# Patient Record
Sex: MALE | Race: WHITE | NOT HISPANIC OR LATINO | Employment: OTHER | URBAN - NONMETROPOLITAN AREA
[De-identification: names, ages, dates, MRNs, and addresses within clinical notes are randomized per-mention and may not be internally consistent; named-entity substitution may affect disease eponyms.]

---

## 2017-06-13 ENCOUNTER — OFFICE VISIT (OUTPATIENT)
Dept: GASTROENTEROLOGY | Facility: CLINIC | Age: 77
End: 2017-06-13

## 2017-06-13 VITALS
BODY MASS INDEX: 26.88 KG/M2 | HEART RATE: 70 BPM | DIASTOLIC BLOOD PRESSURE: 70 MMHG | OXYGEN SATURATION: 96 % | HEIGHT: 71 IN | WEIGHT: 192 LBS | SYSTOLIC BLOOD PRESSURE: 118 MMHG

## 2017-06-13 DIAGNOSIS — I10 HTN (HYPERTENSION), BENIGN: ICD-10-CM

## 2017-06-13 DIAGNOSIS — Z86.010 HX OF COLONIC POLYPS: Primary | ICD-10-CM

## 2017-06-13 PROCEDURE — S0260 H&P FOR SURGERY: HCPCS | Performed by: CLINICAL NURSE SPECIALIST

## 2017-06-13 RX ORDER — LOVASTATIN 40 MG/1
40 TABLET ORAL NIGHTLY
COMMUNITY
Start: 2017-06-06

## 2017-06-13 RX ORDER — AMLODIPINE BESYLATE 5 MG/1
5 TABLET ORAL DAILY
COMMUNITY
Start: 2017-04-06 | End: 2022-08-31

## 2017-06-13 RX ORDER — LISINOPRIL 20 MG/1
20 TABLET ORAL DAILY
COMMUNITY
Start: 2017-04-06 | End: 2022-08-31

## 2017-06-13 RX ORDER — FINASTERIDE 5 MG/1
5 TABLET, FILM COATED ORAL DAILY
COMMUNITY
Start: 2017-06-06

## 2017-06-13 NOTE — PROGRESS NOTES
Ever Monson  1940 6/13/2017  Chief Complaint   Patient presents with   • Colonoscopy     Subjective   HPI  Ever Monson is a 76 y.o. male who presents as a referral for preventative maintenance. He has no complaints of nausea or vomiting. No change in bowels. No wt loss. No BRBPR. No melena. There is a negative family hx for colon cancer. No abdominal pain.  Past Medical History:   Diagnosis Date   • BPH (benign prostatic hyperplasia)    • GERD (gastroesophageal reflux disease)    • Hx of colonic polyps    • Hypercholesteremia    • Hypertension    • Melanoma    • Sleep apnea      Past Surgical History:   Procedure Laterality Date   • CHOLECYSTECTOMY     • COLONOSCOPY W/ POLYPECTOMY  06/05/2012    Hyperplastic polyp in the rectum repeat exam in 5 years   • ENDOSCOPY  08/14/2007    Distal esophageal ring, HH   • LYMPH NODE DISSECTION      Right arm     Outpatient Prescriptions Marked as Taking for the 6/13/17 encounter (Office Visit) with JOSE Delvalle   Medication Sig Dispense Refill   • amLODIPine (NORVASC) 5 MG tablet      • finasteride (PROSCAR) 5 MG tablet      • lisinopril (PRINIVIL,ZESTRIL) 20 MG tablet      • lovastatin (MEVACOR) 40 MG tablet        No Known Allergies  Social History     Social History   • Marital status:      Spouse name: N/A   • Number of children: N/A   • Years of education: N/A     Occupational History   • Not on file.     Social History Main Topics   • Smoking status: Former Smoker   • Smokeless tobacco: Never Used   • Alcohol use No   • Drug use: Not on file   • Sexual activity: Not on file     Other Topics Concern   • Not on file     Social History Narrative     Family History   Problem Relation Age of Onset   • Colon cancer Neg Hx    • Colon polyps Neg Hx      Health Maintenance   Topic Date Due   • TDAP/TD VACCINES (1 - Tdap) 09/12/1959   • PNEUMOCOCCAL VACCINES (65+ LOW/MEDIUM RISK) (1 of 2 - PCV13) 09/12/2005   • ZOSTER VACCINE   "06/06/2017   • LIPID PANEL  06/13/2017   • INFLUENZA VACCINE  08/01/2017       REVIEW OF SYSTEMS  General: well appearing, no fever chills or sweats, no unexplained wt loss  HEENT: no acute visual or hearing disturbances  Cardiovascular: No chest pain or palpitations  Pulmonary: No shortness of breath, coughing, wheezing or hemoptysis  : No burning, urgency, hematuria, or dysuria  Musculoskeletal: No joint pain or stiffness  Peripheral: no edema  Skin: No lesions or rashes  Neuro: No dizziness, headaches, stroke, syncope  Endocrine: No hot or cold intolerances  Hematological: No blood dyscrasias    Objective   Vitals:    06/13/17 1241   BP: 118/70   Pulse: 70   SpO2: 96%   Weight: 192 lb (87.1 kg)   Height: 71\" (180.3 cm)     Body mass index is 26.78 kg/(m^2).    PHYSICAL EXAM  General: age appropriate well nourished well appearing, no acute distress  Head: normocephalic and atraumatic  Global assessment-supple  Neck-No JVD noted, no lymphadenopathy  Pulmonary-clear to auscultation bilaterally, normal respiratory effort  Cardiovascular-normal rate and rhythm, normal heart sounds, S1 and S2 noted  Abdomen-soft, non tender, non distended, normal bowel sounds all 4 quadrants, no hepatosplenomegaly noted  Extremities-No clubbing cyanosis or edema  Neuro-Non focal, converses appropriately, awake, alert, oriented    Assessment/Plan     Ever was seen today for colonoscopy.    Diagnoses and all orders for this visit:    Hx of colonic polyps  -     polyethylene glycol (GoLYTELY) 236 G solution; Take as directed by office instructions.  -     Case Request; Standing  -     Case Request    HTN (hypertension), benign  Comments:  continue BP medication the day of procedure    Other orders  -     Implement Anesthesia Orders Day of Procedure; Standing  -     Obtain Informed Consent; Standing  -     Verify bowel prep was successful; Standing        COLONOSCOPY WITH ANESTHESIA (N/A)  Body mass index is 26.78 kg/(m^2).  There are " no Patient Instructions on file for this visit.  Patient instructions on prep prior to procedure provided to the patient.    All risks, benefits, alternatives, and indications of colonoscopy procedure have been discussed with the patient. Risks to include perforation of the colon requiring possible surgery or colostomy, risk of bleeding from biopsies or removal of colon tissue, possibility of missing a colon polyp or cancer, or adverse drug reaction.  Benefits to include the diagnosis and management of disease of the colon and rectum. Alternatives to include barium enema, radiographic evaluation, lab testing or no intervention. Pt verbalizes understanding and agrees.

## 2017-08-07 ENCOUNTER — ANESTHESIA (OUTPATIENT)
Dept: GASTROENTEROLOGY | Facility: HOSPITAL | Age: 77
End: 2017-08-07

## 2017-08-07 ENCOUNTER — ANESTHESIA EVENT (OUTPATIENT)
Dept: GASTROENTEROLOGY | Facility: HOSPITAL | Age: 77
End: 2017-08-07

## 2017-08-07 ENCOUNTER — HOSPITAL ENCOUNTER (OUTPATIENT)
Facility: HOSPITAL | Age: 77
Setting detail: HOSPITAL OUTPATIENT SURGERY
Discharge: HOME | End: 2017-08-07
Attending: INTERNAL MEDICINE | Admitting: INTERNAL MEDICINE

## 2017-08-07 VITALS
DIASTOLIC BLOOD PRESSURE: 74 MMHG | WEIGHT: 185 LBS | RESPIRATION RATE: 16 BRPM | HEIGHT: 69 IN | TEMPERATURE: 97.1 F | OXYGEN SATURATION: 94 % | BODY MASS INDEX: 27.4 KG/M2 | HEART RATE: 76 BPM | SYSTOLIC BLOOD PRESSURE: 130 MMHG

## 2017-08-07 DIAGNOSIS — Z86.010 HX OF COLONIC POLYPS: ICD-10-CM

## 2017-08-07 PROCEDURE — 25010000002 PROPOFOL 10 MG/ML EMULSION: Performed by: NURSE ANESTHETIST, CERTIFIED REGISTERED

## 2017-08-07 PROCEDURE — 88305 TISSUE EXAM BY PATHOLOGIST: CPT | Performed by: INTERNAL MEDICINE

## 2017-08-07 PROCEDURE — 45385 COLONOSCOPY W/LESION REMOVAL: CPT | Performed by: INTERNAL MEDICINE

## 2017-08-07 RX ORDER — LIDOCAINE HYDROCHLORIDE 10 MG/ML
0.5 INJECTION, SOLUTION INFILTRATION; PERINEURAL ONCE AS NEEDED
Status: DISCONTINUED | OUTPATIENT
Start: 2017-08-07 | End: 2017-08-07

## 2017-08-07 RX ORDER — LIDOCAINE HYDROCHLORIDE 20 MG/ML
INJECTION, SOLUTION INFILTRATION; PERINEURAL AS NEEDED
Status: DISCONTINUED | OUTPATIENT
Start: 2017-08-07 | End: 2017-08-07 | Stop reason: SURG

## 2017-08-07 RX ORDER — SODIUM CHLORIDE 9 MG/ML
500 INJECTION, SOLUTION INTRAVENOUS CONTINUOUS PRN
Status: DISCONTINUED | OUTPATIENT
Start: 2017-08-07 | End: 2017-08-07 | Stop reason: HOSPADM

## 2017-08-07 RX ORDER — SODIUM CHLORIDE 0.9 % (FLUSH) 0.9 %
3 SYRINGE (ML) INJECTION AS NEEDED
Status: DISCONTINUED | OUTPATIENT
Start: 2017-08-07 | End: 2017-08-07 | Stop reason: HOSPADM

## 2017-08-07 RX ORDER — PROPOFOL 10 MG/ML
VIAL (ML) INTRAVENOUS AS NEEDED
Status: DISCONTINUED | OUTPATIENT
Start: 2017-08-07 | End: 2017-08-07 | Stop reason: SURG

## 2017-08-07 RX ADMIN — PROPOFOL 30 MG: 10 INJECTION, EMULSION INTRAVENOUS at 09:00

## 2017-08-07 RX ADMIN — LIDOCAINE HYDROCHLORIDE 0.5 ML: 10 INJECTION, SOLUTION EPIDURAL; INFILTRATION; INTRACAUDAL; PERINEURAL at 07:58

## 2017-08-07 RX ADMIN — PROPOFOL 70 MG: 10 INJECTION, EMULSION INTRAVENOUS at 08:55

## 2017-08-07 RX ADMIN — SODIUM CHLORIDE 500 ML: 9 INJECTION, SOLUTION INTRAVENOUS at 07:57

## 2017-08-07 RX ADMIN — LIDOCAINE HYDROCHLORIDE 50 MG: 20 INJECTION, SOLUTION INFILTRATION; PERINEURAL at 08:45

## 2017-08-07 RX ADMIN — PROPOFOL 70 MG: 10 INJECTION, EMULSION INTRAVENOUS at 08:50

## 2017-08-07 RX ADMIN — PROPOFOL 50 MG: 10 INJECTION, EMULSION INTRAVENOUS at 09:04

## 2017-08-07 NOTE — ANESTHESIA PREPROCEDURE EVALUATION
Anesthesia Evaluation     Patient summary reviewed   no history of anesthetic complications:         Airway   Mallampati: I  TM distance: >3 FB  Neck ROM: full  no difficulty expected  Dental    (+) upper dentures and lower dentures    Pulmonary - normal exam   (+) sleep apnea (noncompliant with CPAP),   (-) COPD, asthma, not a smoker  Cardiovascular - normal exam  Exercise tolerance: good (4-7 METS)    (+) hypertension, hyperlipidemia  (-) past MI, angina      Neuro/Psych  (-) seizures, TIA, CVA  GI/Hepatic/Renal/Endo    (+)  GERD,   (-) liver disease, no renal disease, diabetes    Musculoskeletal     Abdominal    Substance History      OB/GYN          Other      history of cancer (lymph node dissection- right axilla- Melanoma)                                    Anesthesia Plan    ASA 2     general   total IV anesthesia  intravenous induction   Anesthetic plan and risks discussed with patient.

## 2017-08-07 NOTE — PLAN OF CARE
Problem: GI Endoscopy (Adult)  Goal: Signs and Symptoms of Listed Potential Problems Will be Absent or Manageable (GI Endoscopy)  Outcome: Outcome(s) achieved Date Met:  08/07/17

## 2017-08-07 NOTE — PLAN OF CARE
Problem: Patient Care Overview (Adult)  Goal: Plan of Care Review  Outcome: Outcome(s) achieved Date Met:  08/07/17 08/07/17 0915   Patient Care Overview   Progress improving   Outcome Evaluation   Outcome Summary/Follow up Plan d/c criteria met   Coping/Psychosocial Response Interventions   Plan Of Care Reviewed With patient;spouse;daughter

## 2017-08-07 NOTE — PLAN OF CARE
Problem: Patient Care Overview (Adult)  Goal: Adult Individualization and Mutuality  Outcome: Ongoing (interventions implemented as appropriate)    08/07/17 0746   Individualization   Patient Specific Preferences none   Patient Specific Goals none   Patient Specific Interventions none   Mutuality/Individual Preferences   What Anxieties, Fears or Concerns Do You Have About Your Health or Care? none   What Questions Do You Have About Your Health or Care? none   What Information Would Help Us Give You More Personalized Care? none

## 2017-08-07 NOTE — PLAN OF CARE
Problem: Patient Care Overview (Adult)  Goal: Plan of Care Review  Outcome: Ongoing (interventions implemented as appropriate)    08/07/17 0805   Patient Care Overview   Progress improving   Outcome Evaluation   Outcome Summary/Follow up Plan no noted problems

## 2017-08-07 NOTE — H&P
"[facility] Gastroenterology  Pre Procedure History & Physical    Chief Complaint:   History of polyps    Subjective     HPI:   Here for colonoscopy.  History of polyps.  Please see office note dated 6/13/2017    Past Medical History:   Past Medical History:   Diagnosis Date   • BPH (benign prostatic hyperplasia)    • GERD (gastroesophageal reflux disease)    • Hx of colonic polyps    • Hypercholesteremia    • Hypertension    • Melanoma    • Sleep apnea        Past Surgical History:  [unfilled]    Family History:  Family History   Problem Relation Age of Onset   • Colon cancer Neg Hx    • Colon polyps Neg Hx        Social History:   reports that he has quit smoking. He has never used smokeless tobacco. He reports that he does not drink alcohol or use illicit drugs.    Medications:   Prior to Admission medications    Medication Sig Start Date End Date Taking? Authorizing Provider   amLODIPine (NORVASC) 5 MG tablet  4/6/17  Yes Historical Provider, MD   finasteride (PROSCAR) 5 MG tablet  6/6/17  Yes Historical Provider, MD   lisinopril (PRINIVIL,ZESTRIL) 20 MG tablet  4/6/17  Yes Historical Provider, MD   lovastatin (MEVACOR) 40 MG tablet  6/6/17  Yes Historical Provider, MD   polyethylene glycol (GoLYTELY) 236 G solution Take as directed by office instructions. 6/13/17  Yes Teetee Bishop, JOSE       Allergies:  Review of patient's allergies indicates no known allergies.    Objective     Blood pressure 133/70, pulse 66, temperature 97.1 °F (36.2 °C), temperature source Temporal Artery , resp. rate 20, height 69\" (175.3 cm), weight 185 lb (83.9 kg), SpO2 95 %.    Physical Exam   Constitutional: Pt is oriented to person, place, and in no distress.   HENT: Mouth/Throat: Oropharynx is clear.   Cardiovascular: Normal rate, regular rhythm.    Pulmonary/Chest: Effort normal. No respiratory distress. No  wheezes.   Abdominal: Soft. Non-distended.  Skin: Skin is warm and dry.   Psychiatric: Mood, memory, affect and " judgment appear normal.     Assessment/Plan     Diagnosis:  History of polyps    Anticipated Surgical Procedure:    Proceed with colonoscopy as scheduled    The following major R/B/A were discussed with the patient, however the list is not all inclusive . Risk:  Bleeding (immediate and delayed), perforation (rupture or tear), reaction to medication, missed lesion/cancer, pain during the procedure, infection, need for surgery, need for ostomy, need for mechanical ventilation (breathing machine), death.  Benefits: removal of polyp/tissue, burn/clip/or inject to stop bleeding, removal of foreign body, dilate any stricture.  Alternatives: Xray or CT, surgery, do nothing with associated risk   The patient was given time to ask question and received explanation, and agrees to proceed as per History and Physical.   No guarantee given or expressed.    EMR Dragon/transcription disclaimer: Much of this encounter note is an electronic transcription/translation of spoken language to printed text.  The electronic translation of spoken language may permit erroneous, or at times, nonsensical words or phrases to be inadvertently transcribed.  Although I have reviewed the note for such errors, some may still exist.    Claudy Gibbons MD  8:49 AM  8/7/2017

## 2017-08-07 NOTE — ANESTHESIA POSTPROCEDURE EVALUATION
Patient: Ever Monson    Procedure Summary     Date Anesthesia Start Anesthesia Stop Room / Location    08/07/17 0846 0914  [facility] /  [facility]       Procedure Diagnosis Surgeon Provider    COLONOSCOPY WITH ANESTHESIA (N/A ) Hx of colonic polyps  (Hx of colonic polyps [Z86.010]) MD Alpesh Luis CRNA          Anesthesia Type: general  Last vitals  BP        Temp        Pulse       Resp        SpO2          Post Anesthesia Care and Evaluation    Patient location during evaluation: PHASE II  Patient participation: complete - patient participated  Level of consciousness: awake  Pain score: 0  Pain management: adequate  Airway patency: patent  Anesthetic complications: No anesthetic complications  PONV Status: none  Cardiovascular status: acceptable  Respiratory status: acceptable  Hydration status: acceptable

## 2017-08-08 LAB
CYTO UR: NORMAL
LAB AP CASE REPORT: NORMAL
LAB AP CLINICAL INFORMATION: NORMAL
Lab: NORMAL
PATH REPORT.FINAL DX SPEC: NORMAL
PATH REPORT.GROSS SPEC: NORMAL

## 2018-06-18 ENCOUNTER — TRANSCRIBE ORDERS (OUTPATIENT)
Dept: ADMINISTRATIVE | Facility: HOSPITAL | Age: 78
End: 2018-06-18

## 2018-06-18 ENCOUNTER — HOSPITAL ENCOUNTER (OUTPATIENT)
Dept: GENERAL RADIOLOGY | Facility: HOSPITAL | Age: 78
Discharge: HOME | End: 2018-06-18
Attending: INTERNAL MEDICINE | Admitting: INTERNAL MEDICINE

## 2018-06-18 DIAGNOSIS — I10 ESSENTIAL HYPERTENSION: Primary | ICD-10-CM

## 2018-06-18 DIAGNOSIS — I10 ESSENTIAL HYPERTENSION: ICD-10-CM

## 2018-06-18 PROCEDURE — 71046 X-RAY EXAM CHEST 2 VIEWS: CPT

## 2018-07-03 ENCOUNTER — APPOINTMENT (OUTPATIENT)
Dept: CT IMAGING | Age: 78
End: 2018-07-03
Payer: MEDICARE

## 2018-07-03 ENCOUNTER — APPOINTMENT (OUTPATIENT)
Dept: GENERAL RADIOLOGY | Age: 78
End: 2018-07-03
Payer: MEDICARE

## 2018-07-03 ENCOUNTER — HOSPITAL ENCOUNTER (EMERGENCY)
Age: 78
Discharge: HOME | End: 2018-07-03
Attending: EMERGENCY MEDICINE
Payer: MEDICARE

## 2018-07-03 VITALS
TEMPERATURE: 98.5 F | DIASTOLIC BLOOD PRESSURE: 76 MMHG | HEART RATE: 75 BPM | WEIGHT: 185 LBS | SYSTOLIC BLOOD PRESSURE: 144 MMHG | HEIGHT: 71 IN | OXYGEN SATURATION: 93 % | RESPIRATION RATE: 16 BRPM | BODY MASS INDEX: 25.9 KG/M2

## 2018-07-03 DIAGNOSIS — C78.7 LIVER METASTASIS (HCC): ICD-10-CM

## 2018-07-03 DIAGNOSIS — R10.9 ABDOMINAL PAIN, UNSPECIFIED ABDOMINAL LOCATION: Primary | ICD-10-CM

## 2018-07-03 DIAGNOSIS — N28.9 LESION OF LEFT NATIVE KIDNEY: ICD-10-CM

## 2018-07-03 LAB
ALBUMIN SERPL-MCNC: 3.9 G/DL (ref 3.5–5.2)
ALP BLD-CCNC: 62 U/L (ref 40–130)
ALT SERPL-CCNC: 27 U/L (ref 5–41)
ANION GAP SERPL CALCULATED.3IONS-SCNC: 12 MMOL/L (ref 7–19)
AST SERPL-CCNC: 30 U/L (ref 5–40)
BASOPHILS ABSOLUTE: 0 K/UL (ref 0–0.2)
BASOPHILS RELATIVE PERCENT: 0.4 % (ref 0–1)
BILIRUB SERPL-MCNC: 1.1 MG/DL (ref 0.2–1.2)
BILIRUBIN URINE: NEGATIVE
BLOOD, URINE: NEGATIVE
BUN BLDV-MCNC: 15 MG/DL (ref 8–23)
CALCIUM SERPL-MCNC: 9.5 MG/DL (ref 8.8–10.2)
CHLORIDE BLD-SCNC: 99 MMOL/L (ref 98–111)
CLARITY: CLEAR
CO2: 26 MMOL/L (ref 22–29)
COLOR: YELLOW
CREAT SERPL-MCNC: 1.2 MG/DL (ref 0.5–1.2)
EOSINOPHILS ABSOLUTE: 0 K/UL (ref 0–0.6)
EOSINOPHILS RELATIVE PERCENT: 0.6 % (ref 0–5)
GFR NON-AFRICAN AMERICAN: 59
GLUCOSE BLD-MCNC: 107 MG/DL (ref 74–109)
GLUCOSE URINE: NEGATIVE MG/DL
HCT VFR BLD CALC: 47.8 % (ref 42–52)
HEMOGLOBIN: 15.8 G/DL (ref 14–18)
KETONES, URINE: NEGATIVE MG/DL
LEUKOCYTE ESTERASE, URINE: NEGATIVE
LIPASE: 28 U/L (ref 13–60)
LYMPHOCYTES ABSOLUTE: 0.7 K/UL (ref 1.1–4.5)
LYMPHOCYTES RELATIVE PERCENT: 10.1 % (ref 20–40)
MCH RBC QN AUTO: 30.9 PG (ref 27–31)
MCHC RBC AUTO-ENTMCNC: 33.1 G/DL (ref 33–37)
MCV RBC AUTO: 93.4 FL (ref 80–94)
MONOCYTES ABSOLUTE: 0.6 K/UL (ref 0–0.9)
MONOCYTES RELATIVE PERCENT: 8.1 % (ref 0–10)
NEUTROPHILS ABSOLUTE: 5.9 K/UL (ref 1.5–7.5)
NEUTROPHILS RELATIVE PERCENT: 80.7 % (ref 50–65)
NITRITE, URINE: NEGATIVE
PDW BLD-RTO: 12.4 % (ref 11.5–14.5)
PH UA: 6
PLATELET # BLD: 157 K/UL (ref 130–400)
PMV BLD AUTO: 10 FL (ref 9.4–12.4)
POTASSIUM SERPL-SCNC: 4.5 MMOL/L (ref 3.5–5)
PROTEIN UA: NEGATIVE MG/DL
RBC # BLD: 5.12 M/UL (ref 4.7–6.1)
SODIUM BLD-SCNC: 137 MMOL/L (ref 136–145)
SPECIFIC GRAVITY UA: 1.01
TOTAL PROTEIN: 7.2 G/DL (ref 6.6–8.7)
TROPONIN: <0.01 NG/ML (ref 0–0.03)
URINE REFLEX TO CULTURE: NORMAL
UROBILINOGEN, URINE: 0.2 E.U./DL
WBC # BLD: 7.3 K/UL (ref 4.8–10.8)

## 2018-07-03 PROCEDURE — 99284 EMERGENCY DEPT VISIT MOD MDM: CPT

## 2018-07-03 PROCEDURE — 81003 URINALYSIS AUTO W/O SCOPE: CPT

## 2018-07-03 PROCEDURE — 93005 ELECTROCARDIOGRAM TRACING: CPT

## 2018-07-03 PROCEDURE — 36415 COLL VENOUS BLD VENIPUNCTURE: CPT

## 2018-07-03 PROCEDURE — 83690 ASSAY OF LIPASE: CPT

## 2018-07-03 PROCEDURE — 71045 X-RAY EXAM CHEST 1 VIEW: CPT

## 2018-07-03 PROCEDURE — 85025 COMPLETE CBC W/AUTO DIFF WBC: CPT

## 2018-07-03 PROCEDURE — 84484 ASSAY OF TROPONIN QUANT: CPT

## 2018-07-03 PROCEDURE — 80053 COMPREHEN METABOLIC PANEL: CPT

## 2018-07-03 PROCEDURE — 99284 EMERGENCY DEPT VISIT MOD MDM: CPT | Performed by: EMERGENCY MEDICINE

## 2018-07-03 PROCEDURE — 6360000004 HC RX CONTRAST MEDICATION: Performed by: EMERGENCY MEDICINE

## 2018-07-03 PROCEDURE — 74177 CT ABD & PELVIS W/CONTRAST: CPT

## 2018-07-03 RX ORDER — HYDROCODONE BITARTRATE AND ACETAMINOPHEN 7.5; 325 MG/1; MG/1
1 TABLET ORAL EVERY 6 HOURS PRN
Qty: 28 TABLET | Refills: 0 | Status: SHIPPED | OUTPATIENT
Start: 2018-07-03 | End: 2018-07-10

## 2018-07-03 RX ORDER — ONDANSETRON 4 MG/1
4 TABLET, ORALLY DISINTEGRATING ORAL EVERY 8 HOURS PRN
Qty: 20 TABLET | Refills: 0 | Status: SHIPPED | OUTPATIENT
Start: 2018-07-03 | End: 2019-09-18 | Stop reason: SDUPTHER

## 2018-07-03 RX ADMIN — IOPAMIDOL 90 ML: 755 INJECTION, SOLUTION INTRAVENOUS at 15:23

## 2018-07-03 ASSESSMENT — ENCOUNTER SYMPTOMS
NAUSEA: 0
ABDOMINAL PAIN: 1
BELCHING: 1
VOMITING: 0
DIARRHEA: 0
SHORTNESS OF BREATH: 0

## 2018-07-03 ASSESSMENT — PAIN SCALES - GENERAL: PAINLEVEL_OUTOF10: 5

## 2018-07-05 LAB
EKG P AXIS: -5 DEGREES
EKG P-R INTERVAL: 176 MS
EKG Q-T INTERVAL: 402 MS
EKG QRS DURATION: 82 MS
EKG QTC CALCULATION (BAZETT): 410 MS
EKG T AXIS: 14 DEGREES

## 2018-07-11 ENCOUNTER — TRANSCRIBE ORDERS (OUTPATIENT)
Dept: ADMINISTRATIVE | Facility: HOSPITAL | Age: 78
End: 2018-07-11

## 2018-07-11 DIAGNOSIS — C43.60 MALIGNANT MELANOMA OF UPPER EXTREMITY, INCLUDING SHOULDER, UNSPECIFIED LATERALITY (HCC): ICD-10-CM

## 2018-07-11 DIAGNOSIS — R51.9 NONINTRACTABLE HEADACHE, UNSPECIFIED CHRONICITY PATTERN, UNSPECIFIED HEADACHE TYPE: Primary | ICD-10-CM

## 2018-07-11 DIAGNOSIS — K76.9 LIVER LESION: ICD-10-CM

## 2018-07-11 DIAGNOSIS — R11.0 NAUSEA: ICD-10-CM

## 2018-07-11 DIAGNOSIS — N28.89 RENAL MASS: ICD-10-CM

## 2018-07-13 ENCOUNTER — HOSPITAL ENCOUNTER (OUTPATIENT)
Dept: MRI IMAGING | Facility: HOSPITAL | Age: 78
Discharge: HOME | End: 2018-07-13
Attending: INTERNAL MEDICINE | Admitting: INTERNAL MEDICINE

## 2018-07-13 ENCOUNTER — HOSPITAL ENCOUNTER (OUTPATIENT)
Dept: ULTRASOUND IMAGING | Facility: HOSPITAL | Age: 78
Discharge: HOME | End: 2018-07-13
Attending: INTERNAL MEDICINE

## 2018-07-13 ENCOUNTER — HOSPITAL ENCOUNTER (OUTPATIENT)
Dept: MRI IMAGING | Facility: HOSPITAL | Age: 78
Discharge: HOME | End: 2018-07-13
Attending: INTERNAL MEDICINE

## 2018-07-13 DIAGNOSIS — C43.60 MALIGNANT MELANOMA OF UPPER EXTREMITY, INCLUDING SHOULDER, UNSPECIFIED LATERALITY (HCC): ICD-10-CM

## 2018-07-13 DIAGNOSIS — R11.0 NAUSEA: ICD-10-CM

## 2018-07-13 DIAGNOSIS — N28.89 RENAL MASS: ICD-10-CM

## 2018-07-13 DIAGNOSIS — R51.9 NONINTRACTABLE HEADACHE, UNSPECIFIED CHRONICITY PATTERN, UNSPECIFIED HEADACHE TYPE: ICD-10-CM

## 2018-07-13 DIAGNOSIS — K76.9 LIVER LESION: ICD-10-CM

## 2018-07-13 LAB — CREAT BLDA-MCNC: 1.2 MG/DL (ref 0.6–1.3)

## 2018-07-13 PROCEDURE — 70553 MRI BRAIN STEM W/O & W/DYE: CPT

## 2018-07-13 PROCEDURE — A9577 INJ MULTIHANCE: HCPCS | Performed by: INTERNAL MEDICINE

## 2018-07-13 PROCEDURE — 0 GADOBENATE DIMEGLUMINE 529 MG/ML SOLUTION: Performed by: INTERNAL MEDICINE

## 2018-07-13 PROCEDURE — 74183 MRI ABD W/O CNTR FLWD CNTR: CPT

## 2018-07-13 PROCEDURE — 82565 ASSAY OF CREATININE: CPT

## 2018-07-13 PROCEDURE — 76775 US EXAM ABDO BACK WALL LIM: CPT

## 2018-07-13 RX ADMIN — GADOBENATE DIMEGLUMINE 18 ML: 529 INJECTION, SOLUTION INTRAVENOUS at 15:15

## 2018-07-18 ENCOUNTER — TRANSCRIBE ORDERS (OUTPATIENT)
Dept: ADMINISTRATIVE | Facility: HOSPITAL | Age: 78
End: 2018-07-18

## 2018-07-18 DIAGNOSIS — R59.9 SWELLING OF LYMPH NODES: Primary | ICD-10-CM

## 2018-07-18 DIAGNOSIS — Z85.820 PERSONAL HISTORY OF MALIGNANT MELANOMA OF SKIN: ICD-10-CM

## 2018-07-24 ENCOUNTER — TRANSCRIBE ORDERS (OUTPATIENT)
Dept: ADMINISTRATIVE | Facility: HOSPITAL | Age: 78
End: 2018-07-24

## 2018-07-24 ENCOUNTER — HOSPITAL ENCOUNTER (OUTPATIENT)
Dept: CT IMAGING | Facility: HOSPITAL | Age: 78
Discharge: HOME | End: 2018-07-24
Attending: INTERNAL MEDICINE

## 2018-07-24 ENCOUNTER — HOSPITAL ENCOUNTER (OUTPATIENT)
Dept: NUCLEAR MEDICINE | Facility: HOSPITAL | Age: 78
End: 2018-07-24
Attending: INTERNAL MEDICINE

## 2018-07-24 ENCOUNTER — HOSPITAL ENCOUNTER (OUTPATIENT)
Dept: NUCLEAR MEDICINE | Facility: HOSPITAL | Age: 78
Discharge: HOME | End: 2018-07-24
Attending: INTERNAL MEDICINE

## 2018-07-24 DIAGNOSIS — R59.9 SWELLING OF LYMPH NODES: ICD-10-CM

## 2018-07-24 DIAGNOSIS — R22.9 LOCALIZED SUPERFICIAL SWELLING, MASS, OR LUMP: Primary | ICD-10-CM

## 2018-07-24 DIAGNOSIS — Z85.820 PERSONAL HISTORY OF MALIGNANT MELANOMA OF SKIN: ICD-10-CM

## 2018-07-24 LAB — CREAT BLDA-MCNC: 1.3 MG/DL (ref 0.6–1.3)

## 2018-07-24 PROCEDURE — 0 TECHNETIUM OXIDRONATE KIT: Performed by: INTERNAL MEDICINE

## 2018-07-24 PROCEDURE — 82565 ASSAY OF CREATININE: CPT

## 2018-07-24 PROCEDURE — 71260 CT THORAX DX C+: CPT

## 2018-07-24 PROCEDURE — 78306 BONE IMAGING WHOLE BODY: CPT

## 2018-07-24 PROCEDURE — A9561 TC99M OXIDRONATE: HCPCS | Performed by: INTERNAL MEDICINE

## 2018-07-24 PROCEDURE — 25010000002 IOPAMIDOL 61 % SOLUTION: Performed by: INTERNAL MEDICINE

## 2018-07-24 RX ADMIN — TECHNETIUM TC 99M OXIDRONATE 1 DOSE: 3.15 INJECTION, POWDER, LYOPHILIZED, FOR SOLUTION INTRAVENOUS at 08:30

## 2018-07-24 RX ADMIN — IOPAMIDOL 100 ML: 612 INJECTION, SOLUTION INTRAVENOUS at 09:00

## 2018-07-26 ENCOUNTER — HOSPITAL ENCOUNTER (OUTPATIENT)
Dept: ULTRASOUND IMAGING | Facility: HOSPITAL | Age: 78
Discharge: HOME | End: 2018-07-26
Attending: SPECIALIST | Admitting: SPECIALIST

## 2018-07-26 VITALS — OXYGEN SATURATION: 97 % | HEART RATE: 63 BPM | DIASTOLIC BLOOD PRESSURE: 87 MMHG | SYSTOLIC BLOOD PRESSURE: 137 MMHG

## 2018-07-26 DIAGNOSIS — R22.9 LOCALIZED SUPERFICIAL SWELLING, MASS, OR LUMP: ICD-10-CM

## 2018-07-26 PROCEDURE — 76942 ECHO GUIDE FOR BIOPSY: CPT

## 2018-07-26 PROCEDURE — 88172 CYTP DX EVAL FNA 1ST EA SITE: CPT | Performed by: SPECIALIST

## 2018-07-26 PROCEDURE — 88341 IMHCHEM/IMCYTCHM EA ADD ANTB: CPT | Performed by: SPECIALIST

## 2018-07-26 PROCEDURE — 88305 TISSUE EXAM BY PATHOLOGIST: CPT | Performed by: SPECIALIST

## 2018-07-26 PROCEDURE — 88173 CYTOPATH EVAL FNA REPORT: CPT | Performed by: SPECIALIST

## 2018-07-26 PROCEDURE — 76882 US LMTD JT/FCL EVL NVASC XTR: CPT

## 2018-07-26 PROCEDURE — 88334 PATH CONSLTJ SURG CYTO XM EA: CPT | Performed by: SPECIALIST

## 2018-07-26 PROCEDURE — 88342 IMHCHEM/IMCYTCHM 1ST ANTB: CPT | Performed by: SPECIALIST

## 2018-08-01 ENCOUNTER — TRANSCRIBE ORDERS (OUTPATIENT)
Dept: ADMINISTRATIVE | Facility: HOSPITAL | Age: 78
End: 2018-08-01

## 2018-08-01 DIAGNOSIS — Z85.820 HISTORY OF MALIGNANT MELANOMA OF SKIN: ICD-10-CM

## 2018-08-01 DIAGNOSIS — C43.60 MALIGNANT MELANOMA OF UPPER EXTREMITY, INCLUDING SHOULDER, UNSPECIFIED LATERALITY (HCC): Primary | ICD-10-CM

## 2018-08-03 ENCOUNTER — HOSPITAL ENCOUNTER (OUTPATIENT)
Facility: HOSPITAL | Age: 78
Setting detail: HOSPITAL OUTPATIENT SURGERY
Discharge: HOME | End: 2018-08-03
Attending: SPECIALIST | Admitting: SPECIALIST

## 2018-08-03 ENCOUNTER — ANESTHESIA EVENT (OUTPATIENT)
Dept: PERIOP | Facility: HOSPITAL | Age: 78
End: 2018-08-03

## 2018-08-03 ENCOUNTER — APPOINTMENT (OUTPATIENT)
Dept: GENERAL RADIOLOGY | Facility: HOSPITAL | Age: 78
End: 2018-08-03

## 2018-08-03 ENCOUNTER — ANESTHESIA (OUTPATIENT)
Dept: PERIOP | Facility: HOSPITAL | Age: 78
End: 2018-08-03

## 2018-08-03 VITALS
WEIGHT: 183.86 LBS | OXYGEN SATURATION: 98 % | RESPIRATION RATE: 16 BRPM | BODY MASS INDEX: 27.23 KG/M2 | DIASTOLIC BLOOD PRESSURE: 61 MMHG | TEMPERATURE: 98 F | SYSTOLIC BLOOD PRESSURE: 151 MMHG | HEIGHT: 69 IN | HEART RATE: 81 BPM

## 2018-08-03 PROCEDURE — 25010000002 FENTANYL CITRATE (PF) 100 MCG/2ML SOLUTION: Performed by: NURSE ANESTHETIST, CERTIFIED REGISTERED

## 2018-08-03 PROCEDURE — 25010000002 HEPARIN LOCK FLUSH 10 UNIT/ML SOLUTION: Performed by: SPECIALIST

## 2018-08-03 PROCEDURE — C1788 PORT, INDWELLING, IMP: HCPCS | Performed by: SPECIALIST

## 2018-08-03 PROCEDURE — 25010000002 VANCOMYCIN PER 500 MG: Performed by: SPECIALIST

## 2018-08-03 PROCEDURE — 25010000002 PROPOFOL 10 MG/ML EMULSION: Performed by: NURSE ANESTHETIST, CERTIFIED REGISTERED

## 2018-08-03 PROCEDURE — 76000 FLUOROSCOPY <1 HR PHYS/QHP: CPT

## 2018-08-03 PROCEDURE — 25010000002 PROPOFOL 1000 MG/ML EMULSION: Performed by: NURSE ANESTHETIST, CERTIFIED REGISTERED

## 2018-08-03 DEVICE — POWERPORT M.R.I. IMPLANTABLE PORT WITH ATTACHABLE 9.6F OPEN-ENDED SINGLE-LUMEN VENOUS CATHETER INTERMEDIATE KIT (WITH SUTURE PLUGS)
Type: IMPLANTABLE DEVICE | Status: FUNCTIONAL
Brand: POWERPORT M.R.I.

## 2018-08-03 RX ORDER — FENTANYL CITRATE 50 UG/ML
INJECTION, SOLUTION INTRAMUSCULAR; INTRAVENOUS AS NEEDED
Status: DISCONTINUED | OUTPATIENT
Start: 2018-08-03 | End: 2018-08-03 | Stop reason: SURG

## 2018-08-03 RX ORDER — FENTANYL CITRATE 50 UG/ML
25 INJECTION, SOLUTION INTRAMUSCULAR; INTRAVENOUS AS NEEDED
Status: DISCONTINUED | OUTPATIENT
Start: 2018-08-03 | End: 2018-08-03 | Stop reason: HOSPADM

## 2018-08-03 RX ORDER — ONDANSETRON 2 MG/ML
4 INJECTION INTRAMUSCULAR; INTRAVENOUS ONCE AS NEEDED
Status: DISCONTINUED | OUTPATIENT
Start: 2018-08-03 | End: 2018-08-03 | Stop reason: HOSPADM

## 2018-08-03 RX ORDER — SODIUM CHLORIDE 0.9 % (FLUSH) 0.9 %
1-10 SYRINGE (ML) INJECTION AS NEEDED
Status: DISCONTINUED | OUTPATIENT
Start: 2018-08-03 | End: 2018-08-03 | Stop reason: HOSPADM

## 2018-08-03 RX ORDER — HYDROCODONE BITARTRATE AND ACETAMINOPHEN 7.5; 325 MG/1; MG/1
1 TABLET ORAL EVERY 4 HOURS PRN
Qty: 20 TABLET | Refills: 0 | Status: SHIPPED | OUTPATIENT
Start: 2018-08-03

## 2018-08-03 RX ORDER — LIDOCAINE HYDROCHLORIDE AND EPINEPHRINE 10; 10 MG/ML; UG/ML
INJECTION, SOLUTION INFILTRATION; PERINEURAL AS NEEDED
Status: DISCONTINUED | OUTPATIENT
Start: 2018-08-03 | End: 2018-08-03 | Stop reason: HOSPADM

## 2018-08-03 RX ORDER — METOCLOPRAMIDE HYDROCHLORIDE 5 MG/ML
5 INJECTION INTRAMUSCULAR; INTRAVENOUS
Status: DISCONTINUED | OUTPATIENT
Start: 2018-08-03 | End: 2018-08-03 | Stop reason: HOSPADM

## 2018-08-03 RX ORDER — MEPERIDINE HYDROCHLORIDE 50 MG/ML
12.5 INJECTION INTRAMUSCULAR; INTRAVENOUS; SUBCUTANEOUS
Status: DISCONTINUED | OUTPATIENT
Start: 2018-08-03 | End: 2018-08-03 | Stop reason: HOSPADM

## 2018-08-03 RX ORDER — ONDANSETRON HCL 8 MG
8 TABLET ORAL EVERY 8 HOURS PRN
Qty: 5 TABLET | Refills: 1 | Status: SHIPPED | OUTPATIENT
Start: 2018-08-03

## 2018-08-03 RX ORDER — SODIUM CHLORIDE 0.9 % (FLUSH) 0.9 %
3 SYRINGE (ML) INJECTION AS NEEDED
Status: DISCONTINUED | OUTPATIENT
Start: 2018-08-03 | End: 2018-08-03 | Stop reason: HOSPADM

## 2018-08-03 RX ORDER — OXYCODONE AND ACETAMINOPHEN 7.5; 325 MG/1; MG/1
2 TABLET ORAL ONCE AS NEEDED
Status: DISCONTINUED | OUTPATIENT
Start: 2018-08-03 | End: 2018-08-03 | Stop reason: HOSPADM

## 2018-08-03 RX ORDER — PROPOFOL 10 MG/ML
VIAL (ML) INTRAVENOUS AS NEEDED
Status: DISCONTINUED | OUTPATIENT
Start: 2018-08-03 | End: 2018-08-03 | Stop reason: SURG

## 2018-08-03 RX ORDER — IBUPROFEN 600 MG/1
600 TABLET ORAL ONCE AS NEEDED
Status: DISCONTINUED | OUTPATIENT
Start: 2018-08-03 | End: 2018-08-03 | Stop reason: HOSPADM

## 2018-08-03 RX ORDER — HYDROCODONE BITARTRATE AND ACETAMINOPHEN 10; 325 MG/1; MG/1
1 TABLET ORAL EVERY 6 HOURS PRN
COMMUNITY

## 2018-08-03 RX ORDER — OXYCODONE AND ACETAMINOPHEN 10; 325 MG/1; MG/1
1 TABLET ORAL ONCE AS NEEDED
Status: DISCONTINUED | OUTPATIENT
Start: 2018-08-03 | End: 2018-08-03 | Stop reason: HOSPADM

## 2018-08-03 RX ORDER — LABETALOL HYDROCHLORIDE 5 MG/ML
5 INJECTION, SOLUTION INTRAVENOUS
Status: DISCONTINUED | OUTPATIENT
Start: 2018-08-03 | End: 2018-08-03 | Stop reason: HOSPADM

## 2018-08-03 RX ORDER — IPRATROPIUM BROMIDE AND ALBUTEROL SULFATE 2.5; .5 MG/3ML; MG/3ML
3 SOLUTION RESPIRATORY (INHALATION) ONCE AS NEEDED
Status: DISCONTINUED | OUTPATIENT
Start: 2018-08-03 | End: 2018-08-03 | Stop reason: HOSPADM

## 2018-08-03 RX ORDER — HEPARIN SODIUM,PORCINE 10 UNIT/ML
VIAL (ML) INTRAVENOUS AS NEEDED
Status: DISCONTINUED | OUTPATIENT
Start: 2018-08-03 | End: 2018-08-03 | Stop reason: HOSPADM

## 2018-08-03 RX ORDER — NALOXONE HCL 0.4 MG/ML
0.4 VIAL (ML) INJECTION AS NEEDED
Status: DISCONTINUED | OUTPATIENT
Start: 2018-08-03 | End: 2018-08-03 | Stop reason: HOSPADM

## 2018-08-03 RX ORDER — SODIUM CHLORIDE, SODIUM LACTATE, POTASSIUM CHLORIDE, CALCIUM CHLORIDE 600; 310; 30; 20 MG/100ML; MG/100ML; MG/100ML; MG/100ML
1000 INJECTION, SOLUTION INTRAVENOUS CONTINUOUS
Status: DISCONTINUED | OUTPATIENT
Start: 2018-08-03 | End: 2018-08-03 | Stop reason: HOSPADM

## 2018-08-03 RX ORDER — ONDANSETRON 4 MG/1
4 TABLET, FILM COATED ORAL EVERY 8 HOURS PRN
COMMUNITY
End: 2022-08-31

## 2018-08-03 RX ORDER — SODIUM CHLORIDE, SODIUM LACTATE, POTASSIUM CHLORIDE, CALCIUM CHLORIDE 600; 310; 30; 20 MG/100ML; MG/100ML; MG/100ML; MG/100ML
100 INJECTION, SOLUTION INTRAVENOUS CONTINUOUS
Status: DISCONTINUED | OUTPATIENT
Start: 2018-08-03 | End: 2018-08-03 | Stop reason: HOSPADM

## 2018-08-03 RX ADMIN — PROPOFOL 50 MG: 10 INJECTION, EMULSION INTRAVENOUS at 15:40

## 2018-08-03 RX ADMIN — LIDOCAINE HYDROCHLORIDE 0.5 ML: 10 INJECTION, SOLUTION EPIDURAL; INFILTRATION; INTRACAUDAL; PERINEURAL at 12:28

## 2018-08-03 RX ADMIN — PROPOFOL 100 MCG/KG/MIN: 10 INJECTION, EMULSION INTRAVENOUS at 15:40

## 2018-08-03 RX ADMIN — VANCOMYCIN HYDROCHLORIDE 1000 MG: 1 INJECTION, POWDER, LYOPHILIZED, FOR SOLUTION INTRAVENOUS at 15:26

## 2018-08-03 RX ADMIN — FENTANYL CITRATE 50 MCG: 50 INJECTION, SOLUTION INTRAMUSCULAR; INTRAVENOUS at 15:47

## 2018-08-03 RX ADMIN — SODIUM CHLORIDE, POTASSIUM CHLORIDE, SODIUM LACTATE AND CALCIUM CHLORIDE 1000 ML: 600; 310; 30; 20 INJECTION, SOLUTION INTRAVENOUS at 12:28

## 2018-08-03 RX ADMIN — FENTANYL CITRATE 50 MCG: 50 INJECTION, SOLUTION INTRAMUSCULAR; INTRAVENOUS at 15:39

## 2018-08-03 NOTE — H&P
Patient Care Team:  Arnaldo Ramsey MD as PCP - General (General Practice)    Chief complaint recurrent melanoma    Subjective     Subjective     Ever Monson  is a 77 y.o. male presents with history of a melanoma excised 2014.  He is done well recently he has noted a nodule in his axilla is some scarring had evaluation showing multiple nodules on his PET scan in the liver and also other areas he is being followed by Dr. Us and with this he has a recurrent melanoma the area in the axilla was biopsied and was positive for melanoma and subcutaneous coordinates treatment for recurrent malignant melanoma.  His initial excision site is clear his eczema is clear except for a 2 cm nodule in the axilla that was biopsied positive and he has a PET scan showing multiple lesions noted.  With the above problem he is for placement of a single lumen port for chemotherapy in treatment of metastatic melanoma.    Past surgical history significant for cholecystectomy in 2008, excision of malignant right tricep melanoma with axillary dissection on 07/21/2014.    Medical problems to skin for skin cancers, arthritis, hypertension, shortness of breath, back pain.    Allergies negative    Medications are those listed he is on no blood thinners    Review of Systems   Pertinent items are noted in HPI, all other systems reviewed and negative    History  Past Medical History:   Diagnosis Date   • BPH (benign prostatic hyperplasia)    • GERD (gastroesophageal reflux disease)    • Hx of colonic polyps    • Hypercholesteremia    • Hypertension    • Melanoma (CMS/HCC)    • Sleep apnea      Past Surgical History:   Procedure Laterality Date   • CHOLECYSTECTOMY     • COLONOSCOPY N/A 8/7/2017    Procedure: COLONOSCOPY WITH ANESTHESIA;  Surgeon: Claudy Gibbons MD;  Location: [facility] ENDOSCOPY;  Service:    • COLONOSCOPY W/ POLYPECTOMY  06/05/2012    Hyperplastic polyp in the rectum repeat exam in 5 years   • ENDOSCOPY   08/14/2007    Distal esophageal ring, HH   • LYMPH NODE DISSECTION      Right arm   • TIPS PROCEDURE  2016   • US GUIDED LYMPH NODE BIOPSY  7/26/2018     Family History   Problem Relation Age of Onset   • Colon cancer Neg Hx    • Colon polyps Neg Hx      Social History   Substance Use Topics   • Smoking status: Former Smoker   • Smokeless tobacco: Never Used      Comment: quit 30-40 years ago   • Alcohol use No     Prescriptions Prior to Admission   Medication Sig Dispense Refill Last Dose   • amLODIPine (NORVASC) 5 MG tablet Take 5 mg by mouth Daily.   8/3/2018 at 0600   • finasteride (PROSCAR) 5 MG tablet Take 5 mg by mouth Daily.   8/2/2018 at 2000   • HYDROcodone-acetaminophen (NORCO) 5-325 MG per tablet Take 1 tablet by mouth Every 6 (Six) Hours As Needed.   8/2/2018 at 1200   • lisinopril (PRINIVIL,ZESTRIL) 20 MG tablet Take 20 mg by mouth Daily.   8/3/2018 at 0600   • lovastatin (MEVACOR) 40 MG tablet Take 40 mg by mouth Every Night.   8/2/2018 at 2000   • ondansetron (ZOFRAN) 4 MG tablet Take 4 mg by mouth Every 8 (Eight) Hours As Needed for Nausea or Vomiting.        Allergies:  Patient has no known allergies.    Problem list  There is no problem list on file for this patient.      Objective      Objective     Vital Signs  Temp:  [98 °F (36.7 °C)] 98 °F (36.7 °C)  Heart Rate:  [62-63] 62  Resp:  [16] 16  BP: (132)/(65) 132/65    Physical Exam:      General Appearance:    Alert, cooperative, in no acute distress   Head:    Normocephalic, without obvious abnormality, atraumatic   Eyes:            Lids and lashes normal, conjunctivae and sclerae normal, no   icterus, no pallor, corneas clear, PERRLA   Ears:    Ears appear intact with no abnormalities noted   Throat:   No oral lesions, no thrush, oral mucosa moist   Neck:   No adenopathy, supple, trachea midline, no thyromegaly, no   carotid bruit, no JVD   Back:     No kyphosis present, no scoliosis present, no skin lesions,      erythema or scars, no  tenderness to percussion or                   palpation,   range of motion normal   Lungs:     Clear to auscultation,respirations regular, even and                  unlabored    Heart:    Regular rhythm and normal rate, normal S1 and S2, no            murmur, no gallop, no rub, no click   Chest Wall:    No abnormalities observed   Abdomen:     Normal bowel sounds, no masses, no organomegaly, soft        non-tender, non-distended, no guarding, no rebound                tenderness   Rectal:     Deferred   Extremities:   Well-healed surgical incision in the right posterior tricep area is clean and dry also well healed incision in the axilla but there is a nodule in the central aspect of the incision that was biopsy-positive for recurrent melanoma.  He appreciated no other skin lesions noted.     Pulses:   Pulses palpable and equal bilaterally   Skin:   No bleeding, bruising or rash   Lymph nodes:   No palpable adenopathy   Neurologic:   Cranial nerves 2 - 12 grossly intact, sensation intact, DTR       present and equal bilaterally       Results Review:    I reviewed the patient's new imaging results and agree with the interpretation.                 Invalid input(s): LABALBU, PROT    Assessment/Plan     Assessment/Plan     Active Problems:    * No active hospital problems. *      Issue with hypertension, chronic back pain, history of melanoma now with recurrent melanoma for placement of a single lumen port for chemotherapy    I discussed the patients findings and my recommendations with patient, family, nursing staff and primary care team.     Bart Enrique MD  08/03/18  3:13 PM    Time:Time spent with the patient 30 minutes     EMR Dragon/Transcription disclaimer: Much of this encounter note is an electronic transcription/translation of spoken language to printed text. The electronic translation of spoken language may permit erroneous, or at times, nonsensical words or phrases to be inadvertently transcribed;  although I have reviewed the note for such errors, some may still exist.

## 2018-08-03 NOTE — ANESTHESIA POSTPROCEDURE EVALUATION
Patient: Ever Monson    Procedure Summary     Date:  08/03/18 Room / Location:   [facility]  /  [facility]    Anesthesia Start:  1533 Anesthesia Stop:      Procedure:  SL PORT PLACEMENT (N/A Chin to Nipples) Diagnosis:      Surgeon:  Bart Enrique MD Provider:  Alex Pryor CRNA    Anesthesia Type:  MAC ASA Status:  3          Anesthesia Type: MAC  Last vitals  BP   132/65 (08/03/18 1153)   Temp   98 °F (36.7 °C) (08/03/18 1153)   Pulse   62 (08/03/18 1442)   Resp   16 (08/03/18 1442)     SpO2   (!) 6 % (08/03/18 1442)     Anesthesia Post Evaluation

## 2018-08-03 NOTE — DISCHARGE INSTRUCTIONS
YOUR NEXT PAIN MEDICATION IS DUE AT______________        Moderate Conscious Sedation, Adult, Care After  Refer to this sheet in the next few weeks. These instructions provide you with information on caring for yourself after your procedure. Your health care provider may also give you more specific instructions. Your treatment has been planned according to current medical practices, but problems sometimes occur. Call your health care provider if you have any problems or questions after your procedure.  WHAT TO EXPECT AFTER THE PROCEDURE    After your procedure:  · You may feel sleepy, clumsy, and have poor balance for several hours.  · Vomiting may occur if you eat too soon after the procedure.  HOME CARE INSTRUCTIONS  · Do not participate in any activities where you could become injured for at least 24 hours. Do not:  ¨ Drive.  ¨ Swim.  ¨ Ride a bicycle.  ¨ Operate heavy machinery.  ¨ Cook.  ¨ Use power tools.  ¨ Climb ladders.  ¨ Work from a high place.  · Do not make important decisions or sign legal documents until you are improved.  · If you vomit, drink water, juice, or soup when you can drink without vomiting. Make sure you have little or no nausea before eating solid foods.  · Only take over-the-counter or prescription medicines for pain, discomfort, or fever as directed by your health care provider.  · Make sure you and your family fully understand everything about the medicines given to you, including what side effects may occur.  · You should not drink alcohol, take sleeping pills, or take medicines that cause drowsiness for at least 24 hours.  · If you smoke, do not smoke without supervision.  · If you are feeling better, you may resume normal activities 24 hours after you were sedated.  · Keep all appointments with your health care provider.  SEEK MEDICAL CARE IF:  · Your skin is pale or bluish in color.  · You continue to feel nauseous or vomit.  · Your pain is getting worse and is not helped by  medicine.  · You have bleeding or swelling.  · You are still sleepy or feeling clumsy after 24 hours.  SEEK IMMEDIATE MEDICAL CARE IF:  · You develop a rash.  · You have difficulty breathing.  · You develop any type of allergic problem.  · You have a fever.  MAKE SURE YOU:  · Understand these instructions.  · Will watch your condition.  · Will get help right away if you are not doing well or get worse.     This information is not intended to replace advice given to you by your health care provider. Make sure you discuss any questions you have with your health care provider.     Document Released: 10/08/2014 Document Revised: 01/08/2016 Document Reviewed: 10/08/2014  [employer] Interactive Patient Education ©2016 [employer]         CALL YOUR PHYSICIAN IF YOU EXPERIENCE  INCREASED PAIN NOT HELPED BY YOUR PAIN MEDICATION.        Fall Prevention in the Home      Falls can cause injuries. They can happen to people of all ages. There are many things you can do to make your home safe and to help prevent falls.    WHAT CAN I DO ON THE OUTSIDE OF MY HOME?  · Regularly fix the edges of walkways and driveways and fix any cracks.  · Remove anything that might make you trip as you walk through a door, such as a raised step or threshold.  · Trim any bushes or trees on the path to your home.  · Use bright outdoor lighting.  · Clear any walking paths of anything that might make someone trip, such as rocks or tools.  · Regularly check to see if handrails are loose or broken. Make sure that both sides of any steps have handrails.  · Any raised decks and porches should have guardrails on the edges.  · Have any leaves, snow, or ice cleared regularly.  · Use sand or salt on walking paths during winter.  · Clean up any spills in your garage right away. This includes oil or grease spills.  WHAT CAN I DO IN THE BATHROOM?    · Use night lights.  · Install grab bars by the toilet and in the tub and shower. Do not use towel bars as grab  bars.  · Use non-skid mats or decals in the tub or shower.  · If you need to sit down in the shower, use a plastic, non-slip stool.  · Keep the floor dry. Clean up any water that spills on the floor as soon as it happens.  · Remove soap buildup in the tub or shower regularly.  · Attach bath mats securely with double-sided non-slip rug tape.  · Do not have throw rugs and other things on the floor that can make you trip.  WHAT CAN I DO IN THE BEDROOM?  · Use night lights.  · Make sure that you have a light by your bed that is easy to reach.  · Do not use any sheets or blankets that are too big for your bed. They should not hang down onto the floor.  · Have a firm chair that has side arms. You can use this for support while you get dressed.  · Do not have throw rugs and other things on the floor that can make you trip.  WHAT CAN I DO IN THE KITCHEN?  · Clean up any spills right away.  · Avoid walking on wet floors.  · Keep items that you use a lot in easy-to-reach places.  · If you need to reach something above you, use a strong step stool that has a grab bar.  · Keep electrical cords out of the way.  · Do not use floor polish or wax that makes floors slippery. If you must use wax, use non-skid floor wax.  · Do not have throw rugs and other things on the floor that can make you trip.  WHAT CAN I DO WITH MY STAIRS?  · Do not leave any items on the stairs.  · Make sure that there are handrails on both sides of the stairs and use them. Fix handrails that are broken or loose. Make sure that handrails are as long as the stairways.  · Check any carpeting to make sure that it is firmly attached to the stairs. Fix any carpet that is loose or worn.  · Avoid having throw rugs at the top or bottom of the stairs. If you do have throw rugs, attach them to the floor with carpet tape.  · Make sure that you have a light switch at the top of the stairs and the bottom of the stairs. If you do not have them, ask someone to add them for  you.  WHAT ELSE CAN I DO TO HELP PREVENT FALLS?  · Wear shoes that:  ¨ Do not have high heels.  ¨ Have rubber bottoms.  ¨ Are comfortable and fit you well.  ¨ Are closed at the toe. Do not wear sandals.  · If you use a stepladder:  ¨ Make sure that it is fully opened. Do not climb a closed stepladder.  ¨ Make sure that both sides of the stepladder are locked into place.  ¨ Ask someone to hold it for you, if possible.  · Clearly anthony and make sure that you can see:  ¨ Any grab bars or handrails.  ¨ First and last steps.  ¨ Where the edge of each step is.  · Use tools that help you move around (mobility aids) if they are needed. These include:  ¨ Canes.  ¨ Walkers.  ¨ Scooters.  ¨ Crutches.  · Turn on the lights when you go into a dark area. Replace any light bulbs as soon as they burn out.  · Set up your furniture so you have a clear path. Avoid moving your furniture around.  · If any of your floors are uneven, fix them.  · If there are any pets around you, be aware of where they are.  · Review your medicines with your doctor. Some medicines can make you feel dizzy. This can increase your chance of falling.  Ask your doctor what other things that you can do to help prevent falls.     This information is not intended to replace advice given to you by your health care provider. Make sure you discuss any questions you have with your health care provider.     Document Released: 10/14/2010 Document Revised: 05/03/2016 Document Reviewed: 01/22/2016  [employer] Interactive Patient Education ©2016 [employer]     PATIENT/FAMILY/RESPONSIBLE PARTY VERBALIZES UNDERSTANDING OF ABOVE EDUCATION.  COPY OF PAIN SCALE GIVEN AND REVIEWED WITH VERBALIZED UNDERSTANDING.

## 2018-08-03 NOTE — ANESTHESIA PREPROCEDURE EVALUATION
Anesthesia Evaluation     Patient summary reviewed   no history of anesthetic complications:  NPO Solid Status: > 8 hours  NPO Liquid Status: > 8 hours           Airway   Mallampati: III  TM distance: >3 FB  Neck ROM: full  Dental    (+) edentulous, upper dentures and lower dentures    Pulmonary - normal exam    breath sounds clear to auscultation  (+) sleep apnea,   (-) asthma, recent URI, not a smoker  Cardiovascular - normal exam  Exercise tolerance: good (4-7 METS)    ECG reviewed  Rhythm: regular  Rate: normal    (+) hypertension well controlled, hyperlipidemia,   (-) pacemaker, past MI, angina, cardiac stents, CABG      Neuro/Psych  (-) seizures, TIA, CVA  GI/Hepatic/Renal/Endo    (+)  GERD,    (-) liver disease, no renal disease, diabetes, hypothyroidism, hyperthyroidism    Musculoskeletal     Abdominal    Substance History      OB/GYN          Other      history of cancer (Melanoma with metastasis)                    Anesthesia Plan    ASA 3     MAC   total IV anesthesia  intravenous induction   Anesthetic plan and risks discussed with patient.

## 2018-08-03 NOTE — OP NOTE
INSERTION VENOUS ACCESS DEVICE  Procedure Note    Ever Monson  8/3/2018    Pre-op Diagnosis:   Melanoma  Post-op Diagnosis:     Recurrent melanoma    Procedure/CPT® Codes:      Procedure(s):  SL PORT PLACEMENT    Surgeon(s):  Bart Enrique MD    Anesthesia: Monitor Anesthesia Care    Staff:   Circulator: Severino Calle RN  Scrub Person: Nadja Stephanyndjesusita TERI; Hima Grijalvaa    Estimated Blood Loss: 10 mL    Specimens:                There were no specimens      Drains:  There were no drains    Indications:Ever Monson  is a 77 y.o. male presents with history of a melanoma excised 2014.  He is done well recently he has noted a nodule in his axilla is some scarring had evaluation showing multiple nodules on his PET scan in the liver and also other areas he is being followed by Dr. Us and with this he has a recurrent melanoma the area in the axilla was biopsied and was positive for melanoma and subcutaneous coordinates treatment for recurrent malignant melanoma.  His initial excision site is clear his axilla is clear except for a 2 cm nodule in the axilla that was biopsied positive and he has a PET scan showing multiple lesions noted.  With the above problem he is for placement of a single lumen port for chemotherapy in treatment of metastatic melanoma.    Findings: Placement of a left single-lumen port via the left cephalic vein.    Complications: There were no complications blood loss 10 mL     Procedure: Patient was placed in a supine position in the surgical suite and after a timeout had been completed 1 g of vancomycin given IV piggyback the area was scrubbed prepped and draped with alcohol and Betadine of the anterior chest.  An Ioban was then applied and local anesthesia of 1% Xylocaine with epinephrine was infiltrated into the left deltopectoral area.  With 35 mL of local given adequate anesthesia accomplished as well as IV sedation a oblique skin incision was completed in the  left deltopectoral groove incising through the skin and subcutaneous tissue to the deltopectoral groove.  A pocket was created over the pectoralis major and a antibiotic soaked sponges placed in this cavity.  The cephalic vein was then noted and encircled proximally and distally with 2-0 silk ties.  Proximally, a Mccarthy tie was placed distally, a regular suture and this was ligated.  With this completed a small nick was completed into the cephalic vein and using a vein pick a dual lumen catheter was inserted and advanced to the distal SVC.  Excess catheter was then removed and the single lumen port was then affixed to this catheter.  With good forward and back flow noted the port was flushed the port was then affixed to the pectoralis major using 2 2-0 silk sutures.  Having completed this and with good forward and back flow from the port the Mccarthy tie was then further cinched around the catheter and the vein and C-arm radiography was completed to assure good position.  With good position noted the correct punch count and laparotomy count was obtained, the deep subcutaneous tissue was reapproximated using simple inverted interrupted sutures of 3-0 Vicryl and skin was enclosed using running subcuticular suture of 4-0 Vicryl.  Following this Mastisol, Steri-Strips, 2 x 2 and Tegaderm applied the patient was then transferred to recovery room in good condition.  Blood loss was less than 10 mL complications none.  No chest x-ray will be needed as a cutdown was completed.    Bart Enrique MD     Date: 8/3/2018  Time: 4:29 PM    EMR Dragon/Transcription disclaimer: Much of this encounter note is an electronic transcription/translation of spoken language to printed text. The electronic translation of spoken language may permit erroneous, or at times, nonsensical words or phrases to be inadvertently transcribed; although I have reviewed the note for such errors, some may still exist.

## 2018-08-06 ENCOUNTER — HOSPITAL ENCOUNTER (OUTPATIENT)
Dept: CT IMAGING | Facility: HOSPITAL | Age: 78
Discharge: HOME | End: 2018-08-06
Attending: INTERNAL MEDICINE | Admitting: INTERNAL MEDICINE

## 2018-08-06 ENCOUNTER — HOSPITAL ENCOUNTER (OUTPATIENT)
Dept: CT IMAGING | Facility: HOSPITAL | Age: 78
Discharge: HOME | End: 2018-08-06
Attending: INTERNAL MEDICINE

## 2018-08-06 DIAGNOSIS — Z85.820 HISTORY OF MALIGNANT MELANOMA OF SKIN: ICD-10-CM

## 2018-08-06 PROCEDURE — A9552 F18 FDG: HCPCS | Performed by: INTERNAL MEDICINE

## 2018-08-06 PROCEDURE — 78816 PET IMAGE W/CT FULL BODY: CPT

## 2018-08-06 PROCEDURE — 0 FLUDEOXYGLUCOSE F18 SOLUTION: Performed by: INTERNAL MEDICINE

## 2018-08-06 RX ADMIN — FLUDEOXYGLUCOSE F18 1 DOSE: 300 INJECTION INTRAVENOUS at 08:17

## 2018-08-16 LAB
CYTO UR: NORMAL
LAB AP CASE REPORT: NORMAL
LAB AP FLOW CYTOMETRY REPORT,ADDENDUM: NORMAL
Lab: NORMAL
PATH REPORT.FINAL DX SPEC: NORMAL
PATH REPORT.GROSS SPEC: NORMAL

## 2018-09-20 ENCOUNTER — TRANSCRIBE ORDERS (OUTPATIENT)
Dept: ADMINISTRATIVE | Facility: HOSPITAL | Age: 78
End: 2018-09-20

## 2018-09-20 DIAGNOSIS — R10.30 LOWER ABDOMINAL PAIN: Primary | ICD-10-CM

## 2018-09-21 ENCOUNTER — HOSPITAL ENCOUNTER (OUTPATIENT)
Dept: CT IMAGING | Facility: HOSPITAL | Age: 78
Discharge: HOME | End: 2018-09-21
Attending: INTERNAL MEDICINE | Admitting: INTERNAL MEDICINE

## 2018-09-21 DIAGNOSIS — R10.30 LOWER ABDOMINAL PAIN: ICD-10-CM

## 2018-09-21 LAB — CREAT BLDA-MCNC: 1.3 MG/DL (ref 0.6–1.3)

## 2018-09-21 PROCEDURE — 25010000002 IOPAMIDOL 61 % SOLUTION: Performed by: INTERNAL MEDICINE

## 2018-09-21 PROCEDURE — 74177 CT ABD & PELVIS W/CONTRAST: CPT

## 2018-09-21 PROCEDURE — 82565 ASSAY OF CREATININE: CPT

## 2018-09-21 RX ADMIN — IOPAMIDOL 100 ML: 612 INJECTION, SOLUTION INTRAVENOUS at 09:49

## 2018-10-16 ENCOUNTER — HOSPITAL ENCOUNTER (EMERGENCY)
Age: 78
Discharge: HOME | End: 2018-10-16
Attending: EMERGENCY MEDICINE
Payer: MEDICARE

## 2018-10-16 ENCOUNTER — APPOINTMENT (OUTPATIENT)
Dept: CT IMAGING | Age: 78
End: 2018-10-16
Payer: MEDICARE

## 2018-10-16 VITALS
SYSTOLIC BLOOD PRESSURE: 108 MMHG | DIASTOLIC BLOOD PRESSURE: 72 MMHG | HEIGHT: 69 IN | BODY MASS INDEX: 27.11 KG/M2 | TEMPERATURE: 98.6 F | HEART RATE: 80 BPM | WEIGHT: 183 LBS | OXYGEN SATURATION: 92 % | RESPIRATION RATE: 18 BRPM

## 2018-10-16 DIAGNOSIS — K57.92 DIVERTICULITIS: Primary | ICD-10-CM

## 2018-10-16 DIAGNOSIS — R10.32 LEFT LOWER QUADRANT PAIN: ICD-10-CM

## 2018-10-16 LAB
ALBUMIN SERPL-MCNC: 3.6 G/DL (ref 3.5–5.2)
ALP BLD-CCNC: 58 U/L (ref 40–130)
ALT SERPL-CCNC: 21 U/L (ref 5–41)
ANION GAP SERPL CALCULATED.3IONS-SCNC: 11 MMOL/L (ref 7–19)
AST SERPL-CCNC: 18 U/L (ref 5–40)
BASOPHILS ABSOLUTE: 0.1 K/UL (ref 0–0.2)
BASOPHILS RELATIVE PERCENT: 0.5 % (ref 0–1)
BILIRUB SERPL-MCNC: 1 MG/DL (ref 0.2–1.2)
BUN BLDV-MCNC: 17 MG/DL (ref 8–23)
CALCIUM SERPL-MCNC: 9.2 MG/DL (ref 8.8–10.2)
CHLORIDE BLD-SCNC: 99 MMOL/L (ref 98–111)
CO2: 26 MMOL/L (ref 22–29)
CREAT SERPL-MCNC: 1 MG/DL (ref 0.5–1.2)
EOSINOPHILS ABSOLUTE: 0.3 K/UL (ref 0–0.6)
EOSINOPHILS RELATIVE PERCENT: 1.8 % (ref 0–5)
GFR NON-AFRICAN AMERICAN: >60
GLUCOSE BLD-MCNC: 110 MG/DL (ref 74–109)
HCT VFR BLD CALC: 40.6 % (ref 42–52)
HEMOGLOBIN: 13.7 G/DL (ref 14–18)
LACTIC ACID: 0.9 MMOL/L (ref 0.5–1.9)
LIPASE: 35 U/L (ref 13–60)
LYMPHOCYTES ABSOLUTE: 2.9 K/UL (ref 1.1–4.5)
LYMPHOCYTES RELATIVE PERCENT: 18.5 % (ref 20–40)
MCH RBC QN AUTO: 30.3 PG (ref 27–31)
MCHC RBC AUTO-ENTMCNC: 33.7 G/DL (ref 33–37)
MCV RBC AUTO: 89.8 FL (ref 80–94)
MONOCYTES ABSOLUTE: 1.4 K/UL (ref 0–0.9)
MONOCYTES RELATIVE PERCENT: 9.1 % (ref 0–10)
NEUTROPHILS ABSOLUTE: 10.8 K/UL (ref 1.5–7.5)
NEUTROPHILS RELATIVE PERCENT: 69.7 % (ref 50–65)
PDW BLD-RTO: 13.2 % (ref 11.5–14.5)
PLATELET # BLD: 207 K/UL (ref 130–400)
PMV BLD AUTO: 10 FL (ref 9.4–12.4)
POTASSIUM SERPL-SCNC: 4.5 MMOL/L (ref 3.5–5)
RBC # BLD: 4.52 M/UL (ref 4.7–6.1)
SODIUM BLD-SCNC: 136 MMOL/L (ref 136–145)
TOTAL PROTEIN: 6.8 G/DL (ref 6.6–8.7)
WBC # BLD: 15.5 K/UL (ref 4.8–10.8)

## 2018-10-16 PROCEDURE — 83690 ASSAY OF LIPASE: CPT

## 2018-10-16 PROCEDURE — 2500000003 HC RX 250 WO HCPCS: Performed by: EMERGENCY MEDICINE

## 2018-10-16 PROCEDURE — 6360000004 HC RX CONTRAST MEDICATION: Performed by: EMERGENCY MEDICINE

## 2018-10-16 PROCEDURE — 6360000002 HC RX W HCPCS: Performed by: EMERGENCY MEDICINE

## 2018-10-16 PROCEDURE — 96365 THER/PROPH/DIAG IV INF INIT: CPT

## 2018-10-16 PROCEDURE — 80053 COMPREHEN METABOLIC PANEL: CPT

## 2018-10-16 PROCEDURE — 99284 EMERGENCY DEPT VISIT MOD MDM: CPT | Performed by: EMERGENCY MEDICINE

## 2018-10-16 PROCEDURE — 96375 TX/PRO/DX INJ NEW DRUG ADDON: CPT

## 2018-10-16 PROCEDURE — 83605 ASSAY OF LACTIC ACID: CPT

## 2018-10-16 PROCEDURE — 85025 COMPLETE CBC W/AUTO DIFF WBC: CPT

## 2018-10-16 PROCEDURE — 96367 TX/PROPH/DG ADDL SEQ IV INF: CPT

## 2018-10-16 PROCEDURE — 36415 COLL VENOUS BLD VENIPUNCTURE: CPT

## 2018-10-16 PROCEDURE — 2580000003 HC RX 258: Performed by: EMERGENCY MEDICINE

## 2018-10-16 PROCEDURE — 99284 EMERGENCY DEPT VISIT MOD MDM: CPT

## 2018-10-16 PROCEDURE — 74177 CT ABD & PELVIS W/CONTRAST: CPT

## 2018-10-16 RX ORDER — SODIUM CHLORIDE 9 MG/ML
INJECTION, SOLUTION INTRAVENOUS CONTINUOUS
Status: DISCONTINUED | OUTPATIENT
Start: 2018-10-16 | End: 2018-10-16 | Stop reason: HOSPADM

## 2018-10-16 RX ORDER — MORPHINE SULFATE/0.9% NACL/PF 1 MG/ML
4 SYRINGE (ML) INJECTION ONCE
Status: COMPLETED | OUTPATIENT
Start: 2018-10-16 | End: 2018-10-16

## 2018-10-16 RX ORDER — AMLODIPINE BESYLATE 5 MG/1
5 TABLET ORAL
COMMUNITY
Start: 2017-04-06 | End: 2020-07-09

## 2018-10-16 RX ORDER — HYDROCODONE BITARTRATE AND ACETAMINOPHEN 7.5; 325 MG/1; MG/1
1 TABLET ORAL EVERY 6 HOURS PRN
COMMUNITY
Start: 2018-08-03 | End: 2019-10-01 | Stop reason: DRUGHIGH

## 2018-10-16 RX ORDER — METRONIDAZOLE 500 MG/1
500 TABLET ORAL 3 TIMES DAILY
Qty: 30 TABLET | Refills: 0 | Status: SHIPPED | OUTPATIENT
Start: 2018-10-16 | End: 2018-10-26

## 2018-10-16 RX ORDER — HEPARIN SODIUM (PORCINE) LOCK FLUSH IV SOLN 100 UNIT/ML 100 UNIT/ML
300 SOLUTION INTRAVENOUS ONCE
Status: COMPLETED | OUTPATIENT
Start: 2018-10-16 | End: 2018-10-16

## 2018-10-16 RX ORDER — FINASTERIDE 5 MG/1
5 TABLET, FILM COATED ORAL
COMMUNITY
Start: 2017-06-06

## 2018-10-16 RX ORDER — CIPROFLOXACIN 500 MG/1
500 TABLET, FILM COATED ORAL 2 TIMES DAILY
Qty: 20 TABLET | Refills: 0 | Status: SHIPPED | OUTPATIENT
Start: 2018-10-16 | End: 2018-10-26

## 2018-10-16 RX ORDER — LISINOPRIL 20 MG/1
20 TABLET ORAL
COMMUNITY
Start: 2017-04-06 | End: 2020-07-09

## 2018-10-16 RX ORDER — LOVASTATIN 40 MG/1
40 TABLET ORAL
COMMUNITY
Start: 2017-06-06

## 2018-10-16 RX ORDER — DRONABINOL 2.5 MG/1
2.5 CAPSULE ORAL
COMMUNITY
End: 2019-09-05 | Stop reason: SDUPTHER

## 2018-10-16 RX ORDER — CIPROFLOXACIN 2 MG/ML
400 INJECTION, SOLUTION INTRAVENOUS ONCE
Status: COMPLETED | OUTPATIENT
Start: 2018-10-16 | End: 2018-10-16

## 2018-10-16 RX ADMIN — CIPROFLOXACIN 400 MG: 2 INJECTION, SOLUTION INTRAVENOUS at 07:45

## 2018-10-16 RX ADMIN — IOPAMIDOL 95 ML: 755 INJECTION, SOLUTION INTRAVENOUS at 06:07

## 2018-10-16 RX ADMIN — Medication 2 MG: at 05:44

## 2018-10-16 RX ADMIN — Medication 1 MG: at 07:26

## 2018-10-16 RX ADMIN — Medication 300 UNITS: at 09:14

## 2018-10-16 RX ADMIN — SODIUM CHLORIDE: 9 INJECTION, SOLUTION INTRAVENOUS at 05:16

## 2018-10-16 RX ADMIN — METRONIDAZOLE 500 MG: 500 INJECTION, SOLUTION INTRAVENOUS at 06:25

## 2018-10-16 ASSESSMENT — ENCOUNTER SYMPTOMS
DIARRHEA: 0
PHOTOPHOBIA: 0
ABDOMINAL PAIN: 1
CONSTIPATION: 0
SINUS PRESSURE: 0
VOMITING: 0
EYE PAIN: 0
CHEST TIGHTNESS: 0
BACK PAIN: 0
SHORTNESS OF BREATH: 0
WHEEZING: 0
COLOR CHANGE: 0
TROUBLE SWALLOWING: 0
ABDOMINAL DISTENTION: 1
NAUSEA: 1

## 2018-10-16 ASSESSMENT — PAIN SCALES - GENERAL
PAINLEVEL_OUTOF10: 2
PAINLEVEL_OUTOF10: 6
PAINLEVEL_OUTOF10: 10
PAINLEVEL_OUTOF10: 4
PAINLEVEL_OUTOF10: 0

## 2018-10-16 ASSESSMENT — PAIN DESCRIPTION - LOCATION: LOCATION: ABDOMEN

## 2018-10-16 NOTE — ED PROVIDER NOTES
MEDICALHISTORY     Past Medical History:   Diagnosis Date    Acid reflux     BPH (benign prostatic hyperplasia)     Cancer (HCC)     Diverticulitis     Hard of hearing     Hypercholesteremia     Hypertension     Melanoma ([address]     to liver, stomach, bone, and right axilla         SURGICAL HISTORY       Past Surgical History:   Procedure Laterality Date    CHOLECYSTECTOMY      TUNNELED CENTRAL VENOUS CATHETER W/ SUBCUTANEOUS PORT           CURRENT MEDICATIONS     Previous Medications    AMLODIPINE (NORVASC) 5 MG TABLET    Take 5 mg by mouth    DRONABINOL (MARINOL) 2.5 MG CAPSULE    Take 2.5 mg by mouth 2 times daily (before meals). Georgana Boop FINASTERIDE (PROSCAR) 5 MG TABLET    Take 5 mg by mouth    HYDROCODONE-ACETAMINOPHEN (NORCO) 7.5-325 MG PER TABLET    Take 1 tablet by mouth. Georgana Boop LISINOPRIL (PRINIVIL;ZESTRIL) 20 MG TABLET    Take 20 mg by mouth    LOVASTATIN (MEVACOR) 40 MG TABLET    Take 40 mg by mouth    ONDANSETRON (ZOFRAN ODT) 4 MG DISINTEGRATING TABLET    Take 1 tablet by mouth every 8 hours as needed for Nausea or Vomiting       ALLERGIES     Patient has no known allergies. FAMILY HISTORY     History reviewed. No pertinent family history. SOCIAL HISTORY       Social History     Social History    Marital status:      Spouse name: N/A    Number of children: N/A    Years of education: N/A     Social History Main Topics    Smoking status: Former Smoker    Smokeless tobacco: Never Used    Alcohol use No    Drug use: No    Sexual activity: Yes     Partners: Female     Other Topics Concern    None     Social History Narrative    None       SCREENINGS             PHYSICAL EXAM    (up to 7 for level 4, 8 or more for level 5)     ED Triage Vitals [10/16/18 0502]   BP Temp Temp src Pulse Resp SpO2 Height Weight   -- -- -- -- -- -- 5' 9\" (1.753 m) 183 lb (83 kg)       Physical Exam   Constitutional: He is oriented to person, place, and time. He appears well-developed and well-nourished. Additional Contrast? None    (Results Pending)           LABS:  Labs Reviewed   CBC WITH AUTO DIFFERENTIAL - Abnormal; Notable for the following:        Result Value    WBC 15.5 (*)     RBC 4.52 (*)     Hemoglobin 13.7 (*)     Hematocrit 40.6 (*)     Neutrophils % 69.7 (*)     Lymphocytes % 18.5 (*)     Neutrophils # 10.8 (*)     Monocytes # 1.40 (*)     All other components within normal limits   COMPREHENSIVE METABOLIC PANEL - Abnormal; Notable for the following:     Glucose 110 (*)     All other components within normal limits   LIPASE   LACTIC ACID, PLASMA       All other labs were within normal range or not returned as of this dictation. EMERGENCY DEPARTMENT COURSE and DIFFERENTIAL DIAGNOSIS/MDM:   Vitals:    Vitals:    10/16/18 0502 10/16/18 0630 10/16/18 0631   BP:  135/78    Pulse:  86    Resp:  20    Temp:   98.6 °F (37 °C)   SpO2:  99%    Weight: 183 lb (83 kg)     Height: 5' 9\" (1.753 m)         MDM  Number of Diagnoses or Management Options  Diverticulitis: new and requires workup  Left lower quadrant pain: new and requires workup  Diagnosis management comments: Patient was found to have diverticulitis. Patient was started on IV Cipro and Flagyl while here in the ED. I'll place him on an outpatient regimen of Flagyl and Cipro. He has pain medication and nausea medication at home. He will need to follow-up with his primary care. He has follow-up with his oncologist in 2 days. Encouraged him to keep the appointment with the oncologist as well as us making a new one with the primary care. After careful review of history, physical, and supporting data, there is very low suspicion for an acute abdomen. The differential includes but is not limited to appendicitis, torsion, perforated viscus, ischemic bowel, obstruction, and infarct. The patient's symptoms have improved from presentation and appears clinically well. Patient reexamined prior to discharge and appears improved.  Patient has been encouraged to

## 2018-10-16 NOTE — ED NOTES
Bedside report received from [facility], helped in escorting patient to restroom and back to room. Medications IV restarted.      [address] [facility]  10/16/18 4905

## 2018-10-25 ENCOUNTER — TRANSCRIBE ORDERS (OUTPATIENT)
Dept: ADMINISTRATIVE | Facility: HOSPITAL | Age: 78
End: 2018-10-25

## 2018-10-25 DIAGNOSIS — C43.60 MALIGNANT MELANOMA OF UPPER EXTREMITY, INCLUDING SHOULDER, UNSPECIFIED LATERALITY (HCC): Primary | ICD-10-CM

## 2018-10-25 DIAGNOSIS — I10 ESSENTIAL HYPERTENSION: ICD-10-CM

## 2018-11-08 ENCOUNTER — HOSPITAL ENCOUNTER (OUTPATIENT)
Dept: CT IMAGING | Facility: HOSPITAL | Age: 78
Discharge: HOME | End: 2018-11-08
Attending: INTERNAL MEDICINE | Admitting: INTERNAL MEDICINE

## 2018-11-08 DIAGNOSIS — C43.60 MALIGNANT MELANOMA OF UPPER EXTREMITY, INCLUDING SHOULDER, UNSPECIFIED LATERALITY (HCC): ICD-10-CM

## 2018-11-08 DIAGNOSIS — I10 ESSENTIAL HYPERTENSION: ICD-10-CM

## 2018-11-08 LAB — CREAT BLDA-MCNC: 1.1 MG/DL (ref 0.6–1.3)

## 2018-11-08 PROCEDURE — 71260 CT THORAX DX C+: CPT

## 2018-11-08 PROCEDURE — 25010000002 IOPAMIDOL 61 % SOLUTION: Performed by: INTERNAL MEDICINE

## 2018-11-08 PROCEDURE — 82565 ASSAY OF CREATININE: CPT

## 2018-11-08 RX ADMIN — IOPAMIDOL 100 ML: 612 INJECTION, SOLUTION INTRAVENOUS at 09:37

## 2018-11-26 ENCOUNTER — OFFICE VISIT (OUTPATIENT)
Dept: UROLOGY | Facility: CLINIC | Age: 78
End: 2018-11-26

## 2018-11-26 VITALS — HEIGHT: 69 IN | BODY MASS INDEX: 27.16 KG/M2 | TEMPERATURE: 98.6 F | WEIGHT: 183.4 LBS

## 2018-11-26 DIAGNOSIS — N28.89 LEFT RENAL MASS: ICD-10-CM

## 2018-11-26 DIAGNOSIS — N40.1 BPH WITH OBSTRUCTION/LOWER URINARY TRACT SYMPTOMS: Primary | ICD-10-CM

## 2018-11-26 DIAGNOSIS — N13.8 BPH WITH OBSTRUCTION/LOWER URINARY TRACT SYMPTOMS: Primary | ICD-10-CM

## 2018-11-26 LAB
BILIRUB BLD-MCNC: NEGATIVE MG/DL
CLARITY, POC: CLEAR
COLOR UR: YELLOW
GLUCOSE UR STRIP-MCNC: NEGATIVE MG/DL
KETONES UR QL: NEGATIVE
LEUKOCYTE EST, POC: NEGATIVE
NITRITE UR-MCNC: NEGATIVE MG/ML
PH UR: 5.5 [PH] (ref 5–8)
PROT UR STRIP-MCNC: NEGATIVE MG/DL
RBC # UR STRIP: NEGATIVE /UL
SP GR UR: 1.02 (ref 1–1.03)
UROBILINOGEN UR QL: NORMAL

## 2018-11-26 PROCEDURE — 81001 URINALYSIS AUTO W/SCOPE: CPT | Performed by: UROLOGY

## 2018-11-26 PROCEDURE — 99204 OFFICE O/P NEW MOD 45 MIN: CPT | Performed by: UROLOGY

## 2018-11-26 NOTE — PROGRESS NOTES
Mr. Monson is 78 y.o. male    CHIEF COMPLAINT: I am here for BPH    HPI  Abnormal radiographic finding   This patient presents with a possible abnormal finding of the left lower pole of the kidney and prostate on CT that included abdominal cuts. This is a  new finding to me but has been noted in 2015. This test was done 2 month(s) ago. Onset is unknown. This was done in context of evaluation for follow-up of metastatic melanoma. Symptoms include obstructive and irritative voiding symptoms but no flank pain or gross hematuria. .   The following portions of the patient's history were reviewed and updated as appropriate: allergies, current medications, past family history, past medical history, past social history, past surgical history and problem list.      Review of Systems   Constitutional: Negative for appetite change, chills, fever and unexpected weight change.   HENT: Negative for congestion, ear pain, facial swelling, hearing loss, nosebleeds, trouble swallowing and voice change.    Eyes: Negative for photophobia, pain, discharge and visual disturbance.   Respiratory: Negative for cough, choking, chest tightness and shortness of breath.    Cardiovascular: Negative for chest pain and palpitations.   Gastrointestinal: Negative for abdominal distention, abdominal pain, blood in stool, constipation, diarrhea, nausea and vomiting.   Endocrine: Negative for cold intolerance, heat intolerance and polydipsia.   Genitourinary: Negative for discharge, dysuria, flank pain, frequency, genital sores, hematuria, scrotal swelling, testicular pain and urgency.   Musculoskeletal: Negative for arthralgias, joint swelling, neck pain and neck stiffness.   Skin: Negative for pallor and rash.   Allergic/Immunologic: Negative for immunocompromised state.   Neurological: Negative for dizziness, tremors, seizures, syncope, light-headedness and headaches.   Hematological: Negative for adenopathy. Does not bruise/bleed easily.    Psychiatric/Behavioral: Negative for agitation, confusion, dysphoric mood, hallucinations, self-injury and suicidal ideas.           Current Outpatient Medications:   •  amLODIPine (NORVASC) 5 MG tablet, Take 5 mg by mouth Daily., Disp: , Rfl:   •  finasteride (PROSCAR) 5 MG tablet, Take 5 mg by mouth Daily., Disp: , Rfl:   •  HYDROcodone-acetaminophen (NORCO) 5-325 MG per tablet, Take 1 tablet by mouth Every 6 (Six) Hours As Needed., Disp: , Rfl:   •  HYDROcodone-acetaminophen (NORCO) 7.5-325 MG per tablet, Take 1 tablet by mouth Every 4 (Four) Hours As Needed for Moderate Pain  for up to 20 doses., Disp: 20 tablet, Rfl: 0  •  lisinopril (PRINIVIL,ZESTRIL) 20 MG tablet, Take 20 mg by mouth Daily., Disp: , Rfl:   •  lovastatin (MEVACOR) 40 MG tablet, Take 40 mg by mouth Every Night., Disp: , Rfl:   •  ondansetron (ZOFRAN) 4 MG tablet, Take 4 mg by mouth Every 8 (Eight) Hours As Needed for Nausea or Vomiting., Disp: , Rfl:   •  ZOFRAN 8 MG tablet, Take 1 tablet by mouth Every 8 (Eight) Hours As Needed for Nausea or Vomiting for up to 5 doses., Disp: 5 tablet, Rfl: 1    Past Medical History:   Diagnosis Date   • BPH (benign prostatic hyperplasia)    • GERD (gastroesophageal reflux disease)    • Hx of colonic polyps    • Hypercholesteremia    • Hypertension    • Melanoma (CMS/HCC)    • Sleep apnea        Past Surgical History:   Procedure Laterality Date   • CHOLECYSTECTOMY     • COLONOSCOPY W/ POLYPECTOMY  06/05/2012    Hyperplastic polyp in the rectum repeat exam in 5 years   • ENDOSCOPY  08/14/2007    Distal esophageal ring, HH   • LYMPH NODE DISSECTION      Right arm   • TIPS PROCEDURE  2016   • US GUIDED LYMPH NODE BIOPSY  7/26/2018       Social History     Socioeconomic History   • Marital status:      Spouse name: Not on file   • Number of children: Not on file   • Years of education: Not on file   • Highest education level: Not on file   Tobacco Use   • Smoking status: Former Smoker   • Smokeless  "tobacco: Never Used   • Tobacco comment: quit 30-40 years ago   Substance and Sexual Activity   • Alcohol use: No   • Drug use: No   • Sexual activity: Defer       Family History   Problem Relation Age of Onset   • Colon cancer Neg Hx    • Colon polyps Neg Hx          Temp 98.6 °F (37 °C)   Ht 175.3 cm (69\")   Wt 83.2 kg (183 lb 6.4 oz)   BMI 27.08 kg/m²       Physical Exam  Constitutional: Well nourished, Well developed; No apparent distress.  His vital signs are reviewed  Psychiatric: Appropriate affect; Alert and oriented  Eyes: Unremarkable  Musculoskeletal: Normal gait and station  GI: Abdomen is soft, non-tender  Respiratory: No distress; Unlabored movement; No accessory musculature needed with symmetric movements  Skin: No pallor or diaphoresis  ; Penis and testicles are normal; Prostate 45 mL without nodule          Data  Results for orders placed or performed in visit on 11/26/18   POC Urinalysis Dipstick, Multipro   Result Value Ref Range    Color Yellow Yellow, Straw, Dark Yellow, Angle    Clarity, UA Clear Clear    Glucose, UA Negative Negative, 1000 mg/dL (3+) mg/dL    Bilirubin Negative Negative    Ketones, UA Negative Negative    Specific Gravity  1.025 1.005 - 1.030    Blood, UA Negative Negative    pH, Urine 5.5 5.0 - 8.0    Protein, POC Negative Negative mg/dL    Urobilinogen, UA Normal Normal    Nitrite, UA Negative Negative    Leukocytes Negative Negative   CT ABDOMEN PELVIS W CONTRAST-  9/21/2018 11:03 AM CDT  FINDINGS:  Calcified subcarinal mediastinal lymph nodes appreciated. Lung bases are  grossly clear with mild groundglass opacities posteriorly in the lower  lobes, dependent atelectasis suspected. There is mild nodularity  appreciated in the left lower lobe laterally, near the major fissure,  suspect related to the mild atelectatic change. Calcified granuloma  noted in the left lung base laterally.     Interval development of multiple less than 1 cm low-attenuation hepatic  lesions. " Largest lesion observed near the dome, right laterally  measuring nearly 1 cm. Multiple lesions observed, too many to count.  These findings are suspicious for developing liver metastases.     The gallbladder is surgically absent. There is no biliary ductal  dilatation. The hepatic venous and portal venous structures are imaged  normally.     The spleen is not enlarged.     There are no adrenal masses.     In the left kidney, lower pole region, there is a 2.2 cm low-attenuation  lesion was observed on the 2015 examination and is essentially  unchanged. The attenuation of the lesion is slightly increased with  density measurements of 33 Hounsfield units, not typical of simple cyst.  A complex cyst considered. A slow-growing neoplasm not excluded.  Ultrasound may be helpful. Continued imaging follow-up suggested.  Additional 2 cm low-attenuation lesions observed in the interpolar  region the right kidney and medially in the left kidney with density  measurements most compatible with cysts.     There are no urinary tract calculi or evidence of obstruction. The  urinary bladder is minimally distended without focal bladder wall  abnormalities. The prostate gland is enlarged measuring 6 cm in  transverse projection with associated mass effect on the urinary bladder  base.     There is extensive diverticulosis of the sigmoid colon. There is colon  wall thickening with mild induration of pericolonic fat. Mild acute  diverticulitis considered in this patient with history of lower  abdominal pain.: Malignancy of coarse difficult to exclude the  associated bowel wall thickening and follow-up recommended post therapy.     Additional diverticulitis observed in the more proximal colon without  additional diverticulitis changes. Colon is not dilated. The appendix is  imaged appropriately.     The small bowel is not dilated. The duodenal sweep is appropriately  observed. The stomach is not distended.     There are atherosclerotic  aorto iliac and femoral calcifications. The  celiac axis, SMA and SALLY are intact and uncompromised.     There are no pathologically enlarged lymph nodes.     There is no ascites or pneumoperitoneum.     There are spondylosis changes noted diffusely in the thoracolumbar spine  with disc degeneration in the lower lumbar spine particularly at L5-S1  and L3-L4.     There are no CT findings to indicate an acute osseous abnormality.     IMPRESSION:  1. Interval development of Multiple subcentimeter low-attenuation  hepatic lesions, too many to count worrisome for metastatic disease.  2. 2 cm low-attenuation lesion lower pole left kidney with density  measurements not typical of a simple cyst, however, stable in size  compared to the 2015 examination suggesting benign process. Slow-growing  malignancy not excluded. Follow-up recommended. Probable bilateral  nephrogenic cysts.  3. Extensive diverticulosis of the sigmoid colon with colon wall  thickening with mild induration of the pericolonic fat suspicious for  acute diverticulitis. Correlate with patient presentation. Imaging  follow-up recommended to assure benign changes post therapy.  4. Prostate hypertrophy.   5. Probable mild atelectasis lung bases.  This report was finalized on 09/21/2018 11:23 by Dr. Austyn Nguyễn MD.  These images were made available to me to review independently.  I did review the radiologist's report described above with regard to the urologic findings./Erasmo Strange MD          International Prostate Symptom Score  The following is posted based on patient questionnaire answers:  0 - not at all    1-7 mild symptoms  1- Less than one time in five  8-19 moderate symptoms  2 -Less than half the time  20-35 severe symptoms  3 - About half the time  4 - More than half the time  5 - Almost always     For following sections:  Incomplete Emptying: - How often have you had the sensation  of not emptying your bladder completely after you finished  urinating?  2  Frequency: -How often have you had to urinate again less than   two hours after you finished urinating?      3  Intermittency: -How often have you found you stopped and started again  Several times when you urinate?       3  Urgency: -How often do you find it difficult to postpone urination?             1  Weak stream: - How often have you had a weak urinary stream?             3  Straining: - How often have you had to push or strain to begin  Urination?          1  Sleeping: -How many times did you most typically get up to urinate   From the time you went to bed at night until the time you got up in the   2  Morning          Total `  15    Quality of Life  How would you feel if you had to live with your urinary condition the way   2  It is now, no better, no worse for the rest of your life?    Where: 0=delighted; 1= pleased, 2= mostly satisfied, 3= mixed, 4 = mostly  Dissatisfied, 5= Unhappy, 6 = terrible  Patient's Body mass index is 27.08 kg/m². BMI is above normal parameters. Recommendations include: educational material.    Medical Records Summary:  Available to me today are  medical records from Dr. Us, oncologist, and Dr. Ever Richardson, previous urologist.. There are approximately 20 pages of records that can be summarized as follows:   08/2014: Dr. Richardson saw this gentleman for an increased PSA velocity and the CT findings of the asymmetry of the base of the prostate gland.  Dr. Richardson describes his exam to save this gentleman has a moderate to enlarged but normal feeling prostate as far as consistency and without evidence of nodules.  He describes the patient has had improvement in his voiding symptoms since starting finasteride which I assume was when he had seen him in a prior visit in 2012.  Those notes are not available to me.  He notes the CT shows enlargement of the prostate no significant abnormality the kidneys.  He does not mention the left lower pole lesion.  He refers the  reader to the report.  The PSA level is not in the note.  11/2018: Dr. Us's record is reviewed.  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  He then describes that the patient developed metastatic melanoma to the liver, bone, celiac and right axillary lymph nodes.  This was noted when he presented to the [facility] hospital on July 2018 with a three-month history of waxing and waning epigastric discomfort and new onset fever.  He did not tolerate interferon. He does describe that he is responded to 4 cycles of Nivolumab/Ipillimumab.               Assessment and Plan  Diagnoses and all orders for this visit:    BPH with obstruction/lower urinary tract symptoms  -     POC Urinalysis Dipstick, Multipro  -     PSA DIAGNOSTIC; Future    Left renal mass      - His prostate is markedly enlarged and he is on finasteride which I would continue.  On CT he has an interesting appearance and that there is significant asymmetry at the base of the prostate with extension of prostatic tissue that is stable from 2015 to 2018.  Certainly if he were to have an advanced prostate cancer we would have expected to see a significant change in this apparent over 3 year period.   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  It does require further follow-up but I feel very good about it because over 3 year.  This is been stable.  Current recommendations are to follow such mass for 5 years.  He already gets surveillance CTA abdomen for his metastatic melanoma which will be adequate for further evaluation of this mass.    (Please note that portions of this note were completed with a voice recognition program.)  Erasmo Strange MD  11/27/18  11:05 AM      Cc: Graeme Us MD, Arnaldo Ramsey MD

## 2019-01-03 ENCOUNTER — TRANSCRIBE ORDERS (OUTPATIENT)
Dept: ADMINISTRATIVE | Facility: HOSPITAL | Age: 79
End: 2019-01-03

## 2019-01-03 DIAGNOSIS — Z01.818 PREOP EXAMINATION: ICD-10-CM

## 2019-01-03 DIAGNOSIS — C43.61 MALIGNANT MELANOMA OF RIGHT UPPER EXTREMITY INCLUDING SHOULDER (HCC): Primary | ICD-10-CM

## 2019-01-09 ENCOUNTER — HOSPITAL ENCOUNTER (OUTPATIENT)
Dept: CARDIOLOGY | Facility: HOSPITAL | Age: 79
Discharge: HOME | End: 2019-01-09
Attending: INTERNAL MEDICINE

## 2019-01-09 ENCOUNTER — HOSPITAL ENCOUNTER (OUTPATIENT)
Dept: CT IMAGING | Facility: HOSPITAL | Age: 79
Discharge: HOME | End: 2019-01-09
Attending: INTERNAL MEDICINE

## 2019-01-09 ENCOUNTER — HOSPITAL ENCOUNTER (OUTPATIENT)
Dept: NUCLEAR MEDICINE | Facility: HOSPITAL | Age: 79
Discharge: HOME | End: 2019-01-09
Attending: INTERNAL MEDICINE

## 2019-01-09 VITALS
HEIGHT: 69 IN | BODY MASS INDEX: 27.11 KG/M2 | WEIGHT: 183 LBS | SYSTOLIC BLOOD PRESSURE: 130 MMHG | DIASTOLIC BLOOD PRESSURE: 70 MMHG

## 2019-01-09 DIAGNOSIS — C43.61 MALIGNANT MELANOMA OF RIGHT UPPER EXTREMITY INCLUDING SHOULDER (HCC): ICD-10-CM

## 2019-01-09 DIAGNOSIS — Z01.818 PREOP EXAMINATION: ICD-10-CM

## 2019-01-09 LAB — CREAT BLDA-MCNC: 1 MG/DL (ref 0.6–1.3)

## 2019-01-09 PROCEDURE — 93306 TTE W/DOPPLER COMPLETE: CPT | Performed by: INTERNAL MEDICINE

## 2019-01-09 PROCEDURE — 71260 CT THORAX DX C+: CPT

## 2019-01-09 PROCEDURE — 0399T ADULT TRANSTHORACIC ECHO COMPLETE W/ CONT IF NECESSARY PER PROTOCOL: CPT | Performed by: INTERNAL MEDICINE

## 2019-01-09 PROCEDURE — 78306 BONE IMAGING WHOLE BODY: CPT

## 2019-01-09 PROCEDURE — 0 IOPAMIDOL PER 1 ML: Performed by: INTERNAL MEDICINE

## 2019-01-09 PROCEDURE — A9561 TC99M OXIDRONATE: HCPCS | Performed by: INTERNAL MEDICINE

## 2019-01-09 PROCEDURE — 74177 CT ABD & PELVIS W/CONTRAST: CPT

## 2019-01-09 PROCEDURE — 0 TECHNETIUM OXIDRONATE KIT: Performed by: INTERNAL MEDICINE

## 2019-01-09 PROCEDURE — 0399T HC MYOCARDL STRAIN IMAG QUAN ASSMT PER SESS: CPT

## 2019-01-09 PROCEDURE — 93306 TTE W/DOPPLER COMPLETE: CPT

## 2019-01-09 PROCEDURE — 82565 ASSAY OF CREATININE: CPT

## 2019-01-09 RX ADMIN — IOPAMIDOL 200 ML: 755 INJECTION, SOLUTION INTRAVENOUS at 09:35

## 2019-01-09 RX ADMIN — TECHNETIUM TC 99M OXIDRONATE 1 DOSE: 3.15 INJECTION, POWDER, LYOPHILIZED, FOR SOLUTION INTRAVENOUS at 07:24

## 2019-01-10 LAB
BH CV ECHO MEAS - AO MAX PG (FULL): 2.6 MMHG
BH CV ECHO MEAS - AO MAX PG: 7.7 MMHG
BH CV ECHO MEAS - AO MEAN PG (FULL): 1 MMHG
BH CV ECHO MEAS - AO MEAN PG: 3 MMHG
BH CV ECHO MEAS - AO ROOT AREA (BSA CORRECTED): 1.6
BH CV ECHO MEAS - AO ROOT AREA: 8 CM^2
BH CV ECHO MEAS - AO ROOT DIAM: 3.2 CM
BH CV ECHO MEAS - AO V2 MAX: 139 CM/SEC
BH CV ECHO MEAS - AO V2 MEAN: 74.8 CM/SEC
BH CV ECHO MEAS - AO V2 VTI: 27.4 CM
BH CV ECHO MEAS - AVA(I,A): 3.2 CM^2
BH CV ECHO MEAS - AVA(I,D): 3.2 CM^2
BH CV ECHO MEAS - AVA(V,A): 2.6 CM^2
BH CV ECHO MEAS - AVA(V,D): 2.6 CM^2
BH CV ECHO MEAS - BSA(HAYCOCK): 2 M^2
BH CV ECHO MEAS - BSA: 2 M^2
BH CV ECHO MEAS - BZI_BMI: 27 KILOGRAMS/M^2
BH CV ECHO MEAS - BZI_METRIC_HEIGHT: 175.3 CM
BH CV ECHO MEAS - BZI_METRIC_WEIGHT: 83 KG
BH CV ECHO MEAS - EDV(CUBED): 161 ML
BH CV ECHO MEAS - EDV(MOD-SP4): 92 ML
BH CV ECHO MEAS - EDV(TEICH): 143.7 ML
BH CV ECHO MEAS - EF(CUBED): 70 %
BH CV ECHO MEAS - EF(MOD-SP4): 61.2 %
BH CV ECHO MEAS - EF(TEICH): 61.1 %
BH CV ECHO MEAS - ESV(CUBED): 48.2 ML
BH CV ECHO MEAS - ESV(MOD-SP4): 35.7 ML
BH CV ECHO MEAS - ESV(TEICH): 55.9 ML
BH CV ECHO MEAS - FS: 33.1 %
BH CV ECHO MEAS - IVS/LVPW: 1.1
BH CV ECHO MEAS - IVSD: 1.6 CM
BH CV ECHO MEAS - LA DIMENSION: 4.4 CM
BH CV ECHO MEAS - LA/AO: 1.4
BH CV ECHO MEAS - LAT PEAK E' VEL: 5.6 CM/SEC
BH CV ECHO MEAS - LV DIASTOLIC VOL/BSA (35-75): 46.3 ML/M^2
BH CV ECHO MEAS - LV MASS(C)D: 374 GRAMS
BH CV ECHO MEAS - LV MASS(C)DI: 188 GRAMS/M^2
BH CV ECHO MEAS - LV MAX PG: 5.1 MMHG
BH CV ECHO MEAS - LV MEAN PG: 2 MMHG
BH CV ECHO MEAS - LV SYSTOLIC VOL/BSA (12-30): 17.9 ML/M^2
BH CV ECHO MEAS - LV V1 MAX: 113 CM/SEC
BH CV ECHO MEAS - LV V1 MEAN: 53.9 CM/SEC
BH CV ECHO MEAS - LV V1 VTI: 28 CM
BH CV ECHO MEAS - LVIDD: 5.4 CM
BH CV ECHO MEAS - LVIDS: 3.6 CM
BH CV ECHO MEAS - LVLD AP4: 8 CM
BH CV ECHO MEAS - LVLS AP4: 6 CM
BH CV ECHO MEAS - LVOT AREA (M): 3.1 CM^2
BH CV ECHO MEAS - LVOT AREA: 3.1 CM^2
BH CV ECHO MEAS - LVOT DIAM: 2 CM
BH CV ECHO MEAS - LVPWD: 1.4 CM
BH CV ECHO MEAS - MED PEAK E' VEL: 5.33 CM/SEC
BH CV ECHO MEAS - MV A MAX VEL: 100 CM/SEC
BH CV ECHO MEAS - MV DEC TIME: 0.26 SEC
BH CV ECHO MEAS - MV E MAX VEL: 71 CM/SEC
BH CV ECHO MEAS - MV E/A: 0.71
BH CV ECHO MEAS - RAP SYSTOLE: 5 MMHG
BH CV ECHO MEAS - RVDD: 4.2 CM
BH CV ECHO MEAS - RVSP: 36.8 MMHG
BH CV ECHO MEAS - SI(AO): 110.8 ML/M^2
BH CV ECHO MEAS - SI(CUBED): 56.7 ML/M^2
BH CV ECHO MEAS - SI(LVOT): 44.2 ML/M^2
BH CV ECHO MEAS - SI(MOD-SP4): 28.3 ML/M^2
BH CV ECHO MEAS - SI(TEICH): 44.2 ML/M^2
BH CV ECHO MEAS - SV(AO): 220.4 ML
BH CV ECHO MEAS - SV(CUBED): 112.8 ML
BH CV ECHO MEAS - SV(LVOT): 88 ML
BH CV ECHO MEAS - SV(MOD-SP4): 56.3 ML
BH CV ECHO MEAS - SV(TEICH): 87.8 ML
BH CV ECHO MEAS - TR MAX VEL: 282 CM/SEC
BH CV ECHO MEASUREMENTS AVERAGE E/E' RATIO: 12.99
LEFT ATRIUM VOLUME INDEX: 17.6 ML/M2
LEFT ATRIUM VOLUME: 35.1 CM3
LV EF 2D ECHO EST: 60 %
MAXIMAL PREDICTED HEART RATE: 142 BPM
STRESS TARGET HR: 121 BPM

## 2019-01-12 ENCOUNTER — HOSPITAL ENCOUNTER (INPATIENT)
Age: 79
LOS: 6 days | Discharge: HOME | DRG: 392 | End: 2019-01-18
Attending: EMERGENCY MEDICINE | Admitting: INTERNAL MEDICINE
Payer: MEDICARE

## 2019-01-12 ENCOUNTER — APPOINTMENT (OUTPATIENT)
Dept: CT IMAGING | Age: 79
DRG: 392 | End: 2019-01-12
Payer: MEDICARE

## 2019-01-12 ENCOUNTER — APPOINTMENT (OUTPATIENT)
Dept: GENERAL RADIOLOGY | Age: 79
DRG: 392 | End: 2019-01-12
Payer: MEDICARE

## 2019-01-12 DIAGNOSIS — E78.00 HYPERCHOLESTEREMIA: ICD-10-CM

## 2019-01-12 DIAGNOSIS — D72.829 LEUKOCYTOSIS, UNSPECIFIED TYPE: ICD-10-CM

## 2019-01-12 DIAGNOSIS — K57.92 DIVERTICULITIS: ICD-10-CM

## 2019-01-12 DIAGNOSIS — K52.9 ENTEROCOLITIS: Primary | ICD-10-CM

## 2019-01-12 DIAGNOSIS — I10 ESSENTIAL HYPERTENSION: ICD-10-CM

## 2019-01-12 LAB
ALBUMIN SERPL-MCNC: 3.3 G/DL (ref 3.5–5.2)
ALP BLD-CCNC: 59 U/L (ref 40–130)
ALT SERPL-CCNC: 27 U/L (ref 5–41)
AMYLASE: 56 U/L (ref 28–100)
ANION GAP SERPL CALCULATED.3IONS-SCNC: 10 MMOL/L (ref 7–19)
AST SERPL-CCNC: 23 U/L (ref 5–40)
BASOPHILS ABSOLUTE: 0.1 K/UL (ref 0–0.2)
BASOPHILS RELATIVE PERCENT: 0.3 % (ref 0–1)
BILIRUB SERPL-MCNC: <0.2 MG/DL (ref 0.2–1.2)
BILIRUBIN URINE: NEGATIVE
BLOOD, URINE: NEGATIVE
BUN BLDV-MCNC: 22 MG/DL (ref 8–23)
C DIFFICILE TOXIN, EIA: NORMAL
CALCIUM SERPL-MCNC: 7.7 MG/DL (ref 8.8–10.2)
CHLORIDE BLD-SCNC: 103 MMOL/L (ref 98–111)
CLARITY: ABNORMAL
CO2: 23 MMOL/L (ref 22–29)
COLOR: YELLOW
CREAT SERPL-MCNC: 1.1 MG/DL (ref 0.5–1.2)
EOSINOPHILS ABSOLUTE: 0.4 K/UL (ref 0–0.6)
EOSINOPHILS RELATIVE PERCENT: 2.1 % (ref 0–5)
GFR NON-AFRICAN AMERICAN: >60
GLUCOSE BLD-MCNC: 136 MG/DL (ref 74–109)
GLUCOSE URINE: NEGATIVE MG/DL
HCT VFR BLD CALC: 45.6 % (ref 42–52)
HEMOGLOBIN: 14.7 G/DL (ref 14–18)
KETONES, URINE: NEGATIVE MG/DL
LEUKOCYTE ESTERASE, URINE: NEGATIVE
LIPASE: 32 U/L (ref 13–60)
LYMPHOCYTES ABSOLUTE: 2.8 K/UL (ref 1.1–4.5)
LYMPHOCYTES RELATIVE PERCENT: 14.2 % (ref 20–40)
MCH RBC QN AUTO: 30.2 PG (ref 27–31)
MCHC RBC AUTO-ENTMCNC: 32.2 G/DL (ref 33–37)
MCV RBC AUTO: 93.6 FL (ref 80–94)
MONOCYTES ABSOLUTE: 1.1 K/UL (ref 0–0.9)
MONOCYTES RELATIVE PERCENT: 5.7 % (ref 0–10)
NEUTROPHILS ABSOLUTE: 15.2 K/UL (ref 1.5–7.5)
NEUTROPHILS RELATIVE PERCENT: 76.9 % (ref 50–65)
NITRITE, URINE: NEGATIVE
PDW BLD-RTO: 14.7 % (ref 11.5–14.5)
PH UA: 5.5
PLATELET # BLD: 278 K/UL (ref 130–400)
PMV BLD AUTO: 10.1 FL (ref 9.4–12.4)
POTASSIUM REFLEX MAGNESIUM: 4.2 MMOL/L (ref 3.5–5)
PROTEIN UA: ABNORMAL MG/DL
RAPID INFLUENZA  B AGN: NEGATIVE
RAPID INFLUENZA A AGN: NEGATIVE
RBC # BLD: 4.87 M/UL (ref 4.7–6.1)
SODIUM BLD-SCNC: 136 MMOL/L (ref 136–145)
SPECIFIC GRAVITY UA: 1.03
TOTAL PROTEIN: 6.2 G/DL (ref 6.6–8.7)
URINE REFLEX TO CULTURE: ABNORMAL
UROBILINOGEN, URINE: 0.2 E.U./DL
WBC # BLD: 19.7 K/UL (ref 4.8–10.8)

## 2019-01-12 PROCEDURE — 99222 1ST HOSP IP/OBS MODERATE 55: CPT | Performed by: INTERNAL MEDICINE

## 2019-01-12 PROCEDURE — 1210000000 HC MED SURG R&B

## 2019-01-12 PROCEDURE — 71045 X-RAY EXAM CHEST 1 VIEW: CPT

## 2019-01-12 PROCEDURE — 85025 COMPLETE CBC W/AUTO DIFF WBC: CPT

## 2019-01-12 PROCEDURE — 96365 THER/PROPH/DIAG IV INF INIT: CPT

## 2019-01-12 PROCEDURE — 6370000000 HC RX 637 (ALT 250 FOR IP): Performed by: EMERGENCY MEDICINE

## 2019-01-12 PROCEDURE — 36415 COLL VENOUS BLD VENIPUNCTURE: CPT

## 2019-01-12 PROCEDURE — 83690 ASSAY OF LIPASE: CPT

## 2019-01-12 PROCEDURE — 99285 EMERGENCY DEPT VISIT HI MDM: CPT | Performed by: EMERGENCY MEDICINE

## 2019-01-12 PROCEDURE — 80053 COMPREHEN METABOLIC PANEL: CPT

## 2019-01-12 PROCEDURE — 82150 ASSAY OF AMYLASE: CPT

## 2019-01-12 PROCEDURE — 6370000000 HC RX 637 (ALT 250 FOR IP): Performed by: INTERNAL MEDICINE

## 2019-01-12 PROCEDURE — 2500000003 HC RX 250 WO HCPCS: Performed by: EMERGENCY MEDICINE

## 2019-01-12 PROCEDURE — 87449 NOS EACH ORGANISM AG IA: CPT

## 2019-01-12 PROCEDURE — 87804 INFLUENZA ASSAY W/OPTIC: CPT

## 2019-01-12 PROCEDURE — 94664 DEMO&/EVAL PT USE INHALER: CPT

## 2019-01-12 PROCEDURE — 81003 URINALYSIS AUTO W/O SCOPE: CPT

## 2019-01-12 PROCEDURE — 87324 CLOSTRIDIUM AG IA: CPT

## 2019-01-12 PROCEDURE — 6360000002 HC RX W HCPCS: Performed by: INTERNAL MEDICINE

## 2019-01-12 PROCEDURE — 74176 CT ABD & PELVIS W/O CONTRAST: CPT

## 2019-01-12 PROCEDURE — 6360000004 HC RX CONTRAST MEDICATION: Performed by: EMERGENCY MEDICINE

## 2019-01-12 PROCEDURE — 6360000002 HC RX W HCPCS: Performed by: EMERGENCY MEDICINE

## 2019-01-12 PROCEDURE — 94640 AIRWAY INHALATION TREATMENT: CPT

## 2019-01-12 PROCEDURE — 99285 EMERGENCY DEPT VISIT HI MDM: CPT

## 2019-01-12 PROCEDURE — 2580000003 HC RX 258: Performed by: INTERNAL MEDICINE

## 2019-01-12 RX ORDER — ONDANSETRON 2 MG/ML
4 INJECTION INTRAMUSCULAR; INTRAVENOUS EVERY 6 HOURS PRN
Status: DISCONTINUED | OUTPATIENT
Start: 2019-01-12 | End: 2019-01-14

## 2019-01-12 RX ORDER — HYDROCODONE BITARTRATE AND ACETAMINOPHEN 7.5; 325 MG/1; MG/1
1 TABLET ORAL EVERY 6 HOURS PRN
Status: DISCONTINUED | OUTPATIENT
Start: 2019-01-12 | End: 2019-01-18 | Stop reason: HOSPADM

## 2019-01-12 RX ORDER — CIPROFLOXACIN 2 MG/ML
400 INJECTION, SOLUTION INTRAVENOUS ONCE
Status: COMPLETED | OUTPATIENT
Start: 2019-01-12 | End: 2019-01-12

## 2019-01-12 RX ORDER — CIPROFLOXACIN 2 MG/ML
400 INJECTION, SOLUTION INTRAVENOUS EVERY 12 HOURS
Status: DISCONTINUED | OUTPATIENT
Start: 2019-01-13 | End: 2019-01-18 | Stop reason: HOSPADM

## 2019-01-12 RX ORDER — DRONABINOL 2.5 MG/1
2.5 CAPSULE ORAL
Status: DISCONTINUED | OUTPATIENT
Start: 2019-01-12 | End: 2019-01-18 | Stop reason: HOSPADM

## 2019-01-12 RX ORDER — SODIUM CHLORIDE 9 MG/ML
INJECTION, SOLUTION INTRAVENOUS CONTINUOUS
Status: ACTIVE | OUTPATIENT
Start: 2019-01-12 | End: 2019-01-13

## 2019-01-12 RX ORDER — SODIUM CHLORIDE 0.9 % (FLUSH) 0.9 %
10 SYRINGE (ML) INJECTION EVERY 12 HOURS SCHEDULED
Status: DISCONTINUED | OUTPATIENT
Start: 2019-01-12 | End: 2019-01-18 | Stop reason: HOSPADM

## 2019-01-12 RX ORDER — ATORVASTATIN CALCIUM 20 MG/1
20 TABLET, FILM COATED ORAL NIGHTLY
Status: DISCONTINUED | OUTPATIENT
Start: 2019-01-12 | End: 2019-01-18 | Stop reason: HOSPADM

## 2019-01-12 RX ORDER — FINASTERIDE 5 MG/1
5 TABLET, FILM COATED ORAL DAILY
Status: DISCONTINUED | OUTPATIENT
Start: 2019-01-13 | End: 2019-01-18 | Stop reason: HOSPADM

## 2019-01-12 RX ORDER — IPRATROPIUM BROMIDE AND ALBUTEROL SULFATE 2.5; .5 MG/3ML; MG/3ML
1 SOLUTION RESPIRATORY (INHALATION) ONCE
Status: COMPLETED | OUTPATIENT
Start: 2019-01-12 | End: 2019-01-12

## 2019-01-12 RX ORDER — SODIUM CHLORIDE 0.9 % (FLUSH) 0.9 %
10 SYRINGE (ML) INJECTION PRN
Status: DISCONTINUED | OUTPATIENT
Start: 2019-01-12 | End: 2019-01-18 | Stop reason: HOSPADM

## 2019-01-12 RX ADMIN — IOPAMIDOL 90 ML: 755 INJECTION, SOLUTION INTRAVENOUS at 13:30

## 2019-01-12 RX ADMIN — METRONIDAZOLE 500 MG: 500 INJECTION, SOLUTION INTRAVENOUS at 17:00

## 2019-01-12 RX ADMIN — ATORVASTATIN CALCIUM 20 MG: 20 TABLET, FILM COATED ORAL at 20:37

## 2019-01-12 RX ADMIN — IPRATROPIUM BROMIDE AND ALBUTEROL SULFATE 1 AMPULE: .5; 3 SOLUTION RESPIRATORY (INHALATION) at 11:44

## 2019-01-12 RX ADMIN — SODIUM CHLORIDE: 9 INJECTION, SOLUTION INTRAVENOUS at 18:32

## 2019-01-12 RX ADMIN — HYDROCODONE BITARTRATE AND ACETAMINOPHEN 1 TABLET: 7.5; 325 TABLET ORAL at 20:42

## 2019-01-12 RX ADMIN — ENOXAPARIN SODIUM 40 MG: 40 INJECTION SUBCUTANEOUS at 18:31

## 2019-01-12 RX ADMIN — CALCIUM GLUCONATE 1 G: 98 INJECTION, SOLUTION INTRAVENOUS at 20:37

## 2019-01-12 RX ADMIN — CIPROFLOXACIN 400 MG: 2 INJECTION, SOLUTION INTRAVENOUS at 15:46

## 2019-01-12 RX ADMIN — Medication 10 ML: at 20:38

## 2019-01-12 ASSESSMENT — ENCOUNTER SYMPTOMS
COUGH: 1
NAUSEA: 0
DIARRHEA: 1
VOMITING: 0
SHORTNESS OF BREATH: 0
ABDOMINAL PAIN: 1

## 2019-01-12 ASSESSMENT — PAIN DESCRIPTION - LOCATION
LOCATION: ABDOMEN

## 2019-01-12 ASSESSMENT — PAIN SCALES - WONG BAKER: WONGBAKER_NUMERICALRESPONSE: 0

## 2019-01-12 ASSESSMENT — PAIN DESCRIPTION - PAIN TYPE
TYPE: ACUTE PAIN

## 2019-01-12 ASSESSMENT — PAIN SCALES - GENERAL
PAINLEVEL_OUTOF10: 4
PAINLEVEL_OUTOF10: 7
PAINLEVEL_OUTOF10: 5

## 2019-01-13 LAB
BASOPHILS ABSOLUTE: 0 K/UL (ref 0–0.2)
BASOPHILS RELATIVE PERCENT: 0.3 % (ref 0–1)
EOSINOPHILS ABSOLUTE: 0.4 K/UL (ref 0–0.6)
EOSINOPHILS RELATIVE PERCENT: 2.9 % (ref 0–5)
HCT VFR BLD CALC: 41.7 % (ref 42–52)
HEMOGLOBIN: 13.5 G/DL (ref 14–18)
LYMPHOCYTES ABSOLUTE: 3.4 K/UL (ref 1.1–4.5)
LYMPHOCYTES RELATIVE PERCENT: 22.8 % (ref 20–40)
MAGNESIUM: 2.2 MG/DL (ref 1.6–2.4)
MCH RBC QN AUTO: 30.1 PG (ref 27–31)
MCHC RBC AUTO-ENTMCNC: 32.4 G/DL (ref 33–37)
MCV RBC AUTO: 93.1 FL (ref 80–94)
MONOCYTES ABSOLUTE: 1 K/UL (ref 0–0.9)
MONOCYTES RELATIVE PERCENT: 7.1 % (ref 0–10)
NEUTROPHILS ABSOLUTE: 9.8 K/UL (ref 1.5–7.5)
NEUTROPHILS RELATIVE PERCENT: 66.4 % (ref 50–65)
PDW BLD-RTO: 14.8 % (ref 11.5–14.5)
PLATELET # BLD: 248 K/UL (ref 130–400)
PMV BLD AUTO: 9.4 FL (ref 9.4–12.4)
RBC # BLD: 4.48 M/UL (ref 4.7–6.1)
WBC # BLD: 14.7 K/UL (ref 4.8–10.8)

## 2019-01-13 PROCEDURE — 6370000000 HC RX 637 (ALT 250 FOR IP): Performed by: INTERNAL MEDICINE

## 2019-01-13 PROCEDURE — 36591 DRAW BLOOD OFF VENOUS DEVICE: CPT

## 2019-01-13 PROCEDURE — 6370000000 HC RX 637 (ALT 250 FOR IP): Performed by: HOSPITALIST

## 2019-01-13 PROCEDURE — 99233 SBSQ HOSP IP/OBS HIGH 50: CPT | Performed by: HOSPITALIST

## 2019-01-13 PROCEDURE — 1210000000 HC MED SURG R&B

## 2019-01-13 PROCEDURE — 2500000003 HC RX 250 WO HCPCS: Performed by: INTERNAL MEDICINE

## 2019-01-13 PROCEDURE — 83735 ASSAY OF MAGNESIUM: CPT

## 2019-01-13 PROCEDURE — 2580000003 HC RX 258: Performed by: HOSPITALIST

## 2019-01-13 PROCEDURE — 6360000002 HC RX W HCPCS: Performed by: INTERNAL MEDICINE

## 2019-01-13 PROCEDURE — 85025 COMPLETE CBC W/AUTO DIFF WBC: CPT

## 2019-01-13 RX ORDER — ACETAMINOPHEN 325 MG/1
650 TABLET ORAL EVERY 8 HOURS PRN
Status: DISCONTINUED | OUTPATIENT
Start: 2019-01-13 | End: 2019-01-18 | Stop reason: HOSPADM

## 2019-01-13 RX ORDER — SODIUM CHLORIDE 9 MG/ML
INJECTION, SOLUTION INTRAVENOUS CONTINUOUS
Status: ACTIVE | OUTPATIENT
Start: 2019-01-13 | End: 2019-01-14

## 2019-01-13 RX ORDER — LOPERAMIDE HYDROCHLORIDE 2 MG/1
2 CAPSULE ORAL 4 TIMES DAILY PRN
Status: DISCONTINUED | OUTPATIENT
Start: 2019-01-13 | End: 2019-01-18 | Stop reason: HOSPADM

## 2019-01-13 RX ADMIN — CIPROFLOXACIN 400 MG: 2 INJECTION, SOLUTION INTRAVENOUS at 16:44

## 2019-01-13 RX ADMIN — ACETAMINOPHEN 650 MG: 325 TABLET ORAL at 21:20

## 2019-01-13 RX ADMIN — SODIUM CHLORIDE: 9 INJECTION, SOLUTION INTRAVENOUS at 21:20

## 2019-01-13 RX ADMIN — SODIUM CHLORIDE: 9 INJECTION, SOLUTION INTRAVENOUS at 14:17

## 2019-01-13 RX ADMIN — METRONIDAZOLE 500 MG: 500 INJECTION, SOLUTION INTRAVENOUS at 08:09

## 2019-01-13 RX ADMIN — ONDANSETRON 4 MG: 2 INJECTION INTRAMUSCULAR; INTRAVENOUS at 18:11

## 2019-01-13 RX ADMIN — CIPROFLOXACIN 400 MG: 2 INJECTION, SOLUTION INTRAVENOUS at 04:09

## 2019-01-13 RX ADMIN — DRONABINOL 2.5 MG: 2.5 CAPSULE ORAL at 16:44

## 2019-01-13 RX ADMIN — ENOXAPARIN SODIUM 40 MG: 40 INJECTION SUBCUTANEOUS at 08:09

## 2019-01-13 RX ADMIN — METRONIDAZOLE 500 MG: 500 INJECTION, SOLUTION INTRAVENOUS at 00:34

## 2019-01-13 RX ADMIN — ATORVASTATIN CALCIUM 20 MG: 20 TABLET, FILM COATED ORAL at 21:20

## 2019-01-13 RX ADMIN — FINASTERIDE 5 MG: 5 TABLET, FILM COATED ORAL at 08:09

## 2019-01-13 RX ADMIN — DRONABINOL 2.5 MG: 2.5 CAPSULE ORAL at 06:48

## 2019-01-13 RX ADMIN — LOPERAMIDE HYDROCHLORIDE 2 MG: 2 CAPSULE ORAL at 14:17

## 2019-01-13 RX ADMIN — METRONIDAZOLE 500 MG: 500 INJECTION, SOLUTION INTRAVENOUS at 17:49

## 2019-01-13 ASSESSMENT — PAIN SCALES - GENERAL
PAINLEVEL_OUTOF10: 2
PAINLEVEL_OUTOF10: 3

## 2019-01-14 LAB
ANION GAP SERPL CALCULATED.3IONS-SCNC: 10 MMOL/L (ref 7–19)
BUN BLDV-MCNC: 11 MG/DL (ref 8–23)
CALCIUM SERPL-MCNC: 6.8 MG/DL (ref 8.8–10.2)
CHLORIDE BLD-SCNC: 105 MMOL/L (ref 98–111)
CO2: 22 MMOL/L (ref 22–29)
CREAT SERPL-MCNC: 0.9 MG/DL (ref 0.5–1.2)
GFR NON-AFRICAN AMERICAN: >60
GLUCOSE BLD-MCNC: 107 MG/DL (ref 74–109)
HCT VFR BLD CALC: 40 % (ref 42–52)
HEMOGLOBIN: 12.8 G/DL (ref 14–18)
MCH RBC QN AUTO: 29.6 PG (ref 27–31)
MCHC RBC AUTO-ENTMCNC: 32 G/DL (ref 33–37)
MCV RBC AUTO: 92.6 FL (ref 80–94)
PDW BLD-RTO: 14.5 % (ref 11.5–14.5)
PLATELET # BLD: 234 K/UL (ref 130–400)
PMV BLD AUTO: 9.8 FL (ref 9.4–12.4)
POTASSIUM SERPL-SCNC: 3.6 MMOL/L (ref 3.5–5)
RBC # BLD: 4.32 M/UL (ref 4.7–6.1)
SODIUM BLD-SCNC: 137 MMOL/L (ref 136–145)
WBC # BLD: 14.9 K/UL (ref 4.8–10.8)

## 2019-01-14 PROCEDURE — 99233 SBSQ HOSP IP/OBS HIGH 50: CPT | Performed by: HOSPITALIST

## 2019-01-14 PROCEDURE — 6370000000 HC RX 637 (ALT 250 FOR IP): Performed by: INTERNAL MEDICINE

## 2019-01-14 PROCEDURE — 2580000003 HC RX 258: Performed by: INTERNAL MEDICINE

## 2019-01-14 PROCEDURE — 80048 BASIC METABOLIC PNL TOTAL CA: CPT

## 2019-01-14 PROCEDURE — 6370000000 HC RX 637 (ALT 250 FOR IP): Performed by: HOSPITALIST

## 2019-01-14 PROCEDURE — 1210000000 HC MED SURG R&B

## 2019-01-14 PROCEDURE — 6360000002 HC RX W HCPCS: Performed by: NURSE PRACTITIONER

## 2019-01-14 PROCEDURE — 6360000002 HC RX W HCPCS: Performed by: INTERNAL MEDICINE

## 2019-01-14 PROCEDURE — 6370000000 HC RX 637 (ALT 250 FOR IP): Performed by: NURSE PRACTITIONER

## 2019-01-14 PROCEDURE — 2500000003 HC RX 250 WO HCPCS: Performed by: INTERNAL MEDICINE

## 2019-01-14 PROCEDURE — 85027 COMPLETE CBC AUTOMATED: CPT

## 2019-01-14 RX ORDER — PROMETHAZINE HYDROCHLORIDE 25 MG/ML
12.5 INJECTION, SOLUTION INTRAMUSCULAR; INTRAVENOUS EVERY 6 HOURS PRN
Status: DISCONTINUED | OUTPATIENT
Start: 2019-01-14 | End: 2019-01-18 | Stop reason: HOSPADM

## 2019-01-14 RX ORDER — DIPHENOXYLATE HYDROCHLORIDE AND ATROPINE SULFATE 2.5; .025 MG/1; MG/1
1 TABLET ORAL 4 TIMES DAILY PRN
Status: DISCONTINUED | OUTPATIENT
Start: 2019-01-14 | End: 2019-01-17

## 2019-01-14 RX ORDER — ONDANSETRON 2 MG/ML
8 INJECTION INTRAMUSCULAR; INTRAVENOUS EVERY 6 HOURS PRN
Status: DISCONTINUED | OUTPATIENT
Start: 2019-01-14 | End: 2019-01-18 | Stop reason: HOSPADM

## 2019-01-14 RX ADMIN — DRONABINOL 2.5 MG: 2.5 CAPSULE ORAL at 17:10

## 2019-01-14 RX ADMIN — CIPROFLOXACIN 400 MG: 2 INJECTION, SOLUTION INTRAVENOUS at 04:20

## 2019-01-14 RX ADMIN — ATORVASTATIN CALCIUM 20 MG: 20 TABLET, FILM COATED ORAL at 21:33

## 2019-01-14 RX ADMIN — METRONIDAZOLE 500 MG: 500 INJECTION, SOLUTION INTRAVENOUS at 01:23

## 2019-01-14 RX ADMIN — METRONIDAZOLE 500 MG: 500 INJECTION, SOLUTION INTRAVENOUS at 18:38

## 2019-01-14 RX ADMIN — LOPERAMIDE HYDROCHLORIDE 2 MG: 2 CAPSULE ORAL at 18:41

## 2019-01-14 RX ADMIN — ONDANSETRON 8 MG: 2 INJECTION INTRAMUSCULAR; INTRAVENOUS at 16:16

## 2019-01-14 RX ADMIN — METRONIDAZOLE 500 MG: 500 INJECTION, SOLUTION INTRAVENOUS at 09:00

## 2019-01-14 RX ADMIN — DIPHENOXYLATE HYDROCHLORIDE AND ATROPINE SULFATE 1 TABLET: 2.5; .025 TABLET ORAL at 17:12

## 2019-01-14 RX ADMIN — LOPERAMIDE HYDROCHLORIDE 2 MG: 2 CAPSULE ORAL at 03:44

## 2019-01-14 RX ADMIN — FINASTERIDE 5 MG: 5 TABLET, FILM COATED ORAL at 09:00

## 2019-01-14 RX ADMIN — ONDANSETRON 4 MG: 2 INJECTION INTRAMUSCULAR; INTRAVENOUS at 04:58

## 2019-01-14 RX ADMIN — ENOXAPARIN SODIUM 40 MG: 40 INJECTION SUBCUTANEOUS at 09:00

## 2019-01-14 RX ADMIN — CIPROFLOXACIN 400 MG: 2 INJECTION, SOLUTION INTRAVENOUS at 17:10

## 2019-01-14 RX ADMIN — HYDROCODONE BITARTRATE AND ACETAMINOPHEN 1 TABLET: 7.5; 325 TABLET ORAL at 03:44

## 2019-01-14 RX ADMIN — Medication 10 ML: at 21:33

## 2019-01-14 RX ADMIN — DRONABINOL 2.5 MG: 2.5 CAPSULE ORAL at 06:21

## 2019-01-14 RX ADMIN — PROMETHAZINE HYDROCHLORIDE 12.5 MG: 25 INJECTION, SOLUTION INTRAMUSCULAR; INTRAVENOUS at 09:16

## 2019-01-14 ASSESSMENT — ENCOUNTER SYMPTOMS
ABDOMINAL PAIN: 0
GASTROINTESTINAL NEGATIVE: 1
EYE PAIN: 0
EYES NEGATIVE: 1
SHORTNESS OF BREATH: 0
WHEEZING: 0
DIARRHEA: 0
NAUSEA: 0
VOMITING: 0
EYE DISCHARGE: 0
BLOOD IN STOOL: 0
EYE REDNESS: 0
BACK PAIN: 0
SORE THROAT: 0
COUGH: 1
CONSTIPATION: 0

## 2019-01-14 ASSESSMENT — PAIN DESCRIPTION - LOCATION: LOCATION: ABDOMEN

## 2019-01-14 ASSESSMENT — PAIN SCALES - GENERAL
PAINLEVEL_OUTOF10: 6
PAINLEVEL_OUTOF10: 8
PAINLEVEL_OUTOF10: 2
PAINLEVEL_OUTOF10: 0

## 2019-01-15 LAB
ANION GAP SERPL CALCULATED.3IONS-SCNC: 6 MMOL/L (ref 7–19)
BUN BLDV-MCNC: 7 MG/DL (ref 8–23)
CALCIUM SERPL-MCNC: 6.3 MG/DL (ref 8.8–10.2)
CHLORIDE BLD-SCNC: 106 MMOL/L (ref 98–111)
CO2: 25 MMOL/L (ref 22–29)
CREAT SERPL-MCNC: 0.9 MG/DL (ref 0.5–1.2)
GFR NON-AFRICAN AMERICAN: >60
GLUCOSE BLD-MCNC: 84 MG/DL (ref 74–109)
HCT VFR BLD CALC: 38.5 % (ref 42–52)
HEMOGLOBIN: 12.5 G/DL (ref 14–18)
MCH RBC QN AUTO: 30.1 PG (ref 27–31)
MCHC RBC AUTO-ENTMCNC: 32.5 G/DL (ref 33–37)
MCV RBC AUTO: 92.8 FL (ref 80–94)
PDW BLD-RTO: 14.8 % (ref 11.5–14.5)
PLATELET # BLD: 216 K/UL (ref 130–400)
PMV BLD AUTO: 9.4 FL (ref 9.4–12.4)
POTASSIUM SERPL-SCNC: 3.6 MMOL/L (ref 3.5–5)
RBC # BLD: 4.15 M/UL (ref 4.7–6.1)
SODIUM BLD-SCNC: 137 MMOL/L (ref 136–145)
WBC # BLD: 10.8 K/UL (ref 4.8–10.8)

## 2019-01-15 PROCEDURE — 2500000003 HC RX 250 WO HCPCS: Performed by: INTERNAL MEDICINE

## 2019-01-15 PROCEDURE — 6370000000 HC RX 637 (ALT 250 FOR IP): Performed by: NURSE PRACTITIONER

## 2019-01-15 PROCEDURE — 1210000000 HC MED SURG R&B

## 2019-01-15 PROCEDURE — 6360000002 HC RX W HCPCS: Performed by: INTERNAL MEDICINE

## 2019-01-15 PROCEDURE — 85027 COMPLETE CBC AUTOMATED: CPT

## 2019-01-15 PROCEDURE — 2580000003 HC RX 258: Performed by: INTERNAL MEDICINE

## 2019-01-15 PROCEDURE — 6370000000 HC RX 637 (ALT 250 FOR IP): Performed by: HOSPITALIST

## 2019-01-15 PROCEDURE — 6370000000 HC RX 637 (ALT 250 FOR IP): Performed by: INTERNAL MEDICINE

## 2019-01-15 PROCEDURE — 80048 BASIC METABOLIC PNL TOTAL CA: CPT

## 2019-01-15 PROCEDURE — 6360000002 HC RX W HCPCS: Performed by: NURSE PRACTITIONER

## 2019-01-15 PROCEDURE — 99232 SBSQ HOSP IP/OBS MODERATE 35: CPT | Performed by: INTERNAL MEDICINE

## 2019-01-15 RX ORDER — SIMETHICONE 80 MG
80 TABLET,CHEWABLE ORAL ONCE
Status: COMPLETED | OUTPATIENT
Start: 2019-01-15 | End: 2019-01-15

## 2019-01-15 RX ORDER — SODIUM CHLORIDE 9 MG/ML
INJECTION, SOLUTION INTRAVENOUS CONTINUOUS
Status: DISCONTINUED | OUTPATIENT
Start: 2019-01-15 | End: 2019-01-18 | Stop reason: HOSPADM

## 2019-01-15 RX ADMIN — DIPHENOXYLATE HYDROCHLORIDE AND ATROPINE SULFATE 1 TABLET: 2.5; .025 TABLET ORAL at 18:51

## 2019-01-15 RX ADMIN — FINASTERIDE 5 MG: 5 TABLET, FILM COATED ORAL at 09:46

## 2019-01-15 RX ADMIN — LOPERAMIDE HYDROCHLORIDE 2 MG: 2 CAPSULE ORAL at 13:54

## 2019-01-15 RX ADMIN — CIPROFLOXACIN 400 MG: 2 INJECTION, SOLUTION INTRAVENOUS at 18:51

## 2019-01-15 RX ADMIN — DRONABINOL 2.5 MG: 2.5 CAPSULE ORAL at 18:51

## 2019-01-15 RX ADMIN — ONDANSETRON 8 MG: 2 INJECTION INTRAMUSCULAR; INTRAVENOUS at 12:05

## 2019-01-15 RX ADMIN — ENOXAPARIN SODIUM 40 MG: 40 INJECTION SUBCUTANEOUS at 09:46

## 2019-01-15 RX ADMIN — METRONIDAZOLE 500 MG: 500 INJECTION, SOLUTION INTRAVENOUS at 20:37

## 2019-01-15 RX ADMIN — LOPERAMIDE HYDROCHLORIDE 2 MG: 2 CAPSULE ORAL at 05:52

## 2019-01-15 RX ADMIN — PROMETHAZINE HYDROCHLORIDE 12.5 MG: 25 INJECTION, SOLUTION INTRAMUSCULAR; INTRAVENOUS at 20:37

## 2019-01-15 RX ADMIN — METRONIDAZOLE 500 MG: 500 INJECTION, SOLUTION INTRAVENOUS at 01:32

## 2019-01-15 RX ADMIN — SODIUM CHLORIDE: 9 INJECTION, SOLUTION INTRAVENOUS at 18:52

## 2019-01-15 RX ADMIN — CIPROFLOXACIN 400 MG: 2 INJECTION, SOLUTION INTRAVENOUS at 03:44

## 2019-01-15 RX ADMIN — DIPHENOXYLATE HYDROCHLORIDE AND ATROPINE SULFATE 1 TABLET: 2.5; .025 TABLET ORAL at 09:59

## 2019-01-15 RX ADMIN — ATORVASTATIN CALCIUM 20 MG: 20 TABLET, FILM COATED ORAL at 20:38

## 2019-01-15 RX ADMIN — HYDROCODONE BITARTRATE AND ACETAMINOPHEN 1 TABLET: 7.5; 325 TABLET ORAL at 11:56

## 2019-01-15 RX ADMIN — SIMETHICONE CHEW TAB 80 MG 80 MG: 80 TABLET ORAL at 20:38

## 2019-01-15 RX ADMIN — PROMETHAZINE HYDROCHLORIDE 12.5 MG: 25 INJECTION, SOLUTION INTRAMUSCULAR; INTRAVENOUS at 05:52

## 2019-01-15 RX ADMIN — METRONIDAZOLE 500 MG: 500 INJECTION, SOLUTION INTRAVENOUS at 09:46

## 2019-01-15 RX ADMIN — Medication 10 ML: at 20:38

## 2019-01-15 RX ADMIN — DRONABINOL 2.5 MG: 2.5 CAPSULE ORAL at 05:52

## 2019-01-15 ASSESSMENT — ENCOUNTER SYMPTOMS
BACK PAIN: 0
COUGH: 1
EYE DISCHARGE: 0
SORE THROAT: 0
BLOOD IN STOOL: 0
SHORTNESS OF BREATH: 0
EYE REDNESS: 0
EYE PAIN: 0
NAUSEA: 0
EYES NEGATIVE: 1
CONSTIPATION: 0
ABDOMINAL PAIN: 0
DIARRHEA: 0
VOMITING: 0
WHEEZING: 0
GASTROINTESTINAL NEGATIVE: 1

## 2019-01-15 ASSESSMENT — PAIN SCALES - GENERAL
PAINLEVEL_OUTOF10: 0
PAINLEVEL_OUTOF10: 7
PAINLEVEL_OUTOF10: 4

## 2019-01-16 LAB
ANION GAP SERPL CALCULATED.3IONS-SCNC: 9 MMOL/L (ref 7–19)
BUN BLDV-MCNC: 9 MG/DL (ref 8–23)
CALCIUM SERPL-MCNC: 7 MG/DL (ref 8.8–10.2)
CHLORIDE BLD-SCNC: 103 MMOL/L (ref 98–111)
CO2: 25 MMOL/L (ref 22–29)
CREAT SERPL-MCNC: 0.9 MG/DL (ref 0.5–1.2)
GFR NON-AFRICAN AMERICAN: >60
GLUCOSE BLD-MCNC: 104 MG/DL (ref 74–109)
HCT VFR BLD CALC: 39.1 % (ref 42–52)
HEMOGLOBIN: 12.7 G/DL (ref 14–18)
MAGNESIUM: 1.9 MG/DL (ref 1.6–2.4)
MCH RBC QN AUTO: 30.1 PG (ref 27–31)
MCHC RBC AUTO-ENTMCNC: 32.5 G/DL (ref 33–37)
MCV RBC AUTO: 92.7 FL (ref 80–94)
PDW BLD-RTO: 14.9 % (ref 11.5–14.5)
PHOSPHORUS: 1.4 MG/DL (ref 2.5–4.5)
PLATELET # BLD: 241 K/UL (ref 130–400)
PMV BLD AUTO: 9.8 FL (ref 9.4–12.4)
POTASSIUM SERPL-SCNC: 3.3 MMOL/L (ref 3.5–5)
RBC # BLD: 4.22 M/UL (ref 4.7–6.1)
SODIUM BLD-SCNC: 137 MMOL/L (ref 136–145)
WBC # BLD: 11.5 K/UL (ref 4.8–10.8)

## 2019-01-16 PROCEDURE — 2580000003 HC RX 258: Performed by: INTERNAL MEDICINE

## 2019-01-16 PROCEDURE — 6370000000 HC RX 637 (ALT 250 FOR IP): Performed by: INTERNAL MEDICINE

## 2019-01-16 PROCEDURE — 6370000000 HC RX 637 (ALT 250 FOR IP): Performed by: HOSPITALIST

## 2019-01-16 PROCEDURE — 2500000003 HC RX 250 WO HCPCS: Performed by: INTERNAL MEDICINE

## 2019-01-16 PROCEDURE — 1210000000 HC MED SURG R&B

## 2019-01-16 PROCEDURE — 6370000000 HC RX 637 (ALT 250 FOR IP): Performed by: NURSE PRACTITIONER

## 2019-01-16 PROCEDURE — 85027 COMPLETE CBC AUTOMATED: CPT

## 2019-01-16 PROCEDURE — 6360000002 HC RX W HCPCS: Performed by: NURSE PRACTITIONER

## 2019-01-16 PROCEDURE — 84100 ASSAY OF PHOSPHORUS: CPT

## 2019-01-16 PROCEDURE — 83735 ASSAY OF MAGNESIUM: CPT

## 2019-01-16 PROCEDURE — 6360000002 HC RX W HCPCS: Performed by: INTERNAL MEDICINE

## 2019-01-16 PROCEDURE — 99232 SBSQ HOSP IP/OBS MODERATE 35: CPT | Performed by: INTERNAL MEDICINE

## 2019-01-16 PROCEDURE — 80048 BASIC METABOLIC PNL TOTAL CA: CPT

## 2019-01-16 RX ORDER — POTASSIUM CHLORIDE 20 MEQ/1
20 TABLET, EXTENDED RELEASE ORAL ONCE
Status: COMPLETED | OUTPATIENT
Start: 2019-01-16 | End: 2019-01-16

## 2019-01-16 RX ADMIN — DRONABINOL 2.5 MG: 2.5 CAPSULE ORAL at 06:40

## 2019-01-16 RX ADMIN — METRONIDAZOLE 500 MG: 500 INJECTION, SOLUTION INTRAVENOUS at 18:25

## 2019-01-16 RX ADMIN — PROMETHAZINE HYDROCHLORIDE 12.5 MG: 25 INJECTION, SOLUTION INTRAMUSCULAR; INTRAVENOUS at 10:53

## 2019-01-16 RX ADMIN — POTASSIUM CHLORIDE 20 MEQ: 20 TABLET, EXTENDED RELEASE ORAL at 12:59

## 2019-01-16 RX ADMIN — DRONABINOL 2.5 MG: 2.5 CAPSULE ORAL at 16:28

## 2019-01-16 RX ADMIN — Medication 10 ML: at 08:46

## 2019-01-16 RX ADMIN — Medication 10 ML: at 22:00

## 2019-01-16 RX ADMIN — DIPHENOXYLATE HYDROCHLORIDE AND ATROPINE SULFATE 1 TABLET: 2.5; .025 TABLET ORAL at 06:40

## 2019-01-16 RX ADMIN — HYDROCODONE BITARTRATE AND ACETAMINOPHEN 1 TABLET: 7.5; 325 TABLET ORAL at 06:40

## 2019-01-16 RX ADMIN — CIPROFLOXACIN 400 MG: 2 INJECTION, SOLUTION INTRAVENOUS at 16:28

## 2019-01-16 RX ADMIN — POTASSIUM PHOSPHATE, MONOBASIC AND POTASSIUM PHOSPHATE, DIBASIC 30 MMOL: 224; 236 INJECTION, SOLUTION INTRAVENOUS at 14:46

## 2019-01-16 RX ADMIN — SODIUM CHLORIDE: 9 INJECTION, SOLUTION INTRAVENOUS at 22:03

## 2019-01-16 RX ADMIN — ONDANSETRON 8 MG: 2 INJECTION INTRAMUSCULAR; INTRAVENOUS at 02:44

## 2019-01-16 RX ADMIN — FINASTERIDE 5 MG: 5 TABLET, FILM COATED ORAL at 08:45

## 2019-01-16 RX ADMIN — ENOXAPARIN SODIUM 40 MG: 40 INJECTION SUBCUTANEOUS at 08:45

## 2019-01-16 RX ADMIN — LOPERAMIDE HYDROCHLORIDE 2 MG: 2 CAPSULE ORAL at 02:26

## 2019-01-16 RX ADMIN — CIPROFLOXACIN 400 MG: 2 INJECTION, SOLUTION INTRAVENOUS at 04:34

## 2019-01-16 RX ADMIN — METRONIDAZOLE 500 MG: 500 INJECTION, SOLUTION INTRAVENOUS at 01:58

## 2019-01-16 RX ADMIN — METRONIDAZOLE 500 MG: 500 INJECTION, SOLUTION INTRAVENOUS at 08:46

## 2019-01-16 RX ADMIN — ATORVASTATIN CALCIUM 20 MG: 20 TABLET, FILM COATED ORAL at 22:00

## 2019-01-16 ASSESSMENT — PAIN SCALES - WONG BAKER: WONGBAKER_NUMERICALRESPONSE: 0

## 2019-01-16 ASSESSMENT — ENCOUNTER SYMPTOMS
ABDOMINAL PAIN: 0
BACK PAIN: 0
EYE PAIN: 0
EYES NEGATIVE: 1
CONSTIPATION: 0
SHORTNESS OF BREATH: 0
DIARRHEA: 0
VOMITING: 0
COUGH: 1
EYE REDNESS: 0
BLOOD IN STOOL: 0
WHEEZING: 0
EYE DISCHARGE: 0
GASTROINTESTINAL NEGATIVE: 1
NAUSEA: 0
SORE THROAT: 0

## 2019-01-16 ASSESSMENT — PAIN SCALES - GENERAL
PAINLEVEL_OUTOF10: 0
PAINLEVEL_OUTOF10: 6

## 2019-01-17 LAB
ANION GAP SERPL CALCULATED.3IONS-SCNC: 9 MMOL/L (ref 7–19)
BUN BLDV-MCNC: 9 MG/DL (ref 8–23)
CALCIUM SERPL-MCNC: 7.5 MG/DL (ref 8.8–10.2)
CHLORIDE BLD-SCNC: 103 MMOL/L (ref 98–111)
CO2: 26 MMOL/L (ref 22–29)
CREAT SERPL-MCNC: 0.9 MG/DL (ref 0.5–1.2)
GFR NON-AFRICAN AMERICAN: >60
GLUCOSE BLD-MCNC: 127 MG/DL (ref 74–109)
HCT VFR BLD CALC: 38.4 % (ref 42–52)
HEMOGLOBIN: 12.4 G/DL (ref 14–18)
MAGNESIUM: 1.8 MG/DL (ref 1.6–2.4)
MCH RBC QN AUTO: 29.7 PG (ref 27–31)
MCHC RBC AUTO-ENTMCNC: 32.3 G/DL (ref 33–37)
MCV RBC AUTO: 92.1 FL (ref 80–94)
PDW BLD-RTO: 14.8 % (ref 11.5–14.5)
PHOSPHORUS: 2.5 MG/DL (ref 2.5–4.5)
PLATELET # BLD: 260 K/UL (ref 130–400)
PMV BLD AUTO: 9.3 FL (ref 9.4–12.4)
POTASSIUM SERPL-SCNC: 3.9 MMOL/L (ref 3.5–5)
RBC # BLD: 4.17 M/UL (ref 4.7–6.1)
SODIUM BLD-SCNC: 138 MMOL/L (ref 136–145)
WBC # BLD: 10.5 K/UL (ref 4.8–10.8)

## 2019-01-17 PROCEDURE — 2500000003 HC RX 250 WO HCPCS: Performed by: INTERNAL MEDICINE

## 2019-01-17 PROCEDURE — 85027 COMPLETE CBC AUTOMATED: CPT

## 2019-01-17 PROCEDURE — 83735 ASSAY OF MAGNESIUM: CPT

## 2019-01-17 PROCEDURE — 84100 ASSAY OF PHOSPHORUS: CPT

## 2019-01-17 PROCEDURE — 99232 SBSQ HOSP IP/OBS MODERATE 35: CPT | Performed by: HOSPITALIST

## 2019-01-17 PROCEDURE — 2580000003 HC RX 258: Performed by: INTERNAL MEDICINE

## 2019-01-17 PROCEDURE — 80048 BASIC METABOLIC PNL TOTAL CA: CPT

## 2019-01-17 PROCEDURE — 1210000000 HC MED SURG R&B

## 2019-01-17 PROCEDURE — 36591 DRAW BLOOD OFF VENOUS DEVICE: CPT

## 2019-01-17 PROCEDURE — 97161 PT EVAL LOW COMPLEX 20 MIN: CPT

## 2019-01-17 PROCEDURE — 97165 OT EVAL LOW COMPLEX 30 MIN: CPT

## 2019-01-17 PROCEDURE — 6360000002 HC RX W HCPCS: Performed by: INTERNAL MEDICINE

## 2019-01-17 PROCEDURE — 6370000000 HC RX 637 (ALT 250 FOR IP): Performed by: INTERNAL MEDICINE

## 2019-01-17 RX ADMIN — FINASTERIDE 5 MG: 5 TABLET, FILM COATED ORAL at 09:12

## 2019-01-17 RX ADMIN — METRONIDAZOLE 500 MG: 500 INJECTION, SOLUTION INTRAVENOUS at 09:11

## 2019-01-17 RX ADMIN — CIPROFLOXACIN 400 MG: 2 INJECTION, SOLUTION INTRAVENOUS at 06:08

## 2019-01-17 RX ADMIN — METRONIDAZOLE 500 MG: 500 INJECTION, SOLUTION INTRAVENOUS at 17:18

## 2019-01-17 RX ADMIN — ENOXAPARIN SODIUM 40 MG: 40 INJECTION SUBCUTANEOUS at 09:12

## 2019-01-17 RX ADMIN — SODIUM CHLORIDE: 9 INJECTION, SOLUTION INTRAVENOUS at 14:15

## 2019-01-17 RX ADMIN — ATORVASTATIN CALCIUM 20 MG: 20 TABLET, FILM COATED ORAL at 21:11

## 2019-01-17 RX ADMIN — Medication 10 ML: at 21:11

## 2019-01-17 RX ADMIN — CIPROFLOXACIN 400 MG: 2 INJECTION, SOLUTION INTRAVENOUS at 17:14

## 2019-01-17 RX ADMIN — DRONABINOL 2.5 MG: 2.5 CAPSULE ORAL at 06:08

## 2019-01-17 RX ADMIN — DRONABINOL 2.5 MG: 2.5 CAPSULE ORAL at 17:14

## 2019-01-17 RX ADMIN — METRONIDAZOLE 500 MG: 500 INJECTION, SOLUTION INTRAVENOUS at 02:18

## 2019-01-17 ASSESSMENT — ENCOUNTER SYMPTOMS
EYE DISCHARGE: 0
EYES NEGATIVE: 1
BACK PAIN: 0
SORE THROAT: 0
BLOOD IN STOOL: 0
SHORTNESS OF BREATH: 0
CONSTIPATION: 0
GASTROINTESTINAL NEGATIVE: 1
ABDOMINAL PAIN: 0
WHEEZING: 0
COUGH: 1
EYE PAIN: 0
NAUSEA: 0
DIARRHEA: 0
VOMITING: 0
EYE REDNESS: 0

## 2019-01-17 ASSESSMENT — PAIN SCALES - GENERAL: PAINLEVEL_OUTOF10: 0

## 2019-01-18 VITALS
OXYGEN SATURATION: 97 % | WEIGHT: 184 LBS | SYSTOLIC BLOOD PRESSURE: 131 MMHG | TEMPERATURE: 98.4 F | HEIGHT: 69 IN | BODY MASS INDEX: 27.25 KG/M2 | DIASTOLIC BLOOD PRESSURE: 78 MMHG | HEART RATE: 61 BPM | RESPIRATION RATE: 20 BRPM

## 2019-01-18 PROCEDURE — 99239 HOSP IP/OBS DSCHRG MGMT >30: CPT | Performed by: HOSPITALIST

## 2019-01-18 PROCEDURE — 6360000002 HC RX W HCPCS: Performed by: INTERNAL MEDICINE

## 2019-01-18 PROCEDURE — 2580000003 HC RX 258: Performed by: INTERNAL MEDICINE

## 2019-01-18 PROCEDURE — 6360000002 HC RX W HCPCS: Performed by: HOSPITALIST

## 2019-01-18 PROCEDURE — 6370000000 HC RX 637 (ALT 250 FOR IP): Performed by: INTERNAL MEDICINE

## 2019-01-18 PROCEDURE — 2500000003 HC RX 250 WO HCPCS: Performed by: INTERNAL MEDICINE

## 2019-01-18 RX ORDER — METRONIDAZOLE 500 MG/1
500 TABLET ORAL 3 TIMES DAILY
Qty: 15 TABLET | Refills: 0 | Status: SHIPPED | OUTPATIENT
Start: 2019-01-18 | End: 2019-01-23

## 2019-01-18 RX ADMIN — METRONIDAZOLE 500 MG: 500 INJECTION, SOLUTION INTRAVENOUS at 01:59

## 2019-01-18 RX ADMIN — FINASTERIDE 5 MG: 5 TABLET, FILM COATED ORAL at 11:35

## 2019-01-18 RX ADMIN — CIPROFLOXACIN 400 MG: 2 INJECTION, SOLUTION INTRAVENOUS at 03:35

## 2019-01-18 RX ADMIN — DRONABINOL 2.5 MG: 2.5 CAPSULE ORAL at 06:47

## 2019-01-18 RX ADMIN — SODIUM CHLORIDE: 9 INJECTION, SOLUTION INTRAVENOUS at 03:35

## 2019-01-18 RX ADMIN — ENOXAPARIN SODIUM 40 MG: 40 INJECTION SUBCUTANEOUS at 11:35

## 2019-01-18 RX ADMIN — SODIUM CHLORIDE, PRESERVATIVE FREE 300 UNITS: 5 INJECTION INTRAVENOUS at 12:15

## 2019-01-18 ASSESSMENT — ENCOUNTER SYMPTOMS
EYES NEGATIVE: 1
NAUSEA: 0
CONSTIPATION: 0
COUGH: 1
GASTROINTESTINAL NEGATIVE: 1
EYE DISCHARGE: 0
EYE PAIN: 0
BACK PAIN: 0
VOMITING: 0
EYE REDNESS: 0
DIARRHEA: 0
BLOOD IN STOOL: 0
ABDOMINAL PAIN: 0
SHORTNESS OF BREATH: 0
WHEEZING: 0
SORE THROAT: 0

## 2019-02-11 ENCOUNTER — HOSPITAL ENCOUNTER (EMERGENCY)
Age: 79
Discharge: HOME | End: 2019-02-11
Payer: MEDICARE

## 2019-02-11 ENCOUNTER — APPOINTMENT (OUTPATIENT)
Dept: CT IMAGING | Age: 79
End: 2019-02-11
Payer: MEDICARE

## 2019-02-11 VITALS
HEART RATE: 77 BPM | BODY MASS INDEX: 27.28 KG/M2 | WEIGHT: 180 LBS | OXYGEN SATURATION: 93 % | DIASTOLIC BLOOD PRESSURE: 78 MMHG | SYSTOLIC BLOOD PRESSURE: 136 MMHG | TEMPERATURE: 97.7 F | RESPIRATION RATE: 18 BRPM | HEIGHT: 68 IN

## 2019-02-11 DIAGNOSIS — K59.1 FUNCTIONAL DIARRHEA: ICD-10-CM

## 2019-02-11 DIAGNOSIS — K57.32 DIVERTICULITIS OF COLON: Primary | ICD-10-CM

## 2019-02-11 LAB
ALBUMIN SERPL-MCNC: 3.7 G/DL (ref 3.5–5.2)
ALP BLD-CCNC: 61 U/L (ref 40–130)
ALT SERPL-CCNC: 30 U/L (ref 5–41)
ANION GAP SERPL CALCULATED.3IONS-SCNC: 13 MMOL/L (ref 7–19)
AST SERPL-CCNC: 29 U/L (ref 5–40)
BASOPHILS ABSOLUTE: 0.1 K/UL (ref 0–0.2)
BASOPHILS RELATIVE PERCENT: 0.7 % (ref 0–1)
BILIRUB SERPL-MCNC: 0.5 MG/DL (ref 0.2–1.2)
BILIRUBIN URINE: NEGATIVE
BLOOD, URINE: NEGATIVE
BUN BLDV-MCNC: 18 MG/DL (ref 8–23)
CALCIUM SERPL-MCNC: 9.3 MG/DL (ref 8.8–10.2)
CHLORIDE BLD-SCNC: 95 MMOL/L (ref 98–111)
CLARITY: CLEAR
CO2: 24 MMOL/L (ref 22–29)
COLOR: YELLOW
CREAT SERPL-MCNC: 1.1 MG/DL (ref 0.5–1.2)
EOSINOPHILS ABSOLUTE: 0.4 K/UL (ref 0–0.6)
EOSINOPHILS RELATIVE PERCENT: 2.6 % (ref 0–5)
GFR NON-AFRICAN AMERICAN: >60
GLUCOSE BLD-MCNC: 107 MG/DL (ref 74–109)
GLUCOSE URINE: NEGATIVE MG/DL
HCT VFR BLD CALC: 41.8 % (ref 42–52)
HEMOGLOBIN: 13.6 G/DL (ref 14–18)
KETONES, URINE: NEGATIVE MG/DL
LACTIC ACID, SEPSIS: 0.8 MG/DL (ref 0.5–1.9)
LEUKOCYTE ESTERASE, URINE: NEGATIVE
LIPASE: 31 U/L (ref 13–60)
LYMPHOCYTES ABSOLUTE: 3.8 K/UL (ref 1.1–4.5)
LYMPHOCYTES RELATIVE PERCENT: 28.4 % (ref 20–40)
MCH RBC QN AUTO: 29.7 PG (ref 27–31)
MCHC RBC AUTO-ENTMCNC: 32.5 G/DL (ref 33–37)
MCV RBC AUTO: 91.3 FL (ref 80–94)
MONOCYTES ABSOLUTE: 1.1 K/UL (ref 0–0.9)
MONOCYTES RELATIVE PERCENT: 8 % (ref 0–10)
NEUTROPHILS ABSOLUTE: 7.9 K/UL (ref 1.5–7.5)
NEUTROPHILS RELATIVE PERCENT: 59.6 % (ref 50–65)
NITRITE, URINE: NEGATIVE
PDW BLD-RTO: 13.9 % (ref 11.5–14.5)
PH UA: 6.5
PLATELET # BLD: 374 K/UL (ref 130–400)
PMV BLD AUTO: 9.7 FL (ref 9.4–12.4)
POTASSIUM SERPL-SCNC: 4.6 MMOL/L (ref 3.5–5)
PROTEIN UA: NEGATIVE MG/DL
RBC # BLD: 4.58 M/UL (ref 4.7–6.1)
SODIUM BLD-SCNC: 132 MMOL/L (ref 136–145)
SPECIFIC GRAVITY UA: 1.03
TOTAL PROTEIN: 7.3 G/DL (ref 6.6–8.7)
URINE REFLEX TO CULTURE: NORMAL
UROBILINOGEN, URINE: 0.2 E.U./DL
WBC # BLD: 13.2 K/UL (ref 4.8–10.8)

## 2019-02-11 PROCEDURE — 99284 EMERGENCY DEPT VISIT MOD MDM: CPT | Performed by: PHYSICIAN ASSISTANT

## 2019-02-11 PROCEDURE — 99284 EMERGENCY DEPT VISIT MOD MDM: CPT

## 2019-02-11 PROCEDURE — 74177 CT ABD & PELVIS W/CONTRAST: CPT

## 2019-02-11 PROCEDURE — 36415 COLL VENOUS BLD VENIPUNCTURE: CPT

## 2019-02-11 PROCEDURE — 83605 ASSAY OF LACTIC ACID: CPT

## 2019-02-11 PROCEDURE — 2580000003 HC RX 258: Performed by: PHYSICIAN ASSISTANT

## 2019-02-11 PROCEDURE — 83690 ASSAY OF LIPASE: CPT

## 2019-02-11 PROCEDURE — 6360000004 HC RX CONTRAST MEDICATION: Performed by: PHYSICIAN ASSISTANT

## 2019-02-11 PROCEDURE — 81003 URINALYSIS AUTO W/O SCOPE: CPT

## 2019-02-11 PROCEDURE — 85025 COMPLETE CBC W/AUTO DIFF WBC: CPT

## 2019-02-11 PROCEDURE — 80053 COMPREHEN METABOLIC PANEL: CPT

## 2019-02-11 PROCEDURE — 96374 THER/PROPH/DIAG INJ IV PUSH: CPT

## 2019-02-11 PROCEDURE — 6360000002 HC RX W HCPCS: Performed by: PHYSICIAN ASSISTANT

## 2019-02-11 RX ORDER — ONDANSETRON 2 MG/ML
4 INJECTION INTRAMUSCULAR; INTRAVENOUS ONCE
Status: COMPLETED | OUTPATIENT
Start: 2019-02-11 | End: 2019-02-11

## 2019-02-11 RX ORDER — 0.9 % SODIUM CHLORIDE 0.9 %
500 INTRAVENOUS SOLUTION INTRAVENOUS ONCE
Status: COMPLETED | OUTPATIENT
Start: 2019-02-11 | End: 2019-02-11

## 2019-02-11 RX ORDER — METRONIDAZOLE 500 MG/1
500 TABLET ORAL 3 TIMES DAILY
Qty: 30 TABLET | Refills: 0 | Status: SHIPPED | OUTPATIENT
Start: 2019-02-11 | End: 2019-02-21

## 2019-02-11 RX ORDER — CIPROFLOXACIN 500 MG/1
500 TABLET, FILM COATED ORAL 2 TIMES DAILY
Qty: 20 TABLET | Refills: 0 | Status: SHIPPED | OUTPATIENT
Start: 2019-02-11 | End: 2019-02-21

## 2019-02-11 RX ADMIN — SODIUM CHLORIDE 500 ML: 9 INJECTION, SOLUTION INTRAVENOUS at 08:45

## 2019-02-11 RX ADMIN — IOPAMIDOL 90 ML: 755 INJECTION, SOLUTION INTRAVENOUS at 10:32

## 2019-02-11 RX ADMIN — ONDANSETRON 4 MG: 2 INJECTION INTRAMUSCULAR; INTRAVENOUS at 10:06

## 2019-02-11 ASSESSMENT — ENCOUNTER SYMPTOMS
VOMITING: 0
COLOR CHANGE: 0
DIARRHEA: 1
SHORTNESS OF BREATH: 0
EYE DISCHARGE: 0
NAUSEA: 0
COUGH: 0
PHOTOPHOBIA: 0
EYE ITCHING: 0
BACK PAIN: 0
CONSTIPATION: 0
ABDOMINAL PAIN: 1
WHEEZING: 0

## 2019-05-31 DIAGNOSIS — C79.51 BONE METASTASES (HCC): ICD-10-CM

## 2019-06-06 ENCOUNTER — HOSPITAL ENCOUNTER (OUTPATIENT)
Dept: INFUSION THERAPY | Age: 79
Discharge: HOME | End: 2019-06-06
Payer: MEDICARE

## 2019-06-06 DIAGNOSIS — C43.9 MALIGNANT MELANOMA, UNSPECIFIED SITE (HCC): ICD-10-CM

## 2019-06-06 DIAGNOSIS — C79.51 BONE METASTASES (HCC): Primary | ICD-10-CM

## 2019-06-06 PROCEDURE — 80053 COMPREHEN METABOLIC PANEL: CPT

## 2019-06-06 PROCEDURE — 85025 COMPLETE CBC W/AUTO DIFF WBC: CPT

## 2019-06-06 PROCEDURE — 96413 CHEMO IV INFUSION 1 HR: CPT

## 2019-06-06 PROCEDURE — 96372 THER/PROPH/DIAG INJ SC/IM: CPT

## 2019-06-06 PROCEDURE — 84443 ASSAY THYROID STIM HORMONE: CPT

## 2019-06-06 PROCEDURE — 6360000002 HC RX W HCPCS: Performed by: INTERNAL MEDICINE

## 2019-06-06 PROCEDURE — 96415 CHEMO IV INFUSION ADDL HR: CPT

## 2019-06-06 PROCEDURE — 96375 TX/PRO/DX INJ NEW DRUG ADDON: CPT

## 2019-06-06 PROCEDURE — 2580000003 HC RX 258: Performed by: INTERNAL MEDICINE

## 2019-06-06 RX ORDER — DIPHENHYDRAMINE HYDROCHLORIDE 50 MG/ML
50 INJECTION INTRAMUSCULAR; INTRAVENOUS
Status: ACTIVE | OUTPATIENT
Start: 2019-06-06 | End: 2019-06-06

## 2019-06-06 RX ORDER — SODIUM CHLORIDE 0.9 % (FLUSH) 0.9 %
5 SYRINGE (ML) INJECTION PRN
Status: DISCONTINUED | OUTPATIENT
Start: 2019-06-06 | End: 2019-06-07 | Stop reason: HOSPADM

## 2019-06-06 RX ORDER — SODIUM CHLORIDE 9 MG/ML
20 INJECTION, SOLUTION INTRAVENOUS ONCE
Status: DISCONTINUED | OUTPATIENT
Start: 2019-06-06 | End: 2019-06-08 | Stop reason: HOSPADM

## 2019-06-06 RX ORDER — HEPARIN SODIUM (PORCINE) LOCK FLUSH IV SOLN 100 UNIT/ML 100 UNIT/ML
500 SOLUTION INTRAVENOUS PRN
Status: DISCONTINUED | OUTPATIENT
Start: 2019-06-06 | End: 2019-06-07 | Stop reason: HOSPADM

## 2019-06-06 RX ORDER — METHYLPREDNISOLONE SODIUM SUCCINATE 125 MG/2ML
125 INJECTION, POWDER, LYOPHILIZED, FOR SOLUTION INTRAMUSCULAR; INTRAVENOUS
Status: ACTIVE | OUTPATIENT
Start: 2019-06-06 | End: 2019-06-06

## 2019-06-06 RX ORDER — SODIUM CHLORIDE 0.9 % (FLUSH) 0.9 %
10 SYRINGE (ML) INJECTION PRN
Status: DISCONTINUED | OUTPATIENT
Start: 2019-06-06 | End: 2019-06-07 | Stop reason: HOSPADM

## 2019-06-06 RX ORDER — EPINEPHRINE 1 MG/ML
0.3 INJECTION, SOLUTION, CONCENTRATE INTRAVENOUS PRN
Status: DISCONTINUED | OUTPATIENT
Start: 2019-06-06 | End: 2019-06-08 | Stop reason: HOSPADM

## 2019-06-20 ENCOUNTER — HOSPITAL ENCOUNTER (OUTPATIENT)
Dept: INFUSION THERAPY | Age: 79
Discharge: HOME | End: 2019-06-20
Payer: MEDICARE

## 2019-06-20 DIAGNOSIS — C43.9 MALIGNANT MELANOMA, UNSPECIFIED SITE (HCC): Primary | ICD-10-CM

## 2019-06-20 PROCEDURE — 96413 CHEMO IV INFUSION 1 HR: CPT

## 2019-06-20 PROCEDURE — 6360000002 HC RX W HCPCS: Performed by: INTERNAL MEDICINE

## 2019-06-20 PROCEDURE — 85025 COMPLETE CBC W/AUTO DIFF WBC: CPT

## 2019-06-20 PROCEDURE — 84443 ASSAY THYROID STIM HORMONE: CPT

## 2019-06-20 PROCEDURE — 80053 COMPREHEN METABOLIC PANEL: CPT

## 2019-06-20 PROCEDURE — 2580000003 HC RX 258: Performed by: INTERNAL MEDICINE

## 2019-06-20 RX ORDER — EPINEPHRINE 1 MG/ML
0.3 INJECTION, SOLUTION, CONCENTRATE INTRAVENOUS PRN
Status: DISCONTINUED | OUTPATIENT
Start: 2019-06-20 | End: 2019-06-22 | Stop reason: HOSPADM

## 2019-06-20 RX ORDER — SODIUM CHLORIDE 0.9 % (FLUSH) 0.9 %
5 SYRINGE (ML) INJECTION PRN
Status: DISCONTINUED | OUTPATIENT
Start: 2019-06-20 | End: 2019-06-21 | Stop reason: HOSPADM

## 2019-06-20 RX ORDER — HEPARIN SODIUM (PORCINE) LOCK FLUSH IV SOLN 100 UNIT/ML 100 UNIT/ML
500 SOLUTION INTRAVENOUS PRN
Status: DISCONTINUED | OUTPATIENT
Start: 2019-06-20 | End: 2019-06-21 | Stop reason: HOSPADM

## 2019-06-20 RX ORDER — METHYLPREDNISOLONE SODIUM SUCCINATE 125 MG/2ML
125 INJECTION, POWDER, LYOPHILIZED, FOR SOLUTION INTRAMUSCULAR; INTRAVENOUS PRN
Status: DISCONTINUED | OUTPATIENT
Start: 2019-06-20 | End: 2019-06-22 | Stop reason: HOSPADM

## 2019-06-20 RX ORDER — DIPHENHYDRAMINE HYDROCHLORIDE 50 MG/ML
50 INJECTION INTRAMUSCULAR; INTRAVENOUS PRN
Status: DISCONTINUED | OUTPATIENT
Start: 2019-06-20 | End: 2019-06-22 | Stop reason: HOSPADM

## 2019-06-20 RX ORDER — SODIUM CHLORIDE 0.9 % (FLUSH) 0.9 %
10 SYRINGE (ML) INJECTION PRN
Status: DISCONTINUED | OUTPATIENT
Start: 2019-06-20 | End: 2019-06-21 | Stop reason: HOSPADM

## 2019-07-05 ENCOUNTER — HOSPITAL ENCOUNTER (OUTPATIENT)
Dept: INFUSION THERAPY | Age: 79
Discharge: HOME | End: 2019-07-05
Payer: MEDICARE

## 2019-07-05 DIAGNOSIS — C79.51 BONE METASTASES (HCC): ICD-10-CM

## 2019-07-05 DIAGNOSIS — C43.9 MALIGNANT MELANOMA, UNSPECIFIED SITE (HCC): Primary | ICD-10-CM

## 2019-07-05 PROCEDURE — 96523 IRRIG DRUG DELIVERY DEVICE: CPT

## 2019-07-05 PROCEDURE — 96372 THER/PROPH/DIAG INJ SC/IM: CPT

## 2019-07-05 PROCEDURE — 80053 COMPREHEN METABOLIC PANEL: CPT

## 2019-07-05 PROCEDURE — 84443 ASSAY THYROID STIM HORMONE: CPT

## 2019-07-05 PROCEDURE — 96413 CHEMO IV INFUSION 1 HR: CPT

## 2019-07-05 PROCEDURE — 85025 COMPLETE CBC W/AUTO DIFF WBC: CPT

## 2019-07-05 PROCEDURE — 2580000003 HC RX 258: Performed by: INTERNAL MEDICINE

## 2019-07-05 PROCEDURE — 6360000002 HC RX W HCPCS: Performed by: INTERNAL MEDICINE

## 2019-07-05 RX ORDER — SODIUM CHLORIDE 0.9 % (FLUSH) 0.9 %
10 SYRINGE (ML) INJECTION PRN
Status: DISCONTINUED | OUTPATIENT
Start: 2019-07-05 | End: 2019-07-06 | Stop reason: HOSPADM

## 2019-07-05 RX ORDER — DIPHENHYDRAMINE HYDROCHLORIDE 50 MG/ML
50 INJECTION INTRAMUSCULAR; INTRAVENOUS
Status: CANCELLED | OUTPATIENT
Start: 2019-07-05

## 2019-07-05 RX ORDER — SODIUM CHLORIDE 0.9 % (FLUSH) 0.9 %
5 SYRINGE (ML) INJECTION PRN
Status: DISCONTINUED | OUTPATIENT
Start: 2019-07-05 | End: 2019-07-06 | Stop reason: HOSPADM

## 2019-07-05 RX ORDER — EPINEPHRINE 1 MG/ML
0.3 INJECTION, SOLUTION, CONCENTRATE INTRAVENOUS PRN
Status: DISCONTINUED | OUTPATIENT
Start: 2019-07-05 | End: 2019-07-07 | Stop reason: HOSPADM

## 2019-07-05 RX ORDER — SODIUM CHLORIDE 9 MG/ML
20 INJECTION, SOLUTION INTRAVENOUS ONCE
Status: DISCONTINUED | OUTPATIENT
Start: 2019-07-05 | End: 2019-07-07 | Stop reason: HOSPADM

## 2019-07-05 RX ORDER — DIPHENHYDRAMINE HYDROCHLORIDE 50 MG/ML
50 INJECTION INTRAMUSCULAR; INTRAVENOUS PRN
Status: DISCONTINUED | OUTPATIENT
Start: 2019-07-05 | End: 2019-07-07 | Stop reason: HOSPADM

## 2019-07-05 RX ORDER — METHYLPREDNISOLONE SODIUM SUCCINATE 125 MG/2ML
125 INJECTION, POWDER, LYOPHILIZED, FOR SOLUTION INTRAMUSCULAR; INTRAVENOUS PRN
Status: DISCONTINUED | OUTPATIENT
Start: 2019-07-05 | End: 2019-07-07 | Stop reason: HOSPADM

## 2019-07-05 RX ORDER — METHYLPREDNISOLONE SODIUM SUCCINATE 125 MG/2ML
125 INJECTION, POWDER, LYOPHILIZED, FOR SOLUTION INTRAMUSCULAR; INTRAVENOUS
Status: CANCELLED | OUTPATIENT
Start: 2019-07-05

## 2019-07-05 RX ORDER — HEPARIN SODIUM (PORCINE) LOCK FLUSH IV SOLN 100 UNIT/ML 100 UNIT/ML
500 SOLUTION INTRAVENOUS PRN
Status: DISCONTINUED | OUTPATIENT
Start: 2019-07-05 | End: 2019-07-06 | Stop reason: HOSPADM

## 2019-07-25 ENCOUNTER — HOSPITAL ENCOUNTER (OUTPATIENT)
Dept: INFUSION THERAPY | Age: 79
Discharge: HOME | End: 2019-07-25
Payer: MEDICARE

## 2019-07-25 DIAGNOSIS — C43.9 MALIGNANT MELANOMA, UNSPECIFIED SITE (HCC): Primary | ICD-10-CM

## 2019-07-25 PROCEDURE — 85025 COMPLETE CBC W/AUTO DIFF WBC: CPT

## 2019-07-25 PROCEDURE — 96413 CHEMO IV INFUSION 1 HR: CPT

## 2019-07-25 PROCEDURE — 6360000002 HC RX W HCPCS: Performed by: PHYSICIAN ASSISTANT

## 2019-07-25 PROCEDURE — 2580000003 HC RX 258: Performed by: PHYSICIAN ASSISTANT

## 2019-07-25 RX ORDER — HEPARIN SODIUM (PORCINE) LOCK FLUSH IV SOLN 100 UNIT/ML 100 UNIT/ML
500 SOLUTION INTRAVENOUS PRN
Status: DISCONTINUED | OUTPATIENT
Start: 2019-07-25 | End: 2019-07-26 | Stop reason: HOSPADM

## 2019-07-27 VITALS
SYSTOLIC BLOOD PRESSURE: 136 MMHG | HEIGHT: 71 IN | WEIGHT: 182 LBS | DIASTOLIC BLOOD PRESSURE: 68 MMHG | HEART RATE: 62 BPM | BODY MASS INDEX: 25.48 KG/M2

## 2019-07-27 DIAGNOSIS — C43.61 MALIGNANT MELANOMA OF SKIN OF RIGHT UPPER EXTREMITY, INCLUDING SHOULDER (HCC): ICD-10-CM

## 2019-08-08 ENCOUNTER — OFFICE VISIT (OUTPATIENT)
Dept: HEMATOLOGY | Age: 79
End: 2019-08-08
Payer: MEDICARE

## 2019-08-08 ENCOUNTER — TRANSCRIBE ORDERS (OUTPATIENT)
Dept: ADMINISTRATIVE | Facility: HOSPITAL | Age: 79
End: 2019-08-08

## 2019-08-08 ENCOUNTER — HOSPITAL ENCOUNTER (OUTPATIENT)
Dept: INFUSION THERAPY | Age: 79
Discharge: HOME | End: 2019-08-08
Payer: MEDICARE

## 2019-08-08 VITALS
SYSTOLIC BLOOD PRESSURE: 122 MMHG | BODY MASS INDEX: 25.58 KG/M2 | HEIGHT: 71 IN | DIASTOLIC BLOOD PRESSURE: 66 MMHG | TEMPERATURE: 97.9 F | WEIGHT: 182.7 LBS | HEART RATE: 64 BPM | OXYGEN SATURATION: 97 %

## 2019-08-08 DIAGNOSIS — C43.9 MALIGNANT MELANOMA, UNSPECIFIED SITE (HCC): Primary | ICD-10-CM

## 2019-08-08 DIAGNOSIS — L29.9 ITCHING: ICD-10-CM

## 2019-08-08 DIAGNOSIS — C79.51 BONE METASTASES (HCC): ICD-10-CM

## 2019-08-08 DIAGNOSIS — Z87.19 HISTORY OF DIVERTICULITIS: ICD-10-CM

## 2019-08-08 DIAGNOSIS — C79.51 METASTASIS TO BONE (HCC): ICD-10-CM

## 2019-08-08 DIAGNOSIS — C43.61 MALIGNANT MELANOMA OF RIGHT UPPER EXTREMITY INCLUDING SHOULDER (HCC): Primary | ICD-10-CM

## 2019-08-08 DIAGNOSIS — C78.7 LIVER METASTASES (HCC): ICD-10-CM

## 2019-08-08 DIAGNOSIS — R91.1 SOLITARY PULMONARY NODULE: ICD-10-CM

## 2019-08-08 DIAGNOSIS — C78.7 METASTASIS TO LIVER (HCC): Primary | ICD-10-CM

## 2019-08-08 DIAGNOSIS — N28.89 RENAL MASS, LEFT: ICD-10-CM

## 2019-08-08 DIAGNOSIS — E03.2 HYPOTHYROIDISM DUE TO MEDICATION: ICD-10-CM

## 2019-08-08 PROBLEM — K52.9 ENTEROCOLITIS: Status: RESOLVED | Noted: 2019-01-12 | Resolved: 2019-08-08

## 2019-08-08 PROCEDURE — 85025 COMPLETE CBC W/AUTO DIFF WBC: CPT

## 2019-08-08 PROCEDURE — 84443 ASSAY THYROID STIM HORMONE: CPT

## 2019-08-08 PROCEDURE — 96413 CHEMO IV INFUSION 1 HR: CPT

## 2019-08-08 PROCEDURE — 6360000002 HC RX W HCPCS: Performed by: INTERNAL MEDICINE

## 2019-08-08 PROCEDURE — 99214 OFFICE O/P EST MOD 30 MIN: CPT | Performed by: NURSE PRACTITIONER

## 2019-08-08 PROCEDURE — 96372 THER/PROPH/DIAG INJ SC/IM: CPT

## 2019-08-08 PROCEDURE — 80053 COMPREHEN METABOLIC PANEL: CPT

## 2019-08-08 PROCEDURE — 2580000003 HC RX 258: Performed by: INTERNAL MEDICINE

## 2019-08-08 RX ORDER — SODIUM CHLORIDE 0.9 % (FLUSH) 0.9 %
5 SYRINGE (ML) INJECTION PRN
Status: DISCONTINUED | OUTPATIENT
Start: 2019-08-08 | End: 2019-08-09 | Stop reason: HOSPADM

## 2019-08-08 RX ORDER — EPINEPHRINE 1 MG/ML
0.3 INJECTION, SOLUTION, CONCENTRATE INTRAVENOUS PRN
Status: DISCONTINUED | OUTPATIENT
Start: 2019-08-08 | End: 2019-08-10 | Stop reason: HOSPADM

## 2019-08-08 RX ORDER — TRIAMCINOLONE ACETONIDE 0.25 MG/G
CREAM TOPICAL
Qty: 30 G | Refills: 2 | Status: SHIPPED | OUTPATIENT
Start: 2019-08-08 | End: 2021-04-08 | Stop reason: SDUPTHER

## 2019-08-08 RX ORDER — SODIUM CHLORIDE 0.9 % (FLUSH) 0.9 %
10 SYRINGE (ML) INJECTION PRN
Status: DISCONTINUED | OUTPATIENT
Start: 2019-08-08 | End: 2019-08-09 | Stop reason: HOSPADM

## 2019-08-08 RX ORDER — METHYLPREDNISOLONE SODIUM SUCCINATE 125 MG/2ML
125 INJECTION, POWDER, LYOPHILIZED, FOR SOLUTION INTRAMUSCULAR; INTRAVENOUS PRN
Status: DISCONTINUED | OUTPATIENT
Start: 2019-08-08 | End: 2019-08-10 | Stop reason: HOSPADM

## 2019-08-08 RX ORDER — SODIUM CHLORIDE 9 MG/ML
20 INJECTION, SOLUTION INTRAVENOUS ONCE
Status: DISCONTINUED | OUTPATIENT
Start: 2019-08-08 | End: 2019-08-10 | Stop reason: HOSPADM

## 2019-08-08 RX ORDER — DIPHENHYDRAMINE HYDROCHLORIDE 50 MG/ML
50 INJECTION INTRAMUSCULAR; INTRAVENOUS PRN
Status: DISCONTINUED | OUTPATIENT
Start: 2019-08-08 | End: 2019-08-10 | Stop reason: HOSPADM

## 2019-08-08 RX ORDER — HEPARIN SODIUM (PORCINE) LOCK FLUSH IV SOLN 100 UNIT/ML 100 UNIT/ML
500 SOLUTION INTRAVENOUS PRN
Status: DISCONTINUED | OUTPATIENT
Start: 2019-08-08 | End: 2019-08-09 | Stop reason: HOSPADM

## 2019-08-08 ASSESSMENT — ENCOUNTER SYMPTOMS
EYE ITCHING: 0
PHOTOPHOBIA: 0
DIARRHEA: 0
SHORTNESS OF BREATH: 0
WHEEZING: 0
NAUSEA: 0
ABDOMINAL PAIN: 0
EYE REDNESS: 0
TROUBLE SWALLOWING: 0
SORE THROAT: 0
VOMITING: 0
CONSTIPATION: 1
COUGH: 0
EYE DISCHARGE: 0

## 2019-08-08 NOTE — PROGRESS NOTES
with SUV of 34, ultrasound recommended. The prostate is markedly enlarged causing partial right obstructive uropathy. CBC shows WBC 7.3, hemoglobin 15.8 and platelets 047,259  CMP revealed a creatinine of 1.2, GFR 59. LFTs WNL  UA negative  Lipase 28, troponin <0.01  He has a history of GERD with laparoscopic paraesophageal hernia repair and fundoplication by Dr. Franco De Leon on 5/24/15. He was prescribed Norco and Zofran PRN at [facility]. Rx was given for omeprazole in clinic  Tumor markers drawn 7/11/18 included:   AFP 5.8   CEA 2.0   CA 19-9 - 8   PSA 7.4 (chronically elevated, up to 8.28 on 8/8/14)  MRI of the abdomen w/wo contrast 7/13/18 at [facility] documented innumerable T1 hypointense, T2 hyperintense lesions throughout the liver as noted on recent CT. One of the larger lesions seen posteriorly in the right hepatic lobe measured 1.6 cm. Also noted was at least two T2 hyperintense lesions within the thoracocolumbar spine, which could represent metastasis. Bilateral renal ultrasound 7/3/18 showed a 2.6 cm hypoechoic left lower pole renal lesion, not typical of a benign cyst with enhancing characteristics concerning for primary renal cancer versus metastatic disease. Bilateral nephrogenic cysts noted along with marked prostate enlargement with lobular changes measuring 8 x 5.9 x 4.9 cm. MRI of the brain w/wo contrast 7/3/18 for complaint of headache and nausea was without evidence of acute intracranial process. Contrasted chest CT 7/24/18 revealed a stable 2 cm right thyroid nodule, a new 9 mm right thyroid nodule. An indeterminate 3 mm subpleural HORACIO nodule is noted, likely granulomatous or postinfectious. Bone scan 7/24/18 was negative for evidence of osseous metastasis. Examination is remarkable for mid epigastric discomfort, 10 lbs weight loss and 1 inch right axillary lymph node.  This was a previous site of positive sentinel lymph node biopsy and dissection associated with the resected, stage IIIa right Negative for arthralgias, joint swelling and myalgias. Skin: Negative for pallor and rash. Allergic/Immunologic: Negative for environmental allergies and immunocompromised state. Neurological: Negative for seizures, syncope and numbness. Hematological: Negative for adenopathy. Does not bruise/bleed easily. Psychiatric/Behavioral: Negative for agitation, behavioral problems, confusion and suicidal ideas. The patient is not nervous/anxious. Objective   There were no vitals filed for this visit. PHYSICAL EXAM:  Physical Exam   Constitutional: He is oriented to person, place, and time. He appears well-developed and well-nourished. He is cooperative. No distress. HENT:   Head: Normocephalic and atraumatic. Mouth/Throat: Oropharynx is clear and moist.   Eyes: Conjunctivae and EOM are normal. Right eye exhibits no discharge. Left eye exhibits no discharge. No scleral icterus. Wears glasses     Neck: Neck supple. No tracheal deviation present. Cardiovascular: Normal rate and regular rhythm. No murmur heard. Pulmonary/Chest: Effort normal and breath sounds normal. No respiratory distress. He has no wheezes. He has no rales. He exhibits no tenderness. Abdominal: Soft. Bowel sounds are normal. He exhibits no distension and no mass. There is no hepatosplenomegaly. There is no tenderness. There is no guarding. Genitourinary:   Genitourinary Comments: Exam deferred     Musculoskeletal: He exhibits no edema, tenderness or deformity. Normal ROM all four extremities     Lymphadenopathy:     He has no cervical adenopathy. Right cervical: No superficial cervical adenopathy present. Left cervical: No superficial cervical adenopathy present. He has no axillary adenopathy. Right: No inguinal and no supraclavicular adenopathy present. Left: No inguinal and no supraclavicular adenopathy present.    No bulky palpable cervical, clavicular, axillary or inguinal adenopathies on

## 2019-08-13 ENCOUNTER — HOSPITAL ENCOUNTER (OUTPATIENT)
Dept: CT IMAGING | Facility: HOSPITAL | Age: 79
Discharge: HOME | End: 2019-08-13
Admitting: NURSE PRACTITIONER

## 2019-08-13 DIAGNOSIS — C78.7 METASTASIS TO LIVER (HCC): ICD-10-CM

## 2019-08-13 DIAGNOSIS — R91.1 SOLITARY PULMONARY NODULE: ICD-10-CM

## 2019-08-13 DIAGNOSIS — C79.51 METASTASIS TO BONE (HCC): ICD-10-CM

## 2019-08-13 LAB — CREAT BLDA-MCNC: 1.3 MG/DL (ref 0.6–1.3)

## 2019-08-13 PROCEDURE — 0 IOHEXOL 300 MG/ML SOLUTION: Performed by: NURSE PRACTITIONER

## 2019-08-13 PROCEDURE — 25010000002 IOPAMIDOL 61 % SOLUTION: Performed by: NURSE PRACTITIONER

## 2019-08-13 PROCEDURE — 74177 CT ABD & PELVIS W/CONTRAST: CPT

## 2019-08-13 PROCEDURE — 71260 CT THORAX DX C+: CPT

## 2019-08-13 PROCEDURE — 82565 ASSAY OF CREATININE: CPT

## 2019-08-13 RX ADMIN — IOHEXOL 50 ML: 300 INJECTION, SOLUTION INTRAVENOUS at 08:35

## 2019-08-13 RX ADMIN — IOPAMIDOL 100 ML: 612 INJECTION, SOLUTION INTRAVENOUS at 08:35

## 2019-08-19 RX ORDER — MONTELUKAST SODIUM 10 MG/1
10 TABLET ORAL NIGHTLY
Qty: 30 TABLET | Refills: 1 | Status: SHIPPED | OUTPATIENT
Start: 2019-08-19 | End: 2019-09-05

## 2019-08-22 ENCOUNTER — HOSPITAL ENCOUNTER (OUTPATIENT)
Dept: INFUSION THERAPY | Age: 79
Discharge: HOME | End: 2019-08-22
Payer: MEDICARE

## 2019-08-22 PROCEDURE — 85025 COMPLETE CBC W/AUTO DIFF WBC: CPT

## 2019-08-22 RX ORDER — METHYLPREDNISOLONE 4 MG/1
TABLET ORAL
Qty: 1 KIT | Refills: 0 | Status: SHIPPED | OUTPATIENT
Start: 2019-08-22 | End: 2019-08-28

## 2019-08-22 NOTE — TELEPHONE ENCOUNTER
Presents for treatment today. Complains of charo all over. He states it is worse than usual. I spoke with Dr. Alex Bhagat and treatment held today. Medrol dose pack escribed. Treatment held today. He will return in two weeks for monthly Opdivo and see Bright Pedraza.

## 2019-09-04 NOTE — PROGRESS NOTES
consultation on 08/05/14, referred by Dr. Som Hogan, regarding a diagnosis of resected malignant melanoma of the right upper extremity. Dr. Thomas Lynn resected a malignant melanoma from the right posterior arm, above the elbow, on 06/05/14. Pathology revealed a 2.25 mm thick non-ulcerated Pankaj's level IV malignant melanoma. There were 10 mitoses/ sq mm. There was no vascular or lymphatic invasion. A wide excision with a sentinel lymph node biopsy was performed by by Dr. Franco Ulloa on 07/09/14. Pathology revealed that the right axillary sentinel lymph node was positive for metastatic malignancy by immunoperoxidase stain. The wide excision sample was without further local involvement. A right axillary lymph node dissection was performed by Dr. Franco Ulloa on 07/21/14. No metastatic malignancy was noted in any of the 8 right axillary lymph nodes submitted. The HMB-45 immunoperoxidase stain was negative for metastatic malignant melanoma in any of these subsequently submitted lymph nodes. Completion of a metastatic workup on 08/06/14, including MRI of the brain, bone scan, CT scans of the chest, abdomen and pelvis were negative for evidence of metastatic disease. Adjuvant pegylated Interferon (Sylatron) 6 mcg/kg (500mcg) sub-q weekly x 8 to be followed then by 3 mcg/kg(250mcg) weekly x up to 5 years was discussed and planned. Adjuvant therapy was indeed initiated and delivered as discussed below in treatment summary. The last dose of the medication was delivered on 2/15/2015. He was unable to tolerate this medication even at reduced dosages because of poor tolerability, weight loss, anorexia, unable to sleep, anxiety, fatigue, et Michele Quick. He declined any further interferon, which is reasonable.    A one-year followup CT scan of the chest and MRI of the brain on 6/11/2015 did not reveal any evidence of recurrent disease.  ----------------------------------PROGRESSION --------------------------------  Guy Cousins presented to intracranial process. Contrasted chest CT 7/24/18 revealed a stable 2 cm right thyroid nodule, a new 9 mm right thyroid nodule. An indeterminate 3 mm subpleural HORACIO nodule is noted, likely granulomatous or postinfectious. Bone scan 7/24/18 was negative for evidence of osseous metastasis. Examination is remarkable for mid epigastric discomfort, 10 lbs weight loss and 1 inch right axillary lymph node. This was a previous site of positive sentinel lymph node biopsy and dissection associated with the resected, stage IIIa right upper extremity malignant melanoma in 2014. Suspect recurrent malignant melanoma. Joce Gonsalez was referred to Dr. Chance Finley who performed an FNA of the right axillary lymph node 7/26/18. Pathology was consistent for metastatic malignant melanoma. 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 Findings were discussed with both Joce Gonsalez and his wife by Dr. Shahla Grey at follow-up on 8/1/18 and reiterated on 8/7/18. Recommendation is for combination therapy with Opdivo 1 mg/kg and Yervoy 3 mg/kg every 3 weeks ×4 cycles, followed by Opdivo 240 mg every 2 weeks thereafter. Joce Gonsalez had a left chest Mediport placed by Dr. Gabriela Medina 8/3/18. PET scan 8/6/18 showed the following:  3 right axillary lymph nodes, SUV up to 8.7   Hepatic metastasis, too numerous to count   2 celiac lymph nodes SUV 2.8 and 3   Right ilium, SUV 5. Right acetabulum SUV 6.4  Cycle #1 Opdivo/Yervoy and monthly Xgeva initiated 8/7/18. He was evaluated at [facility] 10/16/18 for LLQ abdominal discomfort with a history of recent diverticulitis. Abdominal/pelvic CT with contrast revealed a decreased size in the multiple liver nodules. Bilateral renal nodules were stable. An enlarged prostate with an exophytic mass arising from the posterior superior aspect of the prostate was present. He was treated for acute diverticulitis of the mid to distal sigmoid colon.   Joce Gonslaez was evaluated by Dr. Kristopher Wright on by Dr. Mary Ann Castro on 07/09/14. 3. Adjuvant weekly Sylatron Initiated 09/02/14 at 6 mcg/kg (500mcg) sub-q weekly but was only able to receive 4 of 8 planned treatments of this. The last dose #4 was on 09/28/14   4. Adjuvant weekly Sylatron 3 mcg/kg (250mcg) initiated 10/12/2014. Last dose delivered on 2/15/2015, discontinued due to poor tolerability and unable to tolerate the medication. 5. Opdivo 1 mg/kg and Yervoy 3 mg/kg every 3 weeks ×4 doses. 8/7/18 - 10/25/18. 6. Opdivo 240 mg every 2 weeks initiated 11/15/18, discontinued after dose #2 on 12/20/18   7. Monthly Xgeva initiated 8/7/18.   8. Tafinlar 150 mg po BID & Mekinist 2 mg po qday, cycle #1 initiated 1/4/19.   9. Single agent Tafinlar 150 mg po BID initiated 3/28/19, discontinue by patient 4/4/19.   10. Opdivo 240 mg every 2 weeks resumed 4/25/19, change to 480 mg every 4 weeks on 9/5/2019      Past Medical History:    Past Medical History:   Diagnosis Date    Acid reflux     BPH (benign prostatic hyperplasia)     Cancer ([address]     Diverticulitis     Hard of hearing     Hypercholesteremia     Hypertension     Malignant melanoma of skin of upper limb, including shoulder ([address] dx 6/5/2014    to liver, stomach, bone, and right axilla     Past Surgical History:    Past Surgical History:   Procedure Laterality Date    CHOLECYSTECTOMY      TUNNELED CENTRAL VENOUS CATHETER W/ SUBCUTANEOUS PORT       Current Medications:    Current Outpatient Medications   Medication Sig Dispense Refill    PARoxetine (PAXIL) 10 MG tablet Take 1 tablet by mouth daily 30 tablet 3    hydrOXYzine (ATARAX) 25 MG tablet Take 1 tablet by mouth every 8 hours as needed for Itching 90 tablet 0    montelukast (SINGULAIR) 10 MG tablet Take 1 tablet by mouth nightly (Patient not taking: Reported on 9/5/2019) 30 tablet 1    triamcinolone (KENALOG) 0.025 % cream Apply topically 2 times daily.  30 g 2    amLODIPine (NORVASC) 5 MG tablet Take 5 mg by mouth      finasteride due to drug     6. Renal mass, left       Excellent response of stage IV metastatic melanoma on nivolumab. Continue monthly Xgeva for bony metastasis. Recommend continue treatment with attempt to manage adverse effect of itching. Discussed symptoms with Dr. Jadiel Can who recommended trial of Paxil 10 mg daily and hydroxyzine for itching. 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 RF Marinol for appetite. Regarding history of left renal mass, bilateral renal cysts were noted with no solid enhancing renal mass on Javier/pelvic CT dated 8/13/2019. Hypertension is stable, managed by Dr. Casi Espitia. Return in about 4 weeks (around 10/3/2019) for follow up with LIAM Whalen for opdivio.       LIAM Joel  9:33 AM  9/5/2019

## 2019-09-05 ENCOUNTER — CLINICAL DOCUMENTATION (OUTPATIENT)
Dept: HEMATOLOGY | Age: 79
End: 2019-09-05

## 2019-09-05 ENCOUNTER — HOSPITAL ENCOUNTER (OUTPATIENT)
Dept: INFUSION THERAPY | Age: 79
End: 2019-09-05
Payer: MEDICARE

## 2019-09-05 ENCOUNTER — OFFICE VISIT (OUTPATIENT)
Dept: HEMATOLOGY | Age: 79
End: 2019-09-05
Payer: MEDICARE

## 2019-09-05 ENCOUNTER — HOSPITAL ENCOUNTER (OUTPATIENT)
Dept: INFUSION THERAPY | Age: 79
Discharge: HOME | End: 2019-09-05
Payer: MEDICARE

## 2019-09-05 VITALS
BODY MASS INDEX: 26.25 KG/M2 | HEIGHT: 71 IN | WEIGHT: 187.5 LBS | DIASTOLIC BLOOD PRESSURE: 82 MMHG | OXYGEN SATURATION: 97 % | HEART RATE: 85 BPM | TEMPERATURE: 97.5 F | SYSTOLIC BLOOD PRESSURE: 152 MMHG

## 2019-09-05 DIAGNOSIS — C78.7 LIVER METASTASES (HCC): ICD-10-CM

## 2019-09-05 DIAGNOSIS — C78.7 LIVER METASTASIS (HCC): ICD-10-CM

## 2019-09-05 DIAGNOSIS — C43.9 MALIGNANT MELANOMA, UNSPECIFIED SITE (HCC): Primary | ICD-10-CM

## 2019-09-05 DIAGNOSIS — E03.2 HYPOTHYROIDISM DUE TO MEDICATION: ICD-10-CM

## 2019-09-05 DIAGNOSIS — L29.8 ITCHING DUE TO DRUG: ICD-10-CM

## 2019-09-05 DIAGNOSIS — R63.4 WEIGHT LOSS: Primary | ICD-10-CM

## 2019-09-05 DIAGNOSIS — F50.00 ANOREXIA NERVOSA, UNSPECIFIED: ICD-10-CM

## 2019-09-05 DIAGNOSIS — C79.51 BONE METASTASES (HCC): ICD-10-CM

## 2019-09-05 DIAGNOSIS — C43.61 MALIGNANT MELANOMA OF RIGHT UPPER EXTREMITY INCLUDING SHOULDER (HCC): Primary | ICD-10-CM

## 2019-09-05 DIAGNOSIS — N28.89 RENAL MASS, LEFT: ICD-10-CM

## 2019-09-05 DIAGNOSIS — C43.61 MALIGNANT MELANOMA OF RIGHT UPPER EXTREMITY INCLUDING SHOULDER (HCC): ICD-10-CM

## 2019-09-05 DIAGNOSIS — T50.905A ITCHING DUE TO DRUG: ICD-10-CM

## 2019-09-05 PROCEDURE — 6360000002 HC RX W HCPCS: Performed by: INTERNAL MEDICINE

## 2019-09-05 PROCEDURE — 99214 OFFICE O/P EST MOD 30 MIN: CPT | Performed by: NURSE PRACTITIONER

## 2019-09-05 PROCEDURE — 84443 ASSAY THYROID STIM HORMONE: CPT

## 2019-09-05 PROCEDURE — 85025 COMPLETE CBC W/AUTO DIFF WBC: CPT

## 2019-09-05 PROCEDURE — 6360000002 HC RX W HCPCS: Performed by: PHYSICIAN ASSISTANT

## 2019-09-05 PROCEDURE — 2580000003 HC RX 258: Performed by: PHYSICIAN ASSISTANT

## 2019-09-05 PROCEDURE — 36593 DECLOT VASCULAR DEVICE: CPT

## 2019-09-05 PROCEDURE — 96372 THER/PROPH/DIAG INJ SC/IM: CPT

## 2019-09-05 PROCEDURE — 96413 CHEMO IV INFUSION 1 HR: CPT

## 2019-09-05 PROCEDURE — 80053 COMPREHEN METABOLIC PANEL: CPT

## 2019-09-05 RX ORDER — SODIUM CHLORIDE 0.9 % (FLUSH) 0.9 %
10 SYRINGE (ML) INJECTION PRN
Status: DISCONTINUED | OUTPATIENT
Start: 2019-09-05 | End: 2019-09-06 | Stop reason: HOSPADM

## 2019-09-05 RX ORDER — EPINEPHRINE 1 MG/ML
0.3 INJECTION, SOLUTION, CONCENTRATE INTRAVENOUS PRN
Status: DISCONTINUED | OUTPATIENT
Start: 2019-09-05 | End: 2019-09-07 | Stop reason: HOSPADM

## 2019-09-05 RX ORDER — DRONABINOL 2.5 MG/1
2.5 CAPSULE ORAL
Qty: 60 CAPSULE | Refills: 1 | Status: SHIPPED | OUTPATIENT
Start: 2019-09-05 | End: 2019-10-01 | Stop reason: SDUPTHER

## 2019-09-05 RX ORDER — PAROXETINE 10 MG/1
10 TABLET, FILM COATED ORAL DAILY
Qty: 30 TABLET | Refills: 3 | Status: SHIPPED | OUTPATIENT
Start: 2019-09-05 | End: 2019-09-30 | Stop reason: SDUPTHER

## 2019-09-05 RX ORDER — HYDROXYZINE HYDROCHLORIDE 25 MG/1
25 TABLET, FILM COATED ORAL EVERY 8 HOURS PRN
Qty: 90 TABLET | Refills: 0 | Status: SHIPPED | OUTPATIENT
Start: 2019-09-05 | End: 2019-09-27 | Stop reason: SDUPTHER

## 2019-09-05 RX ORDER — SODIUM CHLORIDE 0.9 % (FLUSH) 0.9 %
5 SYRINGE (ML) INJECTION PRN
Status: DISCONTINUED | OUTPATIENT
Start: 2019-09-05 | End: 2019-09-06 | Stop reason: HOSPADM

## 2019-09-05 RX ORDER — DRONABINOL 2.5 MG/1
2.5 CAPSULE ORAL
Qty: 60 CAPSULE | Refills: 1 | Status: SHIPPED | OUTPATIENT
Start: 2019-09-05 | End: 2019-09-05 | Stop reason: SDUPTHER

## 2019-09-05 RX ORDER — METHYLPREDNISOLONE SODIUM SUCCINATE 125 MG/2ML
125 INJECTION, POWDER, LYOPHILIZED, FOR SOLUTION INTRAMUSCULAR; INTRAVENOUS PRN
Status: DISCONTINUED | OUTPATIENT
Start: 2019-09-05 | End: 2019-09-07 | Stop reason: HOSPADM

## 2019-09-05 RX ORDER — DIPHENHYDRAMINE HYDROCHLORIDE 50 MG/ML
50 INJECTION INTRAMUSCULAR; INTRAVENOUS PRN
Status: DISCONTINUED | OUTPATIENT
Start: 2019-09-05 | End: 2019-09-07 | Stop reason: HOSPADM

## 2019-09-05 ASSESSMENT — ENCOUNTER SYMPTOMS
SORE THROAT: 0
ABDOMINAL PAIN: 0
EYE DISCHARGE: 0
EYE REDNESS: 0
COUGH: 0
TROUBLE SWALLOWING: 0
EYE ITCHING: 0
VOMITING: 0
PHOTOPHOBIA: 0
CONSTIPATION: 0
NAUSEA: 0
WHEEZING: 0
DIARRHEA: 0
SHORTNESS OF BREATH: 0

## 2019-09-05 NOTE — PATIENT INSTRUCTIONS
(sometimes called L-tryptophan), warfarin (Coumadin, Jantoven);  · heart rhythm medicine;  · HIV or AIDS medications;  · certain medicines to treat narcolepsy or ADHD --amphetamine, atomoxetine, dextroamphetamine, Adderall, Dexedrine, Evekeo, Vyvanse, and others;  · narcotic pain medicine --fentanyl, tramadol;  · medicine to treat anxiety, mood disorders, thought disorders, or mental illness --such as buspirone, lithium, other antidepressants, or antipsychotics;  · migraine headache medicine --sumatriptan, rizatriptan, zolmitriptan, and others; or  · seizure medicine --phenobarbital, phenytoin. This list is not complete. Other drugs may interact with paroxetine, including prescription and over-the-counter medicines, vitamins, and herbal products. Not all possible interactions are listed in this medication guide. Where can I get more information? Your pharmacist can provide more information about paroxetine. Remember, keep this and all other medicines out of the reach of children, never share your medicines with others, and use this medication only for the indication prescribed. Every effort has been made to ensure that the information provided by [address] [place] [id number]  is accurate, up-to-date, and complete, but no guarantee is made to that effect. Drug information contained herein may be time sensitive. [facility] information has been compiled for use by healthcare practitioners and consumers in the [place]  and therefore [facility] does not warrant that uses outside of the [place] are appropriate, unless specifically indicated otherwise. [facility] drug information does not endorse drugs, diagnose patients or recommend therapy.  [facility] drug information is an informational resource designed to assist licensed healthcare practitioners in caring for their patients and/or to serve consumers viewing this service as a supplement to, and not a substitute for, the expertise, skill, knowledge and judgment of

## 2019-09-10 RX ORDER — MONTELUKAST SODIUM 10 MG/1
TABLET ORAL
Qty: 30 TABLET | Refills: 1 | Status: SHIPPED | OUTPATIENT
Start: 2019-09-10 | End: 2019-10-31

## 2019-09-18 DIAGNOSIS — R11.0 NAUSEA: Primary | ICD-10-CM

## 2019-09-18 RX ORDER — ONDANSETRON 4 MG/1
4 TABLET, ORALLY DISINTEGRATING ORAL EVERY 8 HOURS PRN
Qty: 60 TABLET | Refills: 0 | Status: SHIPPED | OUTPATIENT
Start: 2019-09-18 | End: 2019-10-18

## 2019-09-18 NOTE — TELEPHONE ENCOUNTER
Patient phoned to request RF on prescribed Zofran and also requests a larger quantity. Sent to preferred pharmacy as requested.

## 2019-09-27 RX ORDER — HYDROXYZINE HYDROCHLORIDE 25 MG/1
25 TABLET, FILM COATED ORAL EVERY 8 HOURS PRN
Qty: 90 TABLET | Refills: 0 | Status: SHIPPED | OUTPATIENT
Start: 2019-09-27 | End: 2019-10-25 | Stop reason: SDUPTHER

## 2019-09-30 RX ORDER — PAROXETINE 10 MG/1
10 TABLET, FILM COATED ORAL DAILY
Qty: 90 TABLET | Refills: 2 | Status: SHIPPED | OUTPATIENT
Start: 2019-09-30 | End: 2019-12-26

## 2019-10-01 DIAGNOSIS — C43.61 MALIGNANT MELANOMA OF RIGHT UPPER EXTREMITY INCLUDING SHOULDER (HCC): ICD-10-CM

## 2019-10-01 DIAGNOSIS — R63.4 WEIGHT LOSS: ICD-10-CM

## 2019-10-01 DIAGNOSIS — F50.00 ANOREXIA NERVOSA, UNSPECIFIED: ICD-10-CM

## 2019-10-01 DIAGNOSIS — C79.51 BONE METASTASES (HCC): Primary | ICD-10-CM

## 2019-10-02 RX ORDER — HYDROCODONE BITARTRATE AND ACETAMINOPHEN 10; 325 MG/1; MG/1
1 TABLET ORAL EVERY 6 HOURS PRN
Qty: 120 TABLET | Refills: 0 | Status: SHIPPED | OUTPATIENT
Start: 2019-10-02 | End: 2019-11-01

## 2019-10-02 RX ORDER — DRONABINOL 2.5 MG/1
2.5 CAPSULE ORAL
Qty: 60 CAPSULE | Refills: 0 | Status: SHIPPED | OUTPATIENT
Start: 2019-10-02 | End: 2019-11-01

## 2019-10-03 ENCOUNTER — HOSPITAL ENCOUNTER (OUTPATIENT)
Dept: INFUSION THERAPY | Age: 79
Discharge: HOME | End: 2019-10-03
Payer: MEDICARE

## 2019-10-03 ENCOUNTER — OFFICE VISIT (OUTPATIENT)
Dept: HEMATOLOGY | Age: 79
End: 2019-10-03
Payer: MEDICARE

## 2019-10-03 VITALS
DIASTOLIC BLOOD PRESSURE: 82 MMHG | HEART RATE: 60 BPM | TEMPERATURE: 97.6 F | BODY MASS INDEX: 25.48 KG/M2 | OXYGEN SATURATION: 95 % | HEIGHT: 71 IN | WEIGHT: 182 LBS | SYSTOLIC BLOOD PRESSURE: 134 MMHG

## 2019-10-03 DIAGNOSIS — L29.8 ITCHING DUE TO DRUG: ICD-10-CM

## 2019-10-03 DIAGNOSIS — C43.9 MALIGNANT MELANOMA, UNSPECIFIED SITE (HCC): ICD-10-CM

## 2019-10-03 DIAGNOSIS — T50.905A ITCHING DUE TO DRUG: ICD-10-CM

## 2019-10-03 DIAGNOSIS — N28.89 RENAL MASS, LEFT: ICD-10-CM

## 2019-10-03 DIAGNOSIS — E03.2 HYPOTHYROIDISM DUE TO MEDICATION: ICD-10-CM

## 2019-10-03 DIAGNOSIS — C79.51 BONE METASTASES (HCC): ICD-10-CM

## 2019-10-03 DIAGNOSIS — C79.51 BONE METASTASES (HCC): Primary | ICD-10-CM

## 2019-10-03 DIAGNOSIS — C43.61 MALIGNANT MELANOMA OF SKIN OF RIGHT UPPER EXTREMITY, INCLUDING SHOULDER (HCC): Primary | ICD-10-CM

## 2019-10-03 DIAGNOSIS — C43.61 MALIGNANT MELANOMA OF SKIN OF RIGHT UPPER EXTREMITY, INCLUDING SHOULDER (HCC): ICD-10-CM

## 2019-10-03 DIAGNOSIS — C78.7 LIVER METASTASIS (HCC): ICD-10-CM

## 2019-10-03 PROCEDURE — 84443 ASSAY THYROID STIM HORMONE: CPT

## 2019-10-03 PROCEDURE — 6360000002 HC RX W HCPCS: Performed by: INTERNAL MEDICINE

## 2019-10-03 PROCEDURE — 80053 COMPREHEN METABOLIC PANEL: CPT

## 2019-10-03 PROCEDURE — 96413 CHEMO IV INFUSION 1 HR: CPT

## 2019-10-03 PROCEDURE — 36415 COLL VENOUS BLD VENIPUNCTURE: CPT

## 2019-10-03 PROCEDURE — 4040F PNEUMOC VAC/ADMIN/RCVD: CPT | Performed by: NURSE PRACTITIONER

## 2019-10-03 PROCEDURE — 99213 OFFICE O/P EST LOW 20 MIN: CPT | Performed by: NURSE PRACTITIONER

## 2019-10-03 PROCEDURE — 85025 COMPLETE CBC W/AUTO DIFF WBC: CPT

## 2019-10-03 PROCEDURE — G8427 DOCREV CUR MEDS BY ELIG CLIN: HCPCS | Performed by: NURSE PRACTITIONER

## 2019-10-03 PROCEDURE — G8419 CALC BMI OUT NRM PARAM NOF/U: HCPCS | Performed by: NURSE PRACTITIONER

## 2019-10-03 PROCEDURE — 2580000003 HC RX 258: Performed by: INTERNAL MEDICINE

## 2019-10-03 PROCEDURE — G8484 FLU IMMUNIZE NO ADMIN: HCPCS | Performed by: NURSE PRACTITIONER

## 2019-10-03 PROCEDURE — 1123F ACP DISCUSS/DSCN MKR DOCD: CPT | Performed by: NURSE PRACTITIONER

## 2019-10-03 PROCEDURE — 96372 THER/PROPH/DIAG INJ SC/IM: CPT

## 2019-10-03 PROCEDURE — 1036F TOBACCO NON-USER: CPT | Performed by: NURSE PRACTITIONER

## 2019-10-03 RX ORDER — EPINEPHRINE 1 MG/ML
0.3 INJECTION, SOLUTION, CONCENTRATE INTRAVENOUS PRN
Status: DISCONTINUED | OUTPATIENT
Start: 2019-10-03 | End: 2019-10-05 | Stop reason: HOSPADM

## 2019-10-03 RX ORDER — METHYLPREDNISOLONE SODIUM SUCCINATE 125 MG/2ML
125 INJECTION, POWDER, LYOPHILIZED, FOR SOLUTION INTRAMUSCULAR; INTRAVENOUS PRN
Status: DISCONTINUED | OUTPATIENT
Start: 2019-10-03 | End: 2019-10-05 | Stop reason: HOSPADM

## 2019-10-03 RX ORDER — SODIUM CHLORIDE 0.9 % (FLUSH) 0.9 %
5 SYRINGE (ML) INJECTION PRN
Status: DISCONTINUED | OUTPATIENT
Start: 2019-10-03 | End: 2019-10-04 | Stop reason: HOSPADM

## 2019-10-03 RX ORDER — DIPHENHYDRAMINE HYDROCHLORIDE 50 MG/ML
50 INJECTION INTRAMUSCULAR; INTRAVENOUS PRN
Status: DISCONTINUED | OUTPATIENT
Start: 2019-10-03 | End: 2019-10-05 | Stop reason: HOSPADM

## 2019-10-03 RX ORDER — SODIUM CHLORIDE 0.9 % (FLUSH) 0.9 %
10 SYRINGE (ML) INJECTION PRN
Status: DISCONTINUED | OUTPATIENT
Start: 2019-10-03 | End: 2019-10-04 | Stop reason: HOSPADM

## 2019-10-03 ASSESSMENT — ENCOUNTER SYMPTOMS
ABDOMINAL PAIN: 0
EYE DISCHARGE: 0
WHEEZING: 0
SHORTNESS OF BREATH: 0
SORE THROAT: 0
TROUBLE SWALLOWING: 0
EYE REDNESS: 0
NAUSEA: 0
VOMITING: 0
EYE ITCHING: 0
COUGH: 0
CONSTIPATION: 0
PHOTOPHOBIA: 0
DIARRHEA: 0

## 2019-10-07 RX ORDER — MONTELUKAST SODIUM 10 MG/1
TABLET ORAL
Qty: 30 TABLET | Refills: 1 | Status: SHIPPED | OUTPATIENT
Start: 2019-10-07 | End: 2019-10-31

## 2019-10-11 RX ORDER — LEVOTHYROXINE SODIUM 0.05 MG/1
TABLET ORAL
Qty: 90 TABLET | Refills: 0 | Status: SHIPPED | OUTPATIENT
Start: 2019-10-11 | End: 2019-11-05

## 2019-10-25 RX ORDER — HYDROXYZINE HYDROCHLORIDE 25 MG/1
25 TABLET, FILM COATED ORAL EVERY 8 HOURS PRN
Qty: 90 TABLET | Refills: 0 | Status: SHIPPED | OUTPATIENT
Start: 2019-10-25 | End: 2020-01-13 | Stop reason: SDUPTHER

## 2019-10-30 RX ORDER — SODIUM CHLORIDE 0.9 % (FLUSH) 0.9 %
10 SYRINGE (ML) INJECTION PRN
Status: CANCELLED | OUTPATIENT
Start: 2019-10-31

## 2019-10-30 RX ORDER — EPINEPHRINE 1 MG/ML
0.3 INJECTION, SOLUTION, CONCENTRATE INTRAVENOUS PRN
Status: CANCELLED | OUTPATIENT
Start: 2019-10-31

## 2019-10-30 RX ORDER — DIPHENHYDRAMINE HYDROCHLORIDE 50 MG/ML
50 INJECTION INTRAMUSCULAR; INTRAVENOUS PRN
Status: CANCELLED | OUTPATIENT
Start: 2019-10-31

## 2019-10-30 RX ORDER — METHYLPREDNISOLONE SODIUM SUCCINATE 125 MG/2ML
125 INJECTION, POWDER, LYOPHILIZED, FOR SOLUTION INTRAMUSCULAR; INTRAVENOUS PRN
Status: CANCELLED | OUTPATIENT
Start: 2019-10-31

## 2019-10-30 RX ORDER — SODIUM CHLORIDE 0.9 % (FLUSH) 0.9 %
5 SYRINGE (ML) INJECTION PRN
Status: CANCELLED | OUTPATIENT
Start: 2019-10-31

## 2019-10-31 ENCOUNTER — OFFICE VISIT (OUTPATIENT)
Dept: HEMATOLOGY | Age: 79
End: 2019-10-31
Payer: MEDICARE

## 2019-10-31 ENCOUNTER — HOSPITAL ENCOUNTER (OUTPATIENT)
Dept: INFUSION THERAPY | Age: 79
Discharge: HOME | End: 2019-10-31
Payer: MEDICARE

## 2019-10-31 VITALS
OXYGEN SATURATION: 98 % | SYSTOLIC BLOOD PRESSURE: 132 MMHG | HEART RATE: 57 BPM | BODY MASS INDEX: 26.04 KG/M2 | WEIGHT: 186 LBS | HEIGHT: 71 IN | DIASTOLIC BLOOD PRESSURE: 78 MMHG | TEMPERATURE: 98.3 F

## 2019-10-31 DIAGNOSIS — C43.9 MALIGNANT MELANOMA, UNSPECIFIED SITE (HCC): Primary | ICD-10-CM

## 2019-10-31 DIAGNOSIS — I10 ESSENTIAL HYPERTENSION: ICD-10-CM

## 2019-10-31 DIAGNOSIS — N28.89 RENAL MASS, LEFT: ICD-10-CM

## 2019-10-31 DIAGNOSIS — C43.61 MALIGNANT MELANOMA OF RIGHT UPPER EXTREMITY INCLUDING SHOULDER (HCC): ICD-10-CM

## 2019-10-31 DIAGNOSIS — C43.61 MALIGNANT MELANOMA OF SKIN OF RIGHT UPPER EXTREMITY, INCLUDING SHOULDER (HCC): Primary | ICD-10-CM

## 2019-10-31 DIAGNOSIS — C43.61 MALIGNANT MELANOMA OF SKIN OF RIGHT UPPER EXTREMITY, INCLUDING SHOULDER (HCC): ICD-10-CM

## 2019-10-31 DIAGNOSIS — C79.51 BONE METASTASES (HCC): ICD-10-CM

## 2019-10-31 DIAGNOSIS — L29.9 GENERALIZED PRURITUS: ICD-10-CM

## 2019-10-31 DIAGNOSIS — C78.7 LIVER METASTASIS (HCC): ICD-10-CM

## 2019-10-31 DIAGNOSIS — R63.0 DECREASED APPETITE: ICD-10-CM

## 2019-10-31 DIAGNOSIS — E03.2 HYPOTHYROIDISM DUE TO MEDICATION: ICD-10-CM

## 2019-10-31 DIAGNOSIS — Z79.899 ENCOUNTER FOR LONG-TERM (CURRENT) USE OF OTHER MEDICATIONS: ICD-10-CM

## 2019-10-31 PROCEDURE — 2580000003 HC RX 258: Performed by: INTERNAL MEDICINE

## 2019-10-31 PROCEDURE — 96413 CHEMO IV INFUSION 1 HR: CPT

## 2019-10-31 PROCEDURE — 6360000002 HC RX W HCPCS: Performed by: INTERNAL MEDICINE

## 2019-10-31 PROCEDURE — 96372 THER/PROPH/DIAG INJ SC/IM: CPT

## 2019-10-31 PROCEDURE — 99213 OFFICE O/P EST LOW 20 MIN: CPT | Performed by: NURSE PRACTITIONER

## 2019-10-31 PROCEDURE — 80053 COMPREHEN METABOLIC PANEL: CPT

## 2019-10-31 PROCEDURE — 85025 COMPLETE CBC W/AUTO DIFF WBC: CPT

## 2019-10-31 PROCEDURE — G8417 CALC BMI ABV UP PARAM F/U: HCPCS | Performed by: NURSE PRACTITIONER

## 2019-10-31 PROCEDURE — 84443 ASSAY THYROID STIM HORMONE: CPT

## 2019-10-31 PROCEDURE — G8428 CUR MEDS NOT DOCUMENT: HCPCS | Performed by: NURSE PRACTITIONER

## 2019-10-31 PROCEDURE — 1036F TOBACCO NON-USER: CPT | Performed by: NURSE PRACTITIONER

## 2019-10-31 PROCEDURE — 4040F PNEUMOC VAC/ADMIN/RCVD: CPT | Performed by: NURSE PRACTITIONER

## 2019-10-31 PROCEDURE — 1123F ACP DISCUSS/DSCN MKR DOCD: CPT | Performed by: NURSE PRACTITIONER

## 2019-10-31 PROCEDURE — G8484 FLU IMMUNIZE NO ADMIN: HCPCS | Performed by: NURSE PRACTITIONER

## 2019-10-31 RX ORDER — SODIUM CHLORIDE 0.9 % (FLUSH) 0.9 %
10 SYRINGE (ML) INJECTION PRN
Status: DISCONTINUED | OUTPATIENT
Start: 2019-10-31 | End: 2019-11-01 | Stop reason: HOSPADM

## 2019-10-31 RX ADMIN — ALTEPLASE 2 MG: 2.2 INJECTION, POWDER, LYOPHILIZED, FOR SOLUTION INTRAVENOUS at 10:26

## 2019-10-31 RX ADMIN — WATER 2.2 ML: 1 INJECTION INTRAMUSCULAR; INTRAVENOUS; SUBCUTANEOUS at 10:26

## 2019-10-31 RX ADMIN — DENOSUMAB 120 MG: 120 INJECTION SUBCUTANEOUS at 13:07

## 2019-10-31 RX ADMIN — SODIUM CHLORIDE 480 MG: 9 INJECTION, SOLUTION INTRAVENOUS at 12:37

## 2019-10-31 ASSESSMENT — ENCOUNTER SYMPTOMS
ABDOMINAL PAIN: 0
COUGH: 0
CONSTIPATION: 0
EYE DISCHARGE: 0
VOMITING: 0
DIARRHEA: 0
EYE REDNESS: 0
SORE THROAT: 0
PHOTOPHOBIA: 0
SHORTNESS OF BREATH: 0
NAUSEA: 0
TROUBLE SWALLOWING: 0
WHEEZING: 0
EYE ITCHING: 0

## 2019-10-31 NOTE — PROGRESS NOTES
Port failed to draw blood even after Activase 2mg administered and instilled x 1hr.  Peripheral IV started and Opdivo administered. May need port study at next visit.

## 2019-11-04 RX ORDER — MONTELUKAST SODIUM 10 MG/1
TABLET ORAL
Qty: 30 TABLET | Refills: 1 | Status: SHIPPED | OUTPATIENT
Start: 2019-11-04 | End: 2019-11-29 | Stop reason: SDUPTHER

## 2019-11-05 RX ORDER — LEVOTHYROXINE SODIUM 0.07 MG/1
TABLET ORAL
Qty: 90 TABLET | Refills: 0 | Status: SHIPPED | OUTPATIENT
Start: 2019-11-05 | End: 2019-12-05

## 2019-11-06 RX ORDER — DICYCLOMINE HCL 20 MG
TABLET ORAL
Qty: 60 TABLET | Refills: 1 | Status: SHIPPED | OUTPATIENT
Start: 2019-11-06 | End: 2020-07-29 | Stop reason: SDUPTHER

## 2019-11-21 ENCOUNTER — RESULTS ENCOUNTER (OUTPATIENT)
Dept: UROLOGY | Facility: CLINIC | Age: 79
End: 2019-11-21

## 2019-11-21 DIAGNOSIS — N13.8 BPH WITH OBSTRUCTION/LOWER URINARY TRACT SYMPTOMS: ICD-10-CM

## 2019-11-21 DIAGNOSIS — N40.1 BPH WITH OBSTRUCTION/LOWER URINARY TRACT SYMPTOMS: ICD-10-CM

## 2019-11-26 DIAGNOSIS — C43.9 MALIGNANT MELANOMA, UNSPECIFIED SITE (HCC): Primary | ICD-10-CM

## 2019-11-26 RX ORDER — SODIUM CHLORIDE 0.9 % (FLUSH) 0.9 %
5 SYRINGE (ML) INJECTION PRN
Status: CANCELLED | OUTPATIENT
Start: 2019-11-27

## 2019-11-26 RX ORDER — METHYLPREDNISOLONE SODIUM SUCCINATE 125 MG/2ML
125 INJECTION, POWDER, LYOPHILIZED, FOR SOLUTION INTRAMUSCULAR; INTRAVENOUS PRN
Status: CANCELLED | OUTPATIENT
Start: 2019-11-27

## 2019-11-26 RX ORDER — DIPHENHYDRAMINE HYDROCHLORIDE 50 MG/ML
50 INJECTION INTRAMUSCULAR; INTRAVENOUS PRN
Status: CANCELLED | OUTPATIENT
Start: 2019-11-27

## 2019-11-26 RX ORDER — DRONABINOL 2.5 MG/1
2.5 CAPSULE ORAL
Qty: 60 CAPSULE | Refills: 0 | Status: SHIPPED | OUTPATIENT
Start: 2019-11-26 | End: 2019-12-26 | Stop reason: SDUPTHER

## 2019-11-26 RX ORDER — SODIUM CHLORIDE 0.9 % (FLUSH) 0.9 %
10 SYRINGE (ML) INJECTION PRN
Status: CANCELLED | OUTPATIENT
Start: 2019-11-27

## 2019-11-26 RX ORDER — HYDROCODONE BITARTRATE AND ACETAMINOPHEN 10; 325 MG/1; MG/1
1 TABLET ORAL EVERY 6 HOURS PRN
Qty: 120 TABLET | Refills: 0 | Status: SHIPPED | OUTPATIENT
Start: 2019-11-26 | End: 2019-12-26 | Stop reason: SDUPTHER

## 2019-11-26 RX ORDER — EPINEPHRINE 1 MG/ML
0.3 INJECTION, SOLUTION, CONCENTRATE INTRAVENOUS PRN
Status: CANCELLED | OUTPATIENT
Start: 2019-11-27

## 2019-11-27 ENCOUNTER — HOSPITAL ENCOUNTER (OUTPATIENT)
Dept: INFUSION THERAPY | Age: 79
Discharge: HOME | End: 2019-11-27
Payer: MEDICARE

## 2019-11-27 VITALS
TEMPERATURE: 96.8 F | WEIGHT: 184 LBS | HEIGHT: 71 IN | OXYGEN SATURATION: 98 % | SYSTOLIC BLOOD PRESSURE: 130 MMHG | DIASTOLIC BLOOD PRESSURE: 78 MMHG | BODY MASS INDEX: 25.76 KG/M2 | HEART RATE: 65 BPM

## 2019-11-27 DIAGNOSIS — C79.51 BONE METASTASES (HCC): ICD-10-CM

## 2019-11-27 DIAGNOSIS — C43.61 MALIGNANT MELANOMA OF RIGHT UPPER EXTREMITY INCLUDING SHOULDER (HCC): Primary | ICD-10-CM

## 2019-11-27 DIAGNOSIS — C43.61 MALIGNANT MELANOMA OF RIGHT UPPER EXTREMITY INCLUDING SHOULDER (HCC): ICD-10-CM

## 2019-11-27 DIAGNOSIS — R53.83 FATIGUE DUE TO TREATMENT: ICD-10-CM

## 2019-11-27 DIAGNOSIS — C43.9 MALIGNANT MELANOMA, UNSPECIFIED SITE (HCC): Primary | ICD-10-CM

## 2019-11-27 PROCEDURE — 85025 COMPLETE CBC W/AUTO DIFF WBC: CPT

## 2019-11-27 PROCEDURE — 96413 CHEMO IV INFUSION 1 HR: CPT

## 2019-11-27 PROCEDURE — 80053 COMPREHEN METABOLIC PANEL: CPT

## 2019-11-27 PROCEDURE — 36415 COLL VENOUS BLD VENIPUNCTURE: CPT

## 2019-11-27 PROCEDURE — 6360000002 HC RX W HCPCS: Performed by: INTERNAL MEDICINE

## 2019-11-27 PROCEDURE — 2580000003 HC RX 258: Performed by: INTERNAL MEDICINE

## 2019-11-27 PROCEDURE — 96372 THER/PROPH/DIAG INJ SC/IM: CPT

## 2019-11-27 PROCEDURE — 84443 ASSAY THYROID STIM HORMONE: CPT

## 2019-11-27 RX ORDER — METHYLPREDNISOLONE SODIUM SUCCINATE 125 MG/2ML
125 INJECTION, POWDER, LYOPHILIZED, FOR SOLUTION INTRAMUSCULAR; INTRAVENOUS PRN
Status: CANCELLED | OUTPATIENT
Start: 2019-11-27

## 2019-11-27 RX ORDER — DIPHENHYDRAMINE HYDROCHLORIDE 50 MG/ML
50 INJECTION INTRAMUSCULAR; INTRAVENOUS PRN
Status: CANCELLED | OUTPATIENT
Start: 2019-11-27

## 2019-11-27 RX ORDER — SODIUM CHLORIDE 0.9 % (FLUSH) 0.9 %
10 SYRINGE (ML) INJECTION PRN
Status: DISCONTINUED | OUTPATIENT
Start: 2019-11-27 | End: 2019-11-28 | Stop reason: HOSPADM

## 2019-11-27 RX ADMIN — SODIUM CHLORIDE 480 MG: 9 INJECTION, SOLUTION INTRAVENOUS at 09:43

## 2019-11-27 RX ADMIN — HEPARIN 500 UNITS: 100 SYRINGE at 10:13

## 2019-11-27 RX ADMIN — DENOSUMAB 120 MG: 120 INJECTION SUBCUTANEOUS at 10:13

## 2019-11-27 RX ADMIN — SODIUM CHLORIDE, PRESERVATIVE FREE 10 ML: 5 INJECTION INTRAVENOUS at 10:13

## 2019-11-29 RX ORDER — MONTELUKAST SODIUM 10 MG/1
TABLET ORAL
Qty: 30 TABLET | Refills: 1 | Status: SHIPPED | OUTPATIENT
Start: 2019-11-29 | End: 2019-12-26

## 2019-12-05 RX ORDER — LEVOTHYROXINE SODIUM 0.1 MG/1
100 TABLET ORAL DAILY
Qty: 30 TABLET | Refills: 3 | Status: SHIPPED | OUTPATIENT
Start: 2019-12-05 | End: 2020-03-19 | Stop reason: ALTCHOICE

## 2019-12-26 ENCOUNTER — OFFICE VISIT (OUTPATIENT)
Dept: HEMATOLOGY | Age: 79
End: 2019-12-26
Payer: MEDICARE

## 2019-12-26 ENCOUNTER — TRANSCRIBE ORDERS (OUTPATIENT)
Dept: ADMINISTRATIVE | Facility: HOSPITAL | Age: 79
End: 2019-12-26

## 2019-12-26 ENCOUNTER — HOSPITAL ENCOUNTER (OUTPATIENT)
Dept: INFUSION THERAPY | Age: 79
Discharge: HOME | End: 2019-12-26
Payer: MEDICARE

## 2019-12-26 VITALS
DIASTOLIC BLOOD PRESSURE: 72 MMHG | BODY MASS INDEX: 25.89 KG/M2 | HEIGHT: 71 IN | SYSTOLIC BLOOD PRESSURE: 138 MMHG | HEART RATE: 52 BPM | TEMPERATURE: 97.2 F | WEIGHT: 184.9 LBS

## 2019-12-26 DIAGNOSIS — C78.7 LIVER METASTASIS (HCC): ICD-10-CM

## 2019-12-26 DIAGNOSIS — C43.61 MALIGNANT MELANOMA OF RIGHT UPPER EXTREMITY INCLUDING SHOULDER (HCC): Primary | ICD-10-CM

## 2019-12-26 DIAGNOSIS — C79.51 BONE METASTASES (HCC): ICD-10-CM

## 2019-12-26 DIAGNOSIS — C79.52 SECONDARY MALIGNANT NEOPLASM OF BONE AND BONE MARROW (HCC): Primary | ICD-10-CM

## 2019-12-26 DIAGNOSIS — I10 ESSENTIAL HYPERTENSION: ICD-10-CM

## 2019-12-26 DIAGNOSIS — C79.51 SECONDARY MALIGNANT NEOPLASM OF BONE AND BONE MARROW (HCC): Primary | ICD-10-CM

## 2019-12-26 DIAGNOSIS — R20.2 PARESTHESIA OF FINGER: ICD-10-CM

## 2019-12-26 DIAGNOSIS — C43.9 MALIGNANT MELANOMA, UNSPECIFIED SITE (HCC): ICD-10-CM

## 2019-12-26 DIAGNOSIS — Z51.12 ENCOUNTER FOR ANTINEOPLASTIC IMMUNOTHERAPY: ICD-10-CM

## 2019-12-26 DIAGNOSIS — C43.61 MALIGNANT MELANOMA OF SKIN OF RIGHT UPPER EXTREMITY, INCLUDING SHOULDER (HCC): Primary | ICD-10-CM

## 2019-12-26 DIAGNOSIS — C43.61 MALIGNANT MELANOMA OF SKIN OF RIGHT UPPER EXTREMITY, INCLUDING SHOULDER (HCC): ICD-10-CM

## 2019-12-26 DIAGNOSIS — C79.52 SECONDARY MALIGNANT NEOPLASM OF BONE AND BONE MARROW (HCC): ICD-10-CM

## 2019-12-26 DIAGNOSIS — C43.9 MALIGNANT MELANOMA, UNSPECIFIED SITE (HCC): Primary | ICD-10-CM

## 2019-12-26 DIAGNOSIS — E03.2 HYPOTHYROIDISM DUE TO MEDICATION: ICD-10-CM

## 2019-12-26 DIAGNOSIS — M54.50 LOW BACK PAIN, UNSPECIFIED BACK PAIN LATERALITY, UNSPECIFIED CHRONICITY, UNSPECIFIED WHETHER SCIATICA PRESENT: ICD-10-CM

## 2019-12-26 DIAGNOSIS — C79.51 SECONDARY MALIGNANT NEOPLASM OF BONE AND BONE MARROW (HCC): ICD-10-CM

## 2019-12-26 PROCEDURE — 1036F TOBACCO NON-USER: CPT | Performed by: NURSE PRACTITIONER

## 2019-12-26 PROCEDURE — 96372 THER/PROPH/DIAG INJ SC/IM: CPT

## 2019-12-26 PROCEDURE — G8417 CALC BMI ABV UP PARAM F/U: HCPCS | Performed by: NURSE PRACTITIONER

## 2019-12-26 PROCEDURE — 4040F PNEUMOC VAC/ADMIN/RCVD: CPT | Performed by: NURSE PRACTITIONER

## 2019-12-26 PROCEDURE — 1123F ACP DISCUSS/DSCN MKR DOCD: CPT | Performed by: NURSE PRACTITIONER

## 2019-12-26 PROCEDURE — 6360000002 HC RX W HCPCS: Performed by: INTERNAL MEDICINE

## 2019-12-26 PROCEDURE — 2580000003 HC RX 258: Performed by: INTERNAL MEDICINE

## 2019-12-26 PROCEDURE — 96413 CHEMO IV INFUSION 1 HR: CPT

## 2019-12-26 PROCEDURE — G8484 FLU IMMUNIZE NO ADMIN: HCPCS | Performed by: NURSE PRACTITIONER

## 2019-12-26 PROCEDURE — 99214 OFFICE O/P EST MOD 30 MIN: CPT | Performed by: NURSE PRACTITIONER

## 2019-12-26 PROCEDURE — G8427 DOCREV CUR MEDS BY ELIG CLIN: HCPCS | Performed by: NURSE PRACTITIONER

## 2019-12-26 PROCEDURE — 85025 COMPLETE CBC W/AUTO DIFF WBC: CPT

## 2019-12-26 RX ORDER — METHYLPREDNISOLONE SODIUM SUCCINATE 125 MG/2ML
125 INJECTION, POWDER, LYOPHILIZED, FOR SOLUTION INTRAMUSCULAR; INTRAVENOUS PRN
Status: CANCELLED | OUTPATIENT
Start: 2019-12-26

## 2019-12-26 RX ORDER — DIPHENHYDRAMINE HYDROCHLORIDE 50 MG/ML
50 INJECTION INTRAMUSCULAR; INTRAVENOUS PRN
Status: CANCELLED | OUTPATIENT
Start: 2019-12-26

## 2019-12-26 RX ORDER — DRONABINOL 2.5 MG/1
2.5 CAPSULE ORAL
Qty: 60 CAPSULE | Refills: 0 | Status: SHIPPED | OUTPATIENT
Start: 2019-12-26 | End: 2020-01-22 | Stop reason: SDUPTHER

## 2019-12-26 RX ORDER — SODIUM CHLORIDE 0.9 % (FLUSH) 0.9 %
5 SYRINGE (ML) INJECTION PRN
Status: CANCELLED | OUTPATIENT
Start: 2019-12-26

## 2019-12-26 RX ORDER — EPINEPHRINE 1 MG/ML
0.3 INJECTION, SOLUTION, CONCENTRATE INTRAVENOUS PRN
Status: CANCELLED | OUTPATIENT
Start: 2019-12-26

## 2019-12-26 RX ORDER — SODIUM CHLORIDE 0.9 % (FLUSH) 0.9 %
10 SYRINGE (ML) INJECTION PRN
Status: CANCELLED | OUTPATIENT
Start: 2019-12-26

## 2019-12-26 RX ORDER — HYDROCODONE BITARTRATE AND ACETAMINOPHEN 10; 325 MG/1; MG/1
1 TABLET ORAL EVERY 6 HOURS PRN
Qty: 120 TABLET | Refills: 0 | Status: SHIPPED | OUTPATIENT
Start: 2019-12-26 | End: 2020-01-22 | Stop reason: SDUPTHER

## 2019-12-26 RX ADMIN — DENOSUMAB 120 MG: 120 INJECTION SUBCUTANEOUS at 11:17

## 2019-12-26 RX ADMIN — SODIUM CHLORIDE 480 MG: 9 INJECTION, SOLUTION INTRAVENOUS at 10:42

## 2019-12-26 ASSESSMENT — ENCOUNTER SYMPTOMS
EYE ITCHING: 0
PHOTOPHOBIA: 0
VOMITING: 0
NAUSEA: 0
ABDOMINAL PAIN: 0
SHORTNESS OF BREATH: 0
SORE THROAT: 0
DIARRHEA: 0
EYE DISCHARGE: 0
EYE REDNESS: 0
CONSTIPATION: 0
COUGH: 0
WHEEZING: 0
TROUBLE SWALLOWING: 0

## 2019-12-27 ASSESSMENT — ENCOUNTER SYMPTOMS: BACK PAIN: 1

## 2020-01-10 RX ORDER — MONTELUKAST SODIUM 10 MG/1
TABLET ORAL
Qty: 30 TABLET | Refills: 1 | Status: SHIPPED | OUTPATIENT
Start: 2020-01-10 | End: 2020-02-06

## 2020-01-13 RX ORDER — HYDROXYZINE HYDROCHLORIDE 25 MG/1
25 TABLET, FILM COATED ORAL EVERY 8 HOURS PRN
Qty: 90 TABLET | Refills: 0 | Status: SHIPPED | OUTPATIENT
Start: 2020-01-13 | End: 2020-01-14 | Stop reason: SDUPTHER

## 2020-01-14 RX ORDER — HYDROXYZINE HYDROCHLORIDE 25 MG/1
25 TABLET, FILM COATED ORAL EVERY 8 HOURS PRN
Qty: 90 TABLET | Refills: 1 | Status: SHIPPED | OUTPATIENT
Start: 2020-01-14 | End: 2020-05-19

## 2020-01-16 ENCOUNTER — HOSPITAL ENCOUNTER (OUTPATIENT)
Dept: CT IMAGING | Facility: HOSPITAL | Age: 80
Discharge: HOME | End: 2020-01-16
Admitting: NURSE PRACTITIONER

## 2020-01-16 ENCOUNTER — HOSPITAL ENCOUNTER (OUTPATIENT)
Dept: NUCLEAR MEDICINE | Facility: HOSPITAL | Age: 80
Discharge: HOME | End: 2020-01-16

## 2020-01-16 DIAGNOSIS — C79.51 SECONDARY MALIGNANT NEOPLASM OF BONE AND BONE MARROW (HCC): ICD-10-CM

## 2020-01-16 DIAGNOSIS — C43.61 MALIGNANT MELANOMA OF RIGHT UPPER EXTREMITY INCLUDING SHOULDER (HCC): ICD-10-CM

## 2020-01-16 DIAGNOSIS — C79.52 SECONDARY MALIGNANT NEOPLASM OF BONE AND BONE MARROW (HCC): ICD-10-CM

## 2020-01-16 LAB — CREAT BLDA-MCNC: 1.3 MG/DL (ref 0.6–1.3)

## 2020-01-16 PROCEDURE — 0 TECHNETIUM OXIDRONATE KIT: Performed by: NURSE PRACTITIONER

## 2020-01-16 PROCEDURE — 74177 CT ABD & PELVIS W/CONTRAST: CPT

## 2020-01-16 PROCEDURE — 78306 BONE IMAGING WHOLE BODY: CPT

## 2020-01-16 PROCEDURE — 25010000002 IOPAMIDOL 61 % SOLUTION: Performed by: NURSE PRACTITIONER

## 2020-01-16 PROCEDURE — A9561 TC99M OXIDRONATE: HCPCS | Performed by: NURSE PRACTITIONER

## 2020-01-16 PROCEDURE — 71260 CT THORAX DX C+: CPT

## 2020-01-16 PROCEDURE — 82565 ASSAY OF CREATININE: CPT

## 2020-01-16 RX ADMIN — IOPAMIDOL 50 ML: 612 INJECTION, SOLUTION INTRAVENOUS at 08:31

## 2020-01-16 RX ADMIN — TECHNETIUM TC 99M OXIDRONATE 1 DOSE: 3.15 INJECTION, POWDER, LYOPHILIZED, FOR SOLUTION INTRAVENOUS at 09:04

## 2020-01-16 RX ADMIN — IOPAMIDOL 100 ML: 612 INJECTION, SOLUTION INTRAVENOUS at 08:31

## 2020-01-22 RX ORDER — EPINEPHRINE 1 MG/ML
0.3 INJECTION, SOLUTION, CONCENTRATE INTRAVENOUS PRN
Status: CANCELLED | OUTPATIENT
Start: 2020-01-23

## 2020-01-22 RX ORDER — HYDROCODONE BITARTRATE AND ACETAMINOPHEN 10; 325 MG/1; MG/1
1 TABLET ORAL EVERY 6 HOURS PRN
Qty: 120 TABLET | Refills: 0 | Status: SHIPPED | OUTPATIENT
Start: 2020-01-22 | End: 2020-03-19 | Stop reason: SDUPTHER

## 2020-01-22 RX ORDER — SODIUM CHLORIDE 0.9 % (FLUSH) 0.9 %
10 SYRINGE (ML) INJECTION PRN
Status: CANCELLED | OUTPATIENT
Start: 2020-01-23

## 2020-01-22 RX ORDER — DIPHENHYDRAMINE HYDROCHLORIDE 50 MG/ML
50 INJECTION INTRAMUSCULAR; INTRAVENOUS PRN
Status: CANCELLED | OUTPATIENT
Start: 2020-01-23

## 2020-01-22 RX ORDER — METHYLPREDNISOLONE SODIUM SUCCINATE 125 MG/2ML
125 INJECTION, POWDER, LYOPHILIZED, FOR SOLUTION INTRAMUSCULAR; INTRAVENOUS PRN
Status: CANCELLED | OUTPATIENT
Start: 2020-01-23

## 2020-01-22 RX ORDER — SODIUM CHLORIDE 0.9 % (FLUSH) 0.9 %
5 SYRINGE (ML) INJECTION PRN
Status: CANCELLED | OUTPATIENT
Start: 2020-01-23

## 2020-01-22 RX ORDER — DRONABINOL 2.5 MG/1
2.5 CAPSULE ORAL
Qty: 60 CAPSULE | Refills: 0 | Status: SHIPPED | OUTPATIENT
Start: 2020-01-22 | End: 2020-03-19 | Stop reason: SDUPTHER

## 2020-01-22 RX ORDER — HEPARIN SODIUM (PORCINE) LOCK FLUSH IV SOLN 100 UNIT/ML 100 UNIT/ML
300 SOLUTION INTRAVENOUS PRN
Status: CANCELLED
Start: 2020-01-23

## 2020-01-23 ENCOUNTER — HOSPITAL ENCOUNTER (OUTPATIENT)
Dept: INFUSION THERAPY | Age: 80
Discharge: HOME | End: 2020-01-23
Payer: MEDICARE

## 2020-01-23 ENCOUNTER — OFFICE VISIT (OUTPATIENT)
Dept: HEMATOLOGY | Age: 80
End: 2020-01-23
Payer: MEDICARE

## 2020-01-23 VITALS
WEIGHT: 189 LBS | RESPIRATION RATE: 18 BRPM | BODY MASS INDEX: 26.36 KG/M2 | DIASTOLIC BLOOD PRESSURE: 62 MMHG | SYSTOLIC BLOOD PRESSURE: 132 MMHG | TEMPERATURE: 96.7 F | HEART RATE: 62 BPM

## 2020-01-23 DIAGNOSIS — C43.61 MALIGNANT MELANOMA OF SKIN OF RIGHT UPPER EXTREMITY, INCLUDING SHOULDER (HCC): ICD-10-CM

## 2020-01-23 DIAGNOSIS — C79.51 BONE METASTASES (HCC): ICD-10-CM

## 2020-01-23 DIAGNOSIS — C43.9 MALIGNANT MELANOMA, UNSPECIFIED SITE (HCC): Primary | ICD-10-CM

## 2020-01-23 PROBLEM — M15.9 OSTEOARTHRITIS OF MULTIPLE JOINTS: Status: ACTIVE | Noted: 2020-01-23

## 2020-01-23 PROCEDURE — 2580000003 HC RX 258: Performed by: INTERNAL MEDICINE

## 2020-01-23 PROCEDURE — 84443 ASSAY THYROID STIM HORMONE: CPT

## 2020-01-23 PROCEDURE — 80053 COMPREHEN METABOLIC PANEL: CPT

## 2020-01-23 PROCEDURE — G8484 FLU IMMUNIZE NO ADMIN: HCPCS | Performed by: NURSE PRACTITIONER

## 2020-01-23 PROCEDURE — 36415 COLL VENOUS BLD VENIPUNCTURE: CPT

## 2020-01-23 PROCEDURE — 96372 THER/PROPH/DIAG INJ SC/IM: CPT

## 2020-01-23 PROCEDURE — 6360000002 HC RX W HCPCS: Performed by: INTERNAL MEDICINE

## 2020-01-23 PROCEDURE — 1036F TOBACCO NON-USER: CPT | Performed by: NURSE PRACTITIONER

## 2020-01-23 PROCEDURE — G8427 DOCREV CUR MEDS BY ELIG CLIN: HCPCS | Performed by: NURSE PRACTITIONER

## 2020-01-23 PROCEDURE — 36593 DECLOT VASCULAR DEVICE: CPT

## 2020-01-23 PROCEDURE — 4040F PNEUMOC VAC/ADMIN/RCVD: CPT | Performed by: NURSE PRACTITIONER

## 2020-01-23 PROCEDURE — 1123F ACP DISCUSS/DSCN MKR DOCD: CPT | Performed by: NURSE PRACTITIONER

## 2020-01-23 PROCEDURE — 99214 OFFICE O/P EST MOD 30 MIN: CPT | Performed by: NURSE PRACTITIONER

## 2020-01-23 PROCEDURE — 85025 COMPLETE CBC W/AUTO DIFF WBC: CPT

## 2020-01-23 PROCEDURE — G8417 CALC BMI ABV UP PARAM F/U: HCPCS | Performed by: NURSE PRACTITIONER

## 2020-01-23 PROCEDURE — 96413 CHEMO IV INFUSION 1 HR: CPT

## 2020-01-23 RX ORDER — METRONIDAZOLE 500 MG/1
500 TABLET ORAL 3 TIMES DAILY
Qty: 21 TABLET | Refills: 0 | Status: SHIPPED | OUTPATIENT
Start: 2020-01-23 | End: 2020-01-30

## 2020-01-23 RX ORDER — SODIUM CHLORIDE 0.9 % (FLUSH) 0.9 %
10 SYRINGE (ML) INJECTION PRN
Status: DISCONTINUED | OUTPATIENT
Start: 2020-01-23 | End: 2020-01-24 | Stop reason: HOSPADM

## 2020-01-23 RX ORDER — HEPARIN SODIUM (PORCINE) LOCK FLUSH IV SOLN 100 UNIT/ML 100 UNIT/ML
300 SOLUTION INTRAVENOUS PRN
Status: DISCONTINUED | OUTPATIENT
Start: 2020-01-23 | End: 2020-01-25 | Stop reason: HOSPADM

## 2020-01-23 RX ADMIN — SODIUM CHLORIDE 480 MG: 9 INJECTION, SOLUTION INTRAVENOUS at 10:45

## 2020-01-23 RX ADMIN — SODIUM CHLORIDE, PRESERVATIVE FREE 10 ML: 5 INJECTION INTRAVENOUS at 11:13

## 2020-01-23 RX ADMIN — HEPARIN 300 UNITS: 100 SYRINGE at 11:13

## 2020-01-23 RX ADMIN — ALTEPLASE 2 MG: 2.2 INJECTION, POWDER, LYOPHILIZED, FOR SOLUTION INTRAVENOUS at 10:10

## 2020-01-23 RX ADMIN — DENOSUMAB 120 MG: 120 INJECTION SUBCUTANEOUS at 11:21

## 2020-01-23 ASSESSMENT — ENCOUNTER SYMPTOMS
BACK PAIN: 1
SORE THROAT: 0
NAUSEA: 0
COUGH: 0
EYE REDNESS: 0
WHEEZING: 0
PHOTOPHOBIA: 0
EYE ITCHING: 0
CONSTIPATION: 0
VOMITING: 0
EYE DISCHARGE: 0
SHORTNESS OF BREATH: 0
TROUBLE SWALLOWING: 0
DIARRHEA: 0
ABDOMINAL PAIN: 0

## 2020-01-23 NOTE — PROGRESS NOTES
Progress Note      Pt Name: Morena Peralta  YOB: 1940  MRN: [id number]    Date of evaluation: 12/26/2019  History Obtained From:  patient, spouse, electronic medical record    CHIEF COMPLAINT:    Chief Complaint   Patient presents with    Melanoma     HISTORY OF PRESENT ILLNESS:    Morena Peralta is a 70-year-old gentleman here for continued treatment with immunotherapy regarding his diagnosis of recurrent, stage IV metastatic melanoma involving the liver, bone, celiac and right axillary lymph nodes made on 7/26/2018. He is here today to review surveillance imaging and due for both monthly nivolumab and Xgeva. Hari Joyner is monitored and treated for drug-induced hypothyroidism, currently stable on Synthroid 100 mcg daily. He continues to tolerate treatment well aside from generalized itching for which Prozac, Singulair and Atarax are taken. Bowels were loose following oral contrast with recent CT imaging, otherwise stable. He complains of chronic, dull, generalized arthritis, worse in early a.m. and better throughout the day. Discomfort is improved with with Norco as needed. HTN is stable. Appetite is stable with Marinol 2.5 mg twice daily. TARGET METASTATIC MALIGNANT MELANOMA SITES:  1. Right upper extremity original primary site 06/05/14   2. Right axilla lymph node at presentation 6/5/2014 and 3 axillary nodes at recurrence 7/27/2018 with an SUV of 8.7   3. Too numerous to count liver metastases   4. Celiac lymph nodes x 2 with highest SUV of 3   5. Bony metastatic disease on PET scan in the right ilium (SUV of 5) and right acetabulum (SUV of 6.4)   6. Indeterminate HORACIO 3 mm subpleural nodule   7. Indeterminate thyroid nodules on chest CT    1st TUMOR HISTORY: Resected stage IIIA (pT3a pN1a M0) right upper extremity malignant melanoma, 06/05/14  Mr. Johnathan Brady was seen in initial oncology consultation on 08/05/14, referred by Dr. Jed Camacho, regarding a diagnosis of resected CT scans of the chest, abdomen/pelvis and bone scan 1/16/2020 were negative for evidence of disease progression. 19 mm right thyroid lobe nodule and subcentimeter low-density liver lesions were stable. No abnormal bony uptake. Treatment continues with nivolumab. TREATMENT SUMMARY:  1. Dr. Julieta Daniel resected a malignant melanoma from the right posterior arm, above the elbow, on 06/05/14   2. Wide excision with a sentinel lymph node biopsy by Dr. Stephy Toney on 07/09/14. 3. Adjuvant weekly Sylatron Initiated 09/02/14 at 6 mcg/kg (500mcg) sub-q weekly but was only able to receive 4 of 8 planned treatments of this. The last dose #4 was on 09/28/14   4. Adjuvant weekly Sylatron 3 mcg/kg (250mcg) initiated 10/12/2014. Last dose delivered on 2/15/2015, discontinued due to poor tolerability and unable to tolerate the medication. 5. Opdivo 1 mg/kg and Yervoy 3 mg/kg every 3 weeks ×4 doses. 8/7/18 - 10/25/18. 6. Opdivo 240 mg every 2 weeks initiated 11/15/18, discontinued after dose #2 on 12/20/18   7. Monthly Xgeva initiated 8/7/18.   8.  Tafinlar 150 mg po BID & Mekinist 2 mg po qday, cycle #1 initiated 1/4/19.   9. Single agent Tafinlar 150 mg po BID initiated 3/28/19, discontinue by patient 4/4/19.   10. Opdivo 240 mg every 2 weeks resumed 4/25/19, change to 480 mg every 4 weeks on 9/5/2019      Past Medical History:    Past Medical History:   Diagnosis Date    Acid reflux     BPH (benign prostatic hyperplasia)     Cancer ([address]     Diverticulitis     Hard of hearing     Hypercholesteremia     Hypertension     Malignant melanoma of skin of upper limb, including shoulder ([address] dx 6/5/2014    to liver, stomach, bone, and right axilla     Past Surgical History:    Past Surgical History:   Procedure Laterality Date    CHOLECYSTECTOMY      TUNNELED CENTRAL VENOUS CATHETER W/ SUBCUTANEOUS PORT       Current Medications:    Current Outpatient Medications   Medication Sig Dispense Refill    metroNIDAZOLE (FLAGYL) 500 MG tablet Take 1 tablet by mouth 3 times daily for 7 days 21 tablet 0    dronabinol (MARINOL) 2.5 MG capsule Take 1 capsule by mouth 2 times daily (before meals) for 30 days. 60 capsule 0    HYDROcodone-acetaminophen (NORCO)  MG per tablet Take 1 tablet by mouth every 6 hours as needed for Pain for up to 30 days. Intended supply: 30 days 120 tablet 0    hydrOXYzine (ATARAX) 25 MG tablet Take 1 tablet by mouth every 8 hours as needed for Itching 90 tablet 1    montelukast (SINGULAIR) 10 MG tablet TAKE 1 TABLET BY MOUTH EVERY DAY EVERY NIGHT 30 tablet 1    levothyroxine (SYNTHROID) 100 MCG tablet Take 1 tablet by mouth Daily 30 tablet 3    dicyclomine (BENTYL) 20 MG tablet TAKE 1/2 TABLET BY MOUTH 4 TIMES A DAY AS NEEDED 60 tablet 1    triamcinolone (KENALOG) 0.025 % cream Apply topically 2 times daily. 30 g 2    amLODIPine (NORVASC) 5 MG tablet Take 5 mg by mouth      finasteride (PROSCAR) 5 MG tablet Take 5 mg by mouth      lisinopril (PRINIVIL;ZESTRIL) 20 MG tablet Take 20 mg by mouth      lovastatin (MEVACOR) 40 MG tablet Take 40 mg by mouth       No current facility-administered medications for this visit.       Facility-Administered Medications Ordered in Other Visits   Medication Dose Route Frequency Provider Last Rate Last Dose    nivolumab 480 mg in sodium chloride 0.9 % 100 mL chemo IVPB  480 mg Intravenous Once Carolann Anderson MD        heparin flush 100 UNIT/ML injection 300 Units  300 Units Intracatheter PRN Carolann Anderson MD        sodium chloride flush 0.9 % injection 10 mL  10 mL Intravenous PRN Carolann Anderson MD            Allergies: No Known Allergies  Social History:    Social History     Tobacco Use    Smoking status: Former Smoker    Smokeless tobacco: Never Used   Substance Use Topics    Alcohol use: No    Drug use: No     Family History:   Family History   Problem Relation Age of Onset    Heart Attack Brother          age 78 of [place]       Subjective   REVIEW OF SYSTEMS:   Review of Systems   Constitutional: Positive for fatigue. Negative for fever. No night sweats   HENT: Negative for dental problem, hearing loss, mouth sores, nosebleeds, sore throat and trouble swallowing. Eyes: Negative for photophobia, discharge, redness and itching. No blurring of vision, no double vision    Respiratory: Negative for cough, shortness of breath and wheezing. No hemoptysis   Cardiovascular: Negative for chest pain, palpitations and leg swelling. Gastrointestinal: Negative for abdominal pain, constipation, diarrhea, nausea and vomiting. Endocrine: Negative for cold intolerance, heat intolerance, polydipsia and polyuria. Genitourinary: Negative for dysuria, frequency, hematuria and urgency. Musculoskeletal: Positive for arthralgias and back pain (Chronic). Negative for joint swelling and myalgias. Generalized arthritis   Skin: Negative for pallor and rash. itching, stable   Allergic/Immunologic: Negative for environmental allergies and immunocompromised state. Neurological: Negative for seizures, syncope and numbness. Hematological: Negative for adenopathy. Does not bruise/bleed easily. Psychiatric/Behavioral: Negative for agitation, behavioral problems, confusion and suicidal ideas. The patient is not nervous/anxious. Objective   Vitals:    01/23/20 0955   BP: 132/62   Pulse: 62   Resp: 18   Temp: 96.7 °F (35.9 °C)     PHYSICAL EXAM:  Physical Exam  Vitals signs reviewed. Constitutional:       General: He is not in acute distress. Appearance: He is well-developed and normal weight. He is not ill-appearing or diaphoretic. HENT:      Head: Normocephalic and atraumatic. Right Ear: External ear normal.      Left Ear: External ear normal.      Nose: Nose normal.      Mouth/Throat:      Mouth: Mucous membranes are moist.   Eyes:      General: No scleral icterus. Right eye: No discharge.          Left eye: No extremity, including shoulder ([address]    2. Liver metastasis ([address]    3. Bone metastases ([address]    4. Encounter for antineoplastic immunotherapy    5. Renal mass, left    6. Malignant melanoma, unspecified site ([address]    7. Diverticulitis    8. Hypothyroidism due to medication    9. Osteoarthritis of multiple joints, unspecified osteoarthritis type      Contrasted chest CT 1/16/2020: Stable without acute abnormality. 19 mm right thyroid lobe nodule, stable  Contrasted abdominal/pelvic CT 1/16/2020: Showed no evidence of disease progression. Subcentimeter low-density liver lesions are stable. Mild sigmoid diverticulitis suspected. Bone scan 1/16/2020: No focal abnormal bony uptake to indicate metastatic disease or acute fracture, stable when compared to 2 years ago. There are no new skin lesions and no peripheral adenopathies upon exam.  TSH was 2.93 on 12/26/2019. He is instructed to continue Synthroid 100 mcg daily. B/P stable at 132/62, managed by Dr. Leydi Gonzalez. Continue Singulair, Atarax and Paxil for itching. Continue Marinol 2.5 mg po BID. Continue Norco PRN pain as prescribed. Report new or worsening pain. Rx Flagyl 500 mg po TID for mild diverticulitis on imaging - afebrile, denies abdominal pain, minimal loose stools. CMP, TSH today  RTC monthly for treatment    Return in about 2 months (around 3/23/2020) for follow up with LIAM Olmedo for nivolumab/Xgeva.     LIAM Mclain  10:36 AM  1/23/2020

## 2020-01-23 NOTE — PATIENT INSTRUCTIONS
Patient Education        metronidazole  Pronunciation:  me adalberto bailey  Brand:  FIRST Metronidazole, Flagyl, Flagyl 375  What is the most important information I should know about metronidazole? You should not use metronidazole if you have taken disulfiram (Antabuse) within the past 2 weeks. Do not drink alcohol or consume foods or medicines that contain propylene glycol while you are taking metronidazole and for at least 3 days after you stop taking it. What is metronidazole? Metronidazole is an antibiotic that is used to treat bacterial infections of the vagina, stomach, liver, skin, joints, brain, and respiratory tract. Metronidazole will not treat a vaginal yeast infection. Metronidazole may also be used for purposes not listed in this medication guide. What should I discuss with my healthcare provider before taking metronidazole? You should not take metronidazole if you are allergic to it, or if you have taken disulfiram (Antabuse) within the past 2 weeks. Do not take metronidazole during the first trimester of pregnancy. This medicine can harm an unborn baby. Tell your doctor if you are pregnant. Tell your doctor if you have ever had:  · liver or kidney disease;  · Cockayne syndrome (a rare genetic disorder);  · a stomach or intestinal disease such as Crohn's disease;  · a blood cell disorder such as anemia (lack of red blood cells) or low white blood cell (WBC) counts;  · a fungal infection anywhere in your body; or  · a nerve disorder. In animal studies, metronidazole caused certain types of tumors, some of which were cancerous. However, very high doses are used in animal studies. It is not known whether these effects would occur in people using regular doses. Ask your doctor about your risk. Metronidazole can pass into breast milk and may harm a nursing baby. You should not breast-feed within 24 hours after using metronidazole.  If you use a breast pump during this time, throw out any milk you collect. Do not feed it to your baby. Do not give this medicine to a child without medical advice. How should I take metronidazole? Follow all directions on your prescription label and read all medication guides or instruction sheets. Use the medicine exactly as directed. Shake the oral suspension (liquid) before you measure a dose. Use the dosing syringe provided, or use a medicine dose-measuring device (not a kitchen spoon). Do not crush, chew, or break an extended-release tablet. Swallow it whole. If you are treating a vaginal infection, your sexual partner may also need to take metronidazole (even if no symptoms are present) or you could become reinfected. Metronidazole is usually given for up to 10 days in a row. You may need to repeat this dosage several weeks later. Use this medicine for the full prescribed length of time, even if your symptoms quickly improve. Skipping doses can increase your risk of infection that is resistant to medication. Metronidazole will not treat a viral infection such as the flu or a common cold. This medicine can affect the results of certain medical tests. Tell any doctor who treats you that you are using metronidazole. Store at room temperature away from moisture and heat. What happens if I miss a dose? Take the medicine as soon as you can, but skip the missed dose if it is almost time for your next dose. Do not take two doses at one time. What happens if I overdose? Seek emergency medical attention or call the Poison Help line at 1-754.373.2916. Overdose symptoms may include nausea, vomiting, and loss of balance or coordination. What should I avoid while taking metronidazole? Do not drink alcohol or consume food or medicines that contain propylene glycol while you are taking metronidazole. You may have unpleasant side effects such as headaches, stomach cramps, nausea, vomiting, and flushing (warmth, redness, or tingly feeling).   Avoid alcohol or propylene glycol for at least 3 days after you stop taking metronidazole. Check the labels of any medicines or food products you use to make sure they do not contain alcohol or propylene glycol. What are the possible side effects of metronidazole? Get emergency medical help if you have signs of an allergic reaction: hives; difficult breathing; swelling of your face, lips, tongue, or throat. Call your doctor at once if you have:  · diarrhea;  · painful or difficult urination;  · trouble sleeping, depression, irritability;  · headache, dizziness, weakness;  · a light-headed feeling (like you might pass out); or  · blisters or ulcers in your mouth, red or swollen gums, trouble swallowing. Stop taking the medicine and call your doctor right away if you have neurologic side effects (more likely to occur while taking metronidazole long term):  · numbness, tingling, or burning pain in your hands or feet;  · vision problems, pain behind your eyes, seeing flashes of light;  · muscle weakness, problems with coordination;  · trouble speaking or understanding what is said to you;  · a seizure; or  · fever, neck stiffness, and increased sensitivity to light. Metronidazole can cause life-threatening liver problems in people with Cockayne syndrome. If you have this condition, stop taking metronidazole and contact your doctor if you have signs of liver failure --nausea, stomach pain (upper right side), dark urine, zoraida-colored stools, or jaundice (yellowing of the skin or eyes). Side effects may be more likely in older adults. Common side effects may include:  · nausea, vomiting, loss of appetite, stomach pain;  · diarrhea, constipation;  · unpleasant metallic taste;  · rash, itching;  · vaginal itching or discharge;  · mouth sores; or  · swollen, red, or \"hairy\" tongue. This is not a complete list of side effects and others may occur. Call your doctor for medical advice about side effects.  You may report side effects to FDA at 1-800-FDA-1088. What other drugs will affect metronidazole? Sometimes it is not safe to use certain medications at the same time. Some drugs can affect your blood levels of other drugs you take, which may increase side effects or make the medications less effective. Tell your doctor about all your other medicines, especially:  · busulfan;  · lithium; or  · a blood thinner --warfarin, Coumadin, Jantoven. This list is not complete. Other drugs may affect metronidazole, including prescription and over-the-counter medicines, vitamins, and herbal products. Not all possible drug interactions are listed here. Where can I get more information? Your pharmacist can provide more information about metronidazole. Remember, keep this and all other medicines out of the reach of children, never share your medicines with others, and use this medication only for the indication prescribed. Every effort has been made to ensure that the information provided by [address] is accurate, up-to-date, and complete, but no guarantee is made to that effect. Drug information contained herein may be time sensitive. [employer] information has been compiled for use by healthcare practitioners and consumers in the [place] and therefore [employer] does not warrant that uses outside of the [place] are appropriate, unless specifically indicated otherwise. [facility]'s drug information does not endorse drugs, diagnose patients or recommend therapy. [facility] drug information is an informational resource designed to assist licensed healthcare practitioners in caring for their patients and/or to serve consumers viewing this service as a supplement to, and not a substitute for, the expertise, skill, knowledge and judgment of healthcare practitioners.  The absence of a warning for a given drug or drug combination in no way should be construed to indicate that the drug or drug combination is safe, effective or appropriate for any given patient. [facility] does not assume any responsibility for any aspect of healthcare administered with the aid of information [facility] provides. The information contained herein is not intended to cover all possible uses, directions, precautions, warnings, drug interactions, allergic reactions, or adverse effects. If you have questions about the drugs you are taking, check with your doctor, nurse or pharmacist.  Copyright 2277-1217 [address]: 12.02. Revision date: 10/22/2018. Care instructions adapted under license by [facility] ([facility]). If you have questions about a medical condition or this instruction, always ask your healthcare professional. [place] [address] any warranty or liability for your use of this information.

## 2020-02-06 RX ORDER — MONTELUKAST SODIUM 10 MG/1
TABLET ORAL
Qty: 30 TABLET | Refills: 1 | Status: SHIPPED | OUTPATIENT
Start: 2020-02-06 | End: 2020-03-05

## 2020-02-20 ENCOUNTER — HOSPITAL ENCOUNTER (OUTPATIENT)
Dept: INFUSION THERAPY | Age: 80
Discharge: HOME | End: 2020-02-20
Payer: MEDICARE

## 2020-02-20 VITALS
WEIGHT: 188 LBS | BODY MASS INDEX: 26.32 KG/M2 | OXYGEN SATURATION: 95 % | HEIGHT: 71 IN | TEMPERATURE: 97.9 F | SYSTOLIC BLOOD PRESSURE: 136 MMHG | DIASTOLIC BLOOD PRESSURE: 82 MMHG | HEART RATE: 67 BPM

## 2020-02-20 DIAGNOSIS — C43.9 MALIGNANT MELANOMA, UNSPECIFIED SITE (HCC): Primary | ICD-10-CM

## 2020-02-20 DIAGNOSIS — C79.51 BONE METASTASES (HCC): ICD-10-CM

## 2020-02-20 DIAGNOSIS — C43.61 MALIGNANT MELANOMA OF SKIN OF RIGHT UPPER EXTREMITY, INCLUDING SHOULDER (HCC): ICD-10-CM

## 2020-02-20 PROCEDURE — 96372 THER/PROPH/DIAG INJ SC/IM: CPT

## 2020-02-20 PROCEDURE — 96413 CHEMO IV INFUSION 1 HR: CPT

## 2020-02-20 PROCEDURE — 6360000002 HC RX W HCPCS: Performed by: INTERNAL MEDICINE

## 2020-02-20 PROCEDURE — 2580000003 HC RX 258: Performed by: INTERNAL MEDICINE

## 2020-02-20 PROCEDURE — 84443 ASSAY THYROID STIM HORMONE: CPT

## 2020-02-20 PROCEDURE — 85025 COMPLETE CBC W/AUTO DIFF WBC: CPT

## 2020-02-20 PROCEDURE — 36415 COLL VENOUS BLD VENIPUNCTURE: CPT

## 2020-02-20 PROCEDURE — 80053 COMPREHEN METABOLIC PANEL: CPT

## 2020-02-20 RX ORDER — SODIUM CHLORIDE 0.9 % (FLUSH) 0.9 %
5 SYRINGE (ML) INJECTION PRN
Status: CANCELLED | OUTPATIENT
Start: 2020-02-20

## 2020-02-20 RX ORDER — DIPHENHYDRAMINE HYDROCHLORIDE 50 MG/ML
50 INJECTION INTRAMUSCULAR; INTRAVENOUS PRN
Status: CANCELLED | OUTPATIENT
Start: 2020-02-20

## 2020-02-20 RX ORDER — METHYLPREDNISOLONE SODIUM SUCCINATE 125 MG/2ML
125 INJECTION, POWDER, LYOPHILIZED, FOR SOLUTION INTRAMUSCULAR; INTRAVENOUS PRN
Status: CANCELLED | OUTPATIENT
Start: 2020-02-20

## 2020-02-20 RX ORDER — EPINEPHRINE 1 MG/ML
0.3 INJECTION, SOLUTION, CONCENTRATE INTRAVENOUS PRN
Status: CANCELLED | OUTPATIENT
Start: 2020-02-20

## 2020-02-20 RX ORDER — HEPARIN SODIUM (PORCINE) LOCK FLUSH IV SOLN 100 UNIT/ML 100 UNIT/ML
500 SOLUTION INTRAVENOUS PRN
Status: DISCONTINUED | OUTPATIENT
Start: 2020-02-20 | End: 2020-02-22 | Stop reason: HOSPADM

## 2020-02-20 RX ORDER — SODIUM CHLORIDE 0.9 % (FLUSH) 0.9 %
10 SYRINGE (ML) INJECTION PRN
Status: DISCONTINUED | OUTPATIENT
Start: 2020-02-20 | End: 2020-02-21 | Stop reason: HOSPADM

## 2020-02-20 RX ORDER — HEPARIN SODIUM (PORCINE) LOCK FLUSH IV SOLN 100 UNIT/ML 100 UNIT/ML
500 SOLUTION INTRAVENOUS PRN
Status: CANCELLED
Start: 2020-02-20

## 2020-02-20 RX ORDER — SODIUM CHLORIDE 0.9 % (FLUSH) 0.9 %
10 SYRINGE (ML) INJECTION PRN
Status: CANCELLED | OUTPATIENT
Start: 2020-02-20

## 2020-02-20 RX ADMIN — DENOSUMAB 120 MG: 120 INJECTION SUBCUTANEOUS at 09:52

## 2020-02-20 RX ADMIN — HEPARIN 500 UNITS: 100 SYRINGE at 09:47

## 2020-02-20 RX ADMIN — SODIUM CHLORIDE 480 MG: 9 INJECTION, SOLUTION INTRAVENOUS at 09:08

## 2020-02-20 RX ADMIN — SODIUM CHLORIDE, PRESERVATIVE FREE 10 ML: 5 INJECTION INTRAVENOUS at 09:47

## 2020-03-05 RX ORDER — MONTELUKAST SODIUM 10 MG/1
TABLET ORAL
Qty: 30 TABLET | Refills: 1 | Status: SHIPPED | OUTPATIENT
Start: 2020-03-05 | End: 2020-03-16 | Stop reason: SDUPTHER

## 2020-03-16 RX ORDER — MONTELUKAST SODIUM 10 MG/1
10 TABLET ORAL NIGHTLY
Qty: 90 TABLET | Refills: 1 | Status: SHIPPED | OUTPATIENT
Start: 2020-03-16 | End: 2020-10-01

## 2020-03-18 PROBLEM — G89.3 CANCER RELATED PAIN: Status: ACTIVE | Noted: 2020-03-18

## 2020-03-18 PROBLEM — L29.9 PRURITUS: Status: ACTIVE | Noted: 2020-03-18

## 2020-03-18 PROBLEM — R63.0 DECREASE IN APPETITE: Status: ACTIVE | Noted: 2020-03-18

## 2020-03-18 RX ORDER — SODIUM CHLORIDE 0.9 % (FLUSH) 0.9 %
5 SYRINGE (ML) INJECTION PRN
Status: CANCELLED | OUTPATIENT
Start: 2020-03-19

## 2020-03-18 RX ORDER — LEVOTHYROXINE SODIUM 0.07 MG/1
TABLET ORAL
Qty: 90 TABLET | Refills: 0 | Status: SHIPPED | OUTPATIENT
Start: 2020-03-18 | End: 2020-04-16 | Stop reason: SDUPTHER

## 2020-03-18 RX ORDER — DIPHENHYDRAMINE HYDROCHLORIDE 50 MG/ML
50 INJECTION INTRAMUSCULAR; INTRAVENOUS PRN
Status: CANCELLED | OUTPATIENT
Start: 2020-03-19

## 2020-03-18 RX ORDER — METHYLPREDNISOLONE SODIUM SUCCINATE 125 MG/2ML
125 INJECTION, POWDER, LYOPHILIZED, FOR SOLUTION INTRAMUSCULAR; INTRAVENOUS PRN
Status: CANCELLED | OUTPATIENT
Start: 2020-03-19

## 2020-03-18 RX ORDER — EPINEPHRINE 1 MG/ML
0.3 INJECTION, SOLUTION, CONCENTRATE INTRAVENOUS PRN
Status: CANCELLED | OUTPATIENT
Start: 2020-03-19

## 2020-03-18 RX ORDER — SODIUM CHLORIDE 0.9 % (FLUSH) 0.9 %
10 SYRINGE (ML) INJECTION PRN
Status: CANCELLED | OUTPATIENT
Start: 2020-03-19

## 2020-03-18 RX ORDER — HEPARIN SODIUM (PORCINE) LOCK FLUSH IV SOLN 100 UNIT/ML 100 UNIT/ML
500 SOLUTION INTRAVENOUS PRN
Status: CANCELLED | OUTPATIENT
Start: 2020-03-19

## 2020-03-18 NOTE — PROGRESS NOTES
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 He is treated with monthly Nivolumab and Xgeva, with stable imaging on 1/16/2020. Erlin Lopez continues to tolerate treatment with mild, tolerable itching without rash. He denies cough or diarrhea. 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 Mild, chronic arthritis is unchanged. Discomfort is improved with with Norco as needed. Blood pressure, with a known history of hypertension, is stable at 120/68. Appetite is stable with Marinol 2.5 mg twice daily as needed. Erlin Lopez states he feels much better than he did 1 year ago. Erlin Lopez is accompanied by his wife, Xochilt Alvarez. TARGET METASTATIC MALIGNANT MELANOMA SITES:  1. Right upper extremity original primary site 06/05/14   2. Right axilla lymph node at presentation 6/5/2014 and 3 axillary nodes at recurrence 7/27/2018 with an SUV of 8.7   3. Too numerous to count liver metastases   4. Celiac lymph nodes x 2 with highest SUV of 3   5. Bony metastatic disease on PET scan in the right ilium (SUV of 5) and right acetabulum (SUV of 6.4)   6. Indeterminate HORACIO 3 mm subpleural nodule   7. Indeterminate thyroid nodules on chest CT    1st TUMOR HISTORY: Resected stage IIIA (pT3a pN1a M0) right upper extremity malignant melanoma, 06/05/14  Mr. Vitaly Singer was seen in initial oncology consultation on 08/05/14, referred by Dr. Budd Klinefelter, regarding a diagnosis of resected malignant melanoma of the right upper extremity.    Dr. Duke Fernandes resected a malignant melanoma from the right posterior arm, above the elbow, on 06/05/14. Pathology revealed a 2.25 mm thick non-ulcerated Pankaj's level IV malignant melanoma. There were 10 mitoses/ sq mm. There was no vascular or lymphatic invasion. A wide excision with a sentinel lymph node biopsy was performed by by Dr. Dustin Souza on 07/09/14. Pathology revealed that the right axillary sentinel lymph node was positive for metastatic malignancy by immunoperoxidase stain. The wide excision sample was without further local involvement. A right axillary lymph node dissection was performed by Dr. Dustin Souza on 07/21/14. No metastatic malignancy was noted in any of the 8 right axillary lymph nodes submitted. The HMB-45 immunoperoxidase stain was negative for metastatic malignant melanoma in any of these subsequently submitted lymph nodes. Completion of a metastatic workup on 08/06/14, including MRI of the brain, bone scan, CT scans of the chest, abdomen and pelvis were negative for evidence of metastatic disease. Adjuvant pegylated Interferon (Sylatron) 6 mcg/kg (500mcg) sub-q weekly x 8 to be followed then by 3 mcg/kg(250mcg) weekly x up to 5 years was discussed and planned. Adjuvant therapy was indeed initiated and delivered as discussed below in treatment summary. The last dose of the medication was delivered on 2/15/2015. He was unable to tolerate this medication even at reduced dosages because of poor tolerability, weight loss, anorexia, unable to sleep, anxiety, fatigue, et Peter Shark. He declined any further interferon, which is reasonable. A one-year followup CT scan of the chest and MRI of the brain on 6/11/2015 did not reveal any evidence of recurrent disease.  ----------------------------------PROGRESSION --------------------------------  Carissa Godinez presented to [facility] emergency department on 7/3/18 for a 3 month history of waxing and waning epigastric abdominal pain and new onset fever.   Additional symptoms reported include nodule is noted, likely granulomatous or postinfectious. Bone scan 7/24/18 was negative for evidence of osseous metastasis. Examination is remarkable for mid epigastric discomfort, 10 lbs weight loss and 1 inch right axillary lymph node. This was a previous site of positive sentinel lymph node biopsy and dissection associated with the resected, stage IIIa right upper extremity malignant melanoma in 2014. Suspect recurrent malignant melanoma. Brown Ruiz was referred to Dr. Graham Becker who performed an FNA of the right axillary lymph node 7/26/18. Pathology was consistent for metastatic malignant melanoma. 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 Findings were discussed with both Brown Ruiz and his wife by Dr. Toy Nelson at follow-up on 8/1/18 and reiterated on 8/7/18. Recommendation is for combination therapy with Opdivo 1 mg/kg and Yervoy 3 mg/kg every 3 weeks ×4 cycles, followed by Opdivo 240 mg every 2 weeks thereafter. Brown Ruiz had a left chest Mediport placed by Dr. Ruth Mullins 8/3/18. PET scan 8/6/18 showed the following:  3 right axillary lymph nodes, SUV up to 8.7   Hepatic metastasis, too numerous to count   2 celiac lymph nodes SUV 2.8 and 3   Right ilium, SUV 5. Right acetabulum SUV 6.4  Cycle #1 Opdivo/Yervoy and monthly Xgeva initiated 8/7/18. He was evaluated at [facility] 10/16/18 for LLQ abdominal discomfort with a history of recent diverticulitis. Abdominal/pelvic CT with contrast revealed a decreased size in the multiple liver nodules. Bilateral renal nodules were stable. An enlarged prostate with an exophytic mass arising from the posterior superior aspect of the prostate was present. He was treated for acute diverticulitis of the mid to distal sigmoid colon. Brown Ruiz was evaluated by Dr. Nicolas Reece on 11/27/18 regarding possible prostate mass and elevated PSA with recommendations for surveillance only.   Contrasted chest CT on 11/8/18 at [facility] showed no enlarged evidence of disease progression. 19 mm right thyroid lobe nodule and subcentimeter low-density liver lesions were stable. No abnormal bony uptake. Treatment continues with nivolumab. TREATMENT SUMMARY:  1. Dr. Mackenzie Gavin resected a malignant melanoma from the right posterior arm, above the elbow, on 06/05/14   2. Wide excision with a sentinel lymph node biopsy by Dr. Isaak Rodriguez on 07/09/14. 3. Adjuvant weekly Sylatron Initiated 09/02/14 at 6 mcg/kg (500mcg) sub-q weekly but was only able to receive 4 of 8 planned treatments of this. The last dose #4 was on 09/28/14   4. Adjuvant weekly Sylatron 3 mcg/kg (250mcg) initiated 10/12/2014. Last dose delivered on 2/15/2015, discontinued due to poor tolerability and unable to tolerate the medication. 5. Opdivo 1 mg/kg and Yervoy 3 mg/kg every 3 weeks ×4 doses. 8/7/18 - 10/25/18. 6. Opdivo 240 mg every 2 weeks initiated 11/15/18, discontinued after dose #2 on 12/20/18   7. Monthly Xgeva initiated 8/7/18.   8. Tafinlar 150 mg po BID & Mekinist 2 mg po qday, cycle #1 initiated 1/4/19.   9. Single agent Tafinlar 150 mg po BID initiated 3/28/19, discontinue by patient 4/4/19.   10. Opdivo 240 mg every 2 weeks resumed 4/25/19, change to 480 mg every 4 weeks on 9/5/2019      Past Medical History:    Past Medical History:   Diagnosis Date    Acid reflux     BPH (benign prostatic hyperplasia)     Cancer ([address]     Diverticulitis     Hard of hearing     Hypercholesteremia     Hypertension     Malignant melanoma of skin of upper limb, including shoulder ([address] dx 6/5/2014    to liver, stomach, bone, and right axilla     Past Surgical History:    Past Surgical History:   Procedure Laterality Date    CHOLECYSTECTOMY      TUNNELED CENTRAL VENOUS CATHETER W/ SUBCUTANEOUS PORT       Current Medications:    Current Outpatient Medications   Medication Sig Dispense Refill    dronabinol (MARINOL) 2.5 MG capsule Take 1 capsule by mouth 2 times daily (before meals) for 30 days.  61 itching. No blurring of vision, no double vision    Respiratory: Negative for cough, shortness of breath and wheezing. No hemoptysis   Cardiovascular: Negative for chest pain, palpitations and leg swelling. Gastrointestinal: Negative for abdominal pain, constipation, diarrhea, nausea and vomiting. Endocrine: Negative for cold intolerance, heat intolerance, polydipsia and polyuria. Genitourinary: Negative for dysuria, frequency, hematuria and urgency. Musculoskeletal: Positive for arthralgias and back pain (Chronic). Negative for joint swelling and myalgias. Generalized arthritis   Skin: Negative for pallor and rash. chronic itching, stable   Allergic/Immunologic: Negative for environmental allergies and immunocompromised state. Neurological: Negative for seizures, syncope and numbness. Hematological: Negative for adenopathy. Does not bruise/bleed easily. Psychiatric/Behavioral: Negative for agitation, behavioral problems, confusion and suicidal ideas. The patient is not nervous/anxious. Objective   Vitals:    03/19/20 0815   BP: 120/68   Pulse: 58   Temp: 97 °F (36.1 °C)   SpO2: 98%     PHYSICAL EXAM:  Physical Exam  Vitals signs reviewed. Constitutional:       General: He is not in acute distress. Appearance: He is well-developed and normal weight. He is not ill-appearing or diaphoretic. HENT:      Head: Normocephalic and atraumatic. Right Ear: External ear normal.      Left Ear: External ear normal.      Nose: Nose normal.      Mouth/Throat:      Mouth: Mucous membranes are moist.   Eyes:      General: No scleral icterus. Right eye: No discharge. Left eye: No discharge. Conjunctiva/sclera: Conjunctivae normal.   Neck:      Musculoskeletal: Neck supple. Trachea: No tracheal deviation. Cardiovascular:      Rate and Rhythm: Normal rate and regular rhythm. Heart sounds: Murmur present.    Pulmonary:      Effort: Pulmonary effort is normal. No respiratory distress. Breath sounds: Normal breath sounds. No wheezing or rales. Chest:      Chest wall: No tenderness. Abdominal:      General: Bowel sounds are normal. There is no distension. Palpations: Abdomen is soft. There is no mass. Tenderness: There is no abdominal tenderness. There is no guarding. Genitourinary:     Comments: Exam deferred  Musculoskeletal:         General: No tenderness or deformity. Comments: Normal ROM all four extremities   Lymphadenopathy:      Cervical: No cervical adenopathy. Right cervical: No superficial cervical adenopathy. Left cervical: No superficial cervical adenopathy. Upper Body:      Right upper body: No supraclavicular adenopathy. Left upper body: No supraclavicular adenopathy. Comments: No bulky palpable cervical, clavicular, axillary or inguinal adenopathies on the left or right. Skin:     General: Skin is warm and dry. Findings: No rash. Comments: Left anterior chest Mediport in place without complication. No rashes. Neurological:      Mental Status: He is alert and oriented to person, place, and time. Comments: follows commands, non-focal   Psychiatric:         Behavior: Behavior normal. Behavior is cooperative. Thought Content: Thought content normal.         Judgment: Judgment normal.      Comments: Alert and oriented to person, place and time.        Labs:  CMP 2/20/2020: unremarkable, calcium 9.0  TSH 2/20/2020: 3.53  CBC 03/19/20: WBC 8.06, Hgb 13.4 with MCV 90.9 and platelet count 473,110    ASSESSMENT:   Diagnoses and all orders for this visit:    Malignant melanoma of skin of right upper extremity, including shoulder ([address]  Liver metastasis ([address]  Bone metastases ([address]  Encounter for antineoplastic immunotherapy  There are no new skin lesions and no peripheral adenopathies upon exam.  CT scans of the chest, abdomen and pelvis and bone scan were stable without evidence of disease progression on 1/16/2020. Proceed with monthly Nivolumab and Xgeva today  However it has a routine scheduled follow-up with dermatology next week. Hypothyroidism due to medication    Synthroid 75 mcg daily    Pruritus  Continue Singulair, Atarax and Paxil for itching. Decrease in appetite  Wt Readings from Last 3 Encounters:   03/19/20 188 lb 6.4 oz (85.5 kg)   02/20/20 188 lb (85.3 kg)   01/23/20 189 lb (85.7 kg)   Stable with Marinol 2.5 mg po BID PRN. Rx Renewed. Cancer related pain/arthritis  Continue Norco PRN pain as prescribed. Report new or worsening pain. Rx provided. Orders Placed This Encounter   Procedures    Comprehensive Metabolic Panel    TSH without Reflex     RTC RN in 1 month for Nivolumab (Opdivo) and X-geva  Return in about 2 months (around 5/19/2020) for follow up with LIAM Mohamud for Rick Emma. Discussed plan of care with Dino Andrade and his wife, Tami Call. All questions answered.     LIAM Reese  9:48 AM  3/19/2020

## 2020-03-19 ENCOUNTER — HOSPITAL ENCOUNTER (OUTPATIENT)
Dept: INFUSION THERAPY | Age: 80
Discharge: HOME | End: 2020-03-19
Payer: MEDICARE

## 2020-03-19 ENCOUNTER — OFFICE VISIT (OUTPATIENT)
Dept: HEMATOLOGY | Age: 80
End: 2020-03-19
Payer: MEDICARE

## 2020-03-19 VITALS
OXYGEN SATURATION: 98 % | BODY MASS INDEX: 26.38 KG/M2 | HEIGHT: 71 IN | TEMPERATURE: 97 F | DIASTOLIC BLOOD PRESSURE: 68 MMHG | SYSTOLIC BLOOD PRESSURE: 120 MMHG | HEART RATE: 58 BPM | WEIGHT: 188.4 LBS

## 2020-03-19 DIAGNOSIS — C43.61 MALIGNANT MELANOMA OF SKIN OF RIGHT UPPER EXTREMITY, INCLUDING SHOULDER (HCC): ICD-10-CM

## 2020-03-19 DIAGNOSIS — Z51.12 ENCOUNTER FOR ANTINEOPLASTIC IMMUNOTHERAPY: ICD-10-CM

## 2020-03-19 DIAGNOSIS — C43.9 MALIGNANT MELANOMA, UNSPECIFIED SITE (HCC): Primary | ICD-10-CM

## 2020-03-19 DIAGNOSIS — Z79.899 HISTORY OF ONGOING TREATMENT WITH HIGH-RISK MEDICATION: ICD-10-CM

## 2020-03-19 DIAGNOSIS — C79.51 BONE METASTASES (HCC): ICD-10-CM

## 2020-03-19 PROCEDURE — 1036F TOBACCO NON-USER: CPT | Performed by: NURSE PRACTITIONER

## 2020-03-19 PROCEDURE — 4040F PNEUMOC VAC/ADMIN/RCVD: CPT | Performed by: NURSE PRACTITIONER

## 2020-03-19 PROCEDURE — 99213 OFFICE O/P EST LOW 20 MIN: CPT | Performed by: NURSE PRACTITIONER

## 2020-03-19 PROCEDURE — 84443 ASSAY THYROID STIM HORMONE: CPT

## 2020-03-19 PROCEDURE — 6360000002 HC RX W HCPCS: Performed by: INTERNAL MEDICINE

## 2020-03-19 PROCEDURE — 85025 COMPLETE CBC W/AUTO DIFF WBC: CPT

## 2020-03-19 PROCEDURE — 96372 THER/PROPH/DIAG INJ SC/IM: CPT

## 2020-03-19 PROCEDURE — G8417 CALC BMI ABV UP PARAM F/U: HCPCS | Performed by: NURSE PRACTITIONER

## 2020-03-19 PROCEDURE — 2580000003 HC RX 258: Performed by: INTERNAL MEDICINE

## 2020-03-19 PROCEDURE — G8427 DOCREV CUR MEDS BY ELIG CLIN: HCPCS | Performed by: NURSE PRACTITIONER

## 2020-03-19 PROCEDURE — 96413 CHEMO IV INFUSION 1 HR: CPT

## 2020-03-19 PROCEDURE — 36415 COLL VENOUS BLD VENIPUNCTURE: CPT

## 2020-03-19 PROCEDURE — G8484 FLU IMMUNIZE NO ADMIN: HCPCS | Performed by: NURSE PRACTITIONER

## 2020-03-19 PROCEDURE — 80053 COMPREHEN METABOLIC PANEL: CPT

## 2020-03-19 PROCEDURE — 1123F ACP DISCUSS/DSCN MKR DOCD: CPT | Performed by: NURSE PRACTITIONER

## 2020-03-19 RX ORDER — DRONABINOL 2.5 MG/1
2.5 CAPSULE ORAL
Qty: 60 CAPSULE | Refills: 0 | Status: SHIPPED | OUTPATIENT
Start: 2020-03-19 | End: 2020-04-15 | Stop reason: SDUPTHER

## 2020-03-19 RX ORDER — HEPARIN SODIUM (PORCINE) LOCK FLUSH IV SOLN 100 UNIT/ML 100 UNIT/ML
500 SOLUTION INTRAVENOUS PRN
Status: DISCONTINUED | OUTPATIENT
Start: 2020-03-19 | End: 2020-03-21 | Stop reason: HOSPADM

## 2020-03-19 RX ORDER — SODIUM CHLORIDE 0.9 % (FLUSH) 0.9 %
10 SYRINGE (ML) INJECTION PRN
Status: DISCONTINUED | OUTPATIENT
Start: 2020-03-19 | End: 2020-03-20 | Stop reason: HOSPADM

## 2020-03-19 RX ORDER — HYDROCODONE BITARTRATE AND ACETAMINOPHEN 10; 325 MG/1; MG/1
1 TABLET ORAL EVERY 6 HOURS PRN
Qty: 120 TABLET | Refills: 0 | Status: SHIPPED | OUTPATIENT
Start: 2020-03-19 | End: 2020-04-15 | Stop reason: SDUPTHER

## 2020-03-19 RX ADMIN — SODIUM CHLORIDE, PRESERVATIVE FREE 10 ML: 5 INJECTION INTRAVENOUS at 09:45

## 2020-03-19 RX ADMIN — DENOSUMAB 120 MG: 120 INJECTION SUBCUTANEOUS at 09:45

## 2020-03-19 RX ADMIN — SODIUM CHLORIDE 480 MG: 9 INJECTION, SOLUTION INTRAVENOUS at 09:08

## 2020-03-19 RX ADMIN — HEPARIN 500 UNITS: 100 SYRINGE at 09:45

## 2020-03-19 ASSESSMENT — ENCOUNTER SYMPTOMS
EYE ITCHING: 0
EYE REDNESS: 0
WHEEZING: 0
NAUSEA: 0
PHOTOPHOBIA: 0
SORE THROAT: 0
VOMITING: 0
COUGH: 0
SHORTNESS OF BREATH: 0
CONSTIPATION: 0
BACK PAIN: 1
EYE DISCHARGE: 0
DIARRHEA: 0
TROUBLE SWALLOWING: 0
ABDOMINAL PAIN: 0

## 2020-04-15 RX ORDER — HEPARIN SODIUM (PORCINE) LOCK FLUSH IV SOLN 100 UNIT/ML 100 UNIT/ML
500 SOLUTION INTRAVENOUS PRN
Status: CANCELLED | OUTPATIENT
Start: 2020-04-16

## 2020-04-15 RX ORDER — EPINEPHRINE 1 MG/ML
0.3 INJECTION, SOLUTION, CONCENTRATE INTRAVENOUS PRN
Status: CANCELLED | OUTPATIENT
Start: 2020-04-16

## 2020-04-15 RX ORDER — METHYLPREDNISOLONE SODIUM SUCCINATE 125 MG/2ML
125 INJECTION, POWDER, LYOPHILIZED, FOR SOLUTION INTRAMUSCULAR; INTRAVENOUS PRN
Status: CANCELLED | OUTPATIENT
Start: 2020-04-16

## 2020-04-15 RX ORDER — SODIUM CHLORIDE 0.9 % (FLUSH) 0.9 %
5 SYRINGE (ML) INJECTION PRN
Status: CANCELLED | OUTPATIENT
Start: 2020-04-16

## 2020-04-15 RX ORDER — DIPHENHYDRAMINE HYDROCHLORIDE 50 MG/ML
50 INJECTION INTRAMUSCULAR; INTRAVENOUS PRN
Status: CANCELLED | OUTPATIENT
Start: 2020-04-16

## 2020-04-15 RX ORDER — SODIUM CHLORIDE 0.9 % (FLUSH) 0.9 %
10 SYRINGE (ML) INJECTION PRN
Status: CANCELLED | OUTPATIENT
Start: 2020-04-16

## 2020-04-16 ENCOUNTER — HOSPITAL ENCOUNTER (OUTPATIENT)
Dept: INFUSION THERAPY | Age: 80
Discharge: HOME | End: 2020-04-16
Payer: MEDICARE

## 2020-04-16 VITALS
SYSTOLIC BLOOD PRESSURE: 138 MMHG | OXYGEN SATURATION: 96 % | DIASTOLIC BLOOD PRESSURE: 68 MMHG | HEART RATE: 66 BPM | HEIGHT: 71 IN | BODY MASS INDEX: 26.1 KG/M2 | TEMPERATURE: 97.7 F | WEIGHT: 186.4 LBS

## 2020-04-16 DIAGNOSIS — G89.3 CANCER RELATED PAIN: ICD-10-CM

## 2020-04-16 DIAGNOSIS — C79.51 BONE METASTASES (HCC): ICD-10-CM

## 2020-04-16 DIAGNOSIS — R63.0 LOSS OF APPETITE: ICD-10-CM

## 2020-04-16 DIAGNOSIS — C43.61 MALIGNANT MELANOMA OF SKIN OF RIGHT UPPER EXTREMITY, INCLUDING SHOULDER (HCC): ICD-10-CM

## 2020-04-16 DIAGNOSIS — C43.9 MALIGNANT MELANOMA, UNSPECIFIED SITE (HCC): Primary | ICD-10-CM

## 2020-04-16 PROCEDURE — 2580000003 HC RX 258: Performed by: INTERNAL MEDICINE

## 2020-04-16 PROCEDURE — 96372 THER/PROPH/DIAG INJ SC/IM: CPT

## 2020-04-16 PROCEDURE — 85025 COMPLETE CBC W/AUTO DIFF WBC: CPT

## 2020-04-16 PROCEDURE — 6360000002 HC RX W HCPCS: Performed by: INTERNAL MEDICINE

## 2020-04-16 PROCEDURE — 96413 CHEMO IV INFUSION 1 HR: CPT

## 2020-04-16 RX ORDER — DRONABINOL 2.5 MG/1
2.5 CAPSULE ORAL
Qty: 60 CAPSULE | Refills: 0 | Status: SHIPPED | OUTPATIENT
Start: 2020-04-16 | End: 2020-05-13 | Stop reason: SDUPTHER

## 2020-04-16 RX ORDER — SODIUM CHLORIDE 0.9 % (FLUSH) 0.9 %
10 SYRINGE (ML) INJECTION PRN
Status: DISCONTINUED | OUTPATIENT
Start: 2020-04-16 | End: 2020-04-17 | Stop reason: HOSPADM

## 2020-04-16 RX ORDER — LEVOTHYROXINE SODIUM 0.07 MG/1
TABLET ORAL
Qty: 90 TABLET | Refills: 0 | Status: SHIPPED | OUTPATIENT
Start: 2020-04-16 | End: 2020-07-06

## 2020-04-16 RX ORDER — HEPARIN SODIUM (PORCINE) LOCK FLUSH IV SOLN 100 UNIT/ML 100 UNIT/ML
500 SOLUTION INTRAVENOUS PRN
Status: DISCONTINUED | OUTPATIENT
Start: 2020-04-16 | End: 2020-04-18 | Stop reason: HOSPADM

## 2020-04-16 RX ORDER — HYDROCODONE BITARTRATE AND ACETAMINOPHEN 10; 325 MG/1; MG/1
1 TABLET ORAL EVERY 6 HOURS PRN
Qty: 120 TABLET | Refills: 0 | Status: SHIPPED | OUTPATIENT
Start: 2020-04-16 | End: 2020-05-13 | Stop reason: SDUPTHER

## 2020-04-16 RX ADMIN — DENOSUMAB 120 MG: 120 INJECTION SUBCUTANEOUS at 09:36

## 2020-04-16 RX ADMIN — HEPARIN 500 UNITS: 100 SYRINGE at 09:35

## 2020-04-16 RX ADMIN — SODIUM CHLORIDE, PRESERVATIVE FREE 10 ML: 5 INJECTION INTRAVENOUS at 09:35

## 2020-04-16 RX ADMIN — SODIUM CHLORIDE 480 MG: 900 INJECTION, SOLUTION INTRAVENOUS at 09:03

## 2020-05-13 RX ORDER — DRONABINOL 2.5 MG/1
2.5 CAPSULE ORAL
Qty: 60 CAPSULE | Refills: 0 | Status: SHIPPED | OUTPATIENT
Start: 2020-05-13 | End: 2020-06-11 | Stop reason: SDUPTHER

## 2020-05-13 RX ORDER — HYDROCODONE BITARTRATE AND ACETAMINOPHEN 10; 325 MG/1; MG/1
1 TABLET ORAL EVERY 6 HOURS PRN
Qty: 120 TABLET | Refills: 0 | Status: SHIPPED | OUTPATIENT
Start: 2020-05-13 | End: 2020-06-11 | Stop reason: SDUPTHER

## 2020-05-13 NOTE — PROGRESS NOTES
right thyroid lobe nodule and subcentimeter low-density liver lesions were stable. No abnormal bony uptake. Treatment continues with nivolumab. TREATMENT SUMMARY:  1. Dr. Daniel Jimenez resected a malignant melanoma from the right posterior arm, above the elbow, on 06/05/14   2. Wide excision with a sentinel lymph node biopsy by Dr. Neema Hidalgo on 07/09/14. 3. Adjuvant weekly Sylatron Initiated 09/02/14 at 6 mcg/kg (500mcg) sub-q weekly but was only able to receive 4 of 8 planned treatments of this. The last dose #4 was on 09/28/14   4. Adjuvant weekly Sylatron 3 mcg/kg (250mcg) initiated 10/12/2014. Last dose delivered on 2/15/2015, discontinued due to poor tolerability and unable to tolerate the medication. 5. Opdivo 1 mg/kg and Yervoy 3 mg/kg every 3 weeks ×4 doses. 8/7/18 - 10/25/18. 6. Opdivo 240 mg every 2 weeks initiated 11/15/18, discontinued after dose #2 on 12/20/18   7. Monthly Xgeva initiated 8/7/18.   8. Tafinlar 150 mg po BID & Mekinist 2 mg po qday, cycle #1 initiated 1/4/19.   9. Single agent Tafinlar 150 mg po BID initiated 3/28/19, discontinue by patient 4/4/19.   10. Opdivo 240 mg every 2 weeks resumed 4/25/19, change to 480 mg every 4 weeks on 9/5/2019      Past Medical History:    Past Medical History:   Diagnosis Date    Acid reflux     BPH (benign prostatic hyperplasia)     Cancer ([address]     Diverticulitis     Hard of hearing     Hypercholesteremia     Hypertension     Malignant melanoma of skin of upper limb, including shoulder ([address] dx 6/5/2014    to liver, stomach, bone, and right axilla     Past Surgical History:    Past Surgical History:   Procedure Laterality Date    CHOLECYSTECTOMY      TUNNELED CENTRAL VENOUS CATHETER W/ SUBCUTANEOUS PORT       Current Medications:    Current Outpatient Medications   Medication Sig Dispense Refill    HYDROcodone-acetaminophen (NORCO)  MG per tablet Take 1 tablet by mouth every 6 hours as needed for Pain for up to 30 days.  Intended supply: 30 days 120 tablet 0    dronabinol (MARINOL) 2.5 MG capsule Take 1 capsule by mouth 2 times daily (before meals) for 30 days. 60 capsule 0    levothyroxine (SYNTHROID) 75 MCG tablet TAKE 1 TABLET BY MOUTH EVERY DAY 90 tablet 0    montelukast (SINGULAIR) 10 MG tablet Take 1 tablet by mouth nightly 90 tablet 1    dicyclomine (BENTYL) 20 MG tablet TAKE 1/2 TABLET BY MOUTH 4 TIMES A DAY AS NEEDED 60 tablet 1    triamcinolone (KENALOG) 0.025 % cream Apply topically 2 times daily. 30 g 2    amLODIPine (NORVASC) 5 MG tablet Take 5 mg by mouth      finasteride (PROSCAR) 5 MG tablet Take 5 mg by mouth      lisinopril (PRINIVIL;ZESTRIL) 20 MG tablet Take 20 mg by mouth      lovastatin (MEVACOR) 40 MG tablet Take 40 mg by mouth       No current facility-administered medications for this visit. Facility-Administered Medications Ordered in Other Visits   Medication Dose Route Frequency Provider Last Rate Last Dose    nivolumab 480 mg in sodium chloride 0.9 % 100 mL chemo IVPB  480 mg Intravenous Once LIAM Ward        heparin flush 100 UNIT/ML injection 500 Units  500 Units Intracatheter PRN Karen LIAM Castellanos        sodium chloride flush 0.9 % injection 10 mL  10 mL Intravenous PRN LIAM Ward        denosumab (XGEVA) SC injection 120 mg  120 mg Subcutaneous Once LIAM Alcala            Allergies: No Known Allergies  Social History:    Social History     Tobacco Use    Smoking status: Former Smoker    Smokeless tobacco: Never Used   Substance Use Topics    Alcohol use: No    Drug use: No     Family History:   Family History   Problem Relation Age of Onset    Heart Attack Brother          age 78 of [place]       Subjective   REVIEW OF SYSTEMS:   Review of Systems   Constitutional: Negative for fatigue and fever. No night sweats   HENT: Negative for dental problem, hearing loss, mouth sores, nosebleeds, sore throat and trouble swallowing.     Eyes: Negative for 5. Hypothyroidism due to medication    6. Decrease in appetite    7. Renal mass, left    8. Pruritus      Malignant melanoma of skin of right upper extremity, including shoulder (HCC)  Liver metastasis (HCC)  Bone metastases ([address]  Encounter for antineoplastic immunotherapy  There are no new skin lesions and no peripheral adenopathies, specifically to the right axilla, upon exam.  CT scans of the chest, abdomen and pelvis and bone scan were stable without evidence of disease progression on 1/16/2020. Tolerating treatment very well. Proceed with monthly Nivolumab and Xgeva today    Hypothyroidism due to medication  TSH was 3.38 on 3/19/2020  Continues Synthroid 75 mcg daily  Repeat TSH today    Pruritus  Continue Singulair, Atarax and Paxil for itching. It is of note that Raquel Marin had itching when off immunotherapy as well. Decrease in appetite  Stable with Marinol 2.5 mg po BID PRN. Cancer related pain/arthritis  Continue Norco PRN pain as prescribed. Report new or worsening pain. Orders Placed This Encounter   Procedures    CT Chest W Contrast    CT ABDOMEN PELVIS W IV CONTRAST Additional Contrast? Oral    Comprehensive Metabolic Panel    TSH without Reflex     Return in about 1 month (around 6/14/2020) for follow up with LIAM Grover for Penelope Olivas, review CT scans.       LIAM De La Cruz  9:14 AM  5/14/2020

## 2020-05-14 ENCOUNTER — TRANSCRIBE ORDERS (OUTPATIENT)
Dept: ADMINISTRATIVE | Facility: HOSPITAL | Age: 80
End: 2020-05-14

## 2020-05-14 ENCOUNTER — OFFICE VISIT (OUTPATIENT)
Dept: HEMATOLOGY | Age: 80
End: 2020-05-14
Payer: MEDICARE

## 2020-05-14 ENCOUNTER — HOSPITAL ENCOUNTER (OUTPATIENT)
Dept: INFUSION THERAPY | Age: 80
Discharge: HOME | End: 2020-05-14
Payer: MEDICARE

## 2020-05-14 VITALS
BODY MASS INDEX: 26.1 KG/M2 | TEMPERATURE: 98.1 F | OXYGEN SATURATION: 98 % | HEIGHT: 71 IN | HEART RATE: 68 BPM | SYSTOLIC BLOOD PRESSURE: 140 MMHG | WEIGHT: 186.4 LBS | DIASTOLIC BLOOD PRESSURE: 68 MMHG

## 2020-05-14 DIAGNOSIS — E03.2 HYPOTHYROIDISM DUE TO MEDICATION: Primary | ICD-10-CM

## 2020-05-14 DIAGNOSIS — C43.9 MALIGNANT MELANOMA, UNSPECIFIED SITE (HCC): ICD-10-CM

## 2020-05-14 DIAGNOSIS — C78.7 SECONDARY MALIGNANT NEOPLASM OF LIVER (HCC): ICD-10-CM

## 2020-05-14 DIAGNOSIS — C79.51 SECONDARY MALIGNANT NEOPLASM OF BONE AND BONE MARROW (HCC): ICD-10-CM

## 2020-05-14 DIAGNOSIS — C43.61 MALIGNANT MELANOMA OF SKIN OF RIGHT UPPER EXTREMITY, INCLUDING SHOULDER (HCC): ICD-10-CM

## 2020-05-14 DIAGNOSIS — C79.52 SECONDARY MALIGNANT NEOPLASM OF BONE AND BONE MARROW (HCC): ICD-10-CM

## 2020-05-14 DIAGNOSIS — C79.51 BONE METASTASES (HCC): ICD-10-CM

## 2020-05-14 DIAGNOSIS — C43.61 MALIGNANT MELANOMA OF RIGHT UPPER EXTREMITY INCLUDING SHOULDER (HCC): Primary | ICD-10-CM

## 2020-05-14 LAB
ALBUMIN SERPL-MCNC: 3.8 G/DL (ref 3.5–5.2)
ALP BLD-CCNC: 45 U/L (ref 40–130)
ALT SERPL-CCNC: 16 U/L (ref 21–72)
ANION GAP SERPL CALCULATED.3IONS-SCNC: 10 MMOL/L (ref 7–19)
AST SERPL-CCNC: 28 U/L (ref 17–59)
BASOPHILS ABSOLUTE: 0.04 K/UL (ref 0.01–0.08)
BASOPHILS RELATIVE PERCENT: 0.5 % (ref 0.1–1.2)
BILIRUB SERPL-MCNC: 1 MG/DL (ref 0.2–1.3)
BUN BLDV-MCNC: 18 MG/DL (ref 9–20)
CALCIUM SERPL-MCNC: 8.8 MG/DL (ref 8.4–10.2)
CHLORIDE BLD-SCNC: 104 MMOL/L (ref 98–111)
CO2: 24 MMOL/L (ref 22–29)
CREAT SERPL-MCNC: 1.1 MG/DL (ref 0.6–1.2)
EOSINOPHILS ABSOLUTE: 0.48 K/UL (ref 0.04–0.54)
EOSINOPHILS RELATIVE PERCENT: 5.9 % (ref 0.7–7)
GFR NON-AFRICAN AMERICAN: >60
GLOBULIN: 2.6 G/DL
GLUCOSE BLD-MCNC: 92 MG/DL (ref 74–106)
HCT VFR BLD CALC: 40.7 % (ref 40.1–51)
HEMOGLOBIN: 14 G/DL (ref 13.7–17.5)
LYMPHOCYTES ABSOLUTE: 2.79 K/UL (ref 1.18–3.74)
LYMPHOCYTES RELATIVE PERCENT: 34.1 % (ref 19.3–53.1)
MCH RBC QN AUTO: 31.1 PG (ref 25.7–32.2)
MCHC RBC AUTO-ENTMCNC: 34.4 G/DL (ref 32.3–36.5)
MCV RBC AUTO: 90.4 FL (ref 79–92.2)
MONOCYTES ABSOLUTE: 0.81 K/UL (ref 0.24–0.82)
MONOCYTES RELATIVE PERCENT: 9.9 % (ref 4.7–12.5)
NEUTROPHILS ABSOLUTE: 4.06 K/UL (ref 1.56–6.13)
NEUTROPHILS RELATIVE PERCENT: 49.6 % (ref 34–71.1)
PDW BLD-RTO: 12.3 % (ref 11.6–14.4)
PLATELET # BLD: 224 K/UL (ref 163–337)
PMV BLD AUTO: 9.5 FL (ref 7.4–10.4)
POTASSIUM SERPL-SCNC: 4.7 MMOL/L (ref 3.5–5.1)
RBC # BLD: 4.5 M/UL (ref 4.63–6.08)
SODIUM BLD-SCNC: 138 MMOL/L (ref 137–145)
TOTAL PROTEIN: 6.4 G/DL (ref 6.3–8.2)
TSH SERPL DL<=0.05 MIU/L-ACNC: 3.75 UIU/ML (ref 0.47–4.68)
WBC # BLD: 8.18 K/UL (ref 4.23–9.07)

## 2020-05-14 PROCEDURE — 85025 COMPLETE CBC W/AUTO DIFF WBC: CPT

## 2020-05-14 PROCEDURE — 6360000002 HC RX W HCPCS: Performed by: NURSE PRACTITIONER

## 2020-05-14 PROCEDURE — G8417 CALC BMI ABV UP PARAM F/U: HCPCS | Performed by: NURSE PRACTITIONER

## 2020-05-14 PROCEDURE — 96372 THER/PROPH/DIAG INJ SC/IM: CPT

## 2020-05-14 PROCEDURE — 80053 COMPREHEN METABOLIC PANEL: CPT

## 2020-05-14 PROCEDURE — 2580000003 HC RX 258: Performed by: NURSE PRACTITIONER

## 2020-05-14 PROCEDURE — 99213 OFFICE O/P EST LOW 20 MIN: CPT | Performed by: NURSE PRACTITIONER

## 2020-05-14 PROCEDURE — 4040F PNEUMOC VAC/ADMIN/RCVD: CPT | Performed by: NURSE PRACTITIONER

## 2020-05-14 PROCEDURE — 1123F ACP DISCUSS/DSCN MKR DOCD: CPT | Performed by: NURSE PRACTITIONER

## 2020-05-14 PROCEDURE — 84443 ASSAY THYROID STIM HORMONE: CPT

## 2020-05-14 PROCEDURE — 1036F TOBACCO NON-USER: CPT | Performed by: NURSE PRACTITIONER

## 2020-05-14 PROCEDURE — 96413 CHEMO IV INFUSION 1 HR: CPT

## 2020-05-14 PROCEDURE — G8427 DOCREV CUR MEDS BY ELIG CLIN: HCPCS | Performed by: NURSE PRACTITIONER

## 2020-05-14 PROCEDURE — 36415 COLL VENOUS BLD VENIPUNCTURE: CPT

## 2020-05-14 RX ORDER — HEPARIN SODIUM (PORCINE) LOCK FLUSH IV SOLN 100 UNIT/ML 100 UNIT/ML
500 SOLUTION INTRAVENOUS PRN
Status: CANCELLED | OUTPATIENT
Start: 2020-05-14

## 2020-05-14 RX ORDER — SODIUM CHLORIDE 0.9 % (FLUSH) 0.9 %
10 SYRINGE (ML) INJECTION PRN
Status: CANCELLED | OUTPATIENT
Start: 2020-05-14

## 2020-05-14 RX ORDER — METHYLPREDNISOLONE SODIUM SUCCINATE 125 MG/2ML
125 INJECTION, POWDER, LYOPHILIZED, FOR SOLUTION INTRAMUSCULAR; INTRAVENOUS PRN
Status: CANCELLED | OUTPATIENT
Start: 2020-05-14

## 2020-05-14 RX ORDER — SODIUM CHLORIDE 0.9 % (FLUSH) 0.9 %
5 SYRINGE (ML) INJECTION PRN
Status: CANCELLED | OUTPATIENT
Start: 2020-05-14

## 2020-05-14 RX ORDER — HEPARIN SODIUM (PORCINE) LOCK FLUSH IV SOLN 100 UNIT/ML 100 UNIT/ML
500 SOLUTION INTRAVENOUS PRN
Status: DISCONTINUED | OUTPATIENT
Start: 2020-05-14 | End: 2020-05-16 | Stop reason: HOSPADM

## 2020-05-14 RX ORDER — DIPHENHYDRAMINE HYDROCHLORIDE 50 MG/ML
50 INJECTION INTRAMUSCULAR; INTRAVENOUS PRN
Status: CANCELLED | OUTPATIENT
Start: 2020-05-14

## 2020-05-14 RX ORDER — EPINEPHRINE 1 MG/ML
0.3 INJECTION, SOLUTION, CONCENTRATE INTRAVENOUS PRN
Status: CANCELLED | OUTPATIENT
Start: 2020-05-14

## 2020-05-14 RX ORDER — SODIUM CHLORIDE 0.9 % (FLUSH) 0.9 %
10 SYRINGE (ML) INJECTION PRN
Status: DISCONTINUED | OUTPATIENT
Start: 2020-05-14 | End: 2020-05-15 | Stop reason: HOSPADM

## 2020-05-14 RX ADMIN — SODIUM CHLORIDE 480 MG: 900 INJECTION, SOLUTION INTRAVENOUS at 09:42

## 2020-05-14 RX ADMIN — HEPARIN 500 UNITS: 100 SYRINGE at 10:13

## 2020-05-14 RX ADMIN — DENOSUMAB 120 MG: 120 INJECTION SUBCUTANEOUS at 10:13

## 2020-05-14 RX ADMIN — SODIUM CHLORIDE, PRESERVATIVE FREE 10 ML: 5 INJECTION INTRAVENOUS at 10:14

## 2020-05-14 ASSESSMENT — ENCOUNTER SYMPTOMS
DIARRHEA: 0
EYE DISCHARGE: 0
COUGH: 0
EYE REDNESS: 0
ABDOMINAL PAIN: 0
EYE ITCHING: 0
VOMITING: 0
TROUBLE SWALLOWING: 0
WHEEZING: 0
SHORTNESS OF BREATH: 0
PHOTOPHOBIA: 0
NAUSEA: 0
BACK PAIN: 1
CONSTIPATION: 0
SORE THROAT: 0

## 2020-05-19 RX ORDER — HYDROXYZINE HYDROCHLORIDE 25 MG/1
25 TABLET, FILM COATED ORAL EVERY 8 HOURS PRN
Qty: 90 TABLET | Refills: 1 | Status: SHIPPED | OUTPATIENT
Start: 2020-05-19 | End: 2020-08-17

## 2020-06-04 ENCOUNTER — HOSPITAL ENCOUNTER (OUTPATIENT)
Dept: CT IMAGING | Facility: HOSPITAL | Age: 80
Discharge: HOME | End: 2020-06-04
Admitting: NURSE PRACTITIONER

## 2020-06-04 DIAGNOSIS — C79.52 SECONDARY MALIGNANT NEOPLASM OF BONE AND BONE MARROW (HCC): ICD-10-CM

## 2020-06-04 DIAGNOSIS — C79.51 SECONDARY MALIGNANT NEOPLASM OF BONE AND BONE MARROW (HCC): ICD-10-CM

## 2020-06-04 DIAGNOSIS — C78.7 SECONDARY MALIGNANT NEOPLASM OF LIVER (HCC): ICD-10-CM

## 2020-06-04 DIAGNOSIS — C43.61 MALIGNANT MELANOMA OF RIGHT UPPER EXTREMITY INCLUDING SHOULDER (HCC): ICD-10-CM

## 2020-06-04 LAB — CREAT BLDA-MCNC: 1.2 MG/DL (ref 0.6–1.3)

## 2020-06-04 PROCEDURE — 74177 CT ABD & PELVIS W/CONTRAST: CPT

## 2020-06-04 PROCEDURE — 25010000002 IOPAMIDOL 61 % SOLUTION: Performed by: NURSE PRACTITIONER

## 2020-06-04 PROCEDURE — 82565 ASSAY OF CREATININE: CPT

## 2020-06-04 PROCEDURE — 71260 CT THORAX DX C+: CPT

## 2020-06-04 RX ADMIN — IOPAMIDOL 50 ML: 612 INJECTION, SOLUTION INTRAVENOUS at 07:50

## 2020-06-04 RX ADMIN — IOPAMIDOL 100 ML: 612 INJECTION, SOLUTION INTRAVENOUS at 07:50

## 2020-06-10 RX ORDER — DIPHENHYDRAMINE HYDROCHLORIDE 50 MG/ML
50 INJECTION INTRAMUSCULAR; INTRAVENOUS PRN
Status: CANCELLED | OUTPATIENT
Start: 2020-06-11

## 2020-06-10 RX ORDER — METHYLPREDNISOLONE SODIUM SUCCINATE 125 MG/2ML
125 INJECTION, POWDER, LYOPHILIZED, FOR SOLUTION INTRAMUSCULAR; INTRAVENOUS PRN
Status: CANCELLED | OUTPATIENT
Start: 2020-06-11

## 2020-06-10 RX ORDER — SODIUM CHLORIDE 0.9 % (FLUSH) 0.9 %
10 SYRINGE (ML) INJECTION PRN
Status: CANCELLED | OUTPATIENT
Start: 2020-06-11

## 2020-06-10 RX ORDER — HEPARIN SODIUM (PORCINE) LOCK FLUSH IV SOLN 100 UNIT/ML 100 UNIT/ML
500 SOLUTION INTRAVENOUS PRN
Status: CANCELLED | OUTPATIENT
Start: 2020-06-11

## 2020-06-10 RX ORDER — SODIUM CHLORIDE 0.9 % (FLUSH) 0.9 %
5 SYRINGE (ML) INJECTION PRN
Status: CANCELLED | OUTPATIENT
Start: 2020-06-11

## 2020-06-10 RX ORDER — EPINEPHRINE 1 MG/ML
0.3 INJECTION, SOLUTION, CONCENTRATE INTRAVENOUS PRN
Status: CANCELLED | OUTPATIENT
Start: 2020-06-11

## 2020-06-11 ENCOUNTER — OFFICE VISIT (OUTPATIENT)
Dept: HEMATOLOGY | Age: 80
End: 2020-06-11
Payer: MEDICARE

## 2020-06-11 ENCOUNTER — HOSPITAL ENCOUNTER (OUTPATIENT)
Dept: INFUSION THERAPY | Age: 80
Discharge: HOME | End: 2020-06-11
Payer: MEDICARE

## 2020-06-11 VITALS
HEIGHT: 71 IN | DIASTOLIC BLOOD PRESSURE: 66 MMHG | SYSTOLIC BLOOD PRESSURE: 128 MMHG | WEIGHT: 188 LBS | BODY MASS INDEX: 26.32 KG/M2 | HEART RATE: 60 BPM | TEMPERATURE: 97.5 F | OXYGEN SATURATION: 95 %

## 2020-06-11 DIAGNOSIS — C43.9 MALIGNANT MELANOMA, UNSPECIFIED SITE (HCC): ICD-10-CM

## 2020-06-11 DIAGNOSIS — C79.51 BONE METASTASES (HCC): ICD-10-CM

## 2020-06-11 DIAGNOSIS — C43.61 MALIGNANT MELANOMA OF SKIN OF RIGHT UPPER EXTREMITY, INCLUDING SHOULDER (HCC): Primary | ICD-10-CM

## 2020-06-11 DIAGNOSIS — R53.83 FATIGUE DUE TO TREATMENT: ICD-10-CM

## 2020-06-11 LAB
ALBUMIN SERPL-MCNC: 4.3 G/DL (ref 3.5–5.2)
ALP BLD-CCNC: 53 U/L (ref 40–130)
ALT SERPL-CCNC: 17 U/L (ref 21–72)
ANION GAP SERPL CALCULATED.3IONS-SCNC: 5 MMOL/L (ref 7–19)
AST SERPL-CCNC: 27 U/L (ref 17–59)
BASOPHILS ABSOLUTE: 0.02 K/UL (ref 0.01–0.08)
BASOPHILS RELATIVE PERCENT: 0.3 % (ref 0.1–1.2)
BILIRUB SERPL-MCNC: 1.4 MG/DL (ref 0.2–1.3)
BUN BLDV-MCNC: 19 MG/DL (ref 9–20)
CALCIUM SERPL-MCNC: 8.8 MG/DL (ref 8.4–10.2)
CHLORIDE BLD-SCNC: 101 MMOL/L (ref 98–111)
CO2: 30 MMOL/L (ref 22–29)
CREAT SERPL-MCNC: 1.1 MG/DL (ref 0.6–1.2)
EOSINOPHILS ABSOLUTE: 0.3 K/UL (ref 0.04–0.54)
EOSINOPHILS RELATIVE PERCENT: 3.8 % (ref 0.7–7)
GFR NON-AFRICAN AMERICAN: >60
GLOBULIN: 3.1 G/DL
GLUCOSE BLD-MCNC: 92 MG/DL (ref 74–106)
HCT VFR BLD CALC: 42.1 % (ref 40.1–51)
HEMOGLOBIN: 14.4 G/DL (ref 13.7–17.5)
LYMPHOCYTES ABSOLUTE: 2.32 K/UL (ref 1.18–3.74)
LYMPHOCYTES RELATIVE PERCENT: 29.5 % (ref 19.3–53.1)
MCH RBC QN AUTO: 31 PG (ref 25.7–32.2)
MCHC RBC AUTO-ENTMCNC: 34.2 G/DL (ref 32.3–36.5)
MCV RBC AUTO: 90.5 FL (ref 79–92.2)
MONOCYTES ABSOLUTE: 0.77 K/UL (ref 0.24–0.82)
MONOCYTES RELATIVE PERCENT: 9.8 % (ref 4.7–12.5)
NEUTROPHILS ABSOLUTE: 4.45 K/UL (ref 1.56–6.13)
NEUTROPHILS RELATIVE PERCENT: 56.6 % (ref 34–71.1)
PDW BLD-RTO: 12.5 % (ref 11.6–14.4)
PLATELET # BLD: 223 K/UL (ref 163–337)
PMV BLD AUTO: 9.8 FL (ref 7.4–10.4)
POTASSIUM SERPL-SCNC: 4.5 MMOL/L (ref 3.5–5.1)
RBC # BLD: 4.65 M/UL (ref 4.63–6.08)
SODIUM BLD-SCNC: 136 MMOL/L (ref 137–145)
TOTAL PROTEIN: 7.4 G/DL (ref 6.3–8.2)
TSH SERPL DL<=0.05 MIU/L-ACNC: 5.44 UIU/ML (ref 0.47–4.68)
WBC # BLD: 7.86 K/UL (ref 4.23–9.07)

## 2020-06-11 PROCEDURE — 84443 ASSAY THYROID STIM HORMONE: CPT

## 2020-06-11 PROCEDURE — 1036F TOBACCO NON-USER: CPT | Performed by: NURSE PRACTITIONER

## 2020-06-11 PROCEDURE — G8427 DOCREV CUR MEDS BY ELIG CLIN: HCPCS | Performed by: NURSE PRACTITIONER

## 2020-06-11 PROCEDURE — 99214 OFFICE O/P EST MOD 30 MIN: CPT | Performed by: NURSE PRACTITIONER

## 2020-06-11 PROCEDURE — 1123F ACP DISCUSS/DSCN MKR DOCD: CPT | Performed by: NURSE PRACTITIONER

## 2020-06-11 PROCEDURE — 2580000003 HC RX 258: Performed by: INTERNAL MEDICINE

## 2020-06-11 PROCEDURE — 96413 CHEMO IV INFUSION 1 HR: CPT

## 2020-06-11 PROCEDURE — G8417 CALC BMI ABV UP PARAM F/U: HCPCS | Performed by: NURSE PRACTITIONER

## 2020-06-11 PROCEDURE — 6360000002 HC RX W HCPCS: Performed by: INTERNAL MEDICINE

## 2020-06-11 PROCEDURE — 36415 COLL VENOUS BLD VENIPUNCTURE: CPT

## 2020-06-11 PROCEDURE — 80053 COMPREHEN METABOLIC PANEL: CPT

## 2020-06-11 PROCEDURE — 85025 COMPLETE CBC W/AUTO DIFF WBC: CPT

## 2020-06-11 PROCEDURE — 96372 THER/PROPH/DIAG INJ SC/IM: CPT

## 2020-06-11 PROCEDURE — 4040F PNEUMOC VAC/ADMIN/RCVD: CPT | Performed by: NURSE PRACTITIONER

## 2020-06-11 RX ORDER — DRONABINOL 2.5 MG/1
2.5 CAPSULE ORAL
Qty: 60 CAPSULE | Refills: 0 | Status: SHIPPED | OUTPATIENT
Start: 2020-06-11 | End: 2020-07-09 | Stop reason: SDUPTHER

## 2020-06-11 RX ORDER — HEPARIN SODIUM (PORCINE) LOCK FLUSH IV SOLN 100 UNIT/ML 100 UNIT/ML
500 SOLUTION INTRAVENOUS PRN
Status: DISCONTINUED | OUTPATIENT
Start: 2020-06-11 | End: 2020-06-13 | Stop reason: HOSPADM

## 2020-06-11 RX ORDER — SODIUM CHLORIDE 0.9 % (FLUSH) 0.9 %
10 SYRINGE (ML) INJECTION PRN
Status: DISCONTINUED | OUTPATIENT
Start: 2020-06-11 | End: 2020-06-12 | Stop reason: HOSPADM

## 2020-06-11 RX ORDER — HYDROCODONE BITARTRATE AND ACETAMINOPHEN 10; 325 MG/1; MG/1
1 TABLET ORAL EVERY 6 HOURS PRN
Qty: 120 TABLET | Refills: 0 | Status: SHIPPED | OUTPATIENT
Start: 2020-06-11 | End: 2020-08-06 | Stop reason: SDUPTHER

## 2020-06-11 RX ADMIN — HEPARIN 500 UNITS: 100 SYRINGE at 09:47

## 2020-06-11 RX ADMIN — SODIUM CHLORIDE, PRESERVATIVE FREE 10 ML: 5 INJECTION INTRAVENOUS at 09:47

## 2020-06-11 RX ADMIN — DENOSUMAB 120 MG: 120 INJECTION SUBCUTANEOUS at 09:47

## 2020-06-11 RX ADMIN — SODIUM CHLORIDE 480 MG: 900 INJECTION, SOLUTION INTRAVENOUS at 09:12

## 2020-06-11 ASSESSMENT — ENCOUNTER SYMPTOMS
TROUBLE SWALLOWING: 0
ABDOMINAL PAIN: 0
SORE THROAT: 0
NAUSEA: 1
EYE DISCHARGE: 0
EYE REDNESS: 0
EYE ITCHING: 0
VOMITING: 0
SHORTNESS OF BREATH: 0
CONSTIPATION: 0
PHOTOPHOBIA: 0
COUGH: 0
WHEEZING: 0
DIARRHEA: 0

## 2020-06-11 NOTE — PROGRESS NOTES
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 He was originally diagnosed with recurrent disease 7/26/2018. He is currently treated with monthly immunotherapy, nivolumab, in addition to monthly Xgeva for bony metastasis. Otilio Peabody also comes to discuss surveillance imaging. He is accompanied by his wife. Otilio Peabody continues to tolerate treatment exceptionally well. Mild, chronic itching is tolerable. Additionally, Otilio Peabody does have chemotherapy related hypothyroidism for which he is treated with Synthroid. TSH has been stable. He has noticed arthritis to his hands. Symptoms are not associated with change in motor skills, headache, dizziness or visual disturbance. He continues working in his tractor with tinted windows. He is aware to wear a straw hat, long sleeved shirt and wear sunscreen with sun exposure. Blood pressure and weight have been stable. He has mild nausea upon waking in the morning is resolved. He continues to take Marinol as needed. Back pain is managed with Norco as needed. TARGET METASTATIC MALIGNANT MELANOMA SITES:  1. Right upper extremity original primary site 06/05/14   2. Right axilla lymph node at presentation 6/5/2014 and 3 axillary nodes at recurrence 7/27/2018 with an SUV of 8.7   3. Too numerous to count liver metastases   4. Celiac lymph nodes x 2 with highest SUV of 3   5. Bony metastatic disease on PET scan in the right ilium (SUV of 5) and right acetabulum (SUV of 6.4)   6.  Indeterminate HORACIO 3 mm subpleural right axilla     Past Surgical History:    Past Surgical History:   Procedure Laterality Date    CHOLECYSTECTOMY      TUNNELED CENTRAL VENOUS CATHETER W/ SUBCUTANEOUS PORT       Current Medications:    Current Outpatient Medications   Medication Sig Dispense Refill    hydrOXYzine (ATARAX) 25 MG tablet TAKE 1 TABLET BY MOUTH EVERY 8 HOURS AS NEEDED FOR ITCHING 90 tablet 1    HYDROcodone-acetaminophen (NORCO)  MG per tablet Take 1 tablet by mouth every 6 hours as needed for Pain for up to 30 days. Intended supply: 30 days 120 tablet 0    dronabinol (MARINOL) 2.5 MG capsule Take 1 capsule by mouth 2 times daily (before meals) for 30 days. 60 capsule 0    levothyroxine (SYNTHROID) 75 MCG tablet TAKE 1 TABLET BY MOUTH EVERY DAY 90 tablet 0    montelukast (SINGULAIR) 10 MG tablet Take 1 tablet by mouth nightly 90 tablet 1    dicyclomine (BENTYL) 20 MG tablet TAKE 1/2 TABLET BY MOUTH 4 TIMES A DAY AS NEEDED 60 tablet 1    triamcinolone (KENALOG) 0.025 % cream Apply topically 2 times daily. 30 g 2    amLODIPine (NORVASC) 5 MG tablet Take 5 mg by mouth      finasteride (PROSCAR) 5 MG tablet Take 5 mg by mouth      lisinopril (PRINIVIL;ZESTRIL) 20 MG tablet Take 20 mg by mouth      lovastatin (MEVACOR) 40 MG tablet Take 40 mg by mouth       No current facility-administered medications for this visit.       Facility-Administered Medications Ordered in Other Visits   Medication Dose Route Frequency Provider Last Rate Last Dose    denosumab (XGEVA) SC injection 120 mg  120 mg Subcutaneous Once Leatha Richards MD            Allergies: No Known Allergies  Social History:    Social History     Tobacco Use    Smoking status: Former Smoker    Smokeless tobacco: Never Used   Substance Use Topics    Alcohol use: No    Drug use: No     Family History:   Family History   Problem Relation Age of Onset    Heart Attack Brother          age 78 of [place]       Subjective   REVIEW OF SYSTEMS:   Review of Systems General: No scleral icterus. Right eye: No discharge. Left eye: No discharge. Conjunctiva/sclera: Conjunctivae normal.   Neck:      Musculoskeletal: Neck supple. Trachea: No tracheal deviation. Cardiovascular:      Rate and Rhythm: Normal rate and regular rhythm. Pulmonary:      Effort: Pulmonary effort is normal. No respiratory distress. Breath sounds: Normal breath sounds. No wheezing or rales. Chest:      Chest wall: No tenderness. Abdominal:      General: Bowel sounds are normal. There is no distension. Palpations: Abdomen is soft. There is no mass. Tenderness: There is no abdominal tenderness. There is no guarding. Genitourinary:     Comments: Exam deferred  Musculoskeletal:         General: No tenderness or deformity. Comments: Normal ROM all four extremities. mediport in place, no s/s of infection   Lymphadenopathy:      Cervical: No cervical adenopathy. Right cervical: No superficial cervical adenopathy. Left cervical: No superficial cervical adenopathy. Upper Body:      Right upper body: No supraclavicular adenopathy. Left upper body: No supraclavicular adenopathy. Comments: No bulky palpable cervical, clavicular, axillary or inguinal adenopathies on the left or right. Skin:     General: Skin is warm and dry. Findings: No rash. Neurological:      Mental Status: He is alert and oriented to person, place, and time. Comments: follows commands, non-focal   Psychiatric:         Behavior: Behavior normal. Behavior is cooperative. Thought Content: Thought content normal.         Judgment: Judgment normal.      Comments: Alert and oriented to person, place and time. Labs:  CMP 5/14/2020: stable  TSH 5/14/2020: 3.75  CBC 06/11/20: WBC 7.86, Hgb 14.4/MCV 90.5 and platelet count 379,[phone]    ASSESSMENT:   1. Malignant melanoma of skin of right upper extremity, including shoulder ([address]    2.  Liver metastasis ([address]

## 2020-07-09 ENCOUNTER — OFFICE VISIT (OUTPATIENT)
Dept: HEMATOLOGY | Age: 80
End: 2020-07-09
Payer: MEDICARE

## 2020-07-09 ENCOUNTER — HOSPITAL ENCOUNTER (OUTPATIENT)
Dept: INFUSION THERAPY | Age: 80
Discharge: HOME | End: 2020-07-09
Payer: MEDICARE

## 2020-07-09 VITALS
WEIGHT: 190 LBS | DIASTOLIC BLOOD PRESSURE: 58 MMHG | HEART RATE: 63 BPM | OXYGEN SATURATION: 94 % | BODY MASS INDEX: 26.6 KG/M2 | HEIGHT: 71 IN | TEMPERATURE: 98.9 F | SYSTOLIC BLOOD PRESSURE: 122 MMHG

## 2020-07-09 DIAGNOSIS — C78.7 LIVER METASTASIS (HCC): ICD-10-CM

## 2020-07-09 DIAGNOSIS — C79.51 BONE METASTASES (HCC): ICD-10-CM

## 2020-07-09 DIAGNOSIS — C43.61 MALIGNANT MELANOMA OF SKIN OF RIGHT UPPER EXTREMITY, INCLUDING SHOULDER (HCC): Primary | ICD-10-CM

## 2020-07-09 DIAGNOSIS — E03.2 HYPOTHYROIDISM DUE TO MEDICATION: ICD-10-CM

## 2020-07-09 DIAGNOSIS — C43.9 MALIGNANT MELANOMA, UNSPECIFIED SITE (HCC): ICD-10-CM

## 2020-07-09 LAB
ALBUMIN SERPL-MCNC: 3.8 G/DL (ref 3.5–5.2)
ALP BLD-CCNC: 44 U/L (ref 40–130)
ALT SERPL-CCNC: 16 U/L (ref 21–72)
ANION GAP SERPL CALCULATED.3IONS-SCNC: 7 MMOL/L (ref 7–19)
AST SERPL-CCNC: 26 U/L (ref 17–59)
BASOPHILS ABSOLUTE: 0.04 K/UL (ref 0.01–0.08)
BASOPHILS RELATIVE PERCENT: 0.6 % (ref 0.1–1.2)
BILIRUB SERPL-MCNC: 1.4 MG/DL (ref 0.2–1.3)
BUN BLDV-MCNC: 15 MG/DL (ref 9–20)
CALCIUM SERPL-MCNC: 9 MG/DL (ref 8.4–10.2)
CHLORIDE BLD-SCNC: 101 MMOL/L (ref 98–111)
CO2: 28 MMOL/L (ref 22–29)
CREAT SERPL-MCNC: 1.1 MG/DL (ref 0.6–1.2)
EOSINOPHILS ABSOLUTE: 0.5 K/UL (ref 0.04–0.54)
EOSINOPHILS RELATIVE PERCENT: 7.2 % (ref 0.7–7)
GFR NON-AFRICAN AMERICAN: >60
GLOBULIN: 2.6 G/DL
GLUCOSE BLD-MCNC: 101 MG/DL (ref 74–106)
HCT VFR BLD CALC: 40.7 % (ref 40.1–51)
HEMOGLOBIN: 13.9 G/DL (ref 13.7–17.5)
LYMPHOCYTES ABSOLUTE: 2.24 K/UL (ref 1.18–3.74)
LYMPHOCYTES RELATIVE PERCENT: 32.2 % (ref 19.3–53.1)
MCH RBC QN AUTO: 31.2 PG (ref 25.7–32.2)
MCHC RBC AUTO-ENTMCNC: 34.2 G/DL (ref 32.3–36.5)
MCV RBC AUTO: 91.3 FL (ref 79–92.2)
MONOCYTES ABSOLUTE: 0.64 K/UL (ref 0.24–0.82)
MONOCYTES RELATIVE PERCENT: 9.2 % (ref 4.7–12.5)
NEUTROPHILS ABSOLUTE: 3.53 K/UL (ref 1.56–6.13)
NEUTROPHILS RELATIVE PERCENT: 50.8 % (ref 34–71.1)
PDW BLD-RTO: 12.8 % (ref 11.6–14.4)
PLATELET # BLD: 205 K/UL (ref 163–337)
PMV BLD AUTO: 9.6 FL (ref 7.4–10.4)
POTASSIUM SERPL-SCNC: 4.9 MMOL/L (ref 3.5–5.1)
RBC # BLD: 4.46 M/UL (ref 4.63–6.08)
SODIUM BLD-SCNC: 136 MMOL/L (ref 137–145)
TOTAL PROTEIN: 6.4 G/DL (ref 6.3–8.2)
TSH SERPL DL<=0.05 MIU/L-ACNC: 6.07 UIU/ML (ref 0.47–4.68)
WBC # BLD: 6.95 K/UL (ref 4.23–9.07)

## 2020-07-09 PROCEDURE — 96372 THER/PROPH/DIAG INJ SC/IM: CPT

## 2020-07-09 PROCEDURE — G8417 CALC BMI ABV UP PARAM F/U: HCPCS | Performed by: NURSE PRACTITIONER

## 2020-07-09 PROCEDURE — 36415 COLL VENOUS BLD VENIPUNCTURE: CPT

## 2020-07-09 PROCEDURE — 80053 COMPREHEN METABOLIC PANEL: CPT

## 2020-07-09 PROCEDURE — 4040F PNEUMOC VAC/ADMIN/RCVD: CPT | Performed by: NURSE PRACTITIONER

## 2020-07-09 PROCEDURE — 6360000002 HC RX W HCPCS: Performed by: INTERNAL MEDICINE

## 2020-07-09 PROCEDURE — 1036F TOBACCO NON-USER: CPT | Performed by: NURSE PRACTITIONER

## 2020-07-09 PROCEDURE — 96413 CHEMO IV INFUSION 1 HR: CPT

## 2020-07-09 PROCEDURE — 85025 COMPLETE CBC W/AUTO DIFF WBC: CPT

## 2020-07-09 PROCEDURE — G8427 DOCREV CUR MEDS BY ELIG CLIN: HCPCS | Performed by: NURSE PRACTITIONER

## 2020-07-09 PROCEDURE — 6360000002 HC RX W HCPCS: Performed by: NURSE PRACTITIONER

## 2020-07-09 PROCEDURE — 99214 OFFICE O/P EST MOD 30 MIN: CPT | Performed by: NURSE PRACTITIONER

## 2020-07-09 PROCEDURE — 2580000003 HC RX 258: Performed by: INTERNAL MEDICINE

## 2020-07-09 PROCEDURE — 84443 ASSAY THYROID STIM HORMONE: CPT

## 2020-07-09 PROCEDURE — 1123F ACP DISCUSS/DSCN MKR DOCD: CPT | Performed by: NURSE PRACTITIONER

## 2020-07-09 RX ORDER — DIPHENHYDRAMINE HYDROCHLORIDE 50 MG/ML
50 INJECTION INTRAMUSCULAR; INTRAVENOUS PRN
Status: CANCELLED | OUTPATIENT
Start: 2020-07-09

## 2020-07-09 RX ORDER — DRONABINOL 2.5 MG/1
2.5 CAPSULE ORAL
Qty: 60 CAPSULE | Refills: 0 | Status: SHIPPED | OUTPATIENT
Start: 2020-07-09 | End: 2020-08-06 | Stop reason: SDUPTHER

## 2020-07-09 RX ORDER — SODIUM CHLORIDE 0.9 % (FLUSH) 0.9 %
10 SYRINGE (ML) INJECTION PRN
Status: DISCONTINUED | OUTPATIENT
Start: 2020-07-09 | End: 2020-07-10 | Stop reason: HOSPADM

## 2020-07-09 RX ORDER — HEPARIN SODIUM (PORCINE) LOCK FLUSH IV SOLN 100 UNIT/ML 100 UNIT/ML
500 SOLUTION INTRAVENOUS PRN
Status: DISCONTINUED | OUTPATIENT
Start: 2020-07-09 | End: 2020-07-11 | Stop reason: HOSPADM

## 2020-07-09 RX ORDER — METHYLPREDNISOLONE SODIUM SUCCINATE 125 MG/2ML
125 INJECTION, POWDER, LYOPHILIZED, FOR SOLUTION INTRAMUSCULAR; INTRAVENOUS PRN
Status: CANCELLED | OUTPATIENT
Start: 2020-07-09

## 2020-07-09 RX ORDER — SODIUM CHLORIDE 0.9 % (FLUSH) 0.9 %
10 SYRINGE (ML) INJECTION PRN
Status: CANCELLED | OUTPATIENT
Start: 2020-07-09

## 2020-07-09 RX ORDER — SODIUM CHLORIDE 0.9 % (FLUSH) 0.9 %
5 SYRINGE (ML) INJECTION PRN
Status: CANCELLED | OUTPATIENT
Start: 2020-07-09

## 2020-07-09 RX ORDER — HEPARIN SODIUM (PORCINE) LOCK FLUSH IV SOLN 100 UNIT/ML 100 UNIT/ML
500 SOLUTION INTRAVENOUS PRN
Status: CANCELLED | OUTPATIENT
Start: 2020-07-09

## 2020-07-09 RX ORDER — EPINEPHRINE 1 MG/ML
0.3 INJECTION, SOLUTION, CONCENTRATE INTRAVENOUS PRN
Status: CANCELLED | OUTPATIENT
Start: 2020-07-09

## 2020-07-09 RX ADMIN — DENOSUMAB 120 MG: 120 INJECTION SUBCUTANEOUS at 09:35

## 2020-07-09 RX ADMIN — HEPARIN 500 UNITS: 100 SYRINGE at 09:35

## 2020-07-09 RX ADMIN — SODIUM CHLORIDE 480 MG: 9 INJECTION, SOLUTION INTRAVENOUS at 09:01

## 2020-07-09 RX ADMIN — SODIUM CHLORIDE, PRESERVATIVE FREE 10 ML: 5 INJECTION INTRAVENOUS at 09:35

## 2020-07-09 ASSESSMENT — ENCOUNTER SYMPTOMS
PHOTOPHOBIA: 0
DIARRHEA: 0
CONSTIPATION: 0
SHORTNESS OF BREATH: 0
COUGH: 0
ABDOMINAL PAIN: 0
VOMITING: 0
WHEEZING: 0
NAUSEA: 0
BACK PAIN: 1
EYE REDNESS: 0
EYE ITCHING: 0
SORE THROAT: 0
EYE DISCHARGE: 0

## 2020-07-09 NOTE — PROGRESS NOTES
Progress Note      Pt Name: Edelmira Parham  YOB: 1940  MRN: [id number]    Date of evaluation: 12/26/2019  History Obtained From:  patient, spouse, electronic medical record    CHIEF COMPLAINT:    Chief Complaint   Patient presents with    Treatment     Malignant melanoma of skin of right upper extremity, including shoulder     HISTORY OF PRESENT ILLNESS:    Edelmira Parham is a 68-year-old gentleman diagnosed with recurrent, stage IV metastatic melanoma involving the liver, bone, celiac and right axillary lymph node on 7/26/2018. He is due for scheduled treatment including monthly nivolumab and Xgeva. Debbi Torres is tolerating treatment well and has had an excellent response. He has little to no itching at this time. He desires to discontinue current medications for pruritus. There was no evidence of metastatic disease on Contrasted CT scans of the chest, abdomen and pelvis from 6/4/2020. Immunotherapy related hypothyroidism is treated with Synthroid and monitor closely. Decreased appetite at times is managed well with Marinol. Weight is stable. He reports occasional choking with food/fluid. He has a history of esophageal dilatation and what he explains to be Issac fundoplication. Debbi Torres' main complaint is of dizziness when turning over in bed or changing positions. He is accompanied by his wife. TARGET METASTATIC MALIGNANT MELANOMA SITES:  1. Right upper extremity original primary site 06/05/14   2. Right axilla lymph node at presentation 6/5/2014 and 3 axillary nodes at recurrence 7/27/2018 with an SUV of 8.7   3. Too numerous to count liver metastases   4. Celiac lymph nodes x 2 with highest SUV of 3   5. Bony metastatic disease on PET scan in the right ilium (SUV of 5) and right acetabulum (SUV of 6.4)   6. Indeterminate HORACIO 3 mm subpleural nodule   7.  Indeterminate thyroid nodules on chest CT    1st TUMOR HISTORY: Resected stage IIIA (pT3a pN1a M0) right upper disease.  ----------------------------------PROGRESSION --------------------------------  Sebastian Huggins presented to [facility] emergency department on 7/3/18 for a 3 month history of waxing and waning epigastric abdominal pain and new onset fever. Additional symptoms reported include unexplained weight loss, nausea, headaches. CXR 7/3/18 showed mild cardiomegaly with no acute cardiopulmonary process. Abdominal/pelvic CT with contrast 7/3/18 documented multiple low density bilateral hepatic lesions suspicious for metastatic disease. 2 small renal cysts are noted and one on the left. A second left renal lesion near the lower pole measured 25 mm with SUV of 34, ultrasound recommended. The prostate is markedly enlarged causing partial right obstructive uropathy. CBC shows WBC 7.3, hemoglobin 15.8 and platelets 088,394  CMP revealed a creatinine of 1.2, GFR 59. LFTs WNL  UA negative  Lipase 28, troponin <0.01  He has a history of GERD with laparoscopic paraesophageal hernia repair and fundoplication by Dr. Kannan Bernstein on 5/24/15. He was prescribed Norco and Zofran PRN at [facility] ER. Rx was given for omeprazole in clinic  Tumor markers drawn 7/11/18 included:   AFP 5.8   CEA 2.0   CA 19-9 - 8   PSA 7.4 (chronically elevated, up to 8.28 on 8/8/14)  MRI of the abdomen w/wo contrast 7/13/18 at [facility] documented innumerable T1 hypointense, T2 hyperintense lesions throughout the liver as noted on recent CT. One of the larger lesions seen posteriorly in the right hepatic lobe measured 1.6 cm. Also noted was at least two T2 hyperintense lesions within the thoracocolumbar spine, which could represent metastasis. Bilateral renal ultrasound 7/3/18 showed a 2.6 cm hypoechoic left lower pole renal lesion, not typical of a benign cyst with enhancing characteristics concerning for primary renal cancer versus metastatic disease.  Bilateral nephrogenic cysts noted along with marked prostate enlargement with lobular changes measuring 8 x 5.9 x 4.9 treated for acute diverticulitis of the mid to distal sigmoid colon. Darrius Ferreira was evaluated by Dr. Akash Grubbs on 11/27/18 regarding possible prostate mass and elevated PSA with recommendations for surveillance only. Contrasted chest CT on 11/8/18 at \[facility]\"" showed no enlarged axillary, hilar or mediastinal lymph nodes. There were no acute osseous or soft tissue abnormalities. Darrius Ferreira has had treatment delay of Nivolumab on more than one occasion due to rash, requiring treatment with prednisone. Nivolumab was discontinued following dose #2 of maintenance therapy on 12/20/18. He initiated cycle #1 of Tafinlar 150 mg po BID & Mekinist 2 mg po BID on 1/4/19. Contrasted CT chest 1/9/19 at \[facility]\"" showed emphysematous changes bilaterally without evidence of metastatic disease. A 1.4 cm right hilar lymph node was unchanged since 2015, likely benign. Abdominal/pelvic CT with contrast 1/9/19 documented a 4 mm hepatic lesion, previously 6 mm. Other previously noted lesions throughout the liver are not appreciated. The enlarged prostate with nodular hypertrophy was previously evaluated by Dr. Akash Grubbs. Bone scan 1/9/19 was without evidence of osteoblastic disease. Echocardiogram 1/9/19 showed an EF of 60%. Darrius Ferreira was hopsitalized at [facility] 1/12/19 - 1/18/19 for enterocolitis with nausea/vomiting and diarrhea. Antineoplastic therapy was held during that time, rechallenged beginning 1/24/19. Darrius Ferreira was evaluated 2/11/19 at [facility] ER for a 24 hour history of abdominal pain and diarrhea with mild diverticulitis, stable from 1/12/19. He was treated with Cipro and Flagyl. Single agent Tafinlar was taken 3/28/19 - 4/4/19, discontinued by Darrius Ferreira due to intolerable abdominal cramping. Lou Eric was resumed 4/25/19. CT scans of the chest, abdomen and pelvis with contrast 8/13/2019 at \[facility]\"" was negative for intrathoracic metastasis.   There was no evidence of disease progression in the abdomen/pelvis with stable, subcentimeter low-density liver lesions noted. Dosing was changed to 480 mg every 4 weeks on 9/5/2019 to see if this may decrease persistent itching. Rx fluoxetine and hydroxyzine per Dr. James Jurado recommendations. Contrasted CT scans of the chest, abdomen/pelvis and bone scan 1/16/2020 were negative for evidence of disease progression. 19 mm right thyroid lobe nodule and subcentimeter low-density liver lesions were stable. No abnormal bony uptake. Contrasted CT scans of the chest, abdomen and pelvis 6/4/2020 were without evidence of metastatic disease. A 2.2 cm left lower renal lesion is stable    Treatment continues with nivolumab. TREATMENT SUMMARY:  1. Dr. Rossana Montgomery resected a malignant melanoma from the right posterior arm, above the elbow, on 06/05/14   2. Wide excision with a sentinel lymph node biopsy by Dr. Jennifer Andrew on 07/09/14. 3. Adjuvant weekly Sylatron Initiated 09/02/14 at 6 mcg/kg (500mcg) sub-q weekly but was only able to receive 4 of 8 planned treatments of this. The last dose #4 was on 09/28/14   4. Adjuvant weekly Sylatron 3 mcg/kg (250mcg) initiated 10/12/2014. Last dose delivered on 2/15/2015, discontinued due to poor tolerability and unable to tolerate the medication. 5. Opdivo 1 mg/kg and Yervoy 3 mg/kg every 3 weeks ×4 doses. 8/7/18 - 10/25/18. 6. Opdivo 240 mg every 2 weeks initiated 11/15/18, discontinued after dose #2 on 12/20/18   7. Monthly Xgeva initiated 8/7/18.   8.  Tafinlar 150 mg po BID & Mekinist 2 mg po qday, cycle #1 initiated 1/4/19.   9. Single agent Tafinlar 150 mg po BID initiated 3/28/19, discontinue by patient 4/4/19.   10. Opdivo 240 mg every 2 weeks resumed 4/25/19, change to 480 mg every 4 weeks on 9/5/2019      Past Medical History:    Past Medical History:   Diagnosis Date    Acid reflux     BPH (benign prostatic hyperplasia)     Cancer (HCC)     Diverticulitis     Hard of hearing     Hypercholesteremia     Hypertension     Malignant melanoma of skin of upper limb, including shoulder ([facility] dx 6/5/2014    to liver, stomach, bone, and right axilla     Past Surgical History:    Past Surgical History:   Procedure Laterality Date    CHOLECYSTECTOMY      TUNNELED CENTRAL VENOUS CATHETER W/ SUBCUTANEOUS PORT       Current Medications:    Current Outpatient Medications   Medication Sig Dispense Refill    levothyroxine (SYNTHROID) 75 MCG tablet TAKE 1 TABLET BY MOUTH EVERY DAY 90 tablet 3    PARoxetine (PAXIL) 10 MG tablet TAKE 1 TABLET BY MOUTH EVERY DAY 90 tablet 3    dronabinol (MARINOL) 2.5 MG capsule Take 1 capsule by mouth 2 times daily (before meals) for 30 days. 60 capsule 0    HYDROcodone-acetaminophen (NORCO)  MG per tablet Take 1 tablet by mouth every 6 hours as needed for Pain for up to 30 days. Intended supply: 30 days 120 tablet 0    montelukast (SINGULAIR) 10 MG tablet Take 1 tablet by mouth nightly 90 tablet 1    dicyclomine (BENTYL) 20 MG tablet TAKE 1/2 TABLET BY MOUTH 4 TIMES A DAY AS NEEDED 60 tablet 1    triamcinolone (KENALOG) 0.025 % cream Apply topically 2 times daily. 30 g 2    finasteride (PROSCAR) 5 MG tablet Take 5 mg by mouth      lovastatin (MEVACOR) 40 MG tablet Take 40 mg by mouth       No current facility-administered medications for this visit.       Facility-Administered Medications Ordered in Other Visits   Medication Dose Route Frequency Provider Last Rate Last Dose    nivolumab 480 mg in sodium chloride 0.9 % 100 mL chemo IVPB  480 mg Intravenous Once Opal Gray  mL/hr at 07/09/20 0901 480 mg at 07/09/20 0901    sodium chloride flush 0.9 % injection 10 mL  10 mL Intravenous PRN Opal Gray MD        heparin flush 100 UNIT/ML injection 500 Units  500 Units Intracatheter PRN Opal Gray MD        denosumab (XGEVA) SC injection 120 mg  120 mg Subcutaneous Once LIAM Alvarez            Allergies: No Known Allergies  Social History:    Social History     Tobacco Use    Smoking status: Former Smoker    Smokeless tobacco: Never Used   Substance Use Topics    Alcohol use: No    Drug use: No     Family History:   Family History   Problem Relation Age of Onset    Heart Attack Brother          age 78 of [place]       Subjective   REVIEW OF SYSTEMS:   Review of Systems   Constitutional: Negative for fatigue and fever. No night sweats   HENT: Negative for dental problem, hearing loss, mouth sores, nosebleeds and sore throat. Occasional choking with oral intake   Eyes: Negative for photophobia, discharge, redness and itching. No blurring of vision, no double vision   Respiratory: Negative for cough, shortness of breath and wheezing. No hemoptysis   Cardiovascular: Negative for chest pain, palpitations and leg swelling. Gastrointestinal: Negative for abdominal pain, constipation, diarrhea, nausea and vomiting. Endocrine: Negative for cold intolerance, heat intolerance, polydipsia and polyuria. Genitourinary: Negative for dysuria, frequency, hematuria and urgency. Musculoskeletal: Positive for back pain. Negative for arthralgias, joint swelling and myalgias. Skin: Negative for pallor and rash. Allergic/Immunologic: Negative for environmental allergies and immunocompromised state. Neurological: Negative for seizures, syncope and numbness. Hematological: Negative for adenopathy. Does not bruise/bleed easily. Psychiatric/Behavioral: Negative for agitation, behavioral problems, confusion and suicidal ideas. The patient is not nervous/anxious. Objective   Vitals:    20 0830   BP: (!) 122/58   Pulse: 63   Temp: 98.9 °F (37.2 °C)   SpO2: 94%     Wt Readings from Last 3 Encounters:   20 190 lb (86.2 kg)   20 188 lb (85.3 kg)   20 186 lb 6.4 oz (84.6 kg)     PHYSICAL EXAM:  Physical Exam  Vitals signs reviewed. Constitutional:       General: He is not in acute distress. Appearance: He is well-developed. He is not toxic-appearing or diaphoretic.    HENT: dilatation, previously completed by Dr. Yoly Grant This Encounter   Procedures    Comprehensive Metabolic Panel    TSH without Reflex     RTC RN in 1 month for nivolumab. Return in about 2 months (around 9/9/2020) for follow up with LIAM Sibley for Nivolumab.       LIAM Rey  9:14 AM  7/9/2020

## 2020-07-10 RX ORDER — LEVOTHYROXINE SODIUM 88 UG/1
TABLET ORAL
Qty: 90 TABLET | Refills: 3 | Status: SHIPPED | OUTPATIENT
Start: 2020-07-10 | End: 2020-10-01 | Stop reason: SDUPTHER

## 2020-07-29 RX ORDER — DICYCLOMINE HCL 20 MG
TABLET ORAL
Qty: 180 TABLET | Refills: 3 | Status: SHIPPED | OUTPATIENT
Start: 2020-07-29

## 2020-08-04 RX ORDER — METHYLPREDNISOLONE SODIUM SUCCINATE 125 MG/2ML
125 INJECTION, POWDER, LYOPHILIZED, FOR SOLUTION INTRAMUSCULAR; INTRAVENOUS PRN
Status: CANCELLED | OUTPATIENT
Start: 2020-08-06

## 2020-08-04 RX ORDER — EPINEPHRINE 1 MG/ML
0.3 INJECTION, SOLUTION, CONCENTRATE INTRAVENOUS PRN
Status: CANCELLED | OUTPATIENT
Start: 2020-08-06

## 2020-08-04 RX ORDER — DIPHENHYDRAMINE HYDROCHLORIDE 50 MG/ML
50 INJECTION INTRAMUSCULAR; INTRAVENOUS PRN
Status: CANCELLED | OUTPATIENT
Start: 2020-08-06

## 2020-08-04 RX ORDER — SODIUM CHLORIDE 0.9 % (FLUSH) 0.9 %
10 SYRINGE (ML) INJECTION PRN
Status: CANCELLED | OUTPATIENT
Start: 2020-08-06

## 2020-08-04 RX ORDER — SODIUM CHLORIDE 0.9 % (FLUSH) 0.9 %
5 SYRINGE (ML) INJECTION PRN
Status: CANCELLED | OUTPATIENT
Start: 2020-08-06

## 2020-08-04 RX ORDER — HEPARIN SODIUM (PORCINE) LOCK FLUSH IV SOLN 100 UNIT/ML 100 UNIT/ML
500 SOLUTION INTRAVENOUS PRN
Status: CANCELLED | OUTPATIENT
Start: 2020-08-06

## 2020-08-06 ENCOUNTER — HOSPITAL ENCOUNTER (OUTPATIENT)
Dept: INFUSION THERAPY | Age: 80
Discharge: HOME | End: 2020-08-06
Payer: MEDICARE

## 2020-08-06 VITALS
TEMPERATURE: 98 F | BODY MASS INDEX: 26.74 KG/M2 | OXYGEN SATURATION: 93 % | HEART RATE: 64 BPM | HEIGHT: 71 IN | DIASTOLIC BLOOD PRESSURE: 62 MMHG | WEIGHT: 191 LBS | SYSTOLIC BLOOD PRESSURE: 116 MMHG

## 2020-08-06 DIAGNOSIS — R53.83 FATIGUE DUE TO TREATMENT: ICD-10-CM

## 2020-08-06 DIAGNOSIS — C43.61 MALIGNANT MELANOMA OF SKIN OF RIGHT UPPER EXTREMITY, INCLUDING SHOULDER (HCC): Primary | ICD-10-CM

## 2020-08-06 DIAGNOSIS — C79.51 BONE METASTASES (HCC): ICD-10-CM

## 2020-08-06 DIAGNOSIS — C43.9 MALIGNANT MELANOMA, UNSPECIFIED SITE (HCC): ICD-10-CM

## 2020-08-06 LAB
ALBUMIN SERPL-MCNC: 4.1 G/DL (ref 3.5–5.2)
ALP BLD-CCNC: 42 U/L (ref 40–130)
ALT SERPL-CCNC: 18 U/L (ref 21–72)
ANION GAP SERPL CALCULATED.3IONS-SCNC: 4 MMOL/L (ref 7–19)
AST SERPL-CCNC: 28 U/L (ref 17–59)
BASOPHILS ABSOLUTE: 0.02 K/UL (ref 0.01–0.08)
BASOPHILS RELATIVE PERCENT: 0.2 % (ref 0.1–1.2)
BILIRUB SERPL-MCNC: 1.2 MG/DL (ref 0.2–1.3)
BUN BLDV-MCNC: 19 MG/DL (ref 9–20)
CALCIUM SERPL-MCNC: 8.9 MG/DL (ref 8.4–10.2)
CHLORIDE BLD-SCNC: 103 MMOL/L (ref 98–111)
CO2: 29 MMOL/L (ref 22–29)
CREAT SERPL-MCNC: 1.2 MG/DL (ref 0.6–1.2)
EOSINOPHILS ABSOLUTE: 0.47 K/UL (ref 0.04–0.54)
EOSINOPHILS RELATIVE PERCENT: 5.6 % (ref 0.7–7)
GFR NON-AFRICAN AMERICAN: 58
GLOBULIN: 2.5 G/DL
GLUCOSE BLD-MCNC: 93 MG/DL (ref 74–106)
HCT VFR BLD CALC: 39.7 % (ref 40.1–51)
HEMOGLOBIN: 13.7 G/DL (ref 13.7–17.5)
LYMPHOCYTES ABSOLUTE: 2.24 K/UL (ref 1.18–3.74)
LYMPHOCYTES RELATIVE PERCENT: 26.9 % (ref 19.3–53.1)
MCH RBC QN AUTO: 31.3 PG (ref 25.7–32.2)
MCHC RBC AUTO-ENTMCNC: 34.5 G/DL (ref 32.3–36.5)
MCV RBC AUTO: 90.6 FL (ref 79–92.2)
MONOCYTES ABSOLUTE: 0.92 K/UL (ref 0.24–0.82)
MONOCYTES RELATIVE PERCENT: 11.1 % (ref 4.7–12.5)
NEUTROPHILS ABSOLUTE: 4.67 K/UL (ref 1.56–6.13)
NEUTROPHILS RELATIVE PERCENT: 56.2 % (ref 34–71.1)
PDW BLD-RTO: 12.8 % (ref 11.6–14.4)
PLATELET # BLD: 195 K/UL (ref 163–337)
PMV BLD AUTO: 9.5 FL (ref 7.4–10.4)
POTASSIUM SERPL-SCNC: 4.3 MMOL/L (ref 3.5–5.1)
RBC # BLD: 4.38 M/UL (ref 4.63–6.08)
SODIUM BLD-SCNC: 136 MMOL/L (ref 137–145)
TOTAL PROTEIN: 6.7 G/DL (ref 6.3–8.2)
TSH SERPL DL<=0.05 MIU/L-ACNC: 4.59 UIU/ML (ref 0.47–4.68)
WBC # BLD: 8.32 K/UL (ref 4.23–9.07)

## 2020-08-06 PROCEDURE — 85025 COMPLETE CBC W/AUTO DIFF WBC: CPT

## 2020-08-06 PROCEDURE — 6360000002 HC RX W HCPCS: Performed by: INTERNAL MEDICINE

## 2020-08-06 PROCEDURE — 36415 COLL VENOUS BLD VENIPUNCTURE: CPT

## 2020-08-06 PROCEDURE — 84443 ASSAY THYROID STIM HORMONE: CPT

## 2020-08-06 PROCEDURE — 80053 COMPREHEN METABOLIC PANEL: CPT

## 2020-08-06 PROCEDURE — 96372 THER/PROPH/DIAG INJ SC/IM: CPT

## 2020-08-06 PROCEDURE — 96413 CHEMO IV INFUSION 1 HR: CPT

## 2020-08-06 PROCEDURE — 2580000003 HC RX 258: Performed by: INTERNAL MEDICINE

## 2020-08-06 RX ORDER — HYDROCODONE BITARTRATE AND ACETAMINOPHEN 10; 325 MG/1; MG/1
1 TABLET ORAL EVERY 6 HOURS PRN
Qty: 120 TABLET | Refills: 0 | Status: SHIPPED | OUTPATIENT
Start: 2020-08-06 | End: 2020-10-01 | Stop reason: SDUPTHER

## 2020-08-06 RX ORDER — HEPARIN SODIUM (PORCINE) LOCK FLUSH IV SOLN 100 UNIT/ML 100 UNIT/ML
500 SOLUTION INTRAVENOUS PRN
Status: DISCONTINUED | OUTPATIENT
Start: 2020-08-06 | End: 2020-08-08 | Stop reason: HOSPADM

## 2020-08-06 RX ORDER — DRONABINOL 2.5 MG/1
2.5 CAPSULE ORAL
Qty: 60 CAPSULE | Refills: 0 | Status: SHIPPED | OUTPATIENT
Start: 2020-08-06 | End: 2020-09-03 | Stop reason: SDUPTHER

## 2020-08-06 RX ORDER — SODIUM CHLORIDE 0.9 % (FLUSH) 0.9 %
10 SYRINGE (ML) INJECTION PRN
Status: DISCONTINUED | OUTPATIENT
Start: 2020-08-06 | End: 2020-08-07 | Stop reason: HOSPADM

## 2020-08-06 RX ADMIN — SODIUM CHLORIDE, PRESERVATIVE FREE 10 ML: 5 INJECTION INTRAVENOUS at 09:18

## 2020-08-06 RX ADMIN — DENOSUMAB 120 MG: 120 INJECTION SUBCUTANEOUS at 09:18

## 2020-08-06 RX ADMIN — HEPARIN 500 UNITS: 100 SYRINGE at 09:18

## 2020-08-06 RX ADMIN — SODIUM CHLORIDE 480 MG: 9 INJECTION, SOLUTION INTRAVENOUS at 08:49

## 2020-09-02 NOTE — PROGRESS NOTES
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 He is receiving treatment with monthly nivolumab and Xgeva. There was no evidence of metastatic disease on CT scans of the chest, abdomen/pelvis 6/4/2020. [facility] has immunotherapy related hypothyroidism, managed with Synthroid. He continues Marinol with good appetite. [facility] states he is feeling well. He is without complaint of dizziness at this time. Blood pressure is 146/82. He has a mild rash to his mid forehead that he associates with sun exposure. [facility] stopped his medications for itching including Singulair and antihistamine, though resumed after 3 days due to recurrent pruritus. The rash is not disseminated, he denies dysphagia, cough or significant change in bowel habits. Generalized arthritis is tolerable. TARGET METASTATIC MALIGNANT MELANOMA SITES:  1. Right upper extremity original primary site 06/05/14   2. Right axilla lymph node at presentation 6/5/2014 and 3 axillary nodes at recurrence 7/27/2018 with an SUV of 8.7   3. Too numerous to count liver metastases   4. Celiac lymph nodes x 2 with highest SUV of 3   5. Bony metastatic disease on PET scan in the right ilium (SUV of 5) and right acetabulum (SUV of 6.4)   6. Indeterminate HORACIO 3 mm subpleural nodule   7. Indeterminate thyroid nodules on chest CT    1st TUMOR HISTORY: Resected stage IIIA (pT3a pN1a M0) right upper extremity malignant melanoma, 06/05/14  Mr. Abdias Rabago was seen in initial oncology consultation on 08/05/14, referred by Dr. Jenny Hunt Jose Alberto Ledesma, regarding a diagnosis of resected malignant melanoma of the right upper extremity. Dr. Jose Alberto Ledesma resected a malignant melanoma from the right posterior arm, above the elbow, on 06/05/14. Pathology revealed a 2.25 mm thick non-ulcerated Pankaj's level IV malignant melanoma. There were 10 mitoses/ sq mm. There was no vascular or lymphatic invasion. A wide excision with a sentinel lymph node biopsy was performed by by Dr. Orlando Gandhi on 07/09/14. Pathology revealed that the right axillary sentinel lymph node was positive for metastatic malignancy by immunoperoxidase stain. The wide excision sample was without further local involvement. A right axillary lymph node dissection was performed by Dr. Orlando Gandhi on 07/21/14. No metastatic malignancy was noted in any of the 8 right axillary lymph nodes submitted. The HMB-45 immunoperoxidase stain was negative for metastatic malignant melanoma in any of these subsequently submitted lymph nodes. Completion of a metastatic workup on 08/06/14, including MRI of the brain, bone scan, CT scans of the chest, abdomen and pelvis were negative for evidence of metastatic disease. Adjuvant pegylated Interferon (Sylatron) 6 mcg/kg (500mcg) sub-q weekly x 8 to be followed then by 3 mcg/kg(250mcg) weekly x up to 5 years was discussed and planned. Adjuvant therapy was indeed initiated and delivered as discussed below in treatment summary. The last dose of the medication was delivered on 2/15/2015. He was unable to tolerate this medication even at reduced dosages because of poor tolerability, weight loss, anorexia, unable to sleep, anxiety, fatigue, et Adeline Hue. He declined any further interferon, which is reasonable.    A one-year followup CT scan of the chest and MRI of the brain on 6/11/2015 did not reveal any evidence of recurrent disease.  ----------------------------------PROGRESSION --------------------------------  Darrius Ferreira presented to [facility] emergency department on 7/3/18 for a 3 month history of waxing and waning epigastric abdominal pain and new onset fever. Additional symptoms reported include unexplained weight loss, nausea, headaches. CXR 7/3/18 showed mild cardiomegaly with no acute cardiopulmonary process. Abdominal/pelvic CT with contrast 7/3/18 documented multiple low density bilateral hepatic lesions suspicious for metastatic disease. 2 small renal cysts are noted and one on the left. A second left renal lesion near the lower pole measured 25 mm with SUV of 34, ultrasound recommended. The prostate is markedly enlarged causing partial right obstructive uropathy. CBC shows WBC 7.3, hemoglobin 15.8 and platelets 441,012  CMP revealed a creatinine of 1.2, GFR 59. LFTs WNL  UA negative  Lipase 28, troponin <0.01  He has a history of GERD with laparoscopic paraesophageal hernia repair and fundoplication by Dr. Nicci Torres on 5/24/15. He was prescribed Norco and Zofran PRN at [address] ER. Rx was given for omeprazole in clinic  Tumor markers drawn 7/11/18 included:   AFP 5.8   CEA 2.0   CA 19-9 - 8   PSA 7.4 (chronically elevated, up to 8.28 on 8/8/14)  MRI of the abdomen w/wo contrast 7/13/18 at [facility] documented innumerable T1 hypointense, T2 hyperintense lesions throughout the liver as noted on recent CT. One of the larger lesions seen posteriorly in the right hepatic lobe measured 1.6 cm. Also noted was at least two T2 hyperintense lesions within the thoracocolumbar spine, which could represent metastasis. Bilateral renal ultrasound 7/3/18 showed a 2.6 cm hypoechoic left lower pole renal lesion, not typical of a benign cyst with enhancing characteristics concerning for primary renal cancer versus metastatic disease. Bilateral nephrogenic cysts noted along with marked prostate enlargement with lobular changes measuring 8 x 5.9 x 4.9 cm. MRI of the brain w/wo contrast 7/3/18 for complaint of headache and nausea was without evidence of acute intracranial process.   Contrasted chest CT 7/24/18 revealed a stable 2 cm right thyroid nodule, a new 9 mm right thyroid nodule. An indeterminate 3 mm subpleural HORACIO nodule is noted, likely granulomatous or postinfectious. Bone scan 7/24/18 was negative for evidence of osseous metastasis. Examination is remarkable for mid epigastric discomfort, 10 lbs weight loss and 1 inch right axillary lymph node. This was a previous site of positive sentinel lymph node biopsy and dissection associated with the resected, stage IIIa right upper extremity malignant melanoma in 2014. Suspect recurrent malignant melanoma. Arnoldo Kothari was referred to Dr. Tomer Cameron who performed an FNA of the right axillary lymph node 7/26/18. Pathology was consistent for metastatic malignant melanoma. 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 Findings were discussed with both Arnoldo Kothari and his wife by Dr. Nydia Simpson at follow-up on 8/1/18 and reiterated on 8/7/18. Recommendation is for combination therapy with Opdivo 1 mg/kg and Yervoy 3 mg/kg every 3 weeks ×4 cycles, followed by Opdivo 240 mg every 2 weeks thereafter. Arnoldo Kothari had a left chest Mediport placed by Dr. Sabina Garcia 8/3/18. PET scan 8/6/18 showed the following:  3 right axillary lymph nodes, SUV up to 8.7   Hepatic metastasis, too numerous to count   2 celiac lymph nodes SUV 2.8 and 3   Right ilium, SUV 5. Right acetabulum SUV 6.4  Cycle #1 Opdivo/Yervoy and monthly Xgeva initiated 8/7/18. He was evaluated at [facility] 10/16/18 for LLQ abdominal discomfort with a history of recent diverticulitis. Abdominal/pelvic CT with contrast revealed a decreased size in the multiple liver nodules. Bilateral renal nodules were stable. An enlarged prostate with an exophytic mass arising from the posterior superior aspect of the prostate was present. He was treated for acute diverticulitis of the mid to distal sigmoid colon.   Arnoldo Kothari was evaluated by Dr. Jessica Keller on 11/27/18 regarding possible prostate mass and elevated PSA with recommendations for surveillance only. Contrasted chest CT on 11/8/18 at hospitals showed no enlarged axillary, hilar or mediastinal lymph nodes. There were no acute osseous or soft tissue abnormalities. Jenniffer Funez has had treatment delay of Nivolumab on more than one occasion due to rash, requiring treatment with prednisone. Nivolumab was discontinued following dose #2 of maintenance therapy on 12/20/18. He initiated cycle #1 of Tafinlar 150 mg po BID & Mekinist 2 mg po BID on 1/4/19. Contrasted CT chest 1/9/19 at hospitals showed emphysematous changes bilaterally without evidence of metastatic disease. A 1.4 cm right hilar lymph node was unchanged since 2015, likely benign. Abdominal/pelvic CT with contrast 1/9/19 documented a 4 mm hepatic lesion, previously 6 mm. Other previously noted lesions throughout the liver are not appreciated. The enlarged prostate with nodular hypertrophy was previously evaluated by Dr. Maya Sanchez. Bone scan 1/9/19 was without evidence of osteoblastic disease. Echocardiogram 1/9/19 showed an EF of 60%. Jenniffer Funez was hopsitalized at [facility] 1/12/19 - 1/18/19 for enterocolitis with nausea/vomiting and diarrhea. Antineoplastic therapy was held during that time, rechallenged beginning 1/24/19. Jenniffer Funez was evaluated 2/11/19 at [facility] ER for a 24 hour history of abdominal pain and diarrhea with mild diverticulitis, stable from 1/12/19. He was treated with Cipro and Flagyl. Single agent Tafinlar was taken 3/28/19 - 4/4/19, discontinued by Jenniffer Funez due to intolerable abdominal cramping. Arden Payer was resumed 4/25/19. CT scans of the chest, abdomen and pelvis with contrast 8/13/2019 at hospitals was negative for intrathoracic metastasis. There was no evidence of disease progression in the abdomen/pelvis with stable, subcentimeter low-density liver lesions noted. Dosing was changed to 480 mg every 4 weeks on 9/5/2019 to see if this may decrease persistent itching.  Rx fluoxetine and hydroxyzine per  [facility] recommendations. Contrasted CT scans of the chest, abdomen/pelvis and bone scan 1/16/2020 were negative for evidence of disease progression. 19 mm right thyroid lobe nodule and subcentimeter low-density liver lesions were stable. No abnormal bony uptake. Contrasted CT scans of the chest, abdomen and pelvis 6/4/2020 were without evidence of metastatic disease. A 2.2 cm left lower renal lesion is stable    Treatment continues with nivolumab. TREATMENT SUMMARY:  1. Dr. Jose Alberto Ledesma resected a malignant melanoma from the right posterior arm, above the elbow, on 06/05/14   2. Wide excision with a sentinel lymph node biopsy by Dr. Orlando Gandhi on 07/09/14. 3. Adjuvant weekly Sylatron Initiated 09/02/14 at 6 mcg/kg (500mcg) sub-q weekly but was only able to receive 4 of 8 planned treatments of this. The last dose #4 was on 09/28/14   4. Adjuvant weekly Sylatron 3 mcg/kg (250mcg) initiated 10/12/2014. Last dose delivered on 2/15/2015, discontinued due to poor tolerability and unable to tolerate the medication. 5. Opdivo 1 mg/kg and Yervoy 3 mg/kg every 3 weeks ×4 doses. 8/7/18 - 10/25/18. 6. Opdivo 240 mg every 2 weeks initiated 11/15/18, discontinued after dose #2 on 12/20/18   7. Monthly Xgeva initiated 8/7/18.   8.  Tafinlar 150 mg po BID & Mekinist 2 mg po qday, cycle #1 initiated 1/4/19.   9. Single agent Tafinlar 150 mg po BID initiated 3/28/19, discontinue by patient 4/4/19.   10. Opdivo 240 mg every 2 weeks resumed 4/25/19, change to 480 mg every 4 weeks on 9/5/2019      Past Medical History:    Past Medical History:   Diagnosis Date    Acid reflux     BPH (benign prostatic hyperplasia)     Cancer ([address]     Diverticulitis     Hard of hearing     Hypercholesteremia     Hypertension     Malignant melanoma of skin of upper limb, including shoulder ([address] dx 6/5/2014    to liver, stomach, bone, and right axilla     Past Surgical History:    Past Surgical History:   Procedure Laterality Date    Family History:   Family History   Problem Relation Age of Onset    Heart Attack Brother          age 78 of [place]       Subjective   REVIEW OF SYSTEMS:   Review of Systems   Constitutional: Negative for fatigue and fever. No night sweats   HENT: Negative for dental problem, hearing loss, mouth sores, nosebleeds, sore throat and trouble swallowing. Eyes: Negative for discharge and itching. Respiratory: Negative for cough, shortness of breath and wheezing. Cardiovascular: Negative for chest pain, palpitations and leg swelling. Gastrointestinal: Negative for abdominal pain, constipation, diarrhea, nausea and vomiting. Endocrine: Negative for cold intolerance and heat intolerance. Genitourinary: Negative for dysuria, frequency, hematuria and urgency. Musculoskeletal: Positive for arthralgias. Negative for joint swelling and myalgias. Skin: Positive for rash (Mid-forehead). Negative for pallor. Allergic/Immunologic: Negative for environmental allergies and immunocompromised state. Neurological: Negative for dizziness, seizures, syncope and numbness. Hematological: Negative for adenopathy. Does not bruise/bleed easily. Psychiatric/Behavioral: Negative for agitation, behavioral problems and confusion. The patient is not nervous/anxious. Objective   Vitals:    20 0846   BP: (!) 146/82   Pulse: 61   Resp: 17   Temp: 97.3 °F (36.3 °C)     Wt Readings from Last 3 Encounters:   20 192 lb (87.1 kg)   20 191 lb (86.6 kg)   20 190 lb (86.2 kg)     PHYSICAL EXAM:  Physical Exam  Vitals signs reviewed. Constitutional:       General: He is not in acute distress. Appearance: He is well-developed. He is not toxic-appearing or diaphoretic. Comments: Accompanied by wife. HENT:      Head: Normocephalic and atraumatic.       Right Ear: External ear normal.      Left Ear: External ear normal.      Nose: Nose normal.      Mouth/Throat:      Mouth: Mucous membranes are moist.   Eyes:      General: No scleral icterus. Right eye: No discharge. Left eye: No discharge. Conjunctiva/sclera: Conjunctivae normal.   Neck:      Musculoskeletal: Neck supple. Trachea: No tracheal deviation. Cardiovascular:      Rate and Rhythm: Normal rate and regular rhythm. Pulmonary:      Effort: Pulmonary effort is normal. No respiratory distress. Breath sounds: Normal breath sounds. No wheezing or rales. Abdominal:      General: Bowel sounds are normal. There is no distension. Palpations: Abdomen is soft. Tenderness: There is no abdominal tenderness. There is no guarding. Genitourinary:     Comments: Exam deferred  Musculoskeletal:         General: No tenderness or deformity. Comments: Normal ROM all four extremities   Lymphadenopathy:      Cervical: No cervical adenopathy. Right cervical: No superficial cervical adenopathy. Left cervical: No superficial cervical adenopathy. Upper Body:      Right upper body: No supraclavicular adenopathy. Left upper body: No supraclavicular adenopathy. Comments: No bulky palpable cervical, clavicular, axillary or inguinal adenopathies on the left or right. Skin:     General: Skin is warm and dry. Findings: Rash (Very mild rash to mid forehead, just above the nasal bridge, one half dollar size area of maculopapular erythema) present. Neurological:      Mental Status: He is alert and oriented to person, place, and time. Comments: follows commands, non-focal   Psychiatric:         Behavior: Behavior normal. Behavior is cooperative. Thought Content: Thought content normal.         Judgment: Judgment normal.      Comments: Alert and oriented to person, place and time.           Labs 8/6/2020:  · CMP: Creatinine 1.2, GFR 58, alk phos 42  · TSH: 4.59    CBC 09/03/20: WBC 9.08, Hgb 14.2/MCV 91.2, platelets 977,163    ASSESSMENT:   1. Malignant melanoma of skin of

## 2020-09-03 ENCOUNTER — OFFICE VISIT (OUTPATIENT)
Dept: HEMATOLOGY | Age: 80
End: 2020-09-03
Payer: MEDICARE

## 2020-09-03 ENCOUNTER — TRANSCRIBE ORDERS (OUTPATIENT)
Dept: ADMINISTRATIVE | Facility: HOSPITAL | Age: 80
End: 2020-09-03

## 2020-09-03 ENCOUNTER — HOSPITAL ENCOUNTER (OUTPATIENT)
Dept: INFUSION THERAPY | Age: 80
Discharge: HOME | End: 2020-09-03
Payer: MEDICARE

## 2020-09-03 VITALS
SYSTOLIC BLOOD PRESSURE: 146 MMHG | HEART RATE: 61 BPM | DIASTOLIC BLOOD PRESSURE: 82 MMHG | WEIGHT: 192 LBS | BODY MASS INDEX: 26.78 KG/M2 | TEMPERATURE: 97.3 F | RESPIRATION RATE: 17 BRPM

## 2020-09-03 VITALS — OXYGEN SATURATION: 94 %

## 2020-09-03 DIAGNOSIS — C43.9 MALIGNANT MELANOMA, UNSPECIFIED SITE (HCC): ICD-10-CM

## 2020-09-03 DIAGNOSIS — C43.61 MALIGNANT MELANOMA OF RIGHT UPPER EXTREMITY INCLUDING SHOULDER (HCC): Primary | ICD-10-CM

## 2020-09-03 DIAGNOSIS — C79.51 BONE METASTASIS: ICD-10-CM

## 2020-09-03 DIAGNOSIS — C78.7 LIVER METASTASIS (HCC): ICD-10-CM

## 2020-09-03 DIAGNOSIS — C43.61 MALIGNANT MELANOMA OF SKIN OF RIGHT UPPER EXTREMITY, INCLUDING SHOULDER (HCC): Primary | ICD-10-CM

## 2020-09-03 DIAGNOSIS — C78.7 LIVER METASTASES: ICD-10-CM

## 2020-09-03 DIAGNOSIS — C79.51 BONE METASTASES (HCC): ICD-10-CM

## 2020-09-03 DIAGNOSIS — E03.2 HYPOTHYROIDISM DUE TO MEDICATION: ICD-10-CM

## 2020-09-03 LAB
ALBUMIN SERPL-MCNC: 4 G/DL (ref 3.5–5.2)
ALP BLD-CCNC: 43 U/L (ref 40–130)
ALT SERPL-CCNC: 19 U/L (ref 21–72)
ANION GAP SERPL CALCULATED.3IONS-SCNC: 14 MMOL/L (ref 7–19)
AST SERPL-CCNC: 37 U/L (ref 17–59)
BASOPHILS ABSOLUTE: 0.03 K/UL (ref 0.01–0.08)
BASOPHILS RELATIVE PERCENT: 0.3 % (ref 0.1–1.2)
BILIRUB SERPL-MCNC: 1 MG/DL (ref 0.2–1.3)
BUN BLDV-MCNC: 15 MG/DL (ref 9–20)
CALCIUM SERPL-MCNC: 9 MG/DL (ref 8.4–10.2)
CHLORIDE BLD-SCNC: 95 MMOL/L (ref 98–111)
CO2: 30 MMOL/L (ref 22–29)
CREAT SERPL-MCNC: 1.1 MG/DL (ref 0.6–1.2)
EOSINOPHILS ABSOLUTE: 0.39 K/UL (ref 0.04–0.54)
EOSINOPHILS RELATIVE PERCENT: 4.3 % (ref 0.7–7)
GFR NON-AFRICAN AMERICAN: >60
GLOBULIN: 3 G/DL
GLUCOSE BLD-MCNC: 107 MG/DL (ref 74–106)
HCT VFR BLD CALC: 41.6 % (ref 40.1–51)
HEMOGLOBIN: 14.2 G/DL (ref 13.7–17.5)
LYMPHOCYTES ABSOLUTE: 2.43 K/UL (ref 1.18–3.74)
LYMPHOCYTES RELATIVE PERCENT: 26.8 % (ref 19.3–53.1)
MCH RBC QN AUTO: 31.1 PG (ref 25.7–32.2)
MCHC RBC AUTO-ENTMCNC: 34.1 G/DL (ref 32.3–36.5)
MCV RBC AUTO: 91.2 FL (ref 79–92.2)
MONOCYTES ABSOLUTE: 0.85 K/UL (ref 0.24–0.82)
MONOCYTES RELATIVE PERCENT: 9.4 % (ref 4.7–12.5)
NEUTROPHILS ABSOLUTE: 5.38 K/UL (ref 1.56–6.13)
NEUTROPHILS RELATIVE PERCENT: 59.2 % (ref 34–71.1)
PDW BLD-RTO: 12.7 % (ref 11.6–14.4)
PLATELET # BLD: 232 K/UL (ref 163–337)
PMV BLD AUTO: 9.3 FL (ref 7.4–10.4)
POTASSIUM SERPL-SCNC: 4.1 MMOL/L (ref 3.5–5.1)
RBC # BLD: 4.56 M/UL (ref 4.63–6.08)
SODIUM BLD-SCNC: 139 MMOL/L (ref 137–145)
TOTAL PROTEIN: 7 G/DL (ref 6.3–8.2)
TSH SERPL DL<=0.05 MIU/L-ACNC: 5.7 UIU/ML (ref 0.47–4.68)
WBC # BLD: 9.08 K/UL (ref 4.23–9.07)

## 2020-09-03 PROCEDURE — G8427 DOCREV CUR MEDS BY ELIG CLIN: HCPCS | Performed by: NURSE PRACTITIONER

## 2020-09-03 PROCEDURE — 36415 COLL VENOUS BLD VENIPUNCTURE: CPT

## 2020-09-03 PROCEDURE — 85025 COMPLETE CBC W/AUTO DIFF WBC: CPT

## 2020-09-03 PROCEDURE — 1036F TOBACCO NON-USER: CPT | Performed by: NURSE PRACTITIONER

## 2020-09-03 PROCEDURE — 99213 OFFICE O/P EST LOW 20 MIN: CPT | Performed by: NURSE PRACTITIONER

## 2020-09-03 PROCEDURE — 2580000003 HC RX 258: Performed by: NURSE PRACTITIONER

## 2020-09-03 PROCEDURE — 1123F ACP DISCUSS/DSCN MKR DOCD: CPT | Performed by: NURSE PRACTITIONER

## 2020-09-03 PROCEDURE — 36591 DRAW BLOOD OFF VENOUS DEVICE: CPT

## 2020-09-03 PROCEDURE — 84443 ASSAY THYROID STIM HORMONE: CPT

## 2020-09-03 PROCEDURE — 96413 CHEMO IV INFUSION 1 HR: CPT

## 2020-09-03 PROCEDURE — 4040F PNEUMOC VAC/ADMIN/RCVD: CPT | Performed by: NURSE PRACTITIONER

## 2020-09-03 PROCEDURE — 80053 COMPREHEN METABOLIC PANEL: CPT

## 2020-09-03 PROCEDURE — 96372 THER/PROPH/DIAG INJ SC/IM: CPT

## 2020-09-03 PROCEDURE — G8417 CALC BMI ABV UP PARAM F/U: HCPCS | Performed by: NURSE PRACTITIONER

## 2020-09-03 PROCEDURE — 6360000002 HC RX W HCPCS: Performed by: NURSE PRACTITIONER

## 2020-09-03 RX ORDER — SODIUM CHLORIDE 0.9 % (FLUSH) 0.9 %
5 SYRINGE (ML) INJECTION PRN
Status: CANCELLED | OUTPATIENT
Start: 2020-09-03

## 2020-09-03 RX ORDER — DRONABINOL 2.5 MG/1
2.5 CAPSULE ORAL
Qty: 60 CAPSULE | Refills: 1 | OUTPATIENT
Start: 2020-09-03 | End: 2020-12-07 | Stop reason: SDUPTHER

## 2020-09-03 RX ORDER — HEPARIN SODIUM (PORCINE) LOCK FLUSH IV SOLN 100 UNIT/ML 100 UNIT/ML
500 SOLUTION INTRAVENOUS PRN
Status: DISCONTINUED | OUTPATIENT
Start: 2020-09-03 | End: 2020-09-05 | Stop reason: HOSPADM

## 2020-09-03 RX ORDER — EPINEPHRINE 1 MG/ML
0.3 INJECTION, SOLUTION, CONCENTRATE INTRAVENOUS PRN
Status: CANCELLED | OUTPATIENT
Start: 2020-09-03

## 2020-09-03 RX ORDER — HEPARIN SODIUM (PORCINE) LOCK FLUSH IV SOLN 100 UNIT/ML 100 UNIT/ML
500 SOLUTION INTRAVENOUS PRN
Status: CANCELLED | OUTPATIENT
Start: 2020-09-03

## 2020-09-03 RX ORDER — METHYLPREDNISOLONE SODIUM SUCCINATE 125 MG/2ML
125 INJECTION, POWDER, LYOPHILIZED, FOR SOLUTION INTRAMUSCULAR; INTRAVENOUS PRN
Status: CANCELLED | OUTPATIENT
Start: 2020-09-03

## 2020-09-03 RX ORDER — SODIUM CHLORIDE 0.9 % (FLUSH) 0.9 %
10 SYRINGE (ML) INJECTION PRN
Status: DISCONTINUED | OUTPATIENT
Start: 2020-09-03 | End: 2020-09-04 | Stop reason: HOSPADM

## 2020-09-03 RX ORDER — DIPHENHYDRAMINE HYDROCHLORIDE 50 MG/ML
50 INJECTION INTRAMUSCULAR; INTRAVENOUS PRN
Status: CANCELLED | OUTPATIENT
Start: 2020-09-03

## 2020-09-03 RX ORDER — SODIUM CHLORIDE 0.9 % (FLUSH) 0.9 %
10 SYRINGE (ML) INJECTION PRN
Status: CANCELLED | OUTPATIENT
Start: 2020-09-03

## 2020-09-03 RX ADMIN — SODIUM CHLORIDE, PRESERVATIVE FREE 10 ML: 5 INJECTION INTRAVENOUS at 10:20

## 2020-09-03 RX ADMIN — SODIUM CHLORIDE 480 MG: 9 INJECTION, SOLUTION INTRAVENOUS at 09:43

## 2020-09-03 RX ADMIN — DENOSUMAB 120 MG: 120 INJECTION SUBCUTANEOUS at 10:12

## 2020-09-03 RX ADMIN — HEPARIN 500 UNITS: 100 SYRINGE at 10:21

## 2020-09-03 ASSESSMENT — ENCOUNTER SYMPTOMS
CONSTIPATION: 0
DIARRHEA: 0
SHORTNESS OF BREATH: 0
EYE ITCHING: 0
EYE DISCHARGE: 0
COUGH: 0
SORE THROAT: 0
TROUBLE SWALLOWING: 0
NAUSEA: 0
VOMITING: 0
WHEEZING: 0
ABDOMINAL PAIN: 0

## 2020-09-11 RX ORDER — HYDROXYZINE HYDROCHLORIDE 25 MG/1
25 TABLET, FILM COATED ORAL EVERY 8 HOURS PRN
Qty: 90 TABLET | Refills: 1 | Status: SHIPPED | OUTPATIENT
Start: 2020-09-11 | End: 2020-10-12

## 2020-09-23 RX ORDER — LEVOTHYROXINE SODIUM 0.1 MG/1
TABLET ORAL
Qty: 90 TABLET | Refills: 1 | OUTPATIENT
Start: 2020-09-23

## 2020-09-30 RX ORDER — EPINEPHRINE 1 MG/ML
0.3 INJECTION, SOLUTION, CONCENTRATE INTRAVENOUS PRN
Status: CANCELLED | OUTPATIENT
Start: 2020-10-01

## 2020-09-30 RX ORDER — HEPARIN SODIUM (PORCINE) LOCK FLUSH IV SOLN 100 UNIT/ML 100 UNIT/ML
500 SOLUTION INTRAVENOUS PRN
Status: CANCELLED | OUTPATIENT
Start: 2020-10-01

## 2020-09-30 RX ORDER — DIPHENHYDRAMINE HYDROCHLORIDE 50 MG/ML
50 INJECTION INTRAMUSCULAR; INTRAVENOUS PRN
Status: CANCELLED | OUTPATIENT
Start: 2020-10-01

## 2020-09-30 RX ORDER — METHYLPREDNISOLONE SODIUM SUCCINATE 125 MG/2ML
125 INJECTION, POWDER, LYOPHILIZED, FOR SOLUTION INTRAMUSCULAR; INTRAVENOUS PRN
Status: CANCELLED | OUTPATIENT
Start: 2020-10-01

## 2020-09-30 RX ORDER — SODIUM CHLORIDE 0.9 % (FLUSH) 0.9 %
10 SYRINGE (ML) INJECTION PRN
Status: CANCELLED | OUTPATIENT
Start: 2020-10-01

## 2020-09-30 RX ORDER — SODIUM CHLORIDE 0.9 % (FLUSH) 0.9 %
5 SYRINGE (ML) INJECTION PRN
Status: CANCELLED | OUTPATIENT
Start: 2020-10-01

## 2020-10-01 ENCOUNTER — HOSPITAL ENCOUNTER (OUTPATIENT)
Dept: INFUSION THERAPY | Age: 80
End: 2020-10-01
Payer: MEDICARE

## 2020-10-01 ENCOUNTER — HOSPITAL ENCOUNTER (OUTPATIENT)
Dept: INFUSION THERAPY | Age: 80
Discharge: HOME | End: 2020-10-01
Payer: MEDICARE

## 2020-10-01 VITALS
RESPIRATION RATE: 18 BRPM | DIASTOLIC BLOOD PRESSURE: 64 MMHG | OXYGEN SATURATION: 96 % | WEIGHT: 192.8 LBS | SYSTOLIC BLOOD PRESSURE: 140 MMHG | HEART RATE: 63 BPM | TEMPERATURE: 98 F | BODY MASS INDEX: 26.89 KG/M2

## 2020-10-01 DIAGNOSIS — C43.9 MALIGNANT MELANOMA, UNSPECIFIED SITE (HCC): ICD-10-CM

## 2020-10-01 DIAGNOSIS — C79.51 BONE METASTASES (HCC): ICD-10-CM

## 2020-10-01 DIAGNOSIS — C43.61 MALIGNANT MELANOMA OF SKIN OF RIGHT UPPER EXTREMITY, INCLUDING SHOULDER (HCC): Primary | ICD-10-CM

## 2020-10-01 LAB
BASOPHILS ABSOLUTE: 0.04 K/UL (ref 0.01–0.08)
BASOPHILS RELATIVE PERCENT: 0.5 % (ref 0.1–1.2)
EOSINOPHILS ABSOLUTE: 0.53 K/UL (ref 0.04–0.54)
EOSINOPHILS RELATIVE PERCENT: 6.8 % (ref 0.7–7)
HCT VFR BLD CALC: 39.3 % (ref 40.1–51)
HEMOGLOBIN: 13.5 G/DL (ref 13.7–17.5)
LYMPHOCYTES ABSOLUTE: 2.58 K/UL (ref 1.18–3.74)
LYMPHOCYTES RELATIVE PERCENT: 33.1 % (ref 19.3–53.1)
MCH RBC QN AUTO: 31.5 PG (ref 25.7–32.2)
MCHC RBC AUTO-ENTMCNC: 34.4 G/DL (ref 32.3–36.5)
MCV RBC AUTO: 91.8 FL (ref 79–92.2)
MONOCYTES ABSOLUTE: 0.91 K/UL (ref 0.24–0.82)
MONOCYTES RELATIVE PERCENT: 11.7 % (ref 4.7–12.5)
NEUTROPHILS ABSOLUTE: 3.74 K/UL (ref 1.56–6.13)
NEUTROPHILS RELATIVE PERCENT: 47.9 % (ref 34–71.1)
PDW BLD-RTO: 12.4 % (ref 11.6–14.4)
PLATELET # BLD: 201 K/UL (ref 163–337)
PMV BLD AUTO: 10 FL (ref 7.4–10.4)
RBC # BLD: 4.28 M/UL (ref 4.63–6.08)
WBC # BLD: 7.8 K/UL (ref 4.23–9.07)

## 2020-10-01 PROCEDURE — 85025 COMPLETE CBC W/AUTO DIFF WBC: CPT

## 2020-10-01 PROCEDURE — 96413 CHEMO IV INFUSION 1 HR: CPT

## 2020-10-01 PROCEDURE — 36415 COLL VENOUS BLD VENIPUNCTURE: CPT

## 2020-10-01 PROCEDURE — 6360000002 HC RX W HCPCS: Performed by: PHYSICIAN ASSISTANT

## 2020-10-01 PROCEDURE — 2580000003 HC RX 258: Performed by: PHYSICIAN ASSISTANT

## 2020-10-01 PROCEDURE — 96372 THER/PROPH/DIAG INJ SC/IM: CPT

## 2020-10-01 RX ORDER — HYDROCODONE BITARTRATE AND ACETAMINOPHEN 10; 325 MG/1; MG/1
1 TABLET ORAL EVERY 6 HOURS PRN
Qty: 120 TABLET | Refills: 0 | Status: SHIPPED | OUTPATIENT
Start: 2020-10-01 | End: 2020-12-03 | Stop reason: SDUPTHER

## 2020-10-01 RX ORDER — MONTELUKAST SODIUM 10 MG/1
TABLET ORAL
Qty: 90 TABLET | Refills: 1 | Status: SHIPPED | OUTPATIENT
Start: 2020-10-01 | End: 2021-03-29

## 2020-10-01 RX ORDER — SODIUM CHLORIDE 0.9 % (FLUSH) 0.9 %
10 SYRINGE (ML) INJECTION PRN
Status: DISCONTINUED | OUTPATIENT
Start: 2020-10-01 | End: 2020-10-02 | Stop reason: HOSPADM

## 2020-10-01 RX ORDER — HEPARIN SODIUM (PORCINE) LOCK FLUSH IV SOLN 100 UNIT/ML 100 UNIT/ML
500 SOLUTION INTRAVENOUS PRN
Status: DISCONTINUED | OUTPATIENT
Start: 2020-10-01 | End: 2020-10-03 | Stop reason: HOSPADM

## 2020-10-01 RX ORDER — LEVOTHYROXINE SODIUM 88 UG/1
TABLET ORAL
Qty: 90 TABLET | Refills: 3 | Status: SHIPPED | OUTPATIENT
Start: 2020-10-01 | End: 2021-02-08 | Stop reason: SDUPTHER

## 2020-10-01 RX ADMIN — SODIUM CHLORIDE, PRESERVATIVE FREE 10 ML: 5 INJECTION INTRAVENOUS at 14:10

## 2020-10-01 RX ADMIN — DENOSUMAB 120 MG: 120 INJECTION SUBCUTANEOUS at 14:19

## 2020-10-01 RX ADMIN — SODIUM CHLORIDE 480 MG: 9 INJECTION, SOLUTION INTRAVENOUS at 13:36

## 2020-10-01 RX ADMIN — HEPARIN 500 UNITS: 100 SYRINGE at 14:10

## 2020-10-12 RX ORDER — HYDROXYZINE HYDROCHLORIDE 25 MG/1
25 TABLET, FILM COATED ORAL EVERY 8 HOURS PRN
Qty: 90 TABLET | Refills: 1 | Status: SHIPPED | OUTPATIENT
Start: 2020-10-12 | End: 2020-11-10

## 2020-10-22 ENCOUNTER — HOSPITAL ENCOUNTER (OUTPATIENT)
Dept: CT IMAGING | Facility: HOSPITAL | Age: 80
Discharge: HOME | End: 2020-10-22
Admitting: NURSE PRACTITIONER

## 2020-10-22 DIAGNOSIS — C43.61 MALIGNANT MELANOMA OF RIGHT UPPER EXTREMITY INCLUDING SHOULDER (HCC): ICD-10-CM

## 2020-10-22 DIAGNOSIS — C79.51 BONE METASTASIS: ICD-10-CM

## 2020-10-22 DIAGNOSIS — C78.7 LIVER METASTASES: ICD-10-CM

## 2020-10-22 LAB — CREAT BLDA-MCNC: 1.4 MG/DL (ref 0.6–1.3)

## 2020-10-22 PROCEDURE — 82565 ASSAY OF CREATININE: CPT

## 2020-10-22 PROCEDURE — 74177 CT ABD & PELVIS W/CONTRAST: CPT

## 2020-10-22 PROCEDURE — 71260 CT THORAX DX C+: CPT

## 2020-10-22 PROCEDURE — 25010000002 IOPAMIDOL 61 % SOLUTION: Performed by: NURSE PRACTITIONER

## 2020-10-22 RX ADMIN — IOPAMIDOL 50 ML: 612 INJECTION, SOLUTION INTRAVENOUS at 10:01

## 2020-10-22 RX ADMIN — IOPAMIDOL 100 ML: 612 INJECTION, SOLUTION INTRAVENOUS at 10:01

## 2020-10-28 RX ORDER — METHYLPREDNISOLONE SODIUM SUCCINATE 125 MG/2ML
125 INJECTION, POWDER, LYOPHILIZED, FOR SOLUTION INTRAMUSCULAR; INTRAVENOUS PRN
Status: CANCELLED | OUTPATIENT
Start: 2020-10-29

## 2020-10-28 RX ORDER — SODIUM CHLORIDE 0.9 % (FLUSH) 0.9 %
5 SYRINGE (ML) INJECTION PRN
Status: CANCELLED | OUTPATIENT
Start: 2020-10-29

## 2020-10-28 RX ORDER — HEPARIN SODIUM (PORCINE) LOCK FLUSH IV SOLN 100 UNIT/ML 100 UNIT/ML
500 SOLUTION INTRAVENOUS PRN
Status: CANCELLED | OUTPATIENT
Start: 2020-10-29

## 2020-10-28 RX ORDER — DIPHENHYDRAMINE HYDROCHLORIDE 50 MG/ML
50 INJECTION INTRAMUSCULAR; INTRAVENOUS PRN
Status: CANCELLED | OUTPATIENT
Start: 2020-10-29

## 2020-10-28 RX ORDER — SODIUM CHLORIDE 0.9 % (FLUSH) 0.9 %
10 SYRINGE (ML) INJECTION PRN
Status: CANCELLED | OUTPATIENT
Start: 2020-10-29

## 2020-10-28 RX ORDER — EPINEPHRINE 1 MG/ML
0.3 INJECTION, SOLUTION, CONCENTRATE INTRAVENOUS PRN
Status: CANCELLED | OUTPATIENT
Start: 2020-10-29

## 2020-10-29 ENCOUNTER — HOSPITAL ENCOUNTER (OUTPATIENT)
Dept: INFUSION THERAPY | Age: 80
Discharge: HOME | End: 2020-10-29
Payer: MEDICARE

## 2020-10-29 ENCOUNTER — OFFICE VISIT (OUTPATIENT)
Dept: HEMATOLOGY | Age: 80
End: 2020-10-29
Payer: MEDICARE

## 2020-10-29 VITALS
DIASTOLIC BLOOD PRESSURE: 62 MMHG | OXYGEN SATURATION: 97 % | BODY MASS INDEX: 26.96 KG/M2 | WEIGHT: 193.3 LBS | RESPIRATION RATE: 12 BRPM | SYSTOLIC BLOOD PRESSURE: 122 MMHG | HEART RATE: 63 BPM | TEMPERATURE: 97.1 F

## 2020-10-29 DIAGNOSIS — C79.51 BONE METASTASES (HCC): ICD-10-CM

## 2020-10-29 DIAGNOSIS — C43.61 MALIGNANT MELANOMA OF SKIN OF RIGHT UPPER EXTREMITY, INCLUDING SHOULDER (HCC): ICD-10-CM

## 2020-10-29 DIAGNOSIS — E03.2 HYPOTHYROIDISM DUE TO MEDICATION: Primary | ICD-10-CM

## 2020-10-29 DIAGNOSIS — C43.9 MALIGNANT MELANOMA, UNSPECIFIED SITE (HCC): ICD-10-CM

## 2020-10-29 LAB
ALBUMIN SERPL-MCNC: 4.3 G/DL (ref 3.5–5.2)
ALP BLD-CCNC: 42 U/L (ref 40–130)
ALT SERPL-CCNC: 20 U/L (ref 21–72)
ANION GAP SERPL CALCULATED.3IONS-SCNC: 8 MMOL/L (ref 7–19)
AST SERPL-CCNC: 32 U/L (ref 17–59)
BASOPHILS ABSOLUTE: 0.04 K/UL (ref 0.01–0.08)
BASOPHILS RELATIVE PERCENT: 0.5 % (ref 0.1–1.2)
BILIRUB SERPL-MCNC: 0.9 MG/DL (ref 0.2–1.3)
BUN BLDV-MCNC: 19 MG/DL (ref 9–20)
CALCIUM SERPL-MCNC: 9.1 MG/DL (ref 8.4–10.2)
CHLORIDE BLD-SCNC: 102 MMOL/L (ref 98–111)
CO2: 29 MMOL/L (ref 22–29)
CREAT SERPL-MCNC: 1.2 MG/DL (ref 0.6–1.2)
EOSINOPHILS ABSOLUTE: 0.46 K/UL (ref 0.04–0.54)
EOSINOPHILS RELATIVE PERCENT: 5.5 % (ref 0.7–7)
GFR NON-AFRICAN AMERICAN: 58
GLOBULIN: 3 G/DL
GLUCOSE BLD-MCNC: 81 MG/DL (ref 74–106)
HCT VFR BLD CALC: 43 % (ref 40.1–51)
HEMOGLOBIN: 14.8 G/DL (ref 13.7–17.5)
LYMPHOCYTES ABSOLUTE: 2.56 K/UL (ref 1.18–3.74)
LYMPHOCYTES RELATIVE PERCENT: 30.4 % (ref 19.3–53.1)
MCH RBC QN AUTO: 31.1 PG (ref 25.7–32.2)
MCHC RBC AUTO-ENTMCNC: 34.4 G/DL (ref 32.3–36.5)
MCV RBC AUTO: 90.3 FL (ref 79–92.2)
MONOCYTES ABSOLUTE: 0.86 K/UL (ref 0.24–0.82)
MONOCYTES RELATIVE PERCENT: 10.2 % (ref 4.7–12.5)
NEUTROPHILS ABSOLUTE: 4.5 K/UL (ref 1.56–6.13)
NEUTROPHILS RELATIVE PERCENT: 53.4 % (ref 34–71.1)
PDW BLD-RTO: 12.4 % (ref 11.6–14.4)
PLATELET # BLD: 230 K/UL (ref 163–337)
PMV BLD AUTO: 9.8 FL (ref 7.4–10.4)
POTASSIUM SERPL-SCNC: 4.6 MMOL/L (ref 3.5–5.1)
RBC # BLD: 4.76 M/UL (ref 4.63–6.08)
SODIUM BLD-SCNC: 139 MMOL/L (ref 137–145)
TOTAL PROTEIN: 7.3 G/DL (ref 6.3–8.2)
TSH SERPL DL<=0.05 MIU/L-ACNC: 5.3 UIU/ML (ref 0.47–4.68)
WBC # BLD: 8.42 K/UL (ref 4.23–9.07)

## 2020-10-29 PROCEDURE — 4040F PNEUMOC VAC/ADMIN/RCVD: CPT | Performed by: NURSE PRACTITIONER

## 2020-10-29 PROCEDURE — G8484 FLU IMMUNIZE NO ADMIN: HCPCS | Performed by: NURSE PRACTITIONER

## 2020-10-29 PROCEDURE — 80053 COMPREHEN METABOLIC PANEL: CPT

## 2020-10-29 PROCEDURE — 1123F ACP DISCUSS/DSCN MKR DOCD: CPT | Performed by: NURSE PRACTITIONER

## 2020-10-29 PROCEDURE — 1036F TOBACCO NON-USER: CPT | Performed by: NURSE PRACTITIONER

## 2020-10-29 PROCEDURE — 6360000002 HC RX W HCPCS: Performed by: PHYSICIAN ASSISTANT

## 2020-10-29 PROCEDURE — 85025 COMPLETE CBC W/AUTO DIFF WBC: CPT

## 2020-10-29 PROCEDURE — 36415 COLL VENOUS BLD VENIPUNCTURE: CPT

## 2020-10-29 PROCEDURE — 96372 THER/PROPH/DIAG INJ SC/IM: CPT

## 2020-10-29 PROCEDURE — 96523 IRRIG DRUG DELIVERY DEVICE: CPT

## 2020-10-29 PROCEDURE — 84443 ASSAY THYROID STIM HORMONE: CPT

## 2020-10-29 PROCEDURE — G8417 CALC BMI ABV UP PARAM F/U: HCPCS | Performed by: NURSE PRACTITIONER

## 2020-10-29 PROCEDURE — 2580000003 HC RX 258: Performed by: PHYSICIAN ASSISTANT

## 2020-10-29 PROCEDURE — G8427 DOCREV CUR MEDS BY ELIG CLIN: HCPCS | Performed by: NURSE PRACTITIONER

## 2020-10-29 PROCEDURE — 96413 CHEMO IV INFUSION 1 HR: CPT

## 2020-10-29 PROCEDURE — 99214 OFFICE O/P EST MOD 30 MIN: CPT | Performed by: NURSE PRACTITIONER

## 2020-10-29 RX ORDER — SODIUM CHLORIDE 0.9 % (FLUSH) 0.9 %
5 SYRINGE (ML) INJECTION PRN
Status: DISCONTINUED | OUTPATIENT
Start: 2020-10-29 | End: 2020-10-30 | Stop reason: HOSPADM

## 2020-10-29 RX ORDER — HEPARIN SODIUM (PORCINE) LOCK FLUSH IV SOLN 100 UNIT/ML 100 UNIT/ML
500 SOLUTION INTRAVENOUS PRN
Status: DISCONTINUED | OUTPATIENT
Start: 2020-10-29 | End: 2020-10-31 | Stop reason: HOSPADM

## 2020-10-29 RX ORDER — SODIUM CHLORIDE 0.9 % (FLUSH) 0.9 %
10 SYRINGE (ML) INJECTION PRN
Status: DISCONTINUED | OUTPATIENT
Start: 2020-10-29 | End: 2020-10-30 | Stop reason: HOSPADM

## 2020-10-29 RX ADMIN — HEPARIN 500 UNITS: 100 SYRINGE at 10:07

## 2020-10-29 RX ADMIN — SODIUM CHLORIDE, PRESERVATIVE FREE 10 ML: 5 INJECTION INTRAVENOUS at 10:07

## 2020-10-29 RX ADMIN — SODIUM CHLORIDE 480 MG: 9 INJECTION, SOLUTION INTRAVENOUS at 09:32

## 2020-10-29 RX ADMIN — DENOSUMAB 120 MG: 120 INJECTION SUBCUTANEOUS at 10:06

## 2020-10-29 ASSESSMENT — ENCOUNTER SYMPTOMS
VOMITING: 0
ROS SKIN COMMENTS: GENERALIZED PRURITIS
ABDOMINAL PAIN: 0
EYE DISCHARGE: 0
COUGH: 0
SHORTNESS OF BREATH: 0
BACK PAIN: 1
EYE ITCHING: 0
EYE REDNESS: 0
WHEEZING: 0
TROUBLE SWALLOWING: 0
SORE THROAT: 0
DIARRHEA: 0
NAUSEA: 0
PHOTOPHOBIA: 0
CONSTIPATION: 0

## 2020-10-29 NOTE — PROGRESS NOTES
TUMOR HISTORY: Resected stage IIIA (pT3a pN1a M0) right upper extremity malignant melanoma, 06/05/14  Mr. Ivan Colbert was seen in initial oncology consultation on 08/05/14, referred by Dr. Almas Cortes, regarding a diagnosis of resected malignant melanoma of the right upper extremity. Dr. Bridger Gonzalez resected a malignant melanoma from the right posterior arm, above the elbow, on 06/05/14. Pathology revealed a 2.25 mm thick non-ulcerated Pankaj's level IV malignant melanoma. There were 10 mitoses/ sq mm. There was no vascular or lymphatic invasion. A wide excision with a sentinel lymph node biopsy was performed by by Dr. Jeffrey Adams on 07/09/14. Pathology revealed that the right axillary sentinel lymph node was positive for metastatic malignancy by immunoperoxidase stain. The wide excision sample was without further local involvement. A right axillary lymph node dissection was performed by Dr. Jeffrey Adams on 07/21/14. No metastatic malignancy was noted in any of the 8 right axillary lymph nodes submitted. The HMB-45 immunoperoxidase stain was negative for metastatic malignant melanoma in any of these subsequently submitted lymph nodes. Completion of a metastatic workup on 08/06/14, including MRI of the brain, bone scan, CT scans of the chest, abdomen and pelvis were negative for evidence of metastatic disease. Adjuvant pegylated Interferon (Sylatron) 6 mcg/kg (500mcg) sub-q weekly x 8 to be followed then by 3 mcg/kg(250mcg) weekly x up to 5 years was discussed and planned. Adjuvant therapy was indeed initiated and delivered as discussed below in treatment summary. The last dose of the medication was delivered on 2/15/2015. He was unable to tolerate this medication even at reduced dosages because of poor tolerability, weight loss, anorexia, unable to sleep, anxiety, fatigue, et [place]. He declined any further interferon, which is reasonable.    A one-year followup CT scan of the chest and MRI of the brain on 6/11/2015 did not reveal any evidence of recurrent disease.  ----------------------------------PROGRESSION --------------------------------  Mary Hall presented to [facility] emergency department on 7/3/18 for a 3 month history of waxing and waning epigastric abdominal pain and new onset fever. Additional symptoms reported include unexplained weight loss, nausea, headaches. CXR 7/3/18 showed mild cardiomegaly with no acute cardiopulmonary process. Abdominal/pelvic CT with contrast 7/3/18 documented multiple low density bilateral hepatic lesions suspicious for metastatic disease. 2 small renal cysts are noted and one on the left. A second left renal lesion near the lower pole measured 25 mm with SUV of 34, ultrasound recommended. The prostate is markedly enlarged causing partial right obstructive uropathy. CBC shows WBC 7.3, hemoglobin 15.8 and platelets [phone]  CMP revealed a creatinine of 1.2, GFR 59. LFTs WNL  UA negative  Lipase 28, troponin <0.01  He has a history of GERD with laparoscopic paraesophageal hernia repair and fundoplication by Dr. Sheila Carranza on 5/24/15. He was prescribed Norco and Zofran PRN at [facility] ER. Rx was given for omeprazole in clinic  Tumor markers drawn 7/11/18 included:   AFP 5.8   CEA 2.0   CA 19-9 - 8   PSA 7.4 (chronically elevated, up to 8.28 on 8/8/14)  MRI of the abdomen w/wo contrast 7/13/18 at [facility] documented innumerable T1 hypointense, T2 hyperintense lesions throughout the liver as noted on recent CT. One of the larger lesions seen posteriorly in the right hepatic lobe measured 1.6 cm. Also noted was at least two T2 hyperintense lesions within the thoracocolumbar spine, which could represent metastasis. Bilateral renal ultrasound 7/3/18 showed a 2.6 cm hypoechoic left lower pole renal lesion, not typical of a benign cyst with enhancing characteristics concerning for primary renal cancer versus metastatic disease.  Bilateral nephrogenic cysts noted along with marked prostate enlargement with lobular changes measuring 8 x 5.9 x 4.9 cm. MRI of the brain w/wo contrast 7/3/18 for complaint of headache and nausea was without evidence of acute intracranial process. Contrasted chest CT 7/24/18 revealed a stable 2 cm right thyroid nodule, a new 9 mm right thyroid nodule. An indeterminate 3 mm subpleural HORACIO nodule is noted, likely granulomatous or postinfectious. Bone scan 7/24/18 was negative for evidence of osseous metastasis. Examination is remarkable for mid epigastric discomfort, 10 lbs weight loss and 1 inch right axillary lymph node. This was a previous site of positive sentinel lymph node biopsy and dissection associated with the resected, stage IIIa right upper extremity malignant melanoma in 2014. Suspect recurrent malignant melanoma. Denisha King was referred to Dr. Manuelito Vieira who performed an FNA of the right axillary lymph node 7/26/18. Pathology was consistent for metastatic malignant melanoma. 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 Findings were discussed with both Denisha King and his wife by Dr. Nasim Bray at follow-up on 8/1/18 and reiterated on 8/7/18. Recommendation is for combination therapy with Opdivo 1 mg/kg and Yervoy 3 mg/kg every 3 weeks ×4 cycles, followed by Opdivo 240 mg every 2 weeks thereafter. Denisha King had a left chest Mediport placed by Dr. Dasia Jimenez 8/3/18. PET scan 8/6/18 showed the following:  3 right axillary lymph nodes, SUV up to 8.7   Hepatic metastasis, too numerous to count   2 celiac lymph nodes SUV 2.8 and 3   Right ilium, SUV 5. Right acetabulum SUV 6.4  Cycle #1 Opdivo/Yervoy and monthly Xgeva initiated 8/7/18. He was evaluated at [facility] 10/16/18 for LLQ abdominal discomfort with a history of recent diverticulitis. Abdominal/pelvic CT with contrast revealed a decreased size in the multiple liver nodules. Bilateral renal nodules were stable.   An enlarged prostate with an exophytic mass arising from the posterior superior aspect of Diagnosis Date    Acid reflux     BPH (benign prostatic hyperplasia)     Cancer (HCC)     Diverticulitis     Hard of hearing     Hypercholesteremia     Hypertension     Malignant melanoma of skin of upper limb, including shoulder (HCC) dx 6/5/2014    to liver, stomach, bone, and right axilla     Past Surgical History:    Past Surgical History:   Procedure Laterality Date    CHOLECYSTECTOMY      TUNNELED CENTRAL VENOUS CATHETER W/ SUBCUTANEOUS PORT       Current Medications:    Current Outpatient Medications   Medication Sig Dispense Refill    hydrOXYzine (ATARAX) 25 MG tablet TAKE 1 TABLET BY MOUTH EVERY 8 HOURS AS NEEDED FOR ITCHING 90 tablet 1    montelukast (SINGULAIR) 10 MG tablet TAKE 1 TABLET BY MOUTH EVERY DAY AT NIGHT 90 tablet 1    levothyroxine (SYNTHROID) 88 MCG tablet Take one tablet by mouth daily. 90 tablet 3    HYDROcodone-acetaminophen (NORCO)  MG per tablet Take 1 tablet by mouth every 6 hours as needed for Pain for up to 30 days. Intended supply: 30 days 120 tablet 0    dicyclomine (BENTYL) 20 MG tablet TAKE 1/2 TABLET BY MOUTH 4 TIMES A DAY AS NEEDED 180 tablet 3    PARoxetine (PAXIL) 10 MG tablet TAKE 1 TABLET BY MOUTH EVERY DAY 90 tablet 3    finasteride (PROSCAR) 5 MG tablet Take 5 mg by mouth      lovastatin (MEVACOR) 40 MG tablet Take 40 mg by mouth      triamcinolone (KENALOG) 0.025 % cream Apply topically 2 times daily. (Patient not taking: Reported on 10/29/2020) 30 g 2     No current facility-administered medications for this visit.       Facility-Administered Medications Ordered in Other Visits   Medication Dose Route Frequency Provider Last Rate Last Dose    sodium chloride flush 0.9 % injection 5 mL  5 mL Intravenous PRN Elijah Oro PA-C        sodium chloride flush 0.9 % injection 10 mL  10 mL Intravenous PRN Elijah Oro PA-C   10 mL at 10/29/20 1007    heparin flush 100 UNIT/ML injection 500 Units  500 Units Intracatheter PRN Elijah Oro PA-C 500 Units at 10/29/20 1007        Allergies: No Known Allergies  Social History:    Social History     Tobacco Use    Smoking status: Former Smoker    Smokeless tobacco: Never Used   Substance Use Topics    Alcohol use: No    Drug use: No     Family History:   Family History   Problem Relation Age of Onset    Heart Attack Brother          age 78 of [place]       Subjective   REVIEW OF SYSTEMS:   Review of Systems   Constitutional: Positive for fatigue. Negative for fever. No night sweats   HENT: Negative for dental problem, hearing loss, mouth sores, nosebleeds, sore throat and trouble swallowing. Eyes: Negative for photophobia, discharge, redness and itching. No blurring of vision, no double vision   Respiratory: Negative for cough, shortness of breath and wheezing. No hemoptysis   Cardiovascular: Negative for chest pain, palpitations and leg swelling. Gastrointestinal: Negative for abdominal pain, constipation, diarrhea, nausea and vomiting. Endocrine: Negative for cold intolerance, heat intolerance, polydipsia and polyuria. Genitourinary: Negative for dysuria, frequency, hematuria and urgency. Musculoskeletal: Positive for arthralgias and back pain. Negative for joint swelling and myalgias. Generalized weakness, mild   Skin: Negative for pallor and rash. Generalized pruritis    Allergic/Immunologic: Negative for environmental allergies and immunocompromised state. Neurological: Negative for seizures, syncope and numbness. Hematological: Negative for adenopathy. Does not bruise/bleed easily. Psychiatric/Behavioral: Negative for agitation, behavioral problems and confusion. The patient is not nervous/anxious.       Objective   Vitals:    10/29/20 0830   BP: 122/62   Pulse: 63   Resp: 12   Temp: 97.1 °F (36.2 °C)   SpO2: 97%     Wt Readings from Last 3 Encounters:   10/29/20 193 lb 4.8 oz (87.7 kg)   10/01/20 192 lb 12.8 oz (87.5 kg)   20 192 lb (87.1 kg)     PHYSICAL EXAM:  Physical Exam  Vitals signs reviewed. Constitutional:       General: He is not in acute distress. Appearance: He is well-developed. He is not toxic-appearing or diaphoretic. HENT:      Head: Normocephalic and atraumatic. Right Ear: External ear normal.      Left Ear: External ear normal.      Nose: Nose normal.      Mouth/Throat:      Mouth: Mucous membranes are moist.   Eyes:      General: No scleral icterus. Right eye: No discharge. Left eye: No discharge. Conjunctiva/sclera: Conjunctivae normal.   Neck:      Musculoskeletal: Neck supple. Trachea: No tracheal deviation. Cardiovascular:      Rate and Rhythm: Normal rate and regular rhythm. Pulmonary:      Effort: Pulmonary effort is normal. No respiratory distress. Breath sounds: Normal breath sounds. No wheezing or rales. Abdominal:      General: Bowel sounds are normal. There is no distension. Palpations: Abdomen is soft. Tenderness: There is no abdominal tenderness. There is no guarding. Genitourinary:     Comments: Exam deferred  Musculoskeletal:         General: No tenderness or deformity. Comments: Normal ROM all four extremities   Lymphadenopathy:      Cervical: No cervical adenopathy. Right cervical: No superficial cervical adenopathy. Left cervical: No superficial cervical adenopathy. Upper Body:      Right upper body: No supraclavicular adenopathy. Left upper body: No supraclavicular adenopathy. Comments: No bulky palpable cervical, clavicular, axillary or inguinal adenopathies on the left or right. Skin:     General: Skin is warm and dry. Findings: No rash. Comments: Left anterior chest wall Mediport in place, no sign of infection. No significant collateral vessels noted to the anterior chest   Neurological:      Mental Status: He is alert and oriented to person, place, and time.       Comments: follows commands, non-focal Psychiatric:         Behavior: Behavior normal. Behavior is cooperative. Thought Content: Thought content normal.         Judgment: Judgment normal.      Comments: Alert and oriented to person, place and time. Labs 8/6/2020:  · CMP: Creatinine 1.2, GFR 58, alk phos 42  · TSH: 4.59    CBC 10/29/20: WBC 8.42, Hgb 14.8/MCV 90.3, platelets [phone]    ASSESSMENT:   1. Malignant melanoma of skin of right upper extremity, including shoulder (HCC)    2. Liver metastasis ([address]    3. Bone metastases ([address]    4. Encounter for antineoplastic immunotherapy    5. Hypothyroidism due to medication    6. Left renal mass    7. Pruritus    8. Abnormal finding on CT scan    9. Cancer related pain      Malignant melanoma of skin of right upper extremity, including shoulder ([address]  Liver metastasis ([address]  Bone metastases ([address]  Encounter for antineoplastic immunotherapy    Contrasted CT chest, abdomen/pelvis 10/22/2020 at [facility]:  · No evidence of metastatic disease in the chest, abdomen or pelvis  · High-grade SVC narrowing with collateral vein formation in the mediastinum  · Stable 1.7 cm rim calcified right thyroid nodule  · Bilateral renal cysts with stable intermediate density cyst at the left inferior pole, favor hemorrhagic cyst    Excellent response to immunotherapy. Mr. Dayday Urban is out over 2 years since his original diagnosis of widespread metastatic melanoma. Proceed with monthly nivolumab and X-Geva today and continue every 4 weeks    Abnormal findings on CT scan  Consult vascular surgery regarding high-grade SVC narrowing with collateral vein formation in the mediastinum    Thyroid nodule/hypothyroidism due to medication  TSH was 5.7 on 9/3/2020, repeat TSH today  Continues Synthroid 88 mcg per day  1.7 cm thyroid nodule stable on CT 10/22/2020    Decrease in appetite  Stable with Marinol 2.5 mg po BID PRN  Weight stable    Cancer related pain/arthritis  Continue Norco PRN pain as prescribed.  Report new or worsening pain. Left renal mass  2.2 cm left renal mass stable on abdominal/pelvic CT dated 6/4/2020 and 10/22/2020   Previously evaluated by urology, monitor conservatively    Pruritus  Managed with Singulair, Paxil, antihistamine    Orders Placed This Encounter   Procedures   [facility] [place]       RTC RN in 1 month for nivolumab. Return in about 1 month (around 11/29/2020) for follow up with [facility], [facility] for [id number] [place] [address].          LIAM Carbajal  10:35 AM  10/29/2020

## 2020-11-10 RX ORDER — HYDROXYZINE HYDROCHLORIDE 25 MG/1
25 TABLET, FILM COATED ORAL EVERY 8 HOURS PRN
Qty: 90 TABLET | Refills: 1 | Status: SHIPPED | OUTPATIENT
Start: 2020-11-10 | End: 2021-02-08 | Stop reason: SDUPTHER

## 2020-11-11 ENCOUNTER — TELEPHONE (OUTPATIENT)
Dept: VASCULAR SURGERY | Age: 80
End: 2020-11-11

## 2020-11-11 ENCOUNTER — OFFICE VISIT (OUTPATIENT)
Dept: VASCULAR SURGERY | Age: 80
End: 2020-11-11
Payer: MEDICARE

## 2020-11-11 VITALS
RESPIRATION RATE: 16 BRPM | BODY MASS INDEX: 26.6 KG/M2 | HEART RATE: 66 BPM | OXYGEN SATURATION: 97 % | WEIGHT: 190 LBS | SYSTOLIC BLOOD PRESSURE: 146 MMHG | TEMPERATURE: 98.6 F | DIASTOLIC BLOOD PRESSURE: 73 MMHG | HEIGHT: 71 IN

## 2020-11-11 PROCEDURE — 99203 OFFICE O/P NEW LOW 30 MIN: CPT | Performed by: PHYSICIAN ASSISTANT

## 2020-11-11 PROCEDURE — G8427 DOCREV CUR MEDS BY ELIG CLIN: HCPCS | Performed by: PHYSICIAN ASSISTANT

## 2020-11-11 PROCEDURE — G8417 CALC BMI ABV UP PARAM F/U: HCPCS | Performed by: PHYSICIAN ASSISTANT

## 2020-11-11 PROCEDURE — G8484 FLU IMMUNIZE NO ADMIN: HCPCS | Performed by: PHYSICIAN ASSISTANT

## 2020-11-11 PROCEDURE — 4040F PNEUMOC VAC/ADMIN/RCVD: CPT | Performed by: PHYSICIAN ASSISTANT

## 2020-11-11 PROCEDURE — 1036F TOBACCO NON-USER: CPT | Performed by: PHYSICIAN ASSISTANT

## 2020-11-11 PROCEDURE — 1123F ACP DISCUSS/DSCN MKR DOCD: CPT | Performed by: PHYSICIAN ASSISTANT

## 2020-11-11 NOTE — TELEPHONE ENCOUNTER
----- Message from Ang Weiss PA-C sent at 11/11/2020  1:55 PM CST -----  Can we please request the CT chest from [facility] from June 2020 and 10/2020. We need those for Dr. Mirtha Dunlap to review.

## 2020-12-02 RX ORDER — METHYLPREDNISOLONE SODIUM SUCCINATE 125 MG/2ML
125 INJECTION, POWDER, LYOPHILIZED, FOR SOLUTION INTRAMUSCULAR; INTRAVENOUS PRN
Status: CANCELLED | OUTPATIENT
Start: 2020-12-03

## 2020-12-02 RX ORDER — SODIUM CHLORIDE 0.9 % (FLUSH) 0.9 %
10 SYRINGE (ML) INJECTION PRN
Status: CANCELLED | OUTPATIENT
Start: 2020-12-03

## 2020-12-02 RX ORDER — HEPARIN SODIUM (PORCINE) LOCK FLUSH IV SOLN 100 UNIT/ML 100 UNIT/ML
500 SOLUTION INTRAVENOUS PRN
Status: CANCELLED | OUTPATIENT
Start: 2020-12-03

## 2020-12-02 RX ORDER — EPINEPHRINE 1 MG/ML
0.3 INJECTION, SOLUTION, CONCENTRATE INTRAVENOUS PRN
Status: CANCELLED | OUTPATIENT
Start: 2020-12-03

## 2020-12-02 RX ORDER — DIPHENHYDRAMINE HYDROCHLORIDE 50 MG/ML
50 INJECTION INTRAMUSCULAR; INTRAVENOUS PRN
Status: CANCELLED | OUTPATIENT
Start: 2020-12-03

## 2020-12-02 RX ORDER — SODIUM CHLORIDE 0.9 % (FLUSH) 0.9 %
5 SYRINGE (ML) INJECTION PRN
Status: CANCELLED | OUTPATIENT
Start: 2020-12-03

## 2020-12-03 ENCOUNTER — HOSPITAL ENCOUNTER (OUTPATIENT)
Dept: INFUSION THERAPY | Age: 80
Discharge: HOME | End: 2020-12-03
Payer: MEDICARE

## 2020-12-03 ENCOUNTER — TELEPHONE (OUTPATIENT)
Dept: VASCULAR SURGERY | Age: 80
End: 2020-12-03

## 2020-12-03 ENCOUNTER — OFFICE VISIT (OUTPATIENT)
Dept: HEMATOLOGY | Age: 80
End: 2020-12-03
Payer: MEDICARE

## 2020-12-03 VITALS
TEMPERATURE: 97.1 F | OXYGEN SATURATION: 94 % | HEIGHT: 71 IN | DIASTOLIC BLOOD PRESSURE: 62 MMHG | WEIGHT: 195 LBS | BODY MASS INDEX: 27.3 KG/M2 | SYSTOLIC BLOOD PRESSURE: 128 MMHG | HEART RATE: 61 BPM

## 2020-12-03 VITALS
SYSTOLIC BLOOD PRESSURE: 128 MMHG | WEIGHT: 195 LBS | TEMPERATURE: 97.1 F | HEART RATE: 61 BPM | BODY MASS INDEX: 27.3 KG/M2 | DIASTOLIC BLOOD PRESSURE: 62 MMHG | HEIGHT: 71 IN

## 2020-12-03 DIAGNOSIS — C43.9 MALIGNANT MELANOMA, UNSPECIFIED SITE (HCC): ICD-10-CM

## 2020-12-03 DIAGNOSIS — C43.61 MALIGNANT MELANOMA OF SKIN OF RIGHT UPPER EXTREMITY, INCLUDING SHOULDER (HCC): Primary | ICD-10-CM

## 2020-12-03 DIAGNOSIS — C79.51 BONE METASTASES (HCC): ICD-10-CM

## 2020-12-03 LAB
ALBUMIN SERPL-MCNC: 3.9 G/DL (ref 3.5–5.2)
ALP BLD-CCNC: 46 U/L (ref 40–130)
ALT SERPL-CCNC: 24 U/L (ref 21–72)
ANION GAP SERPL CALCULATED.3IONS-SCNC: 9 MMOL/L (ref 7–19)
AST SERPL-CCNC: 30 U/L (ref 17–59)
BASOPHILS ABSOLUTE: 0.02 K/UL (ref 0.01–0.08)
BASOPHILS RELATIVE PERCENT: 0.3 % (ref 0.1–1.2)
BILIRUB SERPL-MCNC: 0.9 MG/DL (ref 0.2–1.3)
BUN BLDV-MCNC: 18 MG/DL (ref 9–20)
CALCIUM SERPL-MCNC: 8.8 MG/DL (ref 8.4–10.2)
CHLORIDE BLD-SCNC: 101 MMOL/L (ref 98–111)
CO2: 28 MMOL/L (ref 22–29)
CREAT SERPL-MCNC: 1.1 MG/DL (ref 0.6–1.2)
EOSINOPHILS ABSOLUTE: 0.33 K/UL (ref 0.04–0.54)
EOSINOPHILS RELATIVE PERCENT: 4.5 % (ref 0.7–7)
GFR NON-AFRICAN AMERICAN: >60
GLUCOSE BLD-MCNC: 128 MG/DL (ref 74–106)
HCT VFR BLD CALC: 41.9 % (ref 40.1–51)
HEMOGLOBIN: 14.5 G/DL (ref 13.7–17.5)
LYMPHOCYTES ABSOLUTE: 1.96 K/UL (ref 1.18–3.74)
LYMPHOCYTES RELATIVE PERCENT: 27 % (ref 19.3–53.1)
MCH RBC QN AUTO: 31.5 PG (ref 25.7–32.2)
MCHC RBC AUTO-ENTMCNC: 34.6 G/DL (ref 32.3–36.5)
MCV RBC AUTO: 91.1 FL (ref 79–92.2)
MONOCYTES ABSOLUTE: 0.71 K/UL (ref 0.24–0.82)
MONOCYTES RELATIVE PERCENT: 9.8 % (ref 4.7–12.5)
NEUTROPHILS ABSOLUTE: 4.25 K/UL (ref 1.56–6.13)
NEUTROPHILS RELATIVE PERCENT: 58.4 % (ref 34–71.1)
PDW BLD-RTO: 12.2 % (ref 11.6–14.4)
PLATELET # BLD: 202 K/UL (ref 163–337)
PMV BLD AUTO: 9.5 FL (ref 7.4–10.4)
POTASSIUM SERPL-SCNC: 4.1 MMOL/L (ref 3.5–5.1)
RBC # BLD: 4.6 M/UL (ref 4.63–6.08)
SODIUM BLD-SCNC: 138 MMOL/L (ref 137–145)
TOTAL PROTEIN: 6.7 G/DL (ref 6.3–8.2)
TSH SERPL DL<=0.05 MIU/L-ACNC: 3.79 UIU/ML (ref 0.47–4.68)
WBC # BLD: 7.27 K/UL (ref 4.23–9.07)

## 2020-12-03 PROCEDURE — 84443 ASSAY THYROID STIM HORMONE: CPT

## 2020-12-03 PROCEDURE — G8427 DOCREV CUR MEDS BY ELIG CLIN: HCPCS | Performed by: NURSE PRACTITIONER

## 2020-12-03 PROCEDURE — 36415 COLL VENOUS BLD VENIPUNCTURE: CPT

## 2020-12-03 PROCEDURE — G8417 CALC BMI ABV UP PARAM F/U: HCPCS | Performed by: NURSE PRACTITIONER

## 2020-12-03 PROCEDURE — 2580000003 HC RX 258: Performed by: INTERNAL MEDICINE

## 2020-12-03 PROCEDURE — 96372 THER/PROPH/DIAG INJ SC/IM: CPT

## 2020-12-03 PROCEDURE — 6360000002 HC RX W HCPCS: Performed by: INTERNAL MEDICINE

## 2020-12-03 PROCEDURE — 4040F PNEUMOC VAC/ADMIN/RCVD: CPT | Performed by: NURSE PRACTITIONER

## 2020-12-03 PROCEDURE — G8484 FLU IMMUNIZE NO ADMIN: HCPCS | Performed by: NURSE PRACTITIONER

## 2020-12-03 PROCEDURE — 1036F TOBACCO NON-USER: CPT | Performed by: NURSE PRACTITIONER

## 2020-12-03 PROCEDURE — 80053 COMPREHEN METABOLIC PANEL: CPT

## 2020-12-03 PROCEDURE — 99213 OFFICE O/P EST LOW 20 MIN: CPT | Performed by: NURSE PRACTITIONER

## 2020-12-03 PROCEDURE — 1123F ACP DISCUSS/DSCN MKR DOCD: CPT | Performed by: NURSE PRACTITIONER

## 2020-12-03 PROCEDURE — 85025 COMPLETE CBC W/AUTO DIFF WBC: CPT

## 2020-12-03 PROCEDURE — 96413 CHEMO IV INFUSION 1 HR: CPT

## 2020-12-03 RX ORDER — HEPARIN SODIUM (PORCINE) LOCK FLUSH IV SOLN 100 UNIT/ML 100 UNIT/ML
500 SOLUTION INTRAVENOUS PRN
Status: DISCONTINUED | OUTPATIENT
Start: 2020-12-03 | End: 2020-12-05 | Stop reason: HOSPADM

## 2020-12-03 RX ORDER — SODIUM CHLORIDE 0.9 % (FLUSH) 0.9 %
10 SYRINGE (ML) INJECTION PRN
Status: DISCONTINUED | OUTPATIENT
Start: 2020-12-03 | End: 2020-12-04 | Stop reason: HOSPADM

## 2020-12-03 RX ORDER — HYDROCODONE BITARTRATE AND ACETAMINOPHEN 10; 325 MG/1; MG/1
1 TABLET ORAL EVERY 6 HOURS PRN
Qty: 120 TABLET | Refills: 0 | Status: SHIPPED | OUTPATIENT
Start: 2020-12-03 | End: 2020-12-31 | Stop reason: SDUPTHER

## 2020-12-03 RX ADMIN — HEPARIN 500 UNITS: 100 SYRINGE at 09:32

## 2020-12-03 RX ADMIN — SODIUM CHLORIDE, PRESERVATIVE FREE 10 ML: 5 INJECTION INTRAVENOUS at 09:32

## 2020-12-03 RX ADMIN — DENOSUMAB 120 MG: 120 INJECTION SUBCUTANEOUS at 09:33

## 2020-12-03 RX ADMIN — SODIUM CHLORIDE 480 MG: 9 INJECTION, SOLUTION INTRAVENOUS at 09:00

## 2020-12-03 ASSESSMENT — ENCOUNTER SYMPTOMS
PHOTOPHOBIA: 0
TROUBLE SWALLOWING: 0
BACK PAIN: 1
EYE REDNESS: 0
DIARRHEA: 0
EYE ITCHING: 0
NAUSEA: 0
VOMITING: 0
EYE DISCHARGE: 0
SORE THROAT: 0
ROS SKIN COMMENTS: GENERALIZED PRURITIS
CONSTIPATION: 0
COUGH: 0
ABDOMINAL PAIN: 0
WHEEZING: 0
SHORTNESS OF BREATH: 0

## 2020-12-03 NOTE — PROGRESS NOTES
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 He was diagnosed with recurrent disease in July, 2018. He has been treated regularly with nivolumab since April, 2019. He continues to receive monthly Xgeva. Kelly Orta is monitored and treated for immunotherapy related hypothyroidism. He is tolerating treatment with nausea on occasion (relieved with antiemetic) and bony aches. Appetite has remained stable with Marinol. Weight is stable. Itching is managed with Singulair, Paxil and Atarax. He denies cough, diarrhea or rash. High-grade SVC stenosis was evaluated by vascular surgery. He does not display symptoms of acute SVC syndrome. Kelly Orta returns to the clinic, scheduled for treatment today. ECOG grade 1  Neuropathy grade 1  Fatigue grade 1    TARGET METASTATIC MALIGNANT MELANOMA SITES:  1. Right upper extremity original primary site 06/05/14   2. Right axilla lymph node at presentation 6/5/2014 and 3 axillary nodes at recurrence 7/27/2018 with an SUV of 8.7   3. Too numerous to count liver metastases   4. Celiac lymph nodes x 2 with highest SUV of 3   5. Bony metastatic disease on PET scan in the right ilium (SUV of 5) and right acetabulum (SUV of 6.4)   6. Indeterminate HORACIO 3 mm subpleural nodule   7. Indeterminate thyroid nodules on chest CT    1st TUMOR HISTORY: Resected stage IIIA (pT3a pN1a M0) right upper extremity malignant melanoma, 06/05/14  Mr. Mega Danielson was seen in initial oncology consultation on 08/05/14, referred by Dr. Miguel Pulido, regarding a diagnosis of resected malignant melanoma of the right upper extremity. Dr. Pablo Stokes resected a malignant melanoma from the right posterior arm, above the elbow, on 06/05/14. Pathology revealed a 2.25 mm thick non-ulcerated Pankaj's level IV malignant melanoma. There were 10 mitoses/ sq mm. There was no vascular or lymphatic invasion. A wide excision with a sentinel lymph node biopsy was performed by by Dr. Shonda Cruz on 07/09/14. Pathology revealed that the right axillary sentinel lymph node was positive for metastatic malignancy by immunoperoxidase stain. The wide excision sample was without further local involvement. A right axillary lymph node dissection was performed by Dr. Shonda Cruz on 07/21/14. No metastatic malignancy was noted in any of the 8 right axillary lymph nodes submitted. The HMB-45 immunoperoxidase stain was negative for metastatic malignant melanoma in any of these subsequently submitted lymph nodes. Completion of a metastatic workup on 08/06/14, including MRI of the brain, bone scan, CT scans of the chest, abdomen and pelvis were negative for evidence of metastatic disease. Adjuvant pegylated Interferon (Sylatron) 6 mcg/kg (500mcg) sub-q weekly x 8 to be followed then by 3 mcg/kg(250mcg) weekly x up to 5 years was discussed and planned. Adjuvant therapy was indeed initiated and delivered as discussed below in treatment summary. The last dose of the medication was delivered on 2/15/2015. He was unable to tolerate this medication even at reduced dosages because of poor tolerability, weight loss, anorexia, unable to sleep, anxiety, fatigue, et Mell Jimmie. He declined any further interferon, which is reasonable.    A one-year followup CT scan of the chest and MRI of the brain on 6/11/2015 did not reveal any evidence of recurrent disease.  ----------------------------------PROGRESSION --------------------------------  Augustina Holder presented to [address] [place] [address] emergency department on 7/3/18 for a 3 month history of waxing and waning epigastric abdominal pain and new onset fever. Additional symptoms reported include unexplained weight loss, nausea, headaches. CXR 7/3/18 showed mild cardiomegaly with no acute cardiopulmonary process. Abdominal/pelvic CT with contrast 7/3/18 documented multiple low density bilateral hepatic lesions suspicious for metastatic disease. 2 small renal cysts are noted and one on the left. A second left renal lesion near the lower pole measured 25 mm with SUV of 34, ultrasound recommended. The prostate is markedly enlarged causing partial right obstructive uropathy. CBC shows WBC 7.3, hemoglobin 15.8 and platelets 537,591  CMP revealed a creatinine of 1.2, GFR 59. LFTs WNL  UA negative  Lipase 28, troponin <0.01  He has a history of GERD with laparoscopic paraesophageal hernia repair and fundoplication by Dr. Kae Del Valle on 5/24/15. He was prescribed Norco and Zofran PRN at [facility]. Rx was given for omeprazole in clinic  Tumor markers drawn 7/11/18 included:   AFP 5.8   CEA 2.0   CA 19-9 - 8   PSA 7.4 (chronically elevated, up to 8.28 on 8/8/14)  MRI of the abdomen w/wo contrast 7/13/18 at [facility] documented innumerable T1 hypointense, T2 hyperintense lesions throughout the liver as noted on recent CT. One of the larger lesions seen posteriorly in the right hepatic lobe measured 1.6 cm. Also noted was at least two T2 hyperintense lesions within the thoracocolumbar spine, which could represent metastasis. Bilateral renal ultrasound 7/3/18 showed a 2.6 cm hypoechoic left lower pole renal lesion, not typical of a benign cyst with enhancing characteristics concerning for primary renal cancer versus metastatic disease. Bilateral nephrogenic cysts noted along with marked prostate enlargement with lobular changes measuring 8 x 5.9 x 4.9 cm. MRI of the brain w/wo contrast 7/3/18 for complaint of headache and nausea was without evidence of acute intracranial process.   Contrasted chest CT 7/24/18 revealed a stable 2 cm right thyroid nodule, a new 9 mm right thyroid nodule. An indeterminate 3 mm subpleural HORACIO nodule is noted, likely granulomatous or postinfectious. Bone scan 7/24/18 was negative for evidence of osseous metastasis. Examination is remarkable for mid epigastric discomfort, 10 lbs weight loss and 1 inch right axillary lymph node. This was a previous site of positive sentinel lymph node biopsy and dissection associated with the resected, stage IIIa right upper extremity malignant melanoma in 2014. Suspect recurrent malignant melanoma. Wing Escudero was referred to Dr. Lori Ma who performed an FNA of the right axillary lymph node 7/26/18. Pathology was consistent for metastatic malignant melanoma. 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 Findings were discussed with both Wing Escudero and his wife by Dr. Jeyson Green at follow-up on 8/1/18 and reiterated on 8/7/18. Recommendation is for combination therapy with Opdivo 1 mg/kg and Yervoy 3 mg/kg every 3 weeks ×4 cycles, followed by Opdivo 240 mg every 2 weeks thereafter. Wing Escudero had a left chest Mediport placed by Dr. Murphy Oas 8/3/18. PET scan 8/6/18 showed the following:  3 right axillary lymph nodes, SUV up to 8.7   Hepatic metastasis, too numerous to count   2 celiac lymph nodes SUV 2.8 and 3   Right ilium, SUV 5. Right acetabulum SUV 6.4  Cycle #1 Opdivo/Yervoy and monthly Xgeva initiated 8/7/18. He was evaluated at [facility] 10/16/18 for LLQ abdominal discomfort with a history of recent diverticulitis. Abdominal/pelvic CT with contrast revealed a decreased size in the multiple liver nodules. Bilateral renal nodules were stable. An enlarged prostate with an exophytic mass arising from the posterior superior aspect of the prostate was present. He was treated for acute diverticulitis of the mid to distal sigmoid colon.   Wing Escudero was evaluated by Dr. Michael Hinojosa on 11/27/18 regarding possible prostate mass and elevated PSA with recommendations for surveillance only. Contrasted chest CT on 11/8/18 at [facility] showed no enlarged axillary, hilar or mediastinal lymph nodes. There were no acute osseous or soft tissue abnormalities. Abel Aaron has had treatment delay of Nivolumab on more than one occasion due to rash, requiring treatment with prednisone. Nivolumab was discontinued following dose #2 of maintenance therapy on 12/20/18. He initiated cycle #1 of Tafinlar 150 mg po BID & Mekinist 2 mg po BID on 1/4/19. Contrasted CT chest 1/9/19 at [facility] showed emphysematous changes bilaterally without evidence of metastatic disease. A 1.4 cm right hilar lymph node was unchanged since 2015, likely benign. Abdominal/pelvic CT with contrast 1/9/19 documented a 4 mm hepatic lesion, previously 6 mm. Other previously noted lesions throughout the liver are not appreciated. The enlarged prostate with nodular hypertrophy was previously evaluated by Dr. Matteo Mejia. Bone scan 1/9/19 was without evidence of osteoblastic disease. Echocardiogram 1/9/19 showed an EF of 60%. Abel Aaron was hopsitalized at [facility] 1/12/19 - 1/18/19 for enterocolitis with nausea/vomiting and diarrhea. Antineoplastic therapy was held during that time, rechallenged beginning 1/24/19. Abel Aaron was evaluated 2/11/19 at [facility] ER for a 24 hour history of abdominal pain and diarrhea with mild diverticulitis, stable from 1/12/19. He was treated with Cipro and Flagyl. Single agent Tafinlar was taken 3/28/19 - 4/4/19, discontinued by Abel Aaron due to intolerable abdominal cramping. Jefm Holly was resumed 4/25/19. CT scans of the chest, abdomen and pelvis with contrast 8/13/2019 at [facility] was negative for intrathoracic metastasis. There was no evidence of disease progression in the abdomen/pelvis with stable, subcentimeter low-density liver lesions noted. Dosing was changed to 480 mg every 4 weeks on 9/5/2019 to see if this may decrease persistent itching.  Rx fluoxetine and hydroxyzine per Dr. Britton Escamilla recommendations. Contrasted CT scans of the chest, abdomen/pelvis and bone scan 1/16/2020 were negative for evidence of disease progression. 19 mm right thyroid lobe nodule and subcentimeter low-density liver lesions were stable. No abnormal bony uptake. Contrasted CT scans of the chest, abdomen and pelvis 6/4/2020 were without evidence of metastatic disease. A 2.2 cm left lower renal lesion is stable  Contrasted CT scans of the chest, abdomen and pelvis 6/4/2020 were negative aside from high-grade stenosis of the SVC with collateral formation for which Arley Pinto was referred to vascular surgery 10/29/2020. Treatment continues with nivolumab. TREATMENT SUMMARY:  1. Dr. Bryson Garcia resected a malignant melanoma from the right posterior arm, above the elbow, on 06/05/14   2. Wide excision with a sentinel lymph node biopsy by Dr. Tami Zee on 07/09/14. 3. Adjuvant weekly Sylatron Initiated 09/02/14 at 6 mcg/kg (500mcg) sub-q weekly but was only able to receive 4 of 8 planned treatments of this. The last dose #4 was on 09/28/14   4. Adjuvant weekly Sylatron 3 mcg/kg (250mcg) initiated 10/12/2014. Last dose delivered on 2/15/2015, discontinued due to poor tolerability and unable to tolerate the medication. 5. Opdivo 1 mg/kg and Yervoy 3 mg/kg every 3 weeks ×4 doses. 8/7/18 - 10/25/18. 6. Opdivo 240 mg every 2 weeks initiated 11/15/18, discontinued after dose #2 on 12/20/18   7. Monthly Xgeva initiated 8/7/18.   8.  Tafinlar 150 mg po BID & Mekinist 2 mg po qday, cycle #1 initiated 1/4/19.   9. Single agent Tafinlar 150 mg po BID initiated 3/28/19, discontinue by patient 4/4/19.   10. Opdivo 240 mg every 2 weeks resumed 4/25/19, change to 480 mg every 4 weeks on 9/5/2019      Past Medical History:    Past Medical History:   Diagnosis Date    Acid reflux     BPH (benign prostatic hyperplasia)     Cancer ([facility] [address]     Diverticulitis     Hard of hearing     Hypercholesteremia     Hypertension     Malignant melanoma of skin of upper limb, including shoulder ([facility] [address] dx 6/5/2014    to liver, stomach, bone, and right axilla     Past Surgical History:    Past Surgical History:   Procedure Laterality Date    CHOLECYSTECTOMY      TUNNELED CENTRAL VENOUS CATHETER W/ SUBCUTANEOUS PORT       Current Medications:    Current Outpatient Medications   Medication Sig Dispense Refill    HYDROcodone-acetaminophen (NORCO)  MG per tablet Take 1 tablet by mouth every 6 hours as needed for Pain for up to 30 days. Intended supply: 30 days 120 tablet 0    hydrOXYzine (ATARAX) 25 MG tablet TAKE 1 TABLET BY MOUTH EVERY 8 HOURS AS NEEDED FOR ITCHING 90 tablet 1    montelukast (SINGULAIR) 10 MG tablet TAKE 1 TABLET BY MOUTH EVERY DAY AT NIGHT 90 tablet 1    levothyroxine (SYNTHROID) 88 MCG tablet Take one tablet by mouth daily. 90 tablet 3    dicyclomine (BENTYL) 20 MG tablet TAKE 1/2 TABLET BY MOUTH 4 TIMES A DAY AS NEEDED 180 tablet 3    PARoxetine (PAXIL) 10 MG tablet TAKE 1 TABLET BY MOUTH EVERY DAY 90 tablet 3    triamcinolone (KENALOG) 0.025 % cream Apply topically 2 times daily. (Patient not taking: Reported on 11/11/2020) 30 g 2    finasteride (PROSCAR) 5 MG tablet Take 5 mg by mouth      lovastatin (MEVACOR) 40 MG tablet Take 40 mg by mouth       No current facility-administered medications for this visit.       Facility-Administered Medications Ordered in Other Visits   Medication Dose Route Frequency Provider Last Rate Last Dose    nivolumab 480 mg in sodium chloride 0.9 % 100 mL chemo IVPB  480 mg Intravenous Once Rosangela Frias MD        sodium chloride flush 0.9 % injection 10 mL  10 mL Intravenous PRN Rosangela Frias MD        heparin flush 100 UNIT/ML injection 500 Units  500 Units Intracatheter PRN Rosangela Frias MD        denosumab (XGEVA) SC injection 120 mg  120 mg Subcutaneous Once Rosangela Frias MD            Allergies: No Known Allergies  Social History:    Social History     Tobacco Use    Smoking status: Former Smoker    Smokeless tobacco: Never Used   Substance Use Topics    Alcohol use: No    Drug use: No     Family History:   Family History   Problem Relation Age of Onset    Heart Attack Brother          age 78 of [place]       Subjective   REVIEW OF SYSTEMS:   Review of Systems   Constitutional: Positive for fatigue. Negative for fever. No night sweats   HENT: Negative for dental problem, hearing loss, mouth sores, nosebleeds, sore throat and trouble swallowing. Eyes: Negative for photophobia, discharge, redness and itching. No blurring of vision, no double vision   Respiratory: Negative for cough, shortness of breath and wheezing. No hemoptysis   Cardiovascular: Negative for chest pain, palpitations and leg swelling. Gastrointestinal: Negative for abdominal pain, constipation, diarrhea, nausea and vomiting. Endocrine: Negative for cold intolerance, heat intolerance, polydipsia and polyuria. Genitourinary: Negative for dysuria, frequency, hematuria and urgency. Musculoskeletal: Positive for arthralgias and back pain. Negative for joint swelling and myalgias. Generalized weakness, mild   Skin: Negative for pallor and rash. Generalized pruritis    Allergic/Immunologic: Negative for environmental allergies and immunocompromised state. Neurological: Negative for seizures, syncope and numbness. Hematological: Negative for adenopathy. Does not bruise/bleed easily. Psychiatric/Behavioral: Negative for agitation, behavioral problems and confusion. The patient is not nervous/anxious. Objective   Vitals:    20 0903   BP: 128/62   Pulse: 61   Temp: 97.1 °F (36.2 °C)     Wt Readings from Last 3 Encounters:   20 195 lb (88.5 kg)   20 195 lb (88.5 kg)   20 190 lb (86.2 kg)     PHYSICAL EXAM:  Physical Exam  Vitals signs reviewed. Constitutional:       General: He is not in acute distress. Appearance: He is well-developed.  He is not toxic-appearing or diaphoretic. HENT:      Head: Normocephalic and atraumatic. Right Ear: External ear normal.      Left Ear: External ear normal.      Nose: Nose normal.      Mouth/Throat:      Mouth: Mucous membranes are moist.   Eyes:      General: No scleral icterus. Right eye: No discharge. Left eye: No discharge. Conjunctiva/sclera: Conjunctivae normal.   Neck:      Musculoskeletal: Neck supple. Trachea: No tracheal deviation. Cardiovascular:      Rate and Rhythm: Normal rate and regular rhythm. Pulmonary:      Effort: Pulmonary effort is normal. No respiratory distress. Breath sounds: Normal breath sounds. No wheezing or rales. Abdominal:      General: Bowel sounds are normal. There is no distension. Palpations: Abdomen is soft. Tenderness: There is no abdominal tenderness. There is no guarding. Genitourinary:     Comments: Exam deferred  Musculoskeletal:         General: No tenderness or deformity. Comments: Normal ROM all four extremities   Lymphadenopathy:      Cervical: No cervical adenopathy. Right cervical: No superficial cervical adenopathy. Left cervical: No superficial cervical adenopathy. Upper Body:      Right upper body: No supraclavicular adenopathy. Left upper body: No supraclavicular adenopathy. Comments: No bulky palpable cervical, clavicular, axillary or inguinal adenopathies on the left or right. Skin:     General: Skin is warm and dry. Findings: No rash. Comments: Left anterior chest wall Mediport in place, no sign of infection. Mild erythema, dry skin to mid-forehead    Neurological:      Mental Status: He is alert and oriented to person, place, and time. Comments: follows commands, non-focal   Psychiatric:         Behavior: Behavior normal. Behavior is cooperative. Thought Content:  Thought content normal.         Judgment: Judgment normal.      Comments: Alert and oriented to person, place and time. Labs 12/3/2020:  · CMP: Creatinine 1.1, GFR >60, alk phos 46  · CBC: WBC 7.27, Hgb 14.5/MCV 91.1, platelets 061,302    ASSESSMENT:   1. Malignant melanoma of skin of right upper extremity, including shoulder (HCC)    2. Hypothyroidism due to medication    3. Liver metastasis ([address]    4. Bone metastases ([address]    5. Encounter for antineoplastic immunotherapy    6. Left renal mass    7. Pruritus    8. Abnormal finding on CT scan    9. Nausea      Malignant melanoma of skin of right upper extremity, including shoulder (HCC)  Liver metastasis (HCC)  Bone metastases ([address]  Encounter for antineoplastic immunotherapy    Contrasted CT chest, abdomen/pelvis 10/22/2020 at [facility]:  · No evidence of metastatic disease in the chest, abdomen or pelvis  · High-grade SVC narrowing with collateral vein formation in the mediastinum  · Stable 1.7 cm rim calcified right thyroid nodule  · Bilateral renal cysts with stable intermediate density cyst at the left inferior pole, favor hemorrhagic cyst    Excellent treatment response. Continue monthly nivolumab and X-Geva today and continue every 4 weeks  Treatment next month will be delayed by 1 week due to holiday. Lauritaescobar Maritza continues routine skin evaluations per dermatology. Abnormal findings on CT scan  Evaluated by Simón Baker PA-C with vascular surgery on 11/11/2020 regarding high-grade SVC narrowing with collateral vein formation in the mediastinum  No acute distress. No facial or neck swelling. Thyroid nodule/hypothyroidism due to medication  TSH was 5.3 on 10/29/2020, repeat TSH today  Continues Synthroid 88 mcg per day  1.7 cm thyroid nodule stable on CT 10/22/2020    Decrease in appetite  Stable with Marinol 2.5 mg po BID PRN  Weight stable    Cancer related pain/arthritis  Continue Norco PRN pain as prescribed. Recommend Aleve BID and Claritin daily, take the day before, day of and day after Xgeva delivery for discomfort.     Left renal mass  2.2 cm left renal mass stable on abdominal/pelvic CT dated 6/4/2020 and 10/22/2020   Previously evaluated by urology, monitor conservatively    Pruritus  Managed with Singulair, Paxil, antihistamine  May use Benadryl cream or hydrocortisone PRN    Nausea on occasion  Antiemetic PRN  Continue Marinol 2.5 mg po BID, can consider increasing dose if needed    Orders Placed This Encounter   Procedures    TSH without Reflex       RTC RN in 1 month for nivolumab. Return in about 5 weeks (around 1/7/2021) for follow up with LIAM Fraga - for Catarina Stout and Jacob Baez.          LIAM Velazco  9:18 AM  12/3/2020

## 2020-12-07 RX ORDER — DRONABINOL 2.5 MG/1
2.5 CAPSULE ORAL
Qty: 60 CAPSULE | Refills: 1 | OUTPATIENT
Start: 2020-12-07 | End: 2020-12-31 | Stop reason: SDUPTHER

## 2020-12-31 ENCOUNTER — HOSPITAL ENCOUNTER (OUTPATIENT)
Dept: INFUSION THERAPY | Age: 80
Discharge: HOME | End: 2020-12-31
Payer: MEDICARE

## 2020-12-31 VITALS
WEIGHT: 199.5 LBS | HEART RATE: 64 BPM | RESPIRATION RATE: 18 BRPM | OXYGEN SATURATION: 96 % | TEMPERATURE: 98 F | BODY MASS INDEX: 27.82 KG/M2 | DIASTOLIC BLOOD PRESSURE: 68 MMHG | SYSTOLIC BLOOD PRESSURE: 136 MMHG

## 2020-12-31 DIAGNOSIS — C79.51 BONE METASTASES (HCC): ICD-10-CM

## 2020-12-31 DIAGNOSIS — C43.9 MALIGNANT MELANOMA, UNSPECIFIED SITE (HCC): Primary | ICD-10-CM

## 2020-12-31 LAB
BASOPHILS ABSOLUTE: 0.03 K/UL (ref 0.01–0.08)
BASOPHILS RELATIVE PERCENT: 0.4 % (ref 0.1–1.2)
EOSINOPHILS ABSOLUTE: 0.52 K/UL (ref 0.04–0.54)
EOSINOPHILS RELATIVE PERCENT: 6.2 % (ref 0.7–7)
HCT VFR BLD CALC: 41.3 % (ref 40.1–51)
HEMOGLOBIN: 14.5 G/DL (ref 13.7–17.5)
LYMPHOCYTES ABSOLUTE: 2.4 K/UL (ref 1.18–3.74)
LYMPHOCYTES RELATIVE PERCENT: 28.6 % (ref 19.3–53.1)
MCH RBC QN AUTO: 31.7 PG (ref 25.7–32.2)
MCHC RBC AUTO-ENTMCNC: 35.1 G/DL (ref 32.3–36.5)
MCV RBC AUTO: 90.2 FL (ref 79–92.2)
MONOCYTES ABSOLUTE: 0.85 K/UL (ref 0.24–0.82)
MONOCYTES RELATIVE PERCENT: 10.1 % (ref 4.7–12.5)
NEUTROPHILS ABSOLUTE: 4.59 K/UL (ref 1.56–6.13)
NEUTROPHILS RELATIVE PERCENT: 54.7 % (ref 34–71.1)
PDW BLD-RTO: 12.2 % (ref 11.6–14.4)
PLATELET # BLD: 225 K/UL (ref 163–337)
PMV BLD AUTO: 9.5 FL (ref 7.4–10.4)
RBC # BLD: 4.58 M/UL (ref 4.63–6.08)
WBC # BLD: 8.39 K/UL (ref 4.23–9.07)

## 2020-12-31 PROCEDURE — 96413 CHEMO IV INFUSION 1 HR: CPT

## 2020-12-31 PROCEDURE — 85025 COMPLETE CBC W/AUTO DIFF WBC: CPT

## 2020-12-31 PROCEDURE — 6360000002 HC RX W HCPCS: Performed by: INTERNAL MEDICINE

## 2020-12-31 PROCEDURE — 2580000003 HC RX 258: Performed by: INTERNAL MEDICINE

## 2020-12-31 PROCEDURE — 96372 THER/PROPH/DIAG INJ SC/IM: CPT

## 2020-12-31 PROCEDURE — 36415 COLL VENOUS BLD VENIPUNCTURE: CPT

## 2020-12-31 RX ORDER — DIPHENHYDRAMINE HYDROCHLORIDE 50 MG/ML
50 INJECTION INTRAMUSCULAR; INTRAVENOUS PRN
Status: CANCELLED | OUTPATIENT
Start: 2020-12-31

## 2020-12-31 RX ORDER — HYDROCODONE BITARTRATE AND ACETAMINOPHEN 10; 325 MG/1; MG/1
1 TABLET ORAL EVERY 6 HOURS PRN
Qty: 120 TABLET | Refills: 0 | Status: SHIPPED | OUTPATIENT
Start: 2020-12-31 | End: 2021-03-11 | Stop reason: SDUPTHER

## 2020-12-31 RX ORDER — HEPARIN SODIUM (PORCINE) LOCK FLUSH IV SOLN 100 UNIT/ML 100 UNIT/ML
500 SOLUTION INTRAVENOUS PRN
Status: CANCELLED | OUTPATIENT
Start: 2020-12-31

## 2020-12-31 RX ORDER — METHYLPREDNISOLONE SODIUM SUCCINATE 125 MG/2ML
125 INJECTION, POWDER, LYOPHILIZED, FOR SOLUTION INTRAMUSCULAR; INTRAVENOUS PRN
Status: CANCELLED | OUTPATIENT
Start: 2020-12-31

## 2020-12-31 RX ORDER — EPINEPHRINE 1 MG/ML
0.3 INJECTION, SOLUTION, CONCENTRATE INTRAVENOUS PRN
Status: CANCELLED | OUTPATIENT
Start: 2020-12-31

## 2020-12-31 RX ORDER — SODIUM CHLORIDE 0.9 % (FLUSH) 0.9 %
10 SYRINGE (ML) INJECTION PRN
Status: CANCELLED | OUTPATIENT
Start: 2020-12-31

## 2020-12-31 RX ORDER — SODIUM CHLORIDE 0.9 % (FLUSH) 0.9 %
5 SYRINGE (ML) INJECTION PRN
Status: CANCELLED | OUTPATIENT
Start: 2020-12-31

## 2020-12-31 RX ORDER — DRONABINOL 2.5 MG/1
2.5 CAPSULE ORAL
Qty: 60 CAPSULE | Refills: 1 | Status: SHIPPED | OUTPATIENT
Start: 2020-12-31 | End: 2021-01-30

## 2020-12-31 RX ORDER — HEPARIN SODIUM (PORCINE) LOCK FLUSH IV SOLN 100 UNIT/ML 100 UNIT/ML
500 SOLUTION INTRAVENOUS PRN
Status: DISCONTINUED | OUTPATIENT
Start: 2020-12-31 | End: 2021-01-02 | Stop reason: HOSPADM

## 2020-12-31 RX ORDER — SODIUM CHLORIDE 0.9 % (FLUSH) 0.9 %
10 SYRINGE (ML) INJECTION PRN
Status: DISCONTINUED | OUTPATIENT
Start: 2020-12-31 | End: 2021-01-01 | Stop reason: HOSPADM

## 2020-12-31 RX ADMIN — SODIUM CHLORIDE 480 MG: 9 INJECTION, SOLUTION INTRAVENOUS at 09:19

## 2020-12-31 RX ADMIN — SODIUM CHLORIDE, PRESERVATIVE FREE 10 ML: 5 INJECTION INTRAVENOUS at 09:51

## 2020-12-31 RX ADMIN — DENOSUMAB 120 MG: 120 INJECTION SUBCUTANEOUS at 09:52

## 2020-12-31 RX ADMIN — HEPARIN 500 UNITS: 100 SYRINGE at 09:52

## 2021-01-27 RX ORDER — METHYLPREDNISOLONE SODIUM SUCCINATE 125 MG/2ML
125 INJECTION, POWDER, LYOPHILIZED, FOR SOLUTION INTRAMUSCULAR; INTRAVENOUS PRN
Status: CANCELLED | OUTPATIENT
Start: 2021-01-28

## 2021-01-27 RX ORDER — HEPARIN SODIUM (PORCINE) LOCK FLUSH IV SOLN 100 UNIT/ML 100 UNIT/ML
500 SOLUTION INTRAVENOUS PRN
Status: CANCELLED | OUTPATIENT
Start: 2021-01-28

## 2021-01-27 RX ORDER — EPINEPHRINE 1 MG/ML
0.3 INJECTION, SOLUTION, CONCENTRATE INTRAVENOUS PRN
Status: CANCELLED | OUTPATIENT
Start: 2021-01-28

## 2021-01-27 RX ORDER — DIPHENHYDRAMINE HYDROCHLORIDE 50 MG/ML
50 INJECTION INTRAMUSCULAR; INTRAVENOUS PRN
Status: CANCELLED | OUTPATIENT
Start: 2021-01-28

## 2021-01-27 RX ORDER — SODIUM CHLORIDE 0.9 % (FLUSH) 0.9 %
10 SYRINGE (ML) INJECTION PRN
Status: CANCELLED | OUTPATIENT
Start: 2021-01-28

## 2021-01-27 RX ORDER — SODIUM CHLORIDE 0.9 % (FLUSH) 0.9 %
5 SYRINGE (ML) INJECTION PRN
Status: CANCELLED | OUTPATIENT
Start: 2021-01-28

## 2021-01-27 NOTE — PROGRESS NOTES
Progress Note      Pt Name: Aryan Salinas  YOB: 1940  MRN: [id number]    Date of evaluation: 1/28/2021  History Obtained From:  patient, spouse, electronic medical record    CHIEF COMPLAINT:    Chief Complaint   Patient presents with    Other     malignant melanoma of skin of right upper extremity, including shoulder     HISTORY OF PRESENT ILLNESS:    Aryan Salinas is a pleasant 51-year-old gentleman with recurrent, stage IV metastatic melanoma diagnosed 7/2018 with involvement of the liver, bone, celiac and right axillary lymph node. He is receiving maintenance nivolumab every 4 weeks. He receives monthly Xgeva as well. Andrews Fishman is tolerating treatment well with occasional itching and immunotherapy related hypothyroidism, treated with Synthroid. Andrews Fishman did develop a diffuse rash following his last treatment approximately 1 month ago. He was initiated on prednisone 40 mg daily by his PCP a few days ago. Andrews Fishman states the erythematous rash to the anterior/posterior chest, bilateral upper extremities and torso is much improved, though not completely resolved. Occasional nausea is managed with Marinol. Weight is stable. High-grade SVC stenosis was evaluated by vascular surgery. He does not display symptoms of acute SVC syndrome and is monitored conservatively in this regard. Aryan Salinas presents for follow-up surveillance visit and toxicity assessment. He requires intenstive monitoring due to the potential to cause serious morbidity or death. Andrews Fishman is here for reevaluation of rash and treatment recommendations. He is accompanied by his wife. ECOG grade 1  Neuropathy grade 1  Fatigue grade 1        TARGET METASTATIC MALIGNANT MELANOMA SITES:  1. Right upper extremity original primary site 06/05/14   2. Right axilla lymph node at presentation 6/5/2014 and 3 axillary nodes at recurrence 7/27/2018 with an SUV of 8.7   3.  Too numerous to count liver metastases 4. Celiac lymph nodes x 2 with highest SUV of 3   5. Bony metastatic disease on PET scan in the right ilium (SUV of 5) and right acetabulum (SUV of 6.4)   6. Indeterminate HORACIO 3 mm subpleural nodule   7. Indeterminate thyroid nodules on chest CT    1st TUMOR HISTORY: Resected stage IIIA (pT3a pN1a M0) right upper extremity malignant melanoma, 06/05/14  Mr. Errol Camacho was seen in initial oncology consultation on 08/05/14, referred by Dr. Norm Casper, regarding a diagnosis of resected malignant melanoma of the right upper extremity. Dr. Adeel Jain resected a malignant melanoma from the right posterior arm, above the elbow, on 06/05/14. Pathology revealed a 2.25 mm thick non-ulcerated Pankaj's level IV malignant melanoma. There were 10 mitoses/ sq mm. There was no vascular or lymphatic invasion. A wide excision with a sentinel lymph node biopsy was performed by by Dr. Bro James on 07/09/14. Pathology revealed that the right axillary sentinel lymph node was positive for metastatic malignancy by immunoperoxidase stain. The wide excision sample was without further local involvement. A right axillary lymph node dissection was performed by Dr. Bro James on 07/21/14. No metastatic malignancy was noted in any of the 8 right axillary lymph nodes submitted. The HMB-45 immunoperoxidase stain was negative for metastatic malignant melanoma in any of these subsequently submitted lymph nodes. Completion of a metastatic workup on 08/06/14, including MRI of the brain, bone scan, CT scans of the chest, abdomen and pelvis were negative for evidence of metastatic disease. Adjuvant pegylated Interferon (Sylatron) 6 mcg/kg (500mcg) sub-q weekly x 8 to be followed then by 3 mcg/kg(250mcg) weekly x up to 5 years was discussed and planned. Adjuvant therapy was indeed initiated and delivered as discussed below in treatment summary. The last dose of the medication was delivered on 2/15/2015.  He was unable to tolerate this medication even at reduced dosages because of poor tolerability, weight loss, anorexia, unable to sleep, anxiety, fatigue, et Bevelyn Gist. He declined any further interferon, which is reasonable. A one-year followup CT scan of the chest and MRI of the brain on 6/11/2015 did not reveal any evidence of recurrent disease.  ----------------------------------PROGRESSION --------------------------------  Megan Winters presented to [facility] emergency department on 7/3/18 for a 3 month history of waxing and waning epigastric abdominal pain and new onset fever. Additional symptoms reported include unexplained weight loss, nausea, headaches. CXR 7/3/18 showed mild cardiomegaly with no acute cardiopulmonary process. Abdominal/pelvic CT with contrast 7/3/18 documented multiple low density bilateral hepatic lesions suspicious for metastatic disease. 2 small renal cysts are noted and one on the left. A second left renal lesion near the lower pole measured 25 mm with SUV of 34, ultrasound recommended. The prostate is markedly enlarged causing partial right obstructive uropathy. CBC shows WBC 7.3, hemoglobin 15.8 and platelets 062,495  CMP revealed a creatinine of 1.2, GFR 59. LFTs WNL  UA negative  Lipase 28, troponin <0.01  He has a history of GERD with laparoscopic paraesophageal hernia repair and fundoplication by Dr. Amy Faith on 5/24/15. He was prescribed Norco and Zofran PRN at [facility] ER. Rx was given for omeprazole in clinic  Tumor markers drawn 7/11/18 included:   AFP 5.8   CEA 2.0   CA 19-9 - 8   PSA 7.4 (chronically elevated, up to 8.28 on 8/8/14)  MRI of the abdomen w/wo contrast 7/13/18 at [facility] documented innumerable T1 hypointense, T2 hyperintense lesions throughout the liver as noted on recent CT. One of the larger lesions seen posteriorly in the right hepatic lobe measured 1.6 cm. Also noted was at least two T2 hyperintense lesions within the thoracocolumbar spine, which could represent metastasis.   Bilateral renal ultrasound 7/3/18 showed a 2.6 cm hypoechoic left lower pole renal lesion, not typical of a benign cyst with enhancing characteristics concerning for primary renal cancer versus metastatic disease. Bilateral nephrogenic cysts noted along with marked prostate enlargement with lobular changes measuring 8 x 5.9 x 4.9 cm. MRI of the brain w/wo contrast 7/3/18 for complaint of headache and nausea was without evidence of acute intracranial process. Contrasted chest CT 7/24/18 revealed a stable 2 cm right thyroid nodule, a new 9 mm right thyroid nodule. An indeterminate 3 mm subpleural HORACIO nodule is noted, likely granulomatous or postinfectious. Bone scan 7/24/18 was negative for evidence of osseous metastasis. Examination is remarkable for mid epigastric discomfort, 10 lbs weight loss and 1 inch right axillary lymph node. This was a previous site of positive sentinel lymph node biopsy and dissection associated with the resected, stage IIIa right upper extremity malignant melanoma in 2014. Suspect recurrent malignant melanoma. Efraín Reina was referred to Dr. Deanna Cortes who performed an FNA of the right axillary lymph node 7/26/18. Pathology was consistent for metastatic malignant melanoma. 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 Findings were discussed with both Efraín Reina and his wife by Dr. Raul Mast at follow-up on 8/1/18 and reiterated on 8/7/18. Recommendation is for combination therapy with Opdivo 1 mg/kg and Yervoy 3 mg/kg every 3 weeks ×4 cycles, followed by Opdivo 240 mg every 2 weeks thereafter. Efraín Reina had a left chest Mediport placed by Dr. Lopez Cull 8/3/18. PET scan 8/6/18 showed the following:  3 right axillary lymph nodes, SUV up to 8.7   Hepatic metastasis, too numerous to count   2 celiac lymph nodes SUV 2.8 and 3   Right ilium, SUV 5. Right acetabulum SUV 6.4  Cycle #1 Opdivo/Yervoy and monthly Xgeva initiated 8/7/18.   He was evaluated at [facility] 10/16/18 for LLQ abdominal discomfort with a history of recent diverticulitis. Abdominal/pelvic CT with contrast revealed a decreased size in the multiple liver nodules. Bilateral renal nodules were stable. An enlarged prostate with an exophytic mass arising from the posterior superior aspect of the prostate was present. He was treated for acute diverticulitis of the mid to distal sigmoid colon. Kenzie Edmondson was evaluated by Dr. Rachid Davenport on 11/27/18 regarding possible prostate mass and elevated PSA with recommendations for surveillance only. Contrasted chest CT on 11/8/18 at [facility] showed no enlarged axillary, hilar or mediastinal lymph nodes. There were no acute osseous or soft tissue abnormalities. Kenzie Edmondson has had treatment delay of Nivolumab on more than one occasion due to rash, requiring treatment with prednisone. Nivolumab was discontinued following dose #2 of maintenance therapy on 12/20/18. He initiated cycle #1 of Tafinlar 150 mg po BID & Mekinist 2 mg po BID on 1/4/19. Contrasted CT chest 1/9/19 at [facility] showed emphysematous changes bilaterally without evidence of metastatic disease. A 1.4 cm right hilar lymph node was unchanged since 2015, likely benign. Abdominal/pelvic CT with contrast 1/9/19 documented a 4 mm hepatic lesion, previously 6 mm. Other previously noted lesions throughout the liver are not appreciated. The enlarged prostate with nodular hypertrophy was previously evaluated by Dr. Rachid Davenport. Bone scan 1/9/19 was without evidence of osteoblastic disease. Echocardiogram 1/9/19 showed an EF of 60%. Kenzie Edmondson was hopsitalized at [facility] 1/12/19 - 1/18/19 for enterocolitis with nausea/vomiting and diarrhea. Antineoplastic therapy was held during that time, rechallenged beginning 1/24/19. Kenzie Edmondson was evaluated 2/11/19 at [facility] ER for a 24 hour history of abdominal pain and diarrhea with mild diverticulitis, stable from 1/12/19. He was treated with Cipro and Flagyl.   Single agent Tafinlar was taken 3/28/19 - 4/4/19, discontinued by Kenzieart Edmondson due to intolerable abdominal cramping. Williemae Belling was resumed 4/25/19. CT scans of the chest, abdomen and pelvis with contrast 8/13/2019 at [facility] was negative for intrathoracic metastasis. There was no evidence of disease progression in the abdomen/pelvis with stable, subcentimeter low-density liver lesions noted. Dosing was changed to 480 mg every 4 weeks on 9/5/2019 to see if this may decrease persistent itching. Rx fluoxetine and hydroxyzine per Dr. Cathie Mason recommendations. Contrasted CT scans of the chest, abdomen/pelvis and bone scan 1/16/2020 were negative for evidence of disease progression. 19 mm right thyroid lobe nodule and subcentimeter low-density liver lesions were stable. No abnormal bony uptake. Contrasted CT scans of the chest, abdomen and pelvis 6/4/2020 were without evidence of metastatic disease. A 2.2 cm left lower renal lesion is stable  Contrasted CT scans of the chest, abdomen and pelvis 6/4/2020 were negative aside from high-grade stenosis of the SVC with collateral formation for which Linda Arredondo was referred to vascular surgery 10/29/2020. Treatment continues with nivolumab. TREATMENT SUMMARY:  1. Dr. Caroline Grayson resected a malignant melanoma from the right posterior arm, above the elbow, on 06/05/14   2. Wide excision with a sentinel lymph node biopsy by Dr. Hernesto Emery on 07/09/14. 3. Adjuvant weekly Sylatron Initiated 09/02/14 at 6 mcg/kg (500mcg) sub-q weekly but was only able to receive 4 of 8 planned treatments of this. The last dose #4 was on 09/28/14   4. Adjuvant weekly Sylatron 3 mcg/kg (250mcg) initiated 10/12/2014. Last dose delivered on 2/15/2015, discontinued due to poor tolerability and unable to tolerate the medication. 5. Opdivo 1 mg/kg and Yervoy 3 mg/kg every 3 weeks ×4 doses. 8/7/18 - 10/25/18. 6. Opdivo 240 mg every 2 weeks initiated 11/15/18, discontinued after dose #2 on 12/20/18   7. Monthly Xgeva initiated 8/7/18.   8.  Tafinlar 150 mg po BID & Mekinist 2 mg po qday, cycle #1 Use    Smoking status: Former Smoker    Smokeless tobacco: Never Used   Substance Use Topics    Alcohol use: No    Drug use: No     Family History:   Family History   Problem Relation Age of Onset    Heart Attack Brother          age 78 of [place]       Subjective   REVIEW OF SYSTEMS:   Review of Systems   Constitutional: Negative for fatigue and fever. HENT: Negative for dental problem, hearing loss, mouth sores, nosebleeds, sore throat and trouble swallowing. Eyes: Negative for photophobia, discharge, redness and itching. No blurring of vision, no double vision   Respiratory: Negative for cough, shortness of breath and wheezing. No hemoptysis   Cardiovascular: Negative for chest pain, palpitations and leg swelling. Gastrointestinal: Positive for nausea (on occasion, stable with Marinol). Negative for abdominal pain, constipation, diarrhea and vomiting. Endocrine: Negative for cold intolerance, heat intolerance, polydipsia and polyuria. Genitourinary: Negative for dysuria, frequency, hematuria and urgency. Musculoskeletal: Positive for back pain (chronic, stable ). Negative for arthralgias, joint swelling and myalgias. Generalized weakness, mild   Skin: Positive for rash. Negative for pallor. Generalized pruritis    Allergic/Immunologic: Negative for environmental allergies and immunocompromised state. Neurological: Negative for seizures, syncope and numbness. Hematological: Negative for adenopathy. Does not bruise/bleed easily. Psychiatric/Behavioral: Negative for agitation, behavioral problems and confusion. The patient is not nervous/anxious. Objective   Vitals:    21 0839   BP: 126/62   Pulse: 68   Temp: 97.5 °F (36.4 °C)     Wt Readings from Last 3 Encounters:   21 201 lb (91.2 kg)   20 199 lb 8 oz (90.5 kg)   20 195 lb (88.5 kg)     PHYSICAL EXAM:  Physical Exam  Vitals signs reviewed.    Constitutional:       General: He is not in acute distress. Appearance: He is well-developed. He is not toxic-appearing or diaphoretic. HENT:      Head: Normocephalic and atraumatic. Right Ear: External ear normal.      Left Ear: External ear normal.      Nose: Nose normal.      Mouth/Throat:      Mouth: Mucous membranes are moist.   Eyes:      General: No scleral icterus. Right eye: No discharge. Left eye: No discharge. Conjunctiva/sclera: Conjunctivae normal.   Neck:      Musculoskeletal: Neck supple. Trachea: No tracheal deviation. Cardiovascular:      Rate and Rhythm: Normal rate and regular rhythm. Pulmonary:      Effort: Pulmonary effort is normal. No respiratory distress. Breath sounds: Normal breath sounds. No wheezing or rales. Abdominal:      General: Bowel sounds are normal. There is no distension. Palpations: Abdomen is soft. Tenderness: There is no abdominal tenderness. There is no guarding. Genitourinary:     Comments: Exam deferred  Musculoskeletal:         General: No tenderness or deformity. Comments: Normal ROM all four extremities   Lymphadenopathy:      Cervical: No cervical adenopathy. Right cervical: No superficial or deep cervical adenopathy. Left cervical: No superficial or deep cervical adenopathy. Upper Body:      Right upper body: No supraclavicular adenopathy. Left upper body: No supraclavicular adenopathy. Comments: No bulky palpable cervical, clavicular, or axillary adenopathies on the left or right. Skin:     General: Skin is warm and dry. Findings: No rash. Comments: Left anterior chest wall Mediport, and accessed. No sign of infection. Generalized erythematous, macular rash to the torso, anterior/posterior chest and upper extremities bilaterally   Neurological:      Mental Status: He is alert and oriented to person, place, and time.       Comments: follows commands, non-focal   Psychiatric:         Behavior: Behavior normal. Behavior is cooperative. Thought Content: Thought content normal.         Judgment: Judgment normal.      Comments: Alert and oriented to person, place and time. Labs 12/3/2020:  · CMP: Creatinine 1.1, GFR >60, alk phos 46  · CBC: WBC 7.27, Hgb 14.5/MCV 91.1, platelets 785,396  · TSH: 3.79    CBC 01/28/21: WBC 14.04, Hgb 14.4/MCV 90.5, platelets [phone],321    ASSESSMENT:   1. Malignant melanoma of skin of right upper extremity, including shoulder (HCC)    2. Liver metastasis ([address]    3. Bone metastases ([address]    4. Hypothyroidism due to medication    5. Cancer related pain    6. Left renal mass    7. Nausea    8. Fatigue due to treatment    9. Drug-induced skin rash    10. Superior vena cava stenosis      Malignant melanoma of skin of right upper extremity, including shoulder (HCC)  Liver metastasis (HCC)  Bone metastases (HCC)    Contrasted CT chest, abdomen/pelvis 10/22/2020 at [facility]:  · No evidence of metastatic disease in the chest, abdomen or pelvis  · High-grade SVC narrowing with collateral vein formation in the mediastinum  · Stable 1.7 cm rim calcified right thyroid nodule  · Bilateral renal cysts with stable intermediate density cyst at the left inferior pole, favor hemorrhagic cyst    Excellent treatment response. Plan next imaging around 3/2021      Drug-induced skin rash  Izabel Bennett has developed a diffuse rash, likely associated with immunotherapy. He was evaluated by Dr. Zulma Goetz 1/26/2021. I was contacted by Dr. Zulma Goetz and recommended prednisone. He has been taking 20 mg po BID for the last 3 days. Rash is significantly improved, according to Izabel Bennett and his wife.     We will continue prednisone 40 mg daily x4 days, followed by 20 mg daily x4 days  Treatment will be delayed by 2 weeks   Izabel Bennett is scheduled for dose #2 of Covid vaccine 2/1/2021    SVC stenosis  Evaluated by Cuco Buckner PA-C with vascular surgery on 11/11/2020 regarding high-grade SVC narrowing with collateral vein formation in the mediastinum  No acute distress. No facial or neck swelling. Continue to monitor conservatively    Thyroid nodule/hypothyroidism due to medication  TSH was 3.79 on 12/3/2020  1.7 cm thyroid nodule stable on CT 10/22/2020  Continues Synthroid 88 mcg per day  Repeat TSH today    Cancer related pain/arthritis  Continue Norco PRN pain as prescribed. Recommend Aleve BID and Claritin daily, take the day before, day of and day after Xgeva delivery for discomfort. Left renal mass  2.2 cm left renal mass stable on abdominal/pelvic CT dated 6/4/2020 and 10/22/2020   Previously evaluated by urology, monitor conservatively  Plan next imaging 3/2021, sooner if symptoms warrant    Nausea on occasion  Antiemetic PRN  Continue Marinol 2.5 mg po BID, can consider increasing dose if needed    Orders Placed This Encounter   Procedures    CBC Auto Differential    Comprehensive Metabolic Panel    TSH without Reflex     RTC RN for Opdivo and X-geva in 2 weeks. Return in about 6 weeks (around 3/11/2021) for follow up with LIAM Renee for Lorra Milo and Hellen Gola. IAjay am scribing for LIAM Garcia. Electronically signed by Ajay Ochoa on 1/27/2021 at 1:22 pm     Brit ROSADO APRN, personally performed the services described in this documentation as scribed by London Aguilera RN in my presence and it is both accurate and complete.       LIAM Garcia  10:29 AM  1/28/2021

## 2021-01-28 ENCOUNTER — OFFICE VISIT (OUTPATIENT)
Dept: HEMATOLOGY | Age: 81
End: 2021-01-28
Payer: MEDICARE

## 2021-01-28 ENCOUNTER — HOSPITAL ENCOUNTER (OUTPATIENT)
Dept: INFUSION THERAPY | Age: 81
Discharge: HOME | End: 2021-01-28
Payer: MEDICARE

## 2021-01-28 VITALS
WEIGHT: 201 LBS | DIASTOLIC BLOOD PRESSURE: 62 MMHG | HEART RATE: 68 BPM | HEIGHT: 71 IN | TEMPERATURE: 97.5 F | BODY MASS INDEX: 28.14 KG/M2 | SYSTOLIC BLOOD PRESSURE: 126 MMHG

## 2021-01-28 DIAGNOSIS — C78.7 LIVER METASTASIS (HCC): ICD-10-CM

## 2021-01-28 DIAGNOSIS — I87.1 SUPERIOR VENA CAVA STENOSIS: ICD-10-CM

## 2021-01-28 DIAGNOSIS — R11.0 NAUSEA: ICD-10-CM

## 2021-01-28 DIAGNOSIS — L27.0 DRUG-INDUCED SKIN RASH: ICD-10-CM

## 2021-01-28 DIAGNOSIS — E03.2 HYPOTHYROIDISM DUE TO MEDICATION: ICD-10-CM

## 2021-01-28 DIAGNOSIS — N28.89 LEFT RENAL MASS: ICD-10-CM

## 2021-01-28 DIAGNOSIS — C43.61 MALIGNANT MELANOMA OF SKIN OF RIGHT UPPER EXTREMITY, INCLUDING SHOULDER (HCC): Primary | ICD-10-CM

## 2021-01-28 DIAGNOSIS — C43.61 MALIGNANT MELANOMA OF SKIN OF RIGHT UPPER EXTREMITY, INCLUDING SHOULDER (HCC): ICD-10-CM

## 2021-01-28 DIAGNOSIS — R53.83 FATIGUE DUE TO TREATMENT: ICD-10-CM

## 2021-01-28 DIAGNOSIS — G89.3 CANCER RELATED PAIN: ICD-10-CM

## 2021-01-28 DIAGNOSIS — C79.51 BONE METASTASES (HCC): ICD-10-CM

## 2021-01-28 LAB
ALBUMIN SERPL-MCNC: 4 G/DL (ref 3.5–5.2)
ALP BLD-CCNC: 40 U/L (ref 40–130)
ALT SERPL-CCNC: 25 U/L (ref 21–72)
ANION GAP SERPL CALCULATED.3IONS-SCNC: 5 MMOL/L (ref 7–19)
AST SERPL-CCNC: 71 U/L (ref 17–59)
BASOPHILS ABSOLUTE: 0.04 K/UL (ref 0.01–0.08)
BASOPHILS RELATIVE PERCENT: 0.3 % (ref 0.1–1.2)
BILIRUB SERPL-MCNC: 0.7 MG/DL (ref 0.2–1.3)
BUN BLDV-MCNC: 19 MG/DL (ref 9–20)
CALCIUM SERPL-MCNC: 8.8 MG/DL (ref 8.4–10.2)
CHLORIDE BLD-SCNC: 101 MMOL/L (ref 98–111)
CO2: 32 MMOL/L (ref 22–29)
CREAT SERPL-MCNC: 1.2 MG/DL (ref 0.6–1.2)
EOSINOPHILS ABSOLUTE: 0.19 K/UL (ref 0.04–0.54)
EOSINOPHILS RELATIVE PERCENT: 1.4 % (ref 0.7–7)
GFR NON-AFRICAN AMERICAN: 58
GLOBULIN: 3.1 G/DL
GLUCOSE BLD-MCNC: 94 MG/DL (ref 74–106)
HCT VFR BLD CALC: 40.8 % (ref 40.1–51)
HEMOGLOBIN: 14.4 G/DL (ref 13.7–17.5)
LYMPHOCYTES ABSOLUTE: 3.25 K/UL (ref 1.18–3.74)
LYMPHOCYTES RELATIVE PERCENT: 23.1 % (ref 19.3–53.1)
MCH RBC QN AUTO: 31.9 PG (ref 25.7–32.2)
MCHC RBC AUTO-ENTMCNC: 35.3 G/DL (ref 32.3–36.5)
MCV RBC AUTO: 90.5 FL (ref 79–92.2)
MONOCYTES ABSOLUTE: 1.51 K/UL (ref 0.24–0.82)
MONOCYTES RELATIVE PERCENT: 10.8 % (ref 4.7–12.5)
NEUTROPHILS ABSOLUTE: 9.05 K/UL (ref 1.56–6.13)
NEUTROPHILS RELATIVE PERCENT: 64.4 % (ref 34–71.1)
PDW BLD-RTO: 12.3 % (ref 11.6–14.4)
PLATELET # BLD: 252 K/UL (ref 163–337)
PMV BLD AUTO: 9.2 FL (ref 7.4–10.4)
POTASSIUM SERPL-SCNC: 3.7 MMOL/L (ref 3.5–5.1)
RBC # BLD: 4.51 M/UL (ref 4.63–6.08)
SODIUM BLD-SCNC: 138 MMOL/L (ref 137–145)
TOTAL PROTEIN: 7.1 G/DL (ref 6.3–8.2)
TSH SERPL DL<=0.05 MIU/L-ACNC: 4.22 UIU/ML (ref 0.47–4.68)
WBC # BLD: 14.04 K/UL (ref 4.23–9.07)

## 2021-01-28 PROCEDURE — 99214 OFFICE O/P EST MOD 30 MIN: CPT | Performed by: NURSE PRACTITIONER

## 2021-01-28 PROCEDURE — G8484 FLU IMMUNIZE NO ADMIN: HCPCS | Performed by: NURSE PRACTITIONER

## 2021-01-28 PROCEDURE — 1123F ACP DISCUSS/DSCN MKR DOCD: CPT | Performed by: NURSE PRACTITIONER

## 2021-01-28 PROCEDURE — 84443 ASSAY THYROID STIM HORMONE: CPT

## 2021-01-28 PROCEDURE — 85025 COMPLETE CBC W/AUTO DIFF WBC: CPT

## 2021-01-28 PROCEDURE — 99211 OFF/OP EST MAY X REQ PHY/QHP: CPT

## 2021-01-28 PROCEDURE — 4040F PNEUMOC VAC/ADMIN/RCVD: CPT | Performed by: NURSE PRACTITIONER

## 2021-01-28 PROCEDURE — 1036F TOBACCO NON-USER: CPT | Performed by: NURSE PRACTITIONER

## 2021-01-28 PROCEDURE — G8427 DOCREV CUR MEDS BY ELIG CLIN: HCPCS | Performed by: NURSE PRACTITIONER

## 2021-01-28 PROCEDURE — G8417 CALC BMI ABV UP PARAM F/U: HCPCS | Performed by: NURSE PRACTITIONER

## 2021-01-28 PROCEDURE — 80053 COMPREHEN METABOLIC PANEL: CPT

## 2021-01-28 RX ORDER — PREDNISONE 20 MG/1
20 TABLET ORAL 2 TIMES DAILY
COMMUNITY
End: 2021-01-28 | Stop reason: SDUPTHER

## 2021-01-28 RX ORDER — PREDNISONE 20 MG/1
20 TABLET ORAL 2 TIMES DAILY
Qty: 12 TABLET | Refills: 0 | Status: SHIPPED | OUTPATIENT
Start: 2021-01-28 | End: 2021-03-11 | Stop reason: ALTCHOICE

## 2021-01-28 ASSESSMENT — ENCOUNTER SYMPTOMS
COUGH: 0
DIARRHEA: 0
SORE THROAT: 0
TROUBLE SWALLOWING: 0
VOMITING: 0
CONSTIPATION: 0
SHORTNESS OF BREATH: 0
ABDOMINAL PAIN: 0
EYE ITCHING: 0
ROS SKIN COMMENTS: GENERALIZED PRURITIS
EYE DISCHARGE: 0
BACK PAIN: 1
EYE REDNESS: 0
PHOTOPHOBIA: 0
WHEEZING: 0
NAUSEA: 1

## 2021-02-08 DIAGNOSIS — E03.2 HYPOTHYROIDISM DUE TO MEDICATION: ICD-10-CM

## 2021-02-08 RX ORDER — HYDROXYZINE HYDROCHLORIDE 25 MG/1
25 TABLET, FILM COATED ORAL EVERY 8 HOURS PRN
Qty: 90 TABLET | Refills: 1 | Status: SHIPPED | OUTPATIENT
Start: 2021-02-08 | End: 2021-06-02 | Stop reason: SDUPTHER

## 2021-02-08 RX ORDER — LEVOTHYROXINE SODIUM 88 UG/1
TABLET ORAL
Qty: 90 TABLET | Refills: 3 | Status: SHIPPED | OUTPATIENT
Start: 2021-02-08 | End: 2022-01-06

## 2021-02-09 ENCOUNTER — TELEPHONE (OUTPATIENT)
Dept: INFUSION THERAPY | Age: 81
End: 2021-02-09

## 2021-02-09 DIAGNOSIS — L29.8 CHRONIC PRURITIC RASH IN ADULT: ICD-10-CM

## 2021-02-09 DIAGNOSIS — R21 RASH: Primary | ICD-10-CM

## 2021-02-09 RX ORDER — PREDNISONE 20 MG/1
40 TABLET ORAL DAILY
Qty: 14 TABLET | Refills: 0 | Status: SHIPPED | OUTPATIENT
Start: 2021-02-09 | End: 2021-02-16

## 2021-02-09 RX ORDER — PREDNISONE 20 MG/1
20 TABLET ORAL DAILY
Qty: 7 TABLET | Refills: 0 | Status: SHIPPED | OUTPATIENT
Start: 2021-02-09 | End: 2021-02-16

## 2021-02-09 RX ORDER — PREDNISONE 20 MG/1
60 TABLET ORAL DAILY
Qty: 21 TABLET | Refills: 0 | Status: SHIPPED | OUTPATIENT
Start: 2021-02-09 | End: 2021-02-16

## 2021-02-09 RX ORDER — PREDNISONE 20 MG/1
80 TABLET ORAL DAILY
Qty: 28 TABLET | Refills: 0 | Status: SHIPPED | OUTPATIENT
Start: 2021-02-09 | End: 2021-02-16

## 2021-03-10 RX ORDER — SODIUM CHLORIDE 0.9 % (FLUSH) 0.9 %
5 SYRINGE (ML) INJECTION PRN
Status: CANCELLED | OUTPATIENT
Start: 2021-03-11

## 2021-03-10 RX ORDER — SODIUM CHLORIDE 0.9 % (FLUSH) 0.9 %
10 SYRINGE (ML) INJECTION PRN
Status: CANCELLED | OUTPATIENT
Start: 2021-03-11

## 2021-03-10 RX ORDER — HEPARIN SODIUM (PORCINE) LOCK FLUSH IV SOLN 100 UNIT/ML 100 UNIT/ML
500 SOLUTION INTRAVENOUS PRN
Status: CANCELLED | OUTPATIENT
Start: 2021-03-11

## 2021-03-10 RX ORDER — EPINEPHRINE 1 MG/ML
0.3 INJECTION, SOLUTION, CONCENTRATE INTRAVENOUS PRN
Status: CANCELLED | OUTPATIENT
Start: 2021-03-11

## 2021-03-10 RX ORDER — METHYLPREDNISOLONE SODIUM SUCCINATE 125 MG/2ML
125 INJECTION, POWDER, LYOPHILIZED, FOR SOLUTION INTRAMUSCULAR; INTRAVENOUS PRN
Status: CANCELLED | OUTPATIENT
Start: 2021-03-11

## 2021-03-10 RX ORDER — DIPHENHYDRAMINE HYDROCHLORIDE 50 MG/ML
50 INJECTION INTRAMUSCULAR; INTRAVENOUS PRN
Status: CANCELLED | OUTPATIENT
Start: 2021-03-11

## 2021-03-10 NOTE — PROGRESS NOTES
Progress Note      Pt Name: Glenys Ayala  YOB: 1940  MRN: [id number]    Date of evaluation: 12/26/2019  History Obtained From:  patient, spouse, electronic medical record    CHIEF COMPLAINT:    Chief Complaint   Patient presents with    Cancer     Malignant melanoma of skin     HISTORY OF PRESENT ILLNESS:    Glenys Ayala is a pleasant 59-year-old gentleman treated with nivolumab and Xgeva every 4 weeks for his diagnosis of recurrent, stage IV metastatic melanoma with involvement of the liver, bone, celiac and right axillary lymph node. Recurrent disease was documented 7/2018. Uzma Dominguez last received nivolumab on 12/31/2020. He developed a diffuse rash following that treatment and required extended steroid taper. Prednisone was completed 3/8/2021. His rash completely resolved while on steroids. 1-2 days following completion of prednisone, Uzma Dominguez states rash returned only to his left anterolateral chest wall and both axilla. He denies changes in soaps, laundry detergent. He has not worn any new shirts. Uzma Dominguez states he does not wear deodorant. Uzma Dominguez desires to proceed with treatment. He feels he has a good quality of life and is able to work around the yard and use the tractor. He denies jaw pain or dental concern. 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 TSH was 4.22 on 1/28/2021. High-grade SVC stenosis was evaluated by vascular surgery. He does not display symptoms of acute SVC syndrome and is monitored conservatively in this regard. He is accompanied by his wife. ECOG grade 1  Neuropathy grade 1  Fatigue grade 1    Glenys Ayala presents for follow-up surveillance visit and toxicity assessment. he requires intenstive monitoring due to the potential to cause serious morbidity or death. TARGET METASTATIC MALIGNANT MELANOMA SITES:  1. Right upper extremity original primary site 06/05/14   2.  Right axilla lymph node at presentation 6/5/2014 and 3 axillary nodes at recurrence 7/27/2018 with an SUV of 8.7   3. Too numerous to count liver metastases   4. Celiac lymph nodes x 2 with highest SUV of 3   5. Bony metastatic disease on PET scan in the right ilium (SUV of 5) and right acetabulum (SUV of 6.4)   6. Indeterminate HORACIO 3 mm subpleural nodule   7. Indeterminate thyroid nodules on chest CT    1st TUMOR HISTORY: Resected stage IIIA (pT3a pN1a M0) right upper extremity malignant melanoma, 06/05/14  Mr. Alannah Machado was seen in initial oncology consultation on 08/05/14, referred by Dr. Gia Gallo, regarding a diagnosis of resected malignant melanoma of the right upper extremity. Dr. Karie Cazares resected a malignant melanoma from the right posterior arm, above the elbow, on 06/05/14. Pathology revealed a 2.25 mm thick non-ulcerated Pankaj's level IV malignant melanoma. There were 10 mitoses/ sq mm. There was no vascular or lymphatic invasion. A wide excision with a sentinel lymph node biopsy was performed by by Dr. Skyla Escobedo on 07/09/14. Pathology revealed that the right axillary sentinel lymph node was positive for metastatic malignancy by immunoperoxidase stain. The wide excision sample was without further local involvement. A right axillary lymph node dissection was performed by Dr. Skyla Escobedo on 07/21/14. No metastatic malignancy was noted in any of the 8 right axillary lymph nodes submitted. The HMB-45 immunoperoxidase stain was negative for metastatic malignant melanoma in any of these subsequently submitted lymph nodes. Completion of a metastatic workup on 08/06/14, including MRI of the brain, bone scan, CT scans of the chest, abdomen and pelvis were negative for evidence of metastatic disease. Adjuvant pegylated Interferon (Sylatron) 6 mcg/kg (500mcg) sub-q weekly x 8 to be followed then by 3 mcg/kg(250mcg) weekly x up to 5 years was discussed and planned. Adjuvant therapy was indeed initiated and delivered as discussed below in treatment summary.   The last dose of the medication was delivered on 2/15/2015. He was unable to tolerate this medication even at reduced dosages because of poor tolerability, weight loss, anorexia, unable to sleep, anxiety, fatigue, et Arvel Wolof. He declined any further interferon, which is reasonable. A one-year followup CT scan of the chest and MRI of the brain on 6/11/2015 did not reveal any evidence of recurrent disease.  ----------------------------------PROGRESSION --------------------------------  Mariana Hill presented to [facility] emergency department on 7/3/18 for a 3 month history of waxing and waning epigastric abdominal pain and new onset fever. Additional symptoms reported include unexplained weight loss, nausea, headaches. CXR 7/3/18 showed mild cardiomegaly with no acute cardiopulmonary process. Abdominal/pelvic CT with contrast 7/3/18 documented multiple low density bilateral hepatic lesions suspicious for metastatic disease. 2 small renal cysts are noted and one on the left. A second left renal lesion near the lower pole measured 25 mm with SUV of 34, ultrasound recommended. The prostate is markedly enlarged causing partial right obstructive uropathy. CBC shows WBC 7.3, hemoglobin 15.8 and platelets 178,222  CMP revealed a creatinine of 1.2, GFR 59. LFTs WNL  UA negative  Lipase 28, troponin <0.01  He has a history of GERD with laparoscopic paraesophageal hernia repair and fundoplication by Dr. Maria D Hooper on 5/24/15. He was prescribed Norco and Zofran PRN at [facility] ER. Rx was given for omeprazole in clinic  Tumor markers drawn 7/11/18 included:   AFP 5.8   CEA 2.0   CA 19-9 - 8   PSA 7.4 (chronically elevated, up to 8.28 on 8/8/14)  MRI of the abdomen w/wo contrast 7/13/18 at [facility] documented innumerable T1 hypointense, T2 hyperintense lesions throughout the liver as noted on recent CT. One of the larger lesions seen posteriorly in the right hepatic lobe measured 1.6 cm.  Also noted was at least two T2 hyperintense lesions within the thoracocolumbar spine, which could represent metastasis. Bilateral renal ultrasound 7/3/18 showed a 2.6 cm hypoechoic left lower pole renal lesion, not typical of a benign cyst with enhancing characteristics concerning for primary renal cancer versus metastatic disease. Bilateral nephrogenic cysts noted along with marked prostate enlargement with lobular changes measuring 8 x 5.9 x 4.9 cm. MRI of the brain w/wo contrast 7/3/18 for complaint of headache and nausea was without evidence of acute intracranial process. Contrasted chest CT 7/24/18 revealed a stable 2 cm right thyroid nodule, a new 9 mm right thyroid nodule. An indeterminate 3 mm subpleural HORACIO nodule is noted, likely granulomatous or postinfectious. Bone scan 7/24/18 was negative for evidence of osseous metastasis. Examination is remarkable for mid epigastric discomfort, 10 lbs weight loss and 1 inch right axillary lymph node. This was a previous site of positive sentinel lymph node biopsy and dissection associated with the resected, stage IIIa right upper extremity malignant melanoma in 2014. Suspect recurrent malignant melanoma. Zulay Kent was referred to Dr. Sharon Alvarado who performed an FNA of the right axillary lymph node 7/26/18. Pathology was consistent for metastatic malignant melanoma. 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 Findings were discussed with both Zulay Kent and his wife by Dr. Joy Eason at follow-up on 8/1/18 and reiterated on 8/7/18. Recommendation is for combination therapy with Opdivo 1 mg/kg and Yervoy 3 mg/kg every 3 weeks ×4 cycles, followed by Opdivo 240 mg every 2 weeks thereafter. Zulay Kent had a left chest Mediport placed by Dr. Ottoniel Martines 8/3/18. PET scan 8/6/18 showed the following:  3 right axillary lymph nodes, SUV up to 8.7   Hepatic metastasis, too numerous to count   2 celiac lymph nodes SUV 2.8 and 3   Right ilium, SUV 5.  Right acetabulum SUV 6.4  Cycle #1 Opdivo/Yervoy and monthly Xgeva initiated 8/7/18. He was evaluated at [facility] 10/16/18 for LLQ abdominal discomfort with a history of recent diverticulitis. Abdominal/pelvic CT with contrast revealed a decreased size in the multiple liver nodules. Bilateral renal nodules were stable. An enlarged prostate with an exophytic mass arising from the posterior superior aspect of the prostate was present. He was treated for acute diverticulitis of the mid to distal sigmoid colon. Junaid Venegas was evaluated by Dr. Arleth Morrell on 11/27/18 regarding possible prostate mass and elevated PSA with recommendations for surveillance only. Contrasted chest CT on 11/8/18 at [facility] showed no enlarged axillary, hilar or mediastinal lymph nodes. There were no acute osseous or soft tissue abnormalities. Junaid Venegas has had treatment delay of Nivolumab on more than one occasion due to rash, requiring treatment with prednisone. Nivolumab was discontinued following dose #2 of maintenance therapy on 12/20/18. He initiated cycle #1 of Tafinlar 150 mg po BID & Mekinist 2 mg po BID on 1/4/19. Contrasted CT chest 1/9/19 at [facility] showed emphysematous changes bilaterally without evidence of metastatic disease. A 1.4 cm right hilar lymph node was unchanged since 2015, likely benign. Abdominal/pelvic CT with contrast 1/9/19 documented a 4 mm hepatic lesion, previously 6 mm. Other previously noted lesions throughout the liver are not appreciated. The enlarged prostate with nodular hypertrophy was previously evaluated by Dr. Arleth Morrell. Bone scan 1/9/19 was without evidence of osteoblastic disease. Echocardiogram 1/9/19 showed an EF of 60%. Junaid Venegas was hopsitalized at [facility] 1/12/19 - 1/18/19 for enterocolitis with nausea/vomiting and diarrhea. Antineoplastic therapy was held during that time, rechallenged beginning 1/24/19. Junaid Venegas was evaluated 2/11/19 at [facility] ER for a 24 hour history of abdominal pain and diarrhea with mild diverticulitis, stable from 1/12/19.  He was treated with 7. Monthly Xgeva initiated 8/7/18.   8. Tafinlar 150 mg po BID & Mekinist 2 mg po qday, cycle #1 initiated 1/4/19.   9. Single agent Tafinlar 150 mg po BID initiated 3/28/19, discontinue by patient 4/4/19.   10. Opdivo 240 mg every 2 weeks resumed 4/25/19, change to 480 mg every 4 weeks on 9/5/2019      Past Medical History:    Past Medical History:   Diagnosis Date    Acid reflux     BPH (benign prostatic hyperplasia)     [facility] ([facility] [address]     Diverticulitis     Hard of hearing     Hypercholesteremia     Hypertension     Malignant melanoma of skin of upper limb, including shoulder ([facility] [address] dx 6/5/2014    to liver, stomach, bone, and right axilla     Past Surgical History:    Past Surgical History:   Procedure Laterality Date    CHOLECYSTECTOMY      TUNNELED CENTRAL VENOUS CATHETER W/ SUBCUTANEOUS PORT       Current Medications:    Current Outpatient Medications   Medication Sig Dispense Refill    hydrocortisone 2.5 % ointment Apply topically 2 times daily. 20 g 2    levothyroxine (SYNTHROID) 88 MCG tablet Take one tablet by mouth daily. 90 tablet 3    montelukast (SINGULAIR) 10 MG tablet TAKE 1 TABLET BY MOUTH EVERY DAY AT NIGHT 90 tablet 1    dicyclomine (BENTYL) 20 MG tablet TAKE 1/2 TABLET BY MOUTH 4 TIMES A DAY AS NEEDED 180 tablet 3    PARoxetine (PAXIL) 10 MG tablet TAKE 1 TABLET BY MOUTH EVERY DAY 90 tablet 3    triamcinolone (KENALOG) 0.025 % cream Apply topically 2 times daily. 30 g 2    finasteride (PROSCAR) 5 MG tablet Take 5 mg by mouth      lovastatin (MEVACOR) 40 MG tablet Take 40 mg by mouth       No current facility-administered medications for this visit.       Facility-Administered Medications Ordered in Other Visits   Medication Dose Route Frequency Provider Last Rate Last Admin    nivolumab 480 mg in sodium chloride 0.9 % 100 mL chemo IVPB  480 mg Intravenous Once LIAM Ramos        sodium chloride flush 0.9 % injection 10 mL  10 mL Intravenous PRN Julia Patrick APRN        heparin flush 100 UNIT/ML injection 500 Units  500 Units Intracatheter PRN LIAM Walden            Allergies: No Known Allergies  Social History:    Social History     Tobacco Use    Smoking status: Former Smoker    Smokeless tobacco: Never Used   Substance Use Topics    Alcohol use: No    Drug use: No     Family History:   Family History   Problem Relation Age of Onset    Heart Attack Brother          age 78 of [place]       Subjective   REVIEW OF SYSTEMS:   Review of Systems   Constitutional: Negative for fatigue and fever. HENT: Negative for dental problem, hearing loss, mouth sores, nosebleeds, sore throat and trouble swallowing. Eyes: Negative for discharge and itching. Respiratory: Negative for cough, shortness of breath and wheezing. Cardiovascular: Negative for chest pain, palpitations and leg swelling. Gastrointestinal: Negative for abdominal pain, constipation, diarrhea, nausea and vomiting. Endocrine: Negative for cold intolerance and heat intolerance. Genitourinary: Negative for dysuria, frequency, hematuria and urgency. Musculoskeletal: Negative for arthralgias, joint swelling and myalgias. Skin: Positive for rash. Negative for pallor. Allergic/Immunologic: Negative for environmental allergies and immunocompromised state. Neurological: Negative for seizures, syncope and numbness. Hematological: Negative for adenopathy. Does not bruise/bleed easily. Psychiatric/Behavioral: Negative for agitation, behavioral problems and confusion. The patient is not nervous/anxious. Objective   Vitals:    21 0838   BP: 133/69   Pulse: 85   Resp: 20   Temp: 97.1 °F (36.2 °C)     Wt Readings from Last 3 Encounters:   21 200 lb (90.7 kg)   21 201 lb (91.2 kg)   20 199 lb 8 oz (90.5 kg)     PHYSICAL EXAM:  Physical Exam  Vitals signs reviewed. Constitutional:       General: He is not in acute distress. Appearance: He is well-developed. He is not toxic-appearing or diaphoretic. Comments: Wearing a facial mask. HENT:      Head: Normocephalic and atraumatic. Right Ear: External ear normal.      Left Ear: External ear normal.      Nose: Nose normal.      Mouth/Throat:      Mouth: Mucous membranes are moist.   Eyes:      General: No scleral icterus. Right eye: No discharge. Left eye: No discharge. Conjunctiva/sclera: Conjunctivae normal.      Comments: Wears glasses   Neck:      Musculoskeletal: Neck supple. No muscular tenderness. Trachea: No tracheal deviation. Cardiovascular:      Rate and Rhythm: Normal rate and regular rhythm. Pulmonary:      Effort: Pulmonary effort is normal. No respiratory distress. Breath sounds: Normal breath sounds. No wheezing or rales. Abdominal:      General: Bowel sounds are normal. There is no distension. Palpations: Abdomen is soft. Tenderness: There is no abdominal tenderness. There is no guarding. Genitourinary:     Comments: Exam deferred  Musculoskeletal:         General: No tenderness or deformity. Comments: Normal ROM all four extremities   Lymphadenopathy:      Cervical:      Right cervical: No superficial or deep cervical adenopathy. Left cervical: No superficial or deep cervical adenopathy. Upper Body:      Right upper body: No supraclavicular adenopathy. Left upper body: No supraclavicular adenopathy. Comments: No bulky palpable cervical, clavicular, or axillary adenopathies on the left or right. Skin:     General: Skin is warm and dry. Findings: Rash present. Comments: Splotchy erythematous rash to both axilla and left lateral chest wall   Neurological:      Mental Status: He is alert and oriented to person, place, and time. Comments: follows commands, non-focal   Psychiatric:         Behavior: Behavior normal. Behavior is cooperative. Thought Content:  Thought content normal.         Judgment: Judgment normal.      Comments: Alert and oriented to person, place and time. Labs 1/28/2021:  · CMP: Creatinine 1.2, GFR 58, calcium 8.8, AST mildly elevated at 71  · TSH 4.220    CBC 3/11/21:  Lab Results   Component Value Date    WBC 10.10 (H) 03/11/2021    HGB 13.8 03/11/2021    HCT 41.9 03/11/2021    MCV 93.3 (H) 03/11/2021     03/11/2021     ASSESSMENT:   1. Malignant melanoma of skin of right upper extremity, including shoulder ([address]    2. Liver metastasis ([address]    3. Bone metastases ([address]    4. Encounter for antineoplastic immunotherapy    5. Hypothyroidism due to medication    6. Drug-induced skin rash    7. Left renal mass    8. Superior vena cava stenosis      Malignant melanoma of skin of right upper extremity, including shoulder (HCC)  Liver metastasis (HCC)  Bone metastases (HCC)    Contrasted CT chest, abdomen/pelvis 10/22/2020 at [facility]:  · No evidence of metastatic disease in the chest, abdomen or pelvis  · High-grade SVC narrowing with collateral vein formation in the mediastinum  · Stable 1.7 cm rim calcified right thyroid nodule  · Bilateral renal cysts with stable intermediate density cyst at the left inferior pole, favor hemorrhagic cyst    Excellent treatment response. Repeat imaging prior to return    Drug-induced skin rash  likely associated with immunotherapy, resolved with steroids  Rash beneath both axilla and the left lateral chest wall returned 1-2 days after steroid completion  Discussed with Dr. Taras Acosta, who recommended topical steroid and proceed with immunotherapy today  Patient desires to proceed as well    SVC stenosis  Evaluated by David Aly PA-C with vascular surgery on 11/11/2020 regarding high-grade SVC narrowing with collateral vein formation in the mediastinum  No acute distress. No facial or neck swelling.   Continue to monitor conservatively    Thyroid nodule/hypothyroidism due to medication  TSH was 4.22 on 1/28/2021  1.7 cm thyroid nodule stable on CT 10/22/2020  Continues Synthroid 88 mcg per day    Cancer related pain/arthritis  RF Norco PRN pain, as prescribed. Recommend Aleve BID and Claritin daily, take the day before, day of and day after Xgeva delivery for discomfort. Left renal mass  2.2 cm left renal mass stable on abdominal/pelvic CT dated 6/4/2020 and 10/22/2020   Previously evaluated by urology, monitor conservatively  We will monitor with upcoming imaging studies    Orders Placed This Encounter   Procedures    CT ABDOMEN PELVIS W IV CONTRAST Additional Contrast? Oral    CT CHEST W CONTRAST     Orders Placed This Encounter   Medications    hydrocortisone 2.5 % ointment     Sig: Apply topically 2 times daily. Dispense:  20 g     Refill:  2     Return in about 4 weeks (around 4/8/2021) for follow up with LIAM Figueroa for Nivolumab and Alejandra Hannah, review of CT scans. Nenita ROSADO am scribing for LIAM Taylor. Electronically signed by Nenita Aguilar on 3/10/2021 at 8:47 am     Saray ROSADO APRN, personally performed the services described in this documentation as scribed by Christy Pinto RN in my presence and it is both accurate and complete.       LIAM Taylor  9:30 AM  3/11/2021

## 2021-03-11 ENCOUNTER — OFFICE VISIT (OUTPATIENT)
Dept: HEMATOLOGY | Age: 81
End: 2021-03-11
Payer: MEDICARE

## 2021-03-11 ENCOUNTER — HOSPITAL ENCOUNTER (OUTPATIENT)
Dept: INFUSION THERAPY | Age: 81
Discharge: HOME | End: 2021-03-11
Payer: MEDICARE

## 2021-03-11 ENCOUNTER — TRANSCRIBE ORDERS (OUTPATIENT)
Dept: ADMINISTRATIVE | Facility: HOSPITAL | Age: 81
End: 2021-03-11

## 2021-03-11 VITALS
BODY MASS INDEX: 28 KG/M2 | SYSTOLIC BLOOD PRESSURE: 133 MMHG | HEART RATE: 85 BPM | RESPIRATION RATE: 20 BRPM | WEIGHT: 200 LBS | HEIGHT: 71 IN | DIASTOLIC BLOOD PRESSURE: 69 MMHG | TEMPERATURE: 97.1 F

## 2021-03-11 DIAGNOSIS — N28.89 LEFT RENAL MASS: ICD-10-CM

## 2021-03-11 DIAGNOSIS — C43.61 MALIGNANT MELANOMA OF SKIN OF RIGHT UPPER EXTREMITY, INCLUDING SHOULDER (HCC): Primary | ICD-10-CM

## 2021-03-11 DIAGNOSIS — L27.0 DRUG-INDUCED SKIN RASH: ICD-10-CM

## 2021-03-11 DIAGNOSIS — R53.83 FATIGUE DUE TO TREATMENT: ICD-10-CM

## 2021-03-11 DIAGNOSIS — C43.9 MALIGNANT MELANOMA, UNSPECIFIED SITE (HCC): ICD-10-CM

## 2021-03-11 DIAGNOSIS — E03.2 HYPOTHYROIDISM DUE TO MEDICATION: ICD-10-CM

## 2021-03-11 DIAGNOSIS — Z51.12 ENCOUNTER FOR ANTINEOPLASTIC IMMUNOTHERAPY: ICD-10-CM

## 2021-03-11 DIAGNOSIS — C78.7 LIVER METASTASIS (HCC): ICD-10-CM

## 2021-03-11 DIAGNOSIS — I87.1 SUPERIOR VENA CAVA STENOSIS: ICD-10-CM

## 2021-03-11 DIAGNOSIS — C79.51 BONE METASTASIS: ICD-10-CM

## 2021-03-11 DIAGNOSIS — C43.61 MALIGNANT MELANOMA OF RIGHT UPPER EXTREMITY INCLUDING SHOULDER (HCC): Primary | ICD-10-CM

## 2021-03-11 DIAGNOSIS — C79.51 BONE METASTASES (HCC): ICD-10-CM

## 2021-03-11 DIAGNOSIS — G89.3 CANCER RELATED PAIN: ICD-10-CM

## 2021-03-11 LAB
ALBUMIN SERPL-MCNC: 4 G/DL (ref 3.5–5.2)
ALP BLD-CCNC: 44 U/L (ref 40–130)
ALT SERPL-CCNC: 41 U/L (ref 21–72)
ANION GAP SERPL CALCULATED.3IONS-SCNC: 6 MMOL/L (ref 7–19)
AST SERPL-CCNC: 57 U/L (ref 17–59)
BILIRUB SERPL-MCNC: 1.3 MG/DL (ref 0.2–1.3)
BUN BLDV-MCNC: 26 MG/DL (ref 9–20)
CALCIUM SERPL-MCNC: 9.1 MG/DL (ref 8.4–10.2)
CHLORIDE BLD-SCNC: 101 MMOL/L (ref 98–111)
CO2: 32 MMOL/L (ref 22–29)
CREAT SERPL-MCNC: 1.4 MG/DL (ref 0.6–1.2)
GFR NON-AFRICAN AMERICAN: 49
GLOBULIN: 2.6 G/DL
GLUCOSE BLD-MCNC: 93 MG/DL (ref 74–106)
HCT VFR BLD CALC: 41.9 % (ref 40.1–51)
HEMOGLOBIN: 13.8 G/DL (ref 13.7–17.5)
MCH RBC QN AUTO: 30.7 PG (ref 25.7–32.2)
MCHC RBC AUTO-ENTMCNC: 32.9 G/DL (ref 32.3–36.5)
MCV RBC AUTO: 93.3 FL (ref 79–92.2)
PDW BLD-RTO: 13.1 % (ref 11.6–14.4)
PLATELET # BLD: 203 K/UL (ref 163–337)
PMV BLD AUTO: 8.4 FL (ref 7.4–10.4)
POTASSIUM SERPL-SCNC: 4.3 MMOL/L (ref 3.5–5.1)
RBC # BLD: 4.49 M/UL (ref 4.63–6.08)
SODIUM BLD-SCNC: 139 MMOL/L (ref 137–145)
TOTAL PROTEIN: 6.6 G/DL (ref 6.3–8.2)
TSH SERPL DL<=0.05 MIU/L-ACNC: 4.74 UIU/ML (ref 0.47–4.68)
WBC # BLD: 10.1 K/UL (ref 4.23–9.07)

## 2021-03-11 PROCEDURE — 36415 COLL VENOUS BLD VENIPUNCTURE: CPT

## 2021-03-11 PROCEDURE — 85027 COMPLETE CBC AUTOMATED: CPT

## 2021-03-11 PROCEDURE — 4040F PNEUMOC VAC/ADMIN/RCVD: CPT | Performed by: NURSE PRACTITIONER

## 2021-03-11 PROCEDURE — 1036F TOBACCO NON-USER: CPT | Performed by: NURSE PRACTITIONER

## 2021-03-11 PROCEDURE — 80053 COMPREHEN METABOLIC PANEL: CPT

## 2021-03-11 PROCEDURE — G8484 FLU IMMUNIZE NO ADMIN: HCPCS | Performed by: NURSE PRACTITIONER

## 2021-03-11 PROCEDURE — G8417 CALC BMI ABV UP PARAM F/U: HCPCS | Performed by: NURSE PRACTITIONER

## 2021-03-11 PROCEDURE — 2580000003 HC RX 258: Performed by: NURSE PRACTITIONER

## 2021-03-11 PROCEDURE — 96372 THER/PROPH/DIAG INJ SC/IM: CPT

## 2021-03-11 PROCEDURE — G8427 DOCREV CUR MEDS BY ELIG CLIN: HCPCS | Performed by: NURSE PRACTITIONER

## 2021-03-11 PROCEDURE — 84443 ASSAY THYROID STIM HORMONE: CPT

## 2021-03-11 PROCEDURE — 99214 OFFICE O/P EST MOD 30 MIN: CPT | Performed by: NURSE PRACTITIONER

## 2021-03-11 PROCEDURE — 6360000002 HC RX W HCPCS: Performed by: NURSE PRACTITIONER

## 2021-03-11 PROCEDURE — 96413 CHEMO IV INFUSION 1 HR: CPT

## 2021-03-11 PROCEDURE — 1123F ACP DISCUSS/DSCN MKR DOCD: CPT | Performed by: NURSE PRACTITIONER

## 2021-03-11 RX ORDER — HEPARIN SODIUM (PORCINE) LOCK FLUSH IV SOLN 100 UNIT/ML 100 UNIT/ML
500 SOLUTION INTRAVENOUS PRN
Status: DISCONTINUED | OUTPATIENT
Start: 2021-03-11 | End: 2021-03-13 | Stop reason: HOSPADM

## 2021-03-11 RX ORDER — SODIUM CHLORIDE 0.9 % (FLUSH) 0.9 %
10 SYRINGE (ML) INJECTION PRN
Status: DISCONTINUED | OUTPATIENT
Start: 2021-03-11 | End: 2021-03-12 | Stop reason: HOSPADM

## 2021-03-11 RX ORDER — HYDROCODONE BITARTRATE AND ACETAMINOPHEN 10; 325 MG/1; MG/1
1 TABLET ORAL EVERY 6 HOURS PRN
Qty: 120 TABLET | Refills: 0 | Status: SHIPPED | OUTPATIENT
Start: 2021-03-11 | End: 2021-04-08 | Stop reason: SDUPTHER

## 2021-03-11 RX ADMIN — HEPARIN 500 UNITS: 100 SYRINGE at 10:08

## 2021-03-11 RX ADMIN — SODIUM CHLORIDE, PRESERVATIVE FREE 10 ML: 5 INJECTION INTRAVENOUS at 10:08

## 2021-03-11 RX ADMIN — DENOSUMAB 120 MG: 120 INJECTION SUBCUTANEOUS at 10:08

## 2021-03-11 RX ADMIN — SODIUM CHLORIDE 480 MG: 9 INJECTION, SOLUTION INTRAVENOUS at 09:35

## 2021-03-11 ASSESSMENT — ENCOUNTER SYMPTOMS
NAUSEA: 0
DIARRHEA: 0
ABDOMINAL PAIN: 0
VOMITING: 0
COUGH: 0
TROUBLE SWALLOWING: 0
CONSTIPATION: 0
EYE ITCHING: 0
SHORTNESS OF BREATH: 0
EYE DISCHARGE: 0
WHEEZING: 0
SORE THROAT: 0

## 2021-03-18 ENCOUNTER — CLINICAL DOCUMENTATION (OUTPATIENT)
Dept: HEMATOLOGY | Age: 81
End: 2021-03-18

## 2021-03-19 NOTE — PROGRESS NOTES
Late entry for 11/15/2021 11:45 AM    I received a call from Dr. Kwame Hayes regarding Kassandra Fagan. Kassandra Fagan presented for evaluation and complaint of recurrent generalized rash, likely associated with immunotherapy. He will be placed back on steroids, per Dr. Ana Maria Johnson. Recommended prednisone 1 mg/kg/day. I will see Kassandra Fagan back in scheduled follow-up. He will likely need to be rechallenged with alternative immunotherapy.       Maria Squibb, APRN

## 2021-03-29 RX ORDER — MONTELUKAST SODIUM 10 MG/1
TABLET ORAL
Qty: 90 TABLET | Refills: 1 | Status: SHIPPED | OUTPATIENT
Start: 2021-03-29 | End: 2021-04-15 | Stop reason: SDUPTHER

## 2021-04-01 ENCOUNTER — HOSPITAL ENCOUNTER (OUTPATIENT)
Dept: CT IMAGING | Facility: HOSPITAL | Age: 81
Discharge: HOME | End: 2021-04-01
Admitting: NURSE PRACTITIONER

## 2021-04-01 DIAGNOSIS — C79.51 BONE METASTASIS: ICD-10-CM

## 2021-04-01 DIAGNOSIS — C43.61 MALIGNANT MELANOMA OF RIGHT UPPER EXTREMITY INCLUDING SHOULDER (HCC): ICD-10-CM

## 2021-04-01 LAB — CREAT BLDA-MCNC: 1.3 MG/DL (ref 0.6–1.3)

## 2021-04-01 PROCEDURE — 74177 CT ABD & PELVIS W/CONTRAST: CPT

## 2021-04-01 PROCEDURE — 25010000002 IOPAMIDOL 61 % SOLUTION: Performed by: NURSE PRACTITIONER

## 2021-04-01 PROCEDURE — 82565 ASSAY OF CREATININE: CPT

## 2021-04-01 PROCEDURE — 71260 CT THORAX DX C+: CPT

## 2021-04-01 RX ADMIN — IOPAMIDOL 50 ML: 612 INJECTION, SOLUTION INTRAVENOUS at 08:37

## 2021-04-01 RX ADMIN — IOPAMIDOL 100 ML: 612 INJECTION, SOLUTION INTRAVENOUS at 08:36

## 2021-04-01 NOTE — PROGRESS NOTES
(SUV of 6.4)   6. Indeterminate HORACIO 3 mm subpleural nodule   7. Indeterminate thyroid nodules on chest CT    1st TUMOR HISTORY: Resected stage IIIA (pT3a pN1a M0) right upper extremity malignant melanoma, 06/05/14  Mr. Sj Sam was seen in initial oncology consultation on 08/05/14, referred by Dr. Valerie Miranda, regarding a diagnosis of resected malignant melanoma of the right upper extremity. Dr. Karrie Quintanilla resected a malignant melanoma from the right posterior arm, above the elbow, on 06/05/14. Pathology revealed a 2.25 mm thick non-ulcerated Pankaj's level IV malignant melanoma. There were 10 mitoses/ sq mm. There was no vascular or lymphatic invasion. A wide excision with a sentinel lymph node biopsy was performed by by Dr. Cami Pappas on 07/09/14. Pathology revealed that the right axillary sentinel lymph node was positive for metastatic malignancy by immunoperoxidase stain. The wide excision sample was without further local involvement. A right axillary lymph node dissection was performed by Dr. Cami Pappas on 07/21/14. No metastatic malignancy was noted in any of the 8 right axillary lymph nodes submitted. The HMB-45 immunoperoxidase stain was negative for metastatic malignant melanoma in any of these subsequently submitted lymph nodes. Completion of a metastatic workup on 08/06/14, including MRI of the brain, bone scan, CT scans of the chest, abdomen and pelvis were negative for evidence of metastatic disease. Adjuvant pegylated Interferon (Sylatron) 6 mcg/kg (500mcg) sub-q weekly x 8 to be followed then by 3 mcg/kg(250mcg) weekly x up to 5 years was discussed and planned. Adjuvant therapy was indeed initiated and delivered as discussed below in treatment summary. The last dose of the medication was delivered on 2/15/2015. He was unable to tolerate this medication even at reduced dosages because of poor tolerability, weight loss, anorexia, unable to sleep, anxiety, fatigue, et Padmaja Roof.  He declined any further interferon, which is reasonable. A one-year followup CT scan of the chest and MRI of the brain on 6/11/2015 did not reveal any evidence of recurrent disease.  ----------------------------------PROGRESSION --------------------------------  Wu Braswell presented to [facility] emergency department on 7/3/18 for a 3 month history of waxing and waning epigastric abdominal pain and new onset fever. Additional symptoms reported include unexplained weight loss, nausea, headaches. CXR 7/3/18 showed mild cardiomegaly with no acute cardiopulmonary process. Abdominal/pelvic CT with contrast 7/3/18 documented multiple low density bilateral hepatic lesions suspicious for metastatic disease. 2 small renal cysts are noted and one on the left. A second left renal lesion near the lower pole measured 25 mm with SUV of 34, ultrasound recommended. The prostate is markedly enlarged causing partial right obstructive uropathy. CBC shows WBC 7.3, hemoglobin 15.8 and platelets 522,927  CMP revealed a creatinine of 1.2, GFR 59. LFTs WNL  UA negative  Lipase 28, troponin <0.01  He has a history of GERD with laparoscopic paraesophageal hernia repair and fundoplication by Dr. Barbara Peterson on 5/24/15. He was prescribed Norco and Zofran PRN at [facility] ER. Rx was given for omeprazole in clinic  Tumor markers drawn 7/11/18 included:   AFP 5.8   CEA 2.0   CA 19-9 - 8   PSA 7.4 (chronically elevated, up to 8.28 on 8/8/14)  MRI of the abdomen w/wo contrast 7/13/18 at [facility] documented innumerable T1 hypointense, T2 hyperintense lesions throughout the liver as noted on recent CT. One of the larger lesions seen posteriorly in the right hepatic lobe measured 1.6 cm. Also noted was at least two T2 hyperintense lesions within the thoracocolumbar spine, which could represent metastasis.   Bilateral renal ultrasound 7/3/18 showed a 2.6 cm hypoechoic left lower pole renal lesion, not typical of a benign cyst with enhancing characteristics concerning for primary renal cancer versus metastatic disease. Bilateral nephrogenic cysts noted along with marked prostate enlargement with lobular changes measuring 8 x 5.9 x 4.9 cm. MRI of the brain w/wo contrast 7/3/18 for complaint of headache and nausea was without evidence of acute intracranial process. Contrasted chest CT 7/24/18 revealed a stable 2 cm right thyroid nodule, a new 9 mm right thyroid nodule. An indeterminate 3 mm subpleural HORACIO nodule is noted, likely granulomatous or postinfectious. Bone scan 7/24/18 was negative for evidence of osseous metastasis. Examination is remarkable for mid epigastric discomfort, 10 lbs weight loss and 1 inch right axillary lymph node. This was a previous site of positive sentinel lymph node biopsy and dissection associated with the resected, stage IIIa right upper extremity malignant melanoma in 2014. Suspect recurrent malignant melanoma. Stefany Lopez was referred to Dr. Zahira Bojorquez who performed an FNA of the right axillary lymph node 7/26/18. Pathology was consistent for metastatic malignant melanoma. 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 Findings were discussed with both Stefany Lopez and his wife by Dr. Galen Fuentes at follow-up on 8/1/18 and reiterated on 8/7/18. Recommendation is for combination therapy with Opdivo 1 mg/kg and Yervoy 3 mg/kg every 3 weeks ×4 cycles, followed by Opdivo 240 mg every 2 weeks thereafter. Stefany Lopez had a left chest Mediport placed by Dr. Cara Pineda 8/3/18. PET scan 8/6/18 showed the following:  3 right axillary lymph nodes, SUV up to 8.7   Hepatic metastasis, too numerous to count   2 celiac lymph nodes SUV 2.8 and 3   Right ilium, SUV 5. Right acetabulum SUV 6.4  Cycle #1 Opdivo/Yervoy and monthly Xgeva initiated 8/7/18. He was evaluated at [facility] 10/16/18 for LLQ abdominal discomfort with a history of recent diverticulitis. Abdominal/pelvic CT with contrast revealed a decreased size in the multiple liver nodules.  Bilateral renal nodules were stable. An enlarged prostate with an exophytic mass arising from the posterior superior aspect of the prostate was present. He was treated for acute diverticulitis of the mid to distal sigmoid colon. Aminah Mcgrath was evaluated by Dr. Radha Segundo on 11/27/18 regarding possible prostate mass and elevated PSA with recommendations for surveillance only. Contrasted chest CT on 11/8/18 at [facility] showed no enlarged axillary, hilar or mediastinal lymph nodes. There were no acute osseous or soft tissue abnormalities. Aminah Mcgrath has had treatment delay of Nivolumab on more than one occasion due to rash, requiring treatment with prednisone. Nivolumab was discontinued following dose #2 of maintenance therapy on 12/20/18. He initiated cycle #1 of Tafinlar 150 mg po BID & Mekinist 2 mg po BID on 1/4/19. Contrasted CT chest 1/9/19 at [facility] showed emphysematous changes bilaterally without evidence of metastatic disease. A 1.4 cm right hilar lymph node was unchanged since 2015, likely benign. Abdominal/pelvic CT with contrast 1/9/19 documented a 4 mm hepatic lesion, previously 6 mm. Other previously noted lesions throughout the liver are not appreciated. The enlarged prostate with nodular hypertrophy was previously evaluated by Dr. Radha Segundo. Bone scan 1/9/19 was without evidence of osteoblastic disease. Echocardiogram 1/9/19 showed an EF of 60%. mAinah Mcgrath was hopsitalized at [facility] 1/12/19 - 1/18/19 for enterocolitis with nausea/vomiting and diarrhea. Antineoplastic therapy was held during that time, rechallenged beginning 1/24/19. Aminah Mcgrath was evaluated 2/11/19 at [facility] ER for a 24 hour history of abdominal pain and diarrhea with mild diverticulitis, stable from 1/12/19. He was treated with Cipro and Flagyl. Single agent Tafinlar was taken 3/28/19 - 4/4/19, discontinued by Aminah Mcgrath due to intolerable abdominal cramping. Dl Litter was resumed 4/25/19.   CT scans of the chest, abdomen and pelvis with contrast 8/13/2019 at [facility] was negative for intrathoracic metastasis. There was no evidence of disease progression in the abdomen/pelvis with stable, subcentimeter low-density liver lesions noted. Dosing was changed to 480 mg every 4 weeks on 9/5/2019 to see if this may decrease persistent itching. Rx fluoxetine and hydroxyzine per Dr. Mary Avila recommendations. Contrasted CT scans of the chest, abdomen/pelvis and bone scan 1/16/2020 were negative for evidence of disease progression. 19 mm right thyroid lobe nodule and subcentimeter low-density liver lesions were stable. No abnormal bony uptake. Contrasted CT scans of the chest, abdomen and pelvis 6/4/2020 were without evidence of metastatic disease. 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 Treatment continues with nivolumab. TREATMENT SUMMARY:  1. Dr. Eleazar Hernandez resected a malignant melanoma from the right posterior arm, above the elbow, on 06/05/14   2. Wide excision with a sentinel lymph node biopsy by Dr. Vasquez Blas on 07/09/14. 3. Adjuvant weekly Sylatron Initiated 09/02/14 at 6 mcg/kg (500mcg) sub-q weekly but was only able to receive 4 of 8 planned treatments of this. The last dose #4 was on 09/28/14   4. Adjuvant weekly Sylatron 3 mcg/kg (250mcg) initiated 10/12/2014. Last dose delivered on 2/15/2015, discontinued due to poor tolerability and unable to tolerate the medication. 5. Opdivo 1 mg/kg and Yervoy 3 mg/kg every 3 weeks ×4 doses. 8/7/18 - 10/25/18. 6. Opdivo 240 mg every 2 weeks initiated 11/15/18, discontinued after dose #2 on 12/20/18   7. Monthly Xgeva initiated 8/7/18.   8.  Tafinlar 150 mg po BID & Mekinist 2 mg po qday, cycle #1 initiated 1/4/19.   9. Single agent Tafinlar 150 mg po BID initiated 3/28/19, discontinue by patient 4/4/19.   10. Opdivo 240 mg every 2 weeks resumed MOUTH 4 TIMES A DAY AS NEEDED 180 tablet 3    PARoxetine (PAXIL) 10 MG tablet TAKE 1 TABLET BY MOUTH EVERY DAY 90 tablet 3    finasteride (PROSCAR) 5 MG tablet Take 5 mg by mouth      lovastatin (MEVACOR) 40 MG tablet Take 40 mg by mouth       No current facility-administered medications for this visit. Facility-Administered Medications Ordered in Other Visits   Medication Dose Route Frequency Provider Last Rate Last Admin    0.9 % sodium chloride bolus  250 mL Intravenous Once Dez Seat Derington, APRN        sodium chloride flush 0.9 % injection 10 mL  10 mL Intravenous PRN Dez Seat Derington, APRN   10 mL at 21 0950    heparin flush 100 UNIT/ML injection 500 Units  500 Units Intracatheter PRN Dez Seat Derington, APRN   500 Units at 21 8835        Allergies: No Known Allergies  Social History:    Social History     Tobacco Use    Smoking status: Former Smoker    Smokeless tobacco: Never Used   Substance Use Topics    Alcohol use: No    Drug use: No     Family History:   Family History   Problem Relation Age of Onset    Heart Attack Brother          age 78 of [place]     Subjective   REVIEW OF SYSTEMS:   Review of Systems   Constitutional: Positive for fatigue. Negative for fever. No night sweats   HENT: Negative for dental problem, hearing loss, mouth sores, nosebleeds, sore throat and trouble swallowing. Eyes: Negative for discharge and itching. Cataracts   Respiratory: Negative for cough, shortness of breath and wheezing. Cardiovascular: Negative for chest pain, palpitations and leg swelling. Gastrointestinal: Negative for abdominal pain, constipation, diarrhea, nausea and vomiting. Endocrine: Negative for cold intolerance and heat intolerance. Genitourinary: Negative for dysuria, frequency, hematuria and urgency. Musculoskeletal: Negative for arthralgias, joint swelling and myalgias. Skin: Positive for rash. Negative for pallor.         pruritis Allergic/Immunologic: Negative for environmental allergies and immunocompromised state. Neurological: Negative for seizures, syncope and numbness. Hematological: Negative for adenopathy. Does not bruise/bleed easily. Psychiatric/Behavioral: Negative for agitation, behavioral problems and confusion. The patient is not nervous/anxious. Objective   Vitals:    04/08/21 0847   BP: (!) 144/76   Pulse: 92   Resp: 16   Temp: 97.8 °F (36.6 °C)     Wt Readings from Last 3 Encounters:   04/08/21 198 lb 9.6 oz (90.1 kg)   03/11/21 200 lb (90.7 kg)   01/28/21 201 lb (91.2 kg)     PHYSICAL EXAM:  Physical Exam  Vitals signs reviewed. Constitutional:       General: He is not in acute distress. Appearance: He is well-developed. He is not toxic-appearing or diaphoretic. Comments: Wearing a facial mask. HENT:      Head: Normocephalic and atraumatic. Right Ear: External ear normal.      Left Ear: External ear normal.      Nose: Nose normal.      Mouth/Throat:      Mouth: Mucous membranes are moist.   Eyes:      General: No scleral icterus. Right eye: No discharge. Left eye: No discharge. Conjunctiva/sclera: Conjunctivae normal.   Neck:      Musculoskeletal: Neck supple. No muscular tenderness. Trachea: No tracheal deviation. Cardiovascular:      Rate and Rhythm: Normal rate and regular rhythm. Pulmonary:      Effort: Pulmonary effort is normal. No respiratory distress. Breath sounds: Normal breath sounds. No wheezing or rales. Abdominal:      General: Bowel sounds are normal. There is no distension. Palpations: Abdomen is soft. Tenderness: There is no abdominal tenderness. There is no guarding. Genitourinary:     Comments: Exam deferred  Musculoskeletal:         General: No tenderness or deformity. Comments: Normal ROM all four extremities   Lymphadenopathy:      Cervical:      Right cervical: No superficial or deep cervical adenopathy.      Left cervical: No superficial or deep cervical adenopathy. Upper Body:      Right upper body: No supraclavicular adenopathy. Left upper body: No supraclavicular adenopathy. Comments: No bulky palpable cervical, clavicular, or axillary adenopathies on the left or right. Skin:     General: Skin is warm and dry. Findings: Rash (faint, scattered macular rash to trunk, erythema to posterior chest, prominent erythematous plaque with central clearing to bilateral axilla ) present. Neurological:      Mental Status: He is alert and oriented to person, place, and time. Comments: follows commands, non-focal   Psychiatric:         Behavior: Behavior normal. Behavior is cooperative. Thought Content: Thought content normal.         Judgment: Judgment normal.      Comments: Alert and oriented to person, place and time. Labs reviewed by me:    Labs 3/11/2021:  · CMP: Creatinine 1.4, GFR 49, otherwise unremarkable  · TSH 4.74    CBC 4/8/21:  Lab Results   Component Value Date    WBC 9.57 (H) 04/08/2021    HGB 14.2 04/08/2021    HCT 41.8 04/08/2021    MCV 91.3 04/08/2021     04/08/2021     ASSESSMENT:   1. Malignant melanoma of skin of right upper extremity, including shoulder ([address]    2. Liver metastasis ([address]    3. Bone metastases ([address]    4. Encounter for antineoplastic immunotherapy    5. Hypothyroidism due to medication    6. Drug-induced skin rash    7.  Left renal mass       Malignant melanoma of skin of right upper extremity, including shoulder (HCC)  Liver metastasis (HCC)  Bone metastases (HCC)    Contrasted chest CT, abdomen/pelvis 4/1/2021 at [facility]:   · No evidence of metastatic disease in the chest, abdomen or pelvis  · Markedly enlarged prostate with mass-effect upon the bladder and a large nodule extending off the posterior superior aspect of the prostate gland, similar to 10/22/2020  · Stable bilateral renal lesions, favored to represent hemorrhagic or proteinaceous cyst    Drug-induced skin rash  Grade 2  likely associated with immunotherapy, previously resolved with steroids  Cannot rule out tinea to bilateral axilla  Patient desires to skip treatment this month and retrial steroid taper, also allowing him time to complete cataract surgery  Rx prednisone taper, Diflucan daily x3, Lotrisone for axilla  Will need to consider dermatology referral  Prefer to continue immunotherapy given excellent treatment response    Thyroid nodule/hypothyroidism due to medication  TSH was 4.7 on 3/11/2021   1.7 cm thyroid nodule stable on CT  Continues Synthroid 88 mcg per day    Cancer related pain/arthritis  RF Norco PRN pain, as prescribed. Recommend Aleve BID and Claritin daily, take the day before, day of and day after Xgeva delivery for discomfort. Left renal mass  2.2 cm left renal mass stable on abdominal/pelvic CT dated 2020, 10/22/2020 and 2021  Previously evaluated by urology, monitor conservatively      Orders Placed This Encounter   Procedures    Comprehensive Metabolic Panel    TSH without Reflex     Orders Placed This Encounter   Medications    predniSONE (DELTASONE) 20 MG tablet     Si mg daily x1 week, then 60 mg daily x1 week, 40 mg daily x1 week, 20 mg daily x 1 week     Dispense:  70 tablet     Refill:  0    omeprazole (PRILOSEC) 20 MG delayed release capsule     Sig: Take 1 capsule by mouth every morning (before breakfast)     Dispense:  30 capsule     Refill:  1    clotrimazole-betamethasone (LOTRISONE) 1-0.05 % cream     Sig: Apply topically under the arms 2 times daily. Dispense:  1 Tube     Refill:  2    fluconazole (DIFLUCAN) 100 MG tablet     Sig: Take 1 tablet by mouth daily for 3 days     Dispense:  3 tablet     Refill:  0     Return in about 5 weeks (around 2021) for follow up with LIAM Read - for Jhoana Flood and Nuzhat Marks. I, Kike Muñoz am scribing for LIAM Garcia.  Electronically signed by Kike Muñoz on 4/1/2021 at 10:02 am     I, LIAM Sol, personally performed the services described in this documentation as scribed by Amaris Waldron RN in my presence and it is both accurate and complete.       LIAM Garcia  8:02 PM  4/8/2021

## 2021-04-08 ENCOUNTER — HOSPITAL ENCOUNTER (OUTPATIENT)
Dept: INFUSION THERAPY | Age: 81
Discharge: HOME | End: 2021-04-08
Payer: MEDICARE

## 2021-04-08 ENCOUNTER — OFFICE VISIT (OUTPATIENT)
Dept: HEMATOLOGY | Age: 81
End: 2021-04-08
Payer: MEDICARE

## 2021-04-08 VITALS
RESPIRATION RATE: 16 BRPM | HEART RATE: 92 BPM | BODY MASS INDEX: 27.7 KG/M2 | TEMPERATURE: 97.8 F | WEIGHT: 198.6 LBS | SYSTOLIC BLOOD PRESSURE: 144 MMHG | DIASTOLIC BLOOD PRESSURE: 76 MMHG

## 2021-04-08 DIAGNOSIS — L29.9 ITCHING: ICD-10-CM

## 2021-04-08 DIAGNOSIS — C43.61 MALIGNANT MELANOMA OF SKIN OF RIGHT UPPER EXTREMITY, INCLUDING SHOULDER (HCC): Primary | ICD-10-CM

## 2021-04-08 DIAGNOSIS — C78.7 LIVER METASTASIS (HCC): ICD-10-CM

## 2021-04-08 DIAGNOSIS — L27.0 DRUG-INDUCED SKIN RASH: ICD-10-CM

## 2021-04-08 DIAGNOSIS — C43.9 MALIGNANT MELANOMA, UNSPECIFIED SITE (HCC): ICD-10-CM

## 2021-04-08 DIAGNOSIS — C79.51 BONE METASTASES (HCC): ICD-10-CM

## 2021-04-08 DIAGNOSIS — E03.2 HYPOTHYROIDISM DUE TO MEDICATION: ICD-10-CM

## 2021-04-08 DIAGNOSIS — G89.3 CANCER RELATED PAIN: ICD-10-CM

## 2021-04-08 DIAGNOSIS — Z51.12 ENCOUNTER FOR ANTINEOPLASTIC IMMUNOTHERAPY: ICD-10-CM

## 2021-04-08 DIAGNOSIS — N28.89 LEFT RENAL MASS: ICD-10-CM

## 2021-04-08 LAB
ALBUMIN SERPL-MCNC: 3.8 G/DL (ref 3.5–5.2)
ALP BLD-CCNC: 51 U/L (ref 40–130)
ALT SERPL-CCNC: 18 U/L (ref 21–72)
ANION GAP SERPL CALCULATED.3IONS-SCNC: 8 MMOL/L (ref 7–19)
AST SERPL-CCNC: 31 U/L (ref 17–59)
BASOPHILS ABSOLUTE: 0.03 K/UL (ref 0.01–0.08)
BASOPHILS RELATIVE PERCENT: 0.3 % (ref 0.1–1.2)
BILIRUB SERPL-MCNC: 0.8 MG/DL (ref 0.2–1.3)
BUN BLDV-MCNC: 15 MG/DL (ref 9–20)
CALCIUM SERPL-MCNC: 8.7 MG/DL (ref 8.4–10.2)
CHLORIDE BLD-SCNC: 101 MMOL/L (ref 98–111)
CO2: 30 MMOL/L (ref 22–29)
CREAT SERPL-MCNC: 1 MG/DL (ref 0.6–1.2)
EOSINOPHILS ABSOLUTE: 0.41 K/UL (ref 0.04–0.54)
EOSINOPHILS RELATIVE PERCENT: 4.3 % (ref 0.7–7)
GFR NON-AFRICAN AMERICAN: >60
GLOBULIN: 2.7 G/DL
GLUCOSE BLD-MCNC: 118 MG/DL (ref 74–106)
HCT VFR BLD CALC: 41.8 % (ref 40.1–51)
HEMOGLOBIN: 14.2 G/DL (ref 13.7–17.5)
LYMPHOCYTES ABSOLUTE: 2.03 K/UL (ref 1.18–3.74)
LYMPHOCYTES RELATIVE PERCENT: 21.2 % (ref 19.3–53.1)
MCH RBC QN AUTO: 31 PG (ref 25.7–32.2)
MCHC RBC AUTO-ENTMCNC: 34 G/DL (ref 32.3–36.5)
MCV RBC AUTO: 91.3 FL (ref 79–92.2)
MONOCYTES ABSOLUTE: 0.89 K/UL (ref 0.24–0.82)
MONOCYTES RELATIVE PERCENT: 9.3 % (ref 4.7–12.5)
NEUTROPHILS ABSOLUTE: 6.21 K/UL (ref 1.56–6.13)
NEUTROPHILS RELATIVE PERCENT: 64.9 % (ref 34–71.1)
PDW BLD-RTO: 13 % (ref 11.6–14.4)
PLATELET # BLD: 244 K/UL (ref 163–337)
PMV BLD AUTO: 9.4 FL (ref 7.4–10.4)
POTASSIUM SERPL-SCNC: 4.5 MMOL/L (ref 3.5–5.1)
RBC # BLD: 4.58 M/UL (ref 4.63–6.08)
SODIUM BLD-SCNC: 139 MMOL/L (ref 137–145)
TOTAL PROTEIN: 6.6 G/DL (ref 6.3–8.2)
TSH SERPL DL<=0.05 MIU/L-ACNC: 2.61 UIU/ML (ref 0.47–4.68)
WBC # BLD: 9.57 K/UL (ref 4.23–9.07)

## 2021-04-08 PROCEDURE — 4040F PNEUMOC VAC/ADMIN/RCVD: CPT | Performed by: NURSE PRACTITIONER

## 2021-04-08 PROCEDURE — 85025 COMPLETE CBC W/AUTO DIFF WBC: CPT

## 2021-04-08 PROCEDURE — G8417 CALC BMI ABV UP PARAM F/U: HCPCS | Performed by: NURSE PRACTITIONER

## 2021-04-08 PROCEDURE — 96372 THER/PROPH/DIAG INJ SC/IM: CPT

## 2021-04-08 PROCEDURE — G8428 CUR MEDS NOT DOCUMENT: HCPCS | Performed by: NURSE PRACTITIONER

## 2021-04-08 PROCEDURE — 80053 COMPREHEN METABOLIC PANEL: CPT

## 2021-04-08 PROCEDURE — 96523 IRRIG DRUG DELIVERY DEVICE: CPT

## 2021-04-08 PROCEDURE — 2580000003 HC RX 258: Performed by: NURSE PRACTITIONER

## 2021-04-08 PROCEDURE — 99214 OFFICE O/P EST MOD 30 MIN: CPT | Performed by: NURSE PRACTITIONER

## 2021-04-08 PROCEDURE — 36591 DRAW BLOOD OFF VENOUS DEVICE: CPT

## 2021-04-08 PROCEDURE — 36415 COLL VENOUS BLD VENIPUNCTURE: CPT

## 2021-04-08 PROCEDURE — 84443 ASSAY THYROID STIM HORMONE: CPT

## 2021-04-08 PROCEDURE — 1123F ACP DISCUSS/DSCN MKR DOCD: CPT | Performed by: NURSE PRACTITIONER

## 2021-04-08 PROCEDURE — 1036F TOBACCO NON-USER: CPT | Performed by: NURSE PRACTITIONER

## 2021-04-08 PROCEDURE — 6360000002 HC RX W HCPCS: Performed by: NURSE PRACTITIONER

## 2021-04-08 RX ORDER — HEPARIN SODIUM (PORCINE) LOCK FLUSH IV SOLN 100 UNIT/ML 100 UNIT/ML
500 SOLUTION INTRAVENOUS PRN
Status: DISCONTINUED | OUTPATIENT
Start: 2021-04-08 | End: 2021-04-10 | Stop reason: HOSPADM

## 2021-04-08 RX ORDER — SODIUM CHLORIDE 0.9 % (FLUSH) 0.9 %
10 SYRINGE (ML) INJECTION PRN
Status: DISCONTINUED | OUTPATIENT
Start: 2021-04-08 | End: 2021-04-09 | Stop reason: HOSPADM

## 2021-04-08 RX ORDER — 0.9 % SODIUM CHLORIDE 0.9 %
250 INTRAVENOUS SOLUTION INTRAVENOUS ONCE
Status: DISCONTINUED | OUTPATIENT
Start: 2021-04-08 | End: 2021-04-10 | Stop reason: HOSPADM

## 2021-04-08 RX ORDER — TRIAMCINOLONE ACETONIDE 0.25 MG/G
CREAM TOPICAL
Qty: 30 G | Refills: 2 | Status: SHIPPED | OUTPATIENT
Start: 2021-04-08 | End: 2021-05-13

## 2021-04-08 RX ORDER — OMEPRAZOLE 20 MG/1
20 CAPSULE, DELAYED RELEASE ORAL
Qty: 30 CAPSULE | Refills: 1 | Status: SHIPPED | OUTPATIENT
Start: 2021-04-08 | End: 2021-09-02

## 2021-04-08 RX ORDER — HYDROCODONE BITARTRATE AND ACETAMINOPHEN 10; 325 MG/1; MG/1
1 TABLET ORAL EVERY 6 HOURS PRN
Qty: 120 TABLET | Refills: 0 | Status: SHIPPED | OUTPATIENT
Start: 2021-04-09 | End: 2021-06-03 | Stop reason: SDUPTHER

## 2021-04-08 RX ORDER — PREDNISONE 20 MG/1
TABLET ORAL
Qty: 70 TABLET | Refills: 0 | Status: SHIPPED | OUTPATIENT
Start: 2021-04-08 | End: 2021-05-13 | Stop reason: ALTCHOICE

## 2021-04-08 RX ORDER — FLUCONAZOLE 100 MG/1
100 TABLET ORAL DAILY
Qty: 3 TABLET | Refills: 0 | Status: SHIPPED | OUTPATIENT
Start: 2021-04-08 | End: 2021-04-11

## 2021-04-08 RX ORDER — CLOTRIMAZOLE AND BETAMETHASONE DIPROPIONATE 10; .64 MG/G; MG/G
CREAM TOPICAL
Qty: 1 TUBE | Refills: 2 | Status: SHIPPED | OUTPATIENT
Start: 2021-04-08 | End: 2021-05-13 | Stop reason: SDUPTHER

## 2021-04-08 RX ADMIN — HEPARIN 500 UNITS: 100 SYRINGE at 09:50

## 2021-04-08 RX ADMIN — DENOSUMAB 120 MG: 120 INJECTION SUBCUTANEOUS at 09:46

## 2021-04-08 RX ADMIN — SODIUM CHLORIDE, PRESERVATIVE FREE 10 ML: 5 INJECTION INTRAVENOUS at 09:50

## 2021-04-08 ASSESSMENT — ENCOUNTER SYMPTOMS
DIARRHEA: 0
ROS SKIN COMMENTS: PRURITIS
ABDOMINAL PAIN: 0
SORE THROAT: 0
SHORTNESS OF BREATH: 0
CONSTIPATION: 0
COUGH: 0
VOMITING: 0
EYE ITCHING: 0
NAUSEA: 0
EYE DISCHARGE: 0
WHEEZING: 0
TROUBLE SWALLOWING: 0

## 2021-04-15 DIAGNOSIS — C43.9 MALIGNANT MELANOMA, UNSPECIFIED SITE (HCC): Primary | ICD-10-CM

## 2021-04-15 RX ORDER — PAROXETINE 10 MG/1
TABLET, FILM COATED ORAL
Qty: 90 TABLET | Refills: 3 | Status: SHIPPED | OUTPATIENT
Start: 2021-04-15 | End: 2022-01-10 | Stop reason: SDUPTHER

## 2021-04-15 RX ORDER — MONTELUKAST SODIUM 10 MG/1
TABLET ORAL
Qty: 90 TABLET | Refills: 1 | Status: SHIPPED | OUTPATIENT
Start: 2021-04-15 | End: 2021-10-12

## 2021-04-30 ENCOUNTER — TELEPHONE (OUTPATIENT)
Dept: INFUSION THERAPY | Age: 81
End: 2021-04-30

## 2021-04-30 NOTE — TELEPHONE ENCOUNTER
Contacted patient per LIAM Miller regarding refill request for prednisone. I am told that Joe Redding does not need a refill for his prednisone and that the rash has completely resolved. Lissette Nelson made aware.

## 2021-05-12 NOTE — PROGRESS NOTES
7/27/2018 with an SUV of 8.7   3. Too numerous to count liver metastases   4. Celiac lymph nodes x 2 with highest SUV of 3   5. Bony metastatic disease on PET scan in the right ilium (SUV of 5) and right acetabulum (SUV of 6.4)   6. Indeterminate HORACIO 3 mm subpleural nodule   7. Indeterminate thyroid nodules on chest CT    1st TUMOR HISTORY: Resected stage IIIA (pT3a pN1a M0) right upper extremity malignant melanoma, 06/05/14  Mr. Loreta Dillard was seen in initial oncology consultation on 08/05/14, referred by Dr. Zeenat Taylor, regarding a diagnosis of resected malignant melanoma of the right upper extremity. Dr. Wendy Hernandez resected a malignant melanoma from the right posterior arm, above the elbow, on 06/05/14. Pathology revealed a 2.25 mm thick non-ulcerated Pankaj's level IV malignant melanoma. There were 10 mitoses/ sq mm. There was no vascular or lymphatic invasion. A wide excision with a sentinel lymph node biopsy was performed by by Dr. Sheryl Melissa on 07/09/14. Pathology revealed that the right axillary sentinel lymph node was positive for metastatic malignancy by immunoperoxidase stain. The wide excision sample was without further local involvement. A right axillary lymph node dissection was performed by Dr. Sheryl Melissa on 07/21/14. No metastatic malignancy was noted in any of the 8 right axillary lymph nodes submitted. The HMB-45 immunoperoxidase stain was negative for metastatic malignant melanoma in any of these subsequently submitted lymph nodes. Completion of a metastatic workup on 08/06/14, including MRI of the brain, bone scan, CT scans of the chest, abdomen and pelvis were negative for evidence of metastatic disease. Adjuvant pegylated Interferon (Sylatron) 6 mcg/kg (500mcg) sub-q weekly x 8 to be followed then by 3 mcg/kg(250mcg) weekly x up to 5 years was discussed and planned. Adjuvant therapy was indeed initiated and delivered as discussed below in treatment summary.   The last dose of the medication was delivered on 2/15/2015. He was unable to tolerate this medication even at reduced dosages because of poor tolerability, weight loss, anorexia, unable to sleep, anxiety, fatigue, et [place]. He declined any further interferon, which is reasonable. A one-year followup CT scan of the chest and MRI of the brain on 6/11/2015 did not reveal any evidence of recurrent disease.  ----------------------------------PROGRESSION --------------------------------  Oracio Staley presented to [facility] emergency department on 7/3/18 for a 3 month history of waxing and waning epigastric abdominal pain and new onset fever. Additional symptoms reported include unexplained weight loss, nausea, headaches. CXR 7/3/18 showed mild cardiomegaly with no acute cardiopulmonary process. Abdominal/pelvic CT with contrast 7/3/18 documented multiple low density bilateral hepatic lesions suspicious for metastatic disease. 2 small renal cysts are noted and one on the left. A second left renal lesion near the lower pole measured 25 mm with SUV of 34, ultrasound recommended. The prostate is markedly enlarged causing partial right obstructive uropathy. CBC shows WBC 7.3, hemoglobin 15.8 and platelets 444,976  CMP revealed a creatinine of 1.2, GFR 59. LFTs WNL  UA negative  Lipase 28, troponin <0.01  He has a history of GERD with laparoscopic paraesophageal hernia repair and fundoplication by Dr. Jaden Mauro on 5/24/15. He was prescribed Norco and Zofran PRN at [facility] ER. Rx was given for omeprazole in clinic  Tumor markers drawn 7/11/18 included:   AFP 5.8   CEA 2.0   CA 19-9 - 8   PSA 7.4 (chronically elevated, up to 8.28 on 8/8/14)  MRI of the abdomen w/wo contrast 7/13/18 at \[facility]"" documented innumerable T1 hypointense, T2 hyperintense lesions throughout the liver as noted on recent CT. One of the larger lesions seen posteriorly in the right hepatic lobe measured 1.6 cm.  Also noted was at least two T2 hyperintense lesions within the thoracocolumbar spine, which could represent metastasis. Bilateral renal ultrasound 7/3/18 showed a 2.6 cm hypoechoic left lower pole renal lesion, not typical of a benign cyst with enhancing characteristics concerning for primary renal cancer versus metastatic disease. Bilateral nephrogenic cysts noted along with marked prostate enlargement with lobular changes measuring 8 x 5.9 x 4.9 cm. MRI of the brain w/wo contrast 7/3/18 for complaint of headache and nausea was without evidence of acute intracranial process. Contrasted chest CT 7/24/18 revealed a stable 2 cm right thyroid nodule, a new 9 mm right thyroid nodule. An indeterminate 3 mm subpleural HORACIO nodule is noted, likely granulomatous or postinfectious. Bone scan 7/24/18 was negative for evidence of osseous metastasis. Examination is remarkable for mid epigastric discomfort, 10 lbs weight loss and 1 inch right axillary lymph node. This was a previous site of positive sentinel lymph node biopsy and dissection associated with the resected, stage IIIa right upper extremity malignant melanoma in 2014. Suspect recurrent malignant melanoma. Branden Portillo was referred to Dr. Natalia Gregory who performed an FNA of the right axillary lymph node 7/26/18. Pathology was consistent for metastatic malignant melanoma. 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 Findings were discussed with both Branden Portillo and his wife by Dr. Lory Arvizu at follow-up on 8/1/18 and reiterated on 8/7/18. Recommendation is for combination therapy with Opdivo 1 mg/kg and Yervoy 3 mg/kg every 3 weeks ×4 cycles, followed by Opdivo 240 mg every 2 weeks thereafter. Branden Portillo had a left chest Mediport placed by Dr. Sarah Crowell 8/3/18. PET scan 8/6/18 showed the following:  3 right axillary lymph nodes, SUV up to 8.7   Hepatic metastasis, too numerous to count   2 celiac lymph nodes SUV 2.8 and 3   Right ilium, SUV 5. Right acetabulum SUV 6.4  Cycle #1 Opdivo/Yervoy and monthly Xgeva initiated 8/7/18.   He was evaluated at [facility] 10/16/18 for LLQ abdominal discomfort with a history of recent diverticulitis. Abdominal/pelvic CT with contrast revealed a decreased size in the multiple liver nodules. Bilateral renal nodules were stable. An enlarged prostate with an exophytic mass arising from the posterior superior aspect of the prostate was present. He was treated for acute diverticulitis of the mid to distal sigmoid colon. Nandini Enrique was evaluated by Dr. Michael Dominguez on 11/27/18 regarding possible prostate mass and elevated PSA with recommendations for surveillance only. Contrasted chest CT on 11/8/18 at [facility] showed no enlarged axillary, hilar or mediastinal lymph nodes. There were no acute osseous or soft tissue abnormalities. Nandini Enrique has had treatment delay of Nivolumab on more than one occasion due to rash, requiring treatment with prednisone. Nivolumab was discontinued following dose #2 of maintenance therapy on 12/20/18. He initiated cycle #1 of Tafinlar 150 mg po BID & Mekinist 2 mg po BID on 1/4/19. Contrasted CT chest 1/9/19 at [facility] showed emphysematous changes bilaterally without evidence of metastatic disease. A 1.4 cm right hilar lymph node was unchanged since 2015, likely benign. Abdominal/pelvic CT with contrast 1/9/19 documented a 4 mm hepatic lesion, previously 6 mm. Other previously noted lesions throughout the liver are not appreciated. The enlarged prostate with nodular hypertrophy was previously evaluated by Dr. Michael Dominguez. Bone scan 1/9/19 was without evidence of osteoblastic disease. Echocardiogram 1/9/19 showed an EF of 60%. Nandini Enrique was hopsitalized at [facility] 1/12/19 - 1/18/19 for enterocolitis with nausea/vomiting and diarrhea. Antineoplastic therapy was held during that time, rechallenged beginning 1/24/19. Nandini Enrique was evaluated 2/11/19 at [facility] ER for a 24 hour history of abdominal pain and diarrhea with mild diverticulitis, stable from 1/12/19. He was treated with Cipro and Flagyl.   Single agent 8/7/18.   8. Tafinlar 150 mg po BID & Mekinist 2 mg po qday, cycle #1 initiated 1/4/19.   9. Single agent Tafinlar 150 mg po BID initiated 3/28/19, discontinue by patient 4/4/19.   10. Opdivo 240 mg every 2 weeks resumed 4/25/19, change to 480 mg every 4 weeks on 9/5/2019    HEMATOLOGY CONCERN:  SVC stenosis  [place] iKng was Evaluated by Dl Yung PA-C with vascular surgery on 11/11/2020 regarding high-grade SVC narrowing with collateral vein formation in the mediastinum noted on contrasted chest [facility] on 10/22/2020. [place] King was asymptomatic, observation recommended. Past Medical History:    Past Medical History:   Diagnosis Date    Acid reflux     BPH (benign prostatic hyperplasia)     Cancer (HCC)     Diverticulitis     Hard of hearing     Hypercholesteremia     Hypertension     Malignant melanoma of skin of upper limb, including shoulder ([address] dx 6/5/2014    to liver, stomach, bone, and right axilla     Past Surgical History:    Past Surgical History:   Procedure Laterality Date    CHOLECYSTECTOMY      TUNNELED CENTRAL VENOUS CATHETER W/ SUBCUTANEOUS PORT       Current Medications:    Current Outpatient Medications   Medication Sig Dispense Refill    clotrimazole-betamethasone (LOTRISONE) 1-0.05 % cream Apply topically under the arms 2 times daily. 45 g 3    PARoxetine (PAXIL) 10 MG tablet TAKE 1 TABLET BY MOUTH EVERY DAY 90 tablet 3    montelukast (SINGULAIR) 10 MG tablet TAKE 1 TABLET BY MOUTH EVERY DAY AT NIGHT 90 tablet 1    omeprazole (PRILOSEC) 20 MG delayed release capsule Take 1 capsule by mouth every morning (before breakfast) 30 capsule 1    levothyroxine (SYNTHROID) 88 MCG tablet Take one tablet by mouth daily.  90 tablet 3    dicyclomine (BENTYL) 20 MG tablet TAKE 1/2 TABLET BY MOUTH 4 TIMES A DAY AS NEEDED 180 tablet 3    finasteride (PROSCAR) 5 MG tablet Take 5 mg by mouth      lovastatin (MEVACOR) 40 MG tablet Take 40 mg by mouth       No current facility-administered medications for this visit. Facility-Administered Medications Ordered in Other Visits   Medication Dose Route Frequency Provider Last Rate Last Admin    sodium chloride flush 0.9 % injection 10 mL  10 mL Intravenous PRN LIAM Poole   10 mL at 21 1011    heparin flush 100 UNIT/ML injection 500 Units  500 Units Intracatheter PRN Orlando Eld LouisLIAM todd   500 Units at 21 1011        Allergies: No Known Allergies  Social History:    Social History     Tobacco Use    Smoking status: Former Smoker    Smokeless tobacco: Never Used   Substance Use Topics    Alcohol use: No    Drug use: No     Family History:   Family History   Problem Relation Age of Onset    Heart Attack Brother          age 78 of [place]     Subjective   REVIEW OF SYSTEMS:   Review of Systems   Constitutional: Positive for fatigue. Negative for fever. No night sweats   HENT: Negative for dental problem, hearing loss, mouth sores, nosebleeds, sore throat and trouble swallowing. Eyes: Negative for discharge and itching. Status post recent cataract surgery   Respiratory: Negative for cough, shortness of breath and wheezing. Cardiovascular: Negative for chest pain, palpitations and leg swelling. Gastrointestinal: Negative for abdominal pain, constipation, diarrhea, nausea and vomiting. Endocrine: Negative for cold intolerance and heat intolerance. Genitourinary: Negative for dysuria, frequency, hematuria and urgency. Musculoskeletal: Negative for arthralgias, joint swelling and myalgias. Skin: Positive for rash. Negative for pallor. Allergic/Immunologic: Negative for environmental allergies and immunocompromised state. Neurological: Negative for seizures, syncope and numbness. Hematological: Negative for adenopathy. Does not bruise/bleed easily. Psychiatric/Behavioral: Negative for agitation, behavioral problems and confusion.      Objective   Vitals:    21 0852   BP: (!) 164/84 Pulse: 74   Resp: 18   Temp: 97.3 °F (36.3 °C)   SpO2: 95%     Wt Readings from Last 3 Encounters:   05/13/21 202 lb 9.6 oz (91.9 kg)   04/08/21 198 lb 9.6 oz (90.1 kg)   03/11/21 200 lb (90.7 kg)     PHYSICAL EXAM:  Physical Exam  Vitals signs reviewed. Constitutional:       General: He is not in acute distress. Appearance: He is well-developed. He is not toxic-appearing or diaphoretic. Comments: Wearing a facial mask. HENT:      Head: Normocephalic and atraumatic. Right Ear: External ear normal.      Left Ear: External ear normal.      Nose: Nose normal.      Mouth/Throat:      Mouth: Mucous membranes are moist.   Eyes:      General: No scleral icterus. Right eye: No discharge. Left eye: No discharge. Conjunctiva/sclera: Conjunctivae normal.   Neck:      Musculoskeletal: Neck supple. No muscular tenderness. Trachea: No tracheal deviation. Cardiovascular:      Rate and Rhythm: Normal rate and regular rhythm. Pulmonary:      Effort: Pulmonary effort is normal. No respiratory distress. Breath sounds: Normal breath sounds. No wheezing or rales. Abdominal:      General: Bowel sounds are normal. There is no distension. Palpations: Abdomen is soft. Tenderness: There is no abdominal tenderness. There is no guarding. Genitourinary:     Comments: Exam deferred  Musculoskeletal:         General: No tenderness or deformity. Comments: Normal ROM all four extremities   Lymphadenopathy:      Cervical: No cervical adenopathy. Right cervical: No superficial, deep or posterior cervical adenopathy. Left cervical: No superficial, deep or posterior cervical adenopathy. Upper Body:      Right upper body: No supraclavicular or axillary adenopathy. Left upper body: No supraclavicular or axillary adenopathy. Comments:      Skin:     General: Skin is warm and dry. Findings: Rash (Faint, erythematous macular rash, scattered) present. Neurological:      Mental Status: He is alert and oriented to person, place, and time. Comments: follows commands, non-focal   Psychiatric:         Behavior: Behavior normal. Behavior is cooperative. Thought Content: Thought content normal.         Judgment: Judgment normal.      Comments: Alert and oriented to person, place and time. Labs reviewed by me:    Labs 4/28/2021:  · CMP: Creatinine 1.0, GFR greater than 60  · TSH: 2.61    CBC 05/13/21:  Lab Results   Component Value Date    WBC 9.07 05/13/2021    HGB 14.7 05/13/2021    HCT 45.0 05/13/2021    MCV 93.0 (H) 05/13/2021     05/13/2021     ASSESSMENT:   1. Malignant melanoma of skin of right upper extremity, including shoulder ([address]    2. Liver metastasis ([address]    3. Bone metastases ([address]    4. Encounter for antineoplastic immunotherapy    5. Drug-induced skin rash    6. Hypothyroidism due to medication    7. Left renal mass       Malignant melanoma of skin of right upper extremity, including shoulder (HCC)  Liver metastasis (HCC)  Bone metastases (HCC)    Contrasted chest CT, abdomen/pelvis 4/1/2021 at [facility]:   · No evidence of metastatic disease in the chest, abdomen or pelvis  · Markedly enlarged prostate with mass-effect upon the bladder and a large nodule extending off the posterior superior aspect of the prostate gland, similar to 10/22/2020  · Stable bilateral renal lesions, favored to represent hemorrhagic or proteinaceous cyst    Drug-induced skin rash  Grade 1, associated with immunotherapy, resolved with steroid taper  Discussed with Dr. Courtney Caldwell, will rechallenge with nivolumab per patient request   If rash returns will need to consider changing treatment to pembrolizumab [place]) every 3 weeks.   RF Lotrisone cream    Thyroid nodule/hypothyroidism due to medication  TSH was 2.6 on 4/8/2021  1.7 cm thyroid nodule stable on CT  Continues Synthroid 88 mcg per day  Repeat TSH today    Cancer related pain/arthritis  RF Norco PRN pain, as prescribed. Recommend Aleve BID and Claritin daily, take the day before, day of and day after Xgeva delivery for discomfort. Left renal mass  2.2 cm left renal mass stable on abdominal/pelvic CT dated 6/4/2020, 10/22/2020 and 4/1/2021  Previously evaluated by urology, monitor conservatively    Discussed with Roberta Draper and his wife. All questions answered. Orders placed this encounter:  CMP, TSH    Orders Placed This Encounter   Medications    clotrimazole-betamethasone (LOTRISONE) 1-0.05 % cream     Sig: Apply topically under the arms 2 times daily. Dispense:  45 g     Refill:  3     RTC RN in 4 weeks for nivolumab. Return in about 8 weeks (around 7/8/2021) for follow up with Sherryle Maxim, APRN -for nivolumab. Doug ROSADO am scribing for LIAM Garcia. Electronically signed by Doug Bowers on 5/12/2021 at 1:09 pm      I, Sherryle Maxim, APRN, personally performed the services described in this documentation as scribed by Ayush Riley RN in my presence and it is both accurate and complete.       LIAM Garcia  10:16 AM  5/13/2021

## 2021-05-13 ENCOUNTER — HOSPITAL ENCOUNTER (OUTPATIENT)
Dept: INFUSION THERAPY | Age: 81
Discharge: HOME | End: 2021-05-13
Payer: MEDICARE

## 2021-05-13 ENCOUNTER — OFFICE VISIT (OUTPATIENT)
Dept: HEMATOLOGY | Age: 81
End: 2021-05-13
Payer: MEDICARE

## 2021-05-13 VITALS
DIASTOLIC BLOOD PRESSURE: 84 MMHG | SYSTOLIC BLOOD PRESSURE: 164 MMHG | HEIGHT: 71 IN | HEART RATE: 74 BPM | BODY MASS INDEX: 28.36 KG/M2 | RESPIRATION RATE: 18 BRPM | TEMPERATURE: 97.3 F | WEIGHT: 202.6 LBS | OXYGEN SATURATION: 95 %

## 2021-05-13 DIAGNOSIS — L27.0 DRUG-INDUCED SKIN RASH: ICD-10-CM

## 2021-05-13 DIAGNOSIS — C43.61 MALIGNANT MELANOMA OF SKIN OF RIGHT UPPER EXTREMITY, INCLUDING SHOULDER (HCC): Primary | ICD-10-CM

## 2021-05-13 DIAGNOSIS — C43.9 MALIGNANT MELANOMA, UNSPECIFIED SITE (HCC): Primary | ICD-10-CM

## 2021-05-13 DIAGNOSIS — R53.83 FATIGUE DUE TO TREATMENT: ICD-10-CM

## 2021-05-13 DIAGNOSIS — C78.7 LIVER METASTASIS (HCC): ICD-10-CM

## 2021-05-13 DIAGNOSIS — E03.2 HYPOTHYROIDISM DUE TO MEDICATION: ICD-10-CM

## 2021-05-13 DIAGNOSIS — N28.89 LEFT RENAL MASS: ICD-10-CM

## 2021-05-13 DIAGNOSIS — C79.51 BONE METASTASES (HCC): ICD-10-CM

## 2021-05-13 DIAGNOSIS — Z51.12 ENCOUNTER FOR ANTINEOPLASTIC IMMUNOTHERAPY: ICD-10-CM

## 2021-05-13 LAB
ALBUMIN SERPL-MCNC: 4.3 G/DL (ref 3.5–5.2)
ALP BLD-CCNC: 58 U/L (ref 40–130)
ALT SERPL-CCNC: 34 U/L (ref 21–72)
ANION GAP SERPL CALCULATED.3IONS-SCNC: 7 MMOL/L (ref 7–19)
AST SERPL-CCNC: 32 U/L (ref 17–59)
BASOPHILS ABSOLUTE: 0.04 K/UL (ref 0.01–0.08)
BASOPHILS RELATIVE PERCENT: 0.4 % (ref 0.1–1.2)
BILIRUB SERPL-MCNC: 0.7 MG/DL (ref 0.2–1.3)
BUN BLDV-MCNC: 16 MG/DL (ref 9–20)
CALCIUM SERPL-MCNC: 9 MG/DL (ref 8.4–10.2)
CHLORIDE BLD-SCNC: 100 MMOL/L (ref 98–111)
CO2: 30 MMOL/L (ref 22–29)
CREAT SERPL-MCNC: 1.2 MG/DL (ref 0.6–1.2)
EOSINOPHILS ABSOLUTE: 0.35 K/UL (ref 0.04–0.54)
EOSINOPHILS RELATIVE PERCENT: 3.9 % (ref 0.7–7)
GFR NON-AFRICAN AMERICAN: 58
GLOBULIN: 2.9 G/DL
GLUCOSE BLD-MCNC: 103 MG/DL (ref 74–106)
HCT VFR BLD CALC: 45 % (ref 40.1–51)
HEMOGLOBIN: 14.7 G/DL (ref 13.7–17.5)
LYMPHOCYTES ABSOLUTE: 2.54 K/UL (ref 1.18–3.74)
LYMPHOCYTES RELATIVE PERCENT: 28 % (ref 19.3–53.1)
MCH RBC QN AUTO: 30.4 PG (ref 25.7–32.2)
MCHC RBC AUTO-ENTMCNC: 32.7 G/DL (ref 32.3–36.5)
MCV RBC AUTO: 93 FL (ref 79–92.2)
MONOCYTES ABSOLUTE: 0.89 K/UL (ref 0.24–0.82)
MONOCYTES RELATIVE PERCENT: 9.8 % (ref 4.7–12.5)
NEUTROPHILS ABSOLUTE: 5.25 K/UL (ref 1.56–6.13)
NEUTROPHILS RELATIVE PERCENT: 57.9 % (ref 34–71.1)
PDW BLD-RTO: 13.1 % (ref 11.6–14.4)
PLATELET # BLD: 239 K/UL (ref 163–337)
PMV BLD AUTO: 10 FL (ref 7.4–10.4)
POTASSIUM SERPL-SCNC: 4.5 MMOL/L (ref 3.5–5.1)
RBC # BLD: 4.84 M/UL (ref 4.63–6.08)
SODIUM BLD-SCNC: 137 MMOL/L (ref 137–145)
TOTAL PROTEIN: 7.2 G/DL (ref 6.3–8.2)
TSH SERPL DL<=0.05 MIU/L-ACNC: 5.65 UIU/ML (ref 0.47–4.68)
WBC # BLD: 9.07 K/UL (ref 4.23–9.07)

## 2021-05-13 PROCEDURE — 96372 THER/PROPH/DIAG INJ SC/IM: CPT

## 2021-05-13 PROCEDURE — 1036F TOBACCO NON-USER: CPT | Performed by: NURSE PRACTITIONER

## 2021-05-13 PROCEDURE — G8417 CALC BMI ABV UP PARAM F/U: HCPCS | Performed by: NURSE PRACTITIONER

## 2021-05-13 PROCEDURE — 2580000003 HC RX 258: Performed by: NURSE PRACTITIONER

## 2021-05-13 PROCEDURE — 99213 OFFICE O/P EST LOW 20 MIN: CPT | Performed by: NURSE PRACTITIONER

## 2021-05-13 PROCEDURE — G8427 DOCREV CUR MEDS BY ELIG CLIN: HCPCS | Performed by: NURSE PRACTITIONER

## 2021-05-13 PROCEDURE — 1123F ACP DISCUSS/DSCN MKR DOCD: CPT | Performed by: NURSE PRACTITIONER

## 2021-05-13 PROCEDURE — 80053 COMPREHEN METABOLIC PANEL: CPT

## 2021-05-13 PROCEDURE — 85025 COMPLETE CBC W/AUTO DIFF WBC: CPT

## 2021-05-13 PROCEDURE — 96413 CHEMO IV INFUSION 1 HR: CPT

## 2021-05-13 PROCEDURE — 6360000002 HC RX W HCPCS: Performed by: NURSE PRACTITIONER

## 2021-05-13 PROCEDURE — 84443 ASSAY THYROID STIM HORMONE: CPT

## 2021-05-13 PROCEDURE — 4040F PNEUMOC VAC/ADMIN/RCVD: CPT | Performed by: NURSE PRACTITIONER

## 2021-05-13 RX ORDER — CLOTRIMAZOLE AND BETAMETHASONE DIPROPIONATE 10; .64 MG/G; MG/G
CREAM TOPICAL
Qty: 45 G | Refills: 3 | Status: SHIPPED | OUTPATIENT
Start: 2021-05-13 | End: 2021-12-09

## 2021-05-13 RX ORDER — EPINEPHRINE 1 MG/ML
0.3 INJECTION, SOLUTION, CONCENTRATE INTRAVENOUS PRN
Status: CANCELLED | OUTPATIENT
Start: 2021-05-13

## 2021-05-13 RX ORDER — HEPARIN SODIUM (PORCINE) LOCK FLUSH IV SOLN 100 UNIT/ML 100 UNIT/ML
500 SOLUTION INTRAVENOUS PRN
Status: DISCONTINUED | OUTPATIENT
Start: 2021-05-13 | End: 2021-05-15 | Stop reason: HOSPADM

## 2021-05-13 RX ORDER — SODIUM CHLORIDE 0.9 % (FLUSH) 0.9 %
5 SYRINGE (ML) INJECTION PRN
Status: CANCELLED | OUTPATIENT
Start: 2021-05-13

## 2021-05-13 RX ORDER — SODIUM CHLORIDE 0.9 % (FLUSH) 0.9 %
10 SYRINGE (ML) INJECTION PRN
Status: CANCELLED | OUTPATIENT
Start: 2021-05-13

## 2021-05-13 RX ORDER — METHYLPREDNISOLONE SODIUM SUCCINATE 125 MG/2ML
125 INJECTION, POWDER, LYOPHILIZED, FOR SOLUTION INTRAMUSCULAR; INTRAVENOUS PRN
Status: CANCELLED | OUTPATIENT
Start: 2021-05-13

## 2021-05-13 RX ORDER — SODIUM CHLORIDE 0.9 % (FLUSH) 0.9 %
10 SYRINGE (ML) INJECTION PRN
Status: DISCONTINUED | OUTPATIENT
Start: 2021-05-13 | End: 2021-05-14 | Stop reason: HOSPADM

## 2021-05-13 RX ORDER — DIPHENHYDRAMINE HYDROCHLORIDE 50 MG/ML
50 INJECTION INTRAMUSCULAR; INTRAVENOUS PRN
Status: CANCELLED | OUTPATIENT
Start: 2021-05-13

## 2021-05-13 RX ORDER — HEPARIN SODIUM (PORCINE) LOCK FLUSH IV SOLN 100 UNIT/ML 100 UNIT/ML
500 SOLUTION INTRAVENOUS PRN
Status: CANCELLED | OUTPATIENT
Start: 2021-05-13

## 2021-05-13 RX ADMIN — SODIUM CHLORIDE 480 MG: 9 INJECTION, SOLUTION INTRAVENOUS at 09:37

## 2021-05-13 RX ADMIN — DENOSUMAB 120 MG: 120 INJECTION SUBCUTANEOUS at 10:12

## 2021-05-13 RX ADMIN — SODIUM CHLORIDE, PRESERVATIVE FREE 10 ML: 5 INJECTION INTRAVENOUS at 10:11

## 2021-05-13 RX ADMIN — HEPARIN 500 UNITS: 100 SYRINGE at 10:11

## 2021-05-13 ASSESSMENT — ENCOUNTER SYMPTOMS
EYE DISCHARGE: 0
CONSTIPATION: 0
DIARRHEA: 0
WHEEZING: 0
EYE ITCHING: 0
NAUSEA: 0
COUGH: 0
SHORTNESS OF BREATH: 0
SORE THROAT: 0
VOMITING: 0
ABDOMINAL PAIN: 0
TROUBLE SWALLOWING: 0

## 2021-06-02 DIAGNOSIS — C43.9 MALIGNANT MELANOMA, UNSPECIFIED SITE (HCC): ICD-10-CM

## 2021-06-02 DIAGNOSIS — G89.3 CANCER RELATED PAIN: ICD-10-CM

## 2021-06-02 RX ORDER — HYDROXYZINE HYDROCHLORIDE 25 MG/1
25 TABLET, FILM COATED ORAL EVERY 8 HOURS PRN
Qty: 90 TABLET | Refills: 1 | Status: SHIPPED | OUTPATIENT
Start: 2021-06-02 | End: 2021-08-30 | Stop reason: SDUPTHER

## 2021-06-03 RX ORDER — HYDROCODONE BITARTRATE AND ACETAMINOPHEN 10; 325 MG/1; MG/1
1 TABLET ORAL EVERY 6 HOURS PRN
Qty: 120 TABLET | Refills: 0 | Status: SHIPPED | OUTPATIENT
Start: 2021-06-03 | End: 2021-07-06 | Stop reason: SDUPTHER

## 2021-06-09 RX ORDER — DIPHENHYDRAMINE HYDROCHLORIDE 50 MG/ML
50 INJECTION INTRAMUSCULAR; INTRAVENOUS PRN
Status: CANCELLED | OUTPATIENT
Start: 2021-06-10

## 2021-06-09 RX ORDER — METHYLPREDNISOLONE SODIUM SUCCINATE 125 MG/2ML
125 INJECTION, POWDER, LYOPHILIZED, FOR SOLUTION INTRAMUSCULAR; INTRAVENOUS PRN
Status: CANCELLED | OUTPATIENT
Start: 2021-06-10

## 2021-06-09 RX ORDER — SODIUM CHLORIDE 0.9 % (FLUSH) 0.9 %
10 SYRINGE (ML) INJECTION PRN
Status: CANCELLED | OUTPATIENT
Start: 2021-06-10

## 2021-06-09 RX ORDER — HEPARIN SODIUM (PORCINE) LOCK FLUSH IV SOLN 100 UNIT/ML 100 UNIT/ML
500 SOLUTION INTRAVENOUS PRN
Status: CANCELLED | OUTPATIENT
Start: 2021-06-10

## 2021-06-09 RX ORDER — EPINEPHRINE 1 MG/ML
0.3 INJECTION, SOLUTION, CONCENTRATE INTRAVENOUS PRN
Status: CANCELLED | OUTPATIENT
Start: 2021-06-10

## 2021-06-09 RX ORDER — SODIUM CHLORIDE 0.9 % (FLUSH) 0.9 %
5 SYRINGE (ML) INJECTION PRN
Status: CANCELLED | OUTPATIENT
Start: 2021-06-10

## 2021-06-10 ENCOUNTER — HOSPITAL ENCOUNTER (OUTPATIENT)
Dept: INFUSION THERAPY | Age: 81
Discharge: HOME | End: 2021-06-10
Payer: MEDICARE

## 2021-06-10 VITALS
DIASTOLIC BLOOD PRESSURE: 67 MMHG | WEIGHT: 203.5 LBS | TEMPERATURE: 98.1 F | RESPIRATION RATE: 18 BRPM | HEART RATE: 68 BPM | HEIGHT: 71 IN | SYSTOLIC BLOOD PRESSURE: 126 MMHG | OXYGEN SATURATION: 95 % | BODY MASS INDEX: 28.49 KG/M2

## 2021-06-10 DIAGNOSIS — C43.9 MALIGNANT MELANOMA, UNSPECIFIED SITE (HCC): Primary | ICD-10-CM

## 2021-06-10 DIAGNOSIS — C79.51 BONE METASTASES (HCC): ICD-10-CM

## 2021-06-10 DIAGNOSIS — R53.83 FATIGUE DUE TO TREATMENT: ICD-10-CM

## 2021-06-10 LAB
ALBUMIN SERPL-MCNC: 3.8 G/DL (ref 3.5–5.2)
ALP BLD-CCNC: 46 U/L (ref 40–130)
ALT SERPL-CCNC: 21 U/L (ref 21–72)
ANION GAP SERPL CALCULATED.3IONS-SCNC: 9 MMOL/L (ref 7–19)
AST SERPL-CCNC: 31 U/L (ref 17–59)
BASOPHILS ABSOLUTE: 0.04 K/UL (ref 0.01–0.08)
BASOPHILS RELATIVE PERCENT: 0.4 % (ref 0.1–1.2)
BILIRUB SERPL-MCNC: 0.8 MG/DL (ref 0.2–1.3)
BUN BLDV-MCNC: 20 MG/DL (ref 9–20)
CALCIUM SERPL-MCNC: 9 MG/DL (ref 8.4–10.2)
CHLORIDE BLD-SCNC: 102 MMOL/L (ref 98–111)
CO2: 29 MMOL/L (ref 22–29)
CREAT SERPL-MCNC: 1.1 MG/DL (ref 0.6–1.2)
EOSINOPHILS ABSOLUTE: 0.55 K/UL (ref 0.04–0.54)
EOSINOPHILS RELATIVE PERCENT: 5.9 % (ref 0.7–7)
GFR NON-AFRICAN AMERICAN: >60
GLOBULIN: 2.8 G/DL
GLUCOSE BLD-MCNC: 100 MG/DL (ref 74–106)
HCT VFR BLD CALC: 40.6 % (ref 40.1–51)
HEMOGLOBIN: 13.5 G/DL (ref 13.7–17.5)
LYMPHOCYTES ABSOLUTE: 2.02 K/UL (ref 1.18–3.74)
LYMPHOCYTES RELATIVE PERCENT: 21.8 % (ref 19.3–53.1)
MCH RBC QN AUTO: 30.5 PG (ref 25.7–32.2)
MCHC RBC AUTO-ENTMCNC: 33.3 G/DL (ref 32.3–36.5)
MCV RBC AUTO: 91.9 FL (ref 79–92.2)
MONOCYTES ABSOLUTE: 1.16 K/UL (ref 0.24–0.82)
MONOCYTES RELATIVE PERCENT: 12.5 % (ref 4.7–12.5)
NEUTROPHILS ABSOLUTE: 5.5 K/UL (ref 1.56–6.13)
NEUTROPHILS RELATIVE PERCENT: 59.4 % (ref 34–71.1)
PDW BLD-RTO: 12.7 % (ref 11.6–14.4)
PLATELET # BLD: 208 K/UL (ref 163–337)
PMV BLD AUTO: 10.4 FL (ref 7.4–10.4)
POTASSIUM SERPL-SCNC: 4.4 MMOL/L (ref 3.5–5.1)
RBC # BLD: 4.42 M/UL (ref 4.63–6.08)
SODIUM BLD-SCNC: 140 MMOL/L (ref 137–145)
TOTAL PROTEIN: 6.6 G/DL (ref 6.3–8.2)
TSH SERPL DL<=0.05 MIU/L-ACNC: 2.48 UIU/ML (ref 0.47–4.68)
WBC # BLD: 9.27 K/UL (ref 4.23–9.07)

## 2021-06-10 PROCEDURE — 6360000002 HC RX W HCPCS: Performed by: INTERNAL MEDICINE

## 2021-06-10 PROCEDURE — 2580000003 HC RX 258: Performed by: INTERNAL MEDICINE

## 2021-06-10 PROCEDURE — 85025 COMPLETE CBC W/AUTO DIFF WBC: CPT

## 2021-06-10 PROCEDURE — 96413 CHEMO IV INFUSION 1 HR: CPT

## 2021-06-10 PROCEDURE — 84443 ASSAY THYROID STIM HORMONE: CPT

## 2021-06-10 PROCEDURE — 80053 COMPREHEN METABOLIC PANEL: CPT

## 2021-06-10 PROCEDURE — 96372 THER/PROPH/DIAG INJ SC/IM: CPT

## 2021-06-10 RX ORDER — SODIUM CHLORIDE 0.9 % (FLUSH) 0.9 %
10 SYRINGE (ML) INJECTION PRN
Status: DISCONTINUED | OUTPATIENT
Start: 2021-06-10 | End: 2021-06-11 | Stop reason: HOSPADM

## 2021-06-10 RX ORDER — HEPARIN SODIUM (PORCINE) LOCK FLUSH IV SOLN 100 UNIT/ML 100 UNIT/ML
500 SOLUTION INTRAVENOUS PRN
Status: DISCONTINUED | OUTPATIENT
Start: 2021-06-10 | End: 2021-06-12 | Stop reason: HOSPADM

## 2021-06-10 RX ADMIN — SODIUM CHLORIDE, PRESERVATIVE FREE 10 ML: 5 INJECTION INTRAVENOUS at 09:48

## 2021-06-10 RX ADMIN — SODIUM CHLORIDE 480 MG: 9 INJECTION, SOLUTION INTRAVENOUS at 09:16

## 2021-06-10 RX ADMIN — DENOSUMAB 120 MG: 120 INJECTION SUBCUTANEOUS at 09:49

## 2021-06-10 RX ADMIN — HEPARIN 500 UNITS: 100 SYRINGE at 09:48

## 2021-07-06 DIAGNOSIS — C43.9 MALIGNANT MELANOMA, UNSPECIFIED SITE (HCC): ICD-10-CM

## 2021-07-06 DIAGNOSIS — C43.61 MALIGNANT MELANOMA OF RIGHT UPPER EXTREMITY INCLUDING SHOULDER (HCC): Primary | ICD-10-CM

## 2021-07-06 DIAGNOSIS — G89.3 CANCER RELATED PAIN: ICD-10-CM

## 2021-07-06 RX ORDER — HYDROCODONE BITARTRATE AND ACETAMINOPHEN 10; 325 MG/1; MG/1
1 TABLET ORAL EVERY 6 HOURS PRN
Qty: 120 TABLET | Refills: 0 | Status: SHIPPED | OUTPATIENT
Start: 2021-07-06 | End: 2021-08-23 | Stop reason: SDUPTHER

## 2021-07-07 NOTE — PROGRESS NOTES
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 Nearly 3 years ago in July, 2018. He is treated with monthly Xgeva for bony metastasis. He has been tolerating treatment with stable, drug-induced hypothyroidism as well as occasional rash requiring steroid treatment. Nikki Mendez received Opdivo 6/10/2021 without rash development. He has stable, grade 1 generalized pruritus. He denies new skin nodules, cough or diarrhea. Fatigue and neuropathy are grade 1, stable. Arthritis is exacerbated by Neo Cesar, treated with Norco.    ECOG grade 1  Neuropathy grade 1  Fatigue grade 1  Pruritus grade 1    Ulises Lockhart presents for follow-up surveillance visit and toxicity assessment. he requires intenstive monitoring due to the potential to cause serious morbidity or death. He is accompanied by his wife today. TARGET METASTATIC MALIGNANT MELANOMA SITES:  1. Right upper extremity original primary site 06/05/14   2. Right axilla lymph node at presentation 6/5/2014 and 3 axillary nodes at recurrence 7/27/2018 with an SUV of 8.7   3. Too numerous to count liver metastases   4. Celiac lymph nodes x 2 with highest SUV of 3   5. Bony metastatic disease on PET scan in the right ilium (SUV of 5) and right acetabulum (SUV of 6.4)   6. Indeterminate HORACIO 3 mm subpleural nodule   7.  Indeterminate thyroid nodules on chest CT    1st TUMOR HISTORY: Resected stage IIIA (pT3a pN1a M0) right upper extremity malignant melanoma, 06/05/14  Mr. Queen Florez was seen in initial oncology consultation on 08/05/14, referred by Dr. Marciano Salgado, regarding a diagnosis of resected malignant melanoma of the right upper extremity. Dr. Remi Bridges resected a malignant melanoma from the right posterior arm, above the elbow, on 06/05/14. Pathology revealed a 2.25 mm thick non-ulcerated Pankaj's level IV malignant melanoma. There were 10 mitoses/ sq mm. There was no vascular or lymphatic invasion. A wide excision with a sentinel lymph node biopsy was performed by by Dr. Tish Escobar on 07/09/14. Pathology revealed that the right axillary sentinel lymph node was positive for metastatic malignancy by immunoperoxidase stain. The wide excision sample was without further local involvement. A right axillary lymph node dissection was performed by Dr. Tish Escobar on 07/21/14. No metastatic malignancy was noted in any of the 8 right axillary lymph nodes submitted. The HMB-45 immunoperoxidase stain was negative for metastatic malignant melanoma in any of these subsequently submitted lymph nodes. Completion of a metastatic workup on 08/06/14, including MRI of the brain, bone scan, CT scans of the chest, abdomen and pelvis were negative for evidence of metastatic disease. Adjuvant pegylated Interferon (Sylatron) 6 mcg/kg (500mcg) sub-q weekly x 8 to be followed then by 3 mcg/kg(250mcg) weekly x up to 5 years was discussed and planned. Adjuvant therapy was indeed initiated and delivered as discussed below in treatment summary. The last dose of the medication was delivered on 2/15/2015. He was unable to tolerate this medication even at reduced dosages because of poor tolerability, weight loss, anorexia, unable to sleep, anxiety, fatigue, et Daytno Scarlett. He declined any further interferon, which is reasonable.    A one-year followup CT scan of the chest and MRI of the brain on 6/11/2015 did not reveal any evidence of recurrent disease.  ----------------------------------PROGRESSION --------------------------------  Jimmy Christopher presented to [facility] emergency department on 7/3/18 for a 3 month history of waxing and waning epigastric abdominal pain and new onset fever. Additional symptoms reported include unexplained weight loss, nausea, headaches. CXR 7/3/18 showed mild cardiomegaly with no acute cardiopulmonary process. Abdominal/pelvic CT with contrast 7/3/18 documented multiple low density bilateral hepatic lesions suspicious for metastatic disease. 2 small renal cysts are noted and one on the left. A second left renal lesion near the lower pole measured 25 mm with SUV of 34, ultrasound recommended. The prostate is markedly enlarged causing partial right obstructive uropathy. CBC shows WBC 7.3, hemoglobin 15.8 and platelets [phone]  CMP revealed a creatinine of 1.2, GFR 59. LFTs WNL  UA negative  Lipase 28, troponin <0.01  He has a history of GERD with laparoscopic paraesophageal hernia repair and fundoplication by Dr. Arden Pérez on 5/24/15. He was prescribed Norco and Zofran PRN at [facility] ER. Rx was given for omeprazole in clinic  Tumor markers drawn 7/11/18 included:   AFP 5.8   CEA 2.0   CA 19-9 - 8   PSA 7.4 (chronically elevated, up to 8.28 on 8/8/14)  MRI of the abdomen w/wo contrast 7/13/18 at [facility] documented innumerable T1 hypointense, T2 hyperintense lesions throughout the liver as noted on recent CT. One of the larger lesions seen posteriorly in the right hepatic lobe measured 1.6 cm. Also noted was at least two T2 hyperintense lesions within the thoracocolumbar spine, which could represent metastasis. Bilateral renal ultrasound 7/3/18 showed a 2.6 cm hypoechoic left lower pole renal lesion, not typical of a benign cyst with enhancing characteristics concerning for primary renal cancer versus metastatic disease.  Bilateral nephrogenic cysts noted along with marked prostate enlargement with lobular changes measuring 8 x 5.9 x 4.9 cm.  MRI of the brain w/wo contrast 7/3/18 for complaint of headache and nausea was without evidence of acute intracranial process. Contrasted chest CT 7/24/18 revealed a stable 2 cm right thyroid nodule, a new 9 mm right thyroid nodule. An indeterminate 3 mm subpleural HORACIO nodule is noted, likely granulomatous or postinfectious. Bone scan 7/24/18 was negative for evidence of osseous metastasis. Examination is remarkable for mid epigastric discomfort, 10 lbs weight loss and 1 inch right axillary lymph node. This was a previous site of positive sentinel lymph node biopsy and dissection associated with the resected, stage IIIa right upper extremity malignant melanoma in 2014. Suspect recurrent malignant melanoma. Kelly Orta was referred to Dr. Julieta Hansen who performed an FNA of the right axillary lymph node 7/26/18. Pathology was consistent for metastatic malignant melanoma. 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 Findings were discussed with both Kelly Orta and his wife by Dr. Portia Gonsales at follow-up on 8/1/18 and reiterated on 8/7/18. Recommendation is for combination therapy with Opdivo 1 mg/kg and Yervoy 3 mg/kg every 3 weeks ×4 cycles, followed by Opdivo 240 mg every 2 weeks thereafter. Kelly Orta had a left chest Mediport placed by Dr. Bina Verdin 8/3/18. PET scan 8/6/18 showed the following:  3 right axillary lymph nodes, SUV up to 8.7   Hepatic metastasis, too numerous to count   2 celiac lymph nodes SUV 2.8 and 3   Right ilium, SUV 5. Right acetabulum SUV 6.4  Cycle #1 Opdivo/Yervoy and monthly Xgeva initiated 8/7/18. He was evaluated at [facility] 10/16/18 for LLQ abdominal discomfort with a history of recent diverticulitis. Abdominal/pelvic CT with contrast revealed a decreased size in the multiple liver nodules. Bilateral renal nodules were stable. An enlarged prostate with an exophytic mass arising from the posterior superior aspect of the prostate was present.   He was treated for acute diverticulitis of the mid to distal sigmoid colon. Isaak Bauer was evaluated by Dr. Kendell Lopez on 11/27/18 regarding possible prostate mass and elevated PSA with recommendations for surveillance only. Contrasted chest CT on 11/8/18 at [facility] showed no enlarged axillary, hilar or mediastinal lymph nodes. There were no acute osseous or soft tissue abnormalities. Isaak Bauer has had treatment delay of Nivolumab on more than one occasion due to rash, requiring treatment with prednisone. Nivolumab was discontinued following dose #2 of maintenance therapy on 12/20/18. He initiated cycle #1 of Tafinlar 150 mg po BID & Mekinist 2 mg po BID on 1/4/19. Contrasted CT chest 1/9/19 at [facility] showed emphysematous changes bilaterally without evidence of metastatic disease. A 1.4 cm right hilar lymph node was unchanged since 2015, likely benign. Abdominal/pelvic CT with contrast 1/9/19 documented a 4 mm hepatic lesion, previously 6 mm. Other previously noted lesions throughout the liver are not appreciated. The enlarged prostate with nodular hypertrophy was previously evaluated by Dr. Kendell Lopez. Bone scan 1/9/19 was without evidence of osteoblastic disease. Echocardiogram 1/9/19 showed an EF of 60%. Isaak Bauer was hopsitalized at [facility] 1/12/19 - 1/18/19 for enterocolitis with nausea/vomiting and diarrhea. Antineoplastic therapy was held during that time, rechallenged beginning 1/24/19. Isaak Bauer was evaluated 2/11/19 at [facility] ER for a 24 hour history of abdominal pain and diarrhea with mild diverticulitis, stable from 1/12/19. He was treated with Cipro and Flagyl. Single agent Tafinlar was taken 3/28/19 - 4/4/19, discontinued by Isaak Bauer due to intolerable abdominal cramping. Kali Farhad was resumed 4/25/19. CT scans of the chest, abdomen and pelvis with contrast 8/13/2019 at [facility] was negative for intrathoracic metastasis.   There was no evidence of disease progression in the abdomen/pelvis with stable, subcentimeter low-density liver lesions noted. Dosing was changed to 480 mg every 4 weeks on 9/5/2019 to see if this may decrease persistent itching. Rx fluoxetine and hydroxyzine per Dr. Gerri Glaser recommendations. Contrasted CT scans of the chest, abdomen/pelvis and bone scan 1/16/2020 were negative for evidence of disease progression. 19 mm right thyroid lobe nodule and subcentimeter low-density liver lesions were stable. No abnormal bony uptake. Contrasted CT scans of the chest, abdomen and pelvis 6/4/2020 were without evidence of metastatic disease. 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 Treatment continues with nivolumab. TREATMENT SUMMARY:  1. Dr. Joe Castle resected a malignant melanoma from the right posterior arm, above the elbow, on 06/05/14   2. Wide excision with a sentinel lymph node biopsy by Dr. Ekta Dsouza on 07/09/14. 3. Adjuvant weekly Sylatron Initiated 09/02/14 at 6 mcg/kg (500mcg) sub-q weekly but was only able to receive 4 of 8 planned treatments of this. The last dose #4 was on 09/28/14   4. Adjuvant weekly Sylatron 3 mcg/kg (250mcg) initiated 10/12/2014. Last dose delivered on 2/15/2015, discontinued due to poor tolerability and unable to tolerate the medication. 5. Opdivo 1 mg/kg and Yervoy 3 mg/kg every 3 weeks ×4 doses. 8/7/18 - 10/25/18. 6. Opdivo 240 mg every 2 weeks initiated 11/15/18, discontinued after dose #2 on 12/20/18   7. Monthly Xgeva initiated 8/7/18.   8.  Tafinlar 150 mg po BID & Mekinist 2 mg po qday, cycle #1 initiated 1/4/19.   9. Single agent Tafinlar 150 mg po BID initiated 3/28/19, discontinue by patient 4/4/19.   10. Opdivo 240 mg every 2 weeks resumed 4/25/19, change to 480 mg every 4 weeks on 9/5/2019    HEMATOLOGY CONCERN:  SVC stenosis  Dre Sam was Evaluated by Yoly Crowley PA-C with vascular surgery on 11/11/2020 regarding high-grade SVC narrowing with collateral vein formation in the mediastinum noted on contrasted chest CT at hospitals on 10/22/2020. Dre Sam was asymptomatic, observation recommended. Past Medical History:    Past Medical History:   Diagnosis Date    Acid reflux     BPH (benign prostatic hyperplasia)     Cancer (HCC)     Diverticulitis     Hard of hearing     Hypercholesteremia     Hypertension     Malignant melanoma of skin of upper limb, including shoulder ([address] dx 6/5/2014    to liver, stomach, bone, and right axilla     Past Surgical History:    Past Surgical History:   Procedure Laterality Date    CHOLECYSTECTOMY      TUNNELED CENTRAL VENOUS CATHETER W/ SUBCUTANEOUS PORT       Current Medications:    Current Outpatient Medications   Medication Sig Dispense Refill    HYDROcodone-acetaminophen (NORCO)  MG per tablet Take 1 tablet by mouth every 6 hours as needed for Pain for up to 30 days. Intended supply: 30 days 120 tablet 0    PARoxetine (PAXIL) 10 MG tablet TAKE 1 TABLET BY MOUTH EVERY DAY 90 tablet 3    montelukast (SINGULAIR) 10 MG tablet TAKE 1 TABLET BY MOUTH EVERY DAY AT NIGHT 90 tablet 1    omeprazole (PRILOSEC) 20 MG delayed release capsule Take 1 capsule by mouth every morning (before breakfast) 30 capsule 1    levothyroxine (SYNTHROID) 88 MCG tablet Take one tablet by mouth daily. 90 tablet 3    dicyclomine (BENTYL) 20 MG tablet TAKE 1/2 TABLET BY MOUTH 4 TIMES A DAY AS NEEDED 180 tablet 3    finasteride (PROSCAR) 5 MG tablet Take 5 mg by mouth      lovastatin (MEVACOR) 40 MG tablet Take 40 mg by mouth      clotrimazole-betamethasone (LOTRISONE) 1-0.05 % cream Apply topically under the arms 2 times daily. (Patient not taking: Reported on 7/8/2021) 45 g 3     No current facility-administered medications for this visit.      Facility-Administered Medications Ordered in Other Visits   Medication Dose Route Frequency Provider Last Rate Last Admin    sterile water injection 2.2 mL  2.2 mL Intravenous PRN [facility]   2.2 mL at 21 0856    heparin flush 100 UNIT/ML injection 500 Units  500 [facility]ac[facility]   500 Units at 21 1100    sodium chloride flush 0.9 % injection 10 mL  10 mL Intravenous PRN [facility]   10 mL at 21 1101        Allergies: No Known Allergies  Social History:    Social History     Tobacco Use    Smoking status: Former Smoker    Smokeless tobacco: Never Used   Substance Use Topics    Alcohol use: No    Drug use: No     Family History:   Family History   Problem Relation Age of Onset    Heart Attack Brother          age 78 of [place]     Subjective   REVIEW OF SYSTEMS:   Review of Systems   Constitutional: Positive for fatigue (Mild). Negative for fever. No night sweats   HENT: Negative for dental problem, hearing loss, mouth sores, nosebleeds, sore throat and trouble swallowing. Eyes: Negative for discharge and itching. Recent cataract surgery   Respiratory: Negative for cough, shortness of breath and wheezing. Cardiovascular: Negative for chest pain, palpitations and leg swelling. Gastrointestinal: Negative for abdominal pain, constipation, diarrhea, nausea and vomiting. Endocrine: Negative for cold intolerance and heat intolerance. Genitourinary: Negative for dysuria, frequency, hematuria and urgency. Musculoskeletal: Negative for arthralgias, joint swelling and myalgias. Skin: Negative for pallor and rash. Generalized pruritus, mild   Allergic/Immunologic: Negative for environmental allergies and immunocompromised state. Neurological: Negative for seizures, syncope and numbness. Hematological: Negative for adenopathy. Does not bruise/bleed easily. Psychiatric/Behavioral: Negative for agitation, behavioral problems and confusion. The patient is not nervous/anxious.       Objective   Vitals:    21 0900 BP: (!) 166/69   Pulse: 67   Temp: 97.9 °F (36.6 °C)   SpO2: 97%     Wt Readings from Last 3 Encounters:   07/08/21 212 lb 3.2 oz (96.3 kg)   06/10/21 203 lb 8 oz (92.3 kg)   05/13/21 202 lb 9.6 oz (91.9 kg)     PHYSICAL EXAM:  Physical Exam  Vitals reviewed. Constitutional:       General: He is not in acute distress. Appearance: He is well-developed. He is not toxic-appearing or diaphoretic. Comments: Wearing a facial mask. HENT:      Head: Normocephalic and atraumatic. Right Ear: External ear normal.      Left Ear: External ear normal.      Nose: Nose normal.      Mouth/Throat:      Mouth: Mucous membranes are moist.   Eyes:      General: No scleral icterus. Right eye: No discharge. Left eye: No discharge. Conjunctiva/sclera: Conjunctivae normal.   Neck:      Trachea: No tracheal deviation. Cardiovascular:      Rate and Rhythm: Normal rate and regular rhythm. Pulmonary:      Effort: Pulmonary effort is normal. No respiratory distress. Breath sounds: Normal breath sounds. No wheezing or rales. Abdominal:      General: Bowel sounds are normal. There is no distension. Palpations: Abdomen is soft. Tenderness: There is no abdominal tenderness. There is no guarding. Genitourinary:     Comments: Exam deferred  Musculoskeletal:         General: No tenderness or deformity. Cervical back: Neck supple. No muscular tenderness. Comments: Normal ROM all four extremities   Lymphadenopathy:      Cervical:      Right cervical: No superficial or deep cervical adenopathy. Left cervical: No superficial or deep cervical adenopathy. Upper Body:      Right upper body: No supraclavicular adenopathy. Left upper body: No supraclavicular adenopathy. Comments: No bulky palpable cervical, clavicular, or axillary adenopathies on the left or right. Skin:     General: Skin is warm and dry. Findings: No rash.       Comments: Skin pigmentation changes of the hands/arms, though no obvious rash. No concerning skin lesions. Anterior chest wall Mediport without sign of infection   Neurological:      Mental Status: He is alert and oriented to person, place, and time. Comments: follows commands, non-focal   Psychiatric:         Behavior: Behavior normal. Behavior is cooperative. Thought Content: Thought content normal.         Judgment: Judgment normal.      Comments: Alert and oriented to person, place and time. Labs reviewed by me:    Labs 6/10/2021:  · CMP: Creatinine 1.1, GFR > 60  · TSH: 2.48    CBC 07/08/21:  Lab Results   Component Value Date    WBC 7.66 07/08/2021    HGB 13.1 (L) 07/08/2021    HCT 39.6 (L) 07/08/2021    MCV 92.1 07/08/2021     07/08/2021     ASSESSMENT:   1. Malignant melanoma of right upper extremity including shoulder ([address]    2. Liver metastasis ([address]    3. Bone metastases ([address]    4. Encounter for antineoplastic immunotherapy    5. Drug-induced skin rash    6. Hypothyroidism due to medication    7. Left renal mass    8. Superior vena cava stenosis       Malignant melanoma of skin of right upper extremity, including shoulder (HCC)  Liver metastasis (HCC)  Bone metastases (HCC)    Contrasted chest CT, abdomen/pelvis 4/1/2021 at [facility]:   · No evidence of metastatic disease in the chest, abdomen or pelvis  · Markedly enlarged prostate with mass-effect upon the bladder and a large nodule extending off the posterior superior aspect of the prostate gland, similar to 10/22/2020  · Stable bilateral renal lesions, favored to represent hemorrhagic or proteinaceous cyst    Proceed with Opdivo today and continue every 4 weeks. continue monthly Xgeva  Plan repeat imaging September/October 2021, granted there are no new developments.     Drug-induced skin rash  Tolerated immunotherapy 6/10/2021 without development of skin rash  Continue to monitor    Thyroid nodule/hypothyroidism due to medication  TSH was 2.48 on 6/10/2021  1.7 cm thyroid nodule stable on CT  Continues Synthroid 88 mcg per day    Cancer related pain/arthritis  Norco PRN pain, as prescribed. Left renal mass  2.2 cm left renal mass stable on abdominal/pelvic CT dated 6/4/2020, 10/22/2020 and 4/1/2021   Previously evaluated by urology, monitor conservatively    Superior vena cava stenosis  Asymptomatic  Previously evaluated by vascular surgery with conservative monitoring recommended    Plan of care discussed with Laura Levine and his wife. All questions answered. Orders Placed This Encounter   Procedures    CBC Auto Differential    Comprehensive Metabolic Panel    TSH without Reflex     RTC RN in 4 weeks for nivolumab. Return in about 2 months (around 9/8/2021) for follow up with LIAM Oleary - for Oumar Rudolph and Tomasa Covington. Jayna ROSADO am scribing for LIAM Garcia. Electronically signed by Jayna Parr on 7/8/2021 at 9:47 am       Scarlett ROSADO APRN, personally performed the services described in this documentation as scribed by Anahi Colbert RN in my presence and it is both accurate and complete.       LIAM Garcia  1:16 PM  7/8/2021

## 2021-07-08 ENCOUNTER — OFFICE VISIT (OUTPATIENT)
Dept: HEMATOLOGY | Age: 81
End: 2021-07-08
Payer: MEDICARE

## 2021-07-08 ENCOUNTER — HOSPITAL ENCOUNTER (OUTPATIENT)
Dept: INFUSION THERAPY | Age: 81
Discharge: HOME | End: 2021-07-08
Payer: MEDICARE

## 2021-07-08 VITALS
HEART RATE: 67 BPM | OXYGEN SATURATION: 97 % | HEIGHT: 71 IN | WEIGHT: 212.2 LBS | BODY MASS INDEX: 29.71 KG/M2 | DIASTOLIC BLOOD PRESSURE: 69 MMHG | SYSTOLIC BLOOD PRESSURE: 166 MMHG | TEMPERATURE: 97.9 F

## 2021-07-08 DIAGNOSIS — N28.89 LEFT RENAL MASS: ICD-10-CM

## 2021-07-08 DIAGNOSIS — I87.1 SUPERIOR VENA CAVA STENOSIS: ICD-10-CM

## 2021-07-08 DIAGNOSIS — E03.2 HYPOTHYROIDISM DUE TO MEDICATION: ICD-10-CM

## 2021-07-08 DIAGNOSIS — C78.7 LIVER METASTASIS (HCC): ICD-10-CM

## 2021-07-08 DIAGNOSIS — C79.51 BONE METASTASES (HCC): ICD-10-CM

## 2021-07-08 DIAGNOSIS — E03.2 HYPOTHYROIDISM DUE TO MEDICATION: Primary | ICD-10-CM

## 2021-07-08 DIAGNOSIS — L27.0 DRUG-INDUCED SKIN RASH: ICD-10-CM

## 2021-07-08 DIAGNOSIS — Z51.12 ENCOUNTER FOR ANTINEOPLASTIC IMMUNOTHERAPY: ICD-10-CM

## 2021-07-08 DIAGNOSIS — C43.61 MALIGNANT MELANOMA OF RIGHT UPPER EXTREMITY INCLUDING SHOULDER (HCC): Primary | ICD-10-CM

## 2021-07-08 DIAGNOSIS — C43.9 MALIGNANT MELANOMA, UNSPECIFIED SITE (HCC): ICD-10-CM

## 2021-07-08 DIAGNOSIS — C43.61 MALIGNANT MELANOMA OF RIGHT UPPER EXTREMITY INCLUDING SHOULDER (HCC): ICD-10-CM

## 2021-07-08 LAB
ALBUMIN SERPL-MCNC: 4 G/DL (ref 3.5–5.2)
ALP BLD-CCNC: 35 U/L (ref 40–130)
ALT SERPL-CCNC: 25 U/L (ref 21–72)
ANION GAP SERPL CALCULATED.3IONS-SCNC: 5 MMOL/L (ref 7–19)
AST SERPL-CCNC: 33 U/L (ref 17–59)
BASOPHILS ABSOLUTE: 0.02 K/UL (ref 0.01–0.08)
BASOPHILS RELATIVE PERCENT: 0.3 % (ref 0.1–1.2)
BILIRUB SERPL-MCNC: 0.9 MG/DL (ref 0.2–1.3)
BUN BLDV-MCNC: 15 MG/DL (ref 9–20)
CALCIUM SERPL-MCNC: 9.1 MG/DL (ref 8.4–10.2)
CHLORIDE BLD-SCNC: 100 MMOL/L (ref 98–111)
CO2: 30 MMOL/L (ref 22–29)
CREAT SERPL-MCNC: 1.1 MG/DL (ref 0.6–1.2)
EOSINOPHILS ABSOLUTE: 0.54 K/UL (ref 0.04–0.54)
EOSINOPHILS RELATIVE PERCENT: 7 % (ref 0.7–7)
GFR NON-AFRICAN AMERICAN: >60
GLOBULIN: 2.8 G/DL
GLUCOSE BLD-MCNC: 98 MG/DL (ref 74–106)
HCT VFR BLD CALC: 39.6 % (ref 40.1–51)
HEMOGLOBIN: 13.1 G/DL (ref 13.7–17.5)
LYMPHOCYTES ABSOLUTE: 2.06 K/UL (ref 1.18–3.74)
LYMPHOCYTES RELATIVE PERCENT: 26.9 % (ref 19.3–53.1)
MCH RBC QN AUTO: 30.5 PG (ref 25.7–32.2)
MCHC RBC AUTO-ENTMCNC: 33.1 G/DL (ref 32.3–36.5)
MCV RBC AUTO: 92.1 FL (ref 79–92.2)
MONOCYTES ABSOLUTE: 0.95 K/UL (ref 0.24–0.82)
MONOCYTES RELATIVE PERCENT: 12.4 % (ref 4.7–12.5)
NEUTROPHILS ABSOLUTE: 4.09 K/UL (ref 1.56–6.13)
NEUTROPHILS RELATIVE PERCENT: 53.4 % (ref 34–71.1)
PDW BLD-RTO: 12.9 % (ref 11.6–14.4)
PLATELET # BLD: 197 K/UL (ref 163–337)
PMV BLD AUTO: 10.3 FL (ref 7.4–10.4)
POTASSIUM SERPL-SCNC: 4.4 MMOL/L (ref 3.5–5.1)
RBC # BLD: 4.3 M/UL (ref 4.63–6.08)
SODIUM BLD-SCNC: 135 MMOL/L (ref 137–145)
TOTAL PROTEIN: 6.8 G/DL (ref 6.3–8.2)
TSH SERPL DL<=0.05 MIU/L-ACNC: 2.65 UIU/ML (ref 0.47–4.68)
WBC # BLD: 7.66 K/UL (ref 4.23–9.07)

## 2021-07-08 PROCEDURE — 4040F PNEUMOC VAC/ADMIN/RCVD: CPT | Performed by: NURSE PRACTITIONER

## 2021-07-08 PROCEDURE — 6360000002 HC RX W HCPCS

## 2021-07-08 PROCEDURE — 96413 CHEMO IV INFUSION 1 HR: CPT

## 2021-07-08 PROCEDURE — 96372 THER/PROPH/DIAG INJ SC/IM: CPT

## 2021-07-08 PROCEDURE — G8417 CALC BMI ABV UP PARAM F/U: HCPCS | Performed by: NURSE PRACTITIONER

## 2021-07-08 PROCEDURE — 80053 COMPREHEN METABOLIC PANEL: CPT

## 2021-07-08 PROCEDURE — 99213 OFFICE O/P EST LOW 20 MIN: CPT | Performed by: NURSE PRACTITIONER

## 2021-07-08 PROCEDURE — 2580000003 HC RX 258: Performed by: NURSE PRACTITIONER

## 2021-07-08 PROCEDURE — 85025 COMPLETE CBC W/AUTO DIFF WBC: CPT

## 2021-07-08 PROCEDURE — 84443 ASSAY THYROID STIM HORMONE: CPT

## 2021-07-08 PROCEDURE — G8427 DOCREV CUR MEDS BY ELIG CLIN: HCPCS | Performed by: NURSE PRACTITIONER

## 2021-07-08 PROCEDURE — 6360000002 HC RX W HCPCS: Performed by: NURSE PRACTITIONER

## 2021-07-08 PROCEDURE — 1036F TOBACCO NON-USER: CPT | Performed by: NURSE PRACTITIONER

## 2021-07-08 PROCEDURE — 1123F ACP DISCUSS/DSCN MKR DOCD: CPT | Performed by: NURSE PRACTITIONER

## 2021-07-08 RX ORDER — SODIUM CHLORIDE 0.9 % (FLUSH) 0.9 %
10 SYRINGE (ML) INJECTION PRN
Status: DISCONTINUED | OUTPATIENT
Start: 2021-07-08 | End: 2021-07-09 | Stop reason: HOSPADM

## 2021-07-08 RX ORDER — HEPARIN SODIUM (PORCINE) LOCK FLUSH IV SOLN 100 UNIT/ML 100 UNIT/ML
500 SOLUTION INTRAVENOUS PRN
Status: CANCELLED | OUTPATIENT
Start: 2021-07-08

## 2021-07-08 RX ORDER — HEPARIN SODIUM (PORCINE) LOCK FLUSH IV SOLN 100 UNIT/ML 100 UNIT/ML
500 SOLUTION INTRAVENOUS PRN
Status: DISCONTINUED | OUTPATIENT
Start: 2021-07-08 | End: 2021-07-10 | Stop reason: HOSPADM

## 2021-07-08 RX ORDER — SODIUM CHLORIDE 0.9 % (FLUSH) 0.9 %
5 SYRINGE (ML) INJECTION PRN
Status: CANCELLED | OUTPATIENT
Start: 2021-07-08

## 2021-07-08 RX ORDER — EPINEPHRINE 1 MG/ML
0.3 INJECTION, SOLUTION, CONCENTRATE INTRAVENOUS PRN
Status: CANCELLED | OUTPATIENT
Start: 2021-07-08

## 2021-07-08 RX ORDER — SODIUM CHLORIDE 0.9 % (FLUSH) 0.9 %
10 SYRINGE (ML) INJECTION PRN
Status: CANCELLED | OUTPATIENT
Start: 2021-07-08

## 2021-07-08 RX ORDER — METHYLPREDNISOLONE SODIUM SUCCINATE 125 MG/2ML
125 INJECTION, POWDER, LYOPHILIZED, FOR SOLUTION INTRAMUSCULAR; INTRAVENOUS PRN
Status: CANCELLED | OUTPATIENT
Start: 2021-07-08

## 2021-07-08 RX ORDER — DIPHENHYDRAMINE HYDROCHLORIDE 50 MG/ML
50 INJECTION INTRAMUSCULAR; INTRAVENOUS PRN
Status: CANCELLED | OUTPATIENT
Start: 2021-07-08

## 2021-07-08 RX ADMIN — HEPARIN 500 UNITS: 100 SYRINGE at 11:00

## 2021-07-08 RX ADMIN — DENOSUMAB 120 MG: 120 INJECTION SUBCUTANEOUS at 11:00

## 2021-07-08 RX ADMIN — SODIUM CHLORIDE, PRESERVATIVE FREE 10 ML: 5 INJECTION INTRAVENOUS at 11:01

## 2021-07-08 RX ADMIN — SODIUM CHLORIDE 480 MG: 9 INJECTION, SOLUTION INTRAVENOUS at 10:26

## 2021-07-08 RX ADMIN — ALTEPLASE 2 MG: 2.2 INJECTION, POWDER, LYOPHILIZED, FOR SOLUTION INTRAVENOUS at 08:56

## 2021-07-08 RX ADMIN — WATER 2.2 ML: 1 INJECTION INTRAMUSCULAR; INTRAVENOUS; SUBCUTANEOUS at 08:56

## 2021-07-08 ASSESSMENT — ENCOUNTER SYMPTOMS
VOMITING: 0
SHORTNESS OF BREATH: 0
COUGH: 0
CONSTIPATION: 0
EYE DISCHARGE: 0
NAUSEA: 0
EYE ITCHING: 0
SORE THROAT: 0
ABDOMINAL PAIN: 0
DIARRHEA: 0
TROUBLE SWALLOWING: 0
WHEEZING: 0

## 2021-08-05 ENCOUNTER — HOSPITAL ENCOUNTER (OUTPATIENT)
Dept: INFUSION THERAPY | Age: 81
Discharge: HOME | End: 2021-08-05
Payer: MEDICARE

## 2021-08-05 VITALS
HEART RATE: 64 BPM | RESPIRATION RATE: 18 BRPM | HEIGHT: 71 IN | OXYGEN SATURATION: 96 % | SYSTOLIC BLOOD PRESSURE: 150 MMHG | WEIGHT: 208.2 LBS | TEMPERATURE: 97.7 F | BODY MASS INDEX: 29.15 KG/M2 | DIASTOLIC BLOOD PRESSURE: 63 MMHG

## 2021-08-05 DIAGNOSIS — C79.51 BONE METASTASES (HCC): ICD-10-CM

## 2021-08-05 DIAGNOSIS — E03.2 HYPOTHYROIDISM DUE TO MEDICATION: ICD-10-CM

## 2021-08-05 DIAGNOSIS — C43.61 MALIGNANT MELANOMA OF RIGHT UPPER EXTREMITY INCLUDING SHOULDER (HCC): Primary | ICD-10-CM

## 2021-08-05 DIAGNOSIS — C43.9 MALIGNANT MELANOMA, UNSPECIFIED SITE (HCC): ICD-10-CM

## 2021-08-05 LAB
ALBUMIN SERPL-MCNC: 4 G/DL (ref 3.5–5.2)
ALP BLD-CCNC: 42 U/L (ref 40–130)
ALT SERPL-CCNC: 24 U/L (ref 21–72)
ANION GAP SERPL CALCULATED.3IONS-SCNC: 6 MMOL/L (ref 7–19)
AST SERPL-CCNC: 30 U/L (ref 17–59)
BASOPHILS ABSOLUTE: 0.04 K/UL (ref 0.01–0.08)
BASOPHILS RELATIVE PERCENT: 0.4 % (ref 0.1–1.2)
BILIRUB SERPL-MCNC: 1.5 MG/DL (ref 0.2–1.3)
BUN BLDV-MCNC: 21 MG/DL (ref 9–20)
CALCIUM SERPL-MCNC: 9 MG/DL (ref 8.4–10.2)
CHLORIDE BLD-SCNC: 101 MMOL/L (ref 98–111)
CO2: 29 MMOL/L (ref 22–29)
CREAT SERPL-MCNC: 1.2 MG/DL (ref 0.6–1.2)
EOSINOPHILS ABSOLUTE: 0.4 K/UL (ref 0.04–0.54)
EOSINOPHILS RELATIVE PERCENT: 4.4 % (ref 0.7–7)
GFR NON-AFRICAN AMERICAN: 58
GLOBULIN: 2.8 G/DL
GLUCOSE BLD-MCNC: 91 MG/DL (ref 74–106)
HCT VFR BLD CALC: 41.5 % (ref 40.1–51)
HEMOGLOBIN: 13.8 G/DL (ref 13.7–17.5)
LYMPHOCYTES ABSOLUTE: 2.36 K/UL (ref 1.18–3.74)
LYMPHOCYTES RELATIVE PERCENT: 25.7 % (ref 19.3–53.1)
MCH RBC QN AUTO: 30.5 PG (ref 25.7–32.2)
MCHC RBC AUTO-ENTMCNC: 33.3 G/DL (ref 32.3–36.5)
MCV RBC AUTO: 91.6 FL (ref 79–92.2)
MONOCYTES ABSOLUTE: 1.14 K/UL (ref 0.24–0.82)
MONOCYTES RELATIVE PERCENT: 12.4 % (ref 4.7–12.5)
NEUTROPHILS ABSOLUTE: 5.23 K/UL (ref 1.56–6.13)
NEUTROPHILS RELATIVE PERCENT: 57.1 % (ref 34–71.1)
PDW BLD-RTO: 12.4 % (ref 11.6–14.4)
PLATELET # BLD: 205 K/UL (ref 163–337)
PMV BLD AUTO: 9.7 FL (ref 7.4–10.4)
POTASSIUM SERPL-SCNC: 4.2 MMOL/L (ref 3.5–5.1)
RBC # BLD: 4.53 M/UL (ref 4.63–6.08)
SODIUM BLD-SCNC: 136 MMOL/L (ref 137–145)
TOTAL PROTEIN: 6.8 G/DL (ref 6.3–8.2)
TSH SERPL DL<=0.05 MIU/L-ACNC: 2.51 UIU/ML (ref 0.47–4.68)
WBC # BLD: 9.17 K/UL (ref 4.23–9.07)

## 2021-08-05 PROCEDURE — 6360000002 HC RX W HCPCS: Performed by: NURSE PRACTITIONER

## 2021-08-05 PROCEDURE — 85025 COMPLETE CBC W/AUTO DIFF WBC: CPT

## 2021-08-05 PROCEDURE — 84443 ASSAY THYROID STIM HORMONE: CPT

## 2021-08-05 PROCEDURE — 96372 THER/PROPH/DIAG INJ SC/IM: CPT

## 2021-08-05 PROCEDURE — 2580000003 HC RX 258: Performed by: NURSE PRACTITIONER

## 2021-08-05 PROCEDURE — 96413 CHEMO IV INFUSION 1 HR: CPT

## 2021-08-05 PROCEDURE — 80053 COMPREHEN METABOLIC PANEL: CPT

## 2021-08-05 RX ORDER — DIPHENHYDRAMINE HYDROCHLORIDE 50 MG/ML
50 INJECTION INTRAMUSCULAR; INTRAVENOUS PRN
Status: CANCELLED | OUTPATIENT
Start: 2021-08-05

## 2021-08-05 RX ORDER — SODIUM CHLORIDE 0.9 % (FLUSH) 0.9 %
5 SYRINGE (ML) INJECTION PRN
Status: CANCELLED | OUTPATIENT
Start: 2021-08-05

## 2021-08-05 RX ORDER — HEPARIN SODIUM (PORCINE) LOCK FLUSH IV SOLN 100 UNIT/ML 100 UNIT/ML
500 SOLUTION INTRAVENOUS PRN
Status: CANCELLED | OUTPATIENT
Start: 2021-08-05

## 2021-08-05 RX ORDER — METHYLPREDNISOLONE SODIUM SUCCINATE 125 MG/2ML
125 INJECTION, POWDER, LYOPHILIZED, FOR SOLUTION INTRAMUSCULAR; INTRAVENOUS PRN
Status: CANCELLED | OUTPATIENT
Start: 2021-08-05

## 2021-08-05 RX ORDER — SODIUM CHLORIDE 0.9 % (FLUSH) 0.9 %
10 SYRINGE (ML) INJECTION PRN
Status: DISCONTINUED | OUTPATIENT
Start: 2021-08-05 | End: 2021-08-06 | Stop reason: HOSPADM

## 2021-08-05 RX ORDER — SODIUM CHLORIDE 0.9 % (FLUSH) 0.9 %
10 SYRINGE (ML) INJECTION PRN
Status: CANCELLED | OUTPATIENT
Start: 2021-08-05

## 2021-08-05 RX ORDER — EPINEPHRINE 1 MG/ML
0.3 INJECTION, SOLUTION, CONCENTRATE INTRAVENOUS PRN
Status: CANCELLED | OUTPATIENT
Start: 2021-08-05

## 2021-08-05 RX ORDER — HEPARIN SODIUM (PORCINE) LOCK FLUSH IV SOLN 100 UNIT/ML 100 UNIT/ML
500 SOLUTION INTRAVENOUS PRN
Status: DISCONTINUED | OUTPATIENT
Start: 2021-08-05 | End: 2021-08-07 | Stop reason: HOSPADM

## 2021-08-05 RX ADMIN — WATER 2.2 ML: 1 INJECTION INTRAMUSCULAR; INTRAVENOUS; SUBCUTANEOUS at 08:56

## 2021-08-05 RX ADMIN — ALTEPLASE 2 MG: 2.2 INJECTION, POWDER, LYOPHILIZED, FOR SOLUTION INTRAVENOUS at 08:56

## 2021-08-05 RX ADMIN — HEPARIN 500 UNITS: 100 SYRINGE at 10:31

## 2021-08-05 RX ADMIN — DENOSUMAB 120 MG: 120 INJECTION SUBCUTANEOUS at 10:09

## 2021-08-05 RX ADMIN — SODIUM CHLORIDE 480 MG: 9 INJECTION, SOLUTION INTRAVENOUS at 09:59

## 2021-08-05 RX ADMIN — SODIUM CHLORIDE, PRESERVATIVE FREE 10 ML: 5 INJECTION INTRAVENOUS at 10:31

## 2021-08-23 DIAGNOSIS — C43.9 MALIGNANT MELANOMA, UNSPECIFIED SITE (HCC): ICD-10-CM

## 2021-08-23 DIAGNOSIS — G89.3 CANCER RELATED PAIN: ICD-10-CM

## 2021-08-23 RX ORDER — HYDROCODONE BITARTRATE AND ACETAMINOPHEN 10; 325 MG/1; MG/1
1 TABLET ORAL EVERY 6 HOURS PRN
Qty: 120 TABLET | Refills: 0 | Status: SHIPPED | OUTPATIENT
Start: 2021-08-23 | End: 2021-09-28 | Stop reason: SDUPTHER

## 2021-08-30 RX ORDER — HYDROXYZINE HYDROCHLORIDE 25 MG/1
25 TABLET, FILM COATED ORAL EVERY 8 HOURS PRN
Qty: 90 TABLET | Refills: 1 | Status: SHIPPED | OUTPATIENT
Start: 2021-08-30 | End: 2021-11-29

## 2021-09-02 ENCOUNTER — HOSPITAL ENCOUNTER (OUTPATIENT)
Dept: INFUSION THERAPY | Age: 81
Discharge: HOME | End: 2021-09-02
Payer: MEDICARE

## 2021-09-02 ENCOUNTER — TRANSCRIBE ORDERS (OUTPATIENT)
Dept: ADMINISTRATIVE | Facility: HOSPITAL | Age: 81
End: 2021-09-02

## 2021-09-02 ENCOUNTER — OFFICE VISIT (OUTPATIENT)
Dept: HEMATOLOGY | Age: 81
End: 2021-09-02
Payer: MEDICARE

## 2021-09-02 VITALS
TEMPERATURE: 97.8 F | HEART RATE: 76 BPM | WEIGHT: 208 LBS | DIASTOLIC BLOOD PRESSURE: 70 MMHG | SYSTOLIC BLOOD PRESSURE: 143 MMHG | HEIGHT: 71 IN | RESPIRATION RATE: 20 BRPM | BODY MASS INDEX: 29.12 KG/M2 | OXYGEN SATURATION: 97 %

## 2021-09-02 DIAGNOSIS — R53.83 FATIGUE DUE TO TREATMENT: ICD-10-CM

## 2021-09-02 DIAGNOSIS — C43.9 MALIGNANT MELANOMA, UNSPECIFIED SITE (HCC): ICD-10-CM

## 2021-09-02 DIAGNOSIS — L27.0 DRUG-INDUCED SKIN RASH: ICD-10-CM

## 2021-09-02 DIAGNOSIS — C43.61 MALIGNANT MELANOMA OF RIGHT UPPER EXTREMITY INCLUDING SHOULDER (HCC): Primary | ICD-10-CM

## 2021-09-02 DIAGNOSIS — C78.7 LIVER METASTASIS (HCC): ICD-10-CM

## 2021-09-02 DIAGNOSIS — C43.9 MALIGNANT MELANOMA, UNSPECIFIED SITE (HCC): Primary | ICD-10-CM

## 2021-09-02 DIAGNOSIS — R53.83 FATIGUE DUE TO TREATMENT: Primary | ICD-10-CM

## 2021-09-02 DIAGNOSIS — Z51.12 ENCOUNTER FOR ANTINEOPLASTIC IMMUNOTHERAPY: ICD-10-CM

## 2021-09-02 DIAGNOSIS — E03.2 HYPOTHYROIDISM DUE TO MEDICATION: ICD-10-CM

## 2021-09-02 DIAGNOSIS — G89.3 CANCER RELATED PAIN: ICD-10-CM

## 2021-09-02 DIAGNOSIS — C79.51 BONE METASTASES (HCC): ICD-10-CM

## 2021-09-02 DIAGNOSIS — N28.89 LEFT RENAL MASS: ICD-10-CM

## 2021-09-02 DIAGNOSIS — I87.1 SUPERIOR VENA CAVA STENOSIS: ICD-10-CM

## 2021-09-02 DIAGNOSIS — C43.61 MALIGNANT MELANOMA OF RIGHT UPPER EXTREMITY INCLUDING SHOULDER (HCC): ICD-10-CM

## 2021-09-02 LAB
ALBUMIN SERPL-MCNC: 4.1 G/DL (ref 3.5–5.2)
ALP BLD-CCNC: 53 U/L (ref 40–130)
ALT SERPL-CCNC: 19 U/L (ref 21–72)
ANION GAP SERPL CALCULATED.3IONS-SCNC: 5 MMOL/L (ref 7–19)
AST SERPL-CCNC: 27 U/L (ref 17–59)
BASOPHILS ABSOLUTE: 0.03 K/UL (ref 0.01–0.08)
BASOPHILS RELATIVE PERCENT: 0.3 % (ref 0.1–1.2)
BILIRUB SERPL-MCNC: 1 MG/DL (ref 0.2–1.3)
BUN BLDV-MCNC: 20 MG/DL (ref 9–20)
CALCIUM SERPL-MCNC: 9.3 MG/DL (ref 8.4–10.2)
CHLORIDE BLD-SCNC: 104 MMOL/L (ref 98–111)
CO2: 30 MMOL/L (ref 22–29)
CREAT SERPL-MCNC: 1.1 MG/DL (ref 0.6–1.2)
EOSINOPHILS ABSOLUTE: 0.42 K/UL (ref 0.04–0.54)
EOSINOPHILS RELATIVE PERCENT: 4.8 % (ref 0.7–7)
GFR NON-AFRICAN AMERICAN: >60
GLOBULIN: 2.6 G/DL
GLUCOSE BLD-MCNC: 103 MG/DL (ref 74–106)
HCT VFR BLD CALC: 43.5 % (ref 40.1–51)
HEMOGLOBIN: 14 G/DL (ref 13.7–17.5)
LYMPHOCYTES ABSOLUTE: 2.53 K/UL (ref 1.18–3.74)
LYMPHOCYTES RELATIVE PERCENT: 28.9 % (ref 19.3–53.1)
MCH RBC QN AUTO: 29.6 PG (ref 25.7–32.2)
MCHC RBC AUTO-ENTMCNC: 32.2 G/DL (ref 32.3–36.5)
MCV RBC AUTO: 92 FL (ref 79–92.2)
MONOCYTES ABSOLUTE: 0.97 K/UL (ref 0.24–0.82)
MONOCYTES RELATIVE PERCENT: 11.1 % (ref 4.7–12.5)
NEUTROPHILS ABSOLUTE: 4.79 K/UL (ref 1.56–6.13)
NEUTROPHILS RELATIVE PERCENT: 54.9 % (ref 34–71.1)
PDW BLD-RTO: 12.6 % (ref 11.6–14.4)
PLATELET # BLD: 259 K/UL (ref 163–337)
PMV BLD AUTO: 9.7 FL (ref 7.4–10.4)
POTASSIUM SERPL-SCNC: 4.4 MMOL/L (ref 3.5–5.1)
RBC # BLD: 4.73 M/UL (ref 4.63–6.08)
SODIUM BLD-SCNC: 139 MMOL/L (ref 137–145)
TOTAL PROTEIN: 6.7 G/DL (ref 6.3–8.2)
TSH SERPL DL<=0.05 MIU/L-ACNC: 3.34 UIU/ML (ref 0.47–4.68)
WBC # BLD: 8.74 K/UL (ref 4.23–9.07)

## 2021-09-02 PROCEDURE — 84443 ASSAY THYROID STIM HORMONE: CPT

## 2021-09-02 PROCEDURE — 6360000002 HC RX W HCPCS: Performed by: NURSE PRACTITIONER

## 2021-09-02 PROCEDURE — 2580000003 HC RX 258: Performed by: NURSE PRACTITIONER

## 2021-09-02 PROCEDURE — 85025 COMPLETE CBC W/AUTO DIFF WBC: CPT

## 2021-09-02 PROCEDURE — G8427 DOCREV CUR MEDS BY ELIG CLIN: HCPCS | Performed by: NURSE PRACTITIONER

## 2021-09-02 PROCEDURE — 80053 COMPREHEN METABOLIC PANEL: CPT

## 2021-09-02 PROCEDURE — 4040F PNEUMOC VAC/ADMIN/RCVD: CPT | Performed by: NURSE PRACTITIONER

## 2021-09-02 PROCEDURE — 96413 CHEMO IV INFUSION 1 HR: CPT

## 2021-09-02 PROCEDURE — 96372 THER/PROPH/DIAG INJ SC/IM: CPT

## 2021-09-02 PROCEDURE — G8417 CALC BMI ABV UP PARAM F/U: HCPCS | Performed by: NURSE PRACTITIONER

## 2021-09-02 PROCEDURE — 1036F TOBACCO NON-USER: CPT | Performed by: NURSE PRACTITIONER

## 2021-09-02 PROCEDURE — 99214 OFFICE O/P EST MOD 30 MIN: CPT | Performed by: NURSE PRACTITIONER

## 2021-09-02 PROCEDURE — 1123F ACP DISCUSS/DSCN MKR DOCD: CPT | Performed by: NURSE PRACTITIONER

## 2021-09-02 RX ORDER — METHYLPREDNISOLONE SODIUM SUCCINATE 125 MG/2ML
125 INJECTION, POWDER, LYOPHILIZED, FOR SOLUTION INTRAMUSCULAR; INTRAVENOUS PRN
Status: CANCELLED | OUTPATIENT
Start: 2021-09-02

## 2021-09-02 RX ORDER — SODIUM CHLORIDE 0.9 % (FLUSH) 0.9 %
10 SYRINGE (ML) INJECTION PRN
Status: DISCONTINUED | OUTPATIENT
Start: 2021-09-02 | End: 2021-09-03 | Stop reason: HOSPADM

## 2021-09-02 RX ORDER — SODIUM CHLORIDE 0.9 % (FLUSH) 0.9 %
5 SYRINGE (ML) INJECTION PRN
Status: CANCELLED | OUTPATIENT
Start: 2021-09-02

## 2021-09-02 RX ORDER — NAPROXEN 250 MG/1
250 TABLET ORAL EVERY 12 HOURS PRN
COMMUNITY
End: 2021-12-09

## 2021-09-02 RX ORDER — DIPHENHYDRAMINE HYDROCHLORIDE 50 MG/ML
50 INJECTION INTRAMUSCULAR; INTRAVENOUS PRN
Status: CANCELLED | OUTPATIENT
Start: 2021-09-02

## 2021-09-02 RX ORDER — ACETAMINOPHEN 500 MG
1000 TABLET ORAL EVERY 6 HOURS PRN
COMMUNITY

## 2021-09-02 RX ORDER — EPINEPHRINE 1 MG/ML
0.3 INJECTION, SOLUTION, CONCENTRATE INTRAVENOUS PRN
Status: CANCELLED | OUTPATIENT
Start: 2021-09-02

## 2021-09-02 RX ORDER — HEPARIN SODIUM (PORCINE) LOCK FLUSH IV SOLN 100 UNIT/ML 100 UNIT/ML
500 SOLUTION INTRAVENOUS PRN
Status: DISCONTINUED | OUTPATIENT
Start: 2021-09-02 | End: 2021-09-04 | Stop reason: HOSPADM

## 2021-09-02 RX ADMIN — HEPARIN 500 UNITS: 100 SYRINGE at 10:09

## 2021-09-02 RX ADMIN — SODIUM CHLORIDE 480 MG: 9 INJECTION, SOLUTION INTRAVENOUS at 09:36

## 2021-09-02 RX ADMIN — DENOSUMAB 120 MG: 120 INJECTION SUBCUTANEOUS at 10:09

## 2021-09-02 RX ADMIN — Medication 10 ML: at 10:09

## 2021-09-02 ASSESSMENT — ENCOUNTER SYMPTOMS
SORE THROAT: 0
NAUSEA: 0
ABDOMINAL PAIN: 0
SHORTNESS OF BREATH: 0
WHEEZING: 0
TROUBLE SWALLOWING: 0
CONSTIPATION: 0
EYE ITCHING: 0
VOMITING: 0
DIARRHEA: 0
COUGH: 0
EYE DISCHARGE: 0

## 2021-09-02 NOTE — PROGRESS NOTES
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 He receives nivolumab (Opdivo) and Xgeva every 4 weeks. He has had rash development on a couple occasions, improved with steroids. Ivan Gambino has chronic generalized pruritus, stable. 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 Ivan Gambino denies cough or diarrhea. He has had Covid vaccinations and asked regarding booster vaccine. Fatigue and neuropathy are grade 1, stable. Blood pressure and weight are stable. Arthritis is exacerbated by Jung Ruffing, treated with Tylenol. Ivan Gambino is monitored routinely by Dr. Mohammed Lefort for skin examinations. He had recent evaluation of a scab to his left upper anterior chest wall, felt to be a bug bite. Ivan Gambino is accompanied by his wife, Jeb Levy. ECOG grade 1  Neuropathy grade 1  Fatigue grade 1  Pruritus grade 1    Liam Camacho presents for follow-up surveillance visit and toxicity assessment. he requires intenstive monitoring due to the potential to cause serious morbidity or death. TARGET METASTATIC MALIGNANT MELANOMA SITES:  1. Right upper extremity original primary site 06/05/14   2. Right axilla lymph node at presentation 6/5/2014 and 3 axillary nodes at recurrence 7/27/2018 with an SUV of 8.7   3. Too numerous to count liver metastases   4. Celiac lymph nodes x 2 with highest SUV of 3   5. Bony metastatic disease on PET scan in the right ilium (SUV of 5) and right acetabulum (SUV of 6.4)   6.  Indeterminate HORACIO 3 mm subpleural nodule 7. Indeterminate thyroid nodules on chest CT    1st TUMOR HISTORY: Resected stage IIIA (pT3a pN1a M0) right upper extremity malignant melanoma, 06/05/14  Mr. Sabra Hollis was seen in initial oncology consultation on 08/05/14, referred by Dr. Leslie Blanco, regarding a diagnosis of resected malignant melanoma of the right upper extremity. Dr. Huang resected a malignant melanoma from the right posterior arm, above the elbow, on 06/05/14. Pathology revealed a 2.25 mm thick non-ulcerated Pankaj's level IV malignant melanoma. There were 10 mitoses/ sq mm. There was no vascular or lymphatic invasion. A wide excision with a sentinel lymph node biopsy was performed by by Dr. Tata Rivers on 07/09/14. Pathology revealed that the right axillary sentinel lymph node was positive for metastatic malignancy by immunoperoxidase stain. The wide excision sample was without further local involvement. A right axillary lymph node dissection was performed by Dr. Tata Rivers on 07/21/14. No metastatic malignancy was noted in any of the 8 right axillary lymph nodes submitted. The HMB-45 immunoperoxidase stain was negative for metastatic malignant melanoma in any of these subsequently submitted lymph nodes. Completion of a metastatic workup on 08/06/14, including MRI of the brain, bone scan, CT scans of the chest, abdomen and pelvis were negative for evidence of metastatic disease. Adjuvant pegylated Interferon (Sylatron) 6 mcg/kg (500mcg) sub-q weekly x 8 to be followed then by 3 mcg/kg(250mcg) weekly x up to 5 years was discussed and planned. Adjuvant therapy was indeed initiated and delivered as discussed below in treatment summary. The last dose of the medication was delivered on 2/15/2015. He was unable to tolerate this medication even at reduced dosages because of poor tolerability, weight loss, anorexia, unable to sleep, anxiety, fatigue, et [place]. He declined any further interferon, which is reasonable.    A one-year followup CT scan of the chest and MRI of the brain on 6/11/2015 did not reveal any evidence of recurrent disease.  ----------------------------------PROGRESSION --------------------------------  Augustina Holder presented to [facility] emergency department on 7/3/18 for a 3 month history of waxing and waning epigastric abdominal pain and new onset fever. Additional symptoms reported include unexplained weight loss, nausea, headaches. CXR 7/3/18 showed mild cardiomegaly with no acute cardiopulmonary process. Abdominal/pelvic CT with contrast 7/3/18 documented multiple low density bilateral hepatic lesions suspicious for metastatic disease. 2 small renal cysts are noted and one on the left. A second left renal lesion near the lower pole measured 25 mm with SUV of 34, ultrasound recommended. The prostate is markedly enlarged causing partial right obstructive uropathy. CBC shows WBC 7.3, hemoglobin 15.8 and platelets 037,847  CMP revealed a creatinine of 1.2, GFR 59. LFTs WNL  UA negative  Lipase 28, troponin <0.01  He has a history of GERD with laparoscopic paraesophageal hernia repair and fundoplication by Dr. Giovanni Levin on 5/24/15. He was prescribed Norco and Zofran PRN at [facility] ER. Rx was given for omeprazole in clinic  Tumor markers drawn 7/11/18 included:   AFP 5.8   CEA 2.0   CA 19-9 - 8   PSA 7.4 (chronically elevated, up to 8.28 on 8/8/14)  MRI of the abdomen w/wo contrast 7/13/18 at [facility] documented innumerable T1 hypointense, T2 hyperintense lesions throughout the liver as noted on recent CT. One of the larger lesions seen posteriorly in the right hepatic lobe measured 1.6 cm. Also noted was at least two T2 hyperintense lesions within the thoracocolumbar spine, which could represent metastasis. Bilateral renal ultrasound 7/3/18 showed a 2.6 cm hypoechoic left lower pole renal lesion, not typical of a benign cyst with enhancing characteristics concerning for primary renal cancer versus metastatic disease.  Bilateral nephrogenic cysts noted along with marked prostate enlargement with lobular changes measuring 8 x 5.9 x 4.9 cm. MRI of the brain w/wo contrast 7/3/18 for complaint of headache and nausea was without evidence of acute intracranial process. Contrasted chest CT 7/24/18 revealed a stable 2 cm right thyroid nodule, a new 9 mm right thyroid nodule. An indeterminate 3 mm subpleural HORACIO nodule is noted, likely granulomatous or postinfectious. Bone scan 7/24/18 was negative for evidence of osseous metastasis. Examination is remarkable for mid epigastric discomfort, 10 lbs weight loss and 1 inch right axillary lymph node. This was a previous site of positive sentinel lymph node biopsy and dissection associated with the resected, stage IIIa right upper extremity malignant melanoma in 2014. Suspect recurrent malignant melanoma. Jerzy Bernal was referred to Dr. Meliza Vazquez who performed an FNA of the right axillary lymph node 7/26/18. Pathology was consistent for metastatic malignant melanoma. 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 Findings were discussed with both Jerzy Bernal and his wife by Dr. Jose Vieyra at follow-up on 8/1/18 and reiterated on 8/7/18. Recommendation is for combination therapy with Opdivo 1 mg/kg and Yervoy 3 mg/kg every 3 weeks ×4 cycles, followed by Opdivo 240 mg every 2 weeks thereafter. Jerzy Bernal had a left chest Mediport placed by Dr. Angella Enrique 8/3/18. PET scan 8/6/18 showed the following:  3 right axillary lymph nodes, SUV up to 8.7   Hepatic metastasis, too numerous to count   2 celiac lymph nodes SUV 2.8 and 3   Right ilium, SUV 5. Right acetabulum SUV 6.4  Cycle #1 Opdivo/Yervoy and monthly Xgeva initiated 8/7/18. He was evaluated at [facility] 10/16/18 for LLQ abdominal discomfort with a history of recent diverticulitis. Abdominal/pelvic CT with contrast revealed a decreased size in the multiple liver nodules. Bilateral renal nodules were stable.   An enlarged prostate with an exophytic mass arising from the posterior superior aspect of the prostate was present. He was treated for acute diverticulitis of the mid to distal sigmoid colon. Thad Molina was evaluated by Dr. Araceli Beach on 11/27/18 regarding possible prostate mass and elevated PSA with recommendations for surveillance only. Contrasted chest CT on 11/8/18 at [facility] showed no enlarged axillary, hilar or mediastinal lymph nodes. There were no acute osseous or soft tissue abnormalities. Thad Molina has had treatment delay of Nivolumab on more than one occasion due to rash, requiring treatment with prednisone. Nivolumab was discontinued following dose #2 of maintenance therapy on 12/20/18. He initiated cycle #1 of Tafinlar 150 mg po BID & Mekinist 2 mg po BID on 1/4/19. Contrasted CT chest 1/9/19 at [facility] showed emphysematous changes bilaterally without evidence of metastatic disease. A 1.4 cm right hilar lymph node was unchanged since 2015, likely benign. Abdominal/pelvic CT with contrast 1/9/19 documented a 4 mm hepatic lesion, previously 6 mm. Other previously noted lesions throughout the liver are not appreciated. The enlarged prostate with nodular hypertrophy was previously evaluated by Dr. Araceli Beach. Bone scan 1/9/19 was without evidence of osteoblastic disease. Echocardiogram 1/9/19 showed an EF of 60%. Thad Molina was hopsitalized at [address] 1/12/19 - 1/18/19 for enterocolitis with nausea/vomiting and diarrhea. Antineoplastic therapy was held during that time, rechallenged beginning 1/24/19. Thad Molina was evaluated 2/11/19 at [address] ER for a 24 hour history of abdominal pain and diarrhea with mild diverticulitis, stable from 1/12/19. He was treated with Cipro and Flagyl. Single agent Tafinlar was taken 3/28/19 - 4/4/19, discontinued by Thad Molina due to intolerable abdominal cramping. Dala Reid was resumed 4/25/19. CT scans of the chest, abdomen and pelvis with contrast 8/13/2019 at [facility] was negative for intrathoracic metastasis.   There was no evidence of disease progression in the abdomen/pelvis with stable, subcentimeter low-density liver lesions noted. Dosing was changed to 480 mg every 4 weeks on 9/5/2019 to see if this may decrease persistent itching. Rx fluoxetine and hydroxyzine per Dr. Ayesha Talley recommendations. Contrasted CT scans of the chest, abdomen/pelvis and bone scan 1/16/2020 were negative for evidence of disease progression. 19 mm right thyroid lobe nodule and subcentimeter low-density liver lesions were stable. No abnormal bony uptake. Contrasted CT scans of the chest, abdomen and pelvis 6/4/2020 were without evidence of metastatic disease. 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 Treatment continues with nivolumab. TREATMENT SUMMARY:  1. Dr. Doris Marroquin resected a malignant melanoma from the right posterior arm, above the elbow, on 06/05/14   2. Wide excision with a sentinel lymph node biopsy by Dr. Bina Verdin on 07/09/14. 3. Adjuvant weekly Sylatron Initiated 09/02/14 at 6 mcg/kg (500mcg) sub-q weekly but was only able to receive 4 of 8 planned treatments of this. The last dose #4 was on 09/28/14   4. Adjuvant weekly Sylatron 3 mcg/kg (250mcg) initiated 10/12/2014. Last dose delivered on 2/15/2015, discontinued due to poor tolerability and unable to tolerate the medication. 5. Opdivo 1 mg/kg and Yervoy 3 mg/kg every 3 weeks ×4 doses. 8/7/18 - 10/25/18. 6. Opdivo 240 mg every 2 weeks initiated 11/15/18, discontinued after dose #2 on 12/20/18   7. Monthly Xgeva initiated 8/7/18.   8.  Tafinlar 150 mg po BID & Mekinist 2 mg po qday, cycle #1 initiated 1/4/19.   9. Single agent Tafinlar 150 mg po BID initiated 3/28/19, discontinue by patient 4/4/19.   10. Opdivo 240 mg every 2 weeks resumed 4/25/19, change to 480 mg every 4 weeks on 9/5/2019    HEMATOLOGY CONCERN:  SVC stenosis  Olman Carrion was Evaluated by Terrell Solitario PA-C with vascular surgery on 11/11/2020 regarding high-grade SVC narrowing with collateral vein formation in the mediastinum noted on contrasted chest CT at [facility] on 10/22/2020. Olman Carrion was asymptomatic, observation recommended. Past Medical History:    Past Medical History:   Diagnosis Date    Acid reflux     BPH (benign prostatic hyperplasia)     Cancer (HCC)     Diverticulitis     Hard of hearing     Hypercholesteremia     Hypertension     Malignant melanoma of skin of upper limb, including shoulder ([address] dx 6/5/2014    to liver, stomach, bone, and right axilla     Past Surgical History:    Past Surgical History:   Procedure Laterality Date    CHOLECYSTECTOMY      TUNNELED CENTRAL VENOUS CATHETER W/ SUBCUTANEOUS PORT       Current Medications:    Current Outpatient Medications   Medication Sig Dispense Refill    acetaminophen (TYLENOL) 500 MG tablet Take 1,000 mg by mouth every 6 hours as needed for Pain      naproxen (NAPROSYN) 250 MG tablet Take 250 mg by mouth every 12 hours as needed for Pain      hydrOXYzine (ATARAX) 25 MG tablet Take 1 tablet by mouth every 8 hours as needed for Itching 90 tablet 1    HYDROcodone-acetaminophen (NORCO)  MG per tablet Take 1 tablet by mouth every 6 hours as needed for Pain for up to 30 days. Intended supply: 30 days 120 tablet 0    clotrimazole-betamethasone (LOTRISONE) 1-0.05 % cream Apply topically under the arms 2 times daily. 45 g 3    PARoxetine (PAXIL) 10 MG tablet TAKE 1 TABLET BY MOUTH EVERY DAY 90 tablet 3    montelukast (SINGULAIR) 10 MG tablet TAKE 1 TABLET BY MOUTH EVERY DAY AT NIGHT 90 tablet 1    levothyroxine (SYNTHROID) 88 MCG tablet Take one tablet by mouth daily.  90 tablet 3    dicyclomine (BENTYL) 20 MG tablet TAKE 1/2 TABLET BY MOUTH 4 TIMES A DAY AS NEEDED 180 tablet 3    finasteride (PROSCAR) 5 MG tablet Take 5 mg by mouth      lovastatin (MEVACOR) 40 MG tablet Take 40 mg by mouth       No current facility-administered medications for this visit. Facility-Administered Medications Ordered in Other Visits   Medication Dose Route Frequency Provider Last Rate Last Admin    sodium chloride flush 0.9 % injection 10 mL  10 mL IntraVENous PRN Evans Patrick APRN   10 mL at 21 1009    heparin flush 100 UNIT/ML injection 500 Units  500 Units IntraCATHeter PRN LIAM Anderson   500 Units at 21 1009        Allergies: No Known Allergies  Social History:    Social History     Tobacco Use    Smoking status: Former Smoker    Smokeless tobacco: Never Used   Substance Use Topics    Alcohol use: No    Drug use: No     Family History:   Family History   Problem Relation Age of Onset    Heart Attack Brother          age 78 of [place]     Subjective   REVIEW OF SYSTEMS:   Review of Systems   Constitutional: Positive for fatigue (Mild). Negative for fever. No night sweats   HENT: Negative for dental problem, hearing loss, mouth sores, nosebleeds, sore throat and trouble swallowing. Eyes: Negative for discharge and itching. Respiratory: Negative for cough, shortness of breath and wheezing. Cardiovascular: Negative for chest pain, palpitations and leg swelling. Gastrointestinal: Negative for abdominal pain, constipation, diarrhea, nausea and vomiting. Endocrine: Negative for cold intolerance and heat intolerance. Genitourinary: Negative for dysuria, frequency, hematuria and urgency. Musculoskeletal: Negative for arthralgias, joint swelling and myalgias. Skin: Negative for pallor and rash. Generalized pruritus, mild   Allergic/Immunologic: Negative for environmental allergies and immunocompromised state. Neurological: Negative for seizures, syncope and numbness. Hematological: Negative for adenopathy. Does not bruise/bleed easily.    Psychiatric/Behavioral: Negative for agitation, behavioral problems and confusion. The patient is not nervous/anxious. Objective   Vitals:    09/02/21 0900   BP: (!) 143/70   Pulse: 76   Resp: 20   Temp: 97.8 °F (36.6 °C)   SpO2: 97%     Wt Readings from Last 3 Encounters:   09/02/21 208 lb (94.3 kg)   08/05/21 208 lb 3.2 oz (94.4 kg)   07/08/21 212 lb 3.2 oz (96.3 kg)     PHYSICAL EXAM:  Physical Exam  Vitals reviewed. Constitutional:       General: He is not in acute distress. Appearance: He is well-developed. He is not toxic-appearing or diaphoretic. Comments: Wearing a facial mask. HENT:      Head: Normocephalic and atraumatic. Right Ear: External ear normal.      Left Ear: External ear normal.      Nose: Nose normal.      Mouth/Throat:      Mouth: Mucous membranes are moist.   Eyes:      General: No scleral icterus. Right eye: No discharge. Left eye: No discharge. Conjunctiva/sclera: Conjunctivae normal.   Neck:      Trachea: No tracheal deviation. Cardiovascular:      Rate and Rhythm: Normal rate and regular rhythm. Pulmonary:      Effort: Pulmonary effort is normal. No respiratory distress. Breath sounds: Normal breath sounds. No wheezing or rales. Abdominal:      General: Bowel sounds are normal. There is no distension. Palpations: Abdomen is soft. Tenderness: There is no abdominal tenderness. There is no guarding. Genitourinary:     Comments: Exam deferred  Musculoskeletal:         General: No tenderness or deformity. Cervical back: Neck supple. No muscular tenderness. Comments: Normal ROM all four extremities   Lymphadenopathy:      Cervical:      Right cervical: No superficial, deep or posterior cervical adenopathy. Left cervical: No superficial, deep or posterior cervical adenopathy. Upper Body:      Right upper body: No supraclavicular or axillary adenopathy. Left upper body: No supraclavicular or axillary adenopathy. Skin:     General: Skin is warm and dry. Findings: No rash. Comments: Skin pigmentation changes of the hands/arms, though no obvious rash. Scab left upper anterior chest wall. anterior chest wall Mediport without sign of infection   Neurological:      Mental Status: He is alert and oriented to person, place, and time. Comments: follows commands, non-focal   Psychiatric:         Behavior: Behavior normal. Behavior is cooperative. Thought Content: Thought content normal.         Judgment: Judgment normal.      Comments: Alert and oriented to person, place and time. Labs reviewed by me:    Labs 6/10/2021:  · CMP: Creatinine 1.1, GFR > 60  · TSH: 2.48    CBC 09/02/21:  Lab Results   Component Value Date    WBC 8.74 09/02/2021    HGB 14.0 09/02/2021    HCT 43.5 09/02/2021    MCV 92.0 09/02/2021     09/02/2021    LYMPHOPCT 28.9 09/02/2021    RBC 4.73 09/02/2021    MCH 29.6 09/02/2021    MCHC 32.2 (L) 09/02/2021    RDW 12.6 09/02/2021       ASSESSMENT:   1. Malignant melanoma of right upper extremity including shoulder ([address]    2. Bone metastases ([address]    3. Liver metastasis ([address]    4. Encounter for antineoplastic immunotherapy    5. Drug-induced skin rash    6. Hypothyroidism due to medication    7. Left renal mass    8. Superior vena cava stenosis    9. Cancer related pain       Malignant melanoma of skin of right upper extremity, including shoulder ([address]  Liver metastasis ([address]  Bone metastases ([address]  Encounter for antineoplastic immunotherapy    Contrasted chest CT, abdomen/pelvis 4/1/2021 at [facility]:   · No evidence of metastatic disease in the chest, abdomen or pelvis  · Markedly enlarged prostate with mass-effect upon the bladder and a large nodule extending off the posterior superior aspect of the prostate gland, similar to 10/22/2020  · Stable bilateral renal lesions, favored to represent hemorrhagic or proteinaceous cyst    Proceed with Opdivo today and continue every 4 weeks.   continue monthly Xgeva    Contrasted Chest CT is scheduled history (History of Present Illness (HPI), Past Family Social History [facility]), or Review of Systems (ROS) and made changes when appropriated. Dictated utilizing Dragon transcription software.     LIAM Barragan  10:03 PM  9/2/2021

## 2021-09-28 DIAGNOSIS — C43.9 MALIGNANT MELANOMA, UNSPECIFIED SITE (HCC): ICD-10-CM

## 2021-09-28 DIAGNOSIS — G89.3 CANCER RELATED PAIN: ICD-10-CM

## 2021-09-28 RX ORDER — HYDROCODONE BITARTRATE AND ACETAMINOPHEN 10; 325 MG/1; MG/1
1 TABLET ORAL EVERY 6 HOURS PRN
Qty: 120 TABLET | Refills: 0 | Status: SHIPPED | OUTPATIENT
Start: 2021-09-28 | End: 2021-11-01 | Stop reason: SDUPTHER

## 2021-09-30 ENCOUNTER — HOSPITAL ENCOUNTER (OUTPATIENT)
Dept: CT IMAGING | Facility: HOSPITAL | Age: 81
Discharge: HOME | End: 2021-09-30
Admitting: NURSE PRACTITIONER

## 2021-09-30 DIAGNOSIS — C43.61 MALIGNANT MELANOMA OF RIGHT UPPER EXTREMITY INCLUDING SHOULDER (HCC): ICD-10-CM

## 2021-09-30 LAB — CREAT BLDA-MCNC: 1.2 MG/DL (ref 0.6–1.3)

## 2021-09-30 PROCEDURE — 74177 CT ABD & PELVIS W/CONTRAST: CPT

## 2021-09-30 PROCEDURE — 82565 ASSAY OF CREATININE: CPT

## 2021-09-30 PROCEDURE — 25010000002 IOPAMIDOL 61 % SOLUTION: Performed by: NURSE PRACTITIONER

## 2021-09-30 RX ADMIN — IOPAMIDOL 100 ML: 612 INJECTION, SOLUTION INTRAVENOUS at 08:23

## 2021-09-30 RX ADMIN — IOPAMIDOL 50 ML: 612 INJECTION, SOLUTION INTRAVENOUS at 08:23

## 2021-10-12 NOTE — PROGRESS NOTES
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 He is receiving nivolumab and Xgeva every 4 weeks. He is tolerating treatment well and has had excellent response on imaging studies. He has had treatment-related rash in the past requiring steroid treatment. Antonieta Martínez has chronic pruritus, without recent rash outbreak. He is followed routinely by dermatologist, Dr. Felisha Rodriguez. He is monitored for immunotherapy related hypothyroidism. Antonieta Martínez is prepared to receive treatment today. He also has had recent imaging studies for treatment response/surveillance. He is accompanied by his wife, Alida Chamberlain. ECOG grade 1  Neuropathy grade 1  Fatigue grade 1  Pruritus grade 1    Azeem Jones presents for follow-up surveillance visit and toxicity assessment. he requires intenstive monitoring due to the potential to cause serious morbidity or death. TARGET METASTATIC MALIGNANT MELANOMA SITES:  1. Right upper extremity original primary site 06/05/14   2. Right axilla lymph node at presentation 6/5/2014 and 3 axillary nodes at recurrence 7/27/2018 with an SUV of 8.7   3. Too numerous to count liver metastases   4. Celiac lymph nodes x 2 with highest SUV of 3   5. Bony metastatic disease on PET scan in the right ilium (SUV of 5) and right acetabulum (SUV of 6.4)   6. Indeterminate HORACIO 3 mm subpleural nodule   7. Indeterminate thyroid nodules on chest CT    1st TUMOR HISTORY: Resected stage IIIA (pT3a pN1a M0) right upper extremity malignant melanoma, 06/05/14  Mr. Conroy was seen in initial oncology consultation on 08/05/14, referred by Dr. Eric Bob, regarding a diagnosis of resected malignant melanoma of the right upper extremity. Dr. Parker Lagos resected a malignant melanoma from the right posterior arm, above the elbow, on 06/05/14. Pathology revealed a 2.25 mm thick non-ulcerated Pankaj's level IV malignant melanoma. There were 10 mitoses/ sq mm. There was no vascular or lymphatic invasion. A wide excision with a sentinel lymph node biopsy was performed by by Dr. Michael Rivers on 07/09/14. Pathology revealed that the right axillary sentinel lymph node was positive for metastatic malignancy by immunoperoxidase stain. The wide excision sample was without further local involvement. A right axillary lymph node dissection was performed by Dr. Michael Rivers on 07/21/14. No metastatic malignancy was noted in any of the 8 right axillary lymph nodes submitted. The HMB-45 immunoperoxidase stain was negative for metastatic malignant melanoma in any of these subsequently submitted lymph nodes. Completion of a metastatic workup on 08/06/14, including MRI of the brain, bone scan, CT scans of the chest, abdomen and pelvis were negative for evidence of metastatic disease. Adjuvant pegylated Interferon (Sylatron) 6 mcg/kg (500mcg) sub-q weekly x 8 to be followed then by 3 mcg/kg(250mcg) weekly x up to 5 years was discussed and planned. Adjuvant therapy was indeed initiated and delivered as discussed below in treatment summary. The last dose of the medication was delivered on 2/15/2015. He was unable to tolerate this medication even at reduced dosages because of poor tolerability, weight loss, anorexia, unable to sleep, anxiety, fatigue, et Garry Lint. He declined any further interferon, which is reasonable.    A one-year followup CT scan of the chest and MRI of the brain on 6/11/2015 did not reveal any evidence of recurrent disease.  ----------------------------------PROGRESSION --------------------------------  Ben Toney presented to [facility] emergency department on 7/3/18 for a 3 month history of waxing and waning epigastric abdominal pain and new onset fever. Additional symptoms reported include unexplained weight loss, nausea, headaches. CXR 7/3/18 showed mild cardiomegaly with no acute cardiopulmonary process. Abdominal/pelvic CT with contrast 7/3/18 documented multiple low density bilateral hepatic lesions suspicious for metastatic disease. 2 small renal cysts are noted and one on the left. A second left renal lesion near the lower pole measured 25 mm with SUV of 34, ultrasound recommended. The prostate is markedly enlarged causing partial right obstructive uropathy. CBC shows WBC 7.3, hemoglobin 15.8 and platelets 502,113  CMP revealed a creatinine of 1.2, GFR 59. LFTs WNL  UA negative  Lipase 28, troponin <0.01  He has a history of GERD with laparoscopic paraesophageal hernia repair and fundoplication by Dr. Mitchell Young on 5/24/15. He was prescribed Norco and Zofran PRN at [facility] ER. Rx was given for omeprazole in clinic  Tumor markers drawn 7/11/18 included:   AFP 5.8   CEA 2.0   CA 19-9 - 8   PSA 7.4 (chronically elevated, up to 8.28 on 8/8/14)  MRI of the abdomen w/wo contrast 7/13/18 at [facility] documented innumerable T1 hypointense, T2 hyperintense lesions throughout the liver as noted on recent CT. One of the larger lesions seen posteriorly in the right hepatic lobe measured 1.6 cm. Also noted was at least two T2 hyperintense lesions within the thoracocolumbar spine, which could represent metastasis. Bilateral renal ultrasound 7/3/18 showed a 2.6 cm hypoechoic left lower pole renal lesion, not typical of a benign cyst with enhancing characteristics concerning for primary renal cancer versus metastatic disease. Bilateral nephrogenic cysts noted along with marked prostate enlargement with lobular changes measuring 8 x 5.9 x 4.9 cm.   MRI of the brain w/wo contrast 7/3/18 for complaint of headache and nausea was without evidence of acute intracranial process. Contrasted chest CT 7/24/18 revealed a stable 2 cm right thyroid nodule, a new 9 mm right thyroid nodule. An indeterminate 3 mm subpleural HORACIO nodule is noted, likely granulomatous or postinfectious. Bone scan 7/24/18 was negative for evidence of osseous metastasis. Examination is remarkable for mid epigastric discomfort, 10 lbs weight loss and 1 inch right axillary lymph node. This was a previous site of positive sentinel lymph node biopsy and dissection associated with the resected, stage IIIa right upper extremity malignant melanoma in 2014. Suspect recurrent malignant melanoma. Memo Resendez was referred to Dr. Jody Kim who performed an FNA of the right axillary lymph node 7/26/18. Pathology was consistent for metastatic malignant melanoma. 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 Findings were discussed with both Memo Resendez and his wife by Dr. Bernardo Perez at follow-up on 8/1/18 and reiterated on 8/7/18. Recommendation is for combination therapy with Opdivo 1 mg/kg and Yervoy 3 mg/kg every 3 weeks ×4 cycles, followed by Opdivo 240 mg every 2 weeks thereafter. Memo Resendez had a left chest Mediport placed by Dr. Janine Quach 8/3/18. PET scan 8/6/18 showed the following:  3 right axillary lymph nodes, SUV up to 8.7   Hepatic metastasis, too numerous to count   2 celiac lymph nodes SUV 2.8 and 3   Right ilium, SUV 5. Right acetabulum SUV 6.4  Cycle #1 Opdivo/Yervoy and monthly Xgeva initiated 8/7/18. He was evaluated at [facility] 10/16/18 for LLQ abdominal discomfort with a history of recent diverticulitis. Abdominal/pelvic CT with contrast revealed a decreased size in the multiple liver nodules. Bilateral renal nodules were stable. An enlarged prostate with an exophytic mass arising from the posterior superior aspect of the prostate was present.   He was treated for acute diverticulitis of the mid to distal sigmoid colon. Madison Porter was evaluated by Dr. Luke العراقي on 11/27/18 regarding possible prostate mass and elevated PSA with recommendations for surveillance only. Contrasted chest CT on 11/8/18 at [facility] showed no enlarged axillary, hilar or mediastinal lymph nodes. There were no acute osseous or soft tissue abnormalities. Madison Porter has had treatment delay of Nivolumab on more than one occasion due to rash, requiring treatment with prednisone. Nivolumab was discontinued following dose #2 of maintenance therapy on 12/20/18. He initiated cycle #1 of Tafinlar 150 mg po BID & Mekinist 2 mg po BID on 1/4/19. Contrasted CT chest 1/9/19 at [facility] showed emphysematous changes bilaterally without evidence of metastatic disease. A 1.4 cm right hilar lymph node was unchanged since 2015, likely benign. Abdominal/pelvic CT with contrast 1/9/19 documented a 4 mm hepatic lesion, previously 6 mm. Other previously noted lesions throughout the liver are not appreciated. The enlarged prostate with nodular hypertrophy was previously evaluated by Dr. Luke العرقاي. Bone scan 1/9/19 was without evidence of osteoblastic disease. Echocardiogram 1/9/19 showed an EF of 60%. Madison Porter was hopsitalized at [facility] 1/12/19 - 1/18/19 for enterocolitis with nausea/vomiting and diarrhea. Antineoplastic therapy was held during that time, rechallenged beginning 1/24/19. Madison Porter was evaluated 2/11/19 at [facility] ER for a 24 hour history of abdominal pain and diarrhea with mild diverticulitis, stable from 1/12/19. He was treated with Cipro and Flagyl. Single agent Tafinlar was taken 3/28/19 - 4/4/19, discontinued by Madison Porter due to intolerable abdominal cramping. Robert Curiel was resumed 4/25/19. CT scans of the chest, abdomen and pelvis with contrast 8/13/2019 at [facility] was negative for intrathoracic metastasis. There was no evidence of disease progression in the abdomen/pelvis with stable, subcentimeter low-density liver lesions noted.   Dosing was changed to 480 mg every 4 weeks on 9/5/2019 to see if this may decrease persistent itching. Rx fluoxetine and hydroxyzine per Dr. Justin Collet recommendations. Contrasted CT scans of the chest, abdomen/pelvis and bone scan 1/16/2020 were negative for evidence of disease progression. 19 mm right thyroid lobe nodule and subcentimeter low-density liver lesions were stable. No abnormal bony uptake. Contrasted CT scans of the chest, abdomen and pelvis 6/4/2020 were without evidence of metastatic disease. A 2.2 cm left lower renal lesion is stable  Contrasted CT scans of the chest, abdomen and pelvis 6/4/2020 were negative aside from high-grade stenosis of the SVC with collateral formation for which Sheba Dinero was referred to vascular surgery 10/29/2020. Treatment continues with nivolumab. TREATMENT SUMMARY:  1. Dr. Jae Mendoza resected a malignant melanoma from the right posterior arm, above the elbow, on 06/05/14   2. Wide excision with a sentinel lymph node biopsy by Dr. Lonnie Nieto on 07/09/14. 3. Adjuvant weekly Sylatron Initiated 09/02/14 at 6 mcg/kg (500mcg) sub-q weekly but was only able to receive 4 of 8 planned treatments of this. The last dose #4 was on 09/28/14   4. Adjuvant weekly Sylatron 3 mcg/kg (250mcg) initiated 10/12/2014. Last dose delivered on 2/15/2015, discontinued due to poor tolerability and unable to tolerate the medication. 5. Opdivo 1 mg/kg and Yervoy 3 mg/kg every 3 weeks ×4 doses. 8/7/18 - 10/25/18. 6. Opdivo 240 mg every 2 weeks initiated 11/15/18, discontinued after dose #2 on 12/20/18   7. Monthly Xgeva initiated 8/7/18.   8.  Tafinlar 150 mg po BID & Mekinist 2 mg po qday, cycle #1 initiated 1/4/19.   9. Single agent Tafinlar 150 mg po BID initiated 3/28/19, discontinue by patient 4/4/19.   10. Opdivo 240 mg every 2 weeks resumed 4/25/19, change to 480 mg every 4 weeks on 9/5/2019    HEMATOLOGY CONCERN:  SVC stenosis  Sheba Dinero was Evaluated by Candido Ellsworth PA-C with vascular surgery on 11/11/2020 regarding high-grade SVC narrowing with collateral vein formation in the mediastinum noted on contrasted chest [facility] on 10/22/2020. Ronnell Escalante was asymptomatic, observation recommended. Past Medical History:    Past Medical History:   Diagnosis Date    Acid reflux     BPH (benign prostatic hyperplasia)     Cancer (HCC)     Diverticulitis     Hard of hearing     Hypercholesteremia     Hypertension     Malignant melanoma of skin of upper limb, including shoulder ([address] dx 6/5/2014    to liver, stomach, bone, and right axilla     Past Surgical History:    Past Surgical History:   Procedure Laterality Date    CHOLECYSTECTOMY      TUNNELED CENTRAL VENOUS CATHETER W/ SUBCUTANEOUS PORT       Current Medications:    Current Outpatient Medications   Medication Sig Dispense Refill    montelukast (SINGULAIR) 10 MG tablet TAKE 1 TABLET BY MOUTH ONCE DAILY AT NIGHT 90 tablet 3    HYDROcodone-acetaminophen (NORCO)  MG per tablet Take 1 tablet by mouth every 6 hours as needed for Pain for up to 30 days. Intended supply: 30 days 120 tablet 0    acetaminophen (TYLENOL) 500 MG tablet Take 1,000 mg by mouth every 6 hours as needed for Pain      naproxen (NAPROSYN) 250 MG tablet Take 250 mg by mouth every 12 hours as needed for Pain      clotrimazole-betamethasone (LOTRISONE) 1-0.05 % cream Apply topically under the arms 2 times daily. 45 g 3    PARoxetine (PAXIL) 10 MG tablet TAKE 1 TABLET BY MOUTH EVERY DAY 90 tablet 3    levothyroxine (SYNTHROID) 88 MCG tablet Take one tablet by mouth daily. 90 tablet 3    dicyclomine (BENTYL) 20 MG tablet TAKE 1/2 TABLET BY MOUTH 4 TIMES A DAY AS NEEDED 180 tablet 3    finasteride (PROSCAR) 5 MG tablet Take 5 mg by mouth      lovastatin (MEVACOR) 40 MG tablet Take 40 mg by mouth       No current facility-administered medications for this visit.         Allergies: No Known Allergies  Social History:    Social History     Tobacco Use    Smoking status: Former Smoker    Smokeless tobacco: Never Left Ear: External ear normal.      Nose: Nose normal.      Mouth/Throat:      Mouth: Mucous membranes are moist.   Eyes:      General: No scleral icterus. Right eye: No discharge. Left eye: No discharge. Conjunctiva/sclera: Conjunctivae normal.   Neck:      Trachea: No tracheal deviation. Cardiovascular:      Rate and Rhythm: Normal rate and regular rhythm. Pulmonary:      Effort: Pulmonary effort is normal. No respiratory distress. Breath sounds: Normal breath sounds. No wheezing or rales. Abdominal:      General: Bowel sounds are normal. There is no distension. Palpations: Abdomen is soft. Tenderness: There is no abdominal tenderness. There is no guarding. Genitourinary:     Comments: Exam deferred  Musculoskeletal:         General: No tenderness or deformity. Cervical back: Neck supple. No muscular tenderness. Comments: Normal ROM all four extremities   Lymphadenopathy:      Cervical:      Right cervical: No superficial, deep or posterior cervical adenopathy. Left cervical: No superficial, deep or posterior cervical adenopathy. Upper Body:      Right upper body: No supraclavicular or axillary adenopathy. Left upper body: No supraclavicular or axillary adenopathy. Skin:     General: Skin is warm and dry. Findings: No rash. Comments: Skin pigmentation changes of the hands/arms, though no obvious rash. Mild erythema upper anterior chest wall   Neurological:      Mental Status: He is alert and oriented to person, place, and time. Comments: follows commands, non-focal   Psychiatric:         Behavior: Behavior normal. Behavior is cooperative. Thought Content: Thought content normal.         Judgment: Judgment normal.      Comments: Alert and oriented to person, place and time.           Labs reviewed by me:    Labs 9/2/2021:  · CMP: Creatinine 1.1, GFR > 60  · TSH: 3.34    CBC 10/14/21:  Lab Results   Component Value Date WBC 9.68 (H) 10/14/2021    HGB 15.5 10/14/2021    HCT 44.8 10/14/2021    MCV 89.6 10/14/2021     10/14/2021    LYMPHOPCT 31.9 10/14/2021    RBC 5.00 10/14/2021    MCH 31.0 10/14/2021    MCHC 34.6 10/14/2021    RDW 12.8 10/14/2021       ASSESSMENT:   1. Malignant melanoma of right upper extremity including shoulder ([address]    2. Bone metastases ([address]    3. Liver metastasis ([address]    4. Encounter for antineoplastic immunotherapy    5. Drug-induced skin rash    6. Hypothyroidism due to medication    7. Superior vena cava stenosis    8. Left renal mass         Malignant melanoma of skin of right upper extremity, including shoulder (HCC)  Liver metastasis (HCC)  Bone metastases ([address]  Encounter for antineoplastic immunotherapy    Contrasted CT abdomen/pelvis 9/30/2021 at [facility]:   · Stable CT appearance the abdomen pelvis. No evidence of metastatic disease. No acute intra-abdominal/pelvic pathological process. · Prostate hypertrophy with focal prostate nodularity. · Colonic diverticulosis. No CT evidence of acute diverticulitis. · Stable probably benign renal masses. Contrasted Chest CT is scheduled 12/7/2021 by Lola Benitez PA-C. Proceed with Opdivo today and continue every 4 weeks. continue monthly Xgeva    Drug-induced skin rash  continuing to tolerate nivolumab with chronic pruritus    Thyroid nodule/hypothyroidism due to medication  Continue Synthroid 88 mcg per day  TSH was 3.34 on 9/2/2021  Repeat TSH today    Left renal mass  2.2 cm left renal mass stable on abdominal/pelvic CT dated 6/4/2020, 10/22/2020 and 4/1/2021, 9/30/2021  Previously evaluated by urology, monitor conservatively    Superior vena cava stenosis  Asymptomatic  Previously evaluated by vascular surgery with conservative monitoring recommended  CT chest planned in December, 2021 per vascular    Plan of care discussed with Maira Short and his wife. All questions answered.     CMP, TSH ordered today  RTC RN 1 month for Opdivo and Delmas Kussmaul  Return in about 2 months (around 12/14/2021) for follow up with Zana Schilder, APRN for Stephany Millicent and Sandor Brunner. I have seen, examined and reviewed this patient medication list, appropriate labs and imaging studies. I reviewed relevant medical records and others physicians notes. I discussed the plan of care with the patient. I answered all questions to the patients satisfaction. I have also reviewed the chief complaint (CC) and part of the history (History of Present Illness (HPI), Past Family Social History [facility]), or Review of Systems (ROS) and made changes when appropriated. Dictated utilizing Dragon transcription software.       LIAM Mcnulty  7:42 AM  10/18/2021

## 2021-10-14 ENCOUNTER — OFFICE VISIT (OUTPATIENT)
Dept: HEMATOLOGY | Age: 81
End: 2021-10-14
Payer: MEDICARE

## 2021-10-14 ENCOUNTER — HOSPITAL ENCOUNTER (OUTPATIENT)
Dept: INFUSION THERAPY | Age: 81
Discharge: HOME | End: 2021-10-14
Payer: MEDICARE

## 2021-10-14 VITALS
HEIGHT: 71 IN | DIASTOLIC BLOOD PRESSURE: 82 MMHG | WEIGHT: 205.5 LBS | BODY MASS INDEX: 28.77 KG/M2 | TEMPERATURE: 97.8 F | HEART RATE: 68 BPM | RESPIRATION RATE: 18 BRPM | SYSTOLIC BLOOD PRESSURE: 150 MMHG | OXYGEN SATURATION: 96 %

## 2021-10-14 DIAGNOSIS — R53.83 FATIGUE DUE TO TREATMENT: Primary | ICD-10-CM

## 2021-10-14 DIAGNOSIS — C43.61 MALIGNANT MELANOMA OF RIGHT UPPER EXTREMITY INCLUDING SHOULDER (HCC): Primary | ICD-10-CM

## 2021-10-14 DIAGNOSIS — I87.1 SUPERIOR VENA CAVA STENOSIS: ICD-10-CM

## 2021-10-14 DIAGNOSIS — C43.9 MALIGNANT MELANOMA, UNSPECIFIED SITE (HCC): Primary | ICD-10-CM

## 2021-10-14 DIAGNOSIS — L27.0 DRUG-INDUCED SKIN RASH: ICD-10-CM

## 2021-10-14 DIAGNOSIS — C79.51 BONE METASTASES (HCC): ICD-10-CM

## 2021-10-14 DIAGNOSIS — C43.9 MALIGNANT MELANOMA, UNSPECIFIED SITE (HCC): ICD-10-CM

## 2021-10-14 DIAGNOSIS — R53.83 FATIGUE DUE TO TREATMENT: ICD-10-CM

## 2021-10-14 DIAGNOSIS — C78.7 LIVER METASTASIS (HCC): ICD-10-CM

## 2021-10-14 DIAGNOSIS — E03.2 HYPOTHYROIDISM DUE TO MEDICATION: ICD-10-CM

## 2021-10-14 DIAGNOSIS — N28.89 LEFT RENAL MASS: ICD-10-CM

## 2021-10-14 DIAGNOSIS — Z51.12 ENCOUNTER FOR ANTINEOPLASTIC IMMUNOTHERAPY: ICD-10-CM

## 2021-10-14 LAB
ALBUMIN SERPL-MCNC: 4.5 G/DL (ref 3.5–5.2)
ALP BLD-CCNC: 45 U/L (ref 40–130)
ALT SERPL-CCNC: 26 U/L (ref 21–72)
ANION GAP SERPL CALCULATED.3IONS-SCNC: 7 MMOL/L (ref 7–19)
AST SERPL-CCNC: 42 U/L (ref 17–59)
BASOPHILS ABSOLUTE: 0.02 K/UL (ref 0.01–0.08)
BASOPHILS RELATIVE PERCENT: 0.2 % (ref 0.1–1.2)
BILIRUB SERPL-MCNC: 1.6 MG/DL (ref 0.2–1.3)
BUN BLDV-MCNC: 16 MG/DL (ref 9–20)
CALCIUM SERPL-MCNC: 9.6 MG/DL (ref 8.4–10.2)
CHLORIDE BLD-SCNC: 99 MMOL/L (ref 98–111)
CO2: 31 MMOL/L (ref 22–29)
CREAT SERPL-MCNC: 1.2 MG/DL (ref 0.6–1.2)
EOSINOPHILS ABSOLUTE: 0.43 K/UL (ref 0.04–0.54)
EOSINOPHILS RELATIVE PERCENT: 4.4 % (ref 0.7–7)
GFR NON-AFRICAN AMERICAN: 58
GLOBULIN: 2.8 G/DL
GLUCOSE BLD-MCNC: 104 MG/DL (ref 74–106)
HCT VFR BLD CALC: 44.8 % (ref 40.1–51)
HEMOGLOBIN: 15.5 G/DL (ref 13.7–17.5)
LYMPHOCYTES ABSOLUTE: 3.09 K/UL (ref 1.18–3.74)
LYMPHOCYTES RELATIVE PERCENT: 31.9 % (ref 19.3–53.1)
MCH RBC QN AUTO: 31 PG (ref 25.7–32.2)
MCHC RBC AUTO-ENTMCNC: 34.6 G/DL (ref 32.3–36.5)
MCV RBC AUTO: 89.6 FL (ref 79–92.2)
MONOCYTES ABSOLUTE: 1 K/UL (ref 0.24–0.82)
MONOCYTES RELATIVE PERCENT: 10.3 % (ref 4.7–12.5)
NEUTROPHILS ABSOLUTE: 5.14 K/UL (ref 1.56–6.13)
NEUTROPHILS RELATIVE PERCENT: 53.2 % (ref 34–71.1)
PDW BLD-RTO: 12.8 % (ref 11.6–14.4)
PLATELET # BLD: 235 K/UL (ref 163–337)
PMV BLD AUTO: 10.4 FL (ref 7.4–10.4)
POTASSIUM SERPL-SCNC: 4.6 MMOL/L (ref 3.5–5.1)
RBC # BLD: 5 M/UL (ref 4.63–6.08)
SODIUM BLD-SCNC: 137 MMOL/L (ref 137–145)
TOTAL PROTEIN: 7.3 G/DL (ref 6.3–8.2)
TSH SERPL DL<=0.05 MIU/L-ACNC: 4.95 UIU/ML (ref 0.47–4.68)
WBC # BLD: 9.68 K/UL (ref 4.23–9.07)

## 2021-10-14 PROCEDURE — 96372 THER/PROPH/DIAG INJ SC/IM: CPT

## 2021-10-14 PROCEDURE — 6360000002 HC RX W HCPCS: Performed by: NURSE PRACTITIONER

## 2021-10-14 PROCEDURE — G8417 CALC BMI ABV UP PARAM F/U: HCPCS | Performed by: NURSE PRACTITIONER

## 2021-10-14 PROCEDURE — 1036F TOBACCO NON-USER: CPT | Performed by: NURSE PRACTITIONER

## 2021-10-14 PROCEDURE — 2580000003 HC RX 258: Performed by: NURSE PRACTITIONER

## 2021-10-14 PROCEDURE — 1123F ACP DISCUSS/DSCN MKR DOCD: CPT | Performed by: NURSE PRACTITIONER

## 2021-10-14 PROCEDURE — 80053 COMPREHEN METABOLIC PANEL: CPT

## 2021-10-14 PROCEDURE — 99214 OFFICE O/P EST MOD 30 MIN: CPT | Performed by: NURSE PRACTITIONER

## 2021-10-14 PROCEDURE — 96413 CHEMO IV INFUSION 1 HR: CPT

## 2021-10-14 PROCEDURE — 85025 COMPLETE CBC W/AUTO DIFF WBC: CPT

## 2021-10-14 PROCEDURE — 84443 ASSAY THYROID STIM HORMONE: CPT

## 2021-10-14 PROCEDURE — G8484 FLU IMMUNIZE NO ADMIN: HCPCS | Performed by: NURSE PRACTITIONER

## 2021-10-14 PROCEDURE — 4040F PNEUMOC VAC/ADMIN/RCVD: CPT | Performed by: NURSE PRACTITIONER

## 2021-10-14 PROCEDURE — G8427 DOCREV CUR MEDS BY ELIG CLIN: HCPCS | Performed by: NURSE PRACTITIONER

## 2021-10-14 RX ORDER — METHYLPREDNISOLONE SODIUM SUCCINATE 125 MG/2ML
125 INJECTION, POWDER, LYOPHILIZED, FOR SOLUTION INTRAMUSCULAR; INTRAVENOUS PRN
Status: CANCELLED | OUTPATIENT
Start: 2021-10-14

## 2021-10-14 RX ORDER — HEPARIN SODIUM (PORCINE) LOCK FLUSH IV SOLN 100 UNIT/ML 100 UNIT/ML
500 SOLUTION INTRAVENOUS PRN
Status: CANCELLED | OUTPATIENT
Start: 2021-10-14

## 2021-10-14 RX ORDER — SODIUM CHLORIDE 0.9 % (FLUSH) 0.9 %
10 SYRINGE (ML) INJECTION PRN
Status: CANCELLED | OUTPATIENT
Start: 2021-10-14

## 2021-10-14 RX ORDER — SODIUM CHLORIDE 0.9 % (FLUSH) 0.9 %
10 SYRINGE (ML) INJECTION PRN
Status: DISCONTINUED | OUTPATIENT
Start: 2021-10-14 | End: 2021-10-15 | Stop reason: HOSPADM

## 2021-10-14 RX ORDER — DIPHENHYDRAMINE HYDROCHLORIDE 50 MG/ML
50 INJECTION INTRAMUSCULAR; INTRAVENOUS PRN
Status: CANCELLED | OUTPATIENT
Start: 2021-10-14

## 2021-10-14 RX ORDER — SODIUM CHLORIDE 0.9 % (FLUSH) 0.9 %
5 SYRINGE (ML) INJECTION PRN
Status: CANCELLED | OUTPATIENT
Start: 2021-10-14

## 2021-10-14 RX ORDER — EPINEPHRINE 1 MG/ML
0.3 INJECTION, SOLUTION, CONCENTRATE INTRAVENOUS PRN
Status: CANCELLED | OUTPATIENT
Start: 2021-10-14

## 2021-10-14 RX ORDER — HEPARIN SODIUM (PORCINE) LOCK FLUSH IV SOLN 100 UNIT/ML 100 UNIT/ML
500 SOLUTION INTRAVENOUS PRN
Status: DISCONTINUED | OUTPATIENT
Start: 2021-10-14 | End: 2021-10-16 | Stop reason: HOSPADM

## 2021-10-14 RX ADMIN — DENOSUMAB 120 MG: 120 INJECTION SUBCUTANEOUS at 10:50

## 2021-10-14 RX ADMIN — SODIUM CHLORIDE, PRESERVATIVE FREE 10 ML: 5 INJECTION INTRAVENOUS at 10:50

## 2021-10-14 RX ADMIN — HEPARIN 500 UNITS: 100 SYRINGE at 10:50

## 2021-10-14 RX ADMIN — SODIUM CHLORIDE 480 MG: 9 INJECTION, SOLUTION INTRAVENOUS at 10:14

## 2021-10-18 ASSESSMENT — ENCOUNTER SYMPTOMS
NAUSEA: 0
TROUBLE SWALLOWING: 0
CONSTIPATION: 0
SHORTNESS OF BREATH: 0
VOMITING: 0
EYE DISCHARGE: 0
SORE THROAT: 0
ABDOMINAL PAIN: 0
EYE ITCHING: 0
WHEEZING: 0
COUGH: 0
DIARRHEA: 0

## 2021-11-01 DIAGNOSIS — C43.9 MALIGNANT MELANOMA, UNSPECIFIED SITE (HCC): ICD-10-CM

## 2021-11-01 DIAGNOSIS — G89.3 CANCER RELATED PAIN: ICD-10-CM

## 2021-11-01 RX ORDER — HYDROCODONE BITARTRATE AND ACETAMINOPHEN 10; 325 MG/1; MG/1
1 TABLET ORAL EVERY 6 HOURS PRN
Qty: 120 TABLET | Refills: 0 | Status: SHIPPED | OUTPATIENT
Start: 2021-11-01 | End: 2021-12-13 | Stop reason: SDUPTHER

## 2021-11-11 ENCOUNTER — HOSPITAL ENCOUNTER (OUTPATIENT)
Dept: INFUSION THERAPY | Age: 81
Discharge: HOME | End: 2021-11-11
Payer: MEDICARE

## 2021-11-11 VITALS
HEIGHT: 71 IN | TEMPERATURE: 98 F | RESPIRATION RATE: 18 BRPM | WEIGHT: 209.5 LBS | SYSTOLIC BLOOD PRESSURE: 168 MMHG | OXYGEN SATURATION: 95 % | DIASTOLIC BLOOD PRESSURE: 70 MMHG | HEART RATE: 66 BPM | BODY MASS INDEX: 29.33 KG/M2

## 2021-11-11 DIAGNOSIS — C43.9 MALIGNANT MELANOMA, UNSPECIFIED SITE (HCC): ICD-10-CM

## 2021-11-11 DIAGNOSIS — C43.61 MALIGNANT MELANOMA OF RIGHT UPPER EXTREMITY INCLUDING SHOULDER (HCC): Primary | ICD-10-CM

## 2021-11-11 DIAGNOSIS — C79.51 BONE METASTASES (HCC): ICD-10-CM

## 2021-11-11 DIAGNOSIS — E03.2 HYPOTHYROIDISM DUE TO MEDICATION: ICD-10-CM

## 2021-11-11 LAB
ALBUMIN SERPL-MCNC: 4.1 G/DL (ref 3.5–5.2)
ALP BLD-CCNC: 46 U/L (ref 40–130)
ALT SERPL-CCNC: 22 U/L (ref 21–72)
ANION GAP SERPL CALCULATED.3IONS-SCNC: 5 MMOL/L (ref 7–19)
AST SERPL-CCNC: 46 U/L (ref 17–59)
BASOPHILS ABSOLUTE: 0.02 K/UL (ref 0.01–0.08)
BASOPHILS RELATIVE PERCENT: 0.3 % (ref 0.1–1.2)
BILIRUB SERPL-MCNC: 1.4 MG/DL (ref 0.2–1.3)
BUN BLDV-MCNC: 19 MG/DL (ref 9–20)
CALCIUM SERPL-MCNC: 9 MG/DL (ref 8.4–10.2)
CHLORIDE BLD-SCNC: 104 MMOL/L (ref 98–111)
CO2: 29 MMOL/L (ref 22–29)
CREAT SERPL-MCNC: 1.2 MG/DL (ref 0.6–1.2)
EOSINOPHILS ABSOLUTE: 0.36 K/UL (ref 0.04–0.54)
EOSINOPHILS RELATIVE PERCENT: 5 % (ref 0.7–7)
GFR NON-AFRICAN AMERICAN: 58
GLOBULIN: 2.5 G/DL
GLUCOSE BLD-MCNC: 105 MG/DL (ref 74–106)
HCT VFR BLD CALC: 40.9 % (ref 40.1–51)
HEMOGLOBIN: 14.1 G/DL (ref 13.7–17.5)
LYMPHOCYTES ABSOLUTE: 2.23 K/UL (ref 1.18–3.74)
LYMPHOCYTES RELATIVE PERCENT: 31 % (ref 19.3–53.1)
MCH RBC QN AUTO: 31.3 PG (ref 25.7–32.2)
MCHC RBC AUTO-ENTMCNC: 34.5 G/DL (ref 32.3–36.5)
MCV RBC AUTO: 90.7 FL (ref 79–92.2)
MONOCYTES ABSOLUTE: 0.72 K/UL (ref 0.24–0.82)
MONOCYTES RELATIVE PERCENT: 10 % (ref 4.7–12.5)
NEUTROPHILS ABSOLUTE: 3.87 K/UL (ref 1.56–6.13)
NEUTROPHILS RELATIVE PERCENT: 53.7 % (ref 34–71.1)
PDW BLD-RTO: 12.8 % (ref 11.6–14.4)
PLATELET # BLD: 214 K/UL (ref 163–337)
PMV BLD AUTO: 10.1 FL (ref 7.4–10.4)
POTASSIUM SERPL-SCNC: 4.5 MMOL/L (ref 3.5–5.1)
RBC # BLD: 4.51 M/UL (ref 4.63–6.08)
SODIUM BLD-SCNC: 138 MMOL/L (ref 137–145)
TOTAL PROTEIN: 6.6 G/DL (ref 6.3–8.2)
TSH SERPL DL<=0.05 MIU/L-ACNC: 5.17 UIU/ML (ref 0.47–4.68)
WBC # BLD: 7.2 K/UL (ref 4.23–9.07)

## 2021-11-11 PROCEDURE — 85025 COMPLETE CBC W/AUTO DIFF WBC: CPT

## 2021-11-11 PROCEDURE — 80053 COMPREHEN METABOLIC PANEL: CPT

## 2021-11-11 PROCEDURE — 96413 CHEMO IV INFUSION 1 HR: CPT

## 2021-11-11 PROCEDURE — 36593 DECLOT VASCULAR DEVICE: CPT

## 2021-11-11 PROCEDURE — 84443 ASSAY THYROID STIM HORMONE: CPT

## 2021-11-11 PROCEDURE — 2580000003 HC RX 258: Performed by: NURSE PRACTITIONER

## 2021-11-11 PROCEDURE — 96372 THER/PROPH/DIAG INJ SC/IM: CPT

## 2021-11-11 PROCEDURE — 6360000002 HC RX W HCPCS: Performed by: NURSE PRACTITIONER

## 2021-11-11 PROCEDURE — 36415 COLL VENOUS BLD VENIPUNCTURE: CPT

## 2021-11-11 RX ORDER — SODIUM CHLORIDE 0.9 % (FLUSH) 0.9 %
5 SYRINGE (ML) INJECTION PRN
Status: CANCELLED | OUTPATIENT
Start: 2021-11-11

## 2021-11-11 RX ORDER — METHYLPREDNISOLONE SODIUM SUCCINATE 125 MG/2ML
125 INJECTION, POWDER, LYOPHILIZED, FOR SOLUTION INTRAMUSCULAR; INTRAVENOUS PRN
Status: CANCELLED | OUTPATIENT
Start: 2021-11-11

## 2021-11-11 RX ORDER — SODIUM CHLORIDE 0.9 % (FLUSH) 0.9 %
10 SYRINGE (ML) INJECTION PRN
Status: CANCELLED | OUTPATIENT
Start: 2021-11-11

## 2021-11-11 RX ORDER — HEPARIN SODIUM (PORCINE) LOCK FLUSH IV SOLN 100 UNIT/ML 100 UNIT/ML
500 SOLUTION INTRAVENOUS PRN
Status: CANCELLED | OUTPATIENT
Start: 2021-11-11

## 2021-11-11 RX ORDER — DIPHENHYDRAMINE HYDROCHLORIDE 50 MG/ML
50 INJECTION INTRAMUSCULAR; INTRAVENOUS PRN
Status: CANCELLED | OUTPATIENT
Start: 2021-11-11

## 2021-11-11 RX ORDER — SODIUM CHLORIDE 0.9 % (FLUSH) 0.9 %
10 SYRINGE (ML) INJECTION PRN
Status: DISCONTINUED | OUTPATIENT
Start: 2021-11-11 | End: 2021-11-12 | Stop reason: HOSPADM

## 2021-11-11 RX ORDER — HEPARIN SODIUM (PORCINE) LOCK FLUSH IV SOLN 100 UNIT/ML 100 UNIT/ML
500 SOLUTION INTRAVENOUS PRN
Status: DISCONTINUED | OUTPATIENT
Start: 2021-11-11 | End: 2021-11-13 | Stop reason: HOSPADM

## 2021-11-11 RX ORDER — EPINEPHRINE 1 MG/ML
0.3 INJECTION, SOLUTION, CONCENTRATE INTRAVENOUS PRN
Status: CANCELLED | OUTPATIENT
Start: 2021-11-11

## 2021-11-11 RX ADMIN — DENOSUMAB 120 MG: 120 INJECTION SUBCUTANEOUS at 09:54

## 2021-11-11 RX ADMIN — ALTEPLASE 2 MG: 2.2 INJECTION, POWDER, LYOPHILIZED, FOR SOLUTION INTRAVENOUS at 08:41

## 2021-11-11 RX ADMIN — WATER 2.2 ML: 1 INJECTION INTRAMUSCULAR; INTRAVENOUS; SUBCUTANEOUS at 08:41

## 2021-11-11 RX ADMIN — SODIUM CHLORIDE, PRESERVATIVE FREE 10 ML: 5 INJECTION INTRAVENOUS at 10:38

## 2021-11-11 RX ADMIN — SODIUM CHLORIDE 480 MG: 9 INJECTION, SOLUTION INTRAVENOUS at 10:07

## 2021-11-11 RX ADMIN — HEPARIN 500 UNITS: 100 SYRINGE at 10:38

## 2021-11-29 RX ORDER — HYDROXYZINE HYDROCHLORIDE 25 MG/1
25 TABLET, FILM COATED ORAL EVERY 8 HOURS PRN
Qty: 90 TABLET | Refills: 0 | Status: SHIPPED | OUTPATIENT
Start: 2021-11-29 | End: 2022-01-10 | Stop reason: SDUPTHER

## 2021-12-07 ENCOUNTER — HOSPITAL ENCOUNTER (OUTPATIENT)
Dept: CT IMAGING | Age: 81
Discharge: HOME | End: 2021-12-07
Payer: MEDICARE

## 2021-12-07 DIAGNOSIS — I87.1 SUPERIOR VENA CAVA STENOSIS: ICD-10-CM

## 2021-12-07 PROCEDURE — 71260 CT THORAX DX C+: CPT

## 2021-12-07 PROCEDURE — 6360000004 HC RX CONTRAST MEDICATION: Performed by: PHYSICIAN ASSISTANT

## 2021-12-07 RX ADMIN — IOPAMIDOL 60 ML: 755 INJECTION, SOLUTION INTRAVENOUS at 08:57

## 2021-12-08 ENCOUNTER — OFFICE VISIT (OUTPATIENT)
Dept: VASCULAR SURGERY | Age: 81
End: 2021-12-08
Payer: MEDICARE

## 2021-12-08 DIAGNOSIS — I87.1 SUPERIOR VENA CAVA STENOSIS: Primary | ICD-10-CM

## 2021-12-08 PROCEDURE — G8417 CALC BMI ABV UP PARAM F/U: HCPCS | Performed by: PHYSICIAN ASSISTANT

## 2021-12-08 PROCEDURE — 1123F ACP DISCUSS/DSCN MKR DOCD: CPT | Performed by: PHYSICIAN ASSISTANT

## 2021-12-08 PROCEDURE — 99213 OFFICE O/P EST LOW 20 MIN: CPT | Performed by: PHYSICIAN ASSISTANT

## 2021-12-08 PROCEDURE — 1036F TOBACCO NON-USER: CPT | Performed by: PHYSICIAN ASSISTANT

## 2021-12-08 PROCEDURE — G8428 CUR MEDS NOT DOCUMENT: HCPCS | Performed by: PHYSICIAN ASSISTANT

## 2021-12-08 PROCEDURE — 4040F PNEUMOC VAC/ADMIN/RCVD: CPT | Performed by: PHYSICIAN ASSISTANT

## 2021-12-08 PROCEDURE — G8484 FLU IMMUNIZE NO ADMIN: HCPCS | Performed by: PHYSICIAN ASSISTANT

## 2021-12-08 NOTE — PROGRESS NOTES
Progress Note      Pt Name: Carolina Morrell  YOB: 1940  MRN: [id number]    Date of evaluation: 12/9/21    History Obtained From:  patient, spouse, electronic medical record    CHIEF COMPLAINT:    Chief Complaint   Patient presents with    Chemotherapy     Metastatic     HISTORY OF PRESENT ILLNESS:    Carolina Morrell is a pleasant 20-year-old gentleman returning to the clinic to receive scheduled immunotherapy regarding his diagnosis of stage IV metastatic melanoma having presented with involvement of the liver, bone, celiac and right axillary lymph nodes. Recurrent melanoma was diagnosed 7/2018. He has had excellent treatment response on imaging studies. Vale Flood recently completed chest CT for follow-up of SVC, ordered by vascular surgery. He is here to discuss results from an oncology standpoint. He has minimal cough though has noted mild shortness of breath that he states has worsened over the past year. He continues to have chronic pruritus, no rash outbreak. 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 He takes Norco approximately 3 times a day for complaint of \"bone pain\". Vale Flood reports mild nausea upon waking that resolves about an hour later after taking medications. Blood pressure and weight are stable. Overall, both Vale Flood and Katheryn Fishman feel he is doing well. ECOG grade 1  Neuropathy grade 1  Fatigue grade 1  Pruritus grade 1    Carolina Morrell presents for follow-up surveillance visit and toxicity assessment. he requires intenstive monitoring due to the potential to cause serious morbidity or death. TARGET METASTATIC MALIGNANT MELANOMA SITES:  1. Right upper extremity original primary site 06/05/14   2. Right axilla lymph node at presentation 6/5/2014 and 3 axillary nodes at recurrence 7/27/2018 with an SUV of 8.7   3. Too numerous to count liver metastases   4.  Celiac lymph nodes x 2 with highest SUV of weight loss, anorexia, unable to sleep, anxiety, fatigue, et Kerri Fonder. He declined any further interferon, which is reasonable. A one-year followup CT scan of the chest and MRI of the brain on 6/11/2015 did not reveal any evidence of recurrent disease.  ----------------------------------PROGRESSION --------------------------------  Wagner Garibay presented to [address] [place] [address] emergency department on 7/3/18 for a 3 month history of waxing and waning epigastric abdominal pain and new onset fever. Additional symptoms reported include unexplained weight loss, nausea, headaches. CXR 7/3/18 showed mild cardiomegaly with no acute cardiopulmonary process. Abdominal/pelvic CT with contrast 7/3/18 documented multiple low density bilateral hepatic lesions suspicious for metastatic disease. 2 small renal cysts are noted and one on the left. A second left renal lesion near the lower pole measured 25 mm with SUV of 34, ultrasound recommended. The prostate is markedly enlarged causing partial right obstructive uropathy. CBC shows WBC 7.3, hemoglobin 15.8 and platelets 568,097  CMP revealed a creatinine of 1.2, GFR 59. LFTs WNL  UA negative  Lipase 28, troponin <0.01  He has a history of GERD with laparoscopic paraesophageal hernia repair and fundoplication by Dr. Tanner Lawson on 5/24/15. He was prescribed Norco and Zofran PRN at [address] [place] [address] ER. Rx was given for omeprazole in clinic  Tumor markers drawn 7/11/18 included:   AFP 5.8   CEA 2.0   CA 19-9 - 8   PSA 7.4 (chronically elevated, up to 8.28 on 8/8/14)  MRI of the abdomen w/wo contrast 7/13/18 [facility] documented innumerable T1 hypointense, T2 hyperintense lesions throughout the liver as noted on recent CT. One of the larger lesions seen posteriorly in the right hepatic lobe measured 1.6 cm. Also noted was at least two T2 hyperintense lesions within the thoracocolumbar spine, which could represent metastasis.   Bilateral renal ultrasound 7/3/18 showed a 2.6 cm hypoechoic left lower pole renal lesion, not typical of a benign cyst with enhancing characteristics concerning for primary renal cancer versus metastatic disease. Bilateral nephrogenic cysts noted along with marked prostate enlargement with lobular changes measuring 8 x 5.9 x 4.9 cm. MRI of the brain w/wo contrast 7/3/18 for complaint of headache and nausea was without evidence of acute intracranial process. Contrasted chest CT 7/24/18 revealed a stable 2 cm right thyroid nodule, a new 9 mm right thyroid nodule. An indeterminate 3 mm subpleural HORACIO nodule is noted, likely granulomatous or postinfectious. Bone scan 7/24/18 was negative for evidence of osseous metastasis. Examination is remarkable for mid epigastric discomfort, 10 lbs weight loss and 1 inch right axillary lymph node. This was a previous site of positive sentinel lymph node biopsy and dissection associated with the resected, stage IIIa right upper extremity malignant melanoma in 2014. Suspect recurrent malignant melanoma. Ben Toney was referred to Dr. Andrew Trujillo who performed an FNA of the right axillary lymph node 7/26/18. Pathology was consistent for metastatic malignant melanoma. 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 Findings were discussed with both Ben Toney and his wife by Dr. Unique Kaye at follow-up on 8/1/18 and reiterated on 8/7/18. Recommendation is for combination therapy with Opdivo 1 mg/kg and Yervoy 3 mg/kg every 3 weeks ×4 cycles, followed by Opdivo 240 mg every 2 weeks thereafter. Ben Toney had a left chest Mediport placed by Dr. Dar Lopez 8/3/18. PET scan 8/6/18 showed the following:  3 right axillary lymph nodes, SUV up to 8.7   Hepatic metastasis, too numerous to count   2 celiac lymph nodes SUV 2.8 and 3   Right ilium, SUV 5. Right acetabulum SUV 6.4  Cycle #1 Opdivo/Yervoy and monthly Xgeva initiated 8/7/18. He was evaluated at [facility] 10/16/18 for LLQ abdominal discomfort with a history of recent diverticulitis.   Abdominal/pelvic CT with contrast revealed a decreased size in the multiple liver nodules. Bilateral renal nodules were stable. An enlarged prostate with an exophytic mass arising from the posterior superior aspect of the prostate was present. He was treated for acute diverticulitis of the mid to distal sigmoid colon. Hilda Hammond was evaluated by Dr. Jose Muir on 11/27/18 regarding possible prostate mass and elevated PSA with recommendations for surveillance only. Contrasted chest CT on 11/8/18 at [facility] showed no enlarged axillary, hilar or mediastinal lymph nodes. There were no acute osseous or soft tissue abnormalities. Hilda Hammond has had treatment delay of Nivolumab on more than one occasion due to rash, requiring treatment with prednisone. Nivolumab was discontinued following dose #2 of maintenance therapy on 12/20/18. He initiated cycle #1 of Tafinlar 150 mg po BID & Mekinist 2 mg po BID on 1/4/19. Contrasted CT chest 1/9/19 at [facility] showed emphysematous changes bilaterally without evidence of metastatic disease. A 1.4 cm right hilar lymph node was unchanged since 2015, likely benign. Abdominal/pelvic CT with contrast 1/9/19 documented a 4 mm hepatic lesion, previously 6 mm. Other previously noted lesions throughout the liver are not appreciated. The enlarged prostate with nodular hypertrophy was previously evaluated by Dr. Jose Muir. Bone scan 1/9/19 was without evidence of osteoblastic disease. Echocardiogram 1/9/19 showed an EF of 60%. Hilda Hammond was hopsitalized at [facility] 1/12/19 - 1/18/19 for enterocolitis with nausea/vomiting and diarrhea. Antineoplastic therapy was held during that time, rechallenged beginning 1/24/19. Hilda Hammond was evaluated 2/11/19 at [facility] ER for a 24 hour history of abdominal pain and diarrhea with mild diverticulitis, stable from 1/12/19. He was treated with Cipro and Flagyl. Single agent Tafinlar was taken 3/28/19 - 4/4/19, discontinued by Hilda Ibrahimya due to intolerable abdominal cramping.   Lavone Sevin was resumed po BID initiated 3/28/19, discontinue by patient 4/4/19.   10. Opdivo 240 mg every 2 weeks resumed 4/25/19, change to 480 mg every 4 weeks on 9/5/2019    HEMATOLOGY CONCERN:  SVC stenosis  Reyes Krebs was Evaluated by Jaz Ibarra PA-C with vascular surgery on 11/11/2020 regarding high-grade SVC narrowing with collateral vein formation in the mediastinum noted on contrasted chest CT at [facility] on 10/22/2020. Reyes Krebs was asymptomatic, observation recommended. Past Medical History:    Past Medical History:   Diagnosis Date    Acid reflux     BPH (benign prostatic hyperplasia)     Cancer (HCC)     Diverticulitis     Hard of hearing     Hypercholesteremia     Hypertension     Malignant melanoma of skin of upper limb, including shoulder ([address] dx 6/5/2014    to liver, stomach, bone, and right axilla     Past Surgical History:    Past Surgical History:   Procedure Laterality Date    CHOLECYSTECTOMY      TUNNELED CENTRAL VENOUS CATHETER W/ SUBCUTANEOUS PORT       Current Medications:    Current Outpatient Medications   Medication Sig Dispense Refill    HYDROcodone-acetaminophen (NORCO)  MG per tablet Take 1 tablet by mouth every 6 hours as needed for Pain.  hydrOXYzine (ATARAX) 25 MG tablet TAKE 1 TABLET BY MOUTH EVERY 8 HOURS AS NEEDED FOR ITCHING 90 tablet 0    montelukast (SINGULAIR) 10 MG tablet TAKE 1 TABLET BY MOUTH ONCE DAILY AT NIGHT 90 tablet 3    acetaminophen (TYLENOL) 500 MG tablet Take 1,000 mg by mouth every 6 hours as needed for Pain      PARoxetine (PAXIL) 10 MG tablet TAKE 1 TABLET BY MOUTH EVERY DAY 90 tablet 3    levothyroxine (SYNTHROID) 88 MCG tablet Take one tablet by mouth daily.  90 tablet 3    dicyclomine (BENTYL) 20 MG tablet TAKE 1/2 TABLET BY MOUTH 4 TIMES A DAY AS NEEDED 180 tablet 3    finasteride (PROSCAR) 5 MG tablet Take 5 mg by mouth      lovastatin (MEVACOR) 40 MG tablet Take 40 mg by mouth      levothyroxine (SYNTHROID) 100 MCG tablet Take 1 tablet by mouth daily 30 tablet 2     No current facility-administered medications for this visit. Facility-Administered Medications Ordered in Other Visits   Medication Dose Route Frequency Provider Last Rate Last Admin    sodium chloride flush 0.9 % injection 10 mL  10 mL IntraVENous PRN Isidoro LIAM Barillas   10 mL at 21 0951    heparin flush 100 UNIT/ML injection 500 Units  500 Units IntraCATHeter PRN Isidoro Tejedajanette LouisLIAM todd   500 Units at 21 0406        Allergies: No Known Allergies  Social History:    Social History     Tobacco Use    Smoking status: Former Smoker    Smokeless tobacco: Never Used   Substance Use Topics    Alcohol use: No    Drug use: No     Family History:   Family History   Problem Relation Age of Onset    Heart Attack Brother          age 78 of [place]     Subjective   REVIEW OF SYSTEMS:   Review of Systems   Constitutional: Positive for fatigue (Mild). Negative for fever. No night sweats   HENT: Negative for dental problem, hearing loss, mouth sores, nosebleeds, sore throat and trouble swallowing. Eyes: Negative for discharge and itching. Respiratory: Negative for cough, shortness of breath and wheezing. Cardiovascular: Negative for chest pain, palpitations and leg swelling. Gastrointestinal: Negative for abdominal pain, constipation, diarrhea, nausea and vomiting. Endocrine: Negative for cold intolerance and heat intolerance. Genitourinary: Negative for dysuria, frequency, hematuria and urgency. Musculoskeletal: Positive for arthralgias. Negative for joint swelling and myalgias. Skin: Negative for pallor and rash. Generalized pruritus, mild   Allergic/Immunologic: Negative for environmental allergies and immunocompromised state. Neurological: Negative for seizures, syncope and numbness. Hematological: Negative for adenopathy. Does not bruise/bleed easily. Psychiatric/Behavioral: Negative for agitation, behavioral problems and confusion.  The patient is not nervous/anxious. Objective   Vitals:    12/09/21 0830   BP: (!) 150/74   Pulse: 68   Temp: 97.9 °F (36.6 °C)   SpO2: 98%     Wt Readings from Last 3 Encounters:   12/09/21 212 lb 3.2 oz (96.3 kg)   11/11/21 209 lb 8 oz (95 kg)   10/14/21 205 lb 8 oz (93.2 kg)     PHYSICAL EXAM:  Physical Exam  Vitals reviewed. Constitutional:       General: He is not in acute distress. Appearance: He is well-developed. He is not toxic-appearing or diaphoretic. Comments: Wearing a facial mask. HENT:      Head: Normocephalic and atraumatic. Right Ear: External ear normal.      Left Ear: External ear normal.      Nose: Nose normal.      Mouth/Throat:      Mouth: Mucous membranes are moist.   Eyes:      General: No scleral icterus. Right eye: No discharge. Left eye: No discharge. Conjunctiva/sclera: Conjunctivae normal.   Neck:      Trachea: No tracheal deviation. Cardiovascular:      Rate and Rhythm: Normal rate and regular rhythm. Pulmonary:      Effort: Pulmonary effort is normal. No respiratory distress. Breath sounds: Normal breath sounds. No wheezing or rales. Chest:   Breasts:      Right: No axillary adenopathy or supraclavicular adenopathy. Left: No axillary adenopathy or supraclavicular adenopathy. Abdominal:      General: Bowel sounds are normal. There is no distension. Palpations: Abdomen is soft. Tenderness: There is no abdominal tenderness. There is no guarding. Genitourinary:     Comments: Exam deferred  Musculoskeletal:         General: No tenderness or deformity. Cervical back: Neck supple. No muscular tenderness. Comments: Normal ROM all four extremities   Lymphadenopathy:      Cervical:      Right cervical: No superficial, deep or posterior cervical adenopathy. Left cervical: No superficial, deep or posterior cervical adenopathy. Upper Body:      Right upper body: No supraclavicular or axillary adenopathy.       Left upper body: No supraclavicular or axillary adenopathy. Skin:     General: Skin is warm and dry. Findings: No rash. Comments: Skin pigmentation changes of the hands/arms, though no obvious rash. Mild erythema upper anterior chest wall   Neurological:      Mental Status: He is alert and oriented to person, place, and time. Comments: follows commands, non-focal   Psychiatric:         Behavior: Behavior normal. Behavior is cooperative. Thought Content: Thought content normal.         Judgment: Judgment normal.      Comments: Alert and oriented to person, place and time. Labs reviewed by me:    CBC 12/9/21  Lab Results   Component Value Date    WBC 8.09 12/09/2021    HGB 14.5 12/09/2021    HCT 41.8 12/09/2021    MCV 90.1 12/09/2021     12/09/2021    LYMPHOPCT 27.9 12/09/2021    RBC 4.64 12/09/2021    MCH 31.3 12/09/2021    MCHC 34.7 12/09/2021    RDW 12.4 12/09/2021     Lab Results   Component Value Date     (L) 12/09/2021    K 4.2 12/09/2021     12/09/2021    CO2 30 (H) 12/09/2021    BUN 12 12/09/2021    CREATININE 1.1 12/09/2021    GLUCOSE 109 (H) 12/09/2021    CALCIUM 8.4 12/09/2021    PROT 6.7 12/09/2021    LABALBU 4.1 12/09/2021    BILITOT 1.3 12/09/2021    ALKPHOS 40 12/09/2021    AST 29 12/09/2021    ALT 22 12/09/2021    LABGLOM >60 12/09/2021    GFRAA 64 12/26/2019    AGRATIO 1.8 12/26/2019    GLOB 2.6 12/09/2021     Lab Results   Component Value Date    TSH 7.320 (H) 12/09/2021     ASSESSMENT:   1. Malignant melanoma of right upper extremity including shoulder ([address]    2. Liver metastasis ([address]    3. Bone metastases ([address]    4. Encounter for antineoplastic immunotherapy    5. Hypothyroidism due to medication    6. Superior vena cava stenosis    7.  Left renal mass         Malignant melanoma of skin of right upper extremity, including shoulder (HCC)  Liver metastasis ([address]  Bone metastases ([address]  Encounter for antineoplastic immunotherapy    Contrasted CT appropriate labs and imaging studies. I reviewed relevant medical records and others physicians notes. I discussed the plan of care with the patient. I answered all questions to the patients satisfaction. I have also reviewed the chief complaint (CC) and part of the history (History of Present Illness (HPI), Past Family Social History [facility]), or Review of Systems (ROS) and made changes when appropriated. Dictated utilizing Dragon transcription software.       LIAM Alvarado  9:16 PM  12/9/2021

## 2021-12-08 NOTE — PROGRESS NOTES
Patient Care Team:  Júnior Bloom MD as PCP - General (General Surgery)  Norm LIAM Zhang as Nurse Practitioner (Nurse Practitioner Family)      History and Physical:    Tico Amaro is a 80 y.o. male who has a history that includes hypertension, hyperlipidemia, metastatic melanoma involving the liver bone, celiac right axillary node and SVC stenosis. He reports that at this time he is getting chemo once a month. He denies any shoulder neck or arm swelling. He denies any significant shortness of breath or cough. He denies any extreme pressure behind his eyes or significant headaches.      Tico Amaro is a 80 y.o. male with the following history reviewed and [facility] in [facility]:  Patient Active Problem List    Diagnosis Date Noted    Pruritus 03/18/2020    Cancer related pain 03/18/2020    Decrease in appetite 03/18/2020    Osteoarthritis of multiple joints 01/23/2020    Encounter for antineoplastic immunotherapy 12/26/2019    Liver [facility] [address] 09/05/2019    Hypothyroidism due to medication 08/08/2019    Renal mass, left 08/08/2019    History of diverticulitis 08/08/2019    Malignant melanoma of skin of upper limb, including shoulder (HCC)      to liver, stomach, bone, and right axilla      Bone metastases ([address] 05/31/2019    Melanoma ([address]      to liver, stomach, bone, and right axilla      Hypertension     Hypercholesteremia     Diverticulitis     BPH (benign prostatic hyperplasia)      Current Outpatient Medications   Medication Sig Dispense Refill    hydrOXYzine (ATARAX) 25 MG tablet TAKE 1 TABLET BY MOUTH EVERY 8 HOURS AS NEEDED FOR ITCHING 90 tablet 0    montelukast (SINGULAIR) 10 MG tablet TAKE 1 TABLET BY MOUTH ONCE DAILY AT NIGHT 90 tablet 3    acetaminophen (TYLENOL) 500 MG tablet Take 1,000 mg by mouth every 6 hours as needed for Pain      naproxen (NAPROSYN) 250 MG tablet Take 250 mg by mouth every 12 hours as needed for Pain      headedness. No seizures. No new onset of partial or complete loss of vision affecting only one eye, speech difficulty or lateralizing weakness, numbness/tingling   Hematologic  no excessive bleeding. Psychiatric  no severe anxiety or nervousness. No confusion. All other review of systems are negative. Physical Exam  Due to this being a TeleHealth encounter, evaluation of the following organ systems is limited:   Constitutional   No acute distress. Neurologic  alert and oriented X 3. Psychiatric  mood, affect, and behavior appear normal.  Judgment and thought processes appear normal.      CT Chest -   CT CHEST W CONTRAST    12/7/2021 10:26 AM   History: Superior vena cava stenosis. In order to have a CT radiation dose as low as reasonably achievable   Automated Exposure Control was utilized for adjustment of the mA   and/or KV according to patient size. DLP in mGycm= 544. Postcontrast chest CT. Left arm IV contrast injection. Normally patent left axillary vein and left subclavian vein. Normal appearance of the partially opacified right jugular vein. Normal appearance of the unopacified right subclavian vein, to the   extent visualized. Focal narrowing of the SVC noted. The narrowed segment measures approximately 10 mm. Just above this the SVC measures 15 mm and just below this the SVC   measures 20 mm. No venous thrombus is seen   Rim calcified nodule with internal fatty density within the upper   right mediastinum is indeterminate. This finding is benign appearance and measures 22 x 17 x 21 mm. Normal heart size. Coronary artery calcification is present. Granulomatous lymph node calcification is present in the subcarinal   region. Mildly hyperexpanded lungs with mild chronic interstitial disease. No pneumonia, pneumothorax, or pleural effusion.       Impression   1. Nonspecific mild focal narrowing of the superior vena cava.    No high-grade stenosis or venous thrombus is seen. 2. Chronic lung changes. Signed by Dr Rena Romero      1. Superior vena cava stenosis          Plan      Recommend no smoking  Recommend good BP control  We will follow-up in 1 year with a repeat CT chest or sooner if he develops any symptoms of head, face or neck swelling, worsening shortness of breath or cough. Thank you for allowing us to participate in the care of your patient         Please note that parts of the chart were generated using Dragon dictation software. Although every effort was made to ensure the accuracy of this automated transcription, some errors in transcription may have occurred.

## 2021-12-09 ENCOUNTER — TELEPHONE (OUTPATIENT)
Dept: INFUSION THERAPY | Age: 81
End: 2021-12-09

## 2021-12-09 ENCOUNTER — HOSPITAL ENCOUNTER (OUTPATIENT)
Dept: INFUSION THERAPY | Age: 81
Discharge: HOME | End: 2021-12-09
Payer: MEDICARE

## 2021-12-09 ENCOUNTER — OFFICE VISIT (OUTPATIENT)
Dept: HEMATOLOGY | Age: 81
End: 2021-12-09
Payer: MEDICARE

## 2021-12-09 VITALS
DIASTOLIC BLOOD PRESSURE: 74 MMHG | HEART RATE: 68 BPM | BODY MASS INDEX: 29.71 KG/M2 | OXYGEN SATURATION: 98 % | WEIGHT: 212.2 LBS | SYSTOLIC BLOOD PRESSURE: 150 MMHG | HEIGHT: 71 IN | TEMPERATURE: 97.9 F

## 2021-12-09 DIAGNOSIS — Z51.12 ENCOUNTER FOR ANTINEOPLASTIC IMMUNOTHERAPY: ICD-10-CM

## 2021-12-09 DIAGNOSIS — C43.61 MALIGNANT MELANOMA OF RIGHT UPPER EXTREMITY INCLUDING SHOULDER (HCC): Primary | ICD-10-CM

## 2021-12-09 DIAGNOSIS — R53.83 FATIGUE DUE TO TREATMENT: ICD-10-CM

## 2021-12-09 DIAGNOSIS — C79.51 BONE METASTASES (HCC): ICD-10-CM

## 2021-12-09 DIAGNOSIS — N28.89 LEFT RENAL MASS: ICD-10-CM

## 2021-12-09 DIAGNOSIS — C78.7 LIVER METASTASIS (HCC): ICD-10-CM

## 2021-12-09 DIAGNOSIS — C43.9 MALIGNANT MELANOMA, UNSPECIFIED SITE (HCC): ICD-10-CM

## 2021-12-09 DIAGNOSIS — I87.1 SUPERIOR VENA CAVA STENOSIS: ICD-10-CM

## 2021-12-09 DIAGNOSIS — E03.2 HYPOTHYROIDISM DUE TO MEDICATION: ICD-10-CM

## 2021-12-09 LAB
ALBUMIN SERPL-MCNC: 4.1 G/DL (ref 3.5–5.2)
ALP BLD-CCNC: 40 U/L (ref 40–130)
ALT SERPL-CCNC: 22 U/L (ref 21–72)
ANION GAP SERPL CALCULATED.3IONS-SCNC: 5 MMOL/L (ref 7–19)
AST SERPL-CCNC: 29 U/L (ref 17–59)
BASOPHILS ABSOLUTE: 0.02 K/UL (ref 0.01–0.08)
BASOPHILS RELATIVE PERCENT: 0.2 % (ref 0.1–1.2)
BILIRUB SERPL-MCNC: 1.3 MG/DL (ref 0.2–1.3)
BUN BLDV-MCNC: 12 MG/DL (ref 9–20)
CALCIUM SERPL-MCNC: 8.4 MG/DL (ref 8.4–10.2)
CHLORIDE BLD-SCNC: 101 MMOL/L (ref 98–111)
CO2: 30 MMOL/L (ref 22–29)
CREAT SERPL-MCNC: 1.1 MG/DL (ref 0.6–1.2)
EOSINOPHILS ABSOLUTE: 0.35 K/UL (ref 0.04–0.54)
EOSINOPHILS RELATIVE PERCENT: 4.3 % (ref 0.7–7)
GFR NON-AFRICAN AMERICAN: >60
GLOBULIN: 2.6 G/DL
GLUCOSE BLD-MCNC: 109 MG/DL (ref 74–106)
HCT VFR BLD CALC: 41.8 % (ref 40.1–51)
HEMOGLOBIN: 14.5 G/DL (ref 13.7–17.5)
LYMPHOCYTES ABSOLUTE: 2.26 K/UL (ref 1.18–3.74)
LYMPHOCYTES RELATIVE PERCENT: 27.9 % (ref 19.3–53.1)
MCH RBC QN AUTO: 31.3 PG (ref 25.7–32.2)
MCHC RBC AUTO-ENTMCNC: 34.7 G/DL (ref 32.3–36.5)
MCV RBC AUTO: 90.1 FL (ref 79–92.2)
MONOCYTES ABSOLUTE: 0.84 K/UL (ref 0.24–0.82)
MONOCYTES RELATIVE PERCENT: 10.4 % (ref 4.7–12.5)
NEUTROPHILS ABSOLUTE: 4.62 K/UL (ref 1.56–6.13)
NEUTROPHILS RELATIVE PERCENT: 57.2 % (ref 34–71.1)
PDW BLD-RTO: 12.4 % (ref 11.6–14.4)
PLATELET # BLD: 230 K/UL (ref 163–337)
PMV BLD AUTO: 9.8 FL (ref 7.4–10.4)
POTASSIUM SERPL-SCNC: 4.2 MMOL/L (ref 3.5–5.1)
RBC # BLD: 4.64 M/UL (ref 4.63–6.08)
SODIUM BLD-SCNC: 136 MMOL/L (ref 137–145)
TOTAL PROTEIN: 6.7 G/DL (ref 6.3–8.2)
TSH SERPL DL<=0.05 MIU/L-ACNC: 7.32 UIU/ML (ref 0.47–4.68)
WBC # BLD: 8.09 K/UL (ref 4.23–9.07)

## 2021-12-09 PROCEDURE — G8484 FLU IMMUNIZE NO ADMIN: HCPCS | Performed by: NURSE PRACTITIONER

## 2021-12-09 PROCEDURE — G8427 DOCREV CUR MEDS BY ELIG CLIN: HCPCS | Performed by: NURSE PRACTITIONER

## 2021-12-09 PROCEDURE — 2580000003 HC RX 258: Performed by: NURSE PRACTITIONER

## 2021-12-09 PROCEDURE — 4040F PNEUMOC VAC/ADMIN/RCVD: CPT | Performed by: NURSE PRACTITIONER

## 2021-12-09 PROCEDURE — 96372 THER/PROPH/DIAG INJ SC/IM: CPT

## 2021-12-09 PROCEDURE — 6360000002 HC RX W HCPCS: Performed by: NURSE PRACTITIONER

## 2021-12-09 PROCEDURE — 1123F ACP DISCUSS/DSCN MKR DOCD: CPT | Performed by: NURSE PRACTITIONER

## 2021-12-09 PROCEDURE — 99214 OFFICE O/P EST MOD 30 MIN: CPT | Performed by: NURSE PRACTITIONER

## 2021-12-09 PROCEDURE — G8417 CALC BMI ABV UP PARAM F/U: HCPCS | Performed by: NURSE PRACTITIONER

## 2021-12-09 PROCEDURE — 84443 ASSAY THYROID STIM HORMONE: CPT

## 2021-12-09 PROCEDURE — 96413 CHEMO IV INFUSION 1 HR: CPT

## 2021-12-09 PROCEDURE — 85025 COMPLETE CBC W/AUTO DIFF WBC: CPT

## 2021-12-09 PROCEDURE — 1036F TOBACCO NON-USER: CPT | Performed by: NURSE PRACTITIONER

## 2021-12-09 PROCEDURE — 80053 COMPREHEN METABOLIC PANEL: CPT

## 2021-12-09 RX ORDER — HYDROCODONE BITARTRATE AND ACETAMINOPHEN 10; 325 MG/1; MG/1
1 TABLET ORAL EVERY 6 HOURS PRN
COMMUNITY
End: 2022-02-01 | Stop reason: SDUPTHER

## 2021-12-09 RX ORDER — SODIUM CHLORIDE 0.9 % (FLUSH) 0.9 %
10 SYRINGE (ML) INJECTION PRN
Status: DISCONTINUED | OUTPATIENT
Start: 2021-12-09 | End: 2021-12-10 | Stop reason: HOSPADM

## 2021-12-09 RX ORDER — HEPARIN SODIUM (PORCINE) LOCK FLUSH IV SOLN 100 UNIT/ML 100 UNIT/ML
500 SOLUTION INTRAVENOUS PRN
Status: DISCONTINUED | OUTPATIENT
Start: 2021-12-09 | End: 2021-12-11 | Stop reason: HOSPADM

## 2021-12-09 RX ORDER — LEVOTHYROXINE SODIUM 0.1 MG/1
100 TABLET ORAL DAILY
Qty: 30 TABLET | Refills: 2 | Status: SHIPPED | OUTPATIENT
Start: 2021-12-09 | End: 2022-01-06 | Stop reason: SDUPTHER

## 2021-12-09 RX ADMIN — SODIUM CHLORIDE, PRESERVATIVE FREE 10 ML: 5 INJECTION INTRAVENOUS at 09:51

## 2021-12-09 RX ADMIN — HEPARIN 500 UNITS: 100 SYRINGE at 09:51

## 2021-12-09 RX ADMIN — SODIUM CHLORIDE 480 MG: 9 INJECTION, SOLUTION INTRAVENOUS at 09:15

## 2021-12-09 RX ADMIN — DENOSUMAB 120 MG: 120 INJECTION SUBCUTANEOUS at 09:40

## 2021-12-09 ASSESSMENT — ENCOUNTER SYMPTOMS
SHORTNESS OF BREATH: 0
CONSTIPATION: 0
EYE ITCHING: 0
TROUBLE SWALLOWING: 0
WHEEZING: 0
NAUSEA: 0
ABDOMINAL PAIN: 0
SORE THROAT: 0
COUGH: 0
EYE DISCHARGE: 0
DIARRHEA: 0
VOMITING: 0

## 2021-12-09 NOTE — TELEPHONE ENCOUNTER
Informed patient of TSH level and need to increase synthroid per Frances Chacon, APRN. He verbalized understanding. Rx sent to patients pharmacy.

## 2021-12-13 DIAGNOSIS — G89.3 CANCER RELATED PAIN: ICD-10-CM

## 2021-12-13 DIAGNOSIS — C43.9 MALIGNANT MELANOMA, UNSPECIFIED SITE (HCC): ICD-10-CM

## 2021-12-13 RX ORDER — HYDROCODONE BITARTRATE AND ACETAMINOPHEN 10; 325 MG/1; MG/1
1 TABLET ORAL EVERY 6 HOURS PRN
Qty: 120 TABLET | Refills: 0 | Status: SHIPPED | OUTPATIENT
Start: 2021-12-13 | End: 2022-01-12

## 2022-01-06 ENCOUNTER — HOSPITAL ENCOUNTER (OUTPATIENT)
Dept: INFUSION THERAPY | Age: 82
Discharge: HOME | End: 2022-01-06
Payer: MEDICARE

## 2022-01-06 VITALS
BODY MASS INDEX: 29.71 KG/M2 | HEART RATE: 74 BPM | RESPIRATION RATE: 18 BRPM | OXYGEN SATURATION: 95 % | TEMPERATURE: 97.9 F | WEIGHT: 212.2 LBS | HEIGHT: 71 IN | DIASTOLIC BLOOD PRESSURE: 73 MMHG | SYSTOLIC BLOOD PRESSURE: 138 MMHG

## 2022-01-06 DIAGNOSIS — C43.61 MALIGNANT MELANOMA OF RIGHT UPPER EXTREMITY INCLUDING SHOULDER (HCC): Primary | ICD-10-CM

## 2022-01-06 DIAGNOSIS — C79.51 BONE METASTASES (HCC): ICD-10-CM

## 2022-01-06 DIAGNOSIS — C43.9 MALIGNANT MELANOMA, UNSPECIFIED SITE (HCC): ICD-10-CM

## 2022-01-06 DIAGNOSIS — E03.9 ACQUIRED HYPOTHYROIDISM: ICD-10-CM

## 2022-01-06 DIAGNOSIS — E03.9 ACQUIRED HYPOTHYROIDISM: Primary | ICD-10-CM

## 2022-01-06 LAB
ALBUMIN SERPL-MCNC: 4.1 G/DL (ref 3.5–5.2)
ALP BLD-CCNC: 46 U/L (ref 40–130)
ALT SERPL-CCNC: 22 U/L (ref 5–41)
ANION GAP SERPL CALCULATED.3IONS-SCNC: 11 MMOL/L (ref 7–19)
AST SERPL-CCNC: 22 U/L (ref 5–40)
BASOPHILS ABSOLUTE: 0.03 K/UL (ref 0.01–0.08)
BASOPHILS RELATIVE PERCENT: 0.4 % (ref 0.1–1.2)
BILIRUB SERPL-MCNC: 0.9 MG/DL (ref 0.2–1.2)
BUN BLDV-MCNC: 14 MG/DL (ref 8–23)
CALCIUM SERPL-MCNC: 9.5 MG/DL (ref 8.8–10.2)
CHLORIDE BLD-SCNC: 99 MMOL/L (ref 98–111)
CO2: 25 MMOL/L (ref 22–29)
CREAT SERPL-MCNC: 1.2 MG/DL (ref 0.5–1.2)
EOSINOPHILS ABSOLUTE: 0.31 K/UL (ref 0.04–0.54)
EOSINOPHILS RELATIVE PERCENT: 3.9 % (ref 0.7–7)
GFR AFRICAN AMERICAN: >59
GFR NON-AFRICAN AMERICAN: 58
GLUCOSE BLD-MCNC: 108 MG/DL (ref 74–109)
HCT VFR BLD CALC: 43.1 % (ref 40.1–51)
HEMOGLOBIN: 15.1 G/DL (ref 13.7–17.5)
LYMPHOCYTES ABSOLUTE: 2.2 K/UL (ref 1.18–3.74)
LYMPHOCYTES RELATIVE PERCENT: 27.9 % (ref 19.3–53.1)
MCH RBC QN AUTO: 31.3 PG (ref 25.7–32.2)
MCHC RBC AUTO-ENTMCNC: 35 G/DL (ref 32.3–36.5)
MCV RBC AUTO: 89.4 FL (ref 79–92.2)
MONOCYTES ABSOLUTE: 0.81 K/UL (ref 0.24–0.82)
MONOCYTES RELATIVE PERCENT: 10.3 % (ref 4.7–12.5)
NEUTROPHILS ABSOLUTE: 4.53 K/UL (ref 1.56–6.13)
NEUTROPHILS RELATIVE PERCENT: 57.5 % (ref 34–71.1)
PDW BLD-RTO: 12.1 % (ref 11.6–14.4)
PLATELET # BLD: 228 K/UL (ref 163–337)
PMV BLD AUTO: 9.8 FL (ref 7.4–10.4)
POTASSIUM SERPL-SCNC: 4.3 MMOL/L (ref 3.5–5)
RBC # BLD: 4.82 M/UL (ref 4.63–6.08)
SODIUM BLD-SCNC: 135 MMOL/L (ref 136–145)
TOTAL PROTEIN: 6.7 G/DL (ref 6.6–8.7)
TSH SERPL DL<=0.05 MIU/L-ACNC: 5.12 UIU/ML (ref 0.47–4.68)
WBC # BLD: 7.88 K/UL (ref 4.23–9.07)

## 2022-01-06 PROCEDURE — 84443 ASSAY THYROID STIM HORMONE: CPT

## 2022-01-06 PROCEDURE — 96413 CHEMO IV INFUSION 1 HR: CPT

## 2022-01-06 PROCEDURE — 96372 THER/PROPH/DIAG INJ SC/IM: CPT

## 2022-01-06 PROCEDURE — 36415 COLL VENOUS BLD VENIPUNCTURE: CPT

## 2022-01-06 PROCEDURE — 2580000003 HC RX 258: Performed by: NURSE PRACTITIONER

## 2022-01-06 PROCEDURE — 85025 COMPLETE CBC W/AUTO DIFF WBC: CPT

## 2022-01-06 PROCEDURE — 6360000002 HC RX W HCPCS: Performed by: NURSE PRACTITIONER

## 2022-01-06 RX ORDER — EPINEPHRINE 1 MG/ML
0.3 INJECTION, SOLUTION, CONCENTRATE INTRAVENOUS PRN
Status: CANCELLED | OUTPATIENT
Start: 2022-01-06

## 2022-01-06 RX ORDER — SODIUM CHLORIDE 0.9 % (FLUSH) 0.9 %
10 SYRINGE (ML) INJECTION PRN
Status: CANCELLED | OUTPATIENT
Start: 2022-01-06

## 2022-01-06 RX ORDER — DIPHENHYDRAMINE HYDROCHLORIDE 50 MG/ML
50 INJECTION INTRAMUSCULAR; INTRAVENOUS PRN
Status: CANCELLED | OUTPATIENT
Start: 2022-01-06

## 2022-01-06 RX ORDER — HEPARIN SODIUM (PORCINE) LOCK FLUSH IV SOLN 100 UNIT/ML 100 UNIT/ML
500 SOLUTION INTRAVENOUS PRN
Status: DISCONTINUED | OUTPATIENT
Start: 2022-01-06 | End: 2022-01-08 | Stop reason: HOSPADM

## 2022-01-06 RX ORDER — SODIUM CHLORIDE 0.9 % (FLUSH) 0.9 %
10 SYRINGE (ML) INJECTION PRN
Status: DISCONTINUED | OUTPATIENT
Start: 2022-01-06 | End: 2022-01-07 | Stop reason: HOSPADM

## 2022-01-06 RX ORDER — SODIUM CHLORIDE 0.9 % (FLUSH) 0.9 %
5 SYRINGE (ML) INJECTION PRN
Status: CANCELLED | OUTPATIENT
Start: 2022-01-06

## 2022-01-06 RX ORDER — HEPARIN SODIUM (PORCINE) LOCK FLUSH IV SOLN 100 UNIT/ML 100 UNIT/ML
500 SOLUTION INTRAVENOUS PRN
Status: CANCELLED | OUTPATIENT
Start: 2022-01-06

## 2022-01-06 RX ORDER — LEVOTHYROXINE SODIUM 0.1 MG/1
100 TABLET ORAL DAILY
Qty: 30 TABLET | Refills: 2 | Status: SHIPPED | OUTPATIENT
Start: 2022-01-06 | End: 2022-04-06

## 2022-01-06 RX ORDER — METHYLPREDNISOLONE SODIUM SUCCINATE 125 MG/2ML
125 INJECTION, POWDER, LYOPHILIZED, FOR SOLUTION INTRAMUSCULAR; INTRAVENOUS PRN
Status: CANCELLED | OUTPATIENT
Start: 2022-01-06

## 2022-01-06 RX ADMIN — SODIUM CHLORIDE, PRESERVATIVE FREE 10 ML: 5 INJECTION INTRAVENOUS at 09:31

## 2022-01-06 RX ADMIN — DENOSUMAB 120 MG: 120 INJECTION SUBCUTANEOUS at 09:31

## 2022-01-06 RX ADMIN — SODIUM CHLORIDE 480 MG: 9 INJECTION, SOLUTION INTRAVENOUS at 08:57

## 2022-01-06 RX ADMIN — HEPARIN 500 UNITS: 100 SYRINGE at 09:31

## 2022-01-10 DIAGNOSIS — C43.9 MALIGNANT MELANOMA, UNSPECIFIED SITE (HCC): ICD-10-CM

## 2022-01-10 RX ORDER — HYDROXYZINE HYDROCHLORIDE 25 MG/1
25 TABLET, FILM COATED ORAL EVERY 8 HOURS PRN
Qty: 90 TABLET | Refills: 0 | Status: SHIPPED | OUTPATIENT
Start: 2022-01-10 | End: 2022-02-06

## 2022-01-10 RX ORDER — MONTELUKAST SODIUM 10 MG/1
TABLET ORAL
Qty: 90 TABLET | Refills: 3 | Status: SHIPPED | OUTPATIENT
Start: 2022-01-10

## 2022-01-10 RX ORDER — PAROXETINE 10 MG/1
TABLET, FILM COATED ORAL
Qty: 90 TABLET | Refills: 3 | Status: SHIPPED | OUTPATIENT
Start: 2022-01-10

## 2022-02-01 DIAGNOSIS — G89.3 CANCER RELATED PAIN: Primary | ICD-10-CM

## 2022-02-01 RX ORDER — HYDROCODONE BITARTRATE AND ACETAMINOPHEN 10; 325 MG/1; MG/1
1 TABLET ORAL EVERY 6 HOURS PRN
Qty: 120 TABLET | Refills: 0 | Status: SHIPPED | OUTPATIENT
Start: 2022-02-01 | End: 2022-03-03 | Stop reason: SDUPTHER

## 2022-02-06 RX ORDER — HYDROXYZINE HYDROCHLORIDE 25 MG/1
25 TABLET, FILM COATED ORAL EVERY 8 HOURS PRN
Qty: 90 TABLET | Refills: 0 | Status: SHIPPED | OUTPATIENT
Start: 2022-02-06 | End: 2022-03-02

## 2022-02-28 RX ORDER — METHYLPREDNISOLONE SODIUM SUCCINATE 125 MG/2ML
125 INJECTION, POWDER, LYOPHILIZED, FOR SOLUTION INTRAMUSCULAR; INTRAVENOUS PRN
Status: CANCELLED | OUTPATIENT
Start: 2022-03-03

## 2022-02-28 RX ORDER — EPINEPHRINE 1 MG/ML
0.3 INJECTION, SOLUTION, CONCENTRATE INTRAVENOUS PRN
Status: CANCELLED | OUTPATIENT
Start: 2022-03-03

## 2022-02-28 RX ORDER — SODIUM CHLORIDE 0.9 % (FLUSH) 0.9 %
10 SYRINGE (ML) INJECTION PRN
Status: CANCELLED | OUTPATIENT
Start: 2022-03-03

## 2022-02-28 RX ORDER — HEPARIN SODIUM (PORCINE) LOCK FLUSH IV SOLN 100 UNIT/ML 100 UNIT/ML
500 SOLUTION INTRAVENOUS PRN
Status: CANCELLED | OUTPATIENT
Start: 2022-03-03

## 2022-02-28 RX ORDER — DIPHENHYDRAMINE HYDROCHLORIDE 50 MG/ML
50 INJECTION INTRAMUSCULAR; INTRAVENOUS PRN
Status: CANCELLED | OUTPATIENT
Start: 2022-03-03

## 2022-02-28 RX ORDER — SODIUM CHLORIDE 0.9 % (FLUSH) 0.9 %
5 SYRINGE (ML) INJECTION PRN
Status: CANCELLED | OUTPATIENT
Start: 2022-03-03

## 2022-03-02 RX ORDER — HYDROXYZINE HYDROCHLORIDE 25 MG/1
25 TABLET, FILM COATED ORAL EVERY 8 HOURS PRN
Qty: 90 TABLET | Refills: 0 | Status: SHIPPED | OUTPATIENT
Start: 2022-03-02 | End: 2022-03-25

## 2022-03-02 NOTE — PROGRESS NOTES
Progress Note      Pt Name: Danny Lane  YOB: 1940  MRN: [id number]    Date of evaluation: 3/3/22    History Obtained From:  patient, spouse, electronic medical record    CHIEF COMPLAINT:    Chief Complaint   Patient presents with    Immunotherapy     HISTORY OF PRESENT ILLNESS:    Danny Lane is a very pleasant 43-year-old gentleman diagnosed with recurrent metastatic malignant melanoma in July, 2018. Involvement included right axillary lymph nodes, liver, bone. Rosa Edouard had stable disease on abdominal/back CT dated 9/30/2021 and chest CT dated 12/7/2021. Rosa dEouard is sunburned to his face and bilateral upper extremities. He has chronic, tolerable, pruritus. His main complaint is of periumbilical abdominal discomfort and nausea over the past 1-2 weeks. Antiemetic helps when taken. He has had 2 mouth sores develop, one is on the inner, lower buccal mucosa abutting the gumline. The other is on the right upper buccal mucosa sores are discomforting, especially when first putting in his dentures. He has no jaw pain or visible bone exposure. Mild exertional dyspnea is unchanged, SVC stable. 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 Rosa Edouard continues to take Norco approximately 3 times a day for complaint of \"bone pain\" and generalized achiness. Weight is stable. Rosa Edouard states he is no longer taking Marinol. ECOG grade 1  Neuropathy grade 1  Fatigue grade 1  Pruritus grade 1    Danny Lane presents for follow-up surveillance visit and toxicity assessment. he requires intenstive monitoring due to the potential to cause serious morbidity or death. TARGET METASTATIC MALIGNANT MELANOMA SITES:  1. Right upper extremity original primary site 06/05/14   2. Right axilla lymph node at presentation 6/5/2014 and 3 axillary nodes at recurrence 7/27/2018 with an SUV of 8.7   3. Too numerous to count liver metastases   4.  Celiac lymph nodes x 2 with highest SUV of 3   5. Bony metastatic disease on PET scan in the right ilium (SUV of 5) and right acetabulum (SUV of 6.4)   6. Indeterminate HORACIO 3 mm subpleural nodule   7. Indeterminate thyroid nodules on chest CT    1st TUMOR HISTORY: Resected stage IIIA (pT3a pN1a M0) right upper extremity malignant melanoma, 06/05/14  Mr. Tisha Gaxiola was seen in initial oncology consultation on 08/05/14, referred by Dr. Corinne Rank, regarding a diagnosis of resected malignant melanoma of the right upper extremity. Dr. Qian Kuhn resected a malignant melanoma from the right posterior arm, above the elbow, on 06/05/14. Pathology revealed a 2.25 mm thick non-ulcerated Pankaj's level IV malignant melanoma. There were 10 mitoses/ sq mm. There was no vascular or lymphatic invasion. A wide excision with a sentinel lymph node biopsy was performed by by Dr. Víctor Lake on 07/09/14. Pathology revealed that the right axillary sentinel lymph node was positive for metastatic malignancy by immunoperoxidase stain. The wide excision sample was without further local involvement. A right axillary lymph node dissection was performed by Dr. Víctor Lake on 07/21/14. No metastatic malignancy was noted in any of the 8 right axillary lymph nodes submitted. The HMB-45 immunoperoxidase stain was negative for metastatic malignant melanoma in any of these subsequently submitted lymph nodes. Completion of a metastatic workup on 08/06/14, including MRI of the brain, bone scan, CT scans of the chest, abdomen and pelvis were negative for evidence of metastatic disease. Adjuvant pegylated Interferon (Sylatron) 6 mcg/kg (500mcg) sub-q weekly x 8 to be followed then by 3 mcg/kg(250mcg) weekly x up to 5 years was discussed and planned. Adjuvant therapy was indeed initiated and delivered as discussed below in treatment summary. The last dose of the medication was delivered on 2/15/2015.  He was unable to tolerate this medication even at reduced dosages lower pole renal lesion, not typical of a benign cyst with enhancing characteristics concerning for primary renal cancer versus metastatic disease. Bilateral nephrogenic cysts noted along with marked prostate enlargement with lobular changes measuring 8 x 5.9 x 4.9 cm. MRI of the brain w/wo contrast 7/3/18 for complaint of headache and nausea was without evidence of acute intracranial process. Contrasted chest CT 7/24/18 revealed a stable 2 cm right thyroid nodule, a new 9 mm right thyroid nodule. An indeterminate 3 mm subpleural HORACIO nodule is noted, likely granulomatous or postinfectious. Bone scan 7/24/18 was negative for evidence of osseous metastasis. Examination is remarkable for mid epigastric discomfort, 10 lbs weight loss and 1 inch right axillary lymph node. This was a previous site of positive sentinel lymph node biopsy and dissection associated with the resected, stage IIIa right upper extremity malignant melanoma in 2014. Suspect recurrent malignant melanoma. Bruce Godfrey was referred to Dr. Mckenzie Bhatt who performed an FNA of the right axillary lymph node 7/26/18. Pathology was consistent for metastatic malignant melanoma. 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 Findings were discussed with both Bruce Godfrey and his wife by Dr. Ro Jaeger at follow-up on 8/1/18 and reiterated on 8/7/18. Recommendation is for combination therapy with Opdivo 1 mg/kg and Yervoy 3 mg/kg every 3 weeks ×4 cycles, followed by Opdivo 240 mg every 2 weeks thereafter. Bruce Godfrey had a left chest Mediport placed by Dr. Adrián Gramajo 8/3/18. PET scan 8/6/18 showed the following:  3 right axillary lymph nodes, SUV up to 8.7   Hepatic metastasis, too numerous to count   2 celiac lymph nodes SUV 2.8 and 3   Right ilium, SUV 5. Right acetabulum SUV 6.4  Cycle #1 Opdivo/Yervoy and monthly Xgeva initiated 8/7/18.   He was evaluated at [facility] 10/16/18 for LLQ abdominal discomfort with a history of recent diverticulitis. Abdominal/pelvic CT with contrast revealed a decreased size in the multiple liver nodules. Bilateral renal nodules were stable. An enlarged prostate with an exophytic mass arising from the posterior superior aspect of the prostate was present. He was treated for acute diverticulitis of the mid to distal sigmoid colon. Cedric Alba was evaluated by Dr. Tirso Redman on 11/27/18 regarding possible prostate mass and elevated PSA with recommendations for surveillance only. Contrasted chest CT on 11/8/18 at [facility] showed no enlarged axillary, hilar or mediastinal lymph nodes. There were no acute osseous or soft tissue abnormalities. Cedric Alba has had treatment delay of Nivolumab on more than one occasion due to rash, requiring treatment with prednisone. Nivolumab was discontinued following dose #2 of maintenance therapy on 12/20/18. He initiated cycle #1 of Tafinlar 150 mg po BID & Mekinist 2 mg po BID on 1/4/19. Contrasted CT chest 1/9/19 at [facility] showed emphysematous changes bilaterally without evidence of metastatic disease. A 1.4 cm right hilar lymph node was unchanged since 2015, likely benign. Abdominal/pelvic CT with contrast 1/9/19 documented a 4 mm hepatic lesion, previously 6 mm. Other previously noted lesions throughout the liver are not appreciated. The enlarged prostate with nodular hypertrophy was previously evaluated by Dr. Tirso Redman. Bone scan 1/9/19 was without evidence of osteoblastic disease. Echocardiogram 1/9/19 showed an EF of 60%. Cedric Alba was hopsitalized at [facility] 1/12/19 - 1/18/19 for enterocolitis with nausea/vomiting and diarrhea. Antineoplastic therapy was held during that time, rechallenged beginning 1/24/19. Cedric Alba was evaluated 2/11/19 at [facility] ER for a 24 hour history of abdominal pain and diarrhea with mild diverticulitis, stable from 1/12/19. He was treated with Cipro and Flagyl.   Single agent Tafinlar was taken 3/28/19 - 4/4/19, discontinued by Cedric Alba due to intolerable abdominal cramping. Ifrah Blocker was resumed 4/25/19. CT scans of the chest, abdomen and pelvis with contrast 8/13/2019 at [facility] was negative for intrathoracic metastasis. There was no evidence of disease progression in the abdomen/pelvis with stable, subcentimeter low-density liver lesions noted. Dosing was changed to 480 mg every 4 weeks on 9/5/2019 to see if this may decrease persistent itching. Rx fluoxetine and hydroxyzine per Dr. Zeenat Lamar recommendations. Contrasted CT scans of the chest, abdomen/pelvis and bone scan 1/16/2020 were negative for evidence of disease progression. 19 mm right thyroid lobe nodule and subcentimeter low-density liver lesions were stable. No abnormal bony uptake. Contrasted CT scans of the chest, abdomen and pelvis 6/4/2020 were without evidence of metastatic disease. A 2.2 cm left lower renal lesion is stable  Contrasted CT scans of the chest, abdomen and pelvis 6/4/2020 were negative aside from high-grade stenosis of the SVC with collateral formation for which Carlos A Alfaro was referred to vascular surgery 10/29/2020. Treatment continues with nivolumab. TREATMENT SUMMARY:  1. Dr. Qian Kuhn resected a malignant melanoma from the right posterior arm, above the elbow, on 06/05/14   2. Wide excision with a sentinel lymph node biopsy by Dr. Víctor Lake on 07/09/14. 3. Adjuvant weekly Sylatron Initiated 09/02/14 at 6 mcg/kg (500mcg) sub-q weekly but was only able to receive 4 of 8 planned treatments of this. The last dose #4 was on 09/28/14   4. Adjuvant weekly Sylatron 3 mcg/kg (250mcg) initiated 10/12/2014. Last dose delivered on 2/15/2015, discontinued due to poor tolerability and unable to tolerate the medication. 5. Opdivo 1 mg/kg and Yervoy 3 mg/kg every 3 weeks ×4 doses. 8/7/18 - 10/25/18. 6. Opdivo 240 mg every 2 weeks initiated 11/15/18, discontinued after dose #2 on 12/20/18   7. Monthly Xgeva initiated 8/7/18.   8. Tafinlar 150 mg po BID & Mekinist 2 mg po qday, cycle #1 initiated 1/4/19. 9. Single agent Tafinlar 150 mg po BID initiated 3/28/19, discontinue by patient 4/4/19.   10. Opdivo 240 mg every 2 weeks resumed 4/25/19, change to 480 mg every 4 weeks on 9/5/2019    HEMATOLOGY CONCERN:  SVC stenosis  Felipa Randall was Evaluated by Diana Goldberg PA-C with vascular surgery on 11/11/2020 regarding high-grade SVC narrowing with collateral vein formation in the mediastinum noted on contrasted chest CT at \[facility]\"" on 10/22/2020. Felipa Randall was asymptomatic, observation recommended. Past Medical History:    Past Medical History:   Diagnosis Date    Acid reflux     BPH (benign prostatic hyperplasia)     Cancer (HCC)     Diverticulitis     Hard of hearing     Hypercholesteremia     Hypertension     Malignant melanoma of skin of upper limb, including shoulder ([address] dx 6/5/2014    to liver, stomach, bone, and right axilla     Past Surgical History:    Past Surgical History:   Procedure Laterality Date    CHOLECYSTECTOMY      TUNNELED CENTRAL VENOUS CATHETER W/ SUBCUTANEOUS PORT       Current Medications:    Current Outpatient Medications   Medication Sig Dispense Refill    promethazine (PHENERGAN) 25 MG tablet Take 12.5 mg by mouth every 6 hours as needed for Nausea      dexamethasone 0.5 MG/5ML elixir 1 tsp every 6 hours as needed 237 mL 1    pantoprazole (PROTONIX) 40 MG tablet Take 1 tablet by mouth daily 60 tablet 5    HYDROcodone-acetaminophen (NORCO)  MG per tablet Take 1 tablet by mouth every 6 hours as needed for Pain for up to 30 days.  120 tablet 0    hydrOXYzine (ATARAX) 25 MG tablet TAKE 1 TABLET BY MOUTH EVERY 8 HOURS AS NEEDED FOR ITCHING 90 tablet 0    montelukast (SINGULAIR) 10 MG tablet TAKE 1 TABLET BY MOUTH ONCE DAILY AT NIGHT 90 tablet 3    PARoxetine (PAXIL) 10 MG tablet TAKE 1 TABLET BY MOUTH EVERY DAY 90 tablet 3    levothyroxine (SYNTHROID) 100 MCG tablet Take 1 tablet by mouth daily 30 tablet 2    acetaminophen (TYLENOL) 500 MG tablet Take 1,000 mg by mouth every 6 hours as needed for Pain      dicyclomine (BENTYL) 20 MG tablet TAKE 1/2 TABLET BY MOUTH 4 TIMES A DAY AS NEEDED 180 tablet 3    finasteride (PROSCAR) 5 MG tablet Take 5 mg by mouth      lovastatin (MEVACOR) 40 MG tablet Take 40 mg by mouth       No current facility-administered medications for this visit. Facility-Administered Medications Ordered in Other Visits   Medication Dose Route Frequency Provider Last Rate Last Admin    sodium chloride flush 0.9 % injection 10 mL  10 mL IntraVENous PRN Prem Sandhu MD   10 mL at 22 1010    heparin flush 100 UNIT/ML injection 500 Units  500 Units IntraCATHeter PRN Prem Sandhu MD   500 Units at 22 1010        Allergies: No Known Allergies  Social History:    Social History     Tobacco Use    Smoking status: Former Smoker    Smokeless tobacco: Never Used   Substance Use Topics    Alcohol use: No    Drug use: No     Family History:   Family History   Problem Relation Age of Onset    Heart Attack Brother          age 78 of [place]     Subjective   REVIEW OF SYSTEMS:   Review of Systems   Constitutional: Positive for fatigue (Mild). Negative for fever. No night sweats   HENT: Negative for dental problem, hearing loss, mouth sores, nosebleeds, sore throat and trouble swallowing. Eyes: Negative for discharge and itching. Respiratory: Negative for cough, shortness of breath and wheezing. Cardiovascular: Negative for chest pain, palpitations and leg swelling. Gastrointestinal: Positive for nausea. Negative for constipation, diarrhea and vomiting. Mid abdominal discomfort above the navel   Endocrine: Negative for cold intolerance and heat intolerance. Genitourinary: Negative for dysuria, frequency, hematuria and urgency. Musculoskeletal: Positive for arthralgias. Negative for joint swelling and myalgias. Skin: Negative for pallor and rash.         Generalized pruritus, mild   Allergic/Immunologic: Negative for environmental allergies and immunocompromised state. Neurological: Negative for seizures, syncope and numbness. Hematological: Negative for adenopathy. Does not bruise/bleed easily. Psychiatric/Behavioral: Negative for agitation, behavioral problems and confusion. The patient is not nervous/anxious. Objective   Vitals:    03/03/22 0830   BP: (!) 154/71   Pulse: 70   Resp: 18   Temp: 97.7 °F (36.5 °C)   SpO2: 97%     Wt Readings from Last 3 Encounters:   03/03/22 211 lb 1.6 oz (95.8 kg)   01/06/22 212 lb 3.2 oz (96.3 kg)   12/09/21 212 lb 3.2 oz (96.3 kg)     PHYSICAL EXAM:  Physical Exam  Vitals reviewed. Constitutional:       General: He is not in acute distress. Appearance: He is well-developed. He is not toxic-appearing or diaphoretic. Comments: Wearing a facial mask. HENT:      Head: Normocephalic and atraumatic. Right Ear: External ear normal.      Left Ear: External ear normal.      Nose: Nose normal.      Mouth/Throat:      Mouth: Mucous membranes are moist.   Eyes:      General: No scleral icterus. Right eye: No discharge. Left eye: No discharge. Conjunctiva/sclera: Conjunctivae normal.   Neck:      Trachea: No tracheal deviation. Cardiovascular:      Rate and Rhythm: Normal rate and regular rhythm. Pulmonary:      Effort: Pulmonary effort is normal. No respiratory distress. Breath sounds: Normal breath sounds. No wheezing or rales. Chest:   Breasts:      Right: No axillary adenopathy or supraclavicular adenopathy. Left: No axillary adenopathy or supraclavicular adenopathy. Abdominal:      General: Bowel sounds are normal. There is no distension. Palpations: Abdomen is soft. Tenderness: There is no abdominal tenderness. There is no guarding. Genitourinary:     Comments: Exam deferred  Musculoskeletal:         General: No tenderness or deformity. Cervical back: Neck supple. No muscular tenderness.       Comments: Normal ROM all four extremities   Lymphadenopathy:      Cervical:      Right cervical: No superficial, deep or posterior cervical adenopathy. Left cervical: No superficial, deep or posterior cervical adenopathy. Upper Body:      Right upper body: No supraclavicular or axillary adenopathy. Left upper body: No supraclavicular or axillary adenopathy. Skin:     General: Skin is warm and dry. Findings: Erythema (face, posterior chest, BUE) present. No rash. Neurological:      Mental Status: He is alert and oriented to person, place, and time. Comments: follows commands, non-focal   Psychiatric:         Behavior: Behavior normal. Behavior is cooperative. Thought Content: Thought content normal.         Judgment: Judgment normal.      Comments: Alert and oriented to person, place and time. Labs reviewed by me:    CBC 3/3/22  Lab Results   Component Value Date    WBC 7.53 03/03/2022    HGB 14.3 03/03/2022    HCT 41.7 03/03/2022    MCV 90.1 03/03/2022     03/03/2022    LYMPHOPCT 28.2 03/03/2022    RBC 4.63 03/03/2022    MCH 30.9 03/03/2022    MCHC 34.3 03/03/2022    RDW 12.3 03/03/2022     Lab Results   Component Value Date     03/03/2022    K 4.1 03/03/2022     03/03/2022    CO2 27 03/03/2022    BUN 17 03/03/2022    CREATININE 1.1 03/03/2022    GLUCOSE 118 (H) 03/03/2022    CALCIUM 9.0 03/03/2022    PROT 6.7 03/03/2022    LABALBU 4.0 03/03/2022    BILITOT 1.1 03/03/2022    ALKPHOS 44 03/03/2022    AST 30 03/03/2022    ALT 24 03/03/2022    LABGLOM >60 03/03/2022    GFRAA >59 01/06/2022    AGRATIO 1.8 12/26/2019    GLOB 2.7 03/03/2022     Lab Results   Component Value Date    TSH 3.680 03/03/2022     ASSESSMENT:   1. Malignant melanoma of right upper extremity including shoulder ([address]    2. Hypothyroidism due to medication    3. Periumbilical abdominal pain    4. Nausea    5. Encounter for antineoplastic immunotherapy    6. Superior vena cava stenosis    7.  Mouth sore secondary to chemotherapy       Malignant melanoma of skin of right upper extremity, including shoulder ([address]  Encounter for antineoplastic immunotherapy    Contrasted CT abdomen/pelvis 9/30/2021 at hospitals:   · Stable CT appearance the abdomen pelvis. No evidence of metastatic disease. No acute intra-abdominal/pelvic pathological process. · Prostate hypertrophy with focal prostate nodularity. · Stable probably benign renal masses. CT Chest 12/7/2021 at [facility] (Ordered by ALEAH Hernandez):   · Rim calcified nodule with internal fatty density within the upper right mediastinum is indeterminate. This finding is benign appearance and measures 22 x 17 x 21 mm  · Granulomatous lymph node calcification is present in the subcarinal region. No new skin lesions or lymphadenopathies on exam.  CBC, CMP, TSH ordered today, completed and reviewed. Proceed with Opdivo today and continue every 4 weeks. Hold monthly Xgeva for mouth sore abutting the gumline (see below)  Plan CT abdomen/pelvis with contrast (surveillance for metastatic melanoma and abdominal discomfort) and chest CT prior to return in 1 month. Mouth sore secondary to chemotherapy  Patient has 2 mouth sores, the more concerning being in the left lower buccal mucosa abutting the gumline. No visible bone exposure  Hold monthly Xgeva  Rx dexamethasone mouth rinse    Nausea  Continue antiemetic PRN  May need to consider resumption of Marinol  Trial Protonix 40 mg daily, Rx provided    hypothyroidism due to medication    Continue Synthroid to 100 mcg po daily     Left renal mass  2.2 cm left renal mass stable on abdominal/pelvic CT dated 6/4/2020, 10/22/2020 and 4/1/2021, 9/30/2021  Previously evaluated by urology, monitor conservatively  Continue to observe on surveillance imaging for metastatic melanoma    Superior vena cava stenosis  Asymptomatic, followed by ALEAH Hernandez  Contrasted chest CT 12/7/2021: Patent left axillary vein/left subclavian vein. Normal appearance of the partially opacified right jugular vein. Normal appearance of the unopacified right subclavian vein to the extent visualized. Focal narrowing of the SVC  No anticoagulation, monitored conservatively       Health Maintenance  Colonoscopy 2017 by Dr. Keena Bauer. 5 Year recall. Plan of care discussed with Cailin Wyatt and his wife. All questions answered. Orders Placed This Encounter   Procedures    CT ABDOMEN PELVIS W IV CONTRAST Additional Contrast? Radiologist Recommendation    CT CHEST W CONTRAST    Comprehensive Metabolic Panel    TSH     Orders Placed This Encounter   Medications    dexamethasone 0.5 MG/5ML elixir     Si tsp every 6 hours as needed     Dispense:  237 mL     Refill:  1    pantoprazole (PROTONIX) 40 MG tablet     Sig: Take 1 tablet by mouth daily     Dispense:  60 tablet     Refill:  5     Return in about 4 weeks (around 3/31/2022) for follow up with LIAM Steele in tx room for opdivo. I have seen, examined and reviewed this patient medication list, appropriate labs and imaging studies. I reviewed relevant medical records and others physicians notes. I discussed the plan of care with the patient. I answered all questions to the patients satisfaction. I have also reviewed the chief complaint (CC) and part of the history (History of Present Illness (HPI), Past Family Social History [facility]), or Review of Systems (ROS) and made changes when appropriated. Dictated utilizing Dragon transcription software.       LIAM Phan  9:00 PM  3/3/2022

## 2022-03-03 ENCOUNTER — TRANSCRIBE ORDERS (OUTPATIENT)
Dept: ADMINISTRATIVE | Facility: HOSPITAL | Age: 82
End: 2022-03-03

## 2022-03-03 ENCOUNTER — HOSPITAL ENCOUNTER (OUTPATIENT)
Dept: INFUSION THERAPY | Age: 82
Discharge: HOME | End: 2022-03-03
Payer: MEDICARE

## 2022-03-03 ENCOUNTER — OFFICE VISIT (OUTPATIENT)
Dept: HEMATOLOGY | Age: 82
End: 2022-03-03
Payer: MEDICARE

## 2022-03-03 VITALS
RESPIRATION RATE: 18 BRPM | OXYGEN SATURATION: 97 % | BODY MASS INDEX: 29.55 KG/M2 | TEMPERATURE: 97.7 F | DIASTOLIC BLOOD PRESSURE: 71 MMHG | HEIGHT: 71 IN | SYSTOLIC BLOOD PRESSURE: 154 MMHG | WEIGHT: 211.1 LBS | HEART RATE: 70 BPM

## 2022-03-03 DIAGNOSIS — C43.61 MALIGNANT MELANOMA OF RIGHT UPPER EXTREMITY INCLUDING SHOULDER (HCC): Primary | ICD-10-CM

## 2022-03-03 DIAGNOSIS — T45.1X5A MOUTH SORE SECONDARY TO CHEMOTHERAPY: ICD-10-CM

## 2022-03-03 DIAGNOSIS — C79.51 BONE METASTASIS: Primary | ICD-10-CM

## 2022-03-03 DIAGNOSIS — R10.9 ABDOMINAL PAIN, UNSPECIFIED ABDOMINAL LOCATION: ICD-10-CM

## 2022-03-03 DIAGNOSIS — K13.79 MOUTH SORE SECONDARY TO CHEMOTHERAPY: ICD-10-CM

## 2022-03-03 DIAGNOSIS — R10.33 PERIUMBILICAL ABDOMINAL PAIN: ICD-10-CM

## 2022-03-03 DIAGNOSIS — E03.2 HYPOTHYROIDISM DUE TO MEDICATION: ICD-10-CM

## 2022-03-03 DIAGNOSIS — Z51.12 ENCOUNTER FOR ANTINEOPLASTIC IMMUNOTHERAPY: ICD-10-CM

## 2022-03-03 DIAGNOSIS — G89.3 CANCER RELATED PAIN: ICD-10-CM

## 2022-03-03 DIAGNOSIS — I87.1 SUPERIOR VENA CAVA STENOSIS: ICD-10-CM

## 2022-03-03 DIAGNOSIS — R11.0 NAUSEA: ICD-10-CM

## 2022-03-03 DIAGNOSIS — C43.9 MALIGNANT MELANOMA, UNSPECIFIED SITE (HCC): ICD-10-CM

## 2022-03-03 LAB
ALBUMIN SERPL-MCNC: 4 G/DL (ref 3.5–5.2)
ALP BLD-CCNC: 44 U/L (ref 40–130)
ALT SERPL-CCNC: 24 U/L (ref 21–72)
ANION GAP SERPL CALCULATED.3IONS-SCNC: 8 MMOL/L (ref 7–19)
AST SERPL-CCNC: 30 U/L (ref 17–59)
BASOPHILS ABSOLUTE: 0.04 K/UL (ref 0.01–0.08)
BASOPHILS RELATIVE PERCENT: 0.5 % (ref 0.1–1.2)
BILIRUB SERPL-MCNC: 1.1 MG/DL (ref 0.2–1.3)
BUN BLDV-MCNC: 17 MG/DL (ref 9–20)
CALCIUM SERPL-MCNC: 9 MG/DL (ref 8.4–10.2)
CHLORIDE BLD-SCNC: 104 MMOL/L (ref 98–111)
CO2: 27 MMOL/L (ref 22–29)
CREAT SERPL-MCNC: 1.1 MG/DL (ref 0.6–1.2)
EOSINOPHILS ABSOLUTE: 0.38 K/UL (ref 0.04–0.54)
EOSINOPHILS RELATIVE PERCENT: 5 % (ref 0.7–7)
GFR NON-AFRICAN AMERICAN: >60
GLOBULIN: 2.7 G/DL
GLUCOSE BLD-MCNC: 118 MG/DL (ref 74–106)
HCT VFR BLD CALC: 41.7 % (ref 40.1–51)
HEMOGLOBIN: 14.3 G/DL (ref 13.7–17.5)
LYMPHOCYTES ABSOLUTE: 2.12 K/UL (ref 1.18–3.74)
LYMPHOCYTES RELATIVE PERCENT: 28.2 % (ref 19.3–53.1)
MCH RBC QN AUTO: 30.9 PG (ref 25.7–32.2)
MCHC RBC AUTO-ENTMCNC: 34.3 G/DL (ref 32.3–36.5)
MCV RBC AUTO: 90.1 FL (ref 79–92.2)
MONOCYTES ABSOLUTE: 0.79 K/UL (ref 0.24–0.82)
MONOCYTES RELATIVE PERCENT: 10.5 % (ref 4.7–12.5)
NEUTROPHILS ABSOLUTE: 4.2 K/UL (ref 1.56–6.13)
NEUTROPHILS RELATIVE PERCENT: 55.8 % (ref 34–71.1)
PDW BLD-RTO: 12.3 % (ref 11.6–14.4)
PLATELET # BLD: 210 K/UL (ref 163–337)
PMV BLD AUTO: 9.2 FL (ref 7.4–10.4)
POTASSIUM SERPL-SCNC: 4.1 MMOL/L (ref 3.5–5.1)
RBC # BLD: 4.63 M/UL (ref 4.63–6.08)
SODIUM BLD-SCNC: 139 MMOL/L (ref 137–145)
TOTAL PROTEIN: 6.7 G/DL (ref 6.3–8.2)
TSH SERPL DL<=0.05 MIU/L-ACNC: 3.68 UIU/ML (ref 0.27–4.2)
WBC # BLD: 7.53 K/UL (ref 4.23–9.07)

## 2022-03-03 PROCEDURE — 85025 COMPLETE CBC W/AUTO DIFF WBC: CPT

## 2022-03-03 PROCEDURE — G8427 DOCREV CUR MEDS BY ELIG CLIN: HCPCS | Performed by: NURSE PRACTITIONER

## 2022-03-03 PROCEDURE — 96413 CHEMO IV INFUSION 1 HR: CPT

## 2022-03-03 PROCEDURE — 1036F TOBACCO NON-USER: CPT | Performed by: NURSE PRACTITIONER

## 2022-03-03 PROCEDURE — 80053 COMPREHEN METABOLIC PANEL: CPT

## 2022-03-03 PROCEDURE — 2580000003 HC RX 258: Performed by: INTERNAL MEDICINE

## 2022-03-03 PROCEDURE — G8417 CALC BMI ABV UP PARAM F/U: HCPCS | Performed by: NURSE PRACTITIONER

## 2022-03-03 PROCEDURE — 2580000003 HC RX 258: Performed by: NURSE PRACTITIONER

## 2022-03-03 PROCEDURE — 6360000002 HC RX W HCPCS: Performed by: NURSE PRACTITIONER

## 2022-03-03 PROCEDURE — 1123F ACP DISCUSS/DSCN MKR DOCD: CPT | Performed by: NURSE PRACTITIONER

## 2022-03-03 PROCEDURE — 4040F PNEUMOC VAC/ADMIN/RCVD: CPT | Performed by: NURSE PRACTITIONER

## 2022-03-03 PROCEDURE — 99214 OFFICE O/P EST MOD 30 MIN: CPT | Performed by: NURSE PRACTITIONER

## 2022-03-03 PROCEDURE — G8484 FLU IMMUNIZE NO ADMIN: HCPCS | Performed by: NURSE PRACTITIONER

## 2022-03-03 PROCEDURE — 36415 COLL VENOUS BLD VENIPUNCTURE: CPT

## 2022-03-03 PROCEDURE — 6360000002 HC RX W HCPCS: Performed by: INTERNAL MEDICINE

## 2022-03-03 RX ORDER — PANTOPRAZOLE SODIUM 40 MG/1
40 TABLET, DELAYED RELEASE ORAL DAILY
Qty: 60 TABLET | Refills: 5 | Status: SHIPPED | OUTPATIENT
Start: 2022-03-03

## 2022-03-03 RX ORDER — PROMETHAZINE HYDROCHLORIDE 25 MG/1
12.5 TABLET ORAL EVERY 6 HOURS PRN
COMMUNITY

## 2022-03-03 RX ORDER — SODIUM CHLORIDE 9 MG/ML
25 INJECTION, SOLUTION INTRAVENOUS PRN
OUTPATIENT
Start: 2022-03-03

## 2022-03-03 RX ORDER — SODIUM CHLORIDE 0.9 % (FLUSH) 0.9 %
5-40 SYRINGE (ML) INJECTION PRN
OUTPATIENT
Start: 2022-03-03

## 2022-03-03 RX ORDER — METHYLPREDNISOLONE SODIUM SUCCINATE 125 MG/2ML
125 INJECTION, POWDER, LYOPHILIZED, FOR SOLUTION INTRAMUSCULAR; INTRAVENOUS PRN
Status: CANCELLED | OUTPATIENT
Start: 2022-04-28

## 2022-03-03 RX ORDER — HEPARIN SODIUM (PORCINE) LOCK FLUSH IV SOLN 100 UNIT/ML 100 UNIT/ML
500 SOLUTION INTRAVENOUS PRN
OUTPATIENT
Start: 2022-03-03

## 2022-03-03 RX ORDER — DIPHENHYDRAMINE HYDROCHLORIDE 50 MG/ML
50 INJECTION INTRAMUSCULAR; INTRAVENOUS PRN
Status: CANCELLED | OUTPATIENT
Start: 2022-04-28

## 2022-03-03 RX ORDER — HYDROCODONE BITARTRATE AND ACETAMINOPHEN 10; 325 MG/1; MG/1
1 TABLET ORAL EVERY 6 HOURS PRN
Qty: 120 TABLET | Refills: 0 | Status: SHIPPED | OUTPATIENT
Start: 2022-03-03 | End: 2022-05-09 | Stop reason: SDUPTHER

## 2022-03-03 RX ORDER — SODIUM CHLORIDE 0.9 % (FLUSH) 0.9 %
10 SYRINGE (ML) INJECTION PRN
Status: DISCONTINUED | OUTPATIENT
Start: 2022-03-03 | End: 2022-03-04 | Stop reason: HOSPADM

## 2022-03-03 RX ORDER — HEPARIN SODIUM (PORCINE) LOCK FLUSH IV SOLN 100 UNIT/ML 100 UNIT/ML
500 SOLUTION INTRAVENOUS PRN
Status: DISCONTINUED | OUTPATIENT
Start: 2022-03-03 | End: 2022-03-05 | Stop reason: HOSPADM

## 2022-03-03 RX ORDER — DEXAMETHASONE 0.5 MG/5ML
ELIXIR ORAL
Qty: 237 ML | Refills: 1 | Status: SHIPPED | OUTPATIENT
Start: 2022-03-03 | End: 2022-05-26 | Stop reason: ALTCHOICE

## 2022-03-03 RX ADMIN — SODIUM CHLORIDE 480 MG: 9 INJECTION, SOLUTION INTRAVENOUS at 09:39

## 2022-03-03 RX ADMIN — ALTEPLASE 2 MG: 2.2 INJECTION, POWDER, LYOPHILIZED, FOR SOLUTION INTRAVENOUS at 08:32

## 2022-03-03 RX ADMIN — SODIUM CHLORIDE, PRESERVATIVE FREE 10 ML: 5 INJECTION INTRAVENOUS at 10:10

## 2022-03-03 RX ADMIN — HEPARIN 500 UNITS: 100 SYRINGE at 10:10

## 2022-03-03 RX ADMIN — WATER 2.2 ML: 1 INJECTION INTRAMUSCULAR; INTRAVENOUS; SUBCUTANEOUS at 08:32

## 2022-03-03 ASSESSMENT — ENCOUNTER SYMPTOMS
DIARRHEA: 0
TROUBLE SWALLOWING: 0
VOMITING: 0
EYE DISCHARGE: 0
COUGH: 0
CONSTIPATION: 0
NAUSEA: 1
EYE ITCHING: 0
SORE THROAT: 0
SHORTNESS OF BREATH: 0
WHEEZING: 0

## 2022-03-24 ENCOUNTER — HOSPITAL ENCOUNTER (OUTPATIENT)
Dept: CT IMAGING | Facility: HOSPITAL | Age: 82
Discharge: HOME | End: 2022-03-24
Admitting: NURSE PRACTITIONER

## 2022-03-24 DIAGNOSIS — C79.51 BONE METASTASIS: ICD-10-CM

## 2022-03-24 DIAGNOSIS — R10.9 ABDOMINAL PAIN, UNSPECIFIED ABDOMINAL LOCATION: ICD-10-CM

## 2022-03-24 LAB — CREAT BLDA-MCNC: 1.2 MG/DL (ref 0.6–1.3)

## 2022-03-24 PROCEDURE — 25010000002 IOPAMIDOL 61 % SOLUTION: Performed by: NURSE PRACTITIONER

## 2022-03-24 PROCEDURE — 74177 CT ABD & PELVIS W/CONTRAST: CPT

## 2022-03-24 PROCEDURE — 71260 CT THORAX DX C+: CPT

## 2022-03-24 PROCEDURE — 82565 ASSAY OF CREATININE: CPT

## 2022-03-24 RX ADMIN — IOPAMIDOL 50 ML: 612 INJECTION, SOLUTION INTRAVENOUS at 09:41

## 2022-03-24 RX ADMIN — IOPAMIDOL 100 ML: 612 INJECTION, SOLUTION INTRAVENOUS at 09:41

## 2022-03-26 RX ORDER — HYDROXYZINE HYDROCHLORIDE 25 MG/1
25 TABLET, FILM COATED ORAL EVERY 8 HOURS PRN
Qty: 90 TABLET | Refills: 1 | Status: SHIPPED | OUTPATIENT
Start: 2022-03-26 | End: 2022-04-11 | Stop reason: SDUPTHER

## 2022-03-30 NOTE — PROGRESS NOTES
Progress Note      Pt Name: Chiquita Petersen  YOB: 1940  MRN: [id number]    Date of evaluation: 3/31/22    History Obtained From:  patient, spouse, electronic medical record    CHIEF COMPLAINT:    Chief Complaint   Patient presents with    Melanoma     HISTORY OF PRESENT ILLNESS:    Chiquita Petersen is a very pleasant 80-year-old gentleman diagnosed with recurrent metastatic malignant melanoma in July, 2018. Involvement included right axillary lymph nodes, liver, bone. He returns to the clinic, scheduled for maintenance nivolumab (Opdivo) and has completed surveillance imaging studies, here to discuss. Rome Short has been on brief hold due to mouth sores. Sores have improved with dexamethasone mouth rinse. Cailin Wyatt has noticed a significant improvement in bony discomforts while off Xgeva. Chronic pruritus is stable, perhaps improved at present. He complains of an area of itching to his right lateral ankle with the appearance of tinea. He has been using hydrocortisone cream.  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 Mild exertional dyspnea is unchanged, SVC stable. He reports \"bowel clean out\" with contrast for CT scan. Overall, Cailin Wyatt is doing well. ECOG grade 1  Neuropathy grade 1  Fatigue grade 1  Pruritus grade 1    Chiquita Petersen presents for follow-up surveillance visit and toxicity assessment. he requires intenstive monitoring due to the potential to cause serious morbidity or death. TARGET METASTATIC MALIGNANT MELANOMA SITES:  1. Right upper extremity original primary site 06/05/14   2. Right axilla lymph node at presentation 6/5/2014 and 3 axillary nodes at recurrence 7/27/2018 with an SUV of 8.7   3. Too numerous to count liver metastases   4. Celiac lymph nodes x 2 with highest SUV of 3   5. Bony metastatic disease on PET scan in the right ilium (SUV of 5) and right acetabulum (SUV of 6.4)   6.  Indeterminate HORACIO 3 mm subpleural nodule 7. Indeterminate thyroid nodules on chest CT    1st TUMOR HISTORY: Resected stage IIIA (pT3a pN1a M0) right upper extremity malignant melanoma, 06/05/14  Mr. Brenden Rogers was seen in initial oncology consultation on 08/05/14, referred by Dr. Vanna Merritt, regarding a diagnosis of resected malignant melanoma of the right upper extremity. Dr. Twila Bach resected a malignant melanoma from the right posterior arm, above the elbow, on 06/05/14. Pathology revealed a 2.25 mm thick non-ulcerated Pankaj's level IV malignant melanoma. There were 10 mitoses/ sq mm. There was no vascular or lymphatic invasion. A wide excision with a sentinel lymph node biopsy was performed by by Dr. Vic Romberg on 07/09/14. Pathology revealed that the right axillary sentinel lymph node was positive for metastatic malignancy by immunoperoxidase stain. The wide excision sample was without further local involvement. A right axillary lymph node dissection was performed by Dr. Vic Romberg on 07/21/14. No metastatic malignancy was noted in any of the 8 right axillary lymph nodes submitted. The HMB-45 immunoperoxidase stain was negative for metastatic malignant melanoma in any of these subsequently submitted lymph nodes. Completion of a metastatic workup on 08/06/14, including MRI of the brain, bone scan, CT scans of the chest, abdomen and pelvis were negative for evidence of metastatic disease. Adjuvant pegylated Interferon (Sylatron) 6 mcg/kg (500mcg) sub-q weekly x 8 to be followed then by 3 mcg/kg(250mcg) weekly x up to 5 years was discussed and planned. Adjuvant therapy was indeed initiated and delivered as discussed below in treatment summary. The last dose of the medication was delivered on 2/15/2015. He was unable to tolerate this medication even at reduced dosages because of poor tolerability, weight loss, anorexia, unable to sleep, anxiety, fatigue, et Cory Claude. He declined any further interferon, which is reasonable.    A one-year followup CT scan of the chest and MRI of the brain on 6/11/2015 did not reveal any evidence of recurrent disease.  ----------------------------------PROGRESSION --------------------------------  Gisella Cordova presented to [facility] emergency department on 7/3/18 for a 3 month history of waxing and waning epigastric abdominal pain and new onset fever. Additional symptoms reported include unexplained weight loss, nausea, headaches. CXR 7/3/18 showed mild cardiomegaly with no acute cardiopulmonary process. Abdominal/pelvic CT with contrast 7/3/18 documented multiple low density bilateral hepatic lesions suspicious for metastatic disease. 2 small renal cysts are noted and one on the left. A second left renal lesion near the lower pole measured 25 mm with SUV of 34, ultrasound recommended. The prostate is markedly enlarged causing partial right obstructive uropathy. CBC shows WBC 7.3, hemoglobin 15.8 and platelets 678,313  CMP revealed a creatinine of 1.2, GFR 59. LFTs WNL  UA negative  Lipase 28, troponin <0.01  He has a history of GERD with laparoscopic paraesophageal hernia repair and fundoplication by Dr. Karolina Jacobo on 5/24/15. He was prescribed Norco and Zofran PRN at [facility] ER. Rx was given for omeprazole in clinic  Tumor markers drawn 7/11/18 included:   AFP 5.8   CEA 2.0   CA 19-9 - 8   PSA 7.4 (chronically elevated, up to 8.28 on 8/8/14)  MRI of the abdomen w/wo contrast 7/13/18 at \[facility]\"" documented innumerable T1 hypointense, T2 hyperintense lesions throughout the liver as noted on recent CT. One of the larger lesions seen posteriorly in the right hepatic lobe measured 1.6 cm. Also noted was at least two T2 hyperintense lesions within the thoracocolumbar spine, which could represent metastasis. Bilateral renal ultrasound 7/3/18 showed a 2.6 cm hypoechoic left lower pole renal lesion, not typical of a benign cyst with enhancing characteristics concerning for primary renal cancer versus metastatic disease.  Bilateral nephrogenic cysts noted along with marked prostate enlargement with lobular changes measuring 8 x 5.9 x 4.9 cm. MRI of the brain w/wo contrast 7/3/18 for complaint of headache and nausea was without evidence of acute intracranial process. Contrasted chest CT 7/24/18 revealed a stable 2 cm right thyroid nodule, a new 9 mm right thyroid nodule. An indeterminate 3 mm subpleural HORACIO nodule is noted, likely granulomatous or postinfectious. Bone scan 7/24/18 was negative for evidence of osseous metastasis. Examination is remarkable for mid epigastric discomfort, 10 lbs weight loss and 1 inch right axillary lymph node. This was a previous site of positive sentinel lymph node biopsy and dissection associated with the resected, stage IIIa right upper extremity malignant melanoma in 2014. Suspect recurrent malignant melanoma. Gisella Cordova was referred to Dr. Elinor Rojo who performed an FNA of the right axillary lymph node 7/26/18. Pathology was consistent for metastatic malignant melanoma. 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 Findings were discussed with both Gisella Cordova and his wife by Dr. Milla Jimenez at follow-up on 8/1/18 and reiterated on 8/7/18. Recommendation is for combination therapy with Opdivo 1 mg/kg and Yervoy 3 mg/kg every 3 weeks ×4 cycles, followed by Opdivo 240 mg every 2 weeks thereafter. Gisella Cordova had a left chest Mediport placed by Dr. Kaycee Curran 8/3/18. PET scan 8/6/18 showed the following:  3 right axillary lymph nodes, SUV up to 8.7   Hepatic metastasis, too numerous to count   2 celiac lymph nodes SUV 2.8 and 3   Right ilium, SUV 5. Right acetabulum SUV 6.4  Cycle #1 Opdivo/Yervoy and monthly Xgeva initiated 8/7/18. He was evaluated at [facility] 10/16/18 for LLQ abdominal discomfort with a history of recent diverticulitis. Abdominal/pelvic CT with contrast revealed a decreased size in the multiple liver nodules. Bilateral renal nodules were stable.   An enlarged prostate with an exophytic mass arising from the posterior superior aspect of the prostate was present. He was treated for acute diverticulitis of the mid to distal sigmoid colon. Cheryl Keita was evaluated by Dr. Bassam Marquez on 11/27/18 regarding possible prostate mass and elevated PSA with recommendations for surveillance only. Contrasted chest CT on 11/8/18 at [facility] showed no enlarged axillary, hilar or mediastinal lymph nodes. There were no acute osseous or soft tissue abnormalities. Cheryl Keita has had treatment delay of Nivolumab on more than one occasion due to rash, requiring treatment with prednisone. Nivolumab was discontinued following dose #2 of maintenance therapy on 12/20/18. He initiated cycle #1 of Tafinlar 150 mg po BID & Mekinist 2 mg po BID on 1/4/19. Contrasted CT chest 1/9/19 at [facility] showed emphysematous changes bilaterally without evidence of metastatic disease. A 1.4 cm right hilar lymph node was unchanged since 2015, likely benign. Abdominal/pelvic CT with contrast 1/9/19 documented a 4 mm hepatic lesion, previously 6 mm. Other previously noted lesions throughout the liver are not appreciated. The enlarged prostate with nodular hypertrophy was previously evaluated by Dr. Bassam Marquez. Bone scan 1/9/19 was without evidence of osteoblastic disease. Echocardiogram 1/9/19 showed an EF of 60%. Cheryl Keita was hopsitalized at [facility] 1/12/19 - 1/18/19 for enterocolitis with nausea/vomiting and diarrhea. Antineoplastic therapy was held during that time, rechallenged beginning 1/24/19. Cheryl Keita was evaluated 2/11/19 at [facility] ER for a 24 hour history of abdominal pain and diarrhea with mild diverticulitis, stable from 1/12/19. He was treated with Cipro and Flagyl. Single agent Tafinlar was taken 3/28/19 - 4/4/19, discontinued by Cheryl Keita due to intolerable abdominal cramping. Cathyann Drummer was resumed 4/25/19. CT scans of the chest, abdomen and pelvis with contrast 8/13/2019 at [facility] was negative for intrathoracic metastasis.   There was no evidence of disease progression in the abdomen/pelvis with stable, subcentimeter low-density liver lesions noted. Dosing was changed to 480 mg every 4 weeks on 9/5/2019 to see if this may decrease persistent itching. Rx fluoxetine and hydroxyzine per Dr. Jesu Pinto recommendations. Contrasted CT scans of the chest, abdomen/pelvis and bone scan 1/16/2020 were negative for evidence of disease progression. 19 mm right thyroid lobe nodule and subcentimeter low-density liver lesions were stable. No abnormal bony uptake. Contrasted CT scans of the chest, abdomen and pelvis 6/4/2020 were without evidence of metastatic disease. A 2.2 cm left lower renal lesion is stable  Contrasted CT scans of the chest, abdomen and pelvis 6/4/2020 were negative aside from high-grade stenosis of the SVC with collateral formation for which Cedric Alba was referred to vascular surgery 10/29/2020. Treatment continues with nivolumab. TREATMENT SUMMARY:  1. Dr. Jose Alberto Ledesma resected a malignant melanoma from the right posterior arm, above the elbow, on 06/05/14   2. Wide excision with a sentinel lymph node biopsy by Dr. Fantasma Suarez on 07/09/14. 3. Adjuvant weekly Sylatron Initiated 09/02/14 at 6 mcg/kg (500mcg) sub-q weekly but was only able to receive 4 of 8 planned treatments of this. The last dose #4 was on 09/28/14   4. Adjuvant weekly Sylatron 3 mcg/kg (250mcg) initiated 10/12/2014. Last dose delivered on 2/15/2015, discontinued due to poor tolerability and unable to tolerate the medication. 5. Opdivo 1 mg/kg and Yervoy 3 mg/kg every 3 weeks ×4 doses. 8/7/18 - 10/25/18. 6. Opdivo 240 mg every 2 weeks initiated 11/15/18, discontinued after dose #2 on 12/20/18   7. Monthly Xgeva initiated 8/7/18.   8.  Tafinlar 150 mg po BID & Mekinist 2 mg po qday, cycle #1 initiated 1/4/19.   9. Single agent Tafinlar 150 mg po BID initiated 3/28/19, discontinue by patient 4/4/19.   10. Opdivo 240 mg every 2 weeks resumed 4/25/19, change to 480 mg every 4 weeks on 9/5/2019    HEMATOLOGY CONCERN:  SVC stenosis  Mirela Dent was Evaluated by Oskar Rios PA-C with vascular surgery on 11/11/2020 regarding high-grade SVC narrowing with collateral vein formation in the mediastinum noted on contrasted chest CT at [facility] on 10/22/2020. Mirela Dent was asymptomatic, observation recommended. Past Medical History:    Past Medical History:   Diagnosis Date    Acid reflux     BPH (benign prostatic hyperplasia)     Cancer (HCC)     Diverticulitis     Hard of hearing     Hypercholesteremia     Hypertension     Malignant melanoma of skin of upper limb, including shoulder ([address] dx 6/5/2014    to liver, stomach, bone, and right axilla     Past Surgical History:    Past Surgical History:   Procedure Laterality Date    CHOLECYSTECTOMY      TUNNELED CENTRAL VENOUS CATHETER W/ SUBCUTANEOUS PORT       Current Medications:    Current Outpatient Medications   Medication Sig Dispense Refill    hydrOXYzine (ATARAX) 25 MG tablet TAKE 1 TABLET BY MOUTH EVERY 8 HOURS AS NEEDED FOR ITCHING 90 tablet 1    promethazine (PHENERGAN) 25 MG tablet Take 12.5 mg by mouth every 6 hours as needed for Nausea      dexamethasone 0.5 MG/5ML elixir 1 tsp every 6 hours as needed 237 mL 1    pantoprazole (PROTONIX) 40 MG tablet Take 1 tablet by mouth daily 60 tablet 5    HYDROcodone-acetaminophen (NORCO)  MG per tablet Take 1 tablet by mouth every 6 hours as needed for Pain for up to 30 days.  120 tablet 0    montelukast (SINGULAIR) 10 MG tablet TAKE 1 TABLET BY MOUTH ONCE DAILY AT NIGHT 90 tablet 3    PARoxetine (PAXIL) 10 MG tablet TAKE 1 TABLET BY MOUTH EVERY DAY 90 tablet 3    levothyroxine (SYNTHROID) 100 MCG tablet Take 1 tablet by mouth daily 30 tablet 2    acetaminophen (TYLENOL) 500 MG tablet Take 1,000 mg by mouth every 6 hours as needed for Pain      dicyclomine (BENTYL) 20 MG tablet TAKE 1/2 TABLET BY MOUTH 4 TIMES A DAY AS NEEDED 180 tablet 3    finasteride (PROSCAR) 5 MG tablet Take 5 mg by mouth      lovastatin (MEVACOR) 40 MG tablet Take 40 mg by mouth       No current facility-administered medications for this visit. Facility-Administered Medications Ordered in Other Visits   Medication Dose Route Frequency Provider Last Rate Last Admin    nivolumab 480 mg in sodium chloride 0.9 % 100 mL chemo IVPB  480 mg IntraVENous Once LIAM Peña 296 mL/hr at 22 0950 480 mg at 22 0950    sodium chloride flush 0.9 % injection 10 mL  10 mL IntraVENous PRN LIAM Peña        heparin flush 100 UNIT/ML injection 500 Units  500 Units IntraCATHeter PRN LIAM Peña            Allergies: No Known Allergies  Social History:    Social History     Tobacco Use    Smoking status: Former Smoker    Smokeless tobacco: Never Used   Substance Use Topics    Alcohol use: No    Drug use: No     Family History:   Family History   Problem Relation Age of Onset    Heart Attack Brother          age 78 of [place]     Subjective   REVIEW OF SYSTEMS:   Review of Systems   Constitutional: Positive for fatigue (Mild). Negative for fever. No night sweats   HENT: Positive for mouth sores. Negative for dental problem, hearing loss, nosebleeds, sore throat and trouble swallowing. Eyes: Negative for discharge and itching. Respiratory: Negative for cough, shortness of breath and wheezing. Cardiovascular: Negative for chest pain, palpitations and leg swelling. Gastrointestinal: Positive for nausea (at times). Negative for constipation, diarrhea and vomiting. Mid abdominal discomfort above the navel   Endocrine: Negative for cold intolerance and heat intolerance. Genitourinary: Negative for dysuria, frequency, hematuria and urgency. Musculoskeletal: Positive for arthralgias. Negative for joint swelling and myalgias. Skin: Positive for rash (right ankle, pruritic ). Negative for pallor.         Generalized pruritus, mild   Allergic/Immunologic: Negative for environmental allergies and immunocompromised state. Neurological: Negative for seizures, syncope and numbness. Hematological: Negative for adenopathy. Does not bruise/bleed easily. Psychiatric/Behavioral: Negative for agitation, behavioral problems and confusion. The patient is not nervous/anxious. Objective   Vitals:    03/31/22 0845   BP: (!) 151/72   Pulse: 65   Resp: 20   Temp: 97.7 °F (36.5 °C)   SpO2: 95%     Wt Readings from Last 3 Encounters:   03/31/22 215 lb 6.4 oz (97.7 kg)   03/03/22 211 lb 1.6 oz (95.8 kg)   01/06/22 212 lb 3.2 oz (96.3 kg)     PHYSICAL EXAM:  Physical Exam  Vitals reviewed. Constitutional:       General: He is not in acute distress. Appearance: He is well-developed. He is not toxic-appearing or diaphoretic. Comments: Wearing a facial mask. HENT:      Head: Normocephalic and atraumatic. Right Ear: External ear normal.      Left Ear: External ear normal.      Nose: Nose normal.      Mouth/Throat:      Mouth: Mucous membranes are moist.      Comments: Mouth sore on left resolved. Sore on right improved, not healed completely. Dentures   Eyes:      General: No scleral icterus. Right eye: No discharge. Left eye: No discharge. Conjunctiva/sclera: Conjunctivae normal.   Neck:      Trachea: No tracheal deviation. Cardiovascular:      Rate and Rhythm: Normal rate and regular rhythm. Pulmonary:      Effort: Pulmonary effort is normal. No respiratory distress. Breath sounds: Normal breath sounds. No wheezing or rales. Chest:   Breasts:      Right: No axillary adenopathy or supraclavicular adenopathy. Left: No axillary adenopathy or supraclavicular adenopathy. Abdominal:      General: Bowel sounds are normal. There is no distension. Palpations: Abdomen is soft. Tenderness: There is no abdominal tenderness. There is no guarding.    Genitourinary:     Comments: Exam deferred  Musculoskeletal:         General: No tenderness or deformity. Cervical back: Neck supple. No muscular tenderness. Comments: Normal ROM all four extremities   Lymphadenopathy:      Cervical:      Right cervical: No superficial, deep or posterior cervical adenopathy. Left cervical: No superficial, deep or posterior cervical adenopathy. Upper Body:      Right upper body: No supraclavicular or axillary adenopathy. Left upper body: No supraclavicular or axillary adenopathy. Skin:     General: Skin is warm and dry. Findings: No erythema. Comments: 1.5 x 1.5 inch annular lesion with dryness/mild erythema to right lateral ankle   Neurological:      Mental Status: He is alert and oriented to person, place, and time. Comments: follows commands, non-focal   Psychiatric:         Behavior: Behavior normal. Behavior is cooperative. Thought Content: Thought content normal.         Judgment: Judgment normal.      Comments: Alert and oriented to person, place and time. Labs reviewed by me:    CBC 3/31/22  Lab Results   Component Value Date    WBC 7.30 03/31/2022    HGB 13.9 03/31/2022    HCT 41.2 03/31/2022    MCV 90.0 03/31/2022     03/31/2022    LYMPHOPCT 29.5 03/31/2022    RBC 4.58 (L) 03/31/2022    MCH 30.3 03/31/2022    MCHC 33.7 03/31/2022    RDW 12.4 03/31/2022     Lab Results   Component Value Date     03/31/2022    K 4.2 03/31/2022     03/31/2022    CO2 27 03/31/2022    BUN 17 03/31/2022    CREATININE 1.0 03/31/2022    GLUCOSE 113 (H) 03/31/2022    CALCIUM 8.5 03/31/2022    PROT 6.3 03/31/2022    LABALBU 4.0 03/31/2022    BILITOT 1.4 (H) 03/31/2022    ALKPHOS 40 03/31/2022    AST 30 03/31/2022    ALT 25 03/31/2022    LABGLOM >60 03/31/2022    GFRAA >59 01/06/2022    AGRATIO 1.8 12/26/2019    GLOB 2.3 03/31/2022     Lab Results   Component Value Date    TSH 3.680 03/03/2022     ASSESSMENT:   1. Malignant melanoma of right upper extremity including shoulder ([facility] [address]    2.  Encounter for antineoplastic immunotherapy    3. Hypothyroidism due to medication    4. Left renal mass    5. Mouth sore secondary to chemotherapy    6. Tinea       Malignant melanoma of skin of right upper extremity, including shoulder ([address]  Encounter for antineoplastic immunotherapy    CT Chest with contrast 3/24/2022 at hospitals:  · No evidence of intrathoracic metastasis. · Stable peripherally calcified 1.0 cm right thyroid nodule, unchanged from 8/13/2019. CT Abdomen and pelvis 3/24/2022 at hospitals:   · No evidence of intra-abdominal or pelvic metastasis. · Sigmoid colonic diverticulosis. · Prostate enlargement (5.3 x 5.9 cm). · Stable renal cysts including hyperdense inferior left renal cyst measuring 2.3 cm. No new skin lesions or lymphadenopathies on exam.  CBC, CMP, TSH ordered today, completed and reviewed. Proceed with Opdivo today and continue every 4 weeks. Hold monthly Xgeva for mouth sore abutting the gumline (see below)    Yael Garcia is 3 months shy of being out 4 years since diagnosed with recurrent stage IV metastatic melanoma involving lymph nodes, liver and bones. He has had a excellent treatment response with immunotherapy. Continue treatment. Mouth sore secondary to chemotherapy  One sore is resolved the other improved. No visible bone exposure  Will Hold monthly Xgeva again today, per patient request  Continue dexamethasone mouth rinse as needed    hypothyroidism due to medication    Continue Synthroid to 100 mcg po daily   Repeat TSH today    Left renal mass  2.2 cm left renal mass stable on abdominal/pelvic CT dated 6/4/2020, 10/22/2020 and 4/1/2021, 9/30/2021, 3/4/2022  Previously evaluated by urology, monitor conservatively  Continue to observe on surveillance imaging for metastatic melanoma    Superior vena cava stenosis  Asymptomatic, followed by ALEAH Austin  Contrasted chest CT 12/7/2021: Patent left axillary vein/left subclavian vein.   Normal appearance of the partially opacified right jugular vein. Normal appearance of the unopacified right subclavian vein to the extent visualized. Focal narrowing of the SVC  No indication of SVC on recent Chest CT 3/4/2022  No anticoagulation, monitored conservatively     Tinea   1.0 - 1.5 inch annular lesion with dryness/scale to right lateral ankle  Eczema vs tinea  Recommend Lotrisone cream OTC    Health Maintenance  Colonoscopy 8/27/2017 by Dr. Cirilo Guerra. 5 Year recall. Plan of care discussed with Cara Lee and his wife. All questions answered. Orders Placed This Encounter   Procedures    CBC with Auto Differential    Comprehensive Metabolic Panel    TSH    TSH     No orders of the defined types were placed in this encounter. Return in about 2 months (around 5/31/2022) for follow up with LIAM Leong for [id number] [place] [address]. I have seen, examined and reviewed this patient medication list, appropriate labs and imaging studies. I reviewed relevant medical records and others physicians notes. I discussed the plan of care with the patient. I answered all questions to the patients satisfaction. I have also reviewed the chief complaint (CC) and part of the history (History of Present Illness (HPI), Past Family Social History [facility]), or Review of Systems (ROS) and made changes when appropriated. Dictated utilizing Dragon transcription software.       LIAM Carney  10:06 AM  3/31/2022

## 2022-03-31 ENCOUNTER — HOSPITAL ENCOUNTER (OUTPATIENT)
Dept: INFUSION THERAPY | Age: 82
Discharge: HOME | End: 2022-03-31
Payer: MEDICARE

## 2022-03-31 ENCOUNTER — OFFICE VISIT (OUTPATIENT)
Dept: HEMATOLOGY | Age: 82
End: 2022-03-31
Payer: MEDICARE

## 2022-03-31 VITALS
OXYGEN SATURATION: 95 % | SYSTOLIC BLOOD PRESSURE: 151 MMHG | RESPIRATION RATE: 20 BRPM | WEIGHT: 215.4 LBS | BODY MASS INDEX: 30.15 KG/M2 | TEMPERATURE: 97.7 F | HEART RATE: 65 BPM | HEIGHT: 71 IN | DIASTOLIC BLOOD PRESSURE: 72 MMHG

## 2022-03-31 DIAGNOSIS — B35.9 TINEA: ICD-10-CM

## 2022-03-31 DIAGNOSIS — N28.89 LEFT RENAL MASS: ICD-10-CM

## 2022-03-31 DIAGNOSIS — K13.79 MOUTH SORE SECONDARY TO CHEMOTHERAPY: ICD-10-CM

## 2022-03-31 DIAGNOSIS — Z51.12 ENCOUNTER FOR ANTINEOPLASTIC IMMUNOTHERAPY: ICD-10-CM

## 2022-03-31 DIAGNOSIS — E03.2 HYPOTHYROIDISM DUE TO MEDICATION: ICD-10-CM

## 2022-03-31 DIAGNOSIS — T45.1X5A MOUTH SORE SECONDARY TO CHEMOTHERAPY: ICD-10-CM

## 2022-03-31 DIAGNOSIS — C43.61 MALIGNANT MELANOMA OF RIGHT UPPER EXTREMITY INCLUDING SHOULDER (HCC): ICD-10-CM

## 2022-03-31 DIAGNOSIS — C43.9 MALIGNANT MELANOMA, UNSPECIFIED SITE (HCC): ICD-10-CM

## 2022-03-31 DIAGNOSIS — Z51.12 ENCOUNTER FOR ANTINEOPLASTIC IMMUNOTHERAPY: Primary | ICD-10-CM

## 2022-03-31 DIAGNOSIS — C43.61 MALIGNANT MELANOMA OF RIGHT UPPER EXTREMITY INCLUDING SHOULDER (HCC): Primary | ICD-10-CM

## 2022-03-31 LAB
ALBUMIN SERPL-MCNC: 4 G/DL (ref 3.5–5.2)
ALP BLD-CCNC: 40 U/L (ref 40–130)
ALT SERPL-CCNC: 25 U/L (ref 21–72)
ANION GAP SERPL CALCULATED.3IONS-SCNC: 8 MMOL/L (ref 7–19)
AST SERPL-CCNC: 30 U/L (ref 17–59)
BASOPHILS ABSOLUTE: 0.03 K/UL (ref 0.01–0.08)
BASOPHILS RELATIVE PERCENT: 0.4 % (ref 0.1–1.2)
BILIRUB SERPL-MCNC: 1.4 MG/DL (ref 0.2–1.3)
BUN BLDV-MCNC: 17 MG/DL (ref 9–20)
CALCIUM SERPL-MCNC: 8.5 MG/DL (ref 8.4–10.2)
CHLORIDE BLD-SCNC: 106 MMOL/L (ref 98–111)
CO2: 27 MMOL/L (ref 22–29)
CREAT SERPL-MCNC: 1 MG/DL (ref 0.6–1.2)
EOSINOPHILS ABSOLUTE: 0.4 K/UL (ref 0.04–0.54)
EOSINOPHILS RELATIVE PERCENT: 5.5 % (ref 0.7–7)
GFR NON-AFRICAN AMERICAN: >60
GLOBULIN: 2.3 G/DL
GLUCOSE BLD-MCNC: 113 MG/DL (ref 74–106)
HCT VFR BLD CALC: 41.2 % (ref 40.1–51)
HEMOGLOBIN: 13.9 G/DL (ref 13.7–17.5)
LYMPHOCYTES ABSOLUTE: 2.15 K/UL (ref 1.18–3.74)
LYMPHOCYTES RELATIVE PERCENT: 29.5 % (ref 19.3–53.1)
MCH RBC QN AUTO: 30.3 PG (ref 25.7–32.2)
MCHC RBC AUTO-ENTMCNC: 33.7 G/DL (ref 32.3–36.5)
MCV RBC AUTO: 90 FL (ref 79–92.2)
MONOCYTES ABSOLUTE: 0.7 K/UL (ref 0.24–0.82)
MONOCYTES RELATIVE PERCENT: 9.6 % (ref 4.7–12.5)
NEUTROPHILS ABSOLUTE: 4.02 K/UL (ref 1.56–6.13)
NEUTROPHILS RELATIVE PERCENT: 55 % (ref 34–71.1)
PDW BLD-RTO: 12.4 % (ref 11.6–14.4)
PLATELET # BLD: 223 K/UL (ref 163–337)
PMV BLD AUTO: 9.7 FL (ref 7.4–10.4)
POTASSIUM SERPL-SCNC: 4.2 MMOL/L (ref 3.5–5.1)
RBC # BLD: 4.58 M/UL (ref 4.63–6.08)
SODIUM BLD-SCNC: 141 MMOL/L (ref 137–145)
TOTAL PROTEIN: 6.3 G/DL (ref 6.3–8.2)
TSH SERPL DL<=0.05 MIU/L-ACNC: 8.84 UIU/ML (ref 0.27–4.2)
WBC # BLD: 7.3 K/UL (ref 4.23–9.07)

## 2022-03-31 PROCEDURE — 2580000003 HC RX 258: Performed by: NURSE PRACTITIONER

## 2022-03-31 PROCEDURE — 36415 COLL VENOUS BLD VENIPUNCTURE: CPT

## 2022-03-31 PROCEDURE — 1123F ACP DISCUSS/DSCN MKR DOCD: CPT | Performed by: NURSE PRACTITIONER

## 2022-03-31 PROCEDURE — G8427 DOCREV CUR MEDS BY ELIG CLIN: HCPCS | Performed by: NURSE PRACTITIONER

## 2022-03-31 PROCEDURE — 1036F TOBACCO NON-USER: CPT | Performed by: NURSE PRACTITIONER

## 2022-03-31 PROCEDURE — 4040F PNEUMOC VAC/ADMIN/RCVD: CPT | Performed by: NURSE PRACTITIONER

## 2022-03-31 PROCEDURE — 99214 OFFICE O/P EST MOD 30 MIN: CPT | Performed by: NURSE PRACTITIONER

## 2022-03-31 PROCEDURE — G8484 FLU IMMUNIZE NO ADMIN: HCPCS | Performed by: NURSE PRACTITIONER

## 2022-03-31 PROCEDURE — 80053 COMPREHEN METABOLIC PANEL: CPT

## 2022-03-31 PROCEDURE — 96413 CHEMO IV INFUSION 1 HR: CPT

## 2022-03-31 PROCEDURE — 6360000002 HC RX W HCPCS: Performed by: NURSE PRACTITIONER

## 2022-03-31 PROCEDURE — 85025 COMPLETE CBC W/AUTO DIFF WBC: CPT

## 2022-03-31 PROCEDURE — G8417 CALC BMI ABV UP PARAM F/U: HCPCS | Performed by: NURSE PRACTITIONER

## 2022-03-31 RX ORDER — EPINEPHRINE 1 MG/ML
0.3 INJECTION, SOLUTION, CONCENTRATE INTRAVENOUS PRN
Status: CANCELLED | OUTPATIENT
Start: 2022-03-31

## 2022-03-31 RX ORDER — SODIUM CHLORIDE 0.9 % (FLUSH) 0.9 %
10 SYRINGE (ML) INJECTION PRN
Status: DISCONTINUED | OUTPATIENT
Start: 2022-03-31 | End: 2022-04-01 | Stop reason: HOSPADM

## 2022-03-31 RX ORDER — HEPARIN SODIUM (PORCINE) LOCK FLUSH IV SOLN 100 UNIT/ML 100 UNIT/ML
500 SOLUTION INTRAVENOUS PRN
Status: CANCELLED | OUTPATIENT
Start: 2022-03-31

## 2022-03-31 RX ORDER — DIPHENHYDRAMINE HYDROCHLORIDE 50 MG/ML
50 INJECTION INTRAMUSCULAR; INTRAVENOUS PRN
Status: CANCELLED | OUTPATIENT
Start: 2022-03-31

## 2022-03-31 RX ORDER — SODIUM CHLORIDE 0.9 % (FLUSH) 0.9 %
10 SYRINGE (ML) INJECTION PRN
Status: CANCELLED | OUTPATIENT
Start: 2022-03-31

## 2022-03-31 RX ORDER — SODIUM CHLORIDE 0.9 % (FLUSH) 0.9 %
5 SYRINGE (ML) INJECTION PRN
Status: CANCELLED | OUTPATIENT
Start: 2022-03-31

## 2022-03-31 RX ORDER — HEPARIN SODIUM (PORCINE) LOCK FLUSH IV SOLN 100 UNIT/ML 100 UNIT/ML
500 SOLUTION INTRAVENOUS PRN
Status: DISCONTINUED | OUTPATIENT
Start: 2022-03-31 | End: 2022-04-02 | Stop reason: HOSPADM

## 2022-03-31 RX ORDER — METHYLPREDNISOLONE SODIUM SUCCINATE 125 MG/2ML
125 INJECTION, POWDER, LYOPHILIZED, FOR SOLUTION INTRAMUSCULAR; INTRAVENOUS PRN
Status: CANCELLED | OUTPATIENT
Start: 2022-03-31

## 2022-03-31 RX ADMIN — SODIUM CHLORIDE, PRESERVATIVE FREE 10 ML: 5 INJECTION INTRAVENOUS at 10:23

## 2022-03-31 RX ADMIN — SODIUM CHLORIDE 480 MG: 9 INJECTION, SOLUTION INTRAVENOUS at 09:50

## 2022-03-31 RX ADMIN — HEPARIN 500 UNITS: 100 SYRINGE at 10:23

## 2022-03-31 ASSESSMENT — ENCOUNTER SYMPTOMS
SORE THROAT: 0
COUGH: 0
NAUSEA: 1
WHEEZING: 0
EYE DISCHARGE: 0
CONSTIPATION: 0
SHORTNESS OF BREATH: 0
EYE ITCHING: 0
DIARRHEA: 0
TROUBLE SWALLOWING: 0
VOMITING: 0

## 2022-04-05 DIAGNOSIS — E03.9 ACQUIRED HYPOTHYROIDISM: ICD-10-CM

## 2022-04-05 DIAGNOSIS — E03.2 HYPOTHYROIDISM DUE TO MEDICATION: Primary | ICD-10-CM

## 2022-04-06 DIAGNOSIS — E03.9 ACQUIRED HYPOTHYROIDISM: ICD-10-CM

## 2022-04-06 RX ORDER — LEVOTHYROXINE SODIUM 0.1 MG/1
TABLET ORAL
Qty: 90 TABLET | Refills: 1 | Status: SHIPPED | OUTPATIENT
Start: 2022-04-06 | End: 2022-09-19

## 2022-04-11 RX ORDER — HYDROXYZINE HYDROCHLORIDE 25 MG/1
25 TABLET, FILM COATED ORAL EVERY 8 HOURS PRN
Qty: 270 TABLET | Refills: 0 | Status: SHIPPED | OUTPATIENT
Start: 2022-04-11 | End: 2022-08-08

## 2022-04-28 ENCOUNTER — HOSPITAL ENCOUNTER (OUTPATIENT)
Dept: INFUSION THERAPY | Age: 82
Discharge: HOME | End: 2022-04-28
Payer: MEDICARE

## 2022-04-28 VITALS
TEMPERATURE: 97.9 F | BODY MASS INDEX: 29.82 KG/M2 | HEART RATE: 66 BPM | SYSTOLIC BLOOD PRESSURE: 152 MMHG | RESPIRATION RATE: 16 BRPM | WEIGHT: 213 LBS | DIASTOLIC BLOOD PRESSURE: 69 MMHG | HEIGHT: 71 IN

## 2022-04-28 DIAGNOSIS — E03.2 HYPOTHYROIDISM DUE TO MEDICATION: ICD-10-CM

## 2022-04-28 DIAGNOSIS — E03.2 HYPOTHYROIDISM DUE TO MEDICATION: Primary | ICD-10-CM

## 2022-04-28 DIAGNOSIS — C43.9 MALIGNANT MELANOMA, UNSPECIFIED SITE (HCC): ICD-10-CM

## 2022-04-28 DIAGNOSIS — Z51.12 ENCOUNTER FOR ANTINEOPLASTIC IMMUNOTHERAPY: ICD-10-CM

## 2022-04-28 DIAGNOSIS — Z51.12 ENCOUNTER FOR ANTINEOPLASTIC IMMUNOTHERAPY: Primary | ICD-10-CM

## 2022-04-28 DIAGNOSIS — C43.61 MALIGNANT MELANOMA OF RIGHT UPPER EXTREMITY INCLUDING SHOULDER (HCC): ICD-10-CM

## 2022-04-28 DIAGNOSIS — C79.51 BONE METASTASES (HCC): ICD-10-CM

## 2022-04-28 LAB
ALBUMIN SERPL-MCNC: 3.9 G/DL (ref 3.5–5.2)
ALP BLD-CCNC: 60 U/L (ref 40–130)
ALT SERPL-CCNC: 58 U/L (ref 21–72)
ANION GAP SERPL CALCULATED.3IONS-SCNC: 7 MMOL/L (ref 7–19)
AST SERPL-CCNC: 52 U/L (ref 17–59)
BASOPHILS ABSOLUTE: 0.02 K/UL (ref 0.01–0.08)
BASOPHILS RELATIVE PERCENT: 0.2 % (ref 0.1–1.2)
BILIRUB SERPL-MCNC: 1.4 MG/DL (ref 0.2–1.3)
BUN BLDV-MCNC: 13 MG/DL (ref 9–20)
CALCIUM SERPL-MCNC: 8.6 MG/DL (ref 8.4–10.2)
CHLORIDE BLD-SCNC: 105 MMOL/L (ref 98–111)
CO2: 27 MMOL/L (ref 22–29)
CREAT SERPL-MCNC: 1.1 MG/DL (ref 0.6–1.2)
EOSINOPHILS ABSOLUTE: 0.4 K/UL (ref 0.04–0.54)
EOSINOPHILS RELATIVE PERCENT: 4.4 % (ref 0.7–7)
GFR NON-AFRICAN AMERICAN: >60
GLOBULIN: 3.1 G/DL
GLUCOSE BLD-MCNC: 104 MG/DL (ref 74–106)
HCT VFR BLD CALC: 41.6 % (ref 40.1–51)
HEMOGLOBIN: 13.4 G/DL (ref 13.7–17.5)
LYMPHOCYTES ABSOLUTE: 1.53 K/UL (ref 1.18–3.74)
LYMPHOCYTES RELATIVE PERCENT: 16.9 % (ref 19.3–53.1)
MCH RBC QN AUTO: 29.6 PG (ref 25.7–32.2)
MCHC RBC AUTO-ENTMCNC: 32.2 G/DL (ref 32.3–36.5)
MCV RBC AUTO: 92 FL (ref 79–92.2)
MONOCYTES ABSOLUTE: 0.9 K/UL (ref 0.24–0.82)
MONOCYTES RELATIVE PERCENT: 9.9 % (ref 4.7–12.5)
NEUTROPHILS ABSOLUTE: 6.2 K/UL (ref 1.56–6.13)
NEUTROPHILS RELATIVE PERCENT: 68.6 % (ref 34–71.1)
PDW BLD-RTO: 12.3 % (ref 11.6–14.4)
PLATELET # BLD: 199 K/UL (ref 163–337)
PMV BLD AUTO: 9.8 FL (ref 7.4–10.4)
POTASSIUM SERPL-SCNC: 4.6 MMOL/L (ref 3.5–5.1)
RBC # BLD: 4.52 M/UL (ref 4.63–6.08)
SODIUM BLD-SCNC: 139 MMOL/L (ref 137–145)
TOTAL PROTEIN: 7 G/DL (ref 6.3–8.2)
TSH SERPL DL<=0.05 MIU/L-ACNC: 4.61 UIU/ML (ref 0.27–4.2)
WBC # BLD: 9.05 K/UL (ref 4.23–9.07)

## 2022-04-28 PROCEDURE — 96372 THER/PROPH/DIAG INJ SC/IM: CPT

## 2022-04-28 PROCEDURE — 80053 COMPREHEN METABOLIC PANEL: CPT

## 2022-04-28 PROCEDURE — 2580000003 HC RX 258: Performed by: INTERNAL MEDICINE

## 2022-04-28 PROCEDURE — 96413 CHEMO IV INFUSION 1 HR: CPT

## 2022-04-28 PROCEDURE — 85025 COMPLETE CBC W/AUTO DIFF WBC: CPT

## 2022-04-28 PROCEDURE — 6360000002 HC RX W HCPCS: Performed by: INTERNAL MEDICINE

## 2022-04-28 RX ORDER — EPINEPHRINE 1 MG/ML
0.3 INJECTION, SOLUTION, CONCENTRATE INTRAVENOUS PRN
Status: CANCELLED | OUTPATIENT
Start: 2022-04-28

## 2022-04-28 RX ORDER — DIPHENHYDRAMINE HYDROCHLORIDE 50 MG/ML
50 INJECTION INTRAMUSCULAR; INTRAVENOUS PRN
Status: CANCELLED | OUTPATIENT
Start: 2022-04-28

## 2022-04-28 RX ORDER — SODIUM CHLORIDE 0.9 % (FLUSH) 0.9 %
10 SYRINGE (ML) INJECTION PRN
Status: CANCELLED | OUTPATIENT
Start: 2022-04-28

## 2022-04-28 RX ORDER — SODIUM CHLORIDE 0.9 % (FLUSH) 0.9 %
10 SYRINGE (ML) INJECTION PRN
Status: DISCONTINUED | OUTPATIENT
Start: 2022-04-28 | End: 2022-04-29 | Stop reason: HOSPADM

## 2022-04-28 RX ORDER — HEPARIN SODIUM (PORCINE) LOCK FLUSH IV SOLN 100 UNIT/ML 100 UNIT/ML
500 SOLUTION INTRAVENOUS PRN
Status: CANCELLED | OUTPATIENT
Start: 2022-04-28

## 2022-04-28 RX ORDER — SODIUM CHLORIDE 0.9 % (FLUSH) 0.9 %
5 SYRINGE (ML) INJECTION PRN
Status: CANCELLED | OUTPATIENT
Start: 2022-04-28

## 2022-04-28 RX ORDER — HEPARIN SODIUM (PORCINE) LOCK FLUSH IV SOLN 100 UNIT/ML 100 UNIT/ML
500 SOLUTION INTRAVENOUS PRN
Status: DISCONTINUED | OUTPATIENT
Start: 2022-04-28 | End: 2022-04-30 | Stop reason: HOSPADM

## 2022-04-28 RX ORDER — METHYLPREDNISOLONE SODIUM SUCCINATE 125 MG/2ML
125 INJECTION, POWDER, LYOPHILIZED, FOR SOLUTION INTRAMUSCULAR; INTRAVENOUS PRN
Status: CANCELLED | OUTPATIENT
Start: 2022-04-28

## 2022-04-28 RX ADMIN — DENOSUMAB 120 MG: 120 INJECTION SUBCUTANEOUS at 10:19

## 2022-04-28 RX ADMIN — HEPARIN 500 UNITS: 100 SYRINGE at 10:19

## 2022-04-28 RX ADMIN — SODIUM CHLORIDE 480 MG: 9 INJECTION, SOLUTION INTRAVENOUS at 09:46

## 2022-04-28 RX ADMIN — SODIUM CHLORIDE, PRESERVATIVE FREE 10 ML: 5 INJECTION INTRAVENOUS at 10:19

## 2022-05-09 DIAGNOSIS — G89.3 CANCER RELATED PAIN: ICD-10-CM

## 2022-05-09 RX ORDER — HYDROCODONE BITARTRATE AND ACETAMINOPHEN 10; 325 MG/1; MG/1
1 TABLET ORAL EVERY 6 HOURS PRN
Qty: 120 TABLET | Refills: 0 | Status: SHIPPED | OUTPATIENT
Start: 2022-05-09 | End: 2022-06-21 | Stop reason: SDUPTHER

## 2022-05-25 NOTE — PROGRESS NOTES
Progress Note      Pt Name: Jorge L Contreras  YOB: 1940  MRN: [id number]    Date of evaluation: 5/26/22    History Obtained From:  patient, spouse, electronic medical record    CHIEF COMPLAINT:    Chief Complaint   Patient presents with    Melanoma     HISTORY OF PRESENT ILLNESS:    Jorge L Cotnreras is an 78-year-old gentleman returning to the clinic for delivery of maintenance immunotherapy with nivolumab regarding his diagnosis of recurrent, metastatic melanoma having presented with right axillary lymph node, liver and bony involvement. Ottoniel Crockett was diagnosed with recurrent disease 7/2018. CT scan chest, abdomen/pelvis 3/2022 revealed stable disease. 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 TSH was 4.61 on 4/28/2022. Generalized pruritus waxes and wanes and is improved at this time. The previous annular lesion to the right ankle has resolved. Ottoniel Crockett reports a great appetite. Prior mouth sores have resolved. He denies diarrhea. He denies cough. He does have mild exertional dyspnea that is chronic and unchanged. History of SVC stenosis dating to October, 2020 is stable, managed by vascular surgery. Ottoniel Crockett is accompanied by his wife, Solo Null. They voice no new concerns at this time. ECOG grade 1  Neuropathy grade 1  Fatigue grade 1  Pruritus grade 1    Jorge L Contreras presents for follow-up surveillance visit and toxicity assessment. he requires intenstive monitoring due to the potential to cause serious morbidity or death. TARGET METASTATIC MALIGNANT MELANOMA SITES:  1. Right upper extremity original primary site 06/05/14   2. Right axilla lymph node at presentation 6/5/2014 and 3 axillary nodes at recurrence 7/27/2018 with an SUV of 8.7   3. Too numerous to count liver metastases   4. Celiac lymph nodes x 2 with highest SUV of 3   5.  Bony metastatic disease on PET scan in the right ilium (SUV of 5) and right acetabulum (SUV of 6.4) 6. Indeterminate HORACIO 3 mm subpleural nodule   7. Indeterminate thyroid nodules on chest CT    1st TUMOR HISTORY: Resected stage IIIA (pT3a pN1a M0) right upper extremity malignant melanoma, 06/05/14  Mr. Krystian Cesar was seen in initial oncology consultation on 08/05/14, referred by Dr. Silvana Carrillo, regarding a diagnosis of resected malignant melanoma of the right upper extremity. Dr. LAWSAMITA resected a malignant melanoma from the right posterior arm, above the elbow, on 06/05/14. Pathology revealed a 2.25 mm thick non-ulcerated Pankaj's level IV malignant melanoma. There were 10 mitoses/ sq mm. There was no vascular or lymphatic invasion. A wide excision with a sentinel lymph node biopsy was performed by by Dr. Verna Morris on 07/09/14. Pathology revealed that the right axillary sentinel lymph node was positive for metastatic malignancy by immunoperoxidase stain. The wide excision sample was without further local involvement. A right axillary lymph node dissection was performed by Dr. Verna Morris on 07/21/14. No metastatic malignancy was noted in any of the 8 right axillary lymph nodes submitted. The HMB-45 immunoperoxidase stain was negative for metastatic malignant melanoma in any of these subsequently submitted lymph nodes. Completion of a metastatic workup on 08/06/14, including MRI of the brain, bone scan, CT scans of the chest, abdomen and pelvis were negative for evidence of metastatic disease. Adjuvant pegylated Interferon (Sylatron) 6 mcg/kg (500mcg) sub-q weekly x 8 to be followed then by 3 mcg/kg(250mcg) weekly x up to 5 years was discussed and planned. Adjuvant therapy was indeed initiated and delivered as discussed below in treatment summary. The last dose of the medication was delivered on 2/15/2015. He was unable to tolerate this medication even at reduced dosages because of poor tolerability, weight loss, anorexia, unable to sleep, anxiety, fatigue, et James Aguilar.  He declined any further interferon, which is reasonable. A one-year followup CT scan of the chest and MRI of the brain on 6/11/2015 did not reveal any evidence of recurrent disease.  ----------------------------------PROGRESSION --------------------------------  Crys Dwyer presented to [facility] emergency department on 7/3/18 for a 3 month history of waxing and waning epigastric abdominal pain and new onset fever. Additional symptoms reported include unexplained weight loss, nausea, headaches. CXR 7/3/18 showed mild cardiomegaly with no acute cardiopulmonary process. Abdominal/pelvic CT with contrast 7/3/18 documented multiple low density bilateral hepatic lesions suspicious for metastatic disease. 2 small renal cysts are noted and one on the left. A second left renal lesion near the lower pole measured 25 mm with SUV of 34, ultrasound recommended. The prostate is markedly enlarged causing partial right obstructive uropathy. CBC shows WBC 7.3, hemoglobin 15.8 and platelets 126,607  CMP revealed a creatinine of 1.2, GFR 59. LFTs WNL  UA negative  Lipase 28, troponin <0.01  He has a history of GERD with laparoscopic paraesophageal hernia repair and fundoplication by Dr. Neelam Lafleur on 5/24/15. He was prescribed Norco and Zofran PRN at [facility] ER. Rx was given for omeprazole in clinic  Tumor markers drawn 7/11/18 included:   AFP 5.8   CEA 2.0   CA 19-9 - 8   PSA 7.4 (chronically elevated, up to 8.28 on 8/8/14)  MRI of the abdomen w/wo contrast 7/13/18 at [facility] documented innumerable T1 hypointense, T2 hyperintense lesions throughout the liver as noted on recent CT. One of the larger lesions seen posteriorly in the right hepatic lobe measured 1.6 cm. Also noted was at least two T2 hyperintense lesions within the thoracocolumbar spine, which could represent metastasis.   Bilateral renal ultrasound 7/3/18 showed a 2.6 cm hypoechoic left lower pole renal lesion, not typical of a benign cyst with enhancing characteristics concerning for primary renal cancer versus metastatic disease. Bilateral nephrogenic cysts noted along with marked prostate enlargement with lobular changes measuring 8 x 5.9 x 4.9 cm. MRI of the brain w/wo contrast 7/3/18 for complaint of headache and nausea was without evidence of acute intracranial process. Contrasted chest CT 7/24/18 revealed a stable 2 cm right thyroid nodule, a new 9 mm right thyroid nodule. An indeterminate 3 mm subpleural HORACIO nodule is noted, likely granulomatous or postinfectious. Bone scan 7/24/18 was negative for evidence of osseous metastasis. Examination is remarkable for mid epigastric discomfort, 10 lbs weight loss and 1 inch right axillary lymph node. This was a previous site of positive sentinel lymph node biopsy and dissection associated with the resected, stage IIIa right upper extremity malignant melanoma in 2014. Suspect recurrent malignant melanoma. Krista Lane was referred to Dr. Lance Solis who performed an FNA of the right axillary lymph node 7/26/18. Pathology was consistent for metastatic malignant melanoma. 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 Findings were discussed with both Krista Lane and his wife by Dr. Beronica Kaminski at follow-up on 8/1/18 and reiterated on 8/7/18. Recommendation is for combination therapy with Opdivo 1 mg/kg and Yervoy 3 mg/kg every 3 weeks ×4 cycles, followed by Opdivo 240 mg every 2 weeks thereafter. Krista Lane had a left chest Mediport placed by Dr. Bergeron Beckham 8/3/18. PET scan 8/6/18 showed the following:  3 right axillary lymph nodes, SUV up to 8.7   Hepatic metastasis, too numerous to count   2 celiac lymph nodes SUV 2.8 and 3   Right ilium, SUV 5. Right acetabulum SUV 6.4  Cycle #1 Opdivo/Yervoy and monthly Xgeva initiated 8/7/18. He was evaluated at [facility] 10/16/18 for LLQ abdominal discomfort with a history of recent diverticulitis. Abdominal/pelvic CT with contrast revealed a decreased size in the multiple liver nodules.  Bilateral renal nodules were stable. An enlarged prostate with an exophytic mass arising from the posterior superior aspect of the prostate was present. He was treated for acute diverticulitis of the mid to distal sigmoid colon. Mary Hall was evaluated by Dr. Erika Powell on 11/27/18 regarding possible prostate mass and elevated PSA with recommendations for surveillance only. Contrasted chest CT on 11/8/18 at [facility] showed no enlarged axillary, hilar or mediastinal lymph nodes. There were no acute osseous or soft tissue abnormalities. Mary Hall has had treatment delay of Nivolumab on more than one occasion due to rash, requiring treatment with prednisone. Nivolumab was discontinued following dose #2 of maintenance therapy on 12/20/18. He initiated cycle #1 of Tafinlar 150 mg po BID & Mekinist 2 mg po BID on 1/4/19. Contrasted CT chest 1/9/19 at [facility] showed emphysematous changes bilaterally without evidence of metastatic disease. A 1.4 cm right hilar lymph node was unchanged since 2015, likely benign. Abdominal/pelvic CT with contrast 1/9/19 documented a 4 mm hepatic lesion, previously 6 mm. Other previously noted lesions throughout the liver are not appreciated. The enlarged prostate with nodular hypertrophy was previously evaluated by Dr. Erika Powell. Bone scan 1/9/19 was without evidence of osteoblastic disease. Echocardiogram 1/9/19 showed an EF of 60%. Mary Hall was hopsitalized at [facility] 1/12/19 - 1/18/19 for enterocolitis with nausea/vomiting and diarrhea. Antineoplastic therapy was held during that time, rechallenged beginning 1/24/19. Mary Hall was evaluated 2/11/19 at [facility] ER for a 24 hour history of abdominal pain and diarrhea with mild diverticulitis, stable from 1/12/19. He was treated with Cipro and Flagyl. Single agent Tafinlar was taken 3/28/19 - 4/4/19, discontinued by Mary Hall due to intolerable abdominal cramping. Kayli Glimpse was resumed 4/25/19.   CT scans of the chest, abdomen and pelvis with contrast 8/13/2019 at [facility] was negative for intrathoracic metastasis. There was no evidence of disease progression in the abdomen/pelvis with stable, subcentimeter low-density liver lesions noted. Dosing was changed to 480 mg every 4 weeks on 9/5/2019 to see if this may decrease persistent itching. Rx fluoxetine and hydroxyzine per Dr. Steven Bañuelos recommendations. Contrasted CT scans of the chest, abdomen/pelvis and bone scan 1/16/2020 were negative for evidence of disease progression. 19 mm right thyroid lobe nodule and subcentimeter low-density liver lesions were stable. No abnormal bony uptake. Contrasted CT scans of the chest, abdomen and pelvis 6/4/2020 were without evidence of metastatic disease. A 2.2 cm left lower renal lesion is stable  Contrasted CT scans of the chest, abdomen and pelvis 6/4/2020 were negative aside from high-grade stenosis of the SVC with collateral formation for which Coco Fontaine was referred to vascular surgery 10/29/2020. Treatment continues with nivolumab. TREATMENT SUMMARY:  1. Dr. [employer] resected a malignant melanoma from the right posterior arm, above the elbow, on 06/05/14   2. Wide excision with a sentinel lymph node biopsy by Dr. Felipe Bardales on 07/09/14. 3. Adjuvant weekly Sylatron Initiated 09/02/14 at 6 mcg/kg (500mcg) sub-q weekly but was only able to receive 4 of 8 planned treatments of this. The last dose #4 was on 09/28/14   4. Adjuvant weekly Sylatron 3 mcg/kg (250mcg) initiated 10/12/2014. Last dose delivered on 2/15/2015, discontinued due to poor tolerability and unable to tolerate the medication. 5. Opdivo 1 mg/kg and Yervoy 3 mg/kg every 3 weeks ×4 doses. 8/7/18 - 10/25/18. 6. Opdivo 240 mg every 2 weeks initiated 11/15/18, discontinued after dose #2 on 12/20/18   7. Monthly Xgeva initiated 8/7/18.   8.  Tafinlar 150 mg po BID & Mekinist 2 mg po qday, cycle #1 initiated 1/4/19.   9. Single agent Tafinlar 150 mg po BID initiated 3/28/19, discontinue by patient 4/4/19.   10. Opdivo 240 mg every 2 weeks resumed 4/25/19, change to 480 mg every 4 weeks on 9/5/2019    HEMATOLOGY CONCERN:  SVC stenosis  Juliocesar Salas was Evaluated by Simón Baker PA-C with vascular surgery on 11/11/2020 regarding high-grade SVC narrowing with collateral vein formation in the mediastinum noted on contrasted chest CT at [facility] on 10/22/2020. Juliocesar Salas was asymptomatic, observation recommended. Past Medical History:    Past Medical History:   Diagnosis Date    Acid reflux     BPH (benign prostatic hyperplasia)     Cancer (HCC)     Diverticulitis     Hard of hearing     Hypercholesteremia     Hypertension     Malignant melanoma of skin of upper limb, including shoulder ([address] dx 6/5/2014    to liver, stomach, bone, and right axilla     Past Surgical History:    Past Surgical History:   Procedure Laterality Date    CHOLECYSTECTOMY      TUNNELED CENTRAL VENOUS CATHETER W/ SUBCUTANEOUS PORT       Current Medications:    Current Outpatient Medications   Medication Sig Dispense Refill    HYDROcodone-acetaminophen (NORCO)  MG per tablet Take 1 tablet by mouth every 6 hours as needed for Pain for up to 30 days.  120 tablet 0    hydrOXYzine (ATARAX) 25 MG tablet Take 1 tablet by mouth every 8 hours as needed for Itching 270 tablet 0    levothyroxine (SYNTHROID) 100 MCG tablet TAKE 1 TABLET BY MOUTH EVERY DAY 90 tablet 1    promethazine (PHENERGAN) 25 MG tablet Take 12.5 mg by mouth every 6 hours as needed for Nausea      pantoprazole (PROTONIX) 40 MG tablet Take 1 tablet by mouth daily 60 tablet 5    montelukast (SINGULAIR) 10 MG tablet TAKE 1 TABLET BY MOUTH ONCE DAILY AT NIGHT 90 tablet 3    PARoxetine (PAXIL) 10 MG tablet TAKE 1 TABLET BY MOUTH EVERY DAY 90 tablet 3    acetaminophen (TYLENOL) 500 MG tablet Take 1,000 mg by mouth every 6 hours as needed for Pain      dicyclomine (BENTYL) 20 MG tablet TAKE 1/2 TABLET BY MOUTH 4 TIMES A DAY AS NEEDED 180 tablet 3    finasteride (PROSCAR) 5 MG tablet Take 5 mg by mouth      lovastatin (MEVACOR) 40 MG tablet Take 40 mg by mouth       No current facility-administered medications for this visit. Facility-Administered Medications Ordered in Other Visits   Medication Dose Route Frequency Provider Last Rate Last Admin    sterile water injection 2.2 mL  2.2 mL IntraVENous PRN LIAM Higgins   2.2 mL at 22 0901    nivolumab 480 mg in sodium chloride 0.9 % 100 mL chemo IVPB  480 mg IntraVENous Once [employer], APRN        sodium chloride flush 0.9 % injection 10 mL  10 mL IntraVENous PRN LIAM Higgins        heparin flush 100 UNIT/ML injection 500 Units  500 Units IntraCATHeter PRN LIAM Higgins            Allergies: No Known Allergies  Social History:    Social History     Tobacco Use    Smoking status: Former Smoker    Smokeless tobacco: Never Used   Substance Use Topics    Alcohol use: No    Drug use: No     Family History:   Family History   Problem Relation Age of Onset    Heart Attack Brother          age 78 of [place]     Subjective   REVIEW OF SYSTEMS:   Review of Systems   Constitutional: Positive for fatigue (Mild). Negative for fever. No night sweats   HENT: Negative for dental problem, hearing loss, mouth sores, nosebleeds, sore throat and trouble swallowing. Eyes: Negative for discharge and itching. Respiratory: Positive for shortness of breath (Exertional, chronic and mild). Negative for cough and wheezing. Cardiovascular: Negative for chest pain, palpitations and leg swelling. Gastrointestinal: Negative for abdominal pain, constipation, diarrhea, nausea and vomiting. Endocrine: Negative for cold intolerance and heat intolerance. Genitourinary: Negative for dysuria, frequency, hematuria and urgency. Musculoskeletal: Negative for arthralgias, joint swelling and myalgias. Skin: Negative for pallor and rash.         Generalized pruritus, mild   Allergic/Immunologic: Negative for environmental allergies and recurrence 7/27/2018 with an SUV of 8.7   10. Too numerous to count liver metastases   11. Celiac lymph nodes x 2 with highest SUV of 3   12. Bony metastatic disease on PET scan in the right ilium (SUV of 5) and right acetabulum (SUV of 6.4)   13. Indeterminate HORACIO 3 mm subpleural nodule   14. Indeterminate thyroid nodules on chest CT    1st TUMOR HISTORY: Resected stage IIIA (pT3a pN1a M0) right upper extremity malignant melanoma, 06/05/14  Mr. Katharine Fitzpatrick was seen in initial oncology consultation on 08/05/14, referred by Dr. Jeanette Jordan, regarding a diagnosis of resected malignant melanoma of the right upper extremity. Dr. David Contreras resected a malignant melanoma from the right posterior arm, above the elbow, on 06/05/14. Pathology revealed a 2.25 mm thick non-ulcerated Pankaj's level IV malignant melanoma. There were 10 mitoses/ sq mm. There was no vascular or lymphatic invasion. A wide excision with a sentinel lymph node biopsy was performed by by Dr. Magdi Waldron on 07/09/14. Pathology revealed that the right axillary sentinel lymph node was positive for metastatic malignancy by immunoperoxidase stain. The wide excision sample was without further local involvement. A right axillary lymph node dissection was performed by Dr. Magdi Waldron on 07/21/14. No metastatic malignancy was noted in any of the 8 right axillary lymph nodes submitted. The HMB-45 immunoperoxidase stain was negative for metastatic malignant melanoma in any of these subsequently submitted lymph nodes. Completion of a metastatic workup on 08/06/14, including MRI of the brain, bone scan, CT scans of the chest, abdomen and pelvis were negative for evidence of metastatic disease. Adjuvant pegylated Interferon (Sylatron) 6 mcg/kg (500mcg) sub-q weekly x 8 to be followed then by 3 mcg/kg(250mcg) weekly x up to 5 years was discussed and planned. Adjuvant therapy was indeed initiated and delivered as discussed below in treatment summary.   The last dose of the medication was delivered on 2/15/2015. He was unable to tolerate this medication even at reduced dosages because of poor tolerability, weight loss, anorexia, unable to sleep, anxiety, fatigue, et Alan Doyne. He declined any further interferon, which is reasonable. A one-year followup CT scan of the chest and MRI of the brain on 6/11/2015 did not reveal any evidence of recurrent disease.  ----------------------------------PROGRESSION --------------------------------  Jerzy Bernal presented to [facility] emergency department on 7/3/18 for a 3 month history of waxing and waning epigastric abdominal pain and new onset fever. Additional symptoms reported include unexplained weight loss, nausea, headaches. CXR 7/3/18 showed mild cardiomegaly with no acute cardiopulmonary process. Abdominal/pelvic CT with contrast 7/3/18 documented multiple low density bilateral hepatic lesions suspicious for metastatic disease. 2 small renal cysts are noted and one on the left. A second left renal lesion near the lower pole measured 25 mm with SUV of 34, ultrasound recommended. The prostate is markedly enlarged causing partial right obstructive uropathy. CBC shows WBC 7.3, hemoglobin 15.8 and platelets 255,959  CMP revealed a creatinine of 1.2, GFR 59. LFTs WNL  UA negative  Lipase 28, troponin <0.01  He has a history of GERD with laparoscopic paraesophageal hernia repair and fundoplication by Dr. Farhana Meadows on 5/24/15. He was prescribed Norco and Zofran PRN at [facility] ER. Rx was given for omeprazole in clinic  Tumor markers drawn 7/11/18 included:   AFP 5.8   CEA 2.0   CA 19-9 - 8   PSA 7.4 (chronically elevated, up to 8.28 on 8/8/14)  MRI of the abdomen w/wo contrast 7/13/18 at [facility] documented innumerable T1 hypointense, T2 hyperintense lesions throughout the liver as noted on recent CT. One of the larger lesions seen posteriorly in the right hepatic lobe measured 1.6 cm.  Also noted was at least two T2 hyperintense lesions within the thoracocolumbar spine, which could represent metastasis. Bilateral renal ultrasound 7/3/18 showed a 2.6 cm hypoechoic left lower pole renal lesion, not typical of a benign cyst with enhancing characteristics concerning for primary renal cancer versus metastatic disease. Bilateral nephrogenic cysts noted along with marked prostate enlargement with lobular changes measuring 8 x 5.9 x 4.9 cm. MRI of the brain w/wo contrast 7/3/18 for complaint of headache and nausea was without evidence of acute intracranial process. Contrasted chest CT 7/24/18 revealed a stable 2 cm right thyroid nodule, a new 9 mm right thyroid nodule. An indeterminate 3 mm subpleural HORACIO nodule is noted, likely granulomatous or postinfectious. Bone scan 7/24/18 was negative for evidence of osseous metastasis. Examination is remarkable for mid epigastric discomfort, 10 lbs weight loss and 1 inch right axillary lymph node. This was a previous site of positive sentinel lymph node biopsy and dissection associated with the resected, stage IIIa right upper extremity malignant melanoma in 2014. Suspect recurrent malignant melanoma. Arley Pinto was referred to Dr. Beau Shaikh who performed an FNA of the right axillary lymph node 7/26/18. Pathology was consistent for metastatic malignant melanoma. 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 Findings were discussed with both Arley Pinto and his wife by Dr. Edmond Yun at follow-up on 8/1/18 and reiterated on 8/7/18. Recommendation is for combination therapy with Opdivo 1 mg/kg and Yervoy 3 mg/kg every 3 weeks ×4 cycles, followed by Opdivo 240 mg every 2 weeks thereafter. Arley Pinto had a left chest Mediport placed by Dr. Tami Zee 8/3/18. PET scan 8/6/18 showed the following:  3 right axillary lymph nodes, SUV up to 8.7   Hepatic metastasis, too numerous to count   2 celiac lymph nodes SUV 2.8 and 3   Right ilium, SUV 5.  Right acetabulum SUV 6.4  Cycle #1 Opdivo/Yervoy and monthly Xgeva initiated 8/7/18. He was evaluated at [facility] 10/16/18 for LLQ abdominal discomfort with a history of recent diverticulitis. Abdominal/pelvic CT with contrast revealed a decreased size in the multiple liver nodules. Bilateral renal nodules were stable. An enlarged prostate with an exophytic mass arising from the posterior superior aspect of the prostate was present. He was treated for acute diverticulitis of the mid to distal sigmoid colon. Chasity Can was evaluated by Dr. Hudson Collins on 11/27/18 regarding possible prostate mass and elevated PSA with recommendations for surveillance only. Contrasted chest CT on 11/8/18 at [facility] showed no enlarged axillary, hilar or mediastinal lymph nodes. There were no acute osseous or soft tissue abnormalities. Chasity Can has had treatment delay of Nivolumab on more than one occasion due to rash, requiring treatment with prednisone. Nivolumab was discontinued following dose #2 of maintenance therapy on 12/20/18. He initiated cycle #1 of Tafinlar 150 mg po BID & Mekinist 2 mg po BID on 1/4/19. Contrasted CT chest 1/9/19 at [facility] showed emphysematous changes bilaterally without evidence of metastatic disease. A 1.4 cm right hilar lymph node was unchanged since 2015, likely benign. Abdominal/pelvic CT with contrast 1/9/19 documented a 4 mm hepatic lesion, previously 6 mm. Other previously noted lesions throughout the liver are not appreciated. The enlarged prostate with nodular hypertrophy was previously evaluated by Dr. Hudson Collins. Bone scan 1/9/19 was without evidence of osteoblastic disease. Echocardiogram 1/9/19 showed an EF of 60%. Chasity Can was hopsitalized at [facility] 1/12/19 - 1/18/19 for enterocolitis with nausea/vomiting and diarrhea. Antineoplastic therapy was held during that time, rechallenged beginning 1/24/19. Chasity Can was evaluated 2/11/19 at [facility] ER for a 24 hour history of abdominal pain and diarrhea with mild diverticulitis, stable from 1/12/19.  He was treated with Cipro and Flagyl. Single agent Tafinlar was taken 3/28/19 - 4/4/19, discontinued by Kelly Orta due to intolerable abdominal cramping. Ezzard Gayer was resumed 4/25/19. CT scans of the chest, abdomen and pelvis with contrast 8/13/2019 [facility] was negative for intrathoracic metastasis. There was no evidence of disease progression in the abdomen/pelvis with stable, subcentimeter low-density liver lesions noted. Dosing was changed to 480 mg every 4 weeks on 9/5/2019 to see if this may decrease persistent itching. Rx fluoxetine and hydroxyzine per Dr. Ayesha Talley recommendations. Contrasted CT scans of the chest, abdomen/pelvis and bone scan 1/16/2020 were negative for evidence of disease progression. 19 mm right thyroid lobe nodule and subcentimeter low-density liver lesions were stable. No abnormal bony uptake. Contrasted CT scans of the chest, abdomen and pelvis 6/4/2020 were without evidence of metastatic disease. 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 Treatment continues with nivolumab. TREATMENT SUMMARY:  6. Dr. [employer] resected a malignant melanoma from the right posterior arm, above the elbow, on 06/05/14   12. Wide excision with a sentinel lymph node biopsy by Dr. Bina Verdin on 07/09/14. 13. Adjuvant weekly Sylatron Initiated 09/02/14 at 6 mcg/kg (500mcg) sub-q weekly but was only able to receive 4 of 8 planned treatments of this. The last dose #4 was on 09/28/14   14. Adjuvant weekly Sylatron 3 mcg/kg (250mcg) initiated 10/12/2014. Last dose delivered on 2/15/2015, discontinued due to poor tolerability and unable to tolerate the medication. 15. Opdivo 1 mg/kg and Yervoy 3 mg/kg every 3 weeks ×4 doses. 8/7/18 - 10/25/18.    16. Opdivo 240 mg every 2 weeks initiated 11/15/18, discontinued after dose #2 on 12/20/18   17. Monthly Xgeva initiated 8/7/18. 18. Tafinlar 150 mg po BID & Mekinist 2 mg po qday, cycle #1 initiated 1/4/19.   19. Single agent Tafinlar 150 mg po BID initiated 3/28/19, discontinue by patient 4/4/19.   20. Opdivo 240 mg every 2 weeks resumed 4/25/19, change to 480 mg every 4 weeks on 9/5/2019    HEMATOLOGY CONCERN:  SVC stenosis  Refugia Mounika was Evaluated by Zohaib Aragon PA-C with vascular surgery on 11/11/2020 regarding high-grade SVC narrowing with collateral vein formation in the mediastinum noted on contrasted chest CT at [facility] on 10/22/2020. Refugia Mounika was asymptomatic, observation recommended. Past Medical History:    Past Medical History:   Diagnosis Date    Acid reflux     BPH (benign prostatic hyperplasia)     Cancer (HCC)     Diverticulitis     Hard of hearing     Hypercholesteremia     Hypertension     Malignant melanoma of skin of upper limb, including shoulder ([address] dx 6/5/2014    to liver, stomach, bone, and right axilla     Past Surgical History:    Past Surgical History:   Procedure Laterality Date    CHOLECYSTECTOMY      TUNNELED CENTRAL VENOUS CATHETER W/ SUBCUTANEOUS PORT       Current Medications:    Current Outpatient Medications   Medication Sig Dispense Refill    HYDROcodone-acetaminophen (NORCO)  MG per tablet Take 1 tablet by mouth every 6 hours as needed for Pain for up to 30 days.  120 tablet 0    hydrOXYzine (ATARAX) 25 MG tablet Take 1 tablet by mouth every 8 hours as needed for Itching 270 tablet 0    levothyroxine (SYNTHROID) 100 MCG tablet TAKE 1 TABLET BY MOUTH EVERY DAY 90 tablet 1    promethazine (PHENERGAN) 25 MG tablet Take 12.5 mg by mouth every 6 hours as needed for Nausea      pantoprazole (PROTONIX) 40 MG tablet Take 1 tablet by mouth daily 60 tablet 5    montelukast (SINGULAIR) 10 MG tablet TAKE 1 TABLET BY MOUTH ONCE DAILY AT NIGHT 90 tablet 3    PARoxetine (PAXIL) 10 MG tablet TAKE 1 TABLET BY MOUTH EVERY DAY 90 tablet 3    acetaminophen (TYLENOL) 500 MG tablet Take 1,000 mg by mouth every 6 hours as needed for Pain      dicyclomine (BENTYL) 20 MG tablet TAKE 1/2 TABLET BY MOUTH 4 TIMES A DAY AS NEEDED 180 tablet 3    finasteride (PROSCAR) 5 MG tablet Take 5 mg by mouth      lovastatin (MEVACOR) 40 MG tablet Take 40 mg by mouth       No current facility-administered medications for this visit. Facility-Administered Medications Ordered in Other Visits   Medication Dose Route Frequency Provider Last Rate Last Admin    sterile water injection 2.2 mL  2.2 mL IntraVENous PRN Audi Patrick APRN   2.2 mL at 22 0901    nivolumab 480 mg in sodium chloride 0.9 % 100 mL chemo IVPB  480 mg IntraVENous Once [employer], APRN        sodium chloride flush 0.9 % injection 10 mL  10 mL IntraVENous PRN LIAM Archer        heparin flush 100 UNIT/ML injection 500 Units  500 Units IntraCATHeter PRN LIAM Archer            Allergies: No Known Allergies  Social History:    Social History     Tobacco Use    Smoking status: Former Smoker    Smokeless tobacco: Never Used   Substance Use Topics    Alcohol use: No    Drug use: No     Family History:   Family History   Problem Relation Age of Onset    Heart Attack Brother          age 78 of [place]     Subjective   REVIEW OF SYSTEMS:   Review of Systems   Constitutional: Positive for fatigue (Mild). Negative for fever. No night sweats   HENT: Positive for mouth sores. Negative for dental problem, hearing loss, nosebleeds, sore throat and trouble swallowing. Eyes: Negative for discharge and itching. Respiratory: Negative for cough, shortness of breath and wheezing. Cardiovascular: Negative for chest pain, palpitations and leg swelling. Gastrointestinal: Positive for nausea (at times). Negative for constipation, diarrhea and vomiting. Mid abdominal discomfort above the navel   Endocrine: Negative for cold intolerance and heat intolerance. Genitourinary: Negative for dysuria, frequency, hematuria and urgency. Musculoskeletal: Positive for arthralgias. Negative for joint swelling and myalgias. Skin: Positive for rash (right ankle, pruritic ). Negative for pallor. Generalized pruritus, mild   Allergic/Immunologic: Negative for environmental allergies and immunocompromised state. Neurological: Negative for seizures, syncope and numbness. Hematological: Negative for adenopathy. Does not bruise/bleed easily. Psychiatric/Behavioral: Negative for agitation, behavioral problems and confusion. The patient is not nervous/anxious. Objective   Vitals:    05/26/22 0845   BP: (!) 143/75   Pulse: 65   Resp: 20   Temp: 97.9 °F (36.6 °C)   SpO2: 95%     Wt Readings from Last 3 Encounters:   05/26/22 213 lb 8 oz (96.8 kg)   04/28/22 213 lb (96.6 kg)   03/31/22 215 lb 6.4 oz (97.7 kg)     PHYSICAL EXAM:  Physical Exam  Vitals reviewed. Constitutional:       General: He is not in acute distress. Appearance: He is well-developed. He is not toxic-appearing or diaphoretic. Comments: Wearing a facial mask. HENT:      Head: Normocephalic and atraumatic. Right Ear: External ear normal.      Left Ear: External ear normal.      Nose: Nose normal.      Mouth/Throat:      Mouth: Mucous membranes are moist.      Comments: Mouth sore on left resolved. Sore on right improved, not healed completely. Dentures   Eyes:      General: No scleral icterus. Right eye: No discharge. Left eye: No discharge. Conjunctiva/sclera: Conjunctivae normal.   Neck:      Trachea: No tracheal deviation. Cardiovascular:      Rate and Rhythm: Normal rate and regular rhythm. Pulmonary:      Effort: Pulmonary effort is normal. No respiratory distress. Breath sounds: Normal breath sounds. No wheezing or rales. Chest:   Breasts:      Right: No axillary adenopathy or supraclavicular adenopathy.       Left: No axillary adenopathy or supraclavicular adenopathy. Abdominal:      General: Bowel sounds are normal. There is no distension. Palpations: Abdomen is soft. Tenderness: There is no abdominal tenderness. There is no guarding. Genitourinary:     Comments: Exam deferred  Musculoskeletal:         General: No tenderness or deformity. Cervical back: Neck supple. No muscular tenderness. Comments: Normal ROM all four extremities   Lymphadenopathy:      Cervical:      Right cervical: No superficial, deep or posterior cervical adenopathy. Left cervical: No superficial, deep or posterior cervical adenopathy. Upper Body:      Right upper body: No supraclavicular or axillary adenopathy. Left upper body: No supraclavicular or axillary adenopathy. Skin:     General: Skin is warm and dry. Findings: No erythema. Comments: 1.5 x 1.5 inch annular lesion with dryness/mild erythema to right lateral ankle   Neurological:      Mental Status: He is alert and oriented to person, place, and time. Comments: follows commands, non-focal   Psychiatric:         Behavior: Behavior normal. Behavior is cooperative. Thought Content: Thought content normal.         Judgment: Judgment normal.      Comments: Alert and oriented to person, place and time.        Labs reviewed by me:    CBC 5/26/22  Lab Results   Component Value Date    WBC 9.10 (H) 05/26/2022    HGB 14.5 05/26/2022    HCT 44.5 05/26/2022    MCV 91.4 05/26/2022     05/26/2022    LYMPHOPCT 28.2 05/26/2022    RBC 4.87 05/26/2022    MCH 29.8 05/26/2022    MCHC 32.6 05/26/2022    RDW 12.6 05/26/2022     Lab Results   Component Value Date     05/26/2022    K 4.3 05/26/2022    CL 99 05/26/2022    CO2 29 05/26/2022    BUN 14 05/26/2022    CREATININE 1.1 05/26/2022    GLUCOSE 107 (H) 05/26/2022    CALCIUM 9.2 05/26/2022    PROT 7.0 05/26/2022    LABALBU 4.2 05/26/2022    BILITOT 0.9 05/26/2022    ALKPHOS 57 05/26/2022    AST 50 05/26/2022    ALT 31 05/26/2022    LABGLOM >60 05/26/2022    GFRAA >59 01/06/2022    AGRATIO 1.8 12/26/2019    GLOB 2.8 05/26/2022     Lab Results   Component Value Date    TSH 4.610 (H) 04/28/2022     ASSESSMENT:   1. Malignant melanoma of right upper extremity including shoulder (HCC)    2. Encounter for antineoplastic immunotherapy    3. Hypothyroidism due to medication    4. Superior vena cava stenosis    5. Left renal mass       Malignant melanoma of skin of right upper extremity, including shoulder ([address]  Encounter for antineoplastic immunotherapy    CT Chest with contrast 3/24/2022 at [facility]:  · No evidence of intrathoracic metastasis. · Stable peripherally calcified 1.0 cm right thyroid nodule, unchanged from 8/13/2019. CT Abdomen and pelvis 3/24/2022 at [facility]:   · No evidence of intra-abdominal or pelvic metastasis. · Sigmoid colonic diverticulosis. · Prostate enlargement (5.3 x 5.9 cm). · Stable renal cysts including hyperdense inferior left renal cyst measuring 2.3 cm. No new skin lesions or lymphadenopathies on exam.  CBC, CMP, TSH ordered today, completed and reviewed. Proceed with Opdivo today and continue every 4 weeks. 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 He has had a excellent treatment response with immunotherapy. Continue treatment. Mouth sore secondary to chemotherapy  One sore is resolved the other improved.  No visible bone exposure  Will Hold monthly Xgeva again today, per patient request  Continue dexamethasone mouth rinse as needed    hypothyroidism due to medication    Continue Synthroid to 100 mcg po daily   Repeat TSH today    Left renal mass  2.2 cm left renal mass stable on abdominal/pelvic CT dated 6/4/2020, 10/22/2020 and 4/1/2021, 9/30/2021, 3/4/2022  Previously evaluated by urology, monitor conservatively  Continue to observe on surveillance imaging for metastatic melanoma    Superior vena cava stenosis  Asymptomatic, followed by ALEAH Nowak  Contrasted chest CT 12/7/2021: Patent left axillary vein/left subclavian vein. Normal appearance of the partially opacified right jugular vein. Normal appearance of the unopacified right subclavian vein to the extent visualized. Focal narrowing of the SVC  No indication of SVC on recent Chest CT 3/4/2022  No anticoagulation, monitored conservatively     Tinea   1.0 - 1.5 inch annular lesion with dryness/scale to right lateral ankle  Eczema vs tinea  Recommend Lotrisone cream OTC    Health Maintenance  Colonoscopy 8/27/2017 by Dr. Omar Mcqueen. 5 Year recall. Plan of care discussed with Chasity Can and his wife. All questions answered. Orders Placed This Encounter   Procedures    CBC with Auto Differential    Comprehensive Metabolic Panel    TSH     No orders of the defined types were placed in this encounter. Return in about 3 months (around 8/26/2022) for follow up with LIAM Wiggins. I have seen, examined and reviewed this patient medication list, appropriate labs and imaging studies. I reviewed relevant medical records and others physicians notes. I discussed the plan of care with the patient. I answered all questions to the patients satisfaction. I have also reviewed the chief complaint (CC) and part of the history (History of Present Illness (HPI), Past Family Social History [facility]), or Review of Systems (ROS) and made changes when appropriated. Dictated utilizing Dragon transcription software.       LIAM Kathleen  9:31 AM  5/26/2022  Objective   Vitals:    05/26/22 0845   BP: (!) 143/75   Pulse: 65   Resp: 20   Temp: 97.9 °F (36.6 °C)   SpO2: 95%     Wt Readings from Last 3 Encounters:   05/26/22 213 lb 8 oz (96.8 kg)   04/28/22 213 lb (96.6 kg)   03/31/22 215 lb 6.4 oz (97.7 kg)     PHYSICAL EXAM:  Physical Exam  Vitals reviewed. Constitutional:       General: He is not in acute distress. Appearance: He is well-developed. He is not toxic-appearing or diaphoretic. Comments: Wearing a facial mask. HENT:      Head: Normocephalic and atraumatic. Right Ear: External ear normal.      Left Ear: External ear normal.      Nose: Nose normal.      Mouth/Throat:      Mouth: Mucous membranes are moist.   Eyes:      General: No scleral icterus. Right eye: No discharge. Left eye: No discharge. Conjunctiva/sclera: Conjunctivae normal.   Neck:      Trachea: No tracheal deviation. Cardiovascular:      Rate and Rhythm: Normal rate and regular rhythm. Pulmonary:      Effort: Pulmonary effort is normal. No respiratory distress. Breath sounds: Normal breath sounds. No wheezing or rales. Chest:   Breasts:      Right: No axillary adenopathy or supraclavicular adenopathy. Left: No axillary adenopathy or supraclavicular adenopathy. Abdominal:      General: Bowel sounds are normal. There is no distension. Palpations: Abdomen is soft. Tenderness: There is no abdominal tenderness. There is no guarding. Genitourinary:     Comments: Exam deferred  Musculoskeletal:         General: No tenderness or deformity. Cervical back: Neck supple. No muscular tenderness. Comments: Normal ROM all four extremities   Lymphadenopathy:      Cervical:      Right cervical: No superficial, deep or posterior cervical adenopathy. Left cervical: No superficial, deep or posterior cervical adenopathy. Upper Body:      Right upper body: No supraclavicular or axillary adenopathy. Left upper body: No supraclavicular or axillary adenopathy. Skin:     General: Skin is warm and dry. Findings: No erythema. Comments: Prior annular lesion to right lateral ankle is resolved   Neurological:      Mental Status: He is alert and oriented to person, place, and time.       Comments: follows commands, non-focal   Psychiatric:         Behavior: Behavior normal. Behavior is cooperative. Thought Content: Thought content normal.         Judgment: Judgment normal.      Comments: Alert and oriented to person, place and time. Labs reviewed by me:    CBC 5/26/22  Lab Results   Component Value Date    WBC 9.10 (H) 05/26/2022    HGB 14.5 05/26/2022    HCT 44.5 05/26/2022    MCV 91.4 05/26/2022     05/26/2022    LYMPHOPCT 28.2 05/26/2022    RBC 4.87 05/26/2022    MCH 29.8 05/26/2022    MCHC 32.6 05/26/2022    RDW 12.6 05/26/2022     Lab Results   Component Value Date     05/26/2022    K 4.3 05/26/2022    CL 99 05/26/2022    CO2 29 05/26/2022    BUN 14 05/26/2022    CREATININE 1.1 05/26/2022    GLUCOSE 107 (H) 05/26/2022    CALCIUM 9.2 05/26/2022    PROT 7.0 05/26/2022    LABALBU 4.2 05/26/2022    BILITOT 0.9 05/26/2022    ALKPHOS 57 05/26/2022    AST 50 05/26/2022    ALT 31 05/26/2022    LABGLOM >60 05/26/2022    GFRAA >59 01/06/2022    AGRATIO 1.8 12/26/2019    GLOB 2.8 05/26/2022     Lab Results   Component Value Date    TSH 4.610 (H) 04/28/2022       ASSESSMENT:   1. Malignant melanoma of right upper extremity including shoulder (HCC)    2. Encounter for antineoplastic immunotherapy    3. Hypothyroidism due to medication    4. Superior vena cava stenosis    5. Left renal mass       Malignant melanoma of skin of right upper extremity, including shoulder ([address]  Encounter for antineoplastic immunotherapy    CT Chest with contrast 3/24/2022 at [facility]:  · No evidence of intrathoracic metastasis. · Stable peripherally calcified 1.0 cm right thyroid nodule, unchanged from 8/13/2019. CT Abdomen and pelvis 3/24/2022 at [facility]:   · No evidence of intra-abdominal or pelvic metastasis. · Sigmoid colonic diverticulosis. · Prostate enlargement (5.3 x 5.9 cm). · Stable renal cysts including hyperdense inferior left renal cyst measuring 2.3 cm.      No new skin lesions or lymphadenopathies on exam.  CBC, CMP, TSH ordered today  Proceed with Opdivo today and continue every 4 weeks. Resume monthly X-Geva    He has had a excellent treatment response with immunotherapy. Continue treatment. Plan Next imaging studies 9/2022, sooner if signs/symptoms warrant. hypothyroidism due to medication    Continue Synthroid to 100 mcg po daily   Repeat TSH today    Left renal mass  2.2 cm left renal mass stable on abdominal/pelvic CT dated 6/4/2020, 10/22/2020 and 4/1/2021, 9/30/2021, 3/4/2022  Previously evaluated by urology, monitor conservatively  Continue to observe on surveillance imaging for metastatic melanoma    Superior vena cava stenosis  Asymptomatic, followed by ALEAH Will  Contrasted chest CT 12/7/2021: Patent left axillary vein/left subclavian vein. Normal appearance of the partially opacified right jugular vein. Normal appearance of the unopacified right subclavian vein to the extent visualized. Focal narrowing of the SVC  No indication of SVC on recent Chest CT 3/4/2022  No anticoagulation, monitored conservatively     Chronic pruritus  Stable, takes Atarax, Singulair    Health Maintenance  Colonoscopy 8/27/2017 by Dr. Rajendra Mckenzie. 5 Year recall. Plan of care discussed with Isaak Bauer and his wife. All questions answered. Orders Placed This Encounter   Procedures    CBC with Auto Differential    Comprehensive Metabolic Panel    TSH     RTC RN every 4 weeks for immunotherapy. Return in about 3 months (around 8/26/2022) for follow up with LIAM Escobedo. Arrange surveillance imaging at that time. I have seen, examined and reviewed this patient medication list, appropriate labs and imaging studies. I reviewed relevant medical records and others physicians notes. I discussed the plan of care with the patient. I answered all questions to the patients satisfaction.  I have also reviewed the chief complaint (CC) and part of the history (History of Present Illness (HPI), Past Family Social History [facility]), or Review of Systems (ROS) and made changes when appropriated. Dictated utilizing Dragon transcription software.       LIAM Troncoso  9:31 AM  5/26/2022

## 2022-05-26 ENCOUNTER — HOSPITAL ENCOUNTER (OUTPATIENT)
Dept: INFUSION THERAPY | Age: 82
Discharge: HOME | End: 2022-05-26
Payer: MEDICARE

## 2022-05-26 ENCOUNTER — OFFICE VISIT (OUTPATIENT)
Dept: HEMATOLOGY | Age: 82
End: 2022-05-26
Payer: MEDICARE

## 2022-05-26 VITALS
BODY MASS INDEX: 29.89 KG/M2 | DIASTOLIC BLOOD PRESSURE: 75 MMHG | TEMPERATURE: 97.9 F | HEART RATE: 65 BPM | OXYGEN SATURATION: 95 % | WEIGHT: 213.5 LBS | HEIGHT: 71 IN | SYSTOLIC BLOOD PRESSURE: 143 MMHG | RESPIRATION RATE: 20 BRPM

## 2022-05-26 DIAGNOSIS — E03.2 HYPOTHYROIDISM DUE TO MEDICATION: ICD-10-CM

## 2022-05-26 DIAGNOSIS — C43.61 MALIGNANT MELANOMA OF RIGHT UPPER EXTREMITY INCLUDING SHOULDER (HCC): ICD-10-CM

## 2022-05-26 DIAGNOSIS — I87.1 SUPERIOR VENA CAVA STENOSIS: ICD-10-CM

## 2022-05-26 DIAGNOSIS — E03.9 ACQUIRED HYPOTHYROIDISM: ICD-10-CM

## 2022-05-26 DIAGNOSIS — C43.61 MALIGNANT MELANOMA OF RIGHT UPPER EXTREMITY INCLUDING SHOULDER (HCC): Primary | ICD-10-CM

## 2022-05-26 DIAGNOSIS — C79.51 BONE METASTASES (HCC): ICD-10-CM

## 2022-05-26 DIAGNOSIS — C43.9 MALIGNANT MELANOMA, UNSPECIFIED SITE (HCC): ICD-10-CM

## 2022-05-26 DIAGNOSIS — N28.89 LEFT RENAL MASS: ICD-10-CM

## 2022-05-26 DIAGNOSIS — Z51.12 ENCOUNTER FOR ANTINEOPLASTIC IMMUNOTHERAPY: ICD-10-CM

## 2022-05-26 DIAGNOSIS — Z95.828 PORT-A-CATH IN PLACE: Primary | ICD-10-CM

## 2022-05-26 LAB
ALBUMIN SERPL-MCNC: 4.2 G/DL (ref 3.5–5.2)
ALP BLD-CCNC: 57 U/L (ref 40–130)
ALT SERPL-CCNC: 31 U/L (ref 21–72)
ANION GAP SERPL CALCULATED.3IONS-SCNC: 9 MMOL/L (ref 7–19)
AST SERPL-CCNC: 50 U/L (ref 17–59)
BILIRUB SERPL-MCNC: 0.9 MG/DL (ref 0.2–1.3)
BUN BLDV-MCNC: 14 MG/DL (ref 9–20)
CALCIUM SERPL-MCNC: 9.2 MG/DL (ref 8.4–10.2)
CHLORIDE BLD-SCNC: 99 MMOL/L (ref 98–111)
CO2: 29 MMOL/L (ref 22–29)
CREAT SERPL-MCNC: 1.1 MG/DL (ref 0.6–1.2)
GFR NON-AFRICAN AMERICAN: >60
GLOBULIN: 2.8 G/DL
GLUCOSE BLD-MCNC: 107 MG/DL (ref 74–106)
HCT VFR BLD CALC: 44.5 % (ref 40.1–51)
HEMOGLOBIN: 14.5 G/DL (ref 13.7–17.5)
LYMPHOCYTES ABSOLUTE: 2.57 K/UL (ref 1.18–3.74)
LYMPHOCYTES RELATIVE PERCENT: 28.2 % (ref 19.3–53.1)
MCH RBC QN AUTO: 29.8 PG (ref 25.7–32.2)
MCHC RBC AUTO-ENTMCNC: 32.6 G/DL (ref 32.3–36.5)
MCV RBC AUTO: 91.4 FL (ref 79–92.2)
MONOCYTES ABSOLUTE: 0.87 K/UL (ref 0.24–0.82)
MONOCYTES RELATIVE PERCENT: 9.6 % (ref 4.7–12.5)
NEUTROPHILS ABSOLUTE: 5.05 K/UL (ref 1.56–6.13)
NEUTROPHILS RELATIVE PERCENT: 55.5 % (ref 34–71.1)
PDW BLD-RTO: 12.6 % (ref 11.6–14.4)
PLATELET # BLD: 222 K/UL (ref 163–337)
PMV BLD AUTO: 9.9 FL (ref 7.4–10.4)
POTASSIUM SERPL-SCNC: 4.3 MMOL/L (ref 3.5–5.1)
RBC # BLD: 4.87 M/UL (ref 4.63–6.08)
SODIUM BLD-SCNC: 137 MMOL/L (ref 137–145)
TOTAL PROTEIN: 7 G/DL (ref 6.3–8.2)
TSH SERPL DL<=0.05 MIU/L-ACNC: 4.81 UIU/ML (ref 0.27–4.2)
WBC # BLD: 9.1 K/UL (ref 4.23–9.07)

## 2022-05-26 PROCEDURE — G8427 DOCREV CUR MEDS BY ELIG CLIN: HCPCS | Performed by: NURSE PRACTITIONER

## 2022-05-26 PROCEDURE — 80053 COMPREHEN METABOLIC PANEL: CPT

## 2022-05-26 PROCEDURE — 36593 DECLOT VASCULAR DEVICE: CPT

## 2022-05-26 PROCEDURE — 99214 OFFICE O/P EST MOD 30 MIN: CPT | Performed by: NURSE PRACTITIONER

## 2022-05-26 PROCEDURE — 85025 COMPLETE CBC W/AUTO DIFF WBC: CPT

## 2022-05-26 PROCEDURE — 2580000003 HC RX 258: Performed by: NURSE PRACTITIONER

## 2022-05-26 PROCEDURE — 1123F ACP DISCUSS/DSCN MKR DOCD: CPT | Performed by: NURSE PRACTITIONER

## 2022-05-26 PROCEDURE — 96413 CHEMO IV INFUSION 1 HR: CPT

## 2022-05-26 PROCEDURE — 6360000002 HC RX W HCPCS: Performed by: NURSE PRACTITIONER

## 2022-05-26 PROCEDURE — 96375 TX/PRO/DX INJ NEW DRUG ADDON: CPT

## 2022-05-26 PROCEDURE — 96372 THER/PROPH/DIAG INJ SC/IM: CPT

## 2022-05-26 PROCEDURE — G8417 CALC BMI ABV UP PARAM F/U: HCPCS | Performed by: NURSE PRACTITIONER

## 2022-05-26 PROCEDURE — 1036F TOBACCO NON-USER: CPT | Performed by: NURSE PRACTITIONER

## 2022-05-26 RX ORDER — SODIUM CHLORIDE 0.9 % (FLUSH) 0.9 %
10 SYRINGE (ML) INJECTION PRN
Status: DISCONTINUED | OUTPATIENT
Start: 2022-05-26 | End: 2022-05-27 | Stop reason: HOSPADM

## 2022-05-26 RX ORDER — SODIUM CHLORIDE 0.9 % (FLUSH) 0.9 %
5 SYRINGE (ML) INJECTION PRN
Status: CANCELLED | OUTPATIENT
Start: 2022-05-26

## 2022-05-26 RX ORDER — EPINEPHRINE 1 MG/ML
0.3 INJECTION, SOLUTION, CONCENTRATE INTRAVENOUS PRN
Status: CANCELLED | OUTPATIENT
Start: 2022-05-26

## 2022-05-26 RX ORDER — HEPARIN SODIUM (PORCINE) LOCK FLUSH IV SOLN 100 UNIT/ML 100 UNIT/ML
500 SOLUTION INTRAVENOUS PRN
Status: DISCONTINUED | OUTPATIENT
Start: 2022-05-26 | End: 2022-05-28 | Stop reason: HOSPADM

## 2022-05-26 RX ORDER — SODIUM CHLORIDE 0.9 % (FLUSH) 0.9 %
10 SYRINGE (ML) INJECTION PRN
Status: CANCELLED | OUTPATIENT
Start: 2022-05-26

## 2022-05-26 RX ORDER — DIPHENHYDRAMINE HYDROCHLORIDE 50 MG/ML
50 INJECTION INTRAMUSCULAR; INTRAVENOUS PRN
Status: CANCELLED | OUTPATIENT
Start: 2022-05-26

## 2022-05-26 RX ORDER — HEPARIN SODIUM (PORCINE) LOCK FLUSH IV SOLN 100 UNIT/ML 100 UNIT/ML
500 SOLUTION INTRAVENOUS PRN
Status: CANCELLED | OUTPATIENT
Start: 2022-05-26

## 2022-05-26 RX ORDER — METHYLPREDNISOLONE SODIUM SUCCINATE 125 MG/2ML
125 INJECTION, POWDER, LYOPHILIZED, FOR SOLUTION INTRAMUSCULAR; INTRAVENOUS PRN
Status: CANCELLED | OUTPATIENT
Start: 2022-05-26

## 2022-05-26 RX ADMIN — DENOSUMAB 120 MG: 120 INJECTION SUBCUTANEOUS at 10:12

## 2022-05-26 RX ADMIN — HEPARIN 500 UNITS: 100 SYRINGE at 10:11

## 2022-05-26 RX ADMIN — SODIUM CHLORIDE, PRESERVATIVE FREE 10 ML: 5 INJECTION INTRAVENOUS at 10:11

## 2022-05-26 RX ADMIN — ALTEPLASE 2 MG: 2.2 INJECTION, POWDER, LYOPHILIZED, FOR SOLUTION INTRAVENOUS at 09:01

## 2022-05-26 RX ADMIN — SODIUM CHLORIDE 480 MG: 9 INJECTION, SOLUTION INTRAVENOUS at 09:40

## 2022-05-26 RX ADMIN — WATER 2.2 ML: 1 INJECTION INTRAMUSCULAR; INTRAVENOUS; SUBCUTANEOUS at 09:01

## 2022-05-26 ASSESSMENT — ENCOUNTER SYMPTOMS
SORE THROAT: 0
CONSTIPATION: 0
EYE DISCHARGE: 0
DIARRHEA: 0
NAUSEA: 0
VOMITING: 0
TROUBLE SWALLOWING: 0
EYE ITCHING: 0
ABDOMINAL PAIN: 0
WHEEZING: 0
SHORTNESS OF BREATH: 1
COUGH: 0

## 2022-06-21 DIAGNOSIS — G89.3 CANCER RELATED PAIN: ICD-10-CM

## 2022-06-21 RX ORDER — HYDROCODONE BITARTRATE AND ACETAMINOPHEN 10; 325 MG/1; MG/1
1 TABLET ORAL EVERY 6 HOURS PRN
Qty: 120 TABLET | Refills: 0 | Status: SHIPPED | OUTPATIENT
Start: 2022-06-21 | End: 2022-07-20 | Stop reason: SDUPTHER

## 2022-06-21 NOTE — TELEPHONE ENCOUNTER
Patients wife called requesting a refill for [place]. Script sent to MD for approval. Prescription will go to [facility] on the [address] [facility] side.  Electronically signed by Michael Hewitt RN on 6/21/2022 at 8:43 AM

## 2022-06-23 ENCOUNTER — HOSPITAL ENCOUNTER (OUTPATIENT)
Dept: INFUSION THERAPY | Age: 82
Discharge: HOME | End: 2022-06-23
Payer: MEDICARE

## 2022-06-23 VITALS
HEART RATE: 66 BPM | TEMPERATURE: 97.7 F | SYSTOLIC BLOOD PRESSURE: 165 MMHG | HEIGHT: 71 IN | OXYGEN SATURATION: 94 % | DIASTOLIC BLOOD PRESSURE: 68 MMHG | WEIGHT: 209.4 LBS | RESPIRATION RATE: 20 BRPM | BODY MASS INDEX: 29.31 KG/M2

## 2022-06-23 DIAGNOSIS — C43.61 MALIGNANT MELANOMA OF RIGHT UPPER EXTREMITY INCLUDING SHOULDER (HCC): ICD-10-CM

## 2022-06-23 DIAGNOSIS — C79.51 BONE METASTASES (HCC): ICD-10-CM

## 2022-06-23 DIAGNOSIS — C43.61 MALIGNANT MELANOMA OF RIGHT UPPER EXTREMITY INCLUDING SHOULDER (HCC): Primary | ICD-10-CM

## 2022-06-23 DIAGNOSIS — Z95.828 PORT-A-CATH IN PLACE: ICD-10-CM

## 2022-06-23 DIAGNOSIS — R53.83 FATIGUE DUE TO TREATMENT: ICD-10-CM

## 2022-06-23 DIAGNOSIS — C43.9 MALIGNANT MELANOMA, UNSPECIFIED SITE (HCC): ICD-10-CM

## 2022-06-23 LAB
ALBUMIN SERPL-MCNC: 4.2 G/DL (ref 3.5–5.2)
ALP BLD-CCNC: 50 U/L (ref 40–130)
ALT SERPL-CCNC: 30 U/L (ref 21–72)
ANION GAP SERPL CALCULATED.3IONS-SCNC: 7 MMOL/L (ref 7–19)
AST SERPL-CCNC: 37 U/L (ref 17–59)
BILIRUB SERPL-MCNC: 1.2 MG/DL (ref 0.2–1.3)
BUN BLDV-MCNC: 17 MG/DL (ref 9–20)
CALCIUM SERPL-MCNC: 9.4 MG/DL (ref 8.4–10.2)
CHLORIDE BLD-SCNC: 105 MMOL/L (ref 98–111)
CO2: 28 MMOL/L (ref 22–29)
CREAT SERPL-MCNC: 1.2 MG/DL (ref 0.6–1.2)
GFR NON-AFRICAN AMERICAN: 58
GLOBULIN: 2.6 G/DL
GLUCOSE BLD-MCNC: 116 MG/DL (ref 74–106)
HCT VFR BLD CALC: 43.2 % (ref 40.1–51)
HEMOGLOBIN: 14.1 G/DL (ref 13.7–17.5)
LYMPHOCYTES ABSOLUTE: 2.39 K/UL (ref 1.18–3.74)
LYMPHOCYTES RELATIVE PERCENT: 30.7 % (ref 19.3–53.1)
MCH RBC QN AUTO: 29.6 PG (ref 25.7–32.2)
MCHC RBC AUTO-ENTMCNC: 32.6 G/DL (ref 32.3–36.5)
MCV RBC AUTO: 90.8 FL (ref 79–92.2)
MONOCYTES ABSOLUTE: 0.83 K/UL (ref 0.24–0.82)
MONOCYTES RELATIVE PERCENT: 10.7 % (ref 4.7–12.5)
NEUTROPHILS ABSOLUTE: 4.1 K/UL (ref 1.56–6.13)
NEUTROPHILS RELATIVE PERCENT: 52.5 % (ref 34–71.1)
PDW BLD-RTO: 12.9 % (ref 11.6–14.4)
PLATELET # BLD: 218 K/UL (ref 163–337)
PMV BLD AUTO: 9.8 FL (ref 7.4–10.4)
POTASSIUM SERPL-SCNC: 4 MMOL/L (ref 3.5–5.1)
RBC # BLD: 4.76 M/UL (ref 4.63–6.08)
SODIUM BLD-SCNC: 140 MMOL/L (ref 137–145)
TOTAL PROTEIN: 6.8 G/DL (ref 6.3–8.2)
WBC # BLD: 7.79 K/UL (ref 4.23–9.07)

## 2022-06-23 PROCEDURE — 96372 THER/PROPH/DIAG INJ SC/IM: CPT

## 2022-06-23 PROCEDURE — 96413 CHEMO IV INFUSION 1 HR: CPT

## 2022-06-23 PROCEDURE — 80053 COMPREHEN METABOLIC PANEL: CPT

## 2022-06-23 PROCEDURE — 85025 COMPLETE CBC W/AUTO DIFF WBC: CPT

## 2022-06-23 PROCEDURE — 6360000002 HC RX W HCPCS: Performed by: NURSE PRACTITIONER

## 2022-06-23 PROCEDURE — 2580000003 HC RX 258: Performed by: NURSE PRACTITIONER

## 2022-06-23 RX ORDER — HEPARIN SODIUM (PORCINE) LOCK FLUSH IV SOLN 100 UNIT/ML 100 UNIT/ML
500 SOLUTION INTRAVENOUS PRN
Status: CANCELLED | OUTPATIENT
Start: 2022-06-23

## 2022-06-23 RX ORDER — HEPARIN SODIUM (PORCINE) LOCK FLUSH IV SOLN 100 UNIT/ML 100 UNIT/ML
500 SOLUTION INTRAVENOUS PRN
Status: DISCONTINUED | OUTPATIENT
Start: 2022-06-23 | End: 2022-06-25 | Stop reason: HOSPADM

## 2022-06-23 RX ORDER — METHYLPREDNISOLONE SODIUM SUCCINATE 125 MG/2ML
125 INJECTION, POWDER, LYOPHILIZED, FOR SOLUTION INTRAMUSCULAR; INTRAVENOUS PRN
Status: CANCELLED | OUTPATIENT
Start: 2022-06-23

## 2022-06-23 RX ORDER — SODIUM CHLORIDE 0.9 % (FLUSH) 0.9 %
5 SYRINGE (ML) INJECTION PRN
Status: CANCELLED | OUTPATIENT
Start: 2022-06-23

## 2022-06-23 RX ORDER — DIPHENHYDRAMINE HYDROCHLORIDE 50 MG/ML
50 INJECTION INTRAMUSCULAR; INTRAVENOUS PRN
Status: CANCELLED | OUTPATIENT
Start: 2022-06-23

## 2022-06-23 RX ORDER — SODIUM CHLORIDE 0.9 % (FLUSH) 0.9 %
10 SYRINGE (ML) INJECTION PRN
Status: DISCONTINUED | OUTPATIENT
Start: 2022-06-23 | End: 2022-06-24 | Stop reason: HOSPADM

## 2022-06-23 RX ORDER — EPINEPHRINE 1 MG/ML
0.3 INJECTION, SOLUTION, CONCENTRATE INTRAVENOUS PRN
Status: CANCELLED | OUTPATIENT
Start: 2022-06-23

## 2022-06-23 RX ORDER — SODIUM CHLORIDE 0.9 % (FLUSH) 0.9 %
10 SYRINGE (ML) INJECTION PRN
Status: CANCELLED | OUTPATIENT
Start: 2022-06-23

## 2022-06-23 RX ADMIN — SODIUM CHLORIDE 480 MG: 9 INJECTION, SOLUTION INTRAVENOUS at 09:35

## 2022-06-23 RX ADMIN — DENOSUMAB 120 MG: 120 INJECTION SUBCUTANEOUS at 10:06

## 2022-06-23 RX ADMIN — SODIUM CHLORIDE, PRESERVATIVE FREE 10 ML: 5 INJECTION INTRAVENOUS at 10:06

## 2022-06-23 RX ADMIN — HEPARIN 500 UNITS: 100 SYRINGE at 10:06

## 2022-06-27 RX ORDER — METHYLPREDNISOLONE SODIUM SUCCINATE 125 MG/2ML
125 INJECTION, POWDER, LYOPHILIZED, FOR SOLUTION INTRAMUSCULAR; INTRAVENOUS PRN
Status: CANCELLED | OUTPATIENT
Start: 2022-07-21

## 2022-06-27 RX ORDER — DIPHENHYDRAMINE HYDROCHLORIDE 50 MG/ML
50 INJECTION INTRAMUSCULAR; INTRAVENOUS PRN
Status: CANCELLED | OUTPATIENT
Start: 2022-07-21

## 2022-07-20 DIAGNOSIS — G89.3 CANCER RELATED PAIN: ICD-10-CM

## 2022-07-20 RX ORDER — HYDROCODONE BITARTRATE AND ACETAMINOPHEN 10; 325 MG/1; MG/1
1 TABLET ORAL EVERY 6 HOURS PRN
Qty: 120 TABLET | Refills: 0 | Status: SHIPPED | OUTPATIENT
Start: 2022-07-20 | End: 2022-08-26 | Stop reason: SDUPTHER

## 2022-07-21 ENCOUNTER — TELEPHONE (OUTPATIENT)
Dept: VASCULAR SURGERY | Age: 82
End: 2022-07-21

## 2022-07-21 ENCOUNTER — HOSPITAL ENCOUNTER (OUTPATIENT)
Dept: INFUSION THERAPY | Age: 82
Discharge: HOME | End: 2022-07-21
Payer: MEDICARE

## 2022-07-21 VITALS
HEIGHT: 71 IN | HEART RATE: 75 BPM | WEIGHT: 208.4 LBS | RESPIRATION RATE: 16 BRPM | SYSTOLIC BLOOD PRESSURE: 164 MMHG | DIASTOLIC BLOOD PRESSURE: 78 MMHG | BODY MASS INDEX: 29.18 KG/M2 | OXYGEN SATURATION: 95 % | TEMPERATURE: 98.1 F

## 2022-07-21 DIAGNOSIS — R53.83 FATIGUE DUE TO TREATMENT: ICD-10-CM

## 2022-07-21 DIAGNOSIS — C43.9 MALIGNANT MELANOMA, UNSPECIFIED SITE (HCC): ICD-10-CM

## 2022-07-21 DIAGNOSIS — C43.61 MALIGNANT MELANOMA OF RIGHT UPPER EXTREMITY INCLUDING SHOULDER (HCC): Primary | ICD-10-CM

## 2022-07-21 DIAGNOSIS — Z95.828 PORT-A-CATH IN PLACE: ICD-10-CM

## 2022-07-21 DIAGNOSIS — I87.8 POOR VENOUS ACCESS: Primary | ICD-10-CM

## 2022-07-21 DIAGNOSIS — C79.51 BONE METASTASES (HCC): ICD-10-CM

## 2022-07-21 LAB
HCT VFR BLD CALC: 45.4 % (ref 40.1–51)
HEMOGLOBIN: 14.9 G/DL (ref 13.7–17.5)
LYMPHOCYTES ABSOLUTE: 2.11 K/UL (ref 1.18–3.74)
LYMPHOCYTES RELATIVE PERCENT: 24.2 % (ref 19.3–53.1)
MCH RBC QN AUTO: 29.7 PG (ref 25.7–32.2)
MCHC RBC AUTO-ENTMCNC: 32.8 G/DL (ref 32.3–36.5)
MCV RBC AUTO: 90.6 FL (ref 79–92.2)
MONOCYTES ABSOLUTE: 0.71 K/UL (ref 0.24–0.82)
MONOCYTES RELATIVE PERCENT: 8.2 % (ref 4.7–12.5)
NEUTROPHILS ABSOLUTE: 5.45 K/UL (ref 1.56–6.13)
NEUTROPHILS RELATIVE PERCENT: 62.6 % (ref 34–71.1)
PDW BLD-RTO: 12.7 % (ref 11.6–14.4)
PLATELET # BLD: 226 K/UL (ref 163–337)
PMV BLD AUTO: 9.3 FL (ref 7.4–10.4)
RBC # BLD: 5.01 M/UL (ref 4.63–6.08)
WBC # BLD: 8.71 K/UL (ref 4.23–9.07)

## 2022-07-21 PROCEDURE — 96413 CHEMO IV INFUSION 1 HR: CPT

## 2022-07-21 PROCEDURE — 36593 DECLOT VASCULAR DEVICE: CPT

## 2022-07-21 PROCEDURE — 6360000002 HC RX W HCPCS: Performed by: NURSE PRACTITIONER

## 2022-07-21 PROCEDURE — 96372 THER/PROPH/DIAG INJ SC/IM: CPT

## 2022-07-21 PROCEDURE — 36415 COLL VENOUS BLD VENIPUNCTURE: CPT

## 2022-07-21 PROCEDURE — 85025 COMPLETE CBC W/AUTO DIFF WBC: CPT

## 2022-07-21 PROCEDURE — 2580000003 HC RX 258: Performed by: NURSE PRACTITIONER

## 2022-07-21 RX ORDER — HEPARIN SODIUM (PORCINE) LOCK FLUSH IV SOLN 100 UNIT/ML 100 UNIT/ML
500 SOLUTION INTRAVENOUS PRN
Status: CANCELLED | OUTPATIENT
Start: 2022-07-21

## 2022-07-21 RX ORDER — SODIUM CHLORIDE 0.9 % (FLUSH) 0.9 %
10 SYRINGE (ML) INJECTION PRN
Status: DISCONTINUED | OUTPATIENT
Start: 2022-07-21 | End: 2022-07-22 | Stop reason: HOSPADM

## 2022-07-21 RX ORDER — SODIUM CHLORIDE 0.9 % (FLUSH) 0.9 %
5 SYRINGE (ML) INJECTION PRN
Status: CANCELLED | OUTPATIENT
Start: 2022-07-21

## 2022-07-21 RX ORDER — EPINEPHRINE 1 MG/ML
0.3 INJECTION, SOLUTION, CONCENTRATE INTRAVENOUS PRN
Status: CANCELLED | OUTPATIENT
Start: 2022-07-21

## 2022-07-21 RX ORDER — HEPARIN SODIUM (PORCINE) LOCK FLUSH IV SOLN 100 UNIT/ML 100 UNIT/ML
500 SOLUTION INTRAVENOUS PRN
Status: DISCONTINUED | OUTPATIENT
Start: 2022-07-21 | End: 2022-07-23 | Stop reason: HOSPADM

## 2022-07-21 RX ORDER — SODIUM CHLORIDE 0.9 % (FLUSH) 0.9 %
10 SYRINGE (ML) INJECTION PRN
Status: CANCELLED | OUTPATIENT
Start: 2022-07-21

## 2022-07-21 RX ORDER — DIPHENHYDRAMINE HYDROCHLORIDE 50 MG/ML
50 INJECTION INTRAMUSCULAR; INTRAVENOUS PRN
Status: CANCELLED | OUTPATIENT
Start: 2022-07-21

## 2022-07-21 RX ORDER — METHYLPREDNISOLONE SODIUM SUCCINATE 125 MG/2ML
125 INJECTION, POWDER, LYOPHILIZED, FOR SOLUTION INTRAMUSCULAR; INTRAVENOUS PRN
Status: CANCELLED | OUTPATIENT
Start: 2022-07-21

## 2022-07-21 RX ADMIN — WATER 2.2 ML: 1 INJECTION INTRAMUSCULAR; INTRAVENOUS; SUBCUTANEOUS at 08:50

## 2022-07-21 RX ADMIN — SODIUM CHLORIDE, PRESERVATIVE FREE 10 ML: 5 INJECTION INTRAVENOUS at 09:42

## 2022-07-21 RX ADMIN — ALTEPLASE 2 MG: 2.2 INJECTION, POWDER, LYOPHILIZED, FOR SOLUTION INTRAVENOUS at 08:50

## 2022-07-21 RX ADMIN — HEPARIN 500 UNITS: 100 SYRINGE at 09:42

## 2022-07-21 RX ADMIN — SODIUM CHLORIDE 480 MG: 9 INJECTION, SOLUTION INTRAVENOUS at 09:12

## 2022-07-21 NOTE — PROGRESS NOTES
Isaura Hebert held today due to patient amputated two finger tips in an accident he stated it included bone. Port flushes well will not give blood. Cathflow used still no blood return ok to use today per Tushar Martin. Port study ordered.

## 2022-07-21 NOTE — TELEPHONE ENCOUNTER
I left the pt a vm letting him know I have him scheduled for his port study with Dr. Bin Ramsey on Wed 7/27/22. The pt will need to arrive at [facility] reg at 0700. He will not have any special prep instructions, nor will he need a  home. I asked for a callback if the pt has any questions or concerns regarding this appt.

## 2022-07-21 NOTE — TELEPHONE ENCOUNTER
[facility] with [facility] requesting a port study for this pt. It is not an urgent need, the pt's next tx is a month away. Vane explained the port is flushing but it will not pull blood. I will get the pt on the schedule and contact him with the details. [facility] voiced understanding and is aware of the POC.

## 2022-07-22 NOTE — TELEPHONE ENCOUNTER
I sw the pt's spouse after asking permission to discuss the pt's upcoming port study with Dr. Vane Kumar on Fri 7/29/22. The pt will need to arrive at [facility] reg at 0700. He will not have any special prep instructions, nor will he have to have a  home. Mrs. Barbara Gomes voiced understanding and will make the pt aware of this appt.

## 2022-07-28 ENCOUNTER — PREP FOR PROCEDURE (OUTPATIENT)
Dept: VASCULAR SURGERY | Age: 82
End: 2022-07-28

## 2022-07-28 RX ORDER — ONDANSETRON 2 MG/ML
4 INJECTION INTRAMUSCULAR; INTRAVENOUS EVERY 6 HOURS PRN
Status: CANCELLED | OUTPATIENT
Start: 2022-07-28

## 2022-07-28 RX ORDER — SODIUM CHLORIDE 9 MG/ML
INJECTION, SOLUTION INTRAVENOUS CONTINUOUS
Status: CANCELLED | OUTPATIENT
Start: 2022-07-28

## 2022-07-28 NOTE — H&P
Patient Care Team:  Viji Park MD as PCP - General (General Surgery)  LIAM Clemons as Nurse Practitioner (Nurse Practitioner Family)      Hayde Perkins 80 y.o. male with a history of malignant melanoma with metastasis. Patient's port is malfunctioning. Port can be flushed but will not draw blood we have been asked to perform a port study. Patient Active Problem List   Diagnosis    Melanoma ([address]    Hypertension    Hypercholesteremia    Diverticulitis    BPH (benign prostatic hyperplasia)    Bone metastases ([address]    Malignant melanoma of skin of upper limb, including shoulder ([address]    Hypothyroidism due to medication    Renal mass, left    History of diverticulitis    Liver metastasis ([address]    Encounter for antineoplastic immunotherapy    Osteoarthritis of multiple joints    Pruritus    Cancer related pain    Decrease in appetite    Malignant melanoma of right upper extremity including shoulder ([address]    Port-A-Cath in place      See attached meds  Allergies:  Patient has no known allergies.   Past Medical History:   Diagnosis Date    Acid reflux     BPH (benign prostatic hyperplasia)     Cancer (HCC)     Diverticulitis     Hard of hearing     Hypercholesteremia     Hypertension     Malignant melanoma of skin of upper limb, including shoulder ([address] dx 2014    to liver, stomach, bone, and right axilla      Past Surgical History:   Procedure Laterality Date    CHOLECYSTECTOMY      TUNNELED CENTRAL VENOUS CATHETER W/ SUBCUTANEOUS PORT        Family History   Problem Relation Age of Onset    Heart Attack Brother          age 78 of [place]      Social History     Tobacco Use    Smoking status: Former    Smokeless tobacco: Never   Substance Use Topics    Alcohol use: No       HEENT __normocepahlic; sclera clear  Neck   Supple  Lungs -cta  Heart  rr  GI - Abdomen soft, non-tender  Extremities without cyanosis  Skin without significant lesions     Diagnosis stage IV melanoma with metastasis    Permit for port study    Risks have been reviewed with the patient including but not limited to heart attack, death, CVA, bleeding, nerve injury, infection, steal, pain, and need for further surgery.       _______________________________________________________________________  Signature     Date    Time

## 2022-07-28 NOTE — H&P (VIEW-ONLY)
Patient Care Team:  Suhas Holly MD as PCP - General (General Surgery)  LIAM Alva as Nurse Practitioner (Nurse Practitioner Family)      Jessie Mullen 80 y.o. male with a history of malignant melanoma with metastasis. Patient's port is malfunctioning. Port can be flushed but will not draw blood we have been asked to perform a port study. Patient Active Problem List   Diagnosis    Melanoma ([address]    Hypertension    Hypercholesteremia    Diverticulitis    BPH (benign prostatic hyperplasia)    Bone metastases ([address]    Malignant melanoma of skin of upper limb, including shoulder ([address]    Hypothyroidism due to medication    Renal mass, left    History of diverticulitis    Liver metastasis ([address]    Encounter for antineoplastic immunotherapy    Osteoarthritis of multiple joints    Pruritus    Cancer related pain    Decrease in appetite    Malignant melanoma of right upper extremity including shoulder ([address]    Port-A-Cath in place      See attached meds  Allergies:  Patient has no known allergies.   Past Medical History:   Diagnosis Date    Acid reflux     BPH (benign prostatic hyperplasia)     Cancer (HCC)     Diverticulitis     Hard of hearing     Hypercholesteremia     Hypertension     Malignant melanoma of skin of upper limb, including shoulder ([address] dx 2014    to liver, stomach, bone, and right axilla      Past Surgical History:   Procedure Laterality Date    CHOLECYSTECTOMY      TUNNELED CENTRAL VENOUS CATHETER W/ SUBCUTANEOUS PORT        Family History   Problem Relation Age of Onset    Heart Attack Brother          age 78 of MI      Social History     Tobacco Use    Smoking status: Former    Smokeless tobacco: Never   Substance Use Topics    Alcohol use: No       HEENT __normocepahlic; sclera clear  Neck   Supple  Lungs -cta  Heart  rr  GI - Abdomen soft, non-tender  Extremities without cyanosis  Skin without significant lesions     Diagnosis stage IV melanoma with metastasis    Permit for port study    Risks have been reviewed with the patient including but not limited to heart attack, death, CVA, bleeding, nerve injury, infection, steal, pain, and need for further surgery.       _______________________________________________________________________  Signature     Date    Time

## 2022-07-29 ENCOUNTER — HOSPITAL ENCOUNTER (OUTPATIENT)
Dept: INTERVENTIONAL RADIOLOGY/VASCULAR | Age: 82
Discharge: HOME | End: 2022-07-29
Payer: MEDICARE

## 2022-07-29 VITALS — OXYGEN SATURATION: 94 % | RESPIRATION RATE: 18 BRPM | HEART RATE: 61 BPM

## 2022-07-29 DIAGNOSIS — I87.8 POOR VENOUS ACCESS: ICD-10-CM

## 2022-07-29 PROCEDURE — 36598 INJ W/FLUOR EVAL CV DEVICE: CPT

## 2022-07-29 PROCEDURE — 6360000004 HC RX CONTRAST MEDICATION: Performed by: SURGERY

## 2022-07-29 PROCEDURE — 6360000002 HC RX W HCPCS: Performed by: SURGERY

## 2022-07-29 PROCEDURE — 36598 INJ W/FLUOR EVAL CV DEVICE: CPT | Performed by: SURGERY

## 2022-07-29 PROCEDURE — 2709999900 IR CONTRAST INJECTION  EXISTING CV ACCESS DEVICE EVALUATION W FLUORO

## 2022-07-29 RX ORDER — HEPARIN SODIUM (PORCINE) LOCK FLUSH IV SOLN 100 UNIT/ML 100 UNIT/ML
SOLUTION INTRAVENOUS
Status: COMPLETED | OUTPATIENT
Start: 2022-07-29 | End: 2022-07-29

## 2022-07-29 RX ADMIN — IOPAMIDOL 5 ML: 612 INJECTION, SOLUTION INTRAVENOUS at 08:28

## 2022-07-29 RX ADMIN — SODIUM CHLORIDE, PRESERVATIVE FREE 100 UNITS: 5 INJECTION INTRAVENOUS at 08:28

## 2022-07-29 NOTE — OP NOTE
Patient Name: Maria C Ag   YOB: 1940   MRN: [id number]     Procedure Date: 7/29/2022     Pre Op Diagnosis: port malfunction. No blood return    Post Op Diagnosis: same    Procedure: Port study    Anesthesia:  none    Estimated Blood Loss: Minimal    Complications: None    Implants: none    Procedure Details: Patient was brought to the angiogram suite and placed supine position. Port was accessed in sterile fashion. Initially the fluoroscopy performed over the port tubing and seem to be intact. The tip appeared to be at the left innominate SVC junction. Port was accessed using a Mccarty needle tubing. With contrast injection it appeared to be contrast flowing just fine into the SVC. It is left subclavian port. Port could be used for chemotherapy. The blood return is not can be possible as the tubing points at the wall. I gave the patient option of replacement if needs bilateral from the port. She is okay with using for chemotherapy and blood return from the vein. Findings: Port tubing intact. Tip at the left innominate SVC junction. Pointing at the wall. No issue with fall.     Disposition: Home    DO Sam  7/29/2022 8:20 AM

## 2022-08-02 NOTE — INTERVAL H&P NOTE
Update History & Physical    The patient's History and Physical of July 28, 2022 was reviewed with the patient and I examined the patient. There was no change. The surgical site was confirmed by the patient and me. Plan: The risks, benefits, expected outcome, and alternative to the recommended procedure have been discussed with the patient. Patient understands and wants to proceed with the procedure.      Electronically signed by Jake Boyer DO on 8/2/2022 at 1:38 PM

## 2022-08-08 RX ORDER — HYDROXYZINE HYDROCHLORIDE 25 MG/1
25 TABLET, FILM COATED ORAL EVERY 8 HOURS PRN
Qty: 270 TABLET | Refills: 0 | Status: SHIPPED | OUTPATIENT
Start: 2022-08-08 | End: 2022-11-06

## 2022-08-17 DIAGNOSIS — C43.61 MALIGNANT MELANOMA OF RIGHT UPPER EXTREMITY INCLUDING SHOULDER (HCC): Primary | ICD-10-CM

## 2022-08-17 NOTE — PROGRESS NOTES
Progress Note      Pt Name: Anoop Shine  YOB: 1940  MRN: [id number]    Date of evaluation: 8/17/22    History Obtained From:  patient, spouse, electronic medical record    CHIEF COMPLAINT:    No chief complaint on file. HISTORY OF PRESENT ILLNESS:    Anoop Shine is an 44-year-old gentleman returning to the clinic for delivery of maintenance immunotherapy with nivolumab regarding his diagnosis of recurrent, metastatic melanoma having presented with right axillary lymph node, liver and bony involvement. Stephania Morris was diagnosed with recurrent disease 7/2018. CT scan chest, abdomen/pelvis 3/2022 revealed stable disease. 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 TSH was 4.61 on 4/28/2022. Generalized pruritus waxes and wanes and is improved at this time. The previous annular lesion to the right ankle has resolved. Stephania Morris reports a great appetite. Prior mouth sores have resolved. He denies diarrhea. He denies cough. He does have mild exertional dyspnea that is chronic and unchanged. History of SVC stenosis dating to October, 2020 is stable, managed by vascular surgery. Stephania Morris is accompanied by his wife, Fátima Jackson. They voice no new concerns at this time. ECOG grade 1  Neuropathy grade 1  Fatigue grade 1  Pruritus grade 1    Anoop Shine presents for follow-up surveillance visit and toxicity assessment. he requires intenstive monitoring due to the potential to cause serious morbidity or death.      TARGET METASTATIC MALIGNANT MELANOMA SITES:  Right upper extremity original primary site 06/05/14   Right axilla lymph node at presentation 6/5/2014 and 3 axillary nodes at recurrence 7/27/2018 with an SUV of 8.7   Too numerous to count liver metastases   Celiac lymph nodes x 2 with highest SUV of 3   Bony metastatic disease on PET scan in the right ilium (SUV of 5) and right acetabulum (SUV of 6.4)   Indeterminate HORACIO 3 mm subpleural nodule Indeterminate thyroid nodules on chest CT    1st TUMOR HISTORY: Resected stage IIIA (pT3a pN1a M0) right upper extremity malignant melanoma, 06/05/14  Mr. Kassidy Mallory was seen in initial oncology consultation on 08/05/14, referred by Dr. Eric Bob, regarding a diagnosis of resected malignant melanoma of the right upper extremity. Dr. Parker Lagos resected a malignant melanoma from the right posterior arm, above the elbow, on 06/05/14. Pathology revealed a 2.25 mm thick non-ulcerated Pankaj's level IV malignant melanoma. There were 10 mitoses/ sq mm. There was no vascular or lymphatic invasion. A wide excision with a sentinel lymph node biopsy was performed by by Dr. Michael Rivers on 07/09/14. Pathology revealed that the right axillary sentinel lymph node was positive for metastatic malignancy by immunoperoxidase stain. The wide excision sample was without further local involvement. A right axillary lymph node dissection was performed by Dr. Michael Rivers on 07/21/14. No metastatic malignancy was noted in any of the 8 right axillary lymph nodes submitted. The HMB-45 immunoperoxidase stain was negative for metastatic malignant melanoma in any of these subsequently submitted lymph nodes. Completion of a metastatic workup on 08/06/14, including MRI of the brain, bone scan, CT scans of the chest, abdomen and pelvis were negative for evidence of metastatic disease. Adjuvant pegylated Interferon (Sylatron) 6 mcg/kg (500mcg) sub-q weekly x 8 to be followed then by 3 mcg/kg(250mcg) weekly x up to 5 years was discussed and planned. Adjuvant therapy was indeed initiated and delivered as discussed below in treatment summary. The last dose of the medication was delivered on 2/15/2015. He was unable to tolerate this medication even at reduced dosages because of poor tolerability, weight loss, anorexia, unable to sleep, anxiety, fatigue, et Garry Lint. He declined any further interferon, which is reasonable.    A one-year followup CT scan of the chest and MRI of the brain on 6/11/2015 did not reveal any evidence of recurrent disease.  ----------------------------------PROGRESSION --------------------------------  Santy Moise presented to [facility] emergency department on 7/3/18 for a 3 month history of waxing and waning epigastric abdominal pain and new onset fever. Additional symptoms reported include unexplained weight loss, nausea, headaches. CXR 7/3/18 showed mild cardiomegaly with no acute cardiopulmonary process. Abdominal/pelvic CT with contrast 7/3/18 documented multiple low density bilateral hepatic lesions suspicious for metastatic disease. 2 small renal cysts are noted and one on the left. A second left renal lesion near the lower pole measured 25 mm with SUV of 34, ultrasound recommended. The prostate is markedly enlarged causing partial right obstructive uropathy. CBC shows WBC 7.3, hemoglobin 15.8 and platelets 177,842  CMP revealed a creatinine of 1.2, GFR 59. LFTs WNL  UA negative  Lipase 28, troponin <0.01  He has a history of GERD with laparoscopic paraesophageal hernia repair and fundoplication by Dr. Fidelina Morales on 5/24/15. He was prescribed Norco and Zofran PRN at [facility] ER. Rx was given for omeprazole in clinic  Tumor markers drawn 7/11/18 included:   AFP 5.8   CEA 2.0   CA 19-9 - 8   PSA 7.4 (chronically elevated, up to 8.28 on 8/8/14)  MRI of the abdomen w/wo contrast 7/13/18 at [facility] documented innumerable T1 hypointense, T2 hyperintense lesions throughout the liver as noted on recent CT. One of the larger lesions seen posteriorly in the right hepatic lobe measured 1.6 cm. Also noted was at least two T2 hyperintense lesions within the thoracocolumbar spine, which could represent metastasis. Bilateral renal ultrasound 7/3/18 showed a 2.6 cm hypoechoic left lower pole renal lesion, not typical of a benign cyst with enhancing characteristics concerning for primary renal cancer versus metastatic disease.  Bilateral nephrogenic cysts noted along with marked prostate enlargement with lobular changes measuring 8 x 5.9 x 4.9 cm. MRI of the brain w/wo contrast 7/3/18 for complaint of headache and nausea was without evidence of acute intracranial process. Contrasted chest CT 7/24/18 revealed a stable 2 cm right thyroid nodule, a new 9 mm right thyroid nodule. An indeterminate 3 mm subpleural HORACIO nodule is noted, likely granulomatous or postinfectious. Bone scan 7/24/18 was negative for evidence of osseous metastasis. Examination is remarkable for mid epigastric discomfort, 10 lbs weight loss and 1 inch right axillary lymph node. This was a previous site of positive sentinel lymph node biopsy and dissection associated with the resected, stage IIIa right upper extremity malignant melanoma in 2014. Suspect recurrent malignant melanoma. Kurt Pride was referred to Dr. Ekta Tao who performed an FNA of the right axillary lymph node 7/26/18. Pathology was consistent for metastatic malignant melanoma. 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 Findings were discussed with both Kurt Pride and his wife by Dr. Darcy Kocher at follow-up on 8/1/18 and reiterated on 8/7/18. Recommendation is for combination therapy with Opdivo 1 mg/kg and Yervoy 3 mg/kg every 3 weeks ×4 cycles, followed by Opdivo 240 mg every 2 weeks thereafter. Kurt Pride had a left chest Mediport placed by Dr. Tammie Hooker 8/3/18. PET scan 8/6/18 showed the following:  3 right axillary lymph nodes, SUV up to 8.7   Hepatic metastasis, too numerous to count   2 celiac lymph nodes SUV 2.8 and 3   Right ilium, SUV 5. Right acetabulum SUV 6.4  Cycle #1 Opdivo/Yervoy and monthly Xgeva initiated 8/7/18. He was evaluated at [facility] 10/16/18 for LLQ abdominal discomfort with a history of recent diverticulitis. Abdominal/pelvic CT with contrast revealed a decreased size in the multiple liver nodules. Bilateral renal nodules were stable.   An enlarged prostate with an exophytic mass arising from the posterior superior aspect of the prostate was present. He was treated for acute diverticulitis of the mid to distal sigmoid colon. Vale Flood was evaluated by Dr. Lian Rocha on 11/27/18 regarding possible prostate mass and elevated PSA with recommendations for surveillance only. Contrasted chest CT on 11/8/18 at [facility] showed no enlarged axillary, hilar or mediastinal lymph nodes. There were no acute osseous or soft tissue abnormalities. Vale Flood has had treatment delay of Nivolumab on more than one occasion due to rash, requiring treatment with prednisone. Nivolumab was discontinued following dose #2 of maintenance therapy on 12/20/18. He initiated cycle #1 of Tafinlar 150 mg po BID & Mekinist 2 mg po BID on 1/4/19. Contrasted CT chest 1/9/19 at [facility] showed emphysematous changes bilaterally without evidence of metastatic disease. A 1.4 cm right hilar lymph node was unchanged since 2015, likely benign. Abdominal/pelvic CT with contrast 1/9/19 documented a 4 mm hepatic lesion, previously 6 mm. Other previously noted lesions throughout the liver are not appreciated. The enlarged prostate with nodular hypertrophy was previously evaluated by Dr. Lian Rocha. Bone scan 1/9/19 was without evidence of osteoblastic disease. Echocardiogram 1/9/19 showed an EF of 60%. Vale Flood was hopsitalized at [facility] 1/12/19 - 1/18/19 for enterocolitis with nausea/vomiting and diarrhea. Antineoplastic therapy was held during that time, rechallenged beginning 1/24/19. Vale Flood was evaluated 2/11/19 at [facility] ER for a 24 hour history of abdominal pain and diarrhea with mild diverticulitis, stable from 1/12/19. He was treated with Cipro and Flagyl. Single agent Tafinlar was taken 3/28/19 - 4/4/19, discontinued by Vale Flood due to intolerable abdominal cramping. Shanta Cj was resumed 4/25/19. CT scans of the chest, abdomen and pelvis with contrast 8/13/2019 at [facility] was negative for intrathoracic metastasis.   There was no evidence of disease progression in the abdomen/pelvis with stable, subcentimeter low-density liver lesions noted. Dosing was changed to 480 mg every 4 weeks on 9/5/2019 to see if this may decrease persistent itching. Rx fluoxetine and hydroxyzine per Dr. Justin Collet recommendations. Contrasted CT scans of the chest, abdomen/pelvis and bone scan 1/16/2020 were negative for evidence of disease progression. 19 mm right thyroid lobe nodule and subcentimeter low-density liver lesions were stable. No abnormal bony uptake. Contrasted CT scans of the chest, abdomen and pelvis 6/4/2020 were without evidence of metastatic disease. A 2.2 cm left lower renal lesion is stable  Contrasted CT scans of the chest, abdomen and pelvis 6/4/2020 were negative aside from high-grade stenosis of the SVC with collateral formation for which Sheba Dinero was referred to vascular surgery 10/29/2020. Treatment continues with nivolumab. TREATMENT SUMMARY:  Dr. Jae Mendoza resected a malignant melanoma from the right posterior arm, above the elbow, on 06/05/14   Wide excision with a sentinel lymph node biopsy by Dr. Lonnie Nieto on 07/09/14. Adjuvant weekly Sylatron Initiated 09/02/14 at 6 mcg/kg (500mcg) sub-q weekly but was only able to receive 4 of 8 planned treatments of this. The last dose #4 was on 09/28/14   Adjuvant weekly Sylatron 3 mcg/kg (250mcg) initiated 10/12/2014. Last dose delivered on 2/15/2015, discontinued due to poor tolerability and unable to tolerate the medication. Opdivo 1 mg/kg and Yervoy 3 mg/kg every 3 weeks ×4 doses. 8/7/18 - 10/25/18. Opdivo 240 mg every 2 weeks initiated 11/15/18, discontinued after dose #2 on 12/20/18   Monthly Xgeva initiated 8/7/18. Tafinlar 150 mg po BID & Mekinist 2 mg po qday, cycle #1 initiated 1/4/19. Single agent Tafinlar 150 mg po BID initiated 3/28/19, discontinue by patient 4/4/19.    Opdivo 240 mg every 2 weeks resumed 4/25/19, change to 480 mg every 4 weeks on 9/5/2019    HEMATOLOGY CONCERN:  SVC stenosis  Santos Wofl was Evaluated by Cuco Maravilla PA-C with vascular surgery on 11/11/2020 regarding high-grade SVC narrowing with collateral vein formation in the mediastinum noted on contrasted chest CT at \[facility]\"" on 10/22/2020. Santos Wolf was asymptomatic, observation recommended. Past Medical History:    Past Medical History:   Diagnosis Date    Acid reflux     BPH (benign prostatic hyperplasia)     Cancer (HCC)     Diverticulitis     Hard of hearing     Hypercholesteremia     Hypertension     Malignant melanoma of skin of upper limb, including shoulder ([address] dx 6/5/2014    to liver, stomach, bone, and right axilla     Past Surgical History:    Past Surgical History:   Procedure Laterality Date    CHOLECYSTECTOMY      TUNNELED CENTRAL VENOUS CATHETER W/ SUBCUTANEOUS PORT       Current Medications:    Current Outpatient Medications   Medication Sig Dispense Refill    hydrOXYzine HCl (ATARAX) 25 MG tablet TAKE 1 TABLET BY MOUTH EVERY 8 HOURS AS NEEDED FOR ITCHING 270 tablet 0    HYDROcodone-acetaminophen (NORCO)  MG per tablet Take 1 tablet by mouth every 6 hours as needed for Pain for up to 30 days.  120 tablet 0    levothyroxine (SYNTHROID) 100 MCG tablet TAKE 1 TABLET BY MOUTH EVERY DAY 90 tablet 1    promethazine (PHENERGAN) 25 MG tablet Take 12.5 mg by mouth every 6 hours as needed for Nausea      pantoprazole (PROTONIX) 40 MG tablet Take 1 tablet by mouth daily 60 tablet 5    montelukast (SINGULAIR) 10 MG tablet TAKE 1 TABLET BY MOUTH ONCE DAILY AT NIGHT 90 tablet 3    PARoxetine (PAXIL) 10 MG tablet TAKE 1 TABLET BY MOUTH EVERY DAY 90 tablet 3    acetaminophen (TYLENOL) 500 MG tablet Take 1,000 mg by mouth every 6 hours as needed for Pain      dicyclomine (BENTYL) 20 MG tablet TAKE 1/2 TABLET BY MOUTH 4 TIMES A DAY AS NEEDED 180 tablet 3    finasteride (PROSCAR) 5 MG tablet Take 5 mg by mouth      lovastatin (MEVACOR) 40 MG tablet Take 40 mg by mouth       No current facility-administered medications for this visit. Allergies: No Known Allergies  Social History:    Social History     Tobacco Use    Smoking status: Former    Smokeless tobacco: Never   Substance Use Topics    Alcohol use: No    Drug use: No     Family History:   Family History   Problem Relation Age of Onset    Heart Attack Brother          age 78 of [place] Jone Angelucci presents for follow-up surveillance visit and toxicity assessment. Subjective   REVIEW OF SYSTEMS:   Review of Systems   Constitutional: Positive for fatigue (Mild). Negative for fever. No night sweats   HENT: Positive for mouth sores. Negative for dental problem, hearing loss, nosebleeds, sore throat and trouble swallowing. Eyes: Negative for discharge and itching. Respiratory: Negative for cough, shortness of breath and wheezing. Cardiovascular: Negative for chest pain, palpitations and leg swelling. Gastrointestinal: Positive for nausea (at times). Negative for constipation, diarrhea and vomiting. Mid abdominal discomfort above the navel   Endocrine: Negative for cold intolerance and heat intolerance. Genitourinary: Negative for dysuria, frequency, hematuria and urgency. Musculoskeletal: Positive for arthralgias. Negative for joint swelling and myalgias. Skin: Positive for rash (right ankle, pruritic ). Negative for pallor. Generalized pruritus, mild   Allergic/Immunologic: Negative for environmental allergies and immunocompromised state. Neurological: Negative for seizures, syncope and numbness. Hematological: Negative for adenopathy. Does not bruise/bleed easily. Psychiatric/Behavioral: Negative for agitation, behavioral problems and confusion. The patient is not nervous/anxious. Objective   There were no vitals filed for this visit.     Wt Readings from Last 3 Encounters:   22 208 lb 6.4 oz (94.5 kg)   22 209 lb 6.4 oz (95 kg)   22 213 lb 8 oz (96.8 kg)     PHYSICAL dryness/mild erythema to right lateral ankle   Neurological:      Mental Status: He is alert and oriented to person, place, and time. Comments: follows commands, non-focal   Psychiatric:         Behavior: Behavior normal. Behavior is cooperative. Thought Content: Thought content normal.         Judgment: Judgment normal.      Comments: Alert and oriented to person, place and time. Labs reviewed by me:    CBC 8/17/22  Lab Results   Component Value Date    WBC 8.71 07/21/2022    HGB 14.9 07/21/2022    HCT 45.4 07/21/2022    MCV 90.6 07/21/2022     07/21/2022    LYMPHOPCT 24.2 07/21/2022    RBC 5.01 07/21/2022    MCH 29.7 07/21/2022    MCHC 32.8 07/21/2022    RDW 12.7 07/21/2022     Lab Results   Component Value Date     06/23/2022    K 4.0 06/23/2022     06/23/2022    CO2 28 06/23/2022    BUN 17 06/23/2022    CREATININE 1.2 06/23/2022    GLUCOSE 116 (H) 06/23/2022    CALCIUM 9.4 06/23/2022    PROT 6.8 06/23/2022    LABALBU 4.2 06/23/2022    BILITOT 1.2 06/23/2022    ALKPHOS 50 06/23/2022    AST 37 06/23/2022    ALT 30 06/23/2022    LABGLOM 58 (A) 06/23/2022    GFRAA >59 01/06/2022    AGRATIO 1.8 12/26/2019    GLOB 2.6 06/23/2022     Lab Results   Component Value Date    TSH 4.810 (H) 05/26/2022     ASSESSMENT:   1. Malignant melanoma of right upper extremity including shoulder (HCC)       Malignant melanoma of skin of right upper extremity, including shoulder ([address]  Encounter for antineoplastic immunotherapy    CT Chest with contrast 3/24/2022 at [facility]:  No evidence of intrathoracic metastasis. Stable peripherally calcified 1.0 cm right thyroid nodule, unchanged from 8/13/2019. CT Abdomen and pelvis 3/24/2022 at [facility]:   No evidence of intra-abdominal or pelvic metastasis. Sigmoid colonic diverticulosis. Prostate enlargement (5.3 x 5.9 cm). Stable renal cysts including hyperdense inferior left renal cyst measuring 2.3 cm.      No new skin lesions or lymphadenopathies on exam.  CBC, CMP, TSH ordered today, completed and reviewed. Proceed with Opdivo today and continue every 4 weeks. Hold monthly Xgeva for mouth sore abutting the gumline (see below)    Jeantete Henning is 3 months shy of being out 4 years since diagnosed with recurrent stage IV metastatic melanoma involving lymph nodes, liver and bones. He has had a excellent treatment response with immunotherapy. Continue treatment. Mouth sore secondary to chemotherapy  One sore is resolved the other improved. No visible bone exposure  Will Hold monthly Xgeva again today, per patient request  Continue dexamethasone mouth rinse as needed    hypothyroidism due to medication    Continue Synthroid to 100 mcg po daily   Repeat TSH today    Left renal mass  2.2 cm left renal mass stable on abdominal/pelvic CT dated 6/4/2020, 10/22/2020 and 4/1/2021, 9/30/2021, 3/4/2022  Previously evaluated by urology, monitor conservatively  Continue to observe on surveillance imaging for metastatic melanoma    Superior vena cava stenosis  Asymptomatic, followed by ALEAH Worley  Contrasted chest CT 12/7/2021: Patent left axillary vein/left subclavian vein. Normal appearance of the partially opacified right jugular vein. Normal appearance of the unopacified right subclavian vein to the extent visualized. Focal narrowing of the SVC  No indication of SVC on recent Chest CT 3/4/2022  No anticoagulation, monitored conservatively     Tinea   1.0 - 1.5 inch annular lesion with dryness/scale to right lateral ankle  Eczema vs tinea  Recommend Lotrisone cream OTC    Health Maintenance  Colonoscopy 8/27/2017 by Dr. Mars Noe. 5 Year recall. Plan of care discussed with Jeanette Henning and his wife. All questions answered. Orders Placed This Encounter   Procedures    CBC with Auto Differential     No orders of the defined types were placed in this encounter. No follow-ups on file.      I have seen, examined and reviewed this patient medication list, appropriate labs and imaging studies. I reviewed relevant medical records and others physicians notes. I discussed the plan of care with the patient. I answered all questions to the patients satisfaction. I have also reviewed the chief complaint (CC) and part of the history (History of Present Illness (HPI), Past Family Social History [facility]), or Review of Systems (ROS) and made changes when appropriated. Dictated utilizing Dragon transcription software. Nando Cole RN  10:10 AM  8/17/2022  Objective   There were no vitals filed for this visit. Wt Readings from Last 3 Encounters:   07/21/22 208 lb 6.4 oz (94.5 kg)   06/23/22 209 lb 6.4 oz (95 kg)   05/26/22 213 lb 8 oz (96.8 kg)     PHYSICAL EXAM:  Physical Exam  Labs reviewed by me:    CBC 8/17/22  Lab Results   Component Value Date    WBC 8.71 07/21/2022    HGB 14.9 07/21/2022    HCT 45.4 07/21/2022    MCV 90.6 07/21/2022     07/21/2022    LYMPHOPCT 24.2 07/21/2022    RBC 5.01 07/21/2022    MCH 29.7 07/21/2022    MCHC 32.8 07/21/2022    RDW 12.7 07/21/2022     Lab Results   Component Value Date     06/23/2022    K 4.0 06/23/2022     06/23/2022    CO2 28 06/23/2022    BUN 17 06/23/2022    CREATININE 1.2 06/23/2022    GLUCOSE 116 (H) 06/23/2022    CALCIUM 9.4 06/23/2022    PROT 6.8 06/23/2022    LABALBU 4.2 06/23/2022    BILITOT 1.2 06/23/2022    ALKPHOS 50 06/23/2022    AST 37 06/23/2022    ALT 30 06/23/2022    LABGLOM 58 (A) 06/23/2022    GFRAA >59 01/06/2022    AGRATIO 1.8 12/26/2019    GLOB 2.6 06/23/2022     Lab Results   Component Value Date    TSH 4.810 (H) 05/26/2022       ASSESSMENT:   1. Malignant melanoma of right upper extremity including shoulder (HCC)       Malignant melanoma of skin of right upper extremity, including shoulder ([address]  Encounter for antineoplastic immunotherapy    CT Chest with contrast 3/24/2022 at [facility]:  No evidence of intrathoracic metastasis.    Stable peripherally calcified 1.0 cm right thyroid nodule, unchanged from 8/13/2019. CT Abdomen and pelvis 3/24/2022 at [facility]:   No evidence of intra-abdominal or pelvic metastasis. Sigmoid colonic diverticulosis. Prostate enlargement (5.3 x 5.9 cm). Stable renal cysts including hyperdense inferior left renal cyst measuring 2.3 cm. No new skin lesions or lymphadenopathies on exam.  CBC, CMP, TSH ordered today  Proceed with Opdivo today and continue every 4 weeks. Resume monthly X-Geva    He has had a excellent treatment response with immunotherapy. Continue treatment. Plan Next imaging studies 9/2022, sooner if signs/symptoms warrant. hypothyroidism due to medication    Continue Synthroid to 100 mcg po daily   Repeat TSH today    Left renal mass  2.2 cm left renal mass stable on abdominal/pelvic CT dated 6/4/2020, 10/22/2020 and 4/1/2021, 9/30/2021, 3/4/2022  Previously evaluated by urology, monitor conservatively  Continue to observe on surveillance imaging for metastatic melanoma    Superior vena cava stenosis  Asymptomatic, followed by ALEAH Khan  Contrasted chest CT 12/7/2021: Patent left axillary vein/left subclavian vein. Normal appearance of the partially opacified right jugular vein. Normal appearance of the unopacified right subclavian vein to the extent visualized. Focal narrowing of the SVC  No indication of SVC on recent Chest CT 3/4/2022  No anticoagulation, monitored conservatively     Chronic pruritus  Stable, takes Atarax, Singulair    Health Maintenance  Colonoscopy 8/27/2017 by Dr. Manuela Dozier. 5 Year recall. Plan of care discussed with Stephania Morris and his wife. All questions answered. Orders Placed This Encounter   Procedures    CBC with Auto Differential     RTC RN every 4 weeks for immunotherapy. No follow-ups on file. Arrange surveillance imaging at that time. I have seen, examined and reviewed this patient medication list, appropriate labs and imaging studies.  I reviewed relevant medical records and others physicians notes. I discussed the plan of care with the patient. I answered all questions to the patients satisfaction. I have also reviewed the chief complaint (CC) and part of the history (History of Present Illness (HPI), Past Family Social History [facility]), or Review of Systems (ROS) and made changes when appropriated. Dictated utilizing Dragon transcription software.       Henri Flores RN  10:10 AM  8/17/2022

## 2022-08-18 ENCOUNTER — TELEPHONE (OUTPATIENT)
Dept: INFUSION THERAPY | Age: 82
End: 2022-08-18

## 2022-08-18 ENCOUNTER — HOSPITAL ENCOUNTER (OUTPATIENT)
Dept: INFUSION THERAPY | Age: 82
End: 2022-08-18

## 2022-08-18 NOTE — TELEPHONE ENCOUNTER
Jamaica Hammond called to cancel his [facility] apt and [facility] apt today as his wife is very sick. He wanted to leave a message on the nurses voicemail so I transferred him to . Apts are cancelled. He has no other apts. Ivan Hernandez.  Mercedez Castano

## 2022-08-25 DIAGNOSIS — G89.3 CANCER RELATED PAIN: ICD-10-CM

## 2022-08-26 RX ORDER — HYDROCODONE BITARTRATE AND ACETAMINOPHEN 10; 325 MG/1; MG/1
1 TABLET ORAL EVERY 6 HOURS PRN
Qty: 120 TABLET | Refills: 0 | Status: SHIPPED | OUTPATIENT
Start: 2022-08-26 | End: 2022-09-27 | Stop reason: SDUPTHER

## 2022-08-31 ENCOUNTER — OFFICE VISIT (OUTPATIENT)
Dept: GASTROENTEROLOGY | Facility: CLINIC | Age: 82
End: 2022-08-31

## 2022-08-31 VITALS
OXYGEN SATURATION: 95 % | DIASTOLIC BLOOD PRESSURE: 80 MMHG | SYSTOLIC BLOOD PRESSURE: 158 MMHG | BODY MASS INDEX: 30.51 KG/M2 | HEART RATE: 81 BPM | HEIGHT: 69 IN | WEIGHT: 206 LBS | TEMPERATURE: 97.7 F

## 2022-08-31 DIAGNOSIS — Z86.010 HX OF ADENOMATOUS COLONIC POLYPS: Primary | ICD-10-CM

## 2022-08-31 PROCEDURE — S0260 H&P FOR SURGERY: HCPCS | Performed by: CLINICAL NURSE SPECIALIST

## 2022-08-31 RX ORDER — DICYCLOMINE HCL 20 MG
20 TABLET ORAL EVERY 6 HOURS
COMMUNITY

## 2022-08-31 RX ORDER — PAROXETINE 10 MG/1
10 TABLET, FILM COATED ORAL EVERY MORNING
COMMUNITY

## 2022-08-31 RX ORDER — MONTELUKAST SODIUM 10 MG/1
10 TABLET ORAL NIGHTLY
COMMUNITY

## 2022-08-31 RX ORDER — LEVOTHYROXINE SODIUM 0.1 MG/1
100 TABLET ORAL DAILY
COMMUNITY

## 2022-08-31 NOTE — PROGRESS NOTES
Ever Monson  1940 8/31/2022  Chief Complaint   Patient presents with   • GI Problem     Abdominal pain     Subjective   HPI  Ever Monson is a 81 y.o. male who presents as a referral for preventative maintenance. He has no complaints of nausea or vomiting. No change in bowels. No wt loss. No BRBPR. No melena. There is NO family hx for colon cancer. No abdominal pain.  Past Medical History:   Diagnosis Date   • BPH (benign prostatic hyperplasia)    • GERD (gastroesophageal reflux disease)    • Hx of colonic polyps    • Hypercholesteremia    • Hypertension    • Melanoma (HCC)    • Sleep apnea      Past Surgical History:   Procedure Laterality Date   • CHOLECYSTECTOMY     • COLONOSCOPY N/A 08/07/2017    3 Tubular adenomas cecum, ascending colon and at 20 cm, tics repeat exam in 5 years   • COLONOSCOPY W/ POLYPECTOMY  06/05/2012    Hyperplastic polyp in the rectum repeat exam in 5 years   • ENDOSCOPY  08/14/2007    Distal esophageal ring, HH   • LYMPH NODE DISSECTION      Right arm   • TIPS PROCEDURE  2016   • US GUIDED LYMPH NODE BIOPSY  07/26/2018   • VENOUS ACCESS DEVICE (PORT) INSERTION N/A 08/03/2018    Procedure: SL PORT PLACEMENT;  Surgeon: Bart Enrique MD;  Location: [facility];  Service: General     Outpatient Medications Marked as Taking for the 8/31/22 encounter (Office Visit) with Teetee Bishop APRN   Medication Sig Dispense Refill   • dicyclomine (BENTYL) 20 MG tablet Take 20 mg by mouth Every 6 (Six) Hours.     • finasteride (PROSCAR) 5 MG tablet Take 5 mg by mouth Daily.     • HYDROcodone-acetaminophen (NORCO) 5-325 MG per tablet Take 1 tablet by mouth Every 6 (Six) Hours As Needed.     • HYDROcodone-acetaminophen (NORCO) 7.5-325 MG per tablet Take 1 tablet by mouth Every 4 (Four) Hours As Needed for Moderate Pain  for up to 20 doses. 20 tablet 0   • levothyroxine (SYNTHROID, LEVOTHROID) 88 MCG tablet Take 88 mcg by mouth Daily.     • lovastatin (MEVACOR) 40 MG  "tablet Take 40 mg by mouth Every Night.     • montelukast (SINGULAIR) 10 MG tablet Take 10 mg by mouth Every Night.     • PARoxetine (PAXIL) 10 MG tablet Take 10 mg by mouth Every Morning.     • ZOFRAN 8 MG tablet Take 1 tablet by mouth Every 8 (Eight) Hours As Needed for Nausea or Vomiting for up to 5 doses. 5 tablet 1     No Known Allergies  Social History     Socioeconomic History   • Marital status:    Tobacco Use   • Smoking status: Former Smoker   • Smokeless tobacco: Never Used   • Tobacco comment: quit 30-40 years ago   Substance and Sexual Activity   • Alcohol use: No   • Drug use: No   • Sexual activity: Defer     Family History   Problem Relation Age of Onset   • Colon cancer Neg Hx    • Colon polyps Neg Hx      Health Maintenance   Topic Date Due   • ZOSTER VACCINE (1 of 2) Never done   • Pneumococcal Vaccine 65+ (1 - PCV) Never done   • ANNUAL WELLNESS VISIT  Never done   • LIPID PANEL  Never done   • COLORECTAL CANCER SCREENING  08/07/2022   • INFLUENZA VACCINE  10/01/2022   • TDAP/TD VACCINES (2 - Td or Tdap) 05/01/2031   • COVID-19 Vaccine  Completed       REVIEW OF SYSTEMS  General: well appearing, no fever chills or sweats, no unexplained wt loss  HEENT: no acute visual or hearing disturbances  Cardiovascular: No chest pain or palpitations  Pulmonary: No shortness of breath, coughing, wheezing or hemoptysis  : No burning, urgency, hematuria, or dysuria  Musculoskeletal: No joint pain or stiffness  Peripheral: no edema  Skin: No lesions or rashes  Neuro: No dizziness, headaches, stroke, syncope  Endocrine: No hot or cold intolerances  Hematological: No blood dyscrasias    Objective   Vitals:    08/31/22 1410   BP: 158/80   Pulse: 81   Temp: 97.7 °F (36.5 °C)   SpO2: 95%   Weight: 93.4 kg (206 lb)   Height: 175.3 cm (69\")     Body mass index is 30.42 kg/m².  BMI is >= 30 and <35. (Class 1 Obesity). The following options were offered after discussion;: weight loss educational material (shared " in after visit summary)      PHYSICAL EXAM  General: age appropriate well nourished well appearing, no acute distress  Head: normocephalic and atraumatic  Global assessment-supple  Neck-No JVD noted, no lymphadenopathy  Pulmonary-clear to auscultation bilaterally, normal respiratory effort  Cardiovascular-normal rate and rhythm, normal heart sounds, S1 and S2 noted  Abdomen-soft, non tender, non distended, normal bowel sounds all 4 quadrants, no hepatosplenomegaly noted  Extremities-No clubbing cyanosis or edema  Neuro-Non focal, converses appropriately, awake, alert, oriented    Assessment & Plan     Diagnoses and all orders for this visit:    1. Hx of adenomatous colonic polyps (Primary)  -     Case Request; Standing  -     Follow Anesthesia Guidelines / Standing Orders; Future  -     Obtain Informed Consent; Future  -     Case Request  -     polyethylene glycol (GoLYTELY) 236 g solution; Take as directed by office instructions.  Dispense: 4000 mL; Refill: 0        COLONOSCOPY WITH ANESTHESIA (N/A)  Body mass index is 30.42 kg/m².    Patient instructions on prep prior to procedure provided to the patient.    All risks, benefits, alternatives, and indications of colonoscopy procedure have been discussed with the patient. Risks to include perforation of the colon requiring possible surgery or colostomy, risk of bleeding from biopsies or removal of colon tissue, possibility of missing a colon polyp or cancer, or adverse drug reaction.  Benefits to include the diagnosis and management of disease of the colon and rectum. Alternatives to include barium enema, radiographic evaluation, lab testing or no intervention. Pt verbalizes understanding and agrees.     Teetee Bishop, APRN  2022  14:41 CDT      IF YOU SMOKE OR USE TOBACCO PLEASE READ THE FOLLOWIN minutes reading provided    Why is smoking bad for me?  Smoking increases the risk of heart disease, lung disease, vascular disease, stroke, and cancer.      If you smoke, STOP!    If you would like more information on quitting smoking, please visit the [employer] website: [email or web address]/corporate/healthier-[email or web address]   This link will provide additional resources including the QUIT line and the [employer] support groups.     For more information:    Quit Now [place]  1-800-QUIT-NOW  https://[place].quitlogix.org/en-[place]/

## 2022-09-01 PROBLEM — Z86.010 HX OF ADENOMATOUS COLONIC POLYPS: Status: ACTIVE | Noted: 2022-09-01

## 2022-09-06 RX ORDER — DIPHENHYDRAMINE HYDROCHLORIDE 50 MG/ML
50 INJECTION INTRAMUSCULAR; INTRAVENOUS PRN
Status: CANCELLED | OUTPATIENT
Start: 2022-09-08

## 2022-09-06 RX ORDER — METHYLPREDNISOLONE SODIUM SUCCINATE 125 MG/2ML
125 INJECTION, POWDER, LYOPHILIZED, FOR SOLUTION INTRAMUSCULAR; INTRAVENOUS PRN
Status: CANCELLED | OUTPATIENT
Start: 2022-09-08

## 2022-09-07 RX ORDER — EPINEPHRINE 1 MG/ML
0.3 INJECTION, SOLUTION, CONCENTRATE INTRAVENOUS PRN
Status: CANCELLED | OUTPATIENT
Start: 2022-09-08

## 2022-09-07 RX ORDER — HEPARIN SODIUM (PORCINE) LOCK FLUSH IV SOLN 100 UNIT/ML 100 UNIT/ML
500 SOLUTION INTRAVENOUS PRN
Status: CANCELLED | OUTPATIENT
Start: 2022-09-08

## 2022-09-07 RX ORDER — METHYLPREDNISOLONE SODIUM SUCCINATE 125 MG/2ML
125 INJECTION, POWDER, LYOPHILIZED, FOR SOLUTION INTRAMUSCULAR; INTRAVENOUS PRN
Status: CANCELLED | OUTPATIENT
Start: 2022-09-08

## 2022-09-07 RX ORDER — DIPHENHYDRAMINE HYDROCHLORIDE 50 MG/ML
50 INJECTION INTRAMUSCULAR; INTRAVENOUS PRN
Status: CANCELLED | OUTPATIENT
Start: 2022-09-08

## 2022-09-07 RX ORDER — SODIUM CHLORIDE 0.9 % (FLUSH) 0.9 %
5 SYRINGE (ML) INJECTION PRN
Status: CANCELLED | OUTPATIENT
Start: 2022-09-08

## 2022-09-07 RX ORDER — SODIUM CHLORIDE 0.9 % (FLUSH) 0.9 %
10 SYRINGE (ML) INJECTION PRN
Status: CANCELLED | OUTPATIENT
Start: 2022-09-08

## 2022-09-08 ENCOUNTER — HOSPITAL ENCOUNTER (OUTPATIENT)
Dept: INFUSION THERAPY | Age: 82
Discharge: HOME | End: 2022-09-08
Payer: MEDICARE

## 2022-09-08 VITALS
SYSTOLIC BLOOD PRESSURE: 147 MMHG | BODY MASS INDEX: 28.84 KG/M2 | HEIGHT: 71 IN | HEART RATE: 64 BPM | RESPIRATION RATE: 16 BRPM | OXYGEN SATURATION: 95 % | TEMPERATURE: 97.7 F | WEIGHT: 206 LBS | DIASTOLIC BLOOD PRESSURE: 70 MMHG

## 2022-09-08 DIAGNOSIS — C43.61 MALIGNANT MELANOMA OF RIGHT UPPER EXTREMITY INCLUDING SHOULDER (HCC): Primary | ICD-10-CM

## 2022-09-08 DIAGNOSIS — R53.83 FATIGUE DUE TO TREATMENT: ICD-10-CM

## 2022-09-08 DIAGNOSIS — C43.61 MALIGNANT MELANOMA OF RIGHT UPPER EXTREMITY INCLUDING SHOULDER (HCC): ICD-10-CM

## 2022-09-08 DIAGNOSIS — Z95.828 PORT-A-CATH IN PLACE: ICD-10-CM

## 2022-09-08 DIAGNOSIS — C79.51 BONE METASTASES (HCC): ICD-10-CM

## 2022-09-08 DIAGNOSIS — C43.9 MALIGNANT MELANOMA, UNSPECIFIED SITE (HCC): ICD-10-CM

## 2022-09-08 LAB
ALBUMIN SERPL-MCNC: 3.9 G/DL (ref 3.5–5.2)
ALP BLD-CCNC: 47 U/L (ref 40–130)
ALT SERPL-CCNC: 26 U/L (ref 21–72)
ANION GAP SERPL CALCULATED.3IONS-SCNC: 10 MMOL/L (ref 7–19)
AST SERPL-CCNC: 27 U/L (ref 17–59)
BILIRUB SERPL-MCNC: 1.1 MG/DL (ref 0.2–1.3)
BUN BLDV-MCNC: 17 MG/DL (ref 9–20)
CALCIUM SERPL-MCNC: 8.9 MG/DL (ref 8.4–10.2)
CHLORIDE BLD-SCNC: 102 MMOL/L (ref 98–111)
CO2: 28 MMOL/L (ref 22–29)
CREAT SERPL-MCNC: 1 MG/DL (ref 0.6–1.2)
GFR NON-AFRICAN AMERICAN: >60
GLOBULIN: 2.7 G/DL
GLUCOSE BLD-MCNC: 124 MG/DL (ref 74–106)
HCT VFR BLD CALC: 43.5 % (ref 40.1–51)
HEMOGLOBIN: 14.2 G/DL (ref 13.7–17.5)
LYMPHOCYTES ABSOLUTE: 2.12 K/UL (ref 1.18–3.74)
LYMPHOCYTES RELATIVE PERCENT: 27 % (ref 19.3–53.1)
MCH RBC QN AUTO: 29.6 PG (ref 25.7–32.2)
MCHC RBC AUTO-ENTMCNC: 32.6 G/DL (ref 32.3–36.5)
MCV RBC AUTO: 90.8 FL (ref 79–92.2)
MONOCYTES ABSOLUTE: 0.72 K/UL (ref 0.24–0.82)
MONOCYTES RELATIVE PERCENT: 9.2 % (ref 4.7–12.5)
NEUTROPHILS ABSOLUTE: 4.55 K/UL (ref 1.56–6.13)
NEUTROPHILS RELATIVE PERCENT: 58 % (ref 34–71.1)
PDW BLD-RTO: 12.7 % (ref 11.6–14.4)
PLATELET # BLD: 232 K/UL (ref 163–337)
PMV BLD AUTO: 9.7 FL (ref 7.4–10.4)
POTASSIUM SERPL-SCNC: 3.8 MMOL/L (ref 3.5–5.1)
RBC # BLD: 4.79 M/UL (ref 4.63–6.08)
SODIUM BLD-SCNC: 140 MMOL/L (ref 137–145)
TOTAL PROTEIN: 6.6 G/DL (ref 6.3–8.2)
TSH SERPL DL<=0.05 MIU/L-ACNC: 2.93 UIU/ML (ref 0.27–4.2)
WBC # BLD: 7.85 K/UL (ref 4.23–9.07)

## 2022-09-08 PROCEDURE — 80053 COMPREHEN METABOLIC PANEL: CPT

## 2022-09-08 PROCEDURE — 96413 CHEMO IV INFUSION 1 HR: CPT

## 2022-09-08 PROCEDURE — 96372 THER/PROPH/DIAG INJ SC/IM: CPT

## 2022-09-08 PROCEDURE — 85025 COMPLETE CBC W/AUTO DIFF WBC: CPT

## 2022-09-08 PROCEDURE — 6360000002 HC RX W HCPCS: Performed by: INTERNAL MEDICINE

## 2022-09-08 PROCEDURE — 2580000003 HC RX 258: Performed by: INTERNAL MEDICINE

## 2022-09-08 PROCEDURE — 96367 TX/PROPH/DG ADDL SEQ IV INF: CPT

## 2022-09-08 RX ORDER — SODIUM CHLORIDE 0.9 % (FLUSH) 0.9 %
10 SYRINGE (ML) INJECTION PRN
Status: DISCONTINUED | OUTPATIENT
Start: 2022-09-08 | End: 2022-09-09 | Stop reason: HOSPADM

## 2022-09-08 RX ORDER — HEPARIN SODIUM (PORCINE) LOCK FLUSH IV SOLN 100 UNIT/ML 100 UNIT/ML
500 SOLUTION INTRAVENOUS PRN
Status: DISCONTINUED | OUTPATIENT
Start: 2022-09-08 | End: 2022-09-10 | Stop reason: HOSPADM

## 2022-09-08 RX ADMIN — HEPARIN 500 UNITS: 100 SYRINGE at 09:52

## 2022-09-08 RX ADMIN — SODIUM CHLORIDE 480 MG: 9 INJECTION, SOLUTION INTRAVENOUS at 09:19

## 2022-09-08 RX ADMIN — SODIUM CHLORIDE, PRESERVATIVE FREE 10 ML: 5 INJECTION INTRAVENOUS at 09:52

## 2022-09-08 RX ADMIN — DENOSUMAB 120 MG: 120 INJECTION SUBCUTANEOUS at 09:52

## 2022-09-18 DIAGNOSIS — E03.9 ACQUIRED HYPOTHYROIDISM: ICD-10-CM

## 2022-09-19 RX ORDER — LEVOTHYROXINE SODIUM 0.1 MG/1
TABLET ORAL
Qty: 90 TABLET | Refills: 1 | Status: SHIPPED | OUTPATIENT
Start: 2022-09-19

## 2022-09-26 DIAGNOSIS — G89.3 CANCER RELATED PAIN: ICD-10-CM

## 2022-09-27 RX ORDER — HYDROCODONE BITARTRATE AND ACETAMINOPHEN 10; 325 MG/1; MG/1
1 TABLET ORAL EVERY 6 HOURS PRN
Qty: 120 TABLET | Refills: 0 | Status: SHIPPED | OUTPATIENT
Start: 2022-09-27 | End: 2022-10-05 | Stop reason: SDUPTHER

## 2022-10-05 DIAGNOSIS — G89.3 CANCER RELATED PAIN: ICD-10-CM

## 2022-10-05 NOTE — PROGRESS NOTES
Progress Note      Pt Name: Nayely Crowell  YOB: 1940  MRN: [id number]    Date of evaluation: 10/6/22    History Obtained From:  patient, spouse, electronic medical record    CHIEF COMPLAINT:    Chief Complaint   Patient presents with    Melanoma     HISTORY OF PRESENT ILLNESS:    Nayely Crowell is a pleasant 80-year-old gentleman diagnosed with recurrent metastatic malignant melanoma in July, 2018. Involvement included right axillary lymph nodes, liver, bone. He has had stable disease on maintenance nivolumab (Opdivo). Jin Del Rosario returns to the clinic, accompanied by his wife, scheduled for immunotherapy today. He has chronic, tolerable pruritus. Intermittent rash (mainly sun exposed areas) is much improved at this time. Jin Del Rosario reports having had loose stools about 2 weeks following his last immunotherapy treatment. Loose stools lasted approximately 4 days and was accompanied with lower abdominal cramping. Patient does have a known history of diverticulitis and he thought perhaps he had a bout of diverticulitis. Symptoms are improved. 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 Mild exertional dyspnea is unchanged, SVC stable. Generalized bony discomforts are controlled with Norco QID. ECOG grade 1  Neuropathy grade 1 (mainly the 2nd and 3rd right digits from prior injury)  Fatigue grade 1  Pruritus grade 1    Nayely Crowell presents for follow-up surveillance visit and toxicity assessment. he requires intenstive monitoring due to the potential to cause serious morbidity or death.      TARGET METASTATIC MALIGNANT MELANOMA SITES:  Right upper extremity original primary site 06/05/14   Right axilla lymph node at presentation 6/5/2014 and 3 axillary nodes at recurrence 7/27/2018 with an SUV of 8.7   Too numerous to count liver metastases   Celiac lymph nodes x 2 with highest SUV of 3   Bony metastatic disease on PET scan in the right ilium (SUV of 5) and right acetabulum (SUV of 6.4)   Indeterminate HORACIO 3 mm subpleural nodule   Indeterminate thyroid nodules on chest CT    1st TUMOR HISTORY: Resected stage IIIA (pT3a pN1a M0) right upper extremity malignant melanoma, 06/05/14  Mr. Aramis Hooker was seen in initial oncology consultation on 08/05/14, referred by Dr. Lia Mohamud, regarding a diagnosis of resected malignant melanoma of the right upper extremity. Dr. Kenny Roberts resected a malignant melanoma from the right posterior arm, above the elbow, on 06/05/14. Pathology revealed a 2.25 mm thick non-ulcerated Pankaj's level IV malignant melanoma. There were 10 mitoses/ sq mm. There was no vascular or lymphatic invasion. A wide excision with a sentinel lymph node biopsy was performed by by Dr. Abhi Felix on 07/09/14. Pathology revealed that the right axillary sentinel lymph node was positive for metastatic malignancy by immunoperoxidase stain. The wide excision sample was without further local involvement. A right axillary lymph node dissection was performed by Dr. Abhi Felix on 07/21/14. No metastatic malignancy was noted in any of the 8 right axillary lymph nodes submitted. The HMB-45 immunoperoxidase stain was negative for metastatic malignant melanoma in any of these subsequently submitted lymph nodes. Completion of a metastatic workup on 08/06/14, including MRI of the brain, bone scan, CT scans of the chest, abdomen and pelvis were negative for evidence of metastatic disease. Adjuvant pegylated Interferon (Sylatron) 6 mcg/kg (500mcg) sub-q weekly x 8 to be followed then by 3 mcg/kg(250mcg) weekly x up to 5 years was discussed and planned. Adjuvant therapy was indeed initiated and delivered as discussed below in treatment summary. The last dose of the medication was delivered on 2/15/2015. He was unable to tolerate this medication even at reduced dosages because of poor tolerability, weight loss, anorexia, unable to sleep, anxiety, fatigue, et Francella Silver.  He declined any further interferon, which is reasonable. A one-year followup CT scan of the chest and MRI of the brain on 6/11/2015 did not reveal any evidence of recurrent disease.  ----------------------------------PROGRESSION --------------------------------  Castro Lopes presented to [facility] emergency department on 7/3/18 for a 3 month history of waxing and waning epigastric abdominal pain and new onset fever. Additional symptoms reported include unexplained weight loss, nausea, headaches. CXR 7/3/18 showed mild cardiomegaly with no acute cardiopulmonary process. Abdominal/pelvic CT with contrast 7/3/18 documented multiple low density bilateral hepatic lesions suspicious for metastatic disease. 2 small renal cysts are noted and one on the left. A second left renal lesion near the lower pole measured 25 mm with SUV of 34, ultrasound recommended. The prostate is markedly enlarged causing partial right obstructive uropathy. CBC shows WBC 7.3, hemoglobin 15.8 and platelets 011,328  CMP revealed a creatinine of 1.2, GFR 59. LFTs WNL  UA negative  Lipase 28, troponin <0.01  He has a history of GERD with laparoscopic paraesophageal hernia repair and fundoplication by Dr. Krystin العراقي on 5/24/15. He was prescribed Norco and Zofran PRN at [facility] ER. Rx was given for omeprazole in clinic  Tumor markers drawn 7/11/18 included:   AFP 5.8   CEA 2.0   CA 19-9 - 8   PSA 7.4 (chronically elevated, up to 8.28 on 8/8/14)  MRI of the abdomen w/wo contrast 7/13/18 at [facility] documented innumerable T1 hypointense, T2 hyperintense lesions throughout the liver as noted on recent CT. One of the larger lesions seen posteriorly in the right hepatic lobe measured 1.6 cm. Also noted was at least two T2 hyperintense lesions within the thoracocolumbar spine, which could represent metastasis.   Bilateral renal ultrasound 7/3/18 showed a 2.6 cm hypoechoic left lower pole renal lesion, not typical of a benign cyst with enhancing characteristics concerning for primary renal cancer versus metastatic disease. Bilateral nephrogenic cysts noted along with marked prostate enlargement with lobular changes measuring 8 x 5.9 x 4.9 cm. MRI of the brain w/wo contrast 7/3/18 for complaint of headache and nausea was without evidence of acute intracranial process. Contrasted chest CT 7/24/18 revealed a stable 2 cm right thyroid nodule, a new 9 mm right thyroid nodule. An indeterminate 3 mm subpleural HORACIO nodule is noted, likely granulomatous or postinfectious. Bone scan 7/24/18 was negative for evidence of osseous metastasis. Examination is remarkable for mid epigastric discomfort, 10 lbs weight loss and 1 inch right axillary lymph node. This was a previous site of positive sentinel lymph node biopsy and dissection associated with the resected, stage IIIa right upper extremity malignant melanoma in 2014. Suspect recurrent malignant melanoma. Isaura Gallagher was referred to Dr. Salomon Flowers who performed an FNA of the right axillary lymph node 7/26/18. Pathology was consistent for metastatic malignant melanoma. 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 Findings were discussed with both Isaura Gallagher and his wife by Dr. Vu Hernandes at follow-up on 8/1/18 and reiterated on 8/7/18. Recommendation is for combination therapy with Opdivo 1 mg/kg and Yervoy 3 mg/kg every 3 weeks ×4 cycles, followed by Opdivo 240 mg every 2 weeks thereafter. Isaura Gallagher had a left chest Mediport placed by Dr. Hardeep Luu 8/3/18. PET scan 8/6/18 showed the following:  3 right axillary lymph nodes, SUV up to 8.7   Hepatic metastasis, too numerous to count   2 celiac lymph nodes SUV 2.8 and 3   Right ilium, SUV 5. Right acetabulum SUV 6.4  Cycle #1 Opdivo/Yervoy and monthly Xgeva initiated 8/7/18. He was evaluated at [facility] 10/16/18 for LLQ abdominal discomfort with a history of recent diverticulitis.   Abdominal/pelvic CT with contrast revealed a decreased size in the multiple liver nodules. Bilateral renal nodules were stable. An enlarged prostate with an exophytic mass arising from the posterior superior aspect of the prostate was present. He was treated for acute diverticulitis of the mid to distal sigmoid colon. Serjio Alcala was evaluated by Dr. Merlinda Ree on 11/27/18 regarding possible prostate mass and elevated PSA with recommendations for surveillance only. Contrasted chest CT on 11/8/18 at [facility] showed no enlarged axillary, hilar or mediastinal lymph nodes. There were no acute osseous or soft tissue abnormalities. Serjio Alcala has had treatment delay of Nivolumab on more than one occasion due to rash, requiring treatment with prednisone. Nivolumab was discontinued following dose #2 of maintenance therapy on 12/20/18. He initiated cycle #1 of Tafinlar 150 mg po BID & Mekinist 2 mg po BID on 1/4/19. Contrasted CT chest 1/9/19 at [facility] showed emphysematous changes bilaterally without evidence of metastatic disease. A 1.4 cm right hilar lymph node was unchanged since 2015, likely benign. Abdominal/pelvic CT with contrast 1/9/19 documented a 4 mm hepatic lesion, previously 6 mm. Other previously noted lesions throughout the liver are not appreciated. The enlarged prostate with nodular hypertrophy was previously evaluated by Dr. Merlinda Ree. Bone scan 1/9/19 was without evidence of osteoblastic disease. Echocardiogram 1/9/19 showed an EF of 60%. Serjio Alcala was hopsitalized at [facility] 1/12/19 - 1/18/19 for enterocolitis with nausea/vomiting and diarrhea. Antineoplastic therapy was held during that time, rechallenged beginning 1/24/19. Serjio Alcala was evaluated 2/11/19 at [facility] ER for a 24 hour history of abdominal pain and diarrhea with mild diverticulitis, stable from 1/12/19. He was treated with Cipro and Flagyl. Single agent Tafinlar was taken 3/28/19 - 4/4/19, discontinued by Serjio Alcala due to intolerable abdominal cramping. Bev Galla was resumed 4/25/19.   CT scans of the chest, abdomen and pelvis with contrast 8/13/2019 at [facility] was negative for intrathoracic metastasis. There was no evidence of disease progression in the abdomen/pelvis with stable, subcentimeter low-density liver lesions noted. Dosing was changed to 480 mg every 4 weeks on 9/5/2019 to see if this may decrease persistent itching. Rx fluoxetine and hydroxyzine per Dr. Afua rBowning recommendations. Contrasted CT scans of the chest, abdomen/pelvis and bone scan 1/16/2020 were negative for evidence of disease progression. 19 mm right thyroid lobe nodule and subcentimeter low-density liver lesions were stable. No abnormal bony uptake. Contrasted CT scans of the chest, abdomen and pelvis 6/4/2020 were without evidence of metastatic disease. A 2.2 cm left lower renal lesion is stable  Contrasted CT scans of the chest, abdomen and pelvis 6/4/2020 were negative aside from high-grade stenosis of the SVC with collateral formation for which Walkerangela Rhinamartha was referred to vascular surgery 10/29/2020. Treatment continues with nivolumab. TREATMENT SUMMARY:  Dr. Hoang Shaikh resected a malignant melanoma from the right posterior arm, above the elbow, on 06/05/14   Wide excision with a sentinel lymph node biopsy by Dr. Jessica Toney on 07/09/14. Adjuvant weekly Sylatron Initiated 09/02/14 at 6 mcg/kg (500mcg) sub-q weekly but was only able to receive 4 of 8 planned treatments of this. The last dose #4 was on 09/28/14   Adjuvant weekly Sylatron 3 mcg/kg (250mcg) initiated 10/12/2014. Last dose delivered on 2/15/2015, discontinued due to poor tolerability and unable to tolerate the medication. Opdivo 1 mg/kg and Yervoy 3 mg/kg every 3 weeks ×4 doses. 8/7/18 - 10/25/18. Opdivo 240 mg every 2 weeks initiated 11/15/18, discontinued after dose #2 on 12/20/18   Monthly Xgeva initiated 8/7/18. Tafinlar 150 mg po BID & Mekinist 2 mg po qday, cycle #1 initiated 1/4/19. Single agent Tafinlar 150 mg po BID initiated 3/28/19, discontinue by patient 4/4/19.    Opdivo 240 mg every 2 weeks resumed 4/25/19, change to 480 mg every 4 weeks on 9/5/2019    HEMATOLOGY CONCERN:  SVC stenosis  Chris Marcum was Evaluated by Elmer Ferrer PA-C with vascular surgery on 11/11/2020 regarding high-grade SVC narrowing with collateral vein formation in the mediastinum noted on contrasted chest CT at [facility] on 10/22/2020. Chris Marcum was asymptomatic, observation recommended.     Past Medical History:    Past Medical History:   Diagnosis Date    Acid reflux     BPH (benign prostatic hyperplasia)     Cancer (HCC)     Diverticulitis     Hard of hearing     Hypercholesteremia     Hypertension     Malignant melanoma of skin of upper limb, including shoulder ([address] dx 6/5/2014    to liver, stomach, bone, and right axilla     Past Surgical History:    Past Surgical History:   Procedure Laterality Date    CHOLECYSTECTOMY      TUNNELED CENTRAL VENOUS CATHETER W/ SUBCUTANEOUS PORT       Current Medications:    Current Outpatient Medications   Medication Sig Dispense Refill    levothyroxine (SYNTHROID) 100 MCG tablet TAKE 1 TABLET BY MOUTH EVERY DAY 90 tablet 1    hydrOXYzine HCl (ATARAX) 25 MG tablet TAKE 1 TABLET BY MOUTH EVERY 8 HOURS AS NEEDED FOR ITCHING 270 tablet 0    promethazine (PHENERGAN) 25 MG tablet Take 12.5 mg by mouth every 6 hours as needed for Nausea      pantoprazole (PROTONIX) 40 MG tablet Take 1 tablet by mouth daily 60 tablet 5    montelukast (SINGULAIR) 10 MG tablet TAKE 1 TABLET BY MOUTH ONCE DAILY AT NIGHT 90 tablet 3    PARoxetine (PAXIL) 10 MG tablet TAKE 1 TABLET BY MOUTH EVERY DAY 90 tablet 3    acetaminophen (TYLENOL) 500 MG tablet Take 1,000 mg by mouth every 6 hours as needed for Pain      dicyclomine (BENTYL) 20 MG tablet TAKE 1/2 TABLET BY MOUTH 4 TIMES A DAY AS NEEDED 180 tablet 3    finasteride (PROSCAR) 5 MG tablet Take 5 mg by mouth      lovastatin (MEVACOR) 40 MG tablet Take 40 mg by mouth      HYDROcodone-acetaminophen (NORCO)  MG per tablet Take 1 tablet by mouth every 6 hours as needed for Pain for up to 30 days. 120 tablet 0     No current facility-administered medications for this visit. Facility-Administered Medications Ordered in Other Visits   Medication Dose Route Frequency Provider Last Rate Last Admin    denosumab (XGEVA) SC injection 120 mg  120 mg SubCUTAneous Once [employer], APRN        nivolumab 480 mg in sodium chloride 0.9 % 100 mL chemo IVPB  480 mg IntraVENous Once [employer], APRN 296 mL/hr at 10/06/22 0947 480 mg at 10/06/22 0947    sodium chloride flush 0.9 % injection 10 mL  10 mL IntraVENous PRN LIAM Woodard        heparin flush 100 UNIT/ML injection 500 Units  500 Units IntraCATHeter PRN LIAM Woodard            Allergies: No Known Allergies  Social History:    Social History     Tobacco Use    Smoking status: Former    Smokeless tobacco: Never   Substance Use Topics    Alcohol use: No    Drug use: No     Family History:   Family History   Problem Relation Age of Onset    Heart Attack Brother          age 78 of MI     Subjective   REVIEW OF SYSTEMS:   Review of Systems   Constitutional: Positive for fatigue (Mild). Negative for fever. No night sweats   HENT: Negative for dental problem, hearing loss, nosebleeds, sore throat and trouble swallowing. Eyes: Negative for discharge and itching. Respiratory: Negative for cough, shortness of breath and wheezing. Cardiovascular: Negative for chest pain, palpitations and leg swelling. Gastrointestinal: Negative for constipation, diarrhea and vomiting.        mild Mid abdominal discomfort   Endocrine: Negative for cold intolerance and heat intolerance. Genitourinary: Negative for dysuria, frequency, hematuria and urgency. Musculoskeletal: Positive for arthralgias. Negative for joint swelling and myalgias. Skin: negative for rash. Negative for pallor. Generalized pruritus, mild   Allergic/Immunologic: Negative for environmental allergies and immunocompromised state. Neurological: Negative for seizures, syncope and numbness. Hematological: Negative for adenopathy. Does not bruise/bleed easily. Psychiatric/Behavioral: Negative for agitation, behavioral problems and confusion. The patient is not nervous/anxious. Objective   Vitals:    10/06/22 0900   BP: (!) 162/70   Pulse: 84   Resp: 16   Temp: 97.7 °F (36.5 °C)   SpO2: 97%     Wt Readings from Last 3 Encounters:   10/06/22 203 lb 6.4 oz (92.3 kg)   09/08/22 206 lb (93.4 kg)   07/21/22 208 lb 6.4 oz (94.5 kg)     PHYSICAL EXAM:  Physical Exam  Vitals reviewed. Constitutional:       General: He is not in acute distress. Appearance: He is well-developed. He is not toxic-appearing or diaphoretic. Comments: Wearing a facial mask. HENT:      Head: Normocephalic and atraumatic. Right Ear: External ear normal.      Left Ear: External ear normal.      Nose: Nose normal.      Mouth/Throat:      Mouth: Mucous membranes are moist.   Eyes:      General: No scleral icterus. Right eye: No discharge. Left eye: No discharge. Conjunctiva/sclera: Conjunctivae normal.   Neck:      Trachea: No tracheal deviation. Cardiovascular:      Rate and Rhythm: Normal rate and regular rhythm. Pulmonary:      Effort: Pulmonary effort is normal. No respiratory distress. Breath sounds: Normal breath sounds. No wheezing or rales. Abdominal:      General: Bowel sounds are normal. There is no distension. Palpations: Abdomen is soft. Tenderness: There is no abdominal tenderness. There is no guarding. Genitourinary:     Comments: Exam deferred  Musculoskeletal:         General: No tenderness or deformity. Cervical back: Neck supple. No muscular tenderness. Comments: Normal ROM all four extremities   Lymphadenopathy:      Cervical:      Right cervical: No superficial, deep or posterior cervical adenopathy. Left cervical: No superficial, deep or posterior cervical adenopathy. Upper Body:      Right upper body: No supraclavicular or axillary adenopathy. Left upper body: No supraclavicular or axillary adenopathy. Skin:     General: Skin is warm and dry. Findings: No erythema. Neurological:      Mental Status: He is alert and oriented to person, place, and time. Comments: follows commands, non-focal   Psychiatric:         Behavior: Behavior normal. Behavior is cooperative. Thought Content: Thought content normal.         Judgment: Judgment normal.      Comments: Alert and oriented to person, place and time. Labs reviewed by me:    CBC 10/6/22  Lab Results   Component Value Date    WBC 8.22 10/06/2022    HGB 15.1 10/06/2022    HCT 45.9 10/06/2022    MCV 92.0 10/06/2022     (L) 10/06/2022    LYMPHOPCT 25.7 10/06/2022    RBC 4.99 10/06/2022    MCH 30.3 10/06/2022    MCHC 32.9 10/06/2022    RDW 12.8 10/06/2022     Lab Results   Component Value Date     10/06/2022    K 4.1 10/06/2022     10/06/2022    CO2 28 10/06/2022    BUN 12 10/06/2022    CREATININE 1.1 10/06/2022    GLUCOSE 142 (H) 10/06/2022    CALCIUM 8.5 10/06/2022    PROT 6.6 10/06/2022    LABALBU 3.9 10/06/2022    BILITOT 0.9 10/06/2022    ALKPHOS 48 10/06/2022    AST 29 10/06/2022    ALT 32 10/06/2022    LABGLOM >60 10/06/2022    GFRAA >59 01/06/2022    AGRATIO 1.8 12/26/2019    GLOB 2.7 10/06/2022     Lab Results   Component Value Date    TSH 2.930 09/08/2022     Port study on 7/29/2022 by Dr. Link Barber at [facility]:  Port tubing intact. Tip at the left innominate SVC junction. Pointing at the wall. No issue with flow. ASSESSMENT:   1. Encounter for antineoplastic immunotherapy    2. Malignant melanoma of right upper extremity including shoulder (HCC)    3. Acquired hypothyroidism    4. Superior vena cava stenosis    5. Left renal mass    6.  Loose stools       Malignant melanoma of skin of right upper extremity, including shoulder ([address]  Encounter for antineoplastic immunotherapy    CT Chest with contrast 3/24/2022 at [facility]:  No evidence of intrathoracic metastasis. Stable peripherally calcified 1.0 cm right thyroid nodule, unchanged from 8/13/2019. CT Abdomen and pelvis 3/24/2022 at [facility]:   No evidence of intra-abdominal or pelvic metastasis. Sigmoid colonic diverticulosis. Prostate enlargement (5.3 x 5.9 cm). Stable renal cysts including hyperdense inferior left renal cyst measuring 2.3 cm. No new skin lesions or lymphadenopathies on exam.  CBC, CMP, TSH, cortisol level ordered today  Proceed with Opdivo today and continue every 4 weeks. Continue X-Geva every 4 weeks. No oral complaint    Loose stool  Monitor. Discussed potential side effect of immunotherapy  He will report any loose stool/diarrhea    hypothyroidism due to medication    Continue Synthroid to 100 mcg po daily   Repeat TSH today    Left renal mass  2.2 cm left renal mass stable on abdominal/pelvic CT dated 6/4/2020, 10/22/2020 and 4/1/2021, 9/30/2021, 3/4/2022  Previously evaluated by urology, monitor conservatively  Continue to observe on surveillance imaging for metastatic melanoma    Superior vena cava stenosis  Asymptomatic, followed by ALEAH Goldstein  Contrasted chest CT 12/7/2021: Patent left axillary vein/left subclavian vein. Normal appearance of the partially opacified right jugular vein. Normal appearance of the unopacified right subclavian vein to the extent visualized. Focal narrowing of the SVC  No indication of SVC on recent Chest CT 3/4/2022  No anticoagulation, monitored conservatively. Plans for repeat CT chest in 1 year (12/2022) per vascular surgery    Health Maintenance  Colonoscopy 8/27/2017 by Dr. Yin Calderon. 5 Year recall. Follow-up Colonoscopy scheduled 10/20/2022      Plan of care discussed with Dayton Aquino and his wife. All questions answered.     Orders Placed This Encounter   Procedures    CT ABDOMEN PELVIS W IV CONTRAST Additional Contrast? Radiologist Recommendation    CT CHEST W CONTRAST    CBC with Auto Differential    Comprehensive Metabolic Panel    TSH    Cortisol AM, Total     Controlled Substance Monitoring:    Acute and Chronic Pain Monitoring:   RX Monitoring 10/6/2022   Periodic Controlled Substance Monitoring Possible medication side effects, risk of tolerance/dependence & alternative treatments discussed. ;No signs of potential drug abuse or diversion identified. ;Assessed functional status. ;Obtaining appropriate analgesic effect of treatment. Return in about 4 weeks (around 11/3/2022) for follow up with LIAM Velazco in treatment room . I have seen, examined and reviewed this patient medication list, appropriate labs and imaging studies. I reviewed relevant medical records and others physicians notes. I discussed the plan of care with the patient. I answered all questions to the patients satisfaction. I have also reviewed the chief complaint (CC) and part of the history (History of Present Illness (HPI), Past Family Social History  [facility]), or Review of Systems (ROS) and made changes when appropriated. Dictated utilizing Dragon transcription software.       LIAM Hunt  9:56 AM  10/6/2022

## 2022-10-05 NOTE — TELEPHONE ENCOUNTER
Contacted by patients wife requesting new rx for norco be sent to [facility] due to [facility] norco being on back order. Contacted [facility] and verified this information, discontinued Rx at [facility] and new rx requested to be sent to [facility].

## 2022-10-06 ENCOUNTER — HOSPITAL ENCOUNTER (OUTPATIENT)
Dept: INFUSION THERAPY | Age: 82
Discharge: HOME | End: 2022-10-06
Payer: MEDICARE

## 2022-10-06 ENCOUNTER — OFFICE VISIT (OUTPATIENT)
Dept: HEMATOLOGY | Age: 82
End: 2022-10-06
Payer: MEDICARE

## 2022-10-06 ENCOUNTER — TRANSCRIBE ORDERS (OUTPATIENT)
Dept: ADMINISTRATIVE | Facility: HOSPITAL | Age: 82
End: 2022-10-06

## 2022-10-06 VITALS
TEMPERATURE: 97.7 F | HEART RATE: 84 BPM | WEIGHT: 203.4 LBS | SYSTOLIC BLOOD PRESSURE: 162 MMHG | DIASTOLIC BLOOD PRESSURE: 70 MMHG | RESPIRATION RATE: 16 BRPM | OXYGEN SATURATION: 97 % | BODY MASS INDEX: 28.48 KG/M2 | HEIGHT: 71 IN

## 2022-10-06 DIAGNOSIS — C43.61 MALIGNANT MELANOMA OF RIGHT UPPER EXTREMITY INCLUDING SHOULDER (HCC): ICD-10-CM

## 2022-10-06 DIAGNOSIS — C79.51 BONE METASTASES (HCC): ICD-10-CM

## 2022-10-06 DIAGNOSIS — N28.89 LEFT RENAL MASS: ICD-10-CM

## 2022-10-06 DIAGNOSIS — Z51.12 ENCOUNTER FOR ANTINEOPLASTIC IMMUNOTHERAPY: Primary | ICD-10-CM

## 2022-10-06 DIAGNOSIS — Z95.828 PORT-A-CATH IN PLACE: ICD-10-CM

## 2022-10-06 DIAGNOSIS — C43.9 MALIGNANT MELANOMA, UNSPECIFIED SITE (HCC): ICD-10-CM

## 2022-10-06 DIAGNOSIS — I87.1 SUPERIOR VENA CAVA STENOSIS: ICD-10-CM

## 2022-10-06 DIAGNOSIS — E03.9 ACQUIRED HYPOTHYROIDISM: ICD-10-CM

## 2022-10-06 DIAGNOSIS — R19.5 LOOSE STOOLS: ICD-10-CM

## 2022-10-06 DIAGNOSIS — E03.9 ACQUIRED HYPOTHYROIDISM: Primary | ICD-10-CM

## 2022-10-06 DIAGNOSIS — C43.61 MALIGNANT MELANOMA OF RIGHT UPPER EXTREMITY INCLUDING SHOULDER: Primary | ICD-10-CM

## 2022-10-06 LAB
ALBUMIN SERPL-MCNC: 3.9 G/DL (ref 3.5–5.2)
ALP BLD-CCNC: 48 U/L (ref 40–130)
ALT SERPL-CCNC: 32 U/L (ref 21–72)
ANION GAP SERPL CALCULATED.3IONS-SCNC: 9 MMOL/L (ref 7–19)
AST SERPL-CCNC: 29 U/L (ref 17–59)
BASOPHILS ABSOLUTE: 0.06 K/UL (ref 0.01–0.08)
BASOPHILS RELATIVE PERCENT: 0.7 % (ref 0.1–1.2)
BILIRUB SERPL-MCNC: 0.9 MG/DL (ref 0.2–1.3)
BUN BLDV-MCNC: 12 MG/DL (ref 9–20)
CALCIUM SERPL-MCNC: 8.5 MG/DL (ref 8.4–10.2)
CHLORIDE BLD-SCNC: 101 MMOL/L (ref 98–111)
CO2: 28 MMOL/L (ref 22–29)
CORTISOL - AM: 10.7 UG/DL (ref 6.2–19.4)
CREAT SERPL-MCNC: 1.1 MG/DL (ref 0.6–1.2)
EOSINOPHILS ABSOLUTE: 0.43 K/UL (ref 0.04–0.54)
EOSINOPHILS RELATIVE PERCENT: 5.2 % (ref 0.7–7)
GFR NON-AFRICAN AMERICAN: >60
GLOBULIN: 2.7 G/DL
GLUCOSE BLD-MCNC: 142 MG/DL (ref 74–106)
HCT VFR BLD CALC: 45.9 % (ref 40.1–51)
HEMOGLOBIN: 15.1 G/DL (ref 13.7–17.5)
LYMPHOCYTES ABSOLUTE: 2.11 K/UL (ref 1.18–3.74)
LYMPHOCYTES RELATIVE PERCENT: 25.7 % (ref 19.3–53.1)
MCH RBC QN AUTO: 30.3 PG (ref 25.7–32.2)
MCHC RBC AUTO-ENTMCNC: 32.9 G/DL (ref 32.3–36.5)
MCV RBC AUTO: 92 FL (ref 79–92.2)
MONOCYTES ABSOLUTE: 0.72 K/UL (ref 0.24–0.82)
MONOCYTES RELATIVE PERCENT: 8.8 % (ref 4.7–12.5)
NEUTROPHILS ABSOLUTE: 4.89 K/UL (ref 1.56–6.13)
NEUTROPHILS RELATIVE PERCENT: 59.5 % (ref 34–71.1)
PDW BLD-RTO: 12.8 % (ref 11.6–14.4)
PLATELET # BLD: 109 K/UL (ref 163–337)
PMV BLD AUTO: 10.6 FL (ref 7.4–10.4)
POTASSIUM SERPL-SCNC: 4.1 MMOL/L (ref 3.5–5.1)
RBC # BLD: 4.99 M/UL (ref 4.63–6.08)
SODIUM BLD-SCNC: 138 MMOL/L (ref 137–145)
TOTAL PROTEIN: 6.6 G/DL (ref 6.3–8.2)
TSH SERPL DL<=0.05 MIU/L-ACNC: 3.46 UIU/ML (ref 0.27–4.2)
WBC # BLD: 8.22 K/UL (ref 4.23–9.07)

## 2022-10-06 PROCEDURE — 1036F TOBACCO NON-USER: CPT | Performed by: NURSE PRACTITIONER

## 2022-10-06 PROCEDURE — 96372 THER/PROPH/DIAG INJ SC/IM: CPT

## 2022-10-06 PROCEDURE — G8417 CALC BMI ABV UP PARAM F/U: HCPCS | Performed by: NURSE PRACTITIONER

## 2022-10-06 PROCEDURE — 2580000003 HC RX 258: Performed by: NURSE PRACTITIONER

## 2022-10-06 PROCEDURE — 36415 COLL VENOUS BLD VENIPUNCTURE: CPT

## 2022-10-06 PROCEDURE — 1123F ACP DISCUSS/DSCN MKR DOCD: CPT | Performed by: NURSE PRACTITIONER

## 2022-10-06 PROCEDURE — 6360000002 HC RX W HCPCS: Performed by: NURSE PRACTITIONER

## 2022-10-06 PROCEDURE — 80053 COMPREHEN METABOLIC PANEL: CPT

## 2022-10-06 PROCEDURE — G8427 DOCREV CUR MEDS BY ELIG CLIN: HCPCS | Performed by: NURSE PRACTITIONER

## 2022-10-06 PROCEDURE — 99214 OFFICE O/P EST MOD 30 MIN: CPT | Performed by: NURSE PRACTITIONER

## 2022-10-06 PROCEDURE — 96413 CHEMO IV INFUSION 1 HR: CPT

## 2022-10-06 PROCEDURE — 85025 COMPLETE CBC W/AUTO DIFF WBC: CPT

## 2022-10-06 PROCEDURE — G8484 FLU IMMUNIZE NO ADMIN: HCPCS | Performed by: NURSE PRACTITIONER

## 2022-10-06 RX ORDER — METHYLPREDNISOLONE SODIUM SUCCINATE 125 MG/2ML
125 INJECTION, POWDER, LYOPHILIZED, FOR SOLUTION INTRAMUSCULAR; INTRAVENOUS PRN
Status: CANCELLED | OUTPATIENT
Start: 2022-10-06

## 2022-10-06 RX ORDER — SODIUM CHLORIDE 0.9 % (FLUSH) 0.9 %
5 SYRINGE (ML) INJECTION PRN
Status: CANCELLED | OUTPATIENT
Start: 2022-10-06

## 2022-10-06 RX ORDER — HEPARIN SODIUM (PORCINE) LOCK FLUSH IV SOLN 100 UNIT/ML 100 UNIT/ML
500 SOLUTION INTRAVENOUS PRN
Status: CANCELLED | OUTPATIENT
Start: 2022-10-06

## 2022-10-06 RX ORDER — SODIUM CHLORIDE 0.9 % (FLUSH) 0.9 %
10 SYRINGE (ML) INJECTION PRN
Status: DISCONTINUED | OUTPATIENT
Start: 2022-10-06 | End: 2022-10-07 | Stop reason: HOSPADM

## 2022-10-06 RX ORDER — DIPHENHYDRAMINE HYDROCHLORIDE 50 MG/ML
50 INJECTION INTRAMUSCULAR; INTRAVENOUS PRN
Status: CANCELLED | OUTPATIENT
Start: 2022-10-06

## 2022-10-06 RX ORDER — SODIUM CHLORIDE 0.9 % (FLUSH) 0.9 %
10 SYRINGE (ML) INJECTION PRN
Status: CANCELLED | OUTPATIENT
Start: 2022-10-06

## 2022-10-06 RX ORDER — HEPARIN SODIUM (PORCINE) LOCK FLUSH IV SOLN 100 UNIT/ML 100 UNIT/ML
500 SOLUTION INTRAVENOUS PRN
Status: DISCONTINUED | OUTPATIENT
Start: 2022-10-06 | End: 2022-10-08 | Stop reason: HOSPADM

## 2022-10-06 RX ORDER — HYDROCODONE BITARTRATE AND ACETAMINOPHEN 10; 325 MG/1; MG/1
1 TABLET ORAL EVERY 6 HOURS PRN
Qty: 120 TABLET | Refills: 0 | Status: SHIPPED | OUTPATIENT
Start: 2022-10-06 | End: 2022-11-03 | Stop reason: SDUPTHER

## 2022-10-06 RX ORDER — EPINEPHRINE 1 MG/ML
0.3 INJECTION, SOLUTION, CONCENTRATE INTRAVENOUS PRN
Status: CANCELLED | OUTPATIENT
Start: 2022-10-06

## 2022-10-06 RX ADMIN — SODIUM CHLORIDE 480 MG: 9 INJECTION, SOLUTION INTRAVENOUS at 09:47

## 2022-10-06 RX ADMIN — DENOSUMAB 120 MG: 120 INJECTION SUBCUTANEOUS at 10:19

## 2022-10-06 RX ADMIN — SODIUM CHLORIDE, PRESERVATIVE FREE 10 ML: 5 INJECTION INTRAVENOUS at 10:19

## 2022-10-06 RX ADMIN — HEPARIN 500 UNITS: 100 SYRINGE at 10:19

## 2022-10-07 ENCOUNTER — TRANSCRIBE ORDERS (OUTPATIENT)
Dept: ADMINISTRATIVE | Facility: HOSPITAL | Age: 82
End: 2022-10-07

## 2022-10-07 DIAGNOSIS — C43.61 MALIGNANT MELANOMA OF RIGHT UPPER EXTREMITY INCLUDING SHOULDER: Primary | ICD-10-CM

## 2022-10-20 ENCOUNTER — HOSPITAL ENCOUNTER (OUTPATIENT)
Facility: HOSPITAL | Age: 82
Setting detail: HOSPITAL OUTPATIENT SURGERY
Discharge: HOME | End: 2022-10-20
Attending: INTERNAL MEDICINE | Admitting: INTERNAL MEDICINE

## 2022-10-20 ENCOUNTER — ANESTHESIA EVENT (OUTPATIENT)
Dept: GASTROENTEROLOGY | Facility: HOSPITAL | Age: 82
End: 2022-10-20

## 2022-10-20 ENCOUNTER — HOSPITAL ENCOUNTER (OUTPATIENT)
Dept: INFUSION THERAPY | Age: 82
End: 2022-10-20

## 2022-10-20 ENCOUNTER — ANESTHESIA (OUTPATIENT)
Dept: GASTROENTEROLOGY | Facility: HOSPITAL | Age: 82
End: 2022-10-20

## 2022-10-20 VITALS
BODY MASS INDEX: 28.49 KG/M2 | RESPIRATION RATE: 14 BRPM | HEIGHT: 70 IN | WEIGHT: 199 LBS | TEMPERATURE: 98 F | SYSTOLIC BLOOD PRESSURE: 146 MMHG | DIASTOLIC BLOOD PRESSURE: 70 MMHG | OXYGEN SATURATION: 94 % | HEART RATE: 64 BPM

## 2022-10-20 DIAGNOSIS — Z86.010 HX OF ADENOMATOUS COLONIC POLYPS: ICD-10-CM

## 2022-10-20 PROCEDURE — G0105 COLORECTAL SCRN; HI RISK IND: HCPCS | Performed by: INTERNAL MEDICINE

## 2022-10-20 PROCEDURE — 25010000002 PROPOFOL 10 MG/ML EMULSION: Performed by: NURSE ANESTHETIST, CERTIFIED REGISTERED

## 2022-10-20 RX ORDER — LIDOCAINE HYDROCHLORIDE 20 MG/ML
INJECTION, SOLUTION EPIDURAL; INFILTRATION; INTRACAUDAL; PERINEURAL AS NEEDED
Status: DISCONTINUED | OUTPATIENT
Start: 2022-10-20 | End: 2022-10-20 | Stop reason: SURG

## 2022-10-20 RX ORDER — SODIUM CHLORIDE 9 MG/ML
500 INJECTION, SOLUTION INTRAVENOUS CONTINUOUS PRN
Status: DISCONTINUED | OUTPATIENT
Start: 2022-10-20 | End: 2022-10-20 | Stop reason: HOSPADM

## 2022-10-20 RX ORDER — PROPOFOL 10 MG/ML
VIAL (ML) INTRAVENOUS AS NEEDED
Status: DISCONTINUED | OUTPATIENT
Start: 2022-10-20 | End: 2022-10-20 | Stop reason: SURG

## 2022-10-20 RX ORDER — SODIUM CHLORIDE 0.9 % (FLUSH) 0.9 %
10 SYRINGE (ML) INJECTION AS NEEDED
Status: DISCONTINUED | OUTPATIENT
Start: 2022-10-20 | End: 2022-10-20 | Stop reason: HOSPADM

## 2022-10-20 RX ADMIN — PROPOFOL 270 MG: 10 INJECTION, EMULSION INTRAVENOUS at 11:07

## 2022-10-20 RX ADMIN — SODIUM CHLORIDE 500 ML: 9 INJECTION, SOLUTION INTRAVENOUS at 10:47

## 2022-10-20 RX ADMIN — LIDOCAINE HYDROCHLORIDE 50 MG: 20 INJECTION, SOLUTION EPIDURAL; INFILTRATION; INTRACAUDAL; PERINEURAL at 11:07

## 2022-10-20 NOTE — ANESTHESIA PREPROCEDURE EVALUATION
Anesthesia Evaluation     Patient summary reviewed   no history of anesthetic complications:  NPO Solid Status: > 8 hours             Airway   Mallampati: II  TM distance: >3 FB  Neck ROM: full  Dental    (+) edentulous, upper dentures and lower dentures    Pulmonary    (+) sleep apnea,   (-) asthma, recent URI, not a smoker  Cardiovascular   Exercise tolerance: good (4-7 METS)    ECG reviewed    (+) hypertension well controlled, hyperlipidemia,   (-) pacemaker, past MI, angina, cardiac stents, CABG      Neuro/Psych  (-) seizures, TIA, CVA  GI/Hepatic/Renal/Endo    (+)  GERD,    (-) liver disease, no renal disease, diabetes    Musculoskeletal     Abdominal    Substance History      OB/GYN          Other      history of cancer (Melanoma with metastasis) remission                      Anesthesia Plan    ASA 2     MAC     intravenous induction     Anesthetic plan, risks, benefits, and alternatives have been provided, discussed and informed consent has been obtained with: patient.

## 2022-10-20 NOTE — ANESTHESIA POSTPROCEDURE EVALUATION
"Patient: Ever Monson    Procedure Summary     Date: 10/20/22 Room / Location: [facility] ENDOSCOPY 6 /  [facility] ENDOSCOPY    Anesthesia Start: 1105 Anesthesia Stop: 1126    Procedure: COLONOSCOPY WITH ANESTHESIA Diagnosis:       Hx of adenomatous colonic polyps      (Hx of adenomatous colonic polyps [Z86.010])    Surgeons: Claudy Gibbons MD Provider: Damaris Gallardo CRNA    Anesthesia Type: MAC ASA Status: 2          Anesthesia Type: MAC    Vitals  Vitals Value Taken Time   /59 10/20/22 1131   Temp     Pulse 65 10/20/22 1133   Resp 14 10/20/22 1127   SpO2 92 % 10/20/22 1133   Vitals shown include unvalidated device data.        Post Anesthesia Care and Evaluation    Patient location during evaluation: PACU  Patient participation: complete - patient participated  Level of consciousness: awake and alert  Pain management: adequate    Airway patency: patent  Anesthetic complications: No anesthetic complications    Cardiovascular status: acceptable  Respiratory status: acceptable  Hydration status: acceptable    Comments: Blood pressure 111/56, pulse 67, temperature 98 °F (36.7 °C), temperature source Tympanic, resp. rate 14, height 177.8 cm (70\"), weight 90.3 kg (199 lb), SpO2 93 %.    Pt discharged from PACU based on seb score >8      "

## 2022-10-20 NOTE — H&P
[facility] Gastroenterology  Pre Procedure History & Physical    Chief Complaint:   History of polyps    Subjective     HPI:   Here for colonoscopy.  History of polyps    Past Medical History:   Past Medical History:   Diagnosis Date   • BPH (benign prostatic hyperplasia)    • GERD (gastroesophageal reflux disease)    • Hx of colonic polyps    • Hypercholesteremia    • Hypertension    • Melanoma (HCC)    • Sleep apnea        Past Surgical History:  Past Surgical History:   Procedure Laterality Date   • CHOLECYSTECTOMY     • COLONOSCOPY N/A 08/07/2017    3 Tubular adenomas cecum, ascending colon and at 20 cm, tics repeat exam in 5 years   • COLONOSCOPY W/ POLYPECTOMY  06/05/2012    Hyperplastic polyp in the rectum repeat exam in 5 years   • ENDOSCOPY  08/14/2007    Distal esophageal ring, HH   • LYMPH NODE DISSECTION      Right arm   • TIPS PROCEDURE  2016   • US GUIDED LYMPH NODE BIOPSY  07/26/2018   • VENOUS ACCESS DEVICE (PORT) INSERTION N/A 08/03/2018    Procedure: SL PORT PLACEMENT;  Surgeon: Bart Enrique MD;  Location: [facility];  Service: General       Family History:  Family History   Problem Relation Age of Onset   • Colon cancer Neg Hx    • Colon polyps Neg Hx        Social History:   reports that he has quit smoking. He has never used smokeless tobacco. He reports that he does not drink alcohol and does not use drugs.    Medications:   Prior to Admission medications    Medication Sig Start Date End Date Taking? Authorizing Provider   dicyclomine (BENTYL) 20 MG tablet Take 20 mg by mouth Every 6 (Six) Hours.   Yes Miguel Valdez MD   finasteride (PROSCAR) 5 MG tablet Take 5 mg by mouth Daily. 6/6/17  Yes Miguel Valdez MD   HYDROcodone-acetaminophen (NORCO)  MG per tablet Take 1 tablet by mouth Every 6 (Six) Hours As Needed.   Yes Miguel Valdez MD   levothyroxine (SYNTHROID, LEVOTHROID) 100 MCG tablet Take 1 tablet by mouth Daily.   Yes Miguel Valdez MD  "  lovastatin (MEVACOR) 40 MG tablet Take 40 mg by mouth Every Night. 6/6/17  Yes Miguel Valdez MD   montelukast (SINGULAIR) 10 MG tablet Take 10 mg by mouth Every Night.   Yes ProviderMiguel MD   PARoxetine (PAXIL) 10 MG tablet Take 10 mg by mouth Every Morning.   Yes ProviderMiguel MD   polyethylene glycol (GoLYTELY) 236 g solution Take as directed by office instructions. 8/31/22  Yes Teetee Bishop APRN   ZOFRAN 8 MG tablet Take 1 tablet by mouth Every 8 (Eight) Hours As Needed for Nausea or Vomiting for up to 5 doses. 8/3/18  Yes Bart Enrique MD   HYDROcodone-acetaminophen (NORCO) 7.5-325 MG per tablet Take 1 tablet by mouth Every 4 (Four) Hours As Needed for Moderate Pain  for up to 20 doses. 8/3/18   Bart Enrique MD       Allergies:  Patient has no known allergies.    Objective     Blood pressure 153/80, pulse 76, temperature 98 °F (36.7 °C), temperature source Tympanic, resp. rate 18, height 177.8 cm (70\"), weight 90.3 kg (199 lb), SpO2 95 %.    Physical Exam   Constitutional: Pt is oriented to person, place, and in no distress.   HENT: Mouth/Throat: Oropharynx is clear.   Cardiovascular: Normal rate, regular rhythm.    Pulmonary/Chest: Effort normal. No respiratory distress. No  wheezes.   Abdominal: Soft. Non-distended.  Skin: Skin is warm and dry.   Psychiatric: Mood, memory, affect and judgment appear normal.     Assessment & Plan     Diagnosis:  History of polyps    Anticipated Surgical Procedure:    Proceed with colonoscopy as scheduled    The following major R/B/A were discussed with the patient, however the list is not all inclusive . Risk:  Bleeding (immediate and delayed), perforation (rupture or tear), reaction to medication, missed lesion/cancer, pain during the procedure, infection, need for surgery, need for ostomy, need for mechanical ventilation (breathing machine), death.  Benefits: removal of polyp/tissue, burn/clip/or inject to stop bleeding, removal of " foreign body, dilate any stricture.  Alternatives: Xray or CT, surgery, do nothing with associated risk   The patient was given time to ask question and received explanation, and agrees to proceed as per History and Physical.   No guarantee given or expressed.    EMR Dragon/transcription disclaimer: Much of this encounter note is an electronic transcription/translation of spoken language to printed text.  The electronic translation of spoken language may permit erroneous, or at times, nonsensical words or phrases to be inadvertently transcribed.  Although I have reviewed the note for such errors, some may still exist.    Claudy Gibbons MD  11:08 CDT  10/20/2022

## 2022-10-27 ENCOUNTER — APPOINTMENT (OUTPATIENT)
Dept: CT IMAGING | Facility: HOSPITAL | Age: 82
End: 2022-10-27

## 2022-10-31 RX ORDER — SODIUM CHLORIDE 0.9 % (FLUSH) 0.9 %
5 SYRINGE (ML) INJECTION PRN
Status: CANCELLED | OUTPATIENT
Start: 2022-11-03

## 2022-10-31 RX ORDER — SODIUM CHLORIDE 0.9 % (FLUSH) 0.9 %
10 SYRINGE (ML) INJECTION PRN
Status: CANCELLED | OUTPATIENT
Start: 2022-11-03

## 2022-10-31 RX ORDER — METHYLPREDNISOLONE SODIUM SUCCINATE 125 MG/2ML
125 INJECTION, POWDER, LYOPHILIZED, FOR SOLUTION INTRAMUSCULAR; INTRAVENOUS PRN
Status: CANCELLED | OUTPATIENT
Start: 2022-11-03

## 2022-10-31 RX ORDER — DIPHENHYDRAMINE HYDROCHLORIDE 50 MG/ML
50 INJECTION INTRAMUSCULAR; INTRAVENOUS PRN
Status: CANCELLED | OUTPATIENT
Start: 2022-11-03

## 2022-10-31 RX ORDER — EPINEPHRINE 1 MG/ML
0.3 INJECTION, SOLUTION, CONCENTRATE INTRAVENOUS PRN
Status: CANCELLED | OUTPATIENT
Start: 2022-11-03

## 2022-10-31 RX ORDER — HEPARIN SODIUM (PORCINE) LOCK FLUSH IV SOLN 100 UNIT/ML 100 UNIT/ML
500 SOLUTION INTRAVENOUS PRN
Status: CANCELLED | OUTPATIENT
Start: 2022-11-03

## 2022-11-02 DIAGNOSIS — C43.61 MALIGNANT MELANOMA OF RIGHT UPPER EXTREMITY INCLUDING SHOULDER (HCC): Primary | ICD-10-CM

## 2022-11-02 NOTE — PROGRESS NOTES
Progress Note      Pt Name: Elroy Garcia  YOB: 1940  MRN: [id number]    Date of evaluation: 11/3/22    History Obtained From:  patient, spouse, electronic medical record    CHIEF COMPLAINT:    Chief Complaint   Patient presents with    Cancer     Malignant melanoma of right upper extremity including shoulder     HISTORY OF PRESENT ILLNESS:    Elroy Garcia is a pleasant 77-year-old gentleman returning to the clinic for follow-up and continued treatment regarding his diagnosis of metastatic malignant melanoma, originally diagnosed 7/2018. He presented with metastatic disease to right axillary lymph nodes, liver and bone. He has had stable disease on maintenance nivolumab (Opdivo) every 4 weeks. Kiki Weathers reports a flareup of diverticulitis since last evaluated. He underwent colonoscopy 10/20/2022 by Dr. Juli Paez that revealed diverticulosis in the sigmoid colon. No specimen collected. CT scans of the chest, abdomen/pelvis 10/26/2022 were not completed. Kiki Weathers and his wife states they were unaware of the appointments. Symptomology has since resolved. Kiki Weathers denies diarrhea. Chronic pruritus is mild and tolerable. Intermittent rash (mainly sun exposed areas) is much improved at this time. 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 Generalized bony discomforts are controlled with Norco QID PRN. 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 Mild exertional dyspnea is unchanged, SVC stable. Port study on 7/29/2022 by Dr. Yahir Maynard at [facility]:  Port tubing intact. Tip at the left innominate SVC junction. Pointing at the wall. No issue with flow. Generalized bony discomforts are controlled with Norco QID. Kiki Weathers is accompanied by his wife today.     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 he requires intenstive monitoring due to the potential to cause serious morbidity or death. TARGET METASTATIC MALIGNANT MELANOMA SITES:  Right upper extremity original primary site 06/05/14   Right axilla lymph node at presentation 6/5/2014 and 3 axillary nodes at recurrence 7/27/2018 with an SUV of 8.7   Too numerous to count liver metastases   Celiac lymph nodes x 2 with highest SUV of 3   Bony metastatic disease on PET scan in the right ilium (SUV of 5) and right acetabulum (SUV of 6.4)   Indeterminate HORACIO 3 mm subpleural nodule   Indeterminate thyroid nodules on chest CT    1st TUMOR HISTORY: Resected stage IIIA (pT3a pN1a M0) right upper extremity malignant melanoma, 06/05/14  Mr. Nichelle Ambrosio was seen in initial oncology consultation on 08/05/14, referred by Dr. Patricia Asencio, regarding a diagnosis of resected malignant melanoma of the right upper extremity. Dr. Henri Leblanc resected a malignant melanoma from the right posterior arm, above the elbow, on 06/05/14. Pathology revealed a 2.25 mm thick non-ulcerated Pankaj's level IV malignant melanoma. There were 10 mitoses/ sq mm. There was no vascular or lymphatic invasion. A wide excision with a sentinel lymph node biopsy was performed by by Dr. Aakash Gonzalez on 07/09/14. Pathology revealed that the right axillary sentinel lymph node was positive for metastatic malignancy by immunoperoxidase stain. The wide excision sample was without further local involvement. A right axillary lymph node dissection was performed by Dr. Aakash Gonzalez on 07/21/14. No metastatic malignancy was noted in any of the 8 right axillary lymph nodes submitted. The HMB-45 immunoperoxidase stain was negative for metastatic malignant melanoma in any of these subsequently submitted lymph nodes.   Completion of a metastatic workup on 08/06/14, including MRI of the brain, bone scan, CT scans of the chest, abdomen and pelvis were negative for evidence of metastatic disease. Adjuvant pegylated Interferon (Sylatron) 6 mcg/kg (500mcg) sub-q weekly x 8 to be followed then by 3 mcg/kg(250mcg) weekly x up to 5 years was discussed and planned. Adjuvant therapy was indeed initiated and delivered as discussed below in treatment summary. The last dose of the medication was delivered on 2/15/2015. He was unable to tolerate this medication even at reduced dosages because of poor tolerability, weight loss, anorexia, unable to sleep, anxiety, fatigue, et Jackquline Mckinley. He declined any further interferon, which is reasonable. A one-year followup CT scan of the chest and MRI of the brain on 6/11/2015 did not reveal any evidence of recurrent disease.  ----------------------------------PROGRESSION --------------------------------  Rachelle Hyman presented to [facility] emergency department on 7/3/18 for a 3 month history of waxing and waning epigastric abdominal pain and new onset fever. Additional symptoms reported include unexplained weight loss, nausea, headaches. CXR 7/3/18 showed mild cardiomegaly with no acute cardiopulmonary process. Abdominal/pelvic CT with contrast 7/3/18 documented multiple low density bilateral hepatic lesions suspicious for metastatic disease. 2 small renal cysts are noted and one on the left. A second left renal lesion near the lower pole measured 25 mm with SUV of 34, ultrasound recommended. The prostate is markedly enlarged causing partial right obstructive uropathy. CBC shows WBC 7.3, hemoglobin 15.8 and platelets 780,305  CMP revealed a creatinine of 1.2, GFR 59. LFTs WNL  UA negative  Lipase 28, troponin <0.01  He has a history of GERD with laparoscopic paraesophageal hernia repair and fundoplication by Dr. Alanna Lorenzo on 5/24/15. He was prescribed Norco and Zofran PRN at [facility] ER.    Rx was given for omeprazole in clinic  Tumor markers drawn 7/11/18 included:   AFP 5.8   CEA 2.0   CA 19-9 - 8   PSA 7.4 (chronically elevated, up to 8.28 on 8/8/14)  MRI of the abdomen w/wo contrast 7/13/18 at [facility] documented innumerable T1 hypointense, T2 hyperintense lesions throughout the liver as noted on recent CT. One of the larger lesions seen posteriorly in the right hepatic lobe measured 1.6 cm. Also noted was at least two T2 hyperintense lesions within the thoracocolumbar spine, which could represent metastasis. Bilateral renal ultrasound 7/3/18 showed a 2.6 cm hypoechoic left lower pole renal lesion, not typical of a benign cyst with enhancing characteristics concerning for primary renal cancer versus metastatic disease. Bilateral nephrogenic cysts noted along with marked prostate enlargement with lobular changes measuring 8 x 5.9 x 4.9 cm. MRI of the brain w/wo contrast 7/3/18 for complaint of headache and nausea was without evidence of acute intracranial process. Contrasted chest CT 7/24/18 revealed a stable 2 cm right thyroid nodule, a new 9 mm right thyroid nodule. An indeterminate 3 mm subpleural HORACIO nodule is noted, likely granulomatous or postinfectious. Bone scan 7/24/18 was negative for evidence of osseous metastasis. Examination is remarkable for mid epigastric discomfort, 10 lbs weight loss and 1 inch right axillary lymph node. This was a previous site of positive sentinel lymph node biopsy and dissection associated with the resected, stage IIIa right upper extremity malignant melanoma in 2014. Suspect recurrent malignant melanoma. Isaura Gallagher was referred to Dr. Salomon Flowers who performed an FNA of the right axillary lymph node 7/26/18. Pathology was consistent for metastatic malignant melanoma. 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 Findings were discussed with both Isaura Gallagher and his wife by Dr. Vu Hernandes at follow-up on 8/1/18 and reiterated on 8/7/18. Recommendation is for combination therapy with Opdivo 1 mg/kg and Yervoy 3 mg/kg every 3 weeks ×4 cycles, followed by Opdivo 240 mg every 2 weeks thereafter.   Isauraelpidio Gallagher had a left chest Mediport placed by Dr. Jimmie Jackson 8/3/18. PET scan 8/6/18 showed the following:  3 right axillary lymph nodes, SUV up to 8.7   Hepatic metastasis, too numerous to count   2 celiac lymph nodes SUV 2.8 and 3   Right ilium, SUV 5. Right acetabulum SUV 6.4  Cycle #1 Opdivo/Yervoy and monthly Xgeva initiated 8/7/18. He was evaluated at [facility] 10/16/18 for LLQ abdominal discomfort with a history of recent diverticulitis. Abdominal/pelvic CT with contrast revealed a decreased size in the multiple liver nodules. Bilateral renal nodules were stable. An enlarged prostate with an exophytic mass arising from the posterior superior aspect of the prostate was present. He was treated for acute diverticulitis of the mid to distal sigmoid colon. Claudia Menezes was evaluated by Dr. Marie Rachel on 11/27/18 regarding possible prostate mass and elevated PSA with recommendations for surveillance only. Contrasted chest CT on 11/8/18 at [facility] showed no enlarged axillary, hilar or mediastinal lymph nodes. There were no acute osseous or soft tissue abnormalities. Claudia Menezes has had treatment delay of Nivolumab on more than one occasion due to rash, requiring treatment with prednisone. Nivolumab was discontinued following dose #2 of maintenance therapy on 12/20/18. He initiated cycle #1 of Tafinlar 150 mg po BID & Mekinist 2 mg po BID on 1/4/19. Contrasted CT chest 1/9/19 at [facility] showed emphysematous changes bilaterally without evidence of metastatic disease. A 1.4 cm right hilar lymph node was unchanged since 2015, likely benign. Abdominal/pelvic CT with contrast 1/9/19 documented a 4 mm hepatic lesion, previously 6 mm. Other previously noted lesions throughout the liver are not appreciated. The enlarged prostate with nodular hypertrophy was previously evaluated by Dr. Marie Rachel. Bone scan 1/9/19 was without evidence of osteoblastic disease. Echocardiogram 1/9/19 showed an EF of 60%.   Claudia Menezes was hopsitalized at [address] 1/12/19 - 1/18/19 for enterocolitis with nausea/vomiting and diarrhea. Antineoplastic therapy was held during that time, rechallenged beginning 1/24/19. Joanie Babb was evaluated 2/11/19 at [address] [facility] ER for a 24 hour history of abdominal pain and diarrhea with mild diverticulitis, stable from 1/12/19. He was treated with Cipro and Flagyl. Single agent Tafinlar was taken 3/28/19 - 4/4/19, discontinued by Joanie Babb due to intolerable abdominal cramping. Carol Mulch was resumed 4/25/19. CT scans of the chest, abdomen and pelvis with contrast 8/13/2019 at [facility] was negative for intrathoracic metastasis. There was no evidence of disease progression in the abdomen/pelvis with stable, subcentimeter low-density liver lesions noted. Dosing was changed to 480 mg every 4 weeks on 9/5/2019 to see if this may decrease persistent itching. Rx fluoxetine and hydroxyzine per Dr. Reinier Reyes recommendations. Contrasted CT scans of the chest, abdomen/pelvis and bone scan 1/16/2020 were negative for evidence of disease progression. 19 mm right thyroid lobe nodule and subcentimeter low-density liver lesions were stable. No abnormal bony uptake. Contrasted CT scans of the chest, abdomen and pelvis 6/4/2020 were without evidence of metastatic disease. A 2.2 cm left lower renal lesion is stable  Contrasted CT scans of the chest, abdomen and pelvis 6/4/2020 were negative aside from high-grade stenosis of the SVC with collateral formation for which Joanie Babb was referred to vascular surgery 10/29/2020. Treatment continues with nivolumab. TREATMENT SUMMARY:  Dr. Dwayne Barrios resected a malignant melanoma from the right posterior arm, above the elbow, on 06/05/14   Wide excision with a sentinel lymph node biopsy by Dr. Michelle Segura on 07/09/14. Adjuvant weekly Sylatron Initiated 09/02/14 at 6 mcg/kg (500mcg) sub-q weekly but was only able to receive 4 of 8 planned treatments of this. The last dose #4 was on 09/28/14   Adjuvant weekly Sylatron 3 mcg/kg (250mcg) initiated 10/12/2014.  Last dose delivered on 2/15/2015, discontinued due to poor tolerability and unable to tolerate the medication. Opdivo 1 mg/kg and Yervoy 3 mg/kg every 3 weeks ×4 doses. 8/7/18 - 10/25/18. Opdivo 240 mg every 2 weeks initiated 11/15/18, discontinued after dose #2 on 12/20/18   Monthly Xgeva initiated 8/7/18. Tafinlar 150 mg po BID & Mekinist 2 mg po qday, cycle #1 initiated 1/4/19. Single agent Tafinlar 150 mg po BID initiated 3/28/19, discontinue by patient 4/4/19. Opdivo 240 mg every 2 weeks resumed 4/25/19, change to 480 mg every 4 weeks on 9/5/2019    HEMATOLOGY CONCERN:  SVC stenosis  Danilo Ugarte was Evaluated by Navid Robledo PA-C with vascular surgery on 11/11/2020 regarding high-grade SVC narrowing with collateral vein formation in the mediastinum noted on contrasted chest CT at [facility] on 10/22/2020. Danilo Ugarte was asymptomatic, observation recommended.     Past Medical History:    Past Medical History:   Diagnosis Date    Acid reflux     BPH (benign prostatic hyperplasia)     Cancer (HCC)     Diverticulitis     Hard of hearing     Hypercholesteremia     Hypertension     Malignant melanoma of skin of upper limb, including shoulder ([address] dx 6/5/2014    to liver, stomach, bone, and right axilla     Past Surgical History:    Past Surgical History:   Procedure Laterality Date    CHOLECYSTECTOMY      TUNNELED CENTRAL VENOUS CATHETER W/ SUBCUTANEOUS PORT       Current Medications:    Current Outpatient Medications   Medication Sig Dispense Refill    levothyroxine (SYNTHROID) 100 MCG tablet TAKE 1 TABLET BY MOUTH EVERY DAY 90 tablet 1    hydrOXYzine HCl (ATARAX) 25 MG tablet TAKE 1 TABLET BY MOUTH EVERY 8 HOURS AS NEEDED FOR ITCHING 270 tablet 0    promethazine (PHENERGAN) 25 MG tablet Take 12.5 mg by mouth every 6 hours as needed for Nausea      pantoprazole (PROTONIX) 40 MG tablet Take 1 tablet by mouth daily 60 tablet 5    montelukast (SINGULAIR) 10 MG tablet TAKE 1 TABLET BY MOUTH ONCE DAILY AT NIGHT 90 tablet 3 PARoxetine (PAXIL) 10 MG tablet TAKE 1 TABLET BY MOUTH EVERY DAY 90 tablet 3    acetaminophen (TYLENOL) 500 MG tablet Take 1,000 mg by mouth every 6 hours as needed for Pain      dicyclomine (BENTYL) 20 MG tablet TAKE 1/2 TABLET BY MOUTH 4 TIMES A DAY AS NEEDED 180 tablet 3    finasteride (PROSCAR) 5 MG tablet Take 5 mg by mouth      lovastatin (MEVACOR) 40 MG tablet Take 40 mg by mouth      HYDROcodone-acetaminophen (NORCO)  MG per tablet Take 1 tablet by mouth every 6 hours as needed for Pain for up to 30 days. 120 tablet 0     No current facility-administered medications for this visit. Facility-Administered Medications Ordered in Other Visits   Medication Dose Route Frequency Provider Last Rate Last Admin    sodium chloride flush 0.9 % injection 10 mL  10 mL IntraVENous PRN Larry Pulido MD   10 mL at 22 1018    heparin flush 100 UNIT/ML injection 500 Units  500 Units IntraCATHeter PRN Larry Pulido MD   500 Units at 22 1017        Allergies: No Known Allergies  Social History:    Social History     Tobacco Use    Smoking status: Former    Smokeless tobacco: Never   Substance Use Topics    Alcohol use: No    Drug use: No     Family History:   Family History   Problem Relation Age of Onset    Heart Attack Brother          age 78 of [place]     Subjective   Review of Systems   Constitutional:  Positive for fatigue (Mild). Negative for fever. No night sweats   HENT:  Negative for dental problem, hearing loss, mouth sores, nosebleeds, sore throat and trouble swallowing. Eyes:  Negative for discharge and itching. Respiratory:  Positive for shortness of breath (Exertional, chronic and mild). Negative for cough and wheezing. Cardiovascular:  Negative for chest pain, palpitations and leg swelling. Gastrointestinal:  Negative for abdominal pain, constipation, diarrhea, nausea and vomiting. Endocrine: Negative for cold intolerance and heat intolerance.    Genitourinary: Negative for dysuria, frequency, hematuria and urgency. Musculoskeletal:  Negative for arthralgias, joint swelling and myalgias. Skin:  Negative for pallor and rash. Generalized pruritus, mild   Allergic/Immunologic: Negative for environmental allergies and immunocompromised state. Neurological:  Negative for seizures, syncope and numbness. Hematological:  Negative for adenopathy. Does not bruise/bleed easily. Psychiatric/Behavioral:  Negative for agitation, behavioral problems and confusion. Objective   Vitals:    11/03/22 0842   BP: (!) 141/81   Pulse: 67   Resp: 18   Temp: 98.1 °F (36.7 °C)   SpO2: 98%     Wt Readings from Last 3 Encounters:   11/03/22 199 lb 11.2 oz (90.6 kg)   10/06/22 203 lb 6.4 oz (92.3 kg)   09/08/22 206 lb (93.4 kg)     PHYSICAL EXAM:  Physical Exam  Vitals reviewed. Constitutional:       General: He is not in acute distress. Appearance: He is well-developed. He is not toxic-appearing or diaphoretic. HENT:      Head: Normocephalic and atraumatic. Right Ear: External ear normal.      Left Ear: External ear normal.      Nose: Nose normal.      Mouth/Throat:      Mouth: Mucous membranes are moist.   Eyes:      General: No scleral icterus. Right eye: No discharge. Left eye: No discharge. Conjunctiva/sclera: Conjunctivae normal.   Neck:      Trachea: No tracheal deviation. Cardiovascular:      Rate and Rhythm: Normal rate and regular rhythm. Pulmonary:      Effort: Pulmonary effort is normal. No respiratory distress. Breath sounds: Normal breath sounds. No wheezing or rales. Abdominal:      General: Bowel sounds are normal. There is no distension. Palpations: Abdomen is soft. Tenderness: There is no abdominal tenderness. There is no guarding. Genitourinary:     Comments: Exam deferred  Musculoskeletal:         General: No tenderness or deformity. Cervical back: Neck supple. No muscular tenderness.       Comments: Normal ROM all four extremities   Lymphadenopathy:      Cervical:      Right cervical: No superficial, deep or posterior cervical adenopathy. Left cervical: No superficial, deep or posterior cervical adenopathy. Upper Body:      Right upper body: No supraclavicular or axillary adenopathy. Left upper body: No supraclavicular or axillary adenopathy. Skin:     General: Skin is warm and dry. Findings: No erythema or rash. Comments: Left anterior chest wall mediport, no erythema   Neurological:      Mental Status: He is alert and oriented to person, place, and time. Comments: follows commands, non-focal   Psychiatric:         Behavior: Behavior normal. Behavior is cooperative. Thought Content: Thought content normal.         Judgment: Judgment normal.      Comments: Alert and oriented to person, place and time. Labs reviewed by me:    CBC 11/3/22  Lab Results   Component Value Date    WBC 8.11 11/03/2022    HGB 13.7 11/03/2022    HCT 40.3 11/03/2022    MCV 88.2 11/03/2022     11/03/2022    LYMPHOPCT 28.1 11/03/2022    RBC 4.57 (L) 11/03/2022    MCH 30.0 11/03/2022    MCHC 34.0 11/03/2022    RDW 12.6 11/03/2022     Lab Results   Component Value Date     (L) 11/03/2022    K 4.2 11/03/2022     11/03/2022    CO2 26 11/03/2022    BUN 12 11/03/2022    CREATININE 1.0 11/03/2022    GLUCOSE 104 11/03/2022    CALCIUM 8.7 (L) 11/03/2022    PROT 6.3 (L) 11/03/2022    LABALBU 3.6 11/03/2022    BILITOT 0.8 11/03/2022    ALKPHOS 52 11/03/2022    AST 20 11/03/2022    ALT 20 11/03/2022    LABGLOM >60 11/03/2022    GFRAA >59 01/06/2022    AGRATIO 1.8 12/26/2019    GLOB 2.7 10/06/2022     Lab Results   Component Value Date    TSH 2.660 11/03/2022     Labs 10/6/2022:  Cortisol AM: 10.7    ASSESSMENT:   1. Malignant melanoma of right upper extremity including shoulder ([facility] [address]    2. Neoplastic malignant related fatigue    3. Encounter for antineoplastic immunotherapy    4. Hypothyroidism due to medication    5. Superior vena cava stenosis    6. Left renal mass    7. Care plan discussed with patient       Malignant melanoma of skin of right upper extremity, including shoulder ([address]  Encounter for antineoplastic immunotherapy    CT Chest with contrast 3/24/2022 at [facility]:  No evidence of intrathoracic metastasis. Stable peripherally calcified 1.0 cm right thyroid nodule, unchanged from 8/13/2019. CT Abdomen and pelvis 3/24/2022 at [facility]:   No evidence of intra-abdominal or pelvic metastasis. Sigmoid colonic diverticulosis. Prostate enlargement (5.3 x 5.9 cm). Stable renal cysts including hyperdense inferior left renal cyst measuring 2.3 cm. No new skin lesions or lymphadenopathies on exam.  CBC, CMP, TSH, cortisol level ordered today  Proceed with Opdivo today and continue every 4 weeks. Continue X-Geva every 4 weeks. No oral complaint  Reschedule CT scans of the chest, abdomen/pelvis prior to return in 4 weeks    hypothyroidism due to medication    Continue Synthroid to 100 mcg po daily   Repeat TSH today    Left renal mass  2.2 cm left renal mass stable on abdominal/pelvic CT dated 6/4/2020, 10/22/2020 and 4/1/2021, 9/30/2021, 3/4/2022  Previously evaluated by urology, monitor conservatively  Continue to observe on surveillance imaging for metastatic melanoma    Superior vena cava stenosis  Asymptomatic, followed by ALEAH Naranjo  Contrasted chest CT 12/7/2021: Patent left axillary vein/left subclavian vein. Normal appearance of the partially opacified right jugular vein. Normal appearance of the unopacified right subclavian vein to the extent visualized. Focal narrowing of the SVC  No indication of SVC on recent Chest CT 3/4/2022  No anticoagulation, monitored conservatively. Plans for repeat CT chest in 1 year (12/2022) per vascular surgery    Health Maintenance  Colonoscopy 10/20/2022 by Dr. Bimal Tai normal aside from diverticulosis in the sigmoid colon. Return PRN    Care plan discussed with patient and his wife. All questions answered. Orders Placed This Encounter   Procedures    CBC with Auto Differential    TSH    Cortisol Total       Return in about 4 weeks (around 12/1/2022) for follow up with LIAM Ram for [address] [place] [address]. I have seen, examined and reviewed this patient medication list, appropriate labs and imaging studies. I reviewed relevant medical records and others physicians notes. I discussed the plans of care with the patient. I answered all the questions to the patients satisfaction. I have also reviewed the chief complaint (CC) and part of the history (History of Present Illness (HPI), Past Family Social History [facility]), or Review of Systems (ROS) and made changes when appropriated. (Please note that portions of this note were completed with a voice recognition program. Efforts were made to edit the dictations but occasionally words are mis-transcribed.)    The total time (20 min) I spent to see the patient today includes at least one or more of the following: preparing to see the patient by reviewing prior tests, prior notes or other relevant information, performing appropriate independent examination and evaluation, counseling, ordering of medications, tests or procedures, referrals, communicating with other healthcare professionals when appropriated to coordinate care, documenting clinic information in the electronic medical record or other health records, independently interpreting results of tests, managing test results and communicating the results to the patient/family or caregiver.         LIAM Landis  7:48 PM  11/3/2022

## 2022-11-03 ENCOUNTER — HOSPITAL ENCOUNTER (OUTPATIENT)
Dept: INFUSION THERAPY | Age: 82
Discharge: HOME | End: 2022-11-03
Payer: MEDICARE

## 2022-11-03 ENCOUNTER — OFFICE VISIT (OUTPATIENT)
Dept: HEMATOLOGY | Age: 82
End: 2022-11-03
Payer: MEDICARE

## 2022-11-03 VITALS
HEART RATE: 67 BPM | HEIGHT: 71 IN | DIASTOLIC BLOOD PRESSURE: 81 MMHG | SYSTOLIC BLOOD PRESSURE: 141 MMHG | BODY MASS INDEX: 27.96 KG/M2 | RESPIRATION RATE: 18 BRPM | OXYGEN SATURATION: 98 % | TEMPERATURE: 98.1 F | WEIGHT: 199.7 LBS

## 2022-11-03 DIAGNOSIS — R53.0 NEOPLASTIC MALIGNANT RELATED FATIGUE: ICD-10-CM

## 2022-11-03 DIAGNOSIS — C79.51 BONE METASTASES (HCC): ICD-10-CM

## 2022-11-03 DIAGNOSIS — C43.61 MALIGNANT MELANOMA OF RIGHT UPPER EXTREMITY INCLUDING SHOULDER (HCC): Primary | ICD-10-CM

## 2022-11-03 DIAGNOSIS — C43.9 MALIGNANT MELANOMA, UNSPECIFIED SITE (HCC): ICD-10-CM

## 2022-11-03 DIAGNOSIS — C43.61 MALIGNANT MELANOMA OF RIGHT UPPER EXTREMITY INCLUDING SHOULDER (HCC): ICD-10-CM

## 2022-11-03 DIAGNOSIS — Z71.89 CARE PLAN DISCUSSED WITH PATIENT: ICD-10-CM

## 2022-11-03 DIAGNOSIS — N28.89 LEFT RENAL MASS: ICD-10-CM

## 2022-11-03 DIAGNOSIS — E03.2 HYPOTHYROIDISM DUE TO MEDICATION: ICD-10-CM

## 2022-11-03 DIAGNOSIS — Z51.12 ENCOUNTER FOR ANTINEOPLASTIC IMMUNOTHERAPY: ICD-10-CM

## 2022-11-03 DIAGNOSIS — G89.3 CANCER RELATED PAIN: ICD-10-CM

## 2022-11-03 DIAGNOSIS — Z95.828 PORT-A-CATH IN PLACE: ICD-10-CM

## 2022-11-03 DIAGNOSIS — I87.1 SUPERIOR VENA CAVA STENOSIS: ICD-10-CM

## 2022-11-03 LAB
ALBUMIN SERPL-MCNC: 3.6 G/DL (ref 3.5–5.2)
ALP BLD-CCNC: 52 U/L (ref 40–130)
ALT SERPL-CCNC: 20 U/L (ref 5–41)
ANION GAP SERPL CALCULATED.3IONS-SCNC: 9 MMOL/L (ref 7–19)
AST SERPL-CCNC: 20 U/L (ref 5–40)
BILIRUB SERPL-MCNC: 0.8 MG/DL (ref 0.2–1.2)
BUN BLDV-MCNC: 12 MG/DL (ref 8–23)
CALCIUM SERPL-MCNC: 8.7 MG/DL (ref 8.8–10.2)
CHLORIDE BLD-SCNC: 100 MMOL/L (ref 98–111)
CO2: 26 MMOL/L (ref 22–29)
CORTISOL TOTAL: 7.6 UG/DL
CREAT SERPL-MCNC: 1 MG/DL (ref 0.5–1.2)
GFR SERPL CREATININE-BSD FRML MDRD: >60 ML/MIN/{1.73_M2}
GLUCOSE BLD-MCNC: 104 MG/DL (ref 74–109)
HCT VFR BLD CALC: 40.3 % (ref 40.1–51)
HEMOGLOBIN: 13.7 G/DL (ref 13.7–17.5)
LYMPHOCYTES ABSOLUTE: 2.28 K/UL (ref 1.18–3.74)
LYMPHOCYTES RELATIVE PERCENT: 28.1 % (ref 19.3–53.1)
MCH RBC QN AUTO: 30 PG (ref 25.7–32.2)
MCHC RBC AUTO-ENTMCNC: 34 G/DL (ref 32.3–36.5)
MCV RBC AUTO: 88.2 FL (ref 79–92.2)
MONOCYTES ABSOLUTE: 0.83 K/UL (ref 0.24–0.82)
MONOCYTES RELATIVE PERCENT: 10.2 % (ref 4.7–12.5)
NEUTROPHILS ABSOLUTE: 4.53 K/UL (ref 1.56–6.13)
NEUTROPHILS RELATIVE PERCENT: 55.9 % (ref 34–71.1)
PDW BLD-RTO: 12.6 % (ref 11.6–14.4)
PLATELET # BLD: 274 K/UL (ref 163–337)
PMV BLD AUTO: 9.5 FL (ref 7.4–10.4)
POTASSIUM SERPL-SCNC: 4.2 MMOL/L (ref 3.5–5)
RBC # BLD: 4.57 M/UL (ref 4.63–6.08)
SODIUM BLD-SCNC: 135 MMOL/L (ref 136–145)
TOTAL PROTEIN: 6.3 G/DL (ref 6.6–8.7)
TSH SERPL DL<=0.05 MIU/L-ACNC: 2.66 UIU/ML (ref 0.27–4.2)
WBC # BLD: 8.11 K/UL (ref 4.23–9.07)

## 2022-11-03 PROCEDURE — 1036F TOBACCO NON-USER: CPT | Performed by: NURSE PRACTITIONER

## 2022-11-03 PROCEDURE — 1123F ACP DISCUSS/DSCN MKR DOCD: CPT | Performed by: NURSE PRACTITIONER

## 2022-11-03 PROCEDURE — 6360000002 HC RX W HCPCS: Performed by: INTERNAL MEDICINE

## 2022-11-03 PROCEDURE — 96372 THER/PROPH/DIAG INJ SC/IM: CPT

## 2022-11-03 PROCEDURE — 99213 OFFICE O/P EST LOW 20 MIN: CPT | Performed by: NURSE PRACTITIONER

## 2022-11-03 PROCEDURE — 2580000003 HC RX 258: Performed by: INTERNAL MEDICINE

## 2022-11-03 PROCEDURE — 3078F DIAST BP <80 MM HG: CPT | Performed by: NURSE PRACTITIONER

## 2022-11-03 PROCEDURE — 3074F SYST BP LT 130 MM HG: CPT | Performed by: NURSE PRACTITIONER

## 2022-11-03 PROCEDURE — 85025 COMPLETE CBC W/AUTO DIFF WBC: CPT

## 2022-11-03 PROCEDURE — 36415 COLL VENOUS BLD VENIPUNCTURE: CPT

## 2022-11-03 PROCEDURE — G8417 CALC BMI ABV UP PARAM F/U: HCPCS | Performed by: NURSE PRACTITIONER

## 2022-11-03 PROCEDURE — G8427 DOCREV CUR MEDS BY ELIG CLIN: HCPCS | Performed by: NURSE PRACTITIONER

## 2022-11-03 PROCEDURE — 96413 CHEMO IV INFUSION 1 HR: CPT

## 2022-11-03 PROCEDURE — G8484 FLU IMMUNIZE NO ADMIN: HCPCS | Performed by: NURSE PRACTITIONER

## 2022-11-03 RX ORDER — HYDROCODONE BITARTRATE AND ACETAMINOPHEN 10; 325 MG/1; MG/1
1 TABLET ORAL EVERY 6 HOURS PRN
Qty: 120 TABLET | Refills: 0 | Status: SHIPPED | OUTPATIENT
Start: 2022-11-03 | End: 2022-12-03

## 2022-11-03 RX ORDER — SODIUM CHLORIDE 0.9 % (FLUSH) 0.9 %
10 SYRINGE (ML) INJECTION PRN
Status: DISCONTINUED | OUTPATIENT
Start: 2022-11-03 | End: 2022-11-04 | Stop reason: HOSPADM

## 2022-11-03 RX ORDER — HEPARIN SODIUM (PORCINE) LOCK FLUSH IV SOLN 100 UNIT/ML 100 UNIT/ML
500 SOLUTION INTRAVENOUS PRN
Status: DISCONTINUED | OUTPATIENT
Start: 2022-11-03 | End: 2022-11-05 | Stop reason: HOSPADM

## 2022-11-03 RX ADMIN — SODIUM CHLORIDE 480 MG: 9 INJECTION, SOLUTION INTRAVENOUS at 09:41

## 2022-11-03 RX ADMIN — DENOSUMAB 120 MG: 120 INJECTION SUBCUTANEOUS at 10:17

## 2022-11-03 RX ADMIN — Medication 500 UNITS: at 10:17

## 2022-11-03 RX ADMIN — SODIUM CHLORIDE, PRESERVATIVE FREE 10 ML: 5 INJECTION INTRAVENOUS at 10:18

## 2022-11-03 ASSESSMENT — ENCOUNTER SYMPTOMS
TROUBLE SWALLOWING: 0
ABDOMINAL PAIN: 0
EYE DISCHARGE: 0
SHORTNESS OF BREATH: 1
VOMITING: 0
COUGH: 0
SORE THROAT: 0
NAUSEA: 0
CONSTIPATION: 0
WHEEZING: 0
DIARRHEA: 0
EYE ITCHING: 0

## 2022-11-06 ENCOUNTER — HOSPITAL ENCOUNTER (EMERGENCY)
Age: 82
Discharge: HOME | End: 2022-11-06
Attending: EMERGENCY MEDICINE
Payer: MEDICARE

## 2022-11-06 ENCOUNTER — APPOINTMENT (OUTPATIENT)
Dept: CT IMAGING | Age: 82
End: 2022-11-06
Payer: MEDICARE

## 2022-11-06 VITALS
RESPIRATION RATE: 20 BRPM | HEART RATE: 81 BPM | OXYGEN SATURATION: 90 % | TEMPERATURE: 98.8 F | DIASTOLIC BLOOD PRESSURE: 78 MMHG | SYSTOLIC BLOOD PRESSURE: 131 MMHG

## 2022-11-06 DIAGNOSIS — J10.1 INFLUENZA A: Primary | ICD-10-CM

## 2022-11-06 LAB
ADENOVIRUS BY PCR: NOT DETECTED
ALBUMIN SERPL-MCNC: 3.5 G/DL (ref 3.5–5.2)
ALP BLD-CCNC: 52 U/L (ref 40–130)
ALT SERPL-CCNC: 22 U/L (ref 5–41)
ANION GAP SERPL CALCULATED.3IONS-SCNC: 11 MMOL/L (ref 7–19)
AST SERPL-CCNC: 25 U/L (ref 5–40)
BASOPHILS ABSOLUTE: 0 K/UL (ref 0–0.2)
BASOPHILS RELATIVE PERCENT: 0.4 % (ref 0–1)
BILIRUB SERPL-MCNC: 0.7 MG/DL (ref 0.2–1.2)
BILIRUBIN URINE: NEGATIVE
BLOOD, URINE: NEGATIVE
BORDETELLA PARAPERTUSSIS BY PCR: NOT DETECTED
BORDETELLA PERTUSSIS BY PCR: NOT DETECTED
BUN BLDV-MCNC: 13 MG/DL (ref 8–23)
CALCIUM SERPL-MCNC: 8.7 MG/DL (ref 8.8–10.2)
CHLAMYDOPHILIA PNEUMONIAE BY PCR: NOT DETECTED
CHLORIDE BLD-SCNC: 97 MMOL/L (ref 98–111)
CLARITY: CLEAR
CO2: 26 MMOL/L (ref 22–29)
COLOR: YELLOW
CORONAVIRUS 229E BY PCR: NOT DETECTED
CORONAVIRUS HKU1 BY PCR: NOT DETECTED
CORONAVIRUS NL63 BY PCR: NOT DETECTED
CORONAVIRUS OC43 BY PCR: NOT DETECTED
CREAT SERPL-MCNC: 1.2 MG/DL (ref 0.5–1.2)
EOSINOPHILS ABSOLUTE: 0.1 K/UL (ref 0–0.6)
EOSINOPHILS RELATIVE PERCENT: 0.8 % (ref 0–5)
GFR SERPL CREATININE-BSD FRML MDRD: >60 ML/MIN/{1.73_M2}
GLUCOSE BLD-MCNC: 105 MG/DL (ref 74–109)
GLUCOSE URINE: NEGATIVE MG/DL
HCT VFR BLD CALC: 41.3 % (ref 42–52)
HEMOGLOBIN: 13.7 G/DL (ref 14–18)
HUMAN METAPNEUMOVIRUS BY PCR: NOT DETECTED
HUMAN RHINOVIRUS/ENTEROVIRUS BY PCR: NOT DETECTED
IMMATURE GRANULOCYTES #: 0 K/UL
INFLUENZA A H3 BY PCR: DETECTED
INFLUENZA B BY PCR: NOT DETECTED
KETONES, URINE: 15 MG/DL
LACTIC ACID: 1.2 MMOL/L (ref 0.5–1.9)
LEUKOCYTE ESTERASE, URINE: NEGATIVE
LIPASE: 26 U/L (ref 13–60)
LYMPHOCYTES ABSOLUTE: 0.9 K/UL (ref 1.1–4.5)
LYMPHOCYTES RELATIVE PERCENT: 9.2 % (ref 20–40)
MCH RBC QN AUTO: 30 PG (ref 27–31)
MCHC RBC AUTO-ENTMCNC: 33.2 G/DL (ref 33–37)
MCV RBC AUTO: 90.4 FL (ref 80–94)
MONOCYTES ABSOLUTE: 1.2 K/UL (ref 0–0.9)
MONOCYTES RELATIVE PERCENT: 11.6 % (ref 0–10)
MYCOPLASMA PNEUMONIAE BY PCR: NOT DETECTED
NEUTROPHILS ABSOLUTE: 7.7 K/UL (ref 1.5–7.5)
NEUTROPHILS RELATIVE PERCENT: 77.7 % (ref 50–65)
NITRITE, URINE: NEGATIVE
PARAINFLUENZA VIRUS 1 BY PCR: NOT DETECTED
PARAINFLUENZA VIRUS 2 BY PCR: NOT DETECTED
PARAINFLUENZA VIRUS 3 BY PCR: NOT DETECTED
PARAINFLUENZA VIRUS 4 BY PCR: NOT DETECTED
PDW BLD-RTO: 12.7 % (ref 11.5–14.5)
PH UA: 5.5 (ref 5–8)
PLATELET # BLD: 219 K/UL (ref 130–400)
PMV BLD AUTO: 9.5 FL (ref 9.4–12.4)
POTASSIUM SERPL-SCNC: 4.5 MMOL/L (ref 3.5–5)
PROTEIN UA: ABNORMAL MG/DL
RBC # BLD: 4.57 M/UL (ref 4.7–6.1)
RESPIRATORY SYNCYTIAL VIRUS BY PCR: NOT DETECTED
SARS-COV-2, PCR: NOT DETECTED
SODIUM BLD-SCNC: 134 MMOL/L (ref 136–145)
SPECIFIC GRAVITY UA: 1.02 (ref 1–1.03)
TOTAL PROTEIN: 6.3 G/DL (ref 6.6–8.7)
UROBILINOGEN, URINE: 0.2 E.U./DL
WBC # BLD: 9.9 K/UL (ref 4.8–10.8)

## 2022-11-06 PROCEDURE — 96374 THER/PROPH/DIAG INJ IV PUSH: CPT

## 2022-11-06 PROCEDURE — 83605 ASSAY OF LACTIC ACID: CPT

## 2022-11-06 PROCEDURE — 83690 ASSAY OF LIPASE: CPT

## 2022-11-06 PROCEDURE — 6360000004 HC RX CONTRAST MEDICATION: Performed by: EMERGENCY MEDICINE

## 2022-11-06 PROCEDURE — 74177 CT ABD & PELVIS W/CONTRAST: CPT

## 2022-11-06 PROCEDURE — 99285 EMERGENCY DEPT VISIT HI MDM: CPT

## 2022-11-06 PROCEDURE — 80053 COMPREHEN METABOLIC PANEL: CPT

## 2022-11-06 PROCEDURE — 96375 TX/PRO/DX INJ NEW DRUG ADDON: CPT

## 2022-11-06 PROCEDURE — 0202U NFCT DS 22 TRGT SARS-COV-2: CPT

## 2022-11-06 PROCEDURE — 6360000002 HC RX W HCPCS: Performed by: EMERGENCY MEDICINE

## 2022-11-06 PROCEDURE — 36415 COLL VENOUS BLD VENIPUNCTURE: CPT

## 2022-11-06 PROCEDURE — 81003 URINALYSIS AUTO W/O SCOPE: CPT

## 2022-11-06 PROCEDURE — 85025 COMPLETE CBC W/AUTO DIFF WBC: CPT

## 2022-11-06 RX ORDER — ONDANSETRON 4 MG/1
4 TABLET, ORALLY DISINTEGRATING ORAL EVERY 8 HOURS PRN
Qty: 15 TABLET | Refills: 0 | Status: SHIPPED | OUTPATIENT
Start: 2022-11-06

## 2022-11-06 RX ORDER — ONDANSETRON 2 MG/ML
4 INJECTION INTRAMUSCULAR; INTRAVENOUS ONCE
Status: COMPLETED | OUTPATIENT
Start: 2022-11-06 | End: 2022-11-06

## 2022-11-06 RX ORDER — HYDROMORPHONE HYDROCHLORIDE 1 MG/ML
1 INJECTION, SOLUTION INTRAMUSCULAR; INTRAVENOUS; SUBCUTANEOUS ONCE
Status: COMPLETED | OUTPATIENT
Start: 2022-11-06 | End: 2022-11-06

## 2022-11-06 RX ADMIN — HYDROMORPHONE HYDROCHLORIDE 1 MG: 1 INJECTION, SOLUTION INTRAMUSCULAR; INTRAVENOUS; SUBCUTANEOUS at 03:19

## 2022-11-06 RX ADMIN — ONDANSETRON 4 MG: 2 INJECTION INTRAMUSCULAR; INTRAVENOUS at 03:19

## 2022-11-06 RX ADMIN — IOPAMIDOL 90 ML: 755 INJECTION, SOLUTION INTRAVENOUS at 03:28

## 2022-11-06 NOTE — ED PROVIDER NOTES
[facility] EMERGENCY DEPT  eMERGENCY dEPARTMENT eNCOUnter      Pt Name: Janett Lucio  MRN: [id number]  [place] 1940  Date of evaluation: 11/6/2022  Provider: Elier Gambino MD    CHIEF COMPLAINT       Chief Complaint   Patient presents with    Nausea     X2 days, hx of stage 4 melanoma. ODT Zofran given by EMS at [phone]. Abdominal Pain         HISTORY OF PRESENT ILLNESS   (Location/Symptom, Timing/Onset,Context/Setting, Quality, Duration, Modifying Factors, Severity)  Note limiting factors. Janett Lucio is a 80 y.o. male who presents to the emergency department for evaluation regarding nausea. Patient states that the symptoms been ongoing for about the past 2 days. He arrived here to the ED via EMS and was given an ODT Zofran prior to arrival.  States that he has had some cough and nasal congestion symptoms over about the past 1 to 2 days. He does have prior history of stage IV melanoma. He has not had any shortness of breath. Patient states he has had some feelings of body aches and has noticed decreased oral intake. Denies any chest pain or feelings of shortness of breath. HPI    NursingNotes were reviewed. REVIEW OF SYSTEMS    (2-9 systems for level 4, 10 or more for level 5)     Review of Systems   Constitutional:  Positive for appetite change, chills, fatigue and fever. HENT:  Positive for congestion. Respiratory:  Negative for cough, shortness of breath and wheezing. Cardiovascular:  Negative for chest pain. Gastrointestinal:  Negative for abdominal pain, diarrhea and vomiting. Neurological:  Negative for dizziness. All other systems reviewed and are negative.          PAST MEDICALHISTORY     Past Medical History:   Diagnosis Date    Acid reflux     BPH (benign prostatic hyperplasia)     Cancer (HCC)     Diverticulitis     Hard of hearing     Hypercholesteremia     Hypertension     Malignant melanoma of skin of upper limb, including shoulder ([address] dx 6/5/2014    to liver, stomach, bone, and right axilla         SURGICAL HISTORY       Past Surgical History:   Procedure Laterality Date    CHOLECYSTECTOMY      TUNNELED CENTRAL VENOUS CATHETER W/ SUBCUTANEOUS PORT           CURRENT MEDICATIONS     Discharge Medication List as of 2022  5:38 AM        CONTINUE these medications which have NOT CHANGED    Details   HYDROcodone-acetaminophen (NORCO)  MG per tablet Take 1 tablet by mouth every 6 hours as needed for Pain for up to 30 days. , Disp-120 tablet, R-0Normal      levothyroxine (SYNTHROID) 100 MCG tablet TAKE 1 TABLET BY MOUTH EVERY DAY, Disp-90 tablet, R-1Normal      hydrOXYzine HCl (ATARAX) 25 MG tablet TAKE 1 TABLET BY MOUTH EVERY 8 HOURS AS NEEDED FOR ITCHING, Disp-270 tablet, R-0Normal      promethazine (PHENERGAN) 25 MG tablet Take 12.5 mg by mouth every 6 hours as needed for NauseaHistorical Med      pantoprazole (PROTONIX) 40 MG tablet Take 1 tablet by mouth daily, Disp-60 tablet, R-5Normal      montelukast (SINGULAIR) 10 MG tablet TAKE 1 TABLET BY MOUTH ONCE DAILY AT NIGHT, Disp-90 tablet, R-3Normal      PARoxetine (PAXIL) 10 MG tablet TAKE 1 TABLET BY MOUTH EVERY DAY, Disp-90 tablet, R-3Normal      acetaminophen (TYLENOL) 500 MG tablet Take 1,000 mg by mouth every 6 hours as needed for PainHistorical Med      dicyclomine (BENTYL) 20 MG tablet TAKE 1/2 TABLET BY MOUTH 4 TIMES A DAY AS NEEDED, Disp-180 tablet,R-3Normal      finasteride (PROSCAR) 5 MG tablet Take 5 mg by mouthHistorical Med      lovastatin (MEVACOR) 40 MG tablet Take 40 mg by mouthHistorical Med             ALLERGIES     Patient has no known allergies.     FAMILY HISTORY       Family History   Problem Relation Age of Onset    Heart Attack Brother          age 78 of MI          SOCIAL HISTORY       Social History     Socioeconomic History    Marital status:      Spouse name: None    Number of children: None    Years of education: None    Highest education level: None   Tobacco Use    Smoking status: Former    Smokeless tobacco: Never   Substance and Sexual Activity    Alcohol use: No    Drug use: No    Sexual activity: Yes     Partners: Female       SCREENINGS    [place] Coma Scale  Eye Opening: Spontaneous  Best Verbal Response: Oriented  Best Motor Response: Obeys commands  [place] Coma Scale Score: 15        PHYSICAL EXAM    (up to 7 for level 4, 8 or more for level 5)     ED Triage Vitals   BP Temp Temp Source Heart Rate Resp SpO2 Height Weight   11/06/22 0102 11/06/22 0102 11/06/22 0102 11/06/22 0028 11/06/22 0028 11/06/22 0028 -- --   112/82 98.6 °F (37 °C) Oral 81 20 90 %         Physical Exam  Vitals and nursing note reviewed. HENT:      Head: Atraumatic. Right Ear: External ear normal.      Left Ear: External ear normal.      Nose: Congestion present. Mouth/Throat:      Mouth: Mucous membranes are moist. Mucous membranes are not dry. Pharynx: No posterior oropharyngeal erythema. Eyes:      General: No scleral icterus. Pupils: Pupils are equal, round, and reactive to light. Neck:      Trachea: No tracheal deviation. Cardiovascular:      Rate and Rhythm: Normal rate and regular rhythm. Heart sounds: Normal heart sounds. No murmur heard. Pulmonary:      Effort: Pulmonary effort is normal. No respiratory distress. Breath sounds: Normal breath sounds. No stridor. Abdominal:      General: There is no distension. Palpations: Abdomen is soft. Tenderness: There is abdominal tenderness. There is no guarding. Skin:     Capillary Refill: Capillary refill takes less than 2 seconds. Coloration: Skin is not pale. Findings: No rash. Neurological:      Mental Status: He is alert and oriented to person, place, and time. Psychiatric:         Behavior: Behavior is cooperative.        DIAGNOSTIC RESULTS       RADIOLOGY:  Non-plain film images such as CT, Ultrasound and MRI are read by the radiologist. Plain radiographic images are visualized and preliminarily interpreted bythe emergency physician with the below findings:        CT ABDOMEN PELVIS W IV CONTRAST Additional Contrast? None   Final Result   No acute abdominal or pelvic pathology. Electronically signed by Odette Sotelo MD on 11-06-22 at [address] [place] [address]:  Labs Reviewed   RESPIRATORY PANEL, MOLECULAR, WITH COVID-19 - Abnormal; Notable for the following components:       Result Value    Influenza A H3 by PCR DETECTED (*)     All other components within normal limits   CBC WITH AUTO DIFFERENTIAL - Abnormal; Notable for the following components:    RBC 4.57 (*)     Hemoglobin 13.7 (*)     Hematocrit 41.3 (*)     Neutrophils % 77.7 (*)     Lymphocytes % 9.2 (*)     Monocytes % 11.6 (*)     Neutrophils Absolute 7.7 (*)     Lymphocytes Absolute 0.9 (*)     Monocytes Absolute 1.20 (*)     All other components within normal limits   COMPREHENSIVE METABOLIC PANEL - Abnormal; Notable for the following components:    Sodium 134 (*)     Chloride 97 (*)     Calcium 8.7 (*)     Total Protein 6.3 (*)     All other components within normal limits   URINALYSIS WITH REFLEX TO CULTURE - Abnormal; Notable for the following components:    Ketones, Urine 15 (*)     Protein, UA TRACE (*)     All other components within normal limits   LIPASE   LACTIC ACID       All other labs were within normal range or not returned as of this dictation. EMERGENCY DEPARTMENT COURSE and DIFFERENTIAL DIAGNOSIS/MDM:   Vitals:    Vitals:    11/06/22 0028 11/06/22 0102 11/06/22 0544   BP:  112/82 131/78   Pulse: 81 75 81   Resp: 20 20 20   Temp:  98.6 °F (37 °C) 98.8 °F (37.1 °C)   TempSrc:  Oral    SpO2: 90% 90% 90%       MDM    Laboratory studies look okay. CT scan does not reveal any acute intra-abdominal pathology. Noted to be positive for influenza. Return precautions were reviewed discussed and all questions were answered.     PROCEDURES:  Unless otherwise noted below, none     Procedures    FINAL IMPRESSION      1.

## 2022-11-07 RX ORDER — METHYLPREDNISOLONE SODIUM SUCCINATE 125 MG/2ML
125 INJECTION, POWDER, LYOPHILIZED, FOR SOLUTION INTRAMUSCULAR; INTRAVENOUS PRN
OUTPATIENT
Start: 2022-12-01

## 2022-11-07 RX ORDER — DIPHENHYDRAMINE HYDROCHLORIDE 50 MG/ML
50 INJECTION INTRAMUSCULAR; INTRAVENOUS PRN
OUTPATIENT
Start: 2022-12-01

## 2022-11-14 ENCOUNTER — HOSPITAL ENCOUNTER (OUTPATIENT)
Dept: CT IMAGING | Facility: HOSPITAL | Age: 82
Discharge: HOME | End: 2022-11-14
Admitting: NURSE PRACTITIONER

## 2022-11-14 ENCOUNTER — HOSPITAL ENCOUNTER (OUTPATIENT)
Dept: CT IMAGING | Facility: HOSPITAL | Age: 82
End: 2022-11-14

## 2022-11-14 ENCOUNTER — TRANSCRIBE ORDERS (OUTPATIENT)
Dept: ADMINISTRATIVE | Facility: HOSPITAL | Age: 82
End: 2022-11-14

## 2022-11-14 ENCOUNTER — APPOINTMENT (OUTPATIENT)
Dept: CT IMAGING | Facility: HOSPITAL | Age: 82
End: 2022-11-14

## 2022-11-14 DIAGNOSIS — C43.61 MALIGNANT MELANOMA OF RIGHT UPPER EXTREMITY INCLUDING SHOULDER: ICD-10-CM

## 2022-11-14 DIAGNOSIS — C43.61 MALIGNANT MELANOMA OF RIGHT UPPER EXTREMITY INCLUDING SHOULDER: Primary | ICD-10-CM

## 2022-11-14 LAB — CREAT BLDA-MCNC: 1.2 MG/DL (ref 0.6–1.3)

## 2022-11-14 PROCEDURE — 82565 ASSAY OF CREATININE: CPT

## 2022-11-14 PROCEDURE — 71260 CT THORAX DX C+: CPT

## 2022-11-14 PROCEDURE — 74177 CT ABD & PELVIS W/CONTRAST: CPT

## 2022-11-14 PROCEDURE — 25010000002 IOPAMIDOL 61 % SOLUTION: Performed by: NURSE PRACTITIONER

## 2022-11-14 RX ORDER — HYDROXYZINE HYDROCHLORIDE 25 MG/1
25 TABLET, FILM COATED ORAL EVERY 8 HOURS PRN
Qty: 270 TABLET | Refills: 0 | Status: SHIPPED | OUTPATIENT
Start: 2022-11-14 | End: 2023-02-12

## 2022-11-14 RX ADMIN — IOPAMIDOL 100 ML: 612 INJECTION, SOLUTION INTRAVENOUS at 16:04

## 2022-11-17 ASSESSMENT — ENCOUNTER SYMPTOMS
WHEEZING: 0
ABDOMINAL PAIN: 0
COUGH: 0
VOMITING: 0
DIARRHEA: 0
SHORTNESS OF BREATH: 0

## 2022-11-30 NOTE — PROGRESS NOTES
Progress Note      Pt Name: Elsie Coto  YOB: 1940  MRN: [id number]    Date of evaluation: 12/1/22    History Obtained From:  patient, spouse, electronic medical record    CHIEF COMPLAINT:    Chief Complaint   Patient presents with    Cancer    Immunotherapy     HISTORY OF PRESENT ILLNESS:    Elsie Coto is a pleasant 20-year-old gentleman who holds a diagnosis of recurrent, stage IV metastatic malignant melanoma diagnosed over 4 years ago in July, 2018. Metastatic disease included right axillary lymph node, liver and bone involvement. Orlando Palm has had stable disease on maintenance nivolumab every 4 weeks. He returns to the clinic for follow-up, toxicity assessment and continuation of treatment. He is also here to discuss surveillance CT imaging. Orlando Palm was evaluated at [facility] 11/6/2022 for complaint of nausea and upper abdominal discomfort. Contrasted CT of abd/pelvis 11/6/2022 at [facility] (completed for Nausea and abdominal discomfort):   No acute abdominal or pelvic pathology. Liver was unremarkable. Slight interval enlargement and right renal cortical cyst now measuring 2.7 cm, previously 2.2 cm when compared to 2/11/2019. No enlarged lymph nodes. Patient was diagnosed with FLU. He states nausea and abdominal discomfort are improved, though has had mild, intermittent nausea    Chronic pruritus is mild and tolerable. Intermittent rash (mainly sun exposed areas) is much improved at this time. He asks for a refill of the \"scott\" he was previously given. 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 Generalized bony discomforts are controlled with Norco QID PRN. Mild exertional dyspnea is unchanged, SVC stable. ECOG grade 1  Neuropathy grade 1 (mainly the 2nd and 3rd right digits from prior injury)  Fatigue grade 1  Pruritus grade 1  Orlando Palm is accompanied by his wife today.     Elsie Coto presents for follow-up surveillance visit and toxicity assessment. he requires intenstive monitoring due to the potential to cause serious morbidity or death. TARGET METASTATIC MALIGNANT MELANOMA SITES:  Right upper extremity original primary site 06/05/14   Right axilla lymph node at presentation 6/5/2014 and 3 axillary nodes at recurrence 7/27/2018 with an SUV of 8.7   Too numerous to count liver metastases   Celiac lymph nodes x 2 with highest SUV of 3   Bony metastatic disease on PET scan in the right ilium (SUV of 5) and right acetabulum (SUV of 6.4)   Indeterminate HORACIO 3 mm subpleural nodule   Indeterminate thyroid nodules on chest CT    1st TUMOR HISTORY: Resected stage IIIA (pT3a pN1a M0) right upper extremity malignant melanoma, 06/05/14  Mr. Milad Molina was seen in initial oncology consultation on 08/05/14, referred by Dr. Evans Valerio, regarding a diagnosis of resected malignant melanoma of the right upper extremity. Dr. Benito Mariano resected a malignant melanoma from the right posterior arm, above the elbow, on 06/05/14. Pathology revealed a 2.25 mm thick non-ulcerated Pankaj's level IV malignant melanoma. There were 10 mitoses/ sq mm. There was no vascular or lymphatic invasion. A wide excision with a sentinel lymph node biopsy was performed by by Dr. Jose Elias Levi on 07/09/14. Pathology revealed that the right axillary sentinel lymph node was positive for metastatic malignancy by immunoperoxidase stain. The wide excision sample was without further local involvement. A right axillary lymph node dissection was performed by Dr. Jose Elias Levi on 07/21/14. No metastatic malignancy was noted in any of the 8 right axillary lymph nodes submitted. The HMB-45 immunoperoxidase stain was negative for metastatic malignant melanoma in any of these subsequently submitted lymph nodes.   Completion of a metastatic workup on 08/06/14, including MRI of the brain, bone scan, CT scans of the chest, abdomen and pelvis were negative for evidence of metastatic disease. Adjuvant pegylated Interferon (Sylatron) 6 mcg/kg (500mcg) sub-q weekly x 8 to be followed then by 3 mcg/kg(250mcg) weekly x up to 5 years was discussed and planned. Adjuvant therapy was indeed initiated and delivered as discussed below in treatment summary. The last dose of the medication was delivered on 2/15/2015. He was unable to tolerate this medication even at reduced dosages because of poor tolerability, weight loss, anorexia, unable to sleep, anxiety, fatigue, et [place]. He declined any further interferon, which is reasonable. A one-year followup CT scan of the chest and MRI of the brain on 6/11/2015 did not reveal any evidence of recurrent disease.  ----------------------------------PROGRESSION --------------------------------  Ashleykatyanita Babb presented to [facility] emergency department on 7/3/18 for a 3 month history of waxing and waning epigastric abdominal pain and new onset fever. Additional symptoms reported include unexplained weight loss, nausea, headaches. CXR 7/3/18 showed mild cardiomegaly with no acute cardiopulmonary process. Abdominal/pelvic CT with contrast 7/3/18 documented multiple low density bilateral hepatic lesions suspicious for metastatic disease. 2 small renal cysts are noted and one on the left. A second left renal lesion near the lower pole measured 25 mm with SUV of 34, ultrasound recommended. The prostate is markedly enlarged causing partial right obstructive uropathy. CBC shows WBC 7.3, hemoglobin 15.8 and platelets 632,332  CMP revealed a creatinine of 1.2, GFR 59. LFTs WNL  UA negative  Lipase 28, troponin <0.01  He has a history of GERD with laparoscopic paraesophageal hernia repair and fundoplication by Dr. Brandon Abreu on 5/24/15. He was prescribed Norco and Zofran PRN at [facility] ER.    Rx was given for omeprazole in clinic  Tumor markers drawn 7/11/18 included:   AFP 5.8   CEA 2.0   CA 19-9 - 8   PSA 7.4 (chronically elevated, up to 8.28 on 8/8/14)  MRI of the abdomen w/wo contrast 7/13/18 at [facility] documented innumerable T1 hypointense, T2 hyperintense lesions throughout the liver as noted on recent CT. One of the larger lesions seen posteriorly in the right hepatic lobe measured 1.6 cm. Also noted was at least two T2 hyperintense lesions within the thoracocolumbar spine, which could represent metastasis. Bilateral renal ultrasound 7/3/18 showed a 2.6 cm hypoechoic left lower pole renal lesion, not typical of a benign cyst with enhancing characteristics concerning for primary renal cancer versus metastatic disease. Bilateral nephrogenic cysts noted along with marked prostate enlargement with lobular changes measuring 8 x 5.9 x 4.9 cm. MRI of the brain w/wo contrast 7/3/18 for complaint of headache and nausea was without evidence of acute intracranial process. Contrasted chest CT 7/24/18 revealed a stable 2 cm right thyroid nodule, a new 9 mm right thyroid nodule. An indeterminate 3 mm subpleural HORACIO nodule is noted, likely granulomatous or postinfectious. Bone scan 7/24/18 was negative for evidence of osseous metastasis. Examination is remarkable for mid epigastric discomfort, 10 lbs weight loss and 1 inch right axillary lymph node. This was a previous site of positive sentinel lymph node biopsy and dissection associated with the resected, stage IIIa right upper extremity malignant melanoma in 2014. Suspect recurrent malignant melanoma. Orlando Palm was referred to Dr. Dameon Don who performed an FNA of the right axillary lymph node 7/26/18. Pathology was consistent for metastatic malignant melanoma. 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 Findings were discussed with both Orlando Palm and his wife by Dr. Zoraida Nix at follow-up on 8/1/18 and reiterated on 8/7/18. Recommendation is for combination therapy with Opdivo 1 mg/kg and Yervoy 3 mg/kg every 3 weeks ×4 cycles, followed by Opdivo 240 mg every 2 weeks thereafter.   Orlando Palm had a left chest Mediport placed by  Concepcion 8/3/18. PET scan 8/6/18 showed the following:  3 right axillary lymph nodes, SUV up to 8.7   Hepatic metastasis, too numerous to count   2 celiac lymph nodes SUV 2.8 and 3   Right ilium, SUV 5. Right acetabulum SUV 6.4  Cycle #1 Opdivo/Yervoy and monthly Xgeva initiated 8/7/18. He was evaluated at [facility] 10/16/18 for LLQ abdominal discomfort with a history of recent diverticulitis. Abdominal/pelvic CT with contrast revealed a decreased size in the multiple liver nodules. Bilateral renal nodules were stable. An enlarged prostate with an exophytic mass arising from the posterior superior aspect of the prostate was present. He was treated for acute diverticulitis of the mid to distal sigmoid colon. Daphne Ceballos was evaluated by Dr. Amanuel Calzada on 11/27/18 regarding possible prostate mass and elevated PSA with recommendations for surveillance only. Contrasted chest CT on 11/8/18 at [facility] showed no enlarged axillary, hilar or mediastinal lymph nodes. There were no acute osseous or soft tissue abnormalities. Daphne Ceballos has had treatment delay of Nivolumab on more than one occasion due to rash, requiring treatment with prednisone. Nivolumab was discontinued following dose #2 of maintenance therapy on 12/20/18. He initiated cycle #1 of Tafinlar 150 mg po BID & Mekinist 2 mg po BID on 1/4/19. Contrasted CT chest 1/9/19 at [facility] showed emphysematous changes bilaterally without evidence of metastatic disease. A 1.4 cm right hilar lymph node was unchanged since 2015, likely benign. Abdominal/pelvic CT with contrast 1/9/19 documented a 4 mm hepatic lesion, previously 6 mm. Other previously noted lesions throughout the liver are not appreciated. The enlarged prostate with nodular hypertrophy was previously evaluated by Dr. Amanuel Calzada. Bone scan 1/9/19 was without evidence of osteoblastic disease. Echocardiogram 1/9/19 showed an EF of 60%.   Daphne Ceballos was hopsitalized at [address] 1/12/19 - 1/18/19 for enterocolitis with nausea/vomiting and diarrhea. Antineoplastic therapy was held during that time, rechallenged beginning 1/24/19. Chris Marcum was evaluated 2/11/19 at [facility] for a 24 hour history of abdominal pain and diarrhea with mild diverticulitis, stable from 1/12/19. He was treated with Cipro and Flagyl. Single agent Tafinlar was taken 3/28/19 - 4/4/19, discontinued by Chris Marcum due to intolerable abdominal cramping. Patrica Reason was resumed 4/25/19. CT scans of the chest, abdomen and pelvis with contrast 8/13/2019 at [facility] was negative for intrathoracic metastasis. There was no evidence of disease progression in the abdomen/pelvis with stable, subcentimeter low-density liver lesions noted. Dosing was changed to 480 mg every 4 weeks on 9/5/2019 to see if this may decrease persistent itching. Rx fluoxetine and hydroxyzine per Dr. Francisco Javier Martinez recommendations. Contrasted CT scans of the chest, abdomen/pelvis and bone scan 1/16/2020 were negative for evidence of disease progression. 19 mm right thyroid lobe nodule and subcentimeter low-density liver lesions were stable. No abnormal bony uptake. Contrasted CT scans of the chest, abdomen and pelvis 6/4/2020 were without evidence of metastatic disease. A 2.2 cm left lower renal lesion is stable  Contrasted CT scans of the chest, abdomen and pelvis 6/4/2020 were negative aside from high-grade stenosis of the SVC with collateral formation for which Chris Marcum was referred to vascular surgery 10/29/2020. Treatment continues with nivolumab. TREATMENT SUMMARY:  Dr. Ashley Morrow resected a malignant melanoma from the right posterior arm, above the elbow, on 06/05/14   Wide excision with a sentinel lymph node biopsy by Dr. Ashanti Fuchs on 07/09/14. Adjuvant weekly Sylatron Initiated 09/02/14 at 6 mcg/kg (500mcg) sub-q weekly but was only able to receive 4 of 8 planned treatments of this. The last dose #4 was on 09/28/14   Adjuvant weekly Sylatron 3 mcg/kg (250mcg) initiated 10/12/2014.  Last dose delivered on 2/15/2015, discontinued due to poor tolerability and unable to tolerate the medication. Opdivo 1 mg/kg and Yervoy 3 mg/kg every 3 weeks ×4 doses. 8/7/18 - 10/25/18. Opdivo 240 mg every 2 weeks initiated 11/15/18, discontinued after dose #2 on 12/20/18   Monthly Xgeva initiated 8/7/18. Tafinlar 150 mg po BID & Mekinist 2 mg po qday, cycle #1 initiated 1/4/19. Single agent Tafinlar 150 mg po BID initiated 3/28/19, discontinue by patient 4/4/19. Opdivo 240 mg every 2 weeks resumed 4/25/19, change to 480 mg every 4 weeks on 9/5/2019    HEMATOLOGY CONCERN:  SVC stenosis  Daphne Ceballos was Evaluated by Colton Gil PA-C with vascular surgery on 11/11/2020 regarding high-grade SVC narrowing with collateral vein formation in the mediastinum noted on contrasted chest CT at [facility] on 10/22/2020. Daphne Ceballos was asymptomatic, observation recommended. Past Medical History:    Past Medical History:   Diagnosis Date    Acid reflux     BPH (benign prostatic hyperplasia)     Cancer (HCC)     Diverticulitis     Hard of hearing     Hypercholesteremia     Hypertension     Malignant melanoma of skin of upper limb, including shoulder ([address] dx 6/5/2014    to liver, stomach, bone, and right axilla     Past Surgical History:    Past Surgical History:   Procedure Laterality Date    CHOLECYSTECTOMY      TUNNELED CENTRAL VENOUS CATHETER W/ SUBCUTANEOUS PORT       Current Medications:    Current Outpatient Medications   Medication Sig Dispense Refill    hydrOXYzine HCl (ATARAX) 25 MG tablet TAKE 1 TABLET BY MOUTH EVERY 8 HOURS AS NEEDED FOR ITCHING 270 tablet 0    ondansetron (ZOFRAN ODT) 4 MG disintegrating tablet Take 1 tablet by mouth every 8 hours as needed for Nausea or Vomiting 15 tablet 0    HYDROcodone-acetaminophen (NORCO)  MG per tablet Take 1 tablet by mouth every 6 hours as needed for Pain for up to 30 days.  120 tablet 0    levothyroxine (SYNTHROID) 100 MCG tablet TAKE 1 TABLET BY MOUTH EVERY DAY 90 tablet 1 promethazine (PHENERGAN) 25 MG tablet Take 12.5 mg by mouth every 6 hours as needed for Nausea      pantoprazole (PROTONIX) 40 MG tablet Take 1 tablet by mouth daily 60 tablet 5    montelukast (SINGULAIR) 10 MG tablet TAKE 1 TABLET BY MOUTH ONCE DAILY AT NIGHT 90 tablet 3    PARoxetine (PAXIL) 10 MG tablet TAKE 1 TABLET BY MOUTH EVERY DAY 90 tablet 3    acetaminophen (TYLENOL) 500 MG tablet Take 1,000 mg by mouth every 6 hours as needed for Pain      dicyclomine (BENTYL) 20 MG tablet TAKE 1/2 TABLET BY MOUTH 4 TIMES A DAY AS NEEDED 180 tablet 3    finasteride (PROSCAR) 5 MG tablet Take 5 mg by mouth      lovastatin (MEVACOR) 40 MG tablet Take 40 mg by mouth      triamcinolone (KENALOG) 0.025 % cream Apply topically 2 times daily. 30 g 5     No current facility-administered medications for this visit. Facility-Administered Medications Ordered in Other Visits   Medication Dose Route Frequency Provider Last Rate Last Admin    sodium chloride flush 0.9 % injection 10 mL  10 mL IntraVENous PRN Jigna Patrick APRN   10 mL at 22 1003    heparin flush 100 UNIT/ML injection 500 Units  500 Units IntraCATHeter PRN Jigna Patrick APRN   500 Units at 22 1003        Allergies: No Known Allergies  Social History:    Social History     Tobacco Use    Smoking status: Former    Smokeless tobacco: Never   Substance Use Topics    Alcohol use: No    Drug use: No     Family History:   Family History   Problem Relation Age of Onset    Heart Attack Brother          age 78 of MI     Subjective   Review of Systems   Constitutional:  Positive for fatigue (Mild). Negative for fever. No night sweats   HENT:  Negative for dental problem, hearing loss, mouth sores, nosebleeds, sore throat and trouble swallowing. Eyes:  Negative for discharge and itching. Respiratory:  Positive for shortness of breath (Exertional, chronic and mild). Negative for cough and wheezing.     Cardiovascular:  Negative for chest pain, palpitations and leg swelling. Gastrointestinal:  Positive for nausea (Improved). Negative for abdominal pain, constipation, diarrhea and vomiting. Endocrine: Negative for cold intolerance and heat intolerance. Genitourinary:  Negative for dysuria, frequency, hematuria and urgency. Musculoskeletal:  Negative for arthralgias, joint swelling and myalgias. Skin:  Negative for pallor and rash. Generalized pruritus, mild   Allergic/Immunologic: Negative for environmental allergies and immunocompromised state. Neurological:  Negative for seizures, syncope and numbness. Hematological:  Negative for adenopathy. Does not bruise/bleed easily. Psychiatric/Behavioral:  Negative for agitation, behavioral problems and confusion. Objective   Vitals:    12/01/22 0845   BP: (!) 157/76   Pulse: 76   Resp: 18   Temp: 97.8 °F (36.6 °C)   SpO2: 96%     Wt Readings from Last 3 Encounters:   12/01/22 199 lb 9.6 oz (90.5 kg)   11/03/22 199 lb 11.2 oz (90.6 kg)   10/06/22 203 lb 6.4 oz (92.3 kg)     PHYSICAL EXAM:  Physical Exam  Vitals reviewed. Constitutional:       General: He is not in acute distress. Appearance: He is well-developed. He is not toxic-appearing or diaphoretic. HENT:      Head: Normocephalic and atraumatic. Right Ear: External ear normal.      Left Ear: External ear normal.      Nose: Nose normal.      Mouth/Throat:      Mouth: Mucous membranes are moist.   Eyes:      General: No scleral icterus. Right eye: No discharge. Left eye: No discharge. Conjunctiva/sclera: Conjunctivae normal.   Neck:      Trachea: No tracheal deviation. Cardiovascular:      Rate and Rhythm: Normal rate and regular rhythm. Pulmonary:      Effort: Pulmonary effort is normal. No respiratory distress. Breath sounds: Normal breath sounds. No wheezing or rales. Abdominal:      General: Bowel sounds are normal. There is no distension. Palpations: Abdomen is soft. Tenderness: There is no abdominal tenderness. There is no guarding. Genitourinary:     Comments: Exam deferred  Musculoskeletal:         General: No tenderness or deformity. Cervical back: Neck supple. No muscular tenderness. Comments: Normal ROM all four extremities   Lymphadenopathy:      Cervical:      Right cervical: No superficial, deep or posterior cervical adenopathy. Left cervical: No superficial, deep or posterior cervical adenopathy. Upper Body:      Right upper body: No supraclavicular or axillary adenopathy. Left upper body: No supraclavicular or axillary adenopathy. Skin:     General: Skin is warm and dry. Findings: No erythema or rash. Comments: Left anterior chest wall mediport, no erythema   Neurological:      Mental Status: He is alert and oriented to person, place, and time. Comments: follows commands, non-focal   Psychiatric:         Behavior: Behavior normal. Behavior is cooperative. Thought Content: Thought content normal.         Judgment: Judgment normal.      Comments: Alert and oriented to person, place and time.        Labs reviewed by me:    CBC 12/1/22  Lab Results   Component Value Date    WBC 7.50 12/01/2022    HGB 14.2 12/01/2022    HCT 41.8 12/01/2022    MCV 88.9 12/01/2022     12/01/2022    LYMPHOPCT 30.5 12/01/2022    RBC 4.70 12/01/2022    MCH 30.2 12/01/2022    MCHC 34.0 12/01/2022    RDW 12.7 12/01/2022     Lab Results   Component Value Date     12/01/2022    K 4.0 12/01/2022     12/01/2022    CO2 28 12/01/2022    BUN 13 12/01/2022    CREATININE 1.0 12/01/2022    GLUCOSE 129 (H) 12/01/2022    CALCIUM 8.7 12/01/2022    PROT 7.2 12/01/2022    LABALBU 4.3 12/01/2022    BILITOT 1.1 12/01/2022    ALKPHOS 49 12/01/2022    AST 30 12/01/2022    ALT 30 12/01/2022    LABGLOM >60 12/01/2022    GFRAA >59 01/06/2022    AGRATIO 1.8 12/26/2019    GLOB 3.0 12/01/2022     Lab Results   Component Value Date    TSH 2.660 11/03/2022     Labs 11/03/2022:  Cortisol AM: 7.6  TSH: 2.660    ASSESSMENT:   1. Malignant melanoma of right upper extremity including shoulder ([address]    2. Encounter for antineoplastic immunotherapy    3. Hypothyroidism due to medication    4. Left renal mass    5. Pruritus    6. Nausea       Malignant melanoma of skin of right upper extremity, including shoulder ([address]  Encounter for antineoplastic immunotherapy    Contrasted CT of abd/pelvis 11/6/2022 at [facility] (completed for Nausea and abdominal discomfort):   No acute abdominal or pelvic pathology. Liver was unremarkable. Slight interval enlargement and right renal cortical cyst now measuring 2.7 cm, previously 2.2 cm when compared to 2/11/2019. No enlarged lymph nodes. Patient was diagnosed with FLU    Contrasted chest CT 11/14/2022 at [facility]:  Development of 3 mixed soft tissue and groundglass nodule small within the right upper lobe and 2 within the right lower lobe. Based on distribution appearance I would rather favor that these are inflammatory in nature but would warrant follow-up on subsequent imaging. Metastatic disease is considered less likely but not excluded. There has been interval development of rather diffuse bronchial wall thickening, particularly within the lower lobes suggesting an ongoing inflammatory process either related to bronchitis or reactive airway disease. No discrete endobronchial lesion is present. No evidence of interval enlargement or development of mediastinal or axillary lymphadenopathy. CT abdomen/pelvis with contrast 11/14/2022 at [facility]:  No acute abnormality of the abdomen or pelvis. No finding to suggest a neoplastic process or metastatic disease. Fatty infiltration of the liver. Stable and unchanged renal nodules. Diverticulosis of the colon. No evidence for diverticulitis. Significant lobulated enlargement of the prostate is similar to the previous study.  Significant compression elevation of floor of the urinary bladder. Moderate thickening of the wall of the urinary bladder is partly due to incomplete distention. There is a probability of partial outlet obstruction. TSH was stable at 2.6 on 11/3/2022 -repeat today  Cortisol level normal at 7.6 on 11/3/2022    Continue X-Geva every 4 weeks. hypothyroidism due to medication      Continue Synthroid to 100 mcg po daily   Repeat TSH today    Left renal mass  2.2 cm left renal mass stable on abdominal/pelvic CT dated 6/4/2020, 10/22/2020 and 4/1/2021, 9/30/2021, 3/4/2022, and 11/14/2022 at [facility]  Previously evaluated by urology, monitor conservatively  right renal cortical cyst now measuring 2.7 cm on Contrasted CT of abd/pelvis 11/6/2022 at [facility]  Continue to observe on surveillance imaging for metastatic melanoma    Superior vena cava stenosis  Asymptomatic, followed by ALEAH Smith  Contrasted chest CT 12/7/2021: Patent left axillary vein/left subclavian vein. Normal appearance of the partially opacified right jugular vein. Normal appearance of the unopacified right subclavian vein to the extent visualized. Focal narrowing of the SVC  No indication of SVC on recent Chest CT 11/14/2022  No anticoagulation, monitored conservatively. Plans for repeat CT chest in 1 year (12/2022) per vascular surgery    Pruritis  Rx Kenalog cream provided, per patient request  Continue Atarax PRN, Singulair, Paxil    Nausea  Give Zofran IV with treatment today  Has oral Zofran as needed available at home    Health Maintenance  Colonoscopy 10/20/2022 by Dr. Jagjit Matias normal aside from diverticulosis in the sigmoid colon. Return PRN    Care plan discussed with patient and his wife. All questions answered. Orders Placed This Encounter   Procedures    CBC with Auto Differential    Comprehensive Metabolic Panel    TSH     RTC RN in 1 month for Opdivo. Return in about 2 months (around 2/1/2023) for follow up with LIMA Kendall for Fadi Calderon.      I have seen, examined and reviewed this patient medication list, appropriate labs and imaging studies. I reviewed relevant medical records and others physicians notes. I discussed the plans of care with the patient. I answered all the questions to the patients satisfaction. I have also reviewed the chief complaint (CC) and part of the history (History of Present Illness (HPI), Past Family Social History [facility]), or Review of Systems (ROS) and made changes when appropriated.         (Please note that portions of this note were completed with a voice recognition program. Efforts were made to edit the dictations but occasionally words are mis-transcribed.)          Davey Lewis, APRNANCIE  12:38 PM  12/1/2022

## 2022-12-01 ENCOUNTER — OFFICE VISIT (OUTPATIENT)
Dept: HEMATOLOGY | Age: 82
End: 2022-12-01
Payer: MEDICARE

## 2022-12-01 ENCOUNTER — HOSPITAL ENCOUNTER (OUTPATIENT)
Dept: INFUSION THERAPY | Age: 82
Discharge: HOME | End: 2022-12-01
Payer: MEDICARE

## 2022-12-01 VITALS
HEIGHT: 71 IN | BODY MASS INDEX: 27.94 KG/M2 | WEIGHT: 199.6 LBS | RESPIRATION RATE: 18 BRPM | DIASTOLIC BLOOD PRESSURE: 76 MMHG | TEMPERATURE: 97.8 F | SYSTOLIC BLOOD PRESSURE: 157 MMHG | OXYGEN SATURATION: 96 % | HEART RATE: 76 BPM

## 2022-12-01 DIAGNOSIS — N28.89 LEFT RENAL MASS: ICD-10-CM

## 2022-12-01 DIAGNOSIS — E03.2 HYPOTHYROIDISM DUE TO MEDICATION: ICD-10-CM

## 2022-12-01 DIAGNOSIS — L29.9 PRURITUS: ICD-10-CM

## 2022-12-01 DIAGNOSIS — Z95.828 PORT-A-CATH IN PLACE: ICD-10-CM

## 2022-12-01 DIAGNOSIS — C79.51 BONE METASTASES (HCC): ICD-10-CM

## 2022-12-01 DIAGNOSIS — Z51.12 ENCOUNTER FOR ANTINEOPLASTIC IMMUNOTHERAPY: ICD-10-CM

## 2022-12-01 DIAGNOSIS — R11.0 NAUSEA: ICD-10-CM

## 2022-12-01 DIAGNOSIS — C43.61 MALIGNANT MELANOMA OF RIGHT UPPER EXTREMITY INCLUDING SHOULDER (HCC): Primary | ICD-10-CM

## 2022-12-01 DIAGNOSIS — C43.9 MALIGNANT MELANOMA, UNSPECIFIED SITE (HCC): ICD-10-CM

## 2022-12-01 LAB
ALBUMIN SERPL-MCNC: 4.3 G/DL (ref 3.5–5.2)
ALP BLD-CCNC: 49 U/L (ref 40–130)
ALT SERPL-CCNC: 30 U/L (ref 21–72)
ANION GAP SERPL CALCULATED.3IONS-SCNC: 10 MMOL/L (ref 7–19)
AST SERPL-CCNC: 30 U/L (ref 17–59)
BILIRUB SERPL-MCNC: 1.1 MG/DL (ref 0.2–1.3)
BUN BLDV-MCNC: 13 MG/DL (ref 9–20)
CALCIUM SERPL-MCNC: 8.7 MG/DL (ref 8.4–10.2)
CHLORIDE BLD-SCNC: 101 MMOL/L (ref 98–111)
CO2: 28 MMOL/L (ref 22–29)
CREAT SERPL-MCNC: 1 MG/DL (ref 0.6–1.2)
GFR SERPL CREATININE-BSD FRML MDRD: >60 ML/MIN/{1.73_M2}
GLOBULIN: 3 G/DL
GLUCOSE BLD-MCNC: 129 MG/DL (ref 74–106)
HCT VFR BLD CALC: 41.8 % (ref 40.1–51)
HEMOGLOBIN: 14.2 G/DL (ref 13.7–17.5)
LYMPHOCYTES ABSOLUTE: 2.29 K/UL (ref 1.18–3.74)
LYMPHOCYTES RELATIVE PERCENT: 30.5 % (ref 19.3–53.1)
MCH RBC QN AUTO: 30.2 PG (ref 25.7–32.2)
MCHC RBC AUTO-ENTMCNC: 34 G/DL (ref 32.3–36.5)
MCV RBC AUTO: 88.9 FL (ref 79–92.2)
MONOCYTES ABSOLUTE: 0.72 K/UL (ref 0.24–0.82)
MONOCYTES RELATIVE PERCENT: 9.6 % (ref 4.7–12.5)
NEUTROPHILS ABSOLUTE: 4.09 K/UL (ref 1.56–6.13)
NEUTROPHILS RELATIVE PERCENT: 54.5 % (ref 34–71.1)
PDW BLD-RTO: 12.7 % (ref 11.6–14.4)
PLATELET # BLD: 226 K/UL (ref 163–337)
PMV BLD AUTO: 9.5 FL (ref 7.4–10.4)
POTASSIUM SERPL-SCNC: 4 MMOL/L (ref 3.5–5.1)
RBC # BLD: 4.7 M/UL (ref 4.63–6.08)
SODIUM BLD-SCNC: 139 MMOL/L (ref 137–145)
TOTAL PROTEIN: 7.2 G/DL (ref 6.3–8.2)
WBC # BLD: 7.5 K/UL (ref 4.23–9.07)

## 2022-12-01 PROCEDURE — 80053 COMPREHEN METABOLIC PANEL: CPT

## 2022-12-01 PROCEDURE — G8427 DOCREV CUR MEDS BY ELIG CLIN: HCPCS | Performed by: NURSE PRACTITIONER

## 2022-12-01 PROCEDURE — 6360000002 HC RX W HCPCS: Performed by: INTERNAL MEDICINE

## 2022-12-01 PROCEDURE — 96375 TX/PRO/DX INJ NEW DRUG ADDON: CPT

## 2022-12-01 PROCEDURE — 3074F SYST BP LT 130 MM HG: CPT | Performed by: NURSE PRACTITIONER

## 2022-12-01 PROCEDURE — G8484 FLU IMMUNIZE NO ADMIN: HCPCS | Performed by: NURSE PRACTITIONER

## 2022-12-01 PROCEDURE — 2580000003 HC RX 258: Performed by: NURSE PRACTITIONER

## 2022-12-01 PROCEDURE — 85025 COMPLETE CBC W/AUTO DIFF WBC: CPT

## 2022-12-01 PROCEDURE — 3078F DIAST BP <80 MM HG: CPT | Performed by: NURSE PRACTITIONER

## 2022-12-01 PROCEDURE — 6360000002 HC RX W HCPCS: Performed by: NURSE PRACTITIONER

## 2022-12-01 PROCEDURE — 96372 THER/PROPH/DIAG INJ SC/IM: CPT

## 2022-12-01 PROCEDURE — 36415 COLL VENOUS BLD VENIPUNCTURE: CPT

## 2022-12-01 PROCEDURE — 96413 CHEMO IV INFUSION 1 HR: CPT

## 2022-12-01 PROCEDURE — G8417 CALC BMI ABV UP PARAM F/U: HCPCS | Performed by: NURSE PRACTITIONER

## 2022-12-01 PROCEDURE — 1036F TOBACCO NON-USER: CPT | Performed by: NURSE PRACTITIONER

## 2022-12-01 PROCEDURE — 1123F ACP DISCUSS/DSCN MKR DOCD: CPT | Performed by: NURSE PRACTITIONER

## 2022-12-01 PROCEDURE — 99214 OFFICE O/P EST MOD 30 MIN: CPT | Performed by: NURSE PRACTITIONER

## 2022-12-01 RX ORDER — DIPHENHYDRAMINE HYDROCHLORIDE 50 MG/ML
50 INJECTION INTRAMUSCULAR; INTRAVENOUS PRN
OUTPATIENT
Start: 2022-12-01

## 2022-12-01 RX ORDER — SODIUM CHLORIDE 0.9 % (FLUSH) 0.9 %
10 SYRINGE (ML) INJECTION PRN
Status: CANCELLED | OUTPATIENT
Start: 2022-12-01

## 2022-12-01 RX ORDER — ONDANSETRON 2 MG/ML
4 INJECTION INTRAMUSCULAR; INTRAVENOUS ONCE
Status: COMPLETED | OUTPATIENT
Start: 2022-12-01 | End: 2022-12-01

## 2022-12-01 RX ORDER — HEPARIN SODIUM (PORCINE) LOCK FLUSH IV SOLN 100 UNIT/ML 100 UNIT/ML
500 SOLUTION INTRAVENOUS PRN
Status: DISCONTINUED | OUTPATIENT
Start: 2022-12-01 | End: 2022-12-03 | Stop reason: HOSPADM

## 2022-12-01 RX ORDER — SODIUM CHLORIDE 0.9 % (FLUSH) 0.9 %
10 SYRINGE (ML) INJECTION PRN
Status: DISCONTINUED | OUTPATIENT
Start: 2022-12-01 | End: 2022-12-02 | Stop reason: HOSPADM

## 2022-12-01 RX ORDER — SODIUM CHLORIDE 0.9 % (FLUSH) 0.9 %
5 SYRINGE (ML) INJECTION PRN
OUTPATIENT
Start: 2022-12-01

## 2022-12-01 RX ORDER — TRIAMCINOLONE ACETONIDE 0.25 MG/G
CREAM TOPICAL
Qty: 30 G | Refills: 5 | Status: SHIPPED | OUTPATIENT
Start: 2022-12-01

## 2022-12-01 RX ORDER — METHYLPREDNISOLONE SODIUM SUCCINATE 125 MG/2ML
125 INJECTION, POWDER, LYOPHILIZED, FOR SOLUTION INTRAMUSCULAR; INTRAVENOUS PRN
OUTPATIENT
Start: 2022-12-01

## 2022-12-01 RX ORDER — HEPARIN SODIUM (PORCINE) LOCK FLUSH IV SOLN 100 UNIT/ML 100 UNIT/ML
500 SOLUTION INTRAVENOUS PRN
Status: CANCELLED | OUTPATIENT
Start: 2022-12-01

## 2022-12-01 RX ORDER — EPINEPHRINE 1 MG/ML
0.3 INJECTION, SOLUTION, CONCENTRATE INTRAVENOUS PRN
OUTPATIENT
Start: 2022-12-01

## 2022-12-01 RX ADMIN — DENOSUMAB 120 MG: 120 INJECTION SUBCUTANEOUS at 09:29

## 2022-12-01 RX ADMIN — SODIUM CHLORIDE 480 MG: 9 INJECTION, SOLUTION INTRAVENOUS at 09:29

## 2022-12-01 RX ADMIN — HEPARIN 500 UNITS: 100 SYRINGE at 10:03

## 2022-12-01 RX ADMIN — SODIUM CHLORIDE, PRESERVATIVE FREE 10 ML: 5 INJECTION INTRAVENOUS at 10:03

## 2022-12-01 RX ADMIN — ONDANSETRON 4 MG: 2 INJECTION INTRAMUSCULAR; INTRAVENOUS at 09:57

## 2022-12-01 ASSESSMENT — ENCOUNTER SYMPTOMS
SHORTNESS OF BREATH: 1
VOMITING: 0
WHEEZING: 0
SORE THROAT: 0
COUGH: 0
EYE DISCHARGE: 0
NAUSEA: 1
TROUBLE SWALLOWING: 0
CONSTIPATION: 0
DIARRHEA: 0
ABDOMINAL PAIN: 0
EYE ITCHING: 0

## 2022-12-05 DIAGNOSIS — G89.3 CANCER RELATED PAIN: ICD-10-CM

## 2022-12-05 RX ORDER — HYDROCODONE BITARTRATE AND ACETAMINOPHEN 10; 325 MG/1; MG/1
1 TABLET ORAL EVERY 6 HOURS PRN
Qty: 120 TABLET | Refills: 0 | Status: SHIPPED | OUTPATIENT
Start: 2022-12-05 | End: 2023-01-04

## 2022-12-29 ENCOUNTER — HOSPITAL ENCOUNTER (OUTPATIENT)
Dept: INFUSION THERAPY | Age: 82
Discharge: HOME | End: 2022-12-29
Payer: MEDICARE

## 2022-12-29 VITALS
HEIGHT: 71 IN | BODY MASS INDEX: 27.87 KG/M2 | DIASTOLIC BLOOD PRESSURE: 71 MMHG | WEIGHT: 199.1 LBS | OXYGEN SATURATION: 96 % | RESPIRATION RATE: 18 BRPM | SYSTOLIC BLOOD PRESSURE: 148 MMHG | HEART RATE: 69 BPM | TEMPERATURE: 98 F

## 2022-12-29 DIAGNOSIS — C79.51 BONE METASTASES (HCC): ICD-10-CM

## 2022-12-29 DIAGNOSIS — C43.61 MALIGNANT MELANOMA OF RIGHT UPPER EXTREMITY INCLUDING SHOULDER (HCC): Primary | ICD-10-CM

## 2022-12-29 DIAGNOSIS — Z95.828 PORT-A-CATH IN PLACE: ICD-10-CM

## 2022-12-29 DIAGNOSIS — C43.9 MALIGNANT MELANOMA, UNSPECIFIED SITE (HCC): ICD-10-CM

## 2022-12-29 LAB
ALBUMIN SERPL-MCNC: 4.6 G/DL (ref 3.5–5.2)
ALP BLD-CCNC: 46 U/L (ref 40–130)
ALT SERPL-CCNC: 39 U/L (ref 21–72)
ANION GAP SERPL CALCULATED.3IONS-SCNC: 11 MMOL/L (ref 7–19)
AST SERPL-CCNC: 41 U/L (ref 17–59)
BILIRUB SERPL-MCNC: 1.1 MG/DL (ref 0.2–1.3)
BUN BLDV-MCNC: 12 MG/DL (ref 9–20)
CALCIUM SERPL-MCNC: 9 MG/DL (ref 8.4–10.2)
CHLORIDE BLD-SCNC: 102 MMOL/L (ref 98–111)
CO2: 26 MMOL/L (ref 22–29)
CREAT SERPL-MCNC: 1 MG/DL (ref 0.6–1.2)
GFR SERPL CREATININE-BSD FRML MDRD: >60 ML/MIN/{1.73_M2}
GLOBULIN: 3 G/DL
GLUCOSE BLD-MCNC: 112 MG/DL (ref 74–106)
HCT VFR BLD CALC: 44 % (ref 40.1–51)
HEMOGLOBIN: 14.8 G/DL (ref 13.7–17.5)
LYMPHOCYTES ABSOLUTE: 2.54 K/UL (ref 1.18–3.74)
LYMPHOCYTES RELATIVE PERCENT: 29.9 % (ref 19.3–53.1)
MCH RBC QN AUTO: 29.5 PG (ref 25.7–32.2)
MCHC RBC AUTO-ENTMCNC: 33.6 G/DL (ref 32.3–36.5)
MCV RBC AUTO: 87.8 FL (ref 79–92.2)
MONOCYTES ABSOLUTE: 0.76 K/UL (ref 0.24–0.82)
MONOCYTES RELATIVE PERCENT: 8.9 % (ref 4.7–12.5)
NEUTROPHILS ABSOLUTE: 4.72 K/UL (ref 1.56–6.13)
NEUTROPHILS RELATIVE PERCENT: 55.6 % (ref 34–71.1)
PDW BLD-RTO: 12.7 % (ref 11.6–14.4)
PLATELET # BLD: 253 K/UL (ref 163–337)
PMV BLD AUTO: 9.4 FL (ref 7.4–10.4)
POTASSIUM SERPL-SCNC: 4.3 MMOL/L (ref 3.5–5.1)
RBC # BLD: 5.01 M/UL (ref 4.63–6.08)
SODIUM BLD-SCNC: 139 MMOL/L (ref 137–145)
TOTAL PROTEIN: 7.1 G/DL (ref 6.3–8.2)
WBC # BLD: 8.5 K/UL (ref 4.23–9.07)

## 2022-12-29 PROCEDURE — 96413 CHEMO IV INFUSION 1 HR: CPT

## 2022-12-29 PROCEDURE — 80053 COMPREHEN METABOLIC PANEL: CPT

## 2022-12-29 PROCEDURE — 6360000002 HC RX W HCPCS: Performed by: NURSE PRACTITIONER

## 2022-12-29 PROCEDURE — 2580000003 HC RX 258: Performed by: NURSE PRACTITIONER

## 2022-12-29 PROCEDURE — 85025 COMPLETE CBC W/AUTO DIFF WBC: CPT

## 2022-12-29 PROCEDURE — 96372 THER/PROPH/DIAG INJ SC/IM: CPT

## 2022-12-29 PROCEDURE — 36415 COLL VENOUS BLD VENIPUNCTURE: CPT

## 2022-12-29 RX ORDER — SODIUM CHLORIDE 0.9 % (FLUSH) 0.9 %
5 SYRINGE (ML) INJECTION PRN
OUTPATIENT
Start: 2022-12-29

## 2022-12-29 RX ORDER — DIPHENHYDRAMINE HYDROCHLORIDE 50 MG/ML
50 INJECTION INTRAMUSCULAR; INTRAVENOUS PRN
OUTPATIENT
Start: 2022-12-29

## 2022-12-29 RX ORDER — METHYLPREDNISOLONE SODIUM SUCCINATE 125 MG/2ML
125 INJECTION, POWDER, LYOPHILIZED, FOR SOLUTION INTRAMUSCULAR; INTRAVENOUS PRN
OUTPATIENT
Start: 2022-12-29

## 2022-12-29 RX ORDER — HEPARIN SODIUM (PORCINE) LOCK FLUSH IV SOLN 100 UNIT/ML 100 UNIT/ML
500 SOLUTION INTRAVENOUS PRN
Status: CANCELLED | OUTPATIENT
Start: 2022-12-29

## 2022-12-29 RX ORDER — METHYLPREDNISOLONE SODIUM SUCCINATE 125 MG/2ML
125 INJECTION, POWDER, LYOPHILIZED, FOR SOLUTION INTRAMUSCULAR; INTRAVENOUS PRN
Status: CANCELLED | OUTPATIENT
Start: 2022-12-29

## 2022-12-29 RX ORDER — SODIUM CHLORIDE 0.9 % (FLUSH) 0.9 %
10 SYRINGE (ML) INJECTION PRN
Status: DISCONTINUED | OUTPATIENT
Start: 2022-12-29 | End: 2022-12-30 | Stop reason: HOSPADM

## 2022-12-29 RX ORDER — SODIUM CHLORIDE 0.9 % (FLUSH) 0.9 %
10 SYRINGE (ML) INJECTION PRN
Status: CANCELLED | OUTPATIENT
Start: 2022-12-29

## 2022-12-29 RX ORDER — EPINEPHRINE 1 MG/ML
0.3 INJECTION, SOLUTION, CONCENTRATE INTRAVENOUS PRN
OUTPATIENT
Start: 2022-12-29

## 2022-12-29 RX ORDER — HEPARIN SODIUM (PORCINE) LOCK FLUSH IV SOLN 100 UNIT/ML 100 UNIT/ML
500 SOLUTION INTRAVENOUS PRN
Status: DISCONTINUED | OUTPATIENT
Start: 2022-12-29 | End: 2022-12-31 | Stop reason: HOSPADM

## 2022-12-29 RX ORDER — DIPHENHYDRAMINE HYDROCHLORIDE 50 MG/ML
50 INJECTION INTRAMUSCULAR; INTRAVENOUS PRN
Status: CANCELLED | OUTPATIENT
Start: 2022-12-29

## 2022-12-29 RX ADMIN — SODIUM CHLORIDE, PRESERVATIVE FREE 10 ML: 5 INJECTION INTRAVENOUS at 09:57

## 2022-12-29 RX ADMIN — Medication 500 UNITS: at 09:57

## 2022-12-29 RX ADMIN — DENOSUMAB 120 MG: 120 INJECTION SUBCUTANEOUS at 09:27

## 2022-12-29 RX ADMIN — SODIUM CHLORIDE 480 MG: 9 INJECTION, SOLUTION INTRAVENOUS at 09:27

## 2023-01-05 ENCOUNTER — TELEPHONE (OUTPATIENT)
Dept: INFUSION THERAPY | Age: 83
End: 2023-01-05

## 2023-01-05 DIAGNOSIS — C43.9 MALIGNANT MELANOMA, UNSPECIFIED SITE (HCC): ICD-10-CM

## 2023-01-05 DIAGNOSIS — R11.0 NAUSEA: ICD-10-CM

## 2023-01-05 DIAGNOSIS — G89.3 CANCER RELATED PAIN: ICD-10-CM

## 2023-01-05 RX ORDER — PANTOPRAZOLE SODIUM 40 MG/1
TABLET, DELAYED RELEASE ORAL
Qty: 90 TABLET | Refills: 3 | Status: SHIPPED | OUTPATIENT
Start: 2023-01-05

## 2023-01-05 RX ORDER — HYDROCODONE BITARTRATE AND ACETAMINOPHEN 10; 325 MG/1; MG/1
1 TABLET ORAL EVERY 6 HOURS PRN
Qty: 120 TABLET | Refills: 0 | Status: SHIPPED | OUTPATIENT
Start: 2023-01-05 | End: 2023-02-04

## 2023-01-05 RX ORDER — PAROXETINE 10 MG/1
TABLET, FILM COATED ORAL
Qty: 90 TABLET | Refills: 3 | Status: SHIPPED | OUTPATIENT
Start: 2023-01-05

## 2023-01-06 DIAGNOSIS — C43.9 MALIGNANT MELANOMA, UNSPECIFIED SITE (HCC): ICD-10-CM

## 2023-01-06 RX ORDER — MONTELUKAST SODIUM 10 MG/1
TABLET ORAL
Qty: 90 TABLET | Refills: 3 | Status: SHIPPED | OUTPATIENT
Start: 2023-01-06

## 2023-01-25 NOTE — PROGRESS NOTES
Progress Note      Pt Name: Lizzette Ornelas  YOB: 1940  MRN: [id number]    Date of evaluation: 1/26/23    History Obtained From:  patient, spouse, electronic medical record    CHIEF COMPLAINT:    Chief Complaint   Patient presents with    Cancer     Malignant melanoma of right upper extremity including shoulder     HISTORY OF PRESENT ILLNESS:    Lizzette Ornelas is a pleasant 80-year-old gentleman with recurrent, stage IV metastatic malignant melanoma initially diagnosed in July, 2018. Metastatic disease included right axillary lymph node, liver and bone involvement. Buster Johnson has had stable disease on maintenance nivolumab every 4 weeks. He returns to the clinic for follow-up, toxicity assessment and continuation of treatment. He is also here to discuss surveillance imaging completed in November, 2022. As you recall, Buster Johnson was diagnosed with flu 11/6/2022. He has had mild cough since that time. He has remained afebrile. He is otherwise feeling well and without new complaint. Buster Johnson denies new skin lesions. He denies lymphadenopathy. 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 ECOG grade 1  Neuropathy grade 1 (mainly the 2nd and 3rd right digits from prior injury)  Fatigue grade 1  Pruritus grade 1  He is accompanied by his wife, Aruna He. Lizzette Ornelas presents for follow-up surveillance visit and toxicity assessment. he requires intenstive monitoring due to the potential to cause serious morbidity or death.      TARGET METASTATIC MALIGNANT MELANOMA SITES:  Right upper extremity original primary site 06/05/14   Right axilla lymph node at presentation 6/5/2014 and 3 axillary nodes at recurrence 7/27/2018 with an SUV of 8.7   Too numerous to count liver metastases   Celiac lymph nodes x 2 with highest SUV of 3   Bony metastatic disease on PET scan in the right ilium (SUV of 5) and right acetabulum (SUV of 6.4)   Indeterminate HORACIO 3 mm subpleural nodule   Indeterminate thyroid nodules on chest CT    1st TUMOR HISTORY: Resected stage IIIA (pT3a pN1a M0) right upper extremity malignant melanoma, 06/05/14  Mr. Arturo Westfall was seen in initial oncology consultation on 08/05/14, referred by Dr. Jm Pan, regarding a diagnosis of resected malignant melanoma of the right upper extremity. Dr. Luis Antonio Alba resected a malignant melanoma from the right posterior arm, above the elbow, on 06/05/14. Pathology revealed a 2.25 mm thick non-ulcerated Pankaj's level IV malignant melanoma. There were 10 mitoses/ sq mm. There was no vascular or lymphatic invasion. A wide excision with a sentinel lymph node biopsy was performed by by Dr. Dar Lopez on 07/09/14. Pathology revealed that the right axillary sentinel lymph node was positive for metastatic malignancy by immunoperoxidase stain. The wide excision sample was without further local involvement. A right axillary lymph node dissection was performed by Dr. Dar Lopez on 07/21/14. No metastatic malignancy was noted in any of the 8 right axillary lymph nodes submitted. The HMB-45 immunoperoxidase stain was negative for metastatic malignant melanoma in any of these subsequently submitted lymph nodes. Completion of a metastatic workup on 08/06/14, including MRI of the brain, bone scan, CT scans of the chest, abdomen and pelvis were negative for evidence of metastatic disease. Adjuvant pegylated Interferon (Sylatron) 6 mcg/kg (500mcg) sub-q weekly x 8 to be followed then by 3 mcg/kg(250mcg) weekly x up to 5 years was discussed and planned. Adjuvant therapy was indeed initiated and delivered as discussed below in treatment summary. The last dose of the medication was delivered on 2/15/2015. He was unable to tolerate this medication even at reduced dosages because of poor tolerability, weight loss, anorexia, unable to sleep, anxiety, fatigue, et Caryn Ashleigh. He declined any further interferon, which is reasonable.    A one-year followup CT scan of the chest and MRI of the brain on 6/11/2015 did not reveal any evidence of recurrent disease.  ----------------------------------PROGRESSION --------------------------------  Antonieta Martínez presented to [address] [place] [address] emergency department on 7/3/18 for a 3 month history of waxing and waning epigastric abdominal pain and new onset fever. Additional symptoms reported include unexplained weight loss, nausea, headaches. CXR 7/3/18 showed mild cardiomegaly with no acute cardiopulmonary process. Abdominal/pelvic CT with contrast 7/3/18 documented multiple low density bilateral hepatic lesions suspicious for metastatic disease. 2 small renal cysts are noted and one on the left. A second left renal lesion near the lower pole measured 25 mm with SUV of 34, ultrasound recommended. The prostate is markedly enlarged causing partial right obstructive uropathy. CBC shows WBC 7.3, hemoglobin 15.8 and platelets 557,951  CMP revealed a creatinine of 1.2, GFR 59. LFTs WNL  UA negative  Lipase 28, troponin <0.01  He has a history of GERD with laparoscopic paraesophageal hernia repair and fundoplication by Dr. Michelle Hutchinson on 5/24/15. He was prescribed Norco and Zofran PRN at [facility] ER. Rx was given for omeprazole in clinic  Tumor markers drawn 7/11/18 included:   AFP 5.8   CEA 2.0   CA 19-9 - 8   PSA 7.4 (chronically elevated, up to 8.28 on 8/8/14)  MRI of the abdomen w/wo contrast 7/13/18 at [facility] documented innumerable T1 hypointense, T2 hyperintense lesions throughout the liver as noted on recent CT. One of the larger lesions seen posteriorly in the right hepatic lobe measured 1.6 cm. Also noted was at least two T2 hyperintense lesions within the thoracocolumbar spine, which could represent metastasis. Bilateral renal ultrasound 7/3/18 showed a 2.6 cm hypoechoic left lower pole renal lesion, not typical of a benign cyst with enhancing characteristics concerning for primary renal cancer versus metastatic disease.  Bilateral nephrogenic cysts noted along with marked prostate enlargement with lobular changes measuring 8 x 5.9 x 4.9 cm. MRI of the brain w/wo contrast 7/3/18 for complaint of headache and nausea was without evidence of acute intracranial process. Contrasted chest CT 7/24/18 revealed a stable 2 cm right thyroid nodule, a new 9 mm right thyroid nodule. An indeterminate 3 mm subpleural HORACIO nodule is noted, likely granulomatous or postinfectious. Bone scan 7/24/18 was negative for evidence of osseous metastasis. Examination is remarkable for mid epigastric discomfort, 10 lbs weight loss and 1 inch right axillary lymph node. This was a previous site of positive sentinel lymph node biopsy and dissection associated with the resected, stage IIIa right upper extremity malignant melanoma in 2014. Suspect recurrent malignant melanoma. Sejal Larkin was referred to Dr. Jim Chavez who performed an FNA of the right axillary lymph node 7/26/18. Pathology was consistent for metastatic malignant melanoma. 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 Findings were discussed with both Sejal Larkin and his wife by Dr. Leontine Moritz at follow-up on 8/1/18 and reiterated on 8/7/18. Recommendation is for combination therapy with Opdivo 1 mg/kg and Yervoy 3 mg/kg every 3 weeks ×4 cycles, followed by Opdivo 240 mg every 2 weeks thereafter. Sejal Larkin had a left chest Mediport placed by Dr. Faheem Babb 8/3/18. PET scan 8/6/18 showed the following:  3 right axillary lymph nodes, SUV up to 8.7   Hepatic metastasis, too numerous to count   2 celiac lymph nodes SUV 2.8 and 3   Right ilium, SUV 5. Right acetabulum SUV 6.4  Cycle #1 Opdivo/Yervoy and monthly Xgeva initiated 8/7/18. He was evaluated at [facility] 10/16/18 for LLQ abdominal discomfort with a history of recent diverticulitis. Abdominal/pelvic CT with contrast revealed a decreased size in the multiple liver nodules. Bilateral renal nodules were stable.   An enlarged prostate with an exophytic mass arising from the posterior superior aspect of the prostate was present. He was treated for acute diverticulitis of the mid to distal sigmoid colon. Ruby Reyes was evaluated by Dr. Chance Quinteros on 11/27/18 regarding possible prostate mass and elevated PSA with recommendations for surveillance only. Contrasted chest CT on 11/8/18 [facility] showed no enlarged axillary, hilar or mediastinal lymph nodes. There were no acute osseous or soft tissue abnormalities. Ruby Reyes has had treatment delay of Nivolumab on more than one occasion due to rash, requiring treatment with prednisone. Nivolumab was discontinued following dose #2 of maintenance therapy on 12/20/18. He initiated cycle #1 of Tafinlar 150 mg po BID & Mekinist 2 mg po BID on 1/4/19. Contrasted CT chest 1/9/19 at [facility] showed emphysematous changes bilaterally without evidence of metastatic disease. A 1.4 cm right hilar lymph node was unchanged since 2015, likely benign. Abdominal/pelvic CT with contrast 1/9/19 documented a 4 mm hepatic lesion, previously 6 mm. Other previously noted lesions throughout the liver are not appreciated. The enlarged prostate with nodular hypertrophy was previously evaluated by Dr. Chance Quinteros. Bone scan 1/9/19 was without evidence of osteoblastic disease. Echocardiogram 1/9/19 showed an EF of 60%. Ruby Reyes was hopsitalized at [facility] 1/12/19 - 1/18/19 for enterocolitis with nausea/vomiting and diarrhea. Antineoplastic therapy was held during that time, rechallenged beginning 1/24/19. Ruby Reyes was evaluated 2/11/19 at [facility] ER for a 24 hour history of abdominal pain and diarrhea with mild diverticulitis, stable from 1/12/19. He was treated with Cipro and Flagyl. Single agent Tafinlar was taken 3/28/19 - 4/4/19, discontinued by Ruby Reyes due to intolerable abdominal cramping. Deidre Siskin was resumed 4/25/19. CT scans of the chest, abdomen and pelvis with contrast 8/13/2019 at [facility] was negative for intrathoracic metastasis.   There was no evidence of disease progression in the abdomen/pelvis with stable, subcentimeter low-density liver lesions noted. Dosing was changed to 480 mg every 4 weeks on 9/5/2019 to see if this may decrease persistent itching. Rx fluoxetine and hydroxyzine per Dr. Willean Kussmaul recommendations. Contrasted CT scans of the chest, abdomen/pelvis and bone scan 1/16/2020 were negative for evidence of disease progression. 19 mm right thyroid lobe nodule and subcentimeter low-density liver lesions were stable. No abnormal bony uptake. Contrasted CT scans of the chest, abdomen and pelvis 6/4/2020 were without evidence of metastatic disease. A 2.2 cm left lower renal lesion is stable  Contrasted CT scans of the chest, abdomen and pelvis 6/4/2020 were negative aside from high-grade stenosis of the SVC with collateral formation for which Maira Short was referred to vascular surgery 10/29/2020. Treatment continues with nivolumab. TREATMENT SUMMARY:  Dr. Grafton Mortimer resected a malignant melanoma from the right posterior arm, above the elbow, on 06/05/14   Wide excision with a sentinel lymph node biopsy by Dr. David Garibay on 07/09/14. Adjuvant weekly Sylatron Initiated 09/02/14 at 6 mcg/kg (500mcg) sub-q weekly but was only able to receive 4 of 8 planned treatments of this. The last dose #4 was on 09/28/14   Adjuvant weekly Sylatron 3 mcg/kg (250mcg) initiated 10/12/2014. Last dose delivered on 2/15/2015, discontinued due to poor tolerability and unable to tolerate the medication. Opdivo 1 mg/kg and Yervoy 3 mg/kg every 3 weeks ×4 doses. 8/7/18 - 10/25/18. Opdivo 240 mg every 2 weeks initiated 11/15/18, discontinued after dose #2 on 12/20/18   Monthly Xgeva initiated 8/7/18. Tafinlar 150 mg po BID & Mekinist 2 mg po qday, cycle #1 initiated 1/4/19. Single agent Tafinlar 150 mg po BID initiated 3/28/19, discontinue by patient 4/4/19.    Opdivo 240 mg every 2 weeks resumed 4/25/19, change to 480 mg every 4 weeks on 9/5/2019    HEMATOLOGY CONCERN:  SVC stenosis  Buster Johnson was Evaluated by Jose Hendrickson PA-C with vascular surgery on 11/11/2020 regarding high-grade SVC narrowing with collateral vein formation in the mediastinum noted on contrasted chest CT at [facility] on 10/22/2020. Buster Johnson was asymptomatic, observation recommended. Past Medical History:    Past Medical History:   Diagnosis Date    Acid reflux     BPH (benign prostatic hyperplasia)     Cancer (HCC)     Diverticulitis     Hard of hearing     Hypercholesteremia     Hypertension     Malignant melanoma of skin of upper limb, including shoulder ([address] dx 6/5/2014    to liver, stomach, bone, and right axilla     Past Surgical History:    Past Surgical History:   Procedure Laterality Date    CHOLECYSTECTOMY      TUNNELED CENTRAL VENOUS CATHETER W/ SUBCUTANEOUS PORT       Current Medications:    Current Outpatient Medications   Medication Sig Dispense Refill    montelukast (SINGULAIR) 10 MG tablet TAKE 1 TABLET BY MOUTH ONCE DAILY AT NIGHT 90 tablet 3    PARoxetine (PAXIL) 10 MG tablet TAKE 1 TABLET BY MOUTH EVERY DAY 90 tablet 3    pantoprazole (PROTONIX) 40 MG tablet TAKE 1 TABLET BY MOUTH EVERY DAY 90 tablet 3    HYDROcodone-acetaminophen (NORCO)  MG per tablet Take 1 tablet by mouth every 6 hours as needed for Pain for up to 30 days. 120 tablet 0    triamcinolone (KENALOG) 0.025 % cream Apply topically 2 times daily.  30 g 5    hydrOXYzine HCl (ATARAX) 25 MG tablet TAKE 1 TABLET BY MOUTH EVERY 8 HOURS AS NEEDED FOR ITCHING 270 tablet 0    ondansetron (ZOFRAN ODT) 4 MG disintegrating tablet Take 1 tablet by mouth every 8 hours as needed for Nausea or Vomiting 15 tablet 0    levothyroxine (SYNTHROID) 100 MCG tablet TAKE 1 TABLET BY MOUTH EVERY DAY 90 tablet 1    promethazine (PHENERGAN) 25 MG tablet Take 12.5 mg by mouth every 6 hours as needed for Nausea      acetaminophen (TYLENOL) 500 MG tablet Take 1,000 mg by mouth every 6 hours as needed for Pain      dicyclomine (BENTYL) 20 MG tablet TAKE 1/2 TABLET BY MOUTH 4 TIMES A DAY AS NEEDED 180 tablet 3    finasteride (PROSCAR) 5 MG tablet Take 5 mg by mouth      lovastatin (MEVACOR) 40 MG tablet Take 40 mg by mouth       No current facility-administered medications for this visit. Facility-Administered Medications Ordered in Other Visits   Medication Dose Route Frequency Provider Last Rate Last Admin    sodium chloride flush 0.9 % injection 10 mL  10 mL IntraVENous PRN SaiLIAM Bird   10 mL at 23 1016    heparin flush 100 UNIT/ML injection 500 Units  500 Units IntraCATHeter PRN Saibertin Patrick APRN   500 Units at 23 1016        Allergies: No Known Allergies  Social History:    Social History     Tobacco Use    Smoking status: Former    Smokeless tobacco: Never   Substance Use Topics    Alcohol use: No    Drug use: No     Family History:   Family History   Problem Relation Age of Onset    Heart Attack Brother          age 78 of MI     Subjective   Review of Systems   Constitutional:  Positive for fatigue (Mild). Negative for fever. No night sweats   HENT:  Negative for dental problem, hearing loss, mouth sores, nosebleeds, sore throat and trouble swallowing. Eyes:  Negative for discharge and itching. Respiratory:  Positive for cough (mild x 2-3 months) and shortness of breath (Exertional, chronic and mild). Negative for wheezing. Cardiovascular:  Negative for chest pain, palpitations and leg swelling. Gastrointestinal:  Positive for nausea (rare, Improved). Negative for abdominal pain, constipation, diarrhea and vomiting. Endocrine: Negative for cold intolerance and heat intolerance. Genitourinary:  Negative for dysuria, frequency, hematuria and urgency. Musculoskeletal:  Negative for arthralgias, joint swelling and myalgias. Skin:  Negative for pallor and rash. Generalized pruritus, mild   Allergic/Immunologic: Negative for environmental allergies and immunocompromised state.    Neurological: Negative for seizures, syncope and numbness. Hematological:  Negative for adenopathy. Does not bruise/bleed easily. Psychiatric/Behavioral:  Negative for agitation, behavioral problems and confusion. Objective   Vitals:    01/26/23 0825   BP: 132/68   Pulse: 65   Resp: 18   Temp: 97.8 °F (36.6 °C)   SpO2: 95%     Wt Readings from Last 3 Encounters:   01/26/23 191 lb 12.8 oz (87 kg)   12/29/22 199 lb 1.6 oz (90.3 kg)   12/01/22 199 lb 9.6 oz (90.5 kg)     PHYSICAL EXAM:  Physical Exam  Vitals reviewed. Constitutional:       General: He is not in acute distress. Appearance: He is well-developed. He is not toxic-appearing or diaphoretic. HENT:      Head: Normocephalic and atraumatic. Right Ear: External ear normal.      Left Ear: External ear normal.      Nose: Nose normal.      Mouth/Throat:      Mouth: Mucous membranes are moist.   Eyes:      General: No scleral icterus. Right eye: No discharge. Left eye: No discharge. Conjunctiva/sclera: Conjunctivae normal.   Neck:      Trachea: No tracheal deviation. Cardiovascular:      Rate and Rhythm: Normal rate and regular rhythm. Pulmonary:      Effort: Pulmonary effort is normal. No respiratory distress. Breath sounds: Normal breath sounds. No wheezing or rales. Abdominal:      General: Bowel sounds are normal. There is no distension. Palpations: Abdomen is soft. Tenderness: There is no abdominal tenderness. There is no guarding. Genitourinary:     Comments: Exam deferred  Musculoskeletal:         General: No tenderness or deformity. Cervical back: Neck supple. No muscular tenderness. Comments: Normal ROM all four extremities   Lymphadenopathy:      Cervical:      Right cervical: No superficial, deep or posterior cervical adenopathy. Left cervical: No superficial, deep or posterior cervical adenopathy. Upper Body:      Right upper body: No supraclavicular or axillary adenopathy.       Left upper body: No supraclavicular or axillary adenopathy. Skin:     General: Skin is warm and dry. Findings: No erythema or rash. Comments: Left anterior chest wall mediport, no erythema   Neurological:      Mental Status: He is alert and oriented to person, place, and time. Comments: follows commands, non-focal   Psychiatric:         Behavior: Behavior normal. Behavior is cooperative. Thought Content: Thought content normal.         Judgment: Judgment normal.      Comments: Alert and oriented to person, place and time. Labs reviewed by me:    CBC 1/26/23  Lab Results   Component Value Date    WBC 7.85 01/26/2023    HGB 14.3 01/26/2023    HCT 40.8 01/26/2023    MCV 85.9 01/26/2023     01/26/2023    LYMPHOPCT 32.9 01/26/2023    RBC 4.75 01/26/2023    MCH 30.1 01/26/2023    MCHC 35.0 01/26/2023    RDW 12.5 01/26/2023     Lab Results   Component Value Date     01/26/2023    K 4.2 01/26/2023     01/26/2023    CO2 27 01/26/2023    BUN 11 01/26/2023    CREATININE 0.9 01/26/2023    GLUCOSE 121 (H) 01/26/2023    CALCIUM 8.7 01/26/2023    PROT 6.8 01/26/2023    LABALBU 4.0 01/26/2023    BILITOT 1.1 01/26/2023    ALKPHOS 43 01/26/2023    AST 26 01/26/2023    ALT 20 (L) 01/26/2023    LABGLOM >60 01/26/2023    GFRAA >59 01/06/2022    AGRATIO 1.8 12/26/2019    GLOB 2.8 01/26/2023     Lab Results   Component Value Date    TSH 2.660 11/03/2022     Labs 11/03/2022:  Cortisol AM: 7.6  TSH: 2.660    ASSESSMENT:   1. Malignant melanoma of right upper extremity including shoulder ([address]    2. Fatigue due to treatment    3. Hypothyroidism due to medication    4. Left renal mass    5. Superior vena cava stenosis    6. Pruritus    7.  Care plan discussed with patient       Malignant melanoma of skin of right upper extremity, including shoulder ([address]  Encounter for antineoplastic immunotherapy    Contrasted CT of abd/pelvis 11/6/2022 at [facility] ER (completed for Nausea and abdominal discomfort): No acute abdominal or pelvic pathology. Liver was unremarkable. Slight interval enlargement and right renal cortical cyst now measuring 2.7 cm, previously 2.2 cm when compared to 2/11/2019. No enlarged lymph nodes. Patient was diagnosed with FLU    Contrasted chest CT 11/14/2022 at [facility]:  Development of 3 mixed soft tissue and groundglass nodule small within the right upper lobe and 2 within the right lower lobe. Based on distribution appearance I would rather favor that these are inflammatory in nature but would warrant follow-up on subsequent imaging. Metastatic disease is considered less likely but not excluded. There has been interval development of rather diffuse bronchial wall thickening, particularly within the lower lobes suggesting an ongoing inflammatory process either related to bronchitis or reactive airway disease. No discrete endobronchial lesion is present. No evidence of interval enlargement or development of mediastinal or axillary lymphadenopathy. CT abdomen/pelvis with contrast 11/14/2022 at [facility]:  No acute abnormality of the abdomen or pelvis. No finding to suggest a neoplastic process or metastatic disease. Fatty infiltration of the liver. Stable and unchanged renal nodules. Diverticulosis of the colon. No evidence for diverticulitis. Significant lobulated enlargement of the prostate is similar to the previous study. Significant compression elevation of floor of the urinary bladder. Moderate thickening of the wall of the urinary bladder is partly due to incomplete distention. There is a probability of partial outlet obstruction. Continue X-Geva every 4 weeks. Recommend repeat CT chest 2/2023. Both Anitra Vallecillo and Luis Eduardo Muñoz do not wish to repeat CT chest that soon. They are agreeable to schedule CT chest at his next follow-up with me in March, 2023, sooner should he develop any new symptoms.     hypothyroidism due to medication      Continue Synthroid to 100 mcg po daily Repeat TSH today    Left renal mass  2.2 cm left renal mass stable on abdominal/pelvic CT dated 6/4/2020, 10/22/2020 and 4/1/2021, 9/30/2021, 3/4/2022, and 11/14/2022 at [facility]  Previously evaluated by urology, monitor conservatively  right renal cortical cyst now measuring 2.7 cm on Contrasted CT of abd/pelvis 11/6/2022 at [facility]  Continue to observe on surveillance imaging for metastatic melanoma    Superior vena cava stenosis  Asymptomatic, followed by ALEAH Watkins  Contrasted chest CT 12/7/2021: Patent left axillary vein/left subclavian vein. Normal appearance of the partially opacified right jugular vein. Normal appearance of the unopacified right subclavian vein to the extent visualized. Focal narrowing of the SVC  No indication of SVC on recent Chest CT 11/14/2022  No anticoagulation, monitored conservatively. Pruritis - stable  Rx Kenalog cream provided, per patient request  Continue Atarax PRN, Singulair, Paxil    Health Maintenance  Colonoscopy 10/20/2022 by Dr. Kenrick Fay normal aside from diverticulosis in the sigmoid colon. Return PRN    Care plan discussed with patient and his wife. All questions answered. Orders Placed This Encounter   Procedures    CBC with Auto Differential    Comprehensive Metabolic Panel    TSH    Cortisol AM, Total    T4, Free     RTC RN in 1 month for Opdivo. Return in about 2 months (around 3/26/2023) for follow up with LIAM Carvalho for [address] [place] [address]. I have seen, examined and reviewed this patient medication list, appropriate labs and imaging studies. I reviewed relevant medical records and others physicians notes. I discussed the plans of care with the patient. I answered all the questions to the patients satisfaction. I have also reviewed the chief complaint (CC) and part of the history (History of Present Illness (HPI), Past Family Social History [facility]), or Review of Systems (ROS) and made changes when appropriate.         (Please note that portions of this note were completed with a voice recognition program. Efforts were made to edit the dictations but occasionally words are mis-transcribed.)          LIAM Nieto  10:40 AM  1/26/2023

## 2023-01-26 ENCOUNTER — OFFICE VISIT (OUTPATIENT)
Dept: HEMATOLOGY | Age: 83
End: 2023-01-26

## 2023-01-26 ENCOUNTER — HOSPITAL ENCOUNTER (OUTPATIENT)
Dept: INFUSION THERAPY | Age: 83
Discharge: HOME | End: 2023-01-26
Payer: MEDICARE

## 2023-01-26 VITALS
TEMPERATURE: 97.8 F | RESPIRATION RATE: 18 BRPM | DIASTOLIC BLOOD PRESSURE: 68 MMHG | SYSTOLIC BLOOD PRESSURE: 132 MMHG | BODY MASS INDEX: 26.85 KG/M2 | OXYGEN SATURATION: 95 % | HEART RATE: 65 BPM | WEIGHT: 191.8 LBS | HEIGHT: 71 IN

## 2023-01-26 DIAGNOSIS — I87.1 SUPERIOR VENA CAVA STENOSIS: ICD-10-CM

## 2023-01-26 DIAGNOSIS — C43.9 MALIGNANT MELANOMA, UNSPECIFIED SITE (HCC): Primary | ICD-10-CM

## 2023-01-26 DIAGNOSIS — C79.51 BONE METASTASES (HCC): ICD-10-CM

## 2023-01-26 DIAGNOSIS — Z71.89 CARE PLAN DISCUSSED WITH PATIENT: ICD-10-CM

## 2023-01-26 DIAGNOSIS — R53.83 FATIGUE DUE TO TREATMENT: ICD-10-CM

## 2023-01-26 DIAGNOSIS — C43.61 MALIGNANT MELANOMA OF RIGHT UPPER EXTREMITY INCLUDING SHOULDER (HCC): ICD-10-CM

## 2023-01-26 DIAGNOSIS — E03.2 HYPOTHYROIDISM DUE TO MEDICATION: ICD-10-CM

## 2023-01-26 DIAGNOSIS — C43.61 MALIGNANT MELANOMA OF RIGHT UPPER EXTREMITY INCLUDING SHOULDER (HCC): Primary | ICD-10-CM

## 2023-01-26 DIAGNOSIS — N28.89 LEFT RENAL MASS: ICD-10-CM

## 2023-01-26 DIAGNOSIS — L29.9 PRURITUS: ICD-10-CM

## 2023-01-26 DIAGNOSIS — Z95.828 PORT-A-CATH IN PLACE: ICD-10-CM

## 2023-01-26 LAB
ALBUMIN SERPL-MCNC: 4 G/DL (ref 3.5–5.2)
ALP BLD-CCNC: 43 U/L (ref 40–130)
ALT SERPL-CCNC: 20 U/L (ref 21–72)
ANION GAP SERPL CALCULATED.3IONS-SCNC: 4 MMOL/L (ref 7–19)
AST SERPL-CCNC: 26 U/L (ref 17–59)
BILIRUB SERPL-MCNC: 1.1 MG/DL (ref 0.2–1.3)
BUN BLDV-MCNC: 11 MG/DL (ref 9–20)
CALCIUM SERPL-MCNC: 8.7 MG/DL (ref 8.4–10.2)
CHLORIDE BLD-SCNC: 107 MMOL/L (ref 98–111)
CO2: 27 MMOL/L (ref 22–29)
CORTISOL - AM: 6.7 UG/DL (ref 6.2–19.4)
CREAT SERPL-MCNC: 0.9 MG/DL (ref 0.6–1.2)
GFR SERPL CREATININE-BSD FRML MDRD: >60 ML/MIN/{1.73_M2}
GLOBULIN: 2.8 G/DL
GLUCOSE BLD-MCNC: 121 MG/DL (ref 74–106)
HCT VFR BLD CALC: 40.8 % (ref 40.1–51)
HEMOGLOBIN: 14.3 G/DL (ref 13.7–17.5)
LYMPHOCYTES ABSOLUTE: 2.58 K/UL (ref 1.18–3.74)
LYMPHOCYTES RELATIVE PERCENT: 32.9 % (ref 19.3–53.1)
MCH RBC QN AUTO: 30.1 PG (ref 25.7–32.2)
MCHC RBC AUTO-ENTMCNC: 35 G/DL (ref 32.3–36.5)
MCV RBC AUTO: 85.9 FL (ref 79–92.2)
MONOCYTES ABSOLUTE: 0.84 K/UL (ref 0.24–0.82)
MONOCYTES RELATIVE PERCENT: 10.7 % (ref 4.7–12.5)
NEUTROPHILS ABSOLUTE: 3.97 K/UL (ref 1.56–6.13)
NEUTROPHILS RELATIVE PERCENT: 50.6 % (ref 34–71.1)
PDW BLD-RTO: 12.5 % (ref 11.6–14.4)
PLATELET # BLD: 221 K/UL (ref 163–337)
PMV BLD AUTO: 9.2 FL (ref 7.4–10.4)
POTASSIUM SERPL-SCNC: 4.2 MMOL/L (ref 3.5–5.1)
RBC # BLD: 4.75 M/UL (ref 4.63–6.08)
SODIUM BLD-SCNC: 138 MMOL/L (ref 137–145)
T4 FREE: 1.52 NG/DL (ref 0.93–1.7)
TOTAL PROTEIN: 6.8 G/DL (ref 6.3–8.2)
TSH SERPL DL<=0.05 MIU/L-ACNC: 2.09 UIU/ML (ref 0.27–4.2)
WBC # BLD: 7.85 K/UL (ref 4.23–9.07)

## 2023-01-26 PROCEDURE — 80053 COMPREHEN METABOLIC PANEL: CPT

## 2023-01-26 PROCEDURE — 85025 COMPLETE CBC W/AUTO DIFF WBC: CPT

## 2023-01-26 PROCEDURE — 6360000002 HC RX W HCPCS: Performed by: NURSE PRACTITIONER

## 2023-01-26 PROCEDURE — 36415 COLL VENOUS BLD VENIPUNCTURE: CPT

## 2023-01-26 PROCEDURE — 96413 CHEMO IV INFUSION 1 HR: CPT

## 2023-01-26 PROCEDURE — 36415 COLL VENOUS BLD VENIPUNCTURE: CPT | Performed by: NURSE PRACTITIONER

## 2023-01-26 PROCEDURE — 96372 THER/PROPH/DIAG INJ SC/IM: CPT

## 2023-01-26 PROCEDURE — 2580000003 HC RX 258: Performed by: NURSE PRACTITIONER

## 2023-01-26 RX ORDER — HEPARIN SODIUM (PORCINE) LOCK FLUSH IV SOLN 100 UNIT/ML 100 UNIT/ML
500 SOLUTION INTRAVENOUS PRN
Status: CANCELLED | OUTPATIENT
Start: 2023-01-26

## 2023-01-26 RX ORDER — DIPHENHYDRAMINE HYDROCHLORIDE 50 MG/ML
50 INJECTION INTRAMUSCULAR; INTRAVENOUS PRN
OUTPATIENT
Start: 2023-01-26

## 2023-01-26 RX ORDER — METHYLPREDNISOLONE SODIUM SUCCINATE 125 MG/2ML
125 INJECTION, POWDER, LYOPHILIZED, FOR SOLUTION INTRAMUSCULAR; INTRAVENOUS PRN
Status: CANCELLED | OUTPATIENT
Start: 2023-01-26

## 2023-01-26 RX ORDER — SODIUM CHLORIDE 0.9 % (FLUSH) 0.9 %
10 SYRINGE (ML) INJECTION PRN
Status: DISCONTINUED | OUTPATIENT
Start: 2023-01-26 | End: 2023-01-27 | Stop reason: HOSPADM

## 2023-01-26 RX ORDER — SODIUM CHLORIDE 0.9 % (FLUSH) 0.9 %
10 SYRINGE (ML) INJECTION PRN
Status: CANCELLED | OUTPATIENT
Start: 2023-01-26

## 2023-01-26 RX ORDER — METHYLPREDNISOLONE SODIUM SUCCINATE 125 MG/2ML
125 INJECTION, POWDER, LYOPHILIZED, FOR SOLUTION INTRAMUSCULAR; INTRAVENOUS PRN
OUTPATIENT
Start: 2023-01-26

## 2023-01-26 RX ORDER — EPINEPHRINE 1 MG/ML
0.3 INJECTION, SOLUTION, CONCENTRATE INTRAVENOUS PRN
OUTPATIENT
Start: 2023-01-26

## 2023-01-26 RX ORDER — SODIUM CHLORIDE 0.9 % (FLUSH) 0.9 %
5 SYRINGE (ML) INJECTION PRN
OUTPATIENT
Start: 2023-01-26

## 2023-01-26 RX ORDER — DIPHENHYDRAMINE HYDROCHLORIDE 50 MG/ML
50 INJECTION INTRAMUSCULAR; INTRAVENOUS PRN
Status: CANCELLED | OUTPATIENT
Start: 2023-01-26

## 2023-01-26 RX ORDER — HEPARIN SODIUM (PORCINE) LOCK FLUSH IV SOLN 100 UNIT/ML 100 UNIT/ML
500 SOLUTION INTRAVENOUS PRN
Status: DISCONTINUED | OUTPATIENT
Start: 2023-01-26 | End: 2023-01-28 | Stop reason: HOSPADM

## 2023-01-26 RX ADMIN — DENOSUMAB 120 MG: 120 INJECTION SUBCUTANEOUS at 09:44

## 2023-01-26 RX ADMIN — SODIUM CHLORIDE 480 MG: 9 INJECTION, SOLUTION INTRAVENOUS at 09:44

## 2023-01-26 RX ADMIN — Medication 500 UNITS: at 10:16

## 2023-01-26 RX ADMIN — Medication 10 ML: at 10:16

## 2023-01-26 ASSESSMENT — ENCOUNTER SYMPTOMS
VOMITING: 0
ABDOMINAL PAIN: 0
EYE DISCHARGE: 0
SHORTNESS OF BREATH: 1
TROUBLE SWALLOWING: 0
CONSTIPATION: 0
SORE THROAT: 0
NAUSEA: 1
COUGH: 1
DIARRHEA: 0
EYE ITCHING: 0
WHEEZING: 0

## 2023-01-31 DIAGNOSIS — E03.9 ACQUIRED HYPOTHYROIDISM: ICD-10-CM

## 2023-02-01 RX ORDER — LEVOTHYROXINE SODIUM 0.1 MG/1
TABLET ORAL
Qty: 90 TABLET | Refills: 1 | Status: SHIPPED | OUTPATIENT
Start: 2023-02-01

## 2023-02-06 DIAGNOSIS — G89.3 CANCER RELATED PAIN: ICD-10-CM

## 2023-02-06 RX ORDER — HYDROCODONE BITARTRATE AND ACETAMINOPHEN 10; 325 MG/1; MG/1
1 TABLET ORAL EVERY 6 HOURS PRN
Qty: 120 TABLET | Refills: 0 | Status: SHIPPED | OUTPATIENT
Start: 2023-02-06 | End: 2023-03-08

## 2023-02-23 ENCOUNTER — HOSPITAL ENCOUNTER (OUTPATIENT)
Dept: INFUSION THERAPY | Age: 83
Discharge: HOME | End: 2023-02-23
Payer: MEDICARE

## 2023-02-23 VITALS
TEMPERATURE: 98.2 F | BODY MASS INDEX: 27.12 KG/M2 | HEIGHT: 71 IN | WEIGHT: 193.7 LBS | OXYGEN SATURATION: 94 % | DIASTOLIC BLOOD PRESSURE: 76 MMHG | RESPIRATION RATE: 18 BRPM | HEART RATE: 67 BPM | SYSTOLIC BLOOD PRESSURE: 136 MMHG

## 2023-02-23 DIAGNOSIS — C43.61 MALIGNANT MELANOMA OF RIGHT UPPER EXTREMITY INCLUDING SHOULDER (HCC): ICD-10-CM

## 2023-02-23 DIAGNOSIS — C43.9 MALIGNANT MELANOMA, UNSPECIFIED SITE (HCC): Primary | ICD-10-CM

## 2023-02-23 DIAGNOSIS — Z95.828 PORT-A-CATH IN PLACE: ICD-10-CM

## 2023-02-23 DIAGNOSIS — C79.51 BONE METASTASES (HCC): ICD-10-CM

## 2023-02-23 LAB
ALBUMIN SERPL-MCNC: 3.8 G/DL (ref 3.5–5.2)
ALP BLD-CCNC: 45 U/L (ref 40–130)
ALT SERPL-CCNC: 18 U/L (ref 21–72)
ANION GAP SERPL CALCULATED.3IONS-SCNC: 3 MMOL/L (ref 7–19)
AST SERPL-CCNC: 28 U/L (ref 17–59)
BILIRUB SERPL-MCNC: 1.4 MG/DL (ref 0.2–1.3)
BUN BLDV-MCNC: 15 MG/DL (ref 9–20)
CALCIUM SERPL-MCNC: 8.6 MG/DL (ref 8.4–10.2)
CHLORIDE BLD-SCNC: 107 MMOL/L (ref 98–111)
CO2: 27 MMOL/L (ref 22–29)
CREAT SERPL-MCNC: 1 MG/DL (ref 0.6–1.2)
GFR SERPL CREATININE-BSD FRML MDRD: >60 ML/MIN/{1.73_M2}
GLOBULIN: 2.3 G/DL
GLUCOSE BLD-MCNC: 106 MG/DL (ref 74–106)
HCT VFR BLD CALC: 42.4 % (ref 40.1–51)
HEMOGLOBIN: 14.1 G/DL (ref 13.7–17.5)
LYMPHOCYTES ABSOLUTE: 2.46 K/UL (ref 1.18–3.74)
LYMPHOCYTES RELATIVE PERCENT: 29.2 % (ref 19.3–53.1)
MCH RBC QN AUTO: 29.8 PG (ref 25.7–32.2)
MCHC RBC AUTO-ENTMCNC: 33.3 G/DL (ref 32.3–36.5)
MCV RBC AUTO: 89.6 FL (ref 79–92.2)
MONOCYTES ABSOLUTE: 0.77 K/UL (ref 0.24–0.82)
MONOCYTES RELATIVE PERCENT: 9.1 % (ref 4.7–12.5)
NEUTROPHILS ABSOLUTE: 4.7 K/UL (ref 1.56–6.13)
NEUTROPHILS RELATIVE PERCENT: 55.9 % (ref 34–71.1)
PDW BLD-RTO: 12.7 % (ref 11.6–14.4)
PLATELET # BLD: 243 K/UL (ref 163–337)
PMV BLD AUTO: 9.8 FL (ref 7.4–10.4)
POTASSIUM SERPL-SCNC: 4.2 MMOL/L (ref 3.5–5.1)
RBC # BLD: 4.73 M/UL (ref 4.63–6.08)
SODIUM BLD-SCNC: 137 MMOL/L (ref 137–145)
TOTAL PROTEIN: 6.2 G/DL (ref 6.3–8.2)
WBC # BLD: 8.42 K/UL (ref 4.23–9.07)

## 2023-02-23 PROCEDURE — 96372 THER/PROPH/DIAG INJ SC/IM: CPT

## 2023-02-23 PROCEDURE — 2580000003 HC RX 258: Performed by: NURSE PRACTITIONER

## 2023-02-23 PROCEDURE — 96413 CHEMO IV INFUSION 1 HR: CPT

## 2023-02-23 PROCEDURE — 80053 COMPREHEN METABOLIC PANEL: CPT

## 2023-02-23 PROCEDURE — 36415 COLL VENOUS BLD VENIPUNCTURE: CPT

## 2023-02-23 PROCEDURE — 85025 COMPLETE CBC W/AUTO DIFF WBC: CPT

## 2023-02-23 PROCEDURE — 6360000002 HC RX W HCPCS: Performed by: NURSE PRACTITIONER

## 2023-02-23 RX ORDER — SODIUM CHLORIDE 0.9 % (FLUSH) 0.9 %
10 SYRINGE (ML) INJECTION PRN
Status: DISCONTINUED | OUTPATIENT
Start: 2023-02-23 | End: 2023-02-24 | Stop reason: HOSPADM

## 2023-02-23 RX ORDER — SODIUM CHLORIDE 0.9 % (FLUSH) 0.9 %
5 SYRINGE (ML) INJECTION PRN
OUTPATIENT
Start: 2023-02-23

## 2023-02-23 RX ORDER — METHYLPREDNISOLONE SODIUM SUCCINATE 125 MG/2ML
125 INJECTION, POWDER, LYOPHILIZED, FOR SOLUTION INTRAMUSCULAR; INTRAVENOUS PRN
OUTPATIENT
Start: 2023-02-23

## 2023-02-23 RX ORDER — DIPHENHYDRAMINE HYDROCHLORIDE 50 MG/ML
50 INJECTION INTRAMUSCULAR; INTRAVENOUS PRN
OUTPATIENT
Start: 2023-02-23

## 2023-02-23 RX ORDER — SODIUM CHLORIDE 0.9 % (FLUSH) 0.9 %
10 SYRINGE (ML) INJECTION PRN
Status: CANCELLED | OUTPATIENT
Start: 2023-02-23

## 2023-02-23 RX ORDER — HEPARIN SODIUM (PORCINE) LOCK FLUSH IV SOLN 100 UNIT/ML 100 UNIT/ML
500 SOLUTION INTRAVENOUS PRN
Status: DISCONTINUED | OUTPATIENT
Start: 2023-02-23 | End: 2023-02-25 | Stop reason: HOSPADM

## 2023-02-23 RX ORDER — EPINEPHRINE 1 MG/ML
0.3 INJECTION, SOLUTION, CONCENTRATE INTRAVENOUS PRN
OUTPATIENT
Start: 2023-02-23

## 2023-02-23 RX ORDER — HEPARIN SODIUM (PORCINE) LOCK FLUSH IV SOLN 100 UNIT/ML 100 UNIT/ML
500 SOLUTION INTRAVENOUS PRN
Status: CANCELLED | OUTPATIENT
Start: 2023-02-23

## 2023-02-23 RX ADMIN — SODIUM CHLORIDE, PRESERVATIVE FREE 10 ML: 5 INJECTION INTRAVENOUS at 09:52

## 2023-02-23 RX ADMIN — SODIUM CHLORIDE 480 MG: 9 INJECTION, SOLUTION INTRAVENOUS at 09:16

## 2023-02-23 RX ADMIN — DENOSUMAB 120 MG: 120 INJECTION SUBCUTANEOUS at 09:50

## 2023-02-23 RX ADMIN — HEPARIN 500 UNITS: 100 SYRINGE at 09:52

## 2023-03-10 DIAGNOSIS — G89.3 CANCER RELATED PAIN: ICD-10-CM

## 2023-03-10 RX ORDER — HYDROCODONE BITARTRATE AND ACETAMINOPHEN 10; 325 MG/1; MG/1
1 TABLET ORAL EVERY 6 HOURS PRN
Qty: 120 TABLET | Refills: 0 | Status: SHIPPED | OUTPATIENT
Start: 2023-03-10 | End: 2023-04-09

## 2023-03-22 NOTE — PROGRESS NOTES
subpleural nodule   Indeterminate thyroid nodules on chest CT    1st TUMOR HISTORY: Resected stage IIIA (pT3a pN1a M0) right upper extremity malignant melanoma, 06/05/14  Mr. Susie Hill was seen in initial oncology consultation on 08/05/14, referred by Dr. John Gale, regarding a diagnosis of resected malignant melanoma of the right upper extremity. Dr. Randell Garcia resected a malignant melanoma from the right posterior arm, above the elbow, on 06/05/14. Pathology revealed a 2.25 mm thick non-ulcerated Pankaj's level IV malignant melanoma. There were 10 mitoses/ sq mm. There was no vascular or lymphatic invasion. A wide excision with a sentinel lymph node biopsy was performed by by Dr. Beckie Kaufman on 07/09/14. Pathology revealed that the right axillary sentinel lymph node was positive for metastatic malignancy by immunoperoxidase stain. The wide excision sample was without further local involvement. A right axillary lymph node dissection was performed by Dr. Beckie Kaufman on 07/21/14. No metastatic malignancy was noted in any of the 8 right axillary lymph nodes submitted. The HMB-45 immunoperoxidase stain was negative for metastatic malignant melanoma in any of these subsequently submitted lymph nodes. Completion of a metastatic workup on 08/06/14, including MRI of the brain, bone scan, CT scans of the chest, abdomen and pelvis were negative for evidence of metastatic disease. Adjuvant pegylated Interferon (Sylatron) 6 mcg/kg (500mcg) sub-q weekly x 8 to be followed then by 3 mcg/kg(250mcg) weekly x up to 5 years was discussed and planned. Adjuvant therapy was indeed initiated and delivered as discussed below in treatment summary. The last dose of the medication was delivered on 2/15/2015. He was unable to tolerate this medication even at reduced dosages because of poor tolerability, weight loss, anorexia, unable to sleep, anxiety, fatigue, et Tamy Herd. He declined any further interferon, which is reasonable.    A one-year

## 2023-03-23 ENCOUNTER — TRANSCRIBE ORDERS (OUTPATIENT)
Dept: ADMINISTRATIVE | Facility: HOSPITAL | Age: 83
End: 2023-03-23
Payer: MEDICARE

## 2023-03-23 ENCOUNTER — HOSPITAL ENCOUNTER (OUTPATIENT)
Dept: INFUSION THERAPY | Age: 83
Discharge: HOME | End: 2023-03-23
Payer: MEDICARE

## 2023-03-23 ENCOUNTER — OFFICE VISIT (OUTPATIENT)
Dept: HEMATOLOGY | Age: 83
End: 2023-03-23
Payer: MEDICARE

## 2023-03-23 VITALS
SYSTOLIC BLOOD PRESSURE: 151 MMHG | WEIGHT: 190.3 LBS | TEMPERATURE: 98.6 F | DIASTOLIC BLOOD PRESSURE: 66 MMHG | HEIGHT: 71 IN | RESPIRATION RATE: 18 BRPM | BODY MASS INDEX: 26.64 KG/M2 | OXYGEN SATURATION: 96 % | HEART RATE: 61 BPM

## 2023-03-23 DIAGNOSIS — L29.9 PRURITUS: ICD-10-CM

## 2023-03-23 DIAGNOSIS — C43.9 MALIGNANT MELANOMA, UNSPECIFIED SITE (HCC): Primary | ICD-10-CM

## 2023-03-23 DIAGNOSIS — R53.83 FATIGUE DUE TO TREATMENT: ICD-10-CM

## 2023-03-23 DIAGNOSIS — Z95.828 PORT-A-CATH IN PLACE: ICD-10-CM

## 2023-03-23 DIAGNOSIS — Z51.12 ENCOUNTER FOR ANTINEOPLASTIC IMMUNOTHERAPY: ICD-10-CM

## 2023-03-23 DIAGNOSIS — C79.51 BONE METASTASES: ICD-10-CM

## 2023-03-23 DIAGNOSIS — C43.61 MALIGNANT MELANOMA OF RIGHT UPPER EXTREMITY INCLUDING SHOULDER (HCC): Primary | ICD-10-CM

## 2023-03-23 DIAGNOSIS — C43.61 MALIGNANT MELANOMA OF RIGHT UPPER EXTREMITY INCLUDING SHOULDER (HCC): ICD-10-CM

## 2023-03-23 DIAGNOSIS — E03.2 HYPOTHYROIDISM DUE TO MEDICATION: ICD-10-CM

## 2023-03-23 DIAGNOSIS — Z71.89 CARE PLAN DISCUSSED WITH PATIENT: ICD-10-CM

## 2023-03-23 DIAGNOSIS — C43.61 MALIGNANT MELANOMA OF RIGHT UPPER EXTREMITY INCLUDING SHOULDER: Primary | ICD-10-CM

## 2023-03-23 DIAGNOSIS — N28.89 LEFT RENAL MASS: ICD-10-CM

## 2023-03-23 DIAGNOSIS — R91.8 LUNG NODULES: ICD-10-CM

## 2023-03-23 LAB
ALBUMIN SERPL-MCNC: 4 G/DL (ref 3.5–5.2)
ALP SERPL-CCNC: 47 U/L (ref 40–130)
ALT SERPL-CCNC: 20 U/L (ref 21–72)
ANION GAP SERPL CALCULATED.3IONS-SCNC: 5 MMOL/L (ref 7–19)
AST SERPL-CCNC: 26 U/L (ref 17–59)
BILIRUB SERPL-MCNC: 1 MG/DL (ref 0.2–1.3)
BUN SERPL-MCNC: 13 MG/DL (ref 9–20)
CALCIUM SERPL-MCNC: 8.3 MG/DL (ref 8.4–10.2)
CHLORIDE SERPL-SCNC: 104 MMOL/L (ref 98–111)
CO2 SERPL-SCNC: 26 MMOL/L (ref 22–29)
CORTIS AM PEAK SERPL-MCNC: 8 UG/DL (ref 6.2–19.4)
CREAT SERPL-MCNC: 0.9 MG/DL (ref 0.6–1.2)
ERYTHROCYTE [DISTWIDTH] IN BLOOD BY AUTOMATED COUNT: 12.7 % (ref 11.6–14.4)
GLOBULIN: 2.6 G/DL
GLUCOSE SERPL-MCNC: 107 MG/DL (ref 74–106)
HCT VFR BLD AUTO: 43.6 % (ref 40.1–51)
HGB BLD-MCNC: 14.5 G/DL (ref 13.7–17.5)
LYMPHOCYTES # BLD: 2.43 K/UL (ref 1.18–3.74)
LYMPHOCYTES NFR BLD: 25.4 % (ref 19.3–53.1)
MCH RBC QN AUTO: 30.2 PG (ref 25.7–32.2)
MCHC RBC AUTO-ENTMCNC: 33.3 G/DL (ref 32.3–36.5)
MCV RBC AUTO: 90.8 FL (ref 79–92.2)
MONOCYTES # BLD: 0.83 K/UL (ref 0.24–0.82)
MONOCYTES NFR BLD: 8.7 % (ref 4.7–12.5)
NEUTROPHILS # BLD: 5.77 K/UL (ref 1.56–6.13)
NEUTS SEG NFR BLD: 60.4 % (ref 34–71.1)
PLATELET # BLD AUTO: 233 K/UL (ref 163–337)
PMV BLD AUTO: 10.2 FL (ref 7.4–10.4)
POTASSIUM SERPL-SCNC: 4 MMOL/L (ref 3.5–5.1)
PROT SERPL-MCNC: 6.6 G/DL (ref 6.3–8.2)
RBC # BLD AUTO: 4.8 M/UL (ref 4.63–6.08)
SODIUM SERPL-SCNC: 135 MMOL/L (ref 137–145)
TSH SERPL DL<=0.005 MIU/L-ACNC: 2.06 UIU/ML (ref 0.27–4.2)
WBC # BLD AUTO: 9.56 K/UL (ref 4.23–9.07)

## 2023-03-23 PROCEDURE — 80053 COMPREHEN METABOLIC PANEL: CPT

## 2023-03-23 PROCEDURE — 3078F DIAST BP <80 MM HG: CPT | Performed by: NURSE PRACTITIONER

## 2023-03-23 PROCEDURE — 99214 OFFICE O/P EST MOD 30 MIN: CPT | Performed by: NURSE PRACTITIONER

## 2023-03-23 PROCEDURE — 3074F SYST BP LT 130 MM HG: CPT | Performed by: NURSE PRACTITIONER

## 2023-03-23 PROCEDURE — 1036F TOBACCO NON-USER: CPT | Performed by: NURSE PRACTITIONER

## 2023-03-23 PROCEDURE — 36415 COLL VENOUS BLD VENIPUNCTURE: CPT

## 2023-03-23 PROCEDURE — 1123F ACP DISCUSS/DSCN MKR DOCD: CPT | Performed by: NURSE PRACTITIONER

## 2023-03-23 PROCEDURE — 96413 CHEMO IV INFUSION 1 HR: CPT

## 2023-03-23 PROCEDURE — G8417 CALC BMI ABV UP PARAM F/U: HCPCS | Performed by: NURSE PRACTITIONER

## 2023-03-23 PROCEDURE — G8484 FLU IMMUNIZE NO ADMIN: HCPCS | Performed by: NURSE PRACTITIONER

## 2023-03-23 PROCEDURE — 6360000002 HC RX W HCPCS: Performed by: NURSE PRACTITIONER

## 2023-03-23 PROCEDURE — 85025 COMPLETE CBC W/AUTO DIFF WBC: CPT

## 2023-03-23 PROCEDURE — G8427 DOCREV CUR MEDS BY ELIG CLIN: HCPCS | Performed by: NURSE PRACTITIONER

## 2023-03-23 PROCEDURE — 96372 THER/PROPH/DIAG INJ SC/IM: CPT

## 2023-03-23 PROCEDURE — 2580000003 HC RX 258: Performed by: NURSE PRACTITIONER

## 2023-03-23 RX ORDER — SODIUM CHLORIDE 0.9 % (FLUSH) 0.9 %
10 SYRINGE (ML) INJECTION PRN
Status: CANCELLED | OUTPATIENT
Start: 2023-03-23

## 2023-03-23 RX ORDER — SODIUM CHLORIDE 0.9 % (FLUSH) 0.9 %
5 SYRINGE (ML) INJECTION PRN
OUTPATIENT
Start: 2023-03-23

## 2023-03-23 RX ORDER — DIPHENHYDRAMINE HYDROCHLORIDE 50 MG/ML
50 INJECTION INTRAMUSCULAR; INTRAVENOUS PRN
OUTPATIENT
Start: 2023-03-23

## 2023-03-23 RX ORDER — HEPARIN SODIUM (PORCINE) LOCK FLUSH IV SOLN 100 UNIT/ML 100 UNIT/ML
500 SOLUTION INTRAVENOUS PRN
Status: CANCELLED | OUTPATIENT
Start: 2023-03-23

## 2023-03-23 RX ORDER — HEPARIN SODIUM (PORCINE) LOCK FLUSH IV SOLN 100 UNIT/ML 100 UNIT/ML
500 SOLUTION INTRAVENOUS PRN
Status: DISCONTINUED | OUTPATIENT
Start: 2023-03-23 | End: 2023-03-25 | Stop reason: HOSPADM

## 2023-03-23 RX ORDER — METHYLPREDNISOLONE SODIUM SUCCINATE 125 MG/2ML
125 INJECTION, POWDER, LYOPHILIZED, FOR SOLUTION INTRAMUSCULAR; INTRAVENOUS PRN
OUTPATIENT
Start: 2023-03-23

## 2023-03-23 RX ORDER — SODIUM CHLORIDE 0.9 % (FLUSH) 0.9 %
10 SYRINGE (ML) INJECTION PRN
Status: DISCONTINUED | OUTPATIENT
Start: 2023-03-23 | End: 2023-03-24 | Stop reason: HOSPADM

## 2023-03-23 RX ORDER — EPINEPHRINE 1 MG/ML
0.3 INJECTION, SOLUTION, CONCENTRATE INTRAVENOUS PRN
OUTPATIENT
Start: 2023-03-23

## 2023-03-23 RX ADMIN — SODIUM CHLORIDE 480 MG: 9 INJECTION, SOLUTION INTRAVENOUS at 09:33

## 2023-03-23 RX ADMIN — DENOSUMAB 120 MG: 120 INJECTION SUBCUTANEOUS at 10:05

## 2023-03-23 RX ADMIN — Medication 500 UNITS: at 10:04

## 2023-03-23 RX ADMIN — SODIUM CHLORIDE, PRESERVATIVE FREE 10 ML: 5 INJECTION INTRAVENOUS at 10:04

## 2023-03-27 ASSESSMENT — ENCOUNTER SYMPTOMS
EYE ITCHING: 0
DIARRHEA: 0
WHEEZING: 0
CONSTIPATION: 0
TROUBLE SWALLOWING: 0
COUGH: 1
SORE THROAT: 0
VOMITING: 0
EYE DISCHARGE: 0
SHORTNESS OF BREATH: 1
ABDOMINAL PAIN: 0
NAUSEA: 1

## 2023-04-13 ENCOUNTER — HOSPITAL ENCOUNTER (OUTPATIENT)
Dept: CT IMAGING | Facility: HOSPITAL | Age: 83
Discharge: HOME | End: 2023-04-13
Admitting: NURSE PRACTITIONER
Payer: MEDICARE

## 2023-04-13 DIAGNOSIS — C43.61 MALIGNANT MELANOMA OF RIGHT UPPER EXTREMITY INCLUDING SHOULDER: ICD-10-CM

## 2023-04-13 LAB — CREAT BLDA-MCNC: 1.1 MG/DL (ref 0.6–1.3)

## 2023-04-13 PROCEDURE — 74177 CT ABD & PELVIS W/CONTRAST: CPT

## 2023-04-13 PROCEDURE — 71260 CT THORAX DX C+: CPT

## 2023-04-13 PROCEDURE — 82565 ASSAY OF CREATININE: CPT

## 2023-04-13 PROCEDURE — 25510000001 IOPAMIDOL 61 % SOLUTION: Performed by: NURSE PRACTITIONER

## 2023-04-13 RX ADMIN — IOPAMIDOL 100 ML: 612 INJECTION, SOLUTION INTRAVENOUS at 10:53

## 2023-04-19 ENCOUNTER — TELEPHONE (OUTPATIENT)
Dept: HEMATOLOGY | Age: 83
End: 2023-04-19

## 2023-04-19 NOTE — PROGRESS NOTES
Progress Note      Pt Name: Charles Jin  YOB: 1940  MRN: [id number]    Date of evaluation: 04/20/23     History Obtained From:  patient, spouse, electronic medical record    CHIEF COMPLAINT:    Chief Complaint   Patient presents with    Cancer     Malignant melanoma of right upper extremity including shoulder ([address]     HISTORY OF PRESENT ILLNESS:    Charles Jin is an 29-year-old gentleman returning to the clinic today to discuss surveillance imaging and continue maintenance nivolumab regarding his diagnosis of recurrent, stage IV metastatic malignant melanoma made in July, 2018. Metastatic disease included right axillary lymph node, liver and bone involvement. Geovani Gómez has been tolerating treatment with generalized, tolerable pruritus. He has found over-the-counter calagel lotion has been helping. Geovani Gómez has facial erythema, stating he has been out in the sun lately and had mild sunburn. TSH drug-induced hypothyroidism, has been stable with a TSH of 2.06 on 3/23/2023. He denies cough or diarrhea. 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 Charles Jin presents for follow-up surveillance visit and toxicity assessment. he requires intenstive monitoring due to the potential to cause serious morbidity or death.      TARGET METASTATIC MALIGNANT MELANOMA SITES:  Right upper extremity original primary site 06/05/14   Right axilla lymph node at presentation 6/5/2014 and 3 axillary nodes at recurrence 7/27/2018 with an SUV of 8.7   Too numerous to count liver metastases   Celiac lymph nodes x 2 with highest SUV of 3   Bony metastatic disease on PET scan in the right ilium (SUV of 5) and right acetabulum (SUV of 6.4)   Indeterminate HORACIO 3 mm subpleural nodule   Indeterminate thyroid nodules on chest CT    1st TUMOR HISTORY: Resected stage IIIA (pT3a pN1a

## 2023-04-20 ENCOUNTER — HOSPITAL ENCOUNTER (OUTPATIENT)
Dept: INFUSION THERAPY | Age: 83
Discharge: HOME | End: 2023-04-20
Payer: MEDICARE

## 2023-04-20 ENCOUNTER — OFFICE VISIT (OUTPATIENT)
Dept: HEMATOLOGY | Age: 83
End: 2023-04-20
Payer: MEDICARE

## 2023-04-20 VITALS
HEART RATE: 58 BPM | DIASTOLIC BLOOD PRESSURE: 76 MMHG | OXYGEN SATURATION: 96 % | RESPIRATION RATE: 20 BRPM | HEIGHT: 71 IN | BODY MASS INDEX: 26.01 KG/M2 | WEIGHT: 185.8 LBS | SYSTOLIC BLOOD PRESSURE: 156 MMHG | TEMPERATURE: 97.7 F

## 2023-04-20 DIAGNOSIS — Z51.12 ENCOUNTER FOR ANTINEOPLASTIC IMMUNOTHERAPY: ICD-10-CM

## 2023-04-20 DIAGNOSIS — C43.9 MALIGNANT MELANOMA, UNSPECIFIED SITE (HCC): Primary | ICD-10-CM

## 2023-04-20 DIAGNOSIS — L29.9 PRURITUS: ICD-10-CM

## 2023-04-20 DIAGNOSIS — C79.51 MALIGNANT NEOPLASM METASTATIC TO BONE (HCC): ICD-10-CM

## 2023-04-20 DIAGNOSIS — Z71.89 CARE PLAN DISCUSSED WITH PATIENT: ICD-10-CM

## 2023-04-20 DIAGNOSIS — N28.89 LEFT RENAL MASS: ICD-10-CM

## 2023-04-20 DIAGNOSIS — C43.61 MALIGNANT MELANOMA OF RIGHT UPPER EXTREMITY INCLUDING SHOULDER (HCC): ICD-10-CM

## 2023-04-20 DIAGNOSIS — C43.61 MALIGNANT MELANOMA OF RIGHT UPPER EXTREMITY INCLUDING SHOULDER (HCC): Primary | ICD-10-CM

## 2023-04-20 DIAGNOSIS — Z95.828 PORT-A-CATH IN PLACE: ICD-10-CM

## 2023-04-20 DIAGNOSIS — E03.2 HYPOTHYROIDISM DUE TO MEDICATION: ICD-10-CM

## 2023-04-20 LAB
ALBUMIN SERPL-MCNC: 3.9 G/DL (ref 3.5–5.2)
ALP SERPL-CCNC: 44 U/L (ref 40–130)
ALT SERPL-CCNC: 18 U/L (ref 21–72)
ANION GAP SERPL CALCULATED.3IONS-SCNC: 6 MMOL/L (ref 7–19)
AST SERPL-CCNC: 24 U/L (ref 17–59)
BILIRUB SERPL-MCNC: 1.5 MG/DL (ref 0.2–1.3)
BUN SERPL-MCNC: 13 MG/DL (ref 9–20)
CALCIUM SERPL-MCNC: 8.7 MG/DL (ref 8.4–10.2)
CHLORIDE SERPL-SCNC: 100 MMOL/L (ref 98–111)
CO2 SERPL-SCNC: 29 MMOL/L (ref 22–29)
CREAT SERPL-MCNC: 1 MG/DL (ref 0.6–1.2)
ERYTHROCYTE [DISTWIDTH] IN BLOOD BY AUTOMATED COUNT: 12.7 % (ref 11.6–14.4)
GLOBULIN: 2.9 G/DL
GLUCOSE SERPL-MCNC: 120 MG/DL (ref 74–106)
HCT VFR BLD AUTO: 41.6 % (ref 40.1–51)
HGB BLD-MCNC: 13.9 G/DL (ref 13.7–17.5)
LYMPHOCYTES # BLD: 1.96 K/UL (ref 1.18–3.74)
LYMPHOCYTES NFR BLD: 25.8 % (ref 19.3–53.1)
MCH RBC QN AUTO: 30.2 PG (ref 25.7–32.2)
MCHC RBC AUTO-ENTMCNC: 33.4 G/DL (ref 32.3–36.5)
MCV RBC AUTO: 90.4 FL (ref 79–92.2)
MONOCYTES # BLD: 0.66 K/UL (ref 0.24–0.82)
MONOCYTES NFR BLD: 8.7 % (ref 4.7–12.5)
NEUTROPHILS # BLD: 4.59 K/UL (ref 1.56–6.13)
NEUTS SEG NFR BLD: 60.3 % (ref 34–71.1)
PLATELET # BLD AUTO: 241 K/UL (ref 163–337)
PMV BLD AUTO: 9.6 FL (ref 7.4–10.4)
POTASSIUM SERPL-SCNC: 4.5 MMOL/L (ref 3.5–5.1)
PROT SERPL-MCNC: 6.8 G/DL (ref 6.3–8.2)
RBC # BLD AUTO: 4.6 M/UL (ref 4.63–6.08)
SODIUM SERPL-SCNC: 135 MMOL/L (ref 137–145)
WBC # BLD AUTO: 7.61 K/UL (ref 4.23–9.07)

## 2023-04-20 PROCEDURE — 3077F SYST BP >= 140 MM HG: CPT | Performed by: NURSE PRACTITIONER

## 2023-04-20 PROCEDURE — 2580000003 HC RX 258: Performed by: NURSE PRACTITIONER

## 2023-04-20 PROCEDURE — G8427 DOCREV CUR MEDS BY ELIG CLIN: HCPCS | Performed by: NURSE PRACTITIONER

## 2023-04-20 PROCEDURE — 6360000002 HC RX W HCPCS: Performed by: NURSE PRACTITIONER

## 2023-04-20 PROCEDURE — 36415 COLL VENOUS BLD VENIPUNCTURE: CPT

## 2023-04-20 PROCEDURE — 80053 COMPREHEN METABOLIC PANEL: CPT

## 2023-04-20 PROCEDURE — 96413 CHEMO IV INFUSION 1 HR: CPT

## 2023-04-20 PROCEDURE — 3078F DIAST BP <80 MM HG: CPT | Performed by: NURSE PRACTITIONER

## 2023-04-20 PROCEDURE — 85025 COMPLETE CBC W/AUTO DIFF WBC: CPT

## 2023-04-20 PROCEDURE — 96372 THER/PROPH/DIAG INJ SC/IM: CPT

## 2023-04-20 PROCEDURE — 99213 OFFICE O/P EST LOW 20 MIN: CPT | Performed by: NURSE PRACTITIONER

## 2023-04-20 PROCEDURE — 1036F TOBACCO NON-USER: CPT | Performed by: NURSE PRACTITIONER

## 2023-04-20 PROCEDURE — 1123F ACP DISCUSS/DSCN MKR DOCD: CPT | Performed by: NURSE PRACTITIONER

## 2023-04-20 PROCEDURE — G8417 CALC BMI ABV UP PARAM F/U: HCPCS | Performed by: NURSE PRACTITIONER

## 2023-04-20 RX ORDER — DIPHENHYDRAMINE HYDROCHLORIDE 50 MG/ML
50 INJECTION INTRAMUSCULAR; INTRAVENOUS PRN
Status: CANCELLED | OUTPATIENT
Start: 2023-04-20

## 2023-04-20 RX ORDER — SODIUM CHLORIDE 0.9 % (FLUSH) 0.9 %
5 SYRINGE (ML) INJECTION PRN
Status: DISCONTINUED | OUTPATIENT
Start: 2023-04-20 | End: 2023-04-21 | Stop reason: HOSPADM

## 2023-04-20 RX ORDER — EPINEPHRINE 1 MG/ML
0.3 INJECTION, SOLUTION, CONCENTRATE INTRAVENOUS PRN
Status: CANCELLED | OUTPATIENT
Start: 2023-04-20

## 2023-04-20 RX ORDER — HEPARIN SODIUM (PORCINE) LOCK FLUSH IV SOLN 100 UNIT/ML 100 UNIT/ML
500 SOLUTION INTRAVENOUS PRN
Status: DISCONTINUED | OUTPATIENT
Start: 2023-04-20 | End: 2023-04-22 | Stop reason: HOSPADM

## 2023-04-20 RX ORDER — SODIUM CHLORIDE 0.9 % (FLUSH) 0.9 %
5 SYRINGE (ML) INJECTION PRN
Status: CANCELLED | OUTPATIENT
Start: 2023-04-20

## 2023-04-20 RX ORDER — METHYLPREDNISOLONE SODIUM SUCCINATE 125 MG/2ML
125 INJECTION, POWDER, LYOPHILIZED, FOR SOLUTION INTRAMUSCULAR; INTRAVENOUS PRN
Status: CANCELLED | OUTPATIENT
Start: 2023-04-20

## 2023-04-20 RX ORDER — HEPARIN SODIUM (PORCINE) LOCK FLUSH IV SOLN 100 UNIT/ML 100 UNIT/ML
500 SOLUTION INTRAVENOUS PRN
Status: CANCELLED | OUTPATIENT
Start: 2023-04-20

## 2023-04-20 RX ORDER — SODIUM CHLORIDE 0.9 % (FLUSH) 0.9 %
10 SYRINGE (ML) INJECTION PRN
Status: CANCELLED | OUTPATIENT
Start: 2023-04-20

## 2023-04-20 RX ADMIN — SODIUM CHLORIDE 480 MG: 9 INJECTION, SOLUTION INTRAVENOUS at 09:55

## 2023-04-20 RX ADMIN — HEPARIN 500 UNITS: 100 SYRINGE at 10:44

## 2023-04-20 RX ADMIN — SODIUM CHLORIDE, PRESERVATIVE FREE 5 ML: 5 INJECTION INTRAVENOUS at 10:44

## 2023-04-20 RX ADMIN — DENOSUMAB 120 MG: 120 INJECTION SUBCUTANEOUS at 10:44

## 2023-04-20 ASSESSMENT — ENCOUNTER SYMPTOMS
COUGH: 0
SORE THROAT: 0
EYE ITCHING: 0
EYE DISCHARGE: 0
TROUBLE SWALLOWING: 0
WHEEZING: 0
CONSTIPATION: 0
DIARRHEA: 0
VOMITING: 0
ABDOMINAL PAIN: 0
SHORTNESS OF BREATH: 1
NAUSEA: 1

## 2023-05-08 DIAGNOSIS — G89.3 CANCER RELATED PAIN: ICD-10-CM

## 2023-05-09 DIAGNOSIS — G89.3 CANCER RELATED PAIN: ICD-10-CM

## 2023-05-09 RX ORDER — HYDROCODONE BITARTRATE AND ACETAMINOPHEN 10; 325 MG/1; MG/1
1 TABLET ORAL EVERY 6 HOURS PRN
Qty: 120 TABLET | Refills: 0 | Status: SHIPPED | OUTPATIENT
Start: 2023-05-09 | End: 2023-05-09 | Stop reason: SDUPTHER

## 2023-05-09 RX ORDER — HYDROCODONE BITARTRATE AND ACETAMINOPHEN 10; 325 MG/1; MG/1
1 TABLET ORAL EVERY 6 HOURS PRN
Qty: 120 TABLET | Refills: 0 | Status: SHIPPED | OUTPATIENT
Start: 2023-05-09 | End: 2023-06-08

## 2023-05-17 NOTE — PROGRESS NOTES
Progress Note      Pt Name: Lor Leger  YOB: 1940  MRN: [id number]    Date of evaluation: 05/18/23     History Obtained From:  patient, spouse, electronic medical record    CHIEF COMPLAINT:    Chief Complaint   Patient presents with    Cancer      melanoma     HISTORY OF PRESENT ILLNESS:    Lor Leger is an 80-year-old gentleman who holds a diagnosis of recurrent, stage IV metastatic malignant melanoma. He was initially diagnosed in July, 2018. Metastatic disease included right axillary lymph node, liver and bone involvement. He returns to the clinic to continue systemic treatment with monthly nivolumab, in addition to [place]bar[place]. Generalized pruritus has been much improved this past month. Isaura Gallagher has been avoiding sun exposure. He continues to use over-the-counter calagel lotion. TSH drug-induced hypothyroidism, has been stable with a TSH of 2.06 on 3/23/2023. Isaura Gallagher continues Synthroid 100 mcg daily. He denies cough or diarrhea. 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 Lor Leger presents for follow-up surveillance visit and toxicity assessment. he requires intenstive monitoring due to the potential to cause serious morbidity or death.      TARGET METASTATIC MALIGNANT MELANOMA SITES:  Right upper extremity original primary site 06/05/14   Right axilla lymph node at presentation 6/5/2014 and 3 axillary nodes at recurrence 7/27/2018 with an SUV of 8.7   Too numerous to count liver metastases   Celiac lymph nodes x 2 with highest SUV of 3   Bony metastatic disease on PET scan in the right ilium (SUV of 5) and right acetabulum (SUV of 6.4)   Indeterminate HORACIO 3 mm subpleural nodule   Indeterminate thyroid nodules on chest CT    1st TUMOR HISTORY: Resected stage IIIA (pT3a pN1a M0) right upper extremity

## 2023-05-18 ENCOUNTER — OFFICE VISIT (OUTPATIENT)
Dept: HEMATOLOGY | Age: 83
End: 2023-05-18
Payer: MEDICARE

## 2023-05-18 ENCOUNTER — HOSPITAL ENCOUNTER (OUTPATIENT)
Dept: INFUSION THERAPY | Age: 83
Discharge: HOME | End: 2023-05-18
Payer: MEDICARE

## 2023-05-18 VITALS
BODY MASS INDEX: 25.81 KG/M2 | DIASTOLIC BLOOD PRESSURE: 78 MMHG | TEMPERATURE: 97.6 F | OXYGEN SATURATION: 92 % | SYSTOLIC BLOOD PRESSURE: 157 MMHG | HEIGHT: 71 IN | RESPIRATION RATE: 18 BRPM | WEIGHT: 184.4 LBS | HEART RATE: 60 BPM

## 2023-05-18 DIAGNOSIS — C43.61 MALIGNANT MELANOMA OF RIGHT UPPER EXTREMITY INCLUDING SHOULDER (HCC): ICD-10-CM

## 2023-05-18 DIAGNOSIS — C43.61 MALIGNANT MELANOMA OF RIGHT UPPER EXTREMITY INCLUDING SHOULDER (HCC): Primary | ICD-10-CM

## 2023-05-18 DIAGNOSIS — C79.51 MALIGNANT NEOPLASM METASTATIC TO BONE (HCC): ICD-10-CM

## 2023-05-18 DIAGNOSIS — C43.9 MALIGNANT MELANOMA, UNSPECIFIED SITE (HCC): Primary | ICD-10-CM

## 2023-05-18 DIAGNOSIS — R53.83 FATIGUE DUE TO TREATMENT: Primary | ICD-10-CM

## 2023-05-18 DIAGNOSIS — R53.83 FATIGUE DUE TO TREATMENT: ICD-10-CM

## 2023-05-18 DIAGNOSIS — N28.89 LEFT RENAL MASS: ICD-10-CM

## 2023-05-18 DIAGNOSIS — Z51.12 ENCOUNTER FOR ANTINEOPLASTIC IMMUNOTHERAPY: ICD-10-CM

## 2023-05-18 DIAGNOSIS — L29.9 PRURITUS: ICD-10-CM

## 2023-05-18 DIAGNOSIS — Z71.89 CARE PLAN DISCUSSED WITH PATIENT: ICD-10-CM

## 2023-05-18 DIAGNOSIS — E03.2 HYPOTHYROIDISM DUE TO MEDICATION: ICD-10-CM

## 2023-05-18 DIAGNOSIS — Z95.828 PORT-A-CATH IN PLACE: ICD-10-CM

## 2023-05-18 LAB
ALBUMIN SERPL-MCNC: 4 G/DL (ref 3.5–5.2)
ALP SERPL-CCNC: 46 U/L (ref 40–130)
ALT SERPL-CCNC: 20 U/L (ref 21–72)
ANION GAP SERPL CALCULATED.3IONS-SCNC: 7 MMOL/L (ref 7–19)
AST SERPL-CCNC: 26 U/L (ref 17–59)
BILIRUB SERPL-MCNC: 1.4 MG/DL (ref 0.2–1.3)
BUN SERPL-MCNC: 16 MG/DL (ref 9–20)
CALCIUM SERPL-MCNC: 8.9 MG/DL (ref 8.4–10.2)
CHLORIDE SERPL-SCNC: 101 MMOL/L (ref 98–111)
CO2 SERPL-SCNC: 28 MMOL/L (ref 22–29)
CREAT SERPL-MCNC: 1 MG/DL (ref 0.6–1.2)
ERYTHROCYTE [DISTWIDTH] IN BLOOD BY AUTOMATED COUNT: 12.4 % (ref 11.6–14.4)
GLOBULIN: 2.9 G/DL
GLUCOSE SERPL-MCNC: 103 MG/DL (ref 74–106)
HCT VFR BLD AUTO: 42.2 % (ref 40.1–51)
HGB BLD-MCNC: 14.1 G/DL (ref 13.7–17.5)
LYMPHOCYTES # BLD: 2.37 K/UL (ref 1.18–3.74)
LYMPHOCYTES NFR BLD: 31.6 % (ref 19.3–53.1)
MAGNESIUM SERPL-MCNC: 2 MG/DL (ref 1.6–2.3)
MCH RBC QN AUTO: 30.8 PG (ref 25.7–32.2)
MCHC RBC AUTO-ENTMCNC: 33.4 G/DL (ref 32.3–36.5)
MCV RBC AUTO: 92.1 FL (ref 79–92.2)
MONOCYTES # BLD: 0.59 K/UL (ref 0.24–0.82)
MONOCYTES NFR BLD: 7.9 % (ref 4.7–12.5)
NEUTROPHILS # BLD: 3.97 K/UL (ref 1.56–6.13)
NEUTS SEG NFR BLD: 52.9 % (ref 34–71.1)
PHOSPHATE SERPL-MCNC: 3.3 MG/DL (ref 2.5–4.5)
PLATELET # BLD AUTO: 227 K/UL (ref 163–337)
PMV BLD AUTO: 9.9 FL (ref 7.4–10.4)
POTASSIUM SERPL-SCNC: 4.4 MMOL/L (ref 3.5–5.1)
PROT SERPL-MCNC: 6.9 G/DL (ref 6.3–8.2)
RBC # BLD AUTO: 4.58 M/UL (ref 4.63–6.08)
SODIUM SERPL-SCNC: 136 MMOL/L (ref 137–145)
TSH SERPL DL<=0.005 MIU/L-ACNC: 3.13 UIU/ML (ref 0.27–4.2)
WBC # BLD AUTO: 7.5 K/UL (ref 4.23–9.07)

## 2023-05-18 PROCEDURE — G8417 CALC BMI ABV UP PARAM F/U: HCPCS | Performed by: NURSE PRACTITIONER

## 2023-05-18 PROCEDURE — 83735 ASSAY OF MAGNESIUM: CPT

## 2023-05-18 PROCEDURE — 99213 OFFICE O/P EST LOW 20 MIN: CPT | Performed by: NURSE PRACTITIONER

## 2023-05-18 PROCEDURE — 96372 THER/PROPH/DIAG INJ SC/IM: CPT

## 2023-05-18 PROCEDURE — 6360000002 HC RX W HCPCS: Performed by: NURSE PRACTITIONER

## 2023-05-18 PROCEDURE — 85025 COMPLETE CBC W/AUTO DIFF WBC: CPT

## 2023-05-18 PROCEDURE — G8427 DOCREV CUR MEDS BY ELIG CLIN: HCPCS | Performed by: NURSE PRACTITIONER

## 2023-05-18 PROCEDURE — 1123F ACP DISCUSS/DSCN MKR DOCD: CPT | Performed by: NURSE PRACTITIONER

## 2023-05-18 PROCEDURE — 2580000003 HC RX 258: Performed by: NURSE PRACTITIONER

## 2023-05-18 PROCEDURE — 3078F DIAST BP <80 MM HG: CPT | Performed by: NURSE PRACTITIONER

## 2023-05-18 PROCEDURE — 3077F SYST BP >= 140 MM HG: CPT | Performed by: NURSE PRACTITIONER

## 2023-05-18 PROCEDURE — 1036F TOBACCO NON-USER: CPT | Performed by: NURSE PRACTITIONER

## 2023-05-18 PROCEDURE — 84100 ASSAY OF PHOSPHORUS: CPT

## 2023-05-18 PROCEDURE — 36415 COLL VENOUS BLD VENIPUNCTURE: CPT

## 2023-05-18 PROCEDURE — 80053 COMPREHEN METABOLIC PANEL: CPT

## 2023-05-18 PROCEDURE — 96413 CHEMO IV INFUSION 1 HR: CPT

## 2023-05-18 RX ORDER — DIPHENHYDRAMINE HYDROCHLORIDE 50 MG/ML
50 INJECTION INTRAMUSCULAR; INTRAVENOUS PRN
Status: CANCELLED | OUTPATIENT
Start: 2023-05-18

## 2023-05-18 RX ORDER — METHYLPREDNISOLONE SODIUM SUCCINATE 125 MG/2ML
125 INJECTION, POWDER, LYOPHILIZED, FOR SOLUTION INTRAMUSCULAR; INTRAVENOUS PRN
Status: CANCELLED | OUTPATIENT
Start: 2023-05-18

## 2023-05-18 RX ORDER — SODIUM CHLORIDE 0.9 % (FLUSH) 0.9 %
5 SYRINGE (ML) INJECTION PRN
Status: CANCELLED | OUTPATIENT
Start: 2023-05-18

## 2023-05-18 RX ORDER — HEPARIN SODIUM (PORCINE) LOCK FLUSH IV SOLN 100 UNIT/ML 100 UNIT/ML
500 SOLUTION INTRAVENOUS PRN
Status: DISCONTINUED | OUTPATIENT
Start: 2023-05-18 | End: 2023-05-20 | Stop reason: HOSPADM

## 2023-05-18 RX ORDER — EPINEPHRINE 1 MG/ML
0.3 INJECTION, SOLUTION, CONCENTRATE INTRAVENOUS PRN
Status: CANCELLED | OUTPATIENT
Start: 2023-05-18

## 2023-05-18 RX ORDER — SODIUM CHLORIDE 0.9 % (FLUSH) 0.9 %
10 SYRINGE (ML) INJECTION PRN
Status: CANCELLED | OUTPATIENT
Start: 2023-05-18

## 2023-05-18 RX ORDER — HEPARIN SODIUM (PORCINE) LOCK FLUSH IV SOLN 100 UNIT/ML 100 UNIT/ML
500 SOLUTION INTRAVENOUS PRN
Status: CANCELLED | OUTPATIENT
Start: 2023-05-18

## 2023-05-18 RX ORDER — SODIUM CHLORIDE 0.9 % (FLUSH) 0.9 %
5 SYRINGE (ML) INJECTION PRN
Status: DISCONTINUED | OUTPATIENT
Start: 2023-05-18 | End: 2023-05-19 | Stop reason: HOSPADM

## 2023-05-18 RX ADMIN — DENOSUMAB 120 MG: 120 INJECTION SUBCUTANEOUS at 09:41

## 2023-05-18 RX ADMIN — SODIUM CHLORIDE, PRESERVATIVE FREE 5 ML: 5 INJECTION INTRAVENOUS at 10:05

## 2023-05-18 RX ADMIN — SODIUM CHLORIDE 480 MG: 9 INJECTION, SOLUTION INTRAVENOUS at 09:33

## 2023-05-18 RX ADMIN — HEPARIN 500 UNITS: 100 SYRINGE at 10:05

## 2023-05-18 ASSESSMENT — ENCOUNTER SYMPTOMS
EYE DISCHARGE: 0
SORE THROAT: 0
ABDOMINAL PAIN: 0
NAUSEA: 1
TROUBLE SWALLOWING: 0
CONSTIPATION: 0
SHORTNESS OF BREATH: 1
COUGH: 0
WHEEZING: 0
VOMITING: 0
EYE ITCHING: 0
DIARRHEA: 0

## 2023-05-18 NOTE — PROGRESS NOTES
Port accessed, flushing without difficulty, no blood return noted, okay to proceed with treatment today per [facility] [employer] [facility].     Electronically signed by Liborio Arnold RN on 5/18/2023 at 10:04 AM

## 2023-05-23 RX ORDER — DICYCLOMINE HCL 20 MG
TABLET ORAL
Qty: 180 TABLET | Refills: 3 | Status: SHIPPED | OUTPATIENT
Start: 2023-05-23

## 2023-06-19 RX ORDER — HYDROXYZINE HYDROCHLORIDE 25 MG/1
25 TABLET, FILM COATED ORAL EVERY 8 HOURS PRN
Qty: 270 TABLET | Refills: 0 | Status: SHIPPED | OUTPATIENT
Start: 2023-06-19 | End: 2023-09-17

## 2023-06-21 NOTE — PROGRESS NOTES
Allergies: No Known Allergies  Social History:    Social History     Tobacco Use    Smoking status: Former    Smokeless tobacco: Never   Substance Use Topics    Alcohol use: No    Drug use: No     Family History:   Family History   Problem Relation Age of Onset    Heart Attack Brother          age 78 of [place]     Subjective   Review of Systems   Constitutional:  Positive for fatigue (Mild, stable). Negative for fever. No night sweats   HENT:  Negative for dental problem, hearing loss, mouth sores, nosebleeds, sore throat and trouble swallowing. Eyes:  Negative for discharge and itching. Respiratory:  Positive for shortness of breath (Exertional, chronic and mild). Negative for cough and wheezing. Cardiovascular:  Negative for chest pain, palpitations and leg swelling. Gastrointestinal:  Positive for nausea (on occasion). Negative for abdominal pain, constipation, diarrhea and vomiting. Endocrine: Negative for cold intolerance and heat intolerance. Genitourinary:  Negative for dysuria, frequency, hematuria and urgency. Musculoskeletal:  Negative for arthralgias, joint swelling and myalgias. Skin:  Negative for pallor and rash. Generalized pruritus, mild - controlled at present   Allergic/Immunologic: Negative for environmental allergies and immunocompromised state. Neurological:  Negative for seizures, syncope and numbness. Hematological:  Negative for adenopathy. Does not bruise/bleed easily. Psychiatric/Behavioral:  Negative for agitation, behavioral problems and confusion. Objective   Vitals:    23 0802   BP: 136/64   Pulse: 66   SpO2: 96%     Wt Readings from Last 3 Encounters:   23 182 lb 4.8 oz (82.7 kg)   23 184 lb 6.4 oz (83.6 kg)   23 185 lb 12.8 oz (84.3 kg)     PHYSICAL EXAM:  Physical Exam  Vitals reviewed. Constitutional:       General: He is not in acute distress. Appearance: He is well-developed.  He is not toxic-appearing or

## 2023-06-22 ENCOUNTER — OFFICE VISIT (OUTPATIENT)
Dept: HEMATOLOGY | Age: 83
End: 2023-06-22
Payer: MEDICARE

## 2023-06-22 ENCOUNTER — HOSPITAL ENCOUNTER (OUTPATIENT)
Dept: INFUSION THERAPY | Age: 83
Discharge: HOME | End: 2023-06-22
Payer: MEDICARE

## 2023-06-22 VITALS
HEIGHT: 70 IN | DIASTOLIC BLOOD PRESSURE: 64 MMHG | OXYGEN SATURATION: 96 % | WEIGHT: 182.3 LBS | HEART RATE: 66 BPM | SYSTOLIC BLOOD PRESSURE: 136 MMHG | BODY MASS INDEX: 26.1 KG/M2

## 2023-06-22 DIAGNOSIS — C43.61 MALIGNANT MELANOMA OF RIGHT UPPER EXTREMITY INCLUDING SHOULDER (HCC): Primary | ICD-10-CM

## 2023-06-22 DIAGNOSIS — N28.89 LEFT RENAL MASS: ICD-10-CM

## 2023-06-22 DIAGNOSIS — Z51.12 ENCOUNTER FOR ANTINEOPLASTIC IMMUNOTHERAPY: ICD-10-CM

## 2023-06-22 DIAGNOSIS — C43.9 MALIGNANT MELANOMA, UNSPECIFIED SITE (HCC): ICD-10-CM

## 2023-06-22 DIAGNOSIS — L29.9 PRURITUS: ICD-10-CM

## 2023-06-22 DIAGNOSIS — R11.0 NAUSEA: ICD-10-CM

## 2023-06-22 DIAGNOSIS — E03.2 HYPOTHYROIDISM DUE TO MEDICATION: ICD-10-CM

## 2023-06-22 DIAGNOSIS — Z71.89 CARE PLAN DISCUSSED WITH PATIENT: ICD-10-CM

## 2023-06-22 LAB
BASOPHILS # BLD: 0.04 K/UL (ref 0.01–0.08)
BASOPHILS NFR BLD: 0.5 % (ref 0.1–1.2)
EOSINOPHIL # BLD: 0.47 K/UL (ref 0.04–0.54)
EOSINOPHIL NFR BLD: 5.5 % (ref 0.7–7)
ERYTHROCYTE [DISTWIDTH] IN BLOOD BY AUTOMATED COUNT: 12.5 % (ref 11.6–14.4)
HCT VFR BLD AUTO: 43.2 % (ref 40.1–51)
HGB BLD-MCNC: 14.4 G/DL (ref 13.7–17.5)
LYMPHOCYTES # BLD: 2.62 K/UL (ref 1.18–3.74)
LYMPHOCYTES NFR BLD: 30.9 % (ref 19.3–53.1)
MCH RBC QN AUTO: 30.8 PG (ref 25.7–32.2)
MCHC RBC AUTO-ENTMCNC: 33.3 G/DL (ref 32.3–36.5)
MCV RBC AUTO: 92.3 FL (ref 79–92.2)
MONOCYTES # BLD: 0.77 K/UL (ref 0.24–0.82)
MONOCYTES NFR BLD: 9.1 % (ref 4.7–12.5)
NEUTROPHILS # BLD: 4.57 K/UL (ref 1.56–6.13)
NEUTS SEG NFR BLD: 53.9 % (ref 34–71.1)
PLATELET # BLD AUTO: 189 K/UL (ref 163–337)
PMV BLD AUTO: 9.8 FL (ref 7.4–10.4)
RBC # BLD AUTO: 4.68 M/UL (ref 4.63–6.08)
WBC # BLD AUTO: 8.48 K/UL (ref 4.23–9.07)

## 2023-06-22 PROCEDURE — 3075F SYST BP GE 130 - 139MM HG: CPT | Performed by: NURSE PRACTITIONER

## 2023-06-22 PROCEDURE — G8417 CALC BMI ABV UP PARAM F/U: HCPCS | Performed by: NURSE PRACTITIONER

## 2023-06-22 PROCEDURE — 36415 COLL VENOUS BLD VENIPUNCTURE: CPT

## 2023-06-22 PROCEDURE — 1036F TOBACCO NON-USER: CPT | Performed by: NURSE PRACTITIONER

## 2023-06-22 PROCEDURE — 3078F DIAST BP <80 MM HG: CPT | Performed by: NURSE PRACTITIONER

## 2023-06-22 PROCEDURE — 99213 OFFICE O/P EST LOW 20 MIN: CPT | Performed by: NURSE PRACTITIONER

## 2023-06-22 PROCEDURE — 99212 OFFICE O/P EST SF 10 MIN: CPT

## 2023-06-22 PROCEDURE — 85025 COMPLETE CBC W/AUTO DIFF WBC: CPT

## 2023-06-22 PROCEDURE — G8427 DOCREV CUR MEDS BY ELIG CLIN: HCPCS | Performed by: NURSE PRACTITIONER

## 2023-06-22 PROCEDURE — 1123F ACP DISCUSS/DSCN MKR DOCD: CPT | Performed by: NURSE PRACTITIONER

## 2023-06-22 ASSESSMENT — ENCOUNTER SYMPTOMS
TROUBLE SWALLOWING: 0
VOMITING: 0
SHORTNESS OF BREATH: 1
COUGH: 0
SORE THROAT: 0
EYE DISCHARGE: 0
WHEEZING: 0
CONSTIPATION: 0
ABDOMINAL PAIN: 0
DIARRHEA: 0
NAUSEA: 1
EYE ITCHING: 0

## 2023-06-23 ENCOUNTER — HOSPITAL ENCOUNTER (OUTPATIENT)
Dept: INFUSION THERAPY | Age: 83
Discharge: HOME | End: 2023-06-23
Payer: MEDICARE

## 2023-06-23 VITALS
SYSTOLIC BLOOD PRESSURE: 137 MMHG | DIASTOLIC BLOOD PRESSURE: 65 MMHG | BODY MASS INDEX: 26.3 KG/M2 | HEART RATE: 59 BPM | TEMPERATURE: 98.5 F | HEIGHT: 70 IN | RESPIRATION RATE: 18 BRPM | WEIGHT: 183.7 LBS | OXYGEN SATURATION: 96 %

## 2023-06-23 DIAGNOSIS — C79.51 MALIGNANT NEOPLASM METASTATIC TO BONE (HCC): ICD-10-CM

## 2023-06-23 DIAGNOSIS — Z95.828 PORT-A-CATH IN PLACE: ICD-10-CM

## 2023-06-23 DIAGNOSIS — C43.61 MALIGNANT MELANOMA OF RIGHT UPPER EXTREMITY INCLUDING SHOULDER (HCC): ICD-10-CM

## 2023-06-23 DIAGNOSIS — R53.83 FATIGUE DUE TO TREATMENT: ICD-10-CM

## 2023-06-23 DIAGNOSIS — R53.83 FATIGUE DUE TO TREATMENT: Primary | ICD-10-CM

## 2023-06-23 DIAGNOSIS — C43.9 MALIGNANT MELANOMA, UNSPECIFIED SITE (HCC): Primary | ICD-10-CM

## 2023-06-23 DIAGNOSIS — C43.61 MALIGNANT MELANOMA OF RIGHT UPPER EXTREMITY INCLUDING SHOULDER (HCC): Primary | ICD-10-CM

## 2023-06-23 DIAGNOSIS — E03.2 HYPOTHYROIDISM DUE TO MEDICATION: ICD-10-CM

## 2023-06-23 LAB
ALBUMIN SERPL-MCNC: 3.9 G/DL (ref 3.5–5.2)
ALP SERPL-CCNC: 50 U/L (ref 40–130)
ALT SERPL-CCNC: 19 U/L (ref 21–72)
ANION GAP SERPL CALCULATED.3IONS-SCNC: 10 MMOL/L (ref 7–19)
AST SERPL-CCNC: 25 U/L (ref 17–59)
BILIRUB SERPL-MCNC: 0.9 MG/DL (ref 0.2–1.3)
BUN SERPL-MCNC: 15 MG/DL (ref 9–20)
CALCIUM SERPL-MCNC: 8.3 MG/DL (ref 8.4–10.2)
CHLORIDE SERPL-SCNC: 99 MMOL/L (ref 98–111)
CO2 SERPL-SCNC: 27 MMOL/L (ref 22–29)
CORTIS AM PEAK SERPL-MCNC: 7.3 UG/DL (ref 6.2–19.4)
CREAT SERPL-MCNC: 1 MG/DL (ref 0.6–1.2)
ERYTHROCYTE [DISTWIDTH] IN BLOOD BY AUTOMATED COUNT: 12.3 % (ref 11.6–14.4)
GLUCOSE SERPL-MCNC: 84 MG/DL (ref 74–106)
HCT VFR BLD AUTO: 42 % (ref 40.1–51)
HGB BLD-MCNC: 13.9 G/DL (ref 13.7–17.5)
LYMPHOCYTES # BLD: 2.5 K/UL (ref 1.18–3.74)
LYMPHOCYTES NFR BLD: 31.4 % (ref 19.3–53.1)
MAGNESIUM SERPL-MCNC: 2 MG/DL (ref 1.6–2.3)
MCH RBC QN AUTO: 30.6 PG (ref 25.7–32.2)
MCHC RBC AUTO-ENTMCNC: 33.1 G/DL (ref 32.3–36.5)
MCV RBC AUTO: 92.5 FL (ref 79–92.2)
MONOCYTES # BLD: 0.69 K/UL (ref 0.24–0.82)
MONOCYTES NFR BLD: 8.7 % (ref 4.7–12.5)
NEUTROPHILS # BLD: 4.25 K/UL (ref 1.56–6.13)
NEUTS SEG NFR BLD: 53.5 % (ref 34–71.1)
PHOSPHATE SERPL-MCNC: 3.9 MG/DL (ref 2.5–4.5)
PLATELET # BLD AUTO: 240 K/UL (ref 163–337)
PMV BLD AUTO: 9.3 FL (ref 7.4–10.4)
POTASSIUM SERPL-SCNC: 4.4 MMOL/L (ref 3.5–5.1)
PROT SERPL-MCNC: 6.8 G/DL (ref 6.3–8.2)
RBC # BLD AUTO: 4.54 M/UL (ref 4.63–6.08)
SODIUM SERPL-SCNC: 136 MMOL/L (ref 137–145)
TSH SERPL DL<=0.005 MIU/L-ACNC: 5.19 UIU/ML (ref 0.27–4.2)
WBC # BLD AUTO: 7.95 K/UL (ref 4.23–9.07)

## 2023-06-23 PROCEDURE — 96372 THER/PROPH/DIAG INJ SC/IM: CPT

## 2023-06-23 PROCEDURE — 85025 COMPLETE CBC W/AUTO DIFF WBC: CPT

## 2023-06-23 PROCEDURE — 2580000003 HC RX 258: Performed by: NURSE PRACTITIONER

## 2023-06-23 PROCEDURE — 80053 COMPREHEN METABOLIC PANEL: CPT

## 2023-06-23 PROCEDURE — 6360000002 HC RX W HCPCS: Performed by: NURSE PRACTITIONER

## 2023-06-23 PROCEDURE — 96413 CHEMO IV INFUSION 1 HR: CPT

## 2023-06-23 PROCEDURE — 84100 ASSAY OF PHOSPHORUS: CPT

## 2023-06-23 PROCEDURE — 83735 ASSAY OF MAGNESIUM: CPT

## 2023-06-23 RX ORDER — HEPARIN SODIUM 100 [USP'U]/ML
500 INJECTION, SOLUTION INTRAVENOUS PRN
Status: DISCONTINUED | OUTPATIENT
Start: 2023-06-23 | End: 2023-06-25 | Stop reason: HOSPADM

## 2023-06-23 RX ORDER — SODIUM CHLORIDE 0.9 % (FLUSH) 0.9 %
5 SYRINGE (ML) INJECTION PRN
Status: CANCELLED | OUTPATIENT
Start: 2023-06-23

## 2023-06-23 RX ORDER — DIPHENHYDRAMINE HYDROCHLORIDE 50 MG/ML
50 INJECTION INTRAMUSCULAR; INTRAVENOUS PRN
Status: CANCELLED | OUTPATIENT
Start: 2023-06-23

## 2023-06-23 RX ORDER — EPINEPHRINE 1 MG/ML
0.3 INJECTION, SOLUTION, CONCENTRATE INTRAVENOUS PRN
Status: CANCELLED | OUTPATIENT
Start: 2023-06-23

## 2023-06-23 RX ORDER — SODIUM CHLORIDE 0.9 % (FLUSH) 0.9 %
10 SYRINGE (ML) INJECTION PRN
Status: CANCELLED | OUTPATIENT
Start: 2023-06-23

## 2023-06-23 RX ORDER — HEPARIN SODIUM (PORCINE) LOCK FLUSH IV SOLN 100 UNIT/ML 100 UNIT/ML
500 SOLUTION INTRAVENOUS PRN
Status: CANCELLED | OUTPATIENT
Start: 2023-06-23

## 2023-06-23 RX ORDER — SODIUM CHLORIDE 0.9 % (FLUSH) 0.9 %
10 SYRINGE (ML) INJECTION PRN
Status: DISCONTINUED | OUTPATIENT
Start: 2023-06-23 | End: 2023-06-24 | Stop reason: HOSPADM

## 2023-06-23 RX ADMIN — SODIUM CHLORIDE, PRESERVATIVE FREE 10 ML: 5 INJECTION INTRAVENOUS at 11:21

## 2023-06-23 RX ADMIN — HEPARIN 500 UNITS: 100 SYRINGE at 11:21

## 2023-06-23 RX ADMIN — DENOSUMAB 120 MG: 120 INJECTION SUBCUTANEOUS at 11:21

## 2023-06-23 RX ADMIN — SODIUM CHLORIDE 480 MG: 9 INJECTION, SOLUTION INTRAVENOUS at 11:19

## 2023-06-23 NOTE — PROGRESS NOTES
Port accessed, flushing without difficulty, no blood return noted, okay to proceed with treatment today per [facility] [employer] [facility].     Electronically signed by Sussy Pineda RN on 6/23/2023 at 11:25 AM

## 2023-06-30 NOTE — ED PROVIDER NOTES
Initiate Treatment: Mupirocin No family history on file. SOCIAL HISTORY       Social History     Social History    Marital status:      Spouse name: N/A    Number of children: N/A    Years of education: N/A     Social History Main Topics    Smoking status: Not on file    Smokeless tobacco: Not on file    Alcohol use Not on file    Drug use: Unknown    Sexual activity: Not on file     Other Topics Concern    Not on file     Social History Narrative    No narrative on file       SCREENINGS             PHYSICAL EXAM    (up to 7 for level 4, 8 or more for level 5)     ED Triage Vitals [07/03/18 1226]   BP Temp Temp Source Pulse Resp SpO2 Height Weight   121/61 98.7 °F (37.1 °C) Oral 78 16 92 % 5' 11\" (1.803 m) 185 lb (83.9 kg)       Physical Exam   Constitutional: He is oriented to person, place, and time. He appears well-developed and well-nourished. No distress. HENT:   Mouth/Throat: Oropharynx is clear and moist.   Neck: Normal range of motion. Neck supple. Cardiovascular: Normal rate, regular rhythm and normal heart sounds. Pulmonary/Chest: Effort normal and breath sounds normal.   Abdominal: Soft. There is tenderness (Tmod RLQ, Tmod upper abdomen). There is no rebound and no guarding. Musculoskeletal: He exhibits no edema. Neurological: He is alert and oriented to person, place, and time. Skin: Skin is warm and dry. He is not diaphoretic. Psychiatric: He has a normal mood and affect. Nursing note and vitals reviewed. DIAGNOSTIC RESULTS     EKG: All EKG's are interpreted by the Emergency Department Physician who either signs or Co-signs this chart in the absence of a cardiologist.    EKG: sinus, VR 66, ?  Old anterior    RADIOLOGY:   Non-plain film images such as CT, Ultrasound and MRI are read by the radiologist. Plain radiographic images are visualized and preliminarily interpreted by the emergency physician with the below findings:        Interpretation per the Radiologist below, if available at Detail Level: Zone Render In Strict Bullet Format?: No Continue Regimen: Clindamycin

## 2023-07-10 DIAGNOSIS — E03.9 ACQUIRED HYPOTHYROIDISM: ICD-10-CM

## 2023-07-10 RX ORDER — LEVOTHYROXINE SODIUM 0.1 MG/1
100 TABLET ORAL DAILY
Qty: 90 TABLET | Refills: 1 | Status: SHIPPED | OUTPATIENT
Start: 2023-07-10

## 2023-07-17 DIAGNOSIS — G89.3 CANCER RELATED PAIN: ICD-10-CM

## 2023-07-18 RX ORDER — HYDROCODONE BITARTRATE AND ACETAMINOPHEN 10; 325 MG/1; MG/1
1 TABLET ORAL EVERY 6 HOURS PRN
Qty: 120 TABLET | Refills: 0 | Status: SHIPPED | OUTPATIENT
Start: 2023-07-18 | End: 2023-08-17

## 2023-07-21 ENCOUNTER — HOSPITAL ENCOUNTER (OUTPATIENT)
Dept: INFUSION THERAPY | Age: 83
Discharge: HOME | End: 2023-07-21
Payer: MEDICARE

## 2023-07-21 VITALS
BODY MASS INDEX: 26.31 KG/M2 | WEIGHT: 183.8 LBS | HEIGHT: 70 IN | HEART RATE: 63 BPM | DIASTOLIC BLOOD PRESSURE: 70 MMHG | SYSTOLIC BLOOD PRESSURE: 145 MMHG | OXYGEN SATURATION: 96 % | RESPIRATION RATE: 18 BRPM | TEMPERATURE: 98.2 F

## 2023-07-21 DIAGNOSIS — Z51.12 ENCOUNTER FOR ANTINEOPLASTIC IMMUNOTHERAPY: ICD-10-CM

## 2023-07-21 DIAGNOSIS — C43.61 MALIGNANT MELANOMA OF RIGHT UPPER EXTREMITY INCLUDING SHOULDER (HCC): Primary | ICD-10-CM

## 2023-07-21 DIAGNOSIS — R53.83 FATIGUE DUE TO TREATMENT: ICD-10-CM

## 2023-07-21 DIAGNOSIS — C79.51 MALIGNANT NEOPLASM METASTATIC TO BONE (HCC): ICD-10-CM

## 2023-07-21 DIAGNOSIS — Z95.828 PORT-A-CATH IN PLACE: ICD-10-CM

## 2023-07-21 DIAGNOSIS — C43.61 MALIGNANT MELANOMA OF RIGHT UPPER EXTREMITY INCLUDING SHOULDER (HCC): ICD-10-CM

## 2023-07-21 DIAGNOSIS — C43.9 MALIGNANT MELANOMA, UNSPECIFIED SITE (HCC): ICD-10-CM

## 2023-07-21 LAB
ALBUMIN SERPL-MCNC: 3.9 G/DL (ref 3.5–5.2)
ALP SERPL-CCNC: 55 U/L (ref 40–130)
ALT SERPL-CCNC: 18 U/L (ref 21–72)
ANION GAP SERPL CALCULATED.3IONS-SCNC: 8 MMOL/L (ref 7–19)
AST SERPL-CCNC: 29 U/L (ref 17–59)
BILIRUB SERPL-MCNC: 0.9 MG/DL (ref 0.2–1.3)
BUN SERPL-MCNC: 15 MG/DL (ref 9–20)
CALCIUM SERPL-MCNC: 8.2 MG/DL (ref 8.4–10.2)
CHLORIDE SERPL-SCNC: 103 MMOL/L (ref 98–111)
CO2 SERPL-SCNC: 25 MMOL/L (ref 22–29)
CREAT SERPL-MCNC: 0.9 MG/DL (ref 0.6–1.2)
ERYTHROCYTE [DISTWIDTH] IN BLOOD BY AUTOMATED COUNT: 12.5 % (ref 11.6–14.4)
GLOBULIN: 2.8 G/DL
GLUCOSE SERPL-MCNC: 103 MG/DL (ref 74–106)
HCT VFR BLD AUTO: 41 % (ref 40.1–51)
HGB BLD-MCNC: 13.6 G/DL (ref 13.7–17.5)
LYMPHOCYTES # BLD: 2.81 K/UL (ref 1.18–3.74)
LYMPHOCYTES NFR BLD: 33.7 % (ref 19.3–53.1)
MCH RBC QN AUTO: 30.2 PG (ref 25.7–32.2)
MCHC RBC AUTO-ENTMCNC: 33.2 G/DL (ref 32.3–36.5)
MCV RBC AUTO: 91.1 FL (ref 79–92.2)
MONOCYTES # BLD: 0.73 K/UL (ref 0.24–0.82)
MONOCYTES NFR BLD: 8.7 % (ref 4.7–12.5)
NEUTROPHILS # BLD: 4.22 K/UL (ref 1.56–6.13)
NEUTS SEG NFR BLD: 50.6 % (ref 34–71.1)
PLATELET # BLD AUTO: 233 K/UL (ref 163–337)
PMV BLD AUTO: 9.5 FL (ref 7.4–10.4)
POTASSIUM SERPL-SCNC: 4.4 MMOL/L (ref 3.5–5.1)
PROT SERPL-MCNC: 6.8 G/DL (ref 6.3–8.2)
RBC # BLD AUTO: 4.5 M/UL (ref 4.63–6.08)
SODIUM SERPL-SCNC: 136 MMOL/L (ref 137–145)
TSH SERPL DL<=0.005 MIU/L-ACNC: 3.52 UIU/ML (ref 0.27–4.2)
WBC # BLD AUTO: 8.35 K/UL (ref 4.23–9.07)

## 2023-07-21 PROCEDURE — 6360000002 HC RX W HCPCS: Performed by: NURSE PRACTITIONER

## 2023-07-21 PROCEDURE — 2580000003 HC RX 258: Performed by: NURSE PRACTITIONER

## 2023-07-21 PROCEDURE — 36415 COLL VENOUS BLD VENIPUNCTURE: CPT

## 2023-07-21 PROCEDURE — 85025 COMPLETE CBC W/AUTO DIFF WBC: CPT

## 2023-07-21 PROCEDURE — 96413 CHEMO IV INFUSION 1 HR: CPT

## 2023-07-21 PROCEDURE — 96372 THER/PROPH/DIAG INJ SC/IM: CPT

## 2023-07-21 PROCEDURE — 80053 COMPREHEN METABOLIC PANEL: CPT

## 2023-07-21 RX ORDER — SODIUM CHLORIDE 0.9 % (FLUSH) 0.9 %
10 SYRINGE (ML) INJECTION PRN
Status: DISCONTINUED | OUTPATIENT
Start: 2023-07-21 | End: 2023-07-22 | Stop reason: HOSPADM

## 2023-07-21 RX ORDER — EPINEPHRINE 1 MG/ML
0.3 INJECTION, SOLUTION, CONCENTRATE INTRAVENOUS PRN
Status: CANCELLED | OUTPATIENT
Start: 2023-07-21

## 2023-07-21 RX ORDER — SODIUM CHLORIDE 0.9 % (FLUSH) 0.9 %
10 SYRINGE (ML) INJECTION PRN
Status: CANCELLED | OUTPATIENT
Start: 2023-07-21

## 2023-07-21 RX ORDER — HEPARIN SODIUM (PORCINE) LOCK FLUSH IV SOLN 100 UNIT/ML 100 UNIT/ML
500 SOLUTION INTRAVENOUS PRN
Status: CANCELLED | OUTPATIENT
Start: 2023-07-21

## 2023-07-21 RX ORDER — DIPHENHYDRAMINE HYDROCHLORIDE 50 MG/ML
50 INJECTION INTRAMUSCULAR; INTRAVENOUS PRN
Status: CANCELLED | OUTPATIENT
Start: 2023-07-21

## 2023-07-21 RX ORDER — HEPARIN 100 UNIT/ML
500 SYRINGE INTRAVENOUS PRN
Status: DISCONTINUED | OUTPATIENT
Start: 2023-07-21 | End: 2023-07-23 | Stop reason: HOSPADM

## 2023-07-21 RX ORDER — SODIUM CHLORIDE 0.9 % (FLUSH) 0.9 %
5 SYRINGE (ML) INJECTION PRN
Status: CANCELLED | OUTPATIENT
Start: 2023-07-21

## 2023-07-21 RX ADMIN — SODIUM CHLORIDE, PRESERVATIVE FREE 10 ML: 5 INJECTION INTRAVENOUS at 14:47

## 2023-07-21 RX ADMIN — SODIUM CHLORIDE 480 MG: 9 INJECTION, SOLUTION INTRAVENOUS at 14:13

## 2023-07-21 RX ADMIN — HEPARIN 500 UNITS: 100 SYRINGE at 14:47

## 2023-07-21 RX ADMIN — DENOSUMAB 120 MG: 120 INJECTION SUBCUTANEOUS at 14:46

## 2023-08-17 ENCOUNTER — TELEPHONE (OUTPATIENT)
Dept: HEMATOLOGY | Age: 83
End: 2023-08-17

## 2023-08-17 NOTE — PROGRESS NOTES
airways are clear. No consolidation or pleural effusion. Mild bibasilar atelectasis. Moderate centrilobular and paraseptal emphysema. The groundglass nodular opacities in the RIGHT lung on the prior study have resolved. No suspicious pulmonary nodule. No enlarged axillary, supraclavicular, mediastinal, or hilar lymph nodes. No central pulmonary artery filling defect. Thoracic aorta is nonaneurysmal and contains atherosclerotic calcification. Moderate coronary calcification. Peripherally calcified 19 mm RIGHT thyroid nodule. No acute chest wall soft tissue abnormality. CT Abd/Pelvis W Contrast 4/13/2023 at hospitals:   A stable CT scan of the abdomen and pelvis. No change since the previous study on 11/14/2022. No finding to suggest a neoplastic process or metastatic disease. Stable adrenal nodules. Moderate lobulation and renal contour bilaterally noted. Well-defined   sharply marginated low-density nodules in both kidneys are stable. The largest nodule in the right kidney measures 2.8 cm. CT density suggests a cyst. Large partly exophytic nodule from the medial aspect of the mid left kidney measures 2.1 cm laterally in the lower pole of the left kidney measures 2.3 cm in It has not changed in size or shape since the previous study. .  Diverticulosis of the distal colon. Significantly enlarged and lobulated prostate. No change. Moderate thickening of the walls of the incompletely distended urinary bladder which may partly be due to incomplete distention. There is a probability of chronic partial outlet obstruction due to enlarged prostate. No change. Continue Monthly Opdivo and X-Geva.   Repeat CT chest, abdomen/pelvis prior to his next appointment with me in 2 months- follow- up results    hypothyroidism due to medication  TSH 3.52 on 7/21/2023  Continue Synthroid to 100 mcg po daily     Left renal mass  2.2 cm left renal mass stable on abdominal/pelvic CT dated 6/4/2020, 10/22/2020 and 4/1/2021,

## 2023-08-18 ENCOUNTER — HOSPITAL ENCOUNTER (OUTPATIENT)
Dept: INFUSION THERAPY | Age: 83
Discharge: HOME | End: 2023-08-18
Payer: MEDICARE

## 2023-08-18 ENCOUNTER — OFFICE VISIT (OUTPATIENT)
Dept: HEMATOLOGY | Age: 83
End: 2023-08-18
Payer: MEDICARE

## 2023-08-18 VITALS
TEMPERATURE: 98 F | WEIGHT: 183.2 LBS | RESPIRATION RATE: 18 BRPM | HEART RATE: 59 BPM | BODY MASS INDEX: 26.23 KG/M2 | HEIGHT: 70 IN | DIASTOLIC BLOOD PRESSURE: 71 MMHG | SYSTOLIC BLOOD PRESSURE: 144 MMHG | OXYGEN SATURATION: 94 %

## 2023-08-18 DIAGNOSIS — L29.9 PRURITUS: ICD-10-CM

## 2023-08-18 DIAGNOSIS — Z95.828 PORT-A-CATH IN PLACE: ICD-10-CM

## 2023-08-18 DIAGNOSIS — Z71.89 CARE PLAN DISCUSSED WITH PATIENT: ICD-10-CM

## 2023-08-18 DIAGNOSIS — C79.51 MALIGNANT NEOPLASM METASTATIC TO BONE (HCC): ICD-10-CM

## 2023-08-18 DIAGNOSIS — C43.61 MALIGNANT MELANOMA OF RIGHT UPPER EXTREMITY INCLUDING SHOULDER (HCC): Primary | ICD-10-CM

## 2023-08-18 DIAGNOSIS — E03.9 ACQUIRED HYPOTHYROIDISM: Primary | ICD-10-CM

## 2023-08-18 DIAGNOSIS — N28.89 LEFT RENAL MASS: ICD-10-CM

## 2023-08-18 DIAGNOSIS — G89.3 CANCER RELATED PAIN: ICD-10-CM

## 2023-08-18 DIAGNOSIS — C43.61 MALIGNANT MELANOMA OF RIGHT UPPER EXTREMITY INCLUDING SHOULDER (HCC): ICD-10-CM

## 2023-08-18 DIAGNOSIS — R11.0 NAUSEA: ICD-10-CM

## 2023-08-18 DIAGNOSIS — I87.1 SUPERIOR VENA CAVA STENOSIS: ICD-10-CM

## 2023-08-18 DIAGNOSIS — C43.9 MALIGNANT MELANOMA, UNSPECIFIED SITE (HCC): Primary | ICD-10-CM

## 2023-08-18 DIAGNOSIS — G89.3 CANCER ASSOCIATED PAIN: Primary | ICD-10-CM

## 2023-08-18 DIAGNOSIS — E03.2 HYPOTHYROIDISM DUE TO MEDICATION: ICD-10-CM

## 2023-08-18 DIAGNOSIS — Z51.12 ENCOUNTER FOR ANTINEOPLASTIC IMMUNOTHERAPY: ICD-10-CM

## 2023-08-18 LAB
ALBUMIN SERPL-MCNC: 4 G/DL (ref 3.5–5.2)
ALP SERPL-CCNC: 47 U/L (ref 40–130)
ALT SERPL-CCNC: 17 U/L (ref 21–72)
ANION GAP SERPL CALCULATED.3IONS-SCNC: 9 MMOL/L (ref 7–19)
AST SERPL-CCNC: 24 U/L (ref 17–59)
BILIRUB SERPL-MCNC: 1.6 MG/DL (ref 0.2–1.3)
BUN SERPL-MCNC: 13 MG/DL (ref 9–20)
CALCIUM SERPL-MCNC: 8.5 MG/DL (ref 8.4–10.2)
CHLORIDE SERPL-SCNC: 103 MMOL/L (ref 98–111)
CO2 SERPL-SCNC: 26 MMOL/L (ref 22–29)
CREAT SERPL-MCNC: 0.9 MG/DL (ref 0.6–1.2)
ERYTHROCYTE [DISTWIDTH] IN BLOOD BY AUTOMATED COUNT: 12.5 % (ref 11.6–14.4)
GLOBULIN: 3 G/DL
GLUCOSE SERPL-MCNC: 106 MG/DL (ref 74–106)
HCT VFR BLD AUTO: 40.7 % (ref 40.1–51)
HGB BLD-MCNC: 14 G/DL (ref 13.7–17.5)
LYMPHOCYTES # BLD: 2.17 K/UL (ref 1.18–3.74)
LYMPHOCYTES NFR BLD: 28.5 % (ref 19.3–53.1)
MCH RBC QN AUTO: 30.6 PG (ref 25.7–32.2)
MCHC RBC AUTO-ENTMCNC: 34.4 G/DL (ref 32.3–36.5)
MCV RBC AUTO: 89.1 FL (ref 79–92.2)
MONOCYTES # BLD: 0.67 K/UL (ref 0.24–0.82)
MONOCYTES NFR BLD: 8.8 % (ref 4.7–12.5)
NEUTROPHILS # BLD: 4.31 K/UL (ref 1.56–6.13)
NEUTS SEG NFR BLD: 56.6 % (ref 34–71.1)
PLATELET # BLD AUTO: 243 K/UL (ref 163–337)
PMV BLD AUTO: 9.6 FL (ref 7.4–10.4)
POTASSIUM SERPL-SCNC: 4.1 MMOL/L (ref 3.5–5.1)
PROT SERPL-MCNC: 6.9 G/DL (ref 6.3–8.2)
RBC # BLD AUTO: 4.57 M/UL (ref 4.63–6.08)
SODIUM SERPL-SCNC: 138 MMOL/L (ref 137–145)
WBC # BLD AUTO: 7.61 K/UL (ref 4.23–9.07)

## 2023-08-18 PROCEDURE — 80053 COMPREHEN METABOLIC PANEL: CPT

## 2023-08-18 PROCEDURE — 1123F ACP DISCUSS/DSCN MKR DOCD: CPT | Performed by: NURSE PRACTITIONER

## 2023-08-18 PROCEDURE — 3077F SYST BP >= 140 MM HG: CPT | Performed by: NURSE PRACTITIONER

## 2023-08-18 PROCEDURE — 1036F TOBACCO NON-USER: CPT | Performed by: NURSE PRACTITIONER

## 2023-08-18 PROCEDURE — 96372 THER/PROPH/DIAG INJ SC/IM: CPT

## 2023-08-18 PROCEDURE — 99214 OFFICE O/P EST MOD 30 MIN: CPT | Performed by: NURSE PRACTITIONER

## 2023-08-18 PROCEDURE — 85025 COMPLETE CBC W/AUTO DIFF WBC: CPT

## 2023-08-18 PROCEDURE — 2580000003 HC RX 258: Performed by: NURSE PRACTITIONER

## 2023-08-18 PROCEDURE — G8427 DOCREV CUR MEDS BY ELIG CLIN: HCPCS | Performed by: NURSE PRACTITIONER

## 2023-08-18 PROCEDURE — G8417 CALC BMI ABV UP PARAM F/U: HCPCS | Performed by: NURSE PRACTITIONER

## 2023-08-18 PROCEDURE — 96413 CHEMO IV INFUSION 1 HR: CPT

## 2023-08-18 PROCEDURE — 36415 COLL VENOUS BLD VENIPUNCTURE: CPT

## 2023-08-18 PROCEDURE — 3078F DIAST BP <80 MM HG: CPT | Performed by: NURSE PRACTITIONER

## 2023-08-18 PROCEDURE — 6360000002 HC RX W HCPCS: Performed by: NURSE PRACTITIONER

## 2023-08-18 RX ORDER — DIPHENHYDRAMINE HYDROCHLORIDE 50 MG/ML
50 INJECTION INTRAMUSCULAR; INTRAVENOUS PRN
Status: CANCELLED | OUTPATIENT
Start: 2023-08-18

## 2023-08-18 RX ORDER — HEPARIN SODIUM (PORCINE) LOCK FLUSH IV SOLN 100 UNIT/ML 100 UNIT/ML
500 SOLUTION INTRAVENOUS PRN
Status: CANCELLED | OUTPATIENT
Start: 2023-08-18

## 2023-08-18 RX ORDER — EPINEPHRINE 1 MG/ML
0.3 INJECTION, SOLUTION, CONCENTRATE INTRAVENOUS PRN
Status: CANCELLED | OUTPATIENT
Start: 2023-08-18

## 2023-08-18 RX ORDER — HEPARIN 100 UNIT/ML
500 SYRINGE INTRAVENOUS PRN
Status: DISCONTINUED | OUTPATIENT
Start: 2023-08-18 | End: 2023-08-20 | Stop reason: HOSPADM

## 2023-08-18 RX ORDER — HYDROCODONE BITARTRATE AND ACETAMINOPHEN 10; 325 MG/1; MG/1
1 TABLET ORAL EVERY 6 HOURS PRN
COMMUNITY
End: 2023-08-18 | Stop reason: SDUPTHER

## 2023-08-18 RX ORDER — HYDROCODONE BITARTRATE AND ACETAMINOPHEN 10; 325 MG/1; MG/1
1 TABLET ORAL EVERY 6 HOURS PRN
Qty: 120 TABLET | Refills: 0 | Status: SHIPPED | OUTPATIENT
Start: 2023-08-18 | End: 2023-09-17

## 2023-08-18 RX ORDER — SODIUM CHLORIDE 0.9 % (FLUSH) 0.9 %
10 SYRINGE (ML) INJECTION PRN
Status: CANCELLED | OUTPATIENT
Start: 2023-08-18

## 2023-08-18 RX ORDER — SODIUM CHLORIDE 0.9 % (FLUSH) 0.9 %
5 SYRINGE (ML) INJECTION PRN
Status: CANCELLED | OUTPATIENT
Start: 2023-08-18

## 2023-08-18 RX ORDER — SODIUM CHLORIDE 0.9 % (FLUSH) 0.9 %
10 SYRINGE (ML) INJECTION PRN
Status: DISCONTINUED | OUTPATIENT
Start: 2023-08-18 | End: 2023-08-19 | Stop reason: HOSPADM

## 2023-08-18 RX ADMIN — SODIUM CHLORIDE, PRESERVATIVE FREE 10 ML: 5 INJECTION INTRAVENOUS at 09:45

## 2023-08-18 RX ADMIN — DENOSUMAB 120 MG: 120 INJECTION SUBCUTANEOUS at 09:44

## 2023-08-18 RX ADMIN — SODIUM CHLORIDE 480 MG: 9 INJECTION, SOLUTION INTRAVENOUS at 09:11

## 2023-08-18 RX ADMIN — HEPARIN 500 UNITS: 100 SYRINGE at 09:45

## 2023-08-18 ASSESSMENT — ENCOUNTER SYMPTOMS
SHORTNESS OF BREATH: 1
VOMITING: 0
BACK PAIN: 1
COUGH: 0
ABDOMINAL PAIN: 0
CONSTIPATION: 0
EYE DISCHARGE: 0
DIARRHEA: 0
NAUSEA: 1
TROUBLE SWALLOWING: 0
EYE ITCHING: 0
SORE THROAT: 0
WHEEZING: 0

## 2023-08-21 RX ORDER — HYDROXYZINE HYDROCHLORIDE 25 MG/1
25 TABLET, FILM COATED ORAL EVERY 8 HOURS PRN
Qty: 270 TABLET | Refills: 0 | Status: SHIPPED | OUTPATIENT
Start: 2023-08-21 | End: 2023-11-19

## 2023-09-14 NOTE — PROGRESS NOTES
Take 2 tablets by mouth every 6 hours as needed for Pain      finasteride (PROSCAR) 5 MG tablet Take 1 tablet by mouth      lovastatin (MEVACOR) 40 MG tablet Take 1 tablet by mouth       No current facility-administered medications for this visit. Facility-Administered Medications Ordered in Other Visits   Medication Dose Route Frequency Provider Last Rate Last Admin    denosumab (XGEVA) SC injection 120 mg  120 mg SubCUTAneous Once Junaid Covert, APRN        nivolumab 480 mg in sodium chloride 0.9 % 100 mL IVPB  480 mg IntraVENous Once Junaid Covert, APRN        sodium chloride flush 0.9 % injection 5 mL  5 mL IntraVENous PRN LIAM Cotto        heparin (PF) 100 UNIT/ML injection 500 Units  500 Units IntraCATHeter PRN LIAM Cotto            Allergies: No Known Allergies  Social History:    Social History     Tobacco Use    Smoking status: Former    Smokeless tobacco: Never   Substance Use Topics    Alcohol use: No    Drug use: No     Family History:   Family History   Problem Relation Age of Onset    Heart Attack Brother          age 78 of [place]     Subjective   Review of Systems   Constitutional:  Positive for fatigue (Mild, stable). Negative for fever. No night sweats   HENT:  Negative for dental problem, hearing loss, mouth sores, nosebleeds, sore throat and trouble swallowing. Eyes:  Negative for discharge and itching. Respiratory:  Positive for shortness of breath (Exertional, chronic and mild). Negative for cough and wheezing. Cardiovascular:  Negative for chest pain, palpitations and leg swelling. Gastrointestinal:  Positive for nausea (on occasion). Negative for abdominal pain, constipation, diarrhea and vomiting. Endocrine: Negative for cold intolerance and heat intolerance. Genitourinary:  Negative for dysuria, frequency, hematuria and urgency. Musculoskeletal:  Positive for arthralgias, back pain and myalgias. Negative for joint swelling.    Skin:

## 2023-09-15 ENCOUNTER — HOSPITAL ENCOUNTER (OUTPATIENT)
Dept: INFUSION THERAPY | Age: 83
Discharge: HOME | End: 2023-09-15
Payer: MEDICARE

## 2023-09-15 ENCOUNTER — OFFICE VISIT (OUTPATIENT)
Dept: HEMATOLOGY | Age: 83
End: 2023-09-15
Payer: MEDICARE

## 2023-09-15 VITALS
OXYGEN SATURATION: 97 % | BODY MASS INDEX: 25.94 KG/M2 | HEART RATE: 68 BPM | HEIGHT: 70 IN | TEMPERATURE: 97.9 F | RESPIRATION RATE: 18 BRPM | SYSTOLIC BLOOD PRESSURE: 147 MMHG | WEIGHT: 181.2 LBS | DIASTOLIC BLOOD PRESSURE: 63 MMHG

## 2023-09-15 DIAGNOSIS — C43.61 MALIGNANT MELANOMA OF RIGHT UPPER EXTREMITY INCLUDING SHOULDER (HCC): Primary | ICD-10-CM

## 2023-09-15 DIAGNOSIS — G89.3 CANCER RELATED PAIN: ICD-10-CM

## 2023-09-15 DIAGNOSIS — E03.2 HYPOTHYROIDISM DUE TO MEDICATION: ICD-10-CM

## 2023-09-15 DIAGNOSIS — Z51.12 ENCOUNTER FOR ANTINEOPLASTIC IMMUNOTHERAPY: ICD-10-CM

## 2023-09-15 DIAGNOSIS — C43.9 MALIGNANT MELANOMA, UNSPECIFIED SITE (HCC): ICD-10-CM

## 2023-09-15 DIAGNOSIS — C79.51 MALIGNANT NEOPLASM METASTATIC TO BONE (HCC): ICD-10-CM

## 2023-09-15 DIAGNOSIS — R11.0 NAUSEA: ICD-10-CM

## 2023-09-15 DIAGNOSIS — L29.9 PRURITUS: ICD-10-CM

## 2023-09-15 DIAGNOSIS — I87.1 SUPERIOR VENA CAVA STENOSIS: ICD-10-CM

## 2023-09-15 DIAGNOSIS — Z95.828 PORT-A-CATH IN PLACE: ICD-10-CM

## 2023-09-15 DIAGNOSIS — N28.89 LEFT RENAL MASS: ICD-10-CM

## 2023-09-15 DIAGNOSIS — Z71.89 CARE PLAN DISCUSSED WITH PATIENT: ICD-10-CM

## 2023-09-15 LAB
ALBUMIN SERPL-MCNC: 4 G/DL (ref 3.5–5.2)
ALP SERPL-CCNC: 51 U/L (ref 40–130)
ALT SERPL-CCNC: 22 U/L (ref 21–72)
ANION GAP SERPL CALCULATED.3IONS-SCNC: 14 MMOL/L (ref 7–19)
AST SERPL-CCNC: 28 U/L (ref 17–59)
BASOPHILS # BLD: 0.04 K/UL (ref 0.01–0.08)
BASOPHILS NFR BLD: 0.4 % (ref 0.1–1.2)
BILIRUB SERPL-MCNC: 0.9 MG/DL (ref 0.2–1.3)
BUN SERPL-MCNC: 15 MG/DL (ref 9–20)
CALCIUM SERPL-MCNC: 8.9 MG/DL (ref 8.4–10.2)
CHLORIDE SERPL-SCNC: 100 MMOL/L (ref 98–111)
CO2 SERPL-SCNC: 24 MMOL/L (ref 22–29)
CREAT SERPL-MCNC: 1 MG/DL (ref 0.6–1.2)
EOSINOPHIL # BLD: 0.33 K/UL (ref 0.04–0.54)
EOSINOPHIL NFR BLD: 3.5 % (ref 0.7–7)
ERYTHROCYTE [DISTWIDTH] IN BLOOD BY AUTOMATED COUNT: 12.6 % (ref 11.6–14.4)
GLOBULIN: 2.8 G/DL
GLUCOSE SERPL-MCNC: 112 MG/DL (ref 74–106)
HCT VFR BLD AUTO: 41.9 % (ref 40.1–51)
HGB BLD-MCNC: 14.1 G/DL (ref 13.7–17.5)
LYMPHOCYTES # BLD: 1.98 K/UL (ref 1.18–3.74)
LYMPHOCYTES NFR BLD: 20.8 % (ref 19.3–53.1)
MAGNESIUM SERPL-MCNC: 2.2 MG/DL (ref 1.6–2.3)
MCH RBC QN AUTO: 30.5 PG (ref 25.7–32.2)
MCHC RBC AUTO-ENTMCNC: 33.7 G/DL (ref 32.3–36.5)
MCV RBC AUTO: 90.5 FL (ref 79–92.2)
MONOCYTES # BLD: 0.9 K/UL (ref 0.24–0.82)
MONOCYTES NFR BLD: 9.5 % (ref 4.7–12.5)
NEUTROPHILS # BLD: 6.24 K/UL (ref 1.56–6.13)
NEUTS SEG NFR BLD: 65.6 % (ref 34–71.1)
PHOSPHATE SERPL-MCNC: 2.9 MG/DL (ref 2.5–4.5)
PLATELET # BLD AUTO: 239 K/UL (ref 163–337)
PMV BLD AUTO: 9.4 FL (ref 7.4–10.4)
POTASSIUM SERPL-SCNC: 3.8 MMOL/L (ref 3.5–5.1)
PROT SERPL-MCNC: 6.8 G/DL (ref 6.3–8.2)
RBC # BLD AUTO: 4.63 M/UL (ref 4.63–6.08)
SODIUM SERPL-SCNC: 138 MMOL/L (ref 137–145)
TSH SERPL DL<=0.005 MIU/L-ACNC: 2.47 UIU/ML (ref 0.27–4.2)
WBC # BLD AUTO: 9.51 K/UL (ref 4.23–9.07)

## 2023-09-15 PROCEDURE — 83735 ASSAY OF MAGNESIUM: CPT

## 2023-09-15 PROCEDURE — 85025 COMPLETE CBC W/AUTO DIFF WBC: CPT

## 2023-09-15 PROCEDURE — 1123F ACP DISCUSS/DSCN MKR DOCD: CPT | Performed by: NURSE PRACTITIONER

## 2023-09-15 PROCEDURE — 3077F SYST BP >= 140 MM HG: CPT | Performed by: NURSE PRACTITIONER

## 2023-09-15 PROCEDURE — 96372 THER/PROPH/DIAG INJ SC/IM: CPT

## 2023-09-15 PROCEDURE — 3078F DIAST BP <80 MM HG: CPT | Performed by: NURSE PRACTITIONER

## 2023-09-15 PROCEDURE — 99213 OFFICE O/P EST LOW 20 MIN: CPT | Performed by: NURSE PRACTITIONER

## 2023-09-15 PROCEDURE — 84100 ASSAY OF PHOSPHORUS: CPT

## 2023-09-15 PROCEDURE — 6360000002 HC RX W HCPCS: Performed by: NURSE PRACTITIONER

## 2023-09-15 PROCEDURE — 80053 COMPREHEN METABOLIC PANEL: CPT

## 2023-09-15 PROCEDURE — 36415 COLL VENOUS BLD VENIPUNCTURE: CPT

## 2023-09-15 PROCEDURE — 96413 CHEMO IV INFUSION 1 HR: CPT

## 2023-09-15 PROCEDURE — G8417 CALC BMI ABV UP PARAM F/U: HCPCS | Performed by: NURSE PRACTITIONER

## 2023-09-15 PROCEDURE — G8427 DOCREV CUR MEDS BY ELIG CLIN: HCPCS | Performed by: NURSE PRACTITIONER

## 2023-09-15 PROCEDURE — 1036F TOBACCO NON-USER: CPT | Performed by: NURSE PRACTITIONER

## 2023-09-15 PROCEDURE — 2580000003 HC RX 258: Performed by: NURSE PRACTITIONER

## 2023-09-15 RX ORDER — SODIUM CHLORIDE 0.9 % (FLUSH) 0.9 %
10 SYRINGE (ML) INJECTION PRN
OUTPATIENT
Start: 2023-09-15

## 2023-09-15 RX ORDER — SODIUM CHLORIDE 0.9 % (FLUSH) 0.9 %
5 SYRINGE (ML) INJECTION PRN
Status: CANCELLED | OUTPATIENT
Start: 2023-09-15

## 2023-09-15 RX ORDER — SODIUM CHLORIDE 0.9 % (FLUSH) 0.9 %
5 SYRINGE (ML) INJECTION PRN
Status: DISCONTINUED | OUTPATIENT
Start: 2023-09-15 | End: 2023-09-16 | Stop reason: HOSPADM

## 2023-09-15 RX ORDER — HEPARIN SODIUM (PORCINE) LOCK FLUSH IV SOLN 100 UNIT/ML 100 UNIT/ML
500 SOLUTION INTRAVENOUS PRN
Status: CANCELLED | OUTPATIENT
Start: 2023-09-15

## 2023-09-15 RX ORDER — DIPHENHYDRAMINE HYDROCHLORIDE 50 MG/ML
50 INJECTION INTRAMUSCULAR; INTRAVENOUS PRN
OUTPATIENT
Start: 2023-09-15

## 2023-09-15 RX ORDER — EPINEPHRINE 1 MG/ML
0.3 INJECTION, SOLUTION, CONCENTRATE INTRAVENOUS PRN
OUTPATIENT
Start: 2023-09-15

## 2023-09-15 RX ORDER — HEPARIN 100 UNIT/ML
500 SYRINGE INTRAVENOUS PRN
Status: DISCONTINUED | OUTPATIENT
Start: 2023-09-15 | End: 2023-09-17 | Stop reason: HOSPADM

## 2023-09-15 RX ADMIN — DENOSUMAB 120 MG: 120 INJECTION SUBCUTANEOUS at 09:34

## 2023-09-15 RX ADMIN — SODIUM CHLORIDE, PRESERVATIVE FREE 5 ML: 5 INJECTION INTRAVENOUS at 10:01

## 2023-09-15 RX ADMIN — SODIUM CHLORIDE 480 MG: 9 INJECTION, SOLUTION INTRAVENOUS at 09:31

## 2023-09-15 RX ADMIN — HEPARIN 500 UNITS: 100 SYRINGE at 10:01

## 2023-09-15 ASSESSMENT — ENCOUNTER SYMPTOMS
BACK PAIN: 1
COUGH: 0
NAUSEA: 1
WHEEZING: 0
CONSTIPATION: 0
EYE DISCHARGE: 0
SHORTNESS OF BREATH: 1
DIARRHEA: 0
SORE THROAT: 0
EYE ITCHING: 0
VOMITING: 0
TROUBLE SWALLOWING: 0
ABDOMINAL PAIN: 0

## 2023-09-25 DIAGNOSIS — G89.3 CANCER ASSOCIATED PAIN: ICD-10-CM

## 2023-09-25 RX ORDER — HYDROCODONE BITARTRATE AND ACETAMINOPHEN 10; 325 MG/1; MG/1
1 TABLET ORAL EVERY 6 HOURS PRN
Qty: 120 TABLET | Refills: 0 | Status: SHIPPED | OUTPATIENT
Start: 2023-09-25 | End: 2023-10-25

## 2023-10-06 ENCOUNTER — HOSPITAL ENCOUNTER (OUTPATIENT)
Dept: CT IMAGING | Age: 83
Discharge: HOME | End: 2023-10-06
Payer: MEDICARE

## 2023-10-06 DIAGNOSIS — C43.61 MALIGNANT MELANOMA OF RIGHT UPPER EXTREMITY INCLUDING SHOULDER (HCC): ICD-10-CM

## 2023-10-06 PROCEDURE — 6360000004 HC RX CONTRAST MEDICATION: Performed by: NURSE PRACTITIONER

## 2023-10-06 PROCEDURE — 71260 CT THORAX DX C+: CPT

## 2023-10-06 PROCEDURE — 74177 CT ABD & PELVIS W/CONTRAST: CPT

## 2023-10-06 RX ADMIN — IOPAMIDOL 75 ML: 755 INJECTION, SOLUTION INTRAVENOUS at 12:01

## 2023-10-11 NOTE — PROGRESS NOTES
Progress Note      Pt Name: Brady Del Valle  YOB: 1940  MRN: [id number]    Date of evaluation: 10/13/23     History Obtained From:  patient, electronic medical record    CHIEF COMPLAINT:    Chief Complaint   Patient presents with    Cancer     Malignant melanoma of right upper extremity including shoulder ([address])    Immunotherapy     HISTORY OF PRESENT ILLNESS:  reason for NP visit: Toxicity assessment, continuation of treatment and review of surveillance imaging  Brady Del Valle is a very pleasant 80-year-old gentleman with an unfortunate diagnosis of stage IV metastatic malignant melanoma to right axillary lymph node, liver and bone, made in July, 2018. Marcelino Donato has stable disease, continuing maintenance nivolumab and Xgeva on a monthly basis. He is tolerating treatment with minimal adverse effect including grade 1 fatigue, grade 1 generalized pruritus as well as drug induced hypothyroidism, managed with Synthroid, currently continuing 100 mcg daily. Chronic low back pain and occasional right upper quadrant abdominal pain is managed with Norco 10 mg QID PRN. 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 ECOG grade 1. Surveillance imaging, including CT chest, abdomen and pelvis, are stable and will be delineated in the assessment and plan below. Leeroy's wife, Leonid Brown a patient of [facility], will be undergoing pacemaker placement next week. Brady Del Valle presents for follow-up surveillance visit and toxicity assessment. he requires intenstive monitoring due to the potential to cause serious morbidity or death.      TARGET METASTATIC MALIGNANT MELANOMA SITES:  Right upper extremity original primary site 06/05/14   Right axilla lymph node at presentation 6/5/2014 and 3 axillary nodes at recurrence 7/27/2018 with an SUV of 8.7   Too numerous to count liver metastases   Celiac lymph nodes x 2 with highest SUV of 3   Bony metastatic

## 2023-10-13 ENCOUNTER — HOSPITAL ENCOUNTER (OUTPATIENT)
Dept: INFUSION THERAPY | Age: 83
Discharge: HOME | End: 2023-10-13
Payer: MEDICARE

## 2023-10-13 ENCOUNTER — OFFICE VISIT (OUTPATIENT)
Dept: HEMATOLOGY | Age: 83
End: 2023-10-13
Payer: MEDICARE

## 2023-10-13 VITALS
OXYGEN SATURATION: 96 % | RESPIRATION RATE: 18 BRPM | SYSTOLIC BLOOD PRESSURE: 137 MMHG | HEIGHT: 70 IN | HEART RATE: 64 BPM | WEIGHT: 183.2 LBS | BODY MASS INDEX: 26.23 KG/M2 | DIASTOLIC BLOOD PRESSURE: 66 MMHG | TEMPERATURE: 97.7 F

## 2023-10-13 DIAGNOSIS — I87.1 SUPERIOR VENA CAVA STENOSIS: ICD-10-CM

## 2023-10-13 DIAGNOSIS — Z95.828 PORT-A-CATH IN PLACE: ICD-10-CM

## 2023-10-13 DIAGNOSIS — Z71.89 CARE PLAN DISCUSSED WITH PATIENT: ICD-10-CM

## 2023-10-13 DIAGNOSIS — G89.3 CANCER RELATED PAIN: ICD-10-CM

## 2023-10-13 DIAGNOSIS — C79.51 MALIGNANT NEOPLASM METASTATIC TO BONE (HCC): ICD-10-CM

## 2023-10-13 DIAGNOSIS — N28.89 LEFT RENAL MASS: ICD-10-CM

## 2023-10-13 DIAGNOSIS — E03.2 HYPOTHYROIDISM DUE TO MEDICATION: ICD-10-CM

## 2023-10-13 DIAGNOSIS — Z51.12 ENCOUNTER FOR ANTINEOPLASTIC IMMUNOTHERAPY: ICD-10-CM

## 2023-10-13 DIAGNOSIS — C43.61 MALIGNANT MELANOMA OF RIGHT UPPER EXTREMITY INCLUDING SHOULDER (HCC): Primary | ICD-10-CM

## 2023-10-13 DIAGNOSIS — C43.9 MALIGNANT MELANOMA, UNSPECIFIED SITE (HCC): ICD-10-CM

## 2023-10-13 LAB
ALBUMIN SERPL-MCNC: 3.9 G/DL (ref 3.5–5.2)
ALP SERPL-CCNC: 47 U/L (ref 40–130)
ALT SERPL-CCNC: 21 U/L (ref 21–72)
ANION GAP SERPL CALCULATED.3IONS-SCNC: 6 MMOL/L (ref 7–19)
AST SERPL-CCNC: 26 U/L (ref 17–59)
BASOPHILS # BLD: 0.06 K/UL (ref 0.01–0.08)
BASOPHILS NFR BLD: 0.6 % (ref 0.1–1.2)
BILIRUB SERPL-MCNC: 1.3 MG/DL (ref 0.2–1.3)
BUN SERPL-MCNC: 15 MG/DL (ref 9–20)
CALCIUM SERPL-MCNC: 9.2 MG/DL (ref 8.4–10.2)
CHLORIDE SERPL-SCNC: 104 MMOL/L (ref 98–111)
CO2 SERPL-SCNC: 27 MMOL/L (ref 22–29)
CREAT SERPL-MCNC: 1 MG/DL (ref 0.6–1.2)
EOSINOPHIL # BLD: 0.43 K/UL (ref 0.04–0.54)
EOSINOPHIL NFR BLD: 4.6 % (ref 0.7–7)
ERYTHROCYTE [DISTWIDTH] IN BLOOD BY AUTOMATED COUNT: 12.4 % (ref 11.6–14.4)
GLOBULIN: 2.7 G/DL
GLUCOSE SERPL-MCNC: 108 MG/DL (ref 74–106)
HCT VFR BLD AUTO: 40.9 % (ref 40.1–51)
HGB BLD-MCNC: 14.1 G/DL (ref 13.7–17.5)
LYMPHOCYTES # BLD: 1.99 K/UL (ref 1.18–3.74)
LYMPHOCYTES NFR BLD: 21.2 % (ref 19.3–53.1)
MCH RBC QN AUTO: 30.5 PG (ref 25.7–32.2)
MCHC RBC AUTO-ENTMCNC: 34.5 G/DL (ref 32.3–36.5)
MCV RBC AUTO: 88.5 FL (ref 79–92.2)
MONOCYTES # BLD: 0.9 K/UL (ref 0.24–0.82)
MONOCYTES NFR BLD: 9.6 % (ref 4.7–12.5)
NEUTROPHILS # BLD: 5.99 K/UL (ref 1.56–6.13)
NEUTS SEG NFR BLD: 63.7 % (ref 34–71.1)
PLATELET # BLD AUTO: 223 K/UL (ref 163–337)
PMV BLD AUTO: 9.5 FL (ref 7.4–10.4)
POTASSIUM SERPL-SCNC: 4.2 MMOL/L (ref 3.5–5.1)
PROT SERPL-MCNC: 6.6 G/DL (ref 6.3–8.2)
RBC # BLD AUTO: 4.62 M/UL (ref 4.63–6.08)
SODIUM SERPL-SCNC: 137 MMOL/L (ref 137–145)
WBC # BLD AUTO: 9.4 K/UL (ref 4.23–9.07)

## 2023-10-13 PROCEDURE — G8417 CALC BMI ABV UP PARAM F/U: HCPCS | Performed by: NURSE PRACTITIONER

## 2023-10-13 PROCEDURE — 3078F DIAST BP <80 MM HG: CPT | Performed by: NURSE PRACTITIONER

## 2023-10-13 PROCEDURE — G8427 DOCREV CUR MEDS BY ELIG CLIN: HCPCS | Performed by: NURSE PRACTITIONER

## 2023-10-13 PROCEDURE — 85025 COMPLETE CBC W/AUTO DIFF WBC: CPT

## 2023-10-13 PROCEDURE — 1036F TOBACCO NON-USER: CPT | Performed by: NURSE PRACTITIONER

## 2023-10-13 PROCEDURE — 36415 COLL VENOUS BLD VENIPUNCTURE: CPT

## 2023-10-13 PROCEDURE — 96372 THER/PROPH/DIAG INJ SC/IM: CPT

## 2023-10-13 PROCEDURE — 2580000003 HC RX 258: Performed by: NURSE PRACTITIONER

## 2023-10-13 PROCEDURE — 80053 COMPREHEN METABOLIC PANEL: CPT

## 2023-10-13 PROCEDURE — 3074F SYST BP LT 130 MM HG: CPT | Performed by: NURSE PRACTITIONER

## 2023-10-13 PROCEDURE — 99214 OFFICE O/P EST MOD 30 MIN: CPT | Performed by: NURSE PRACTITIONER

## 2023-10-13 PROCEDURE — 1123F ACP DISCUSS/DSCN MKR DOCD: CPT | Performed by: NURSE PRACTITIONER

## 2023-10-13 PROCEDURE — 96413 CHEMO IV INFUSION 1 HR: CPT

## 2023-10-13 PROCEDURE — G8484 FLU IMMUNIZE NO ADMIN: HCPCS | Performed by: NURSE PRACTITIONER

## 2023-10-13 PROCEDURE — 6360000002 HC RX W HCPCS: Performed by: NURSE PRACTITIONER

## 2023-10-13 RX ORDER — SODIUM CHLORIDE 0.9 % (FLUSH) 0.9 %
10 SYRINGE (ML) INJECTION PRN
Status: CANCELLED | OUTPATIENT
Start: 2023-10-13

## 2023-10-13 RX ORDER — EPINEPHRINE 1 MG/ML
0.3 INJECTION, SOLUTION, CONCENTRATE INTRAVENOUS PRN
Status: CANCELLED | OUTPATIENT
Start: 2023-10-13

## 2023-10-13 RX ORDER — SODIUM CHLORIDE 0.9 % (FLUSH) 0.9 %
5 SYRINGE (ML) INJECTION PRN
Status: CANCELLED | OUTPATIENT
Start: 2023-10-13

## 2023-10-13 RX ORDER — HEPARIN SODIUM (PORCINE) LOCK FLUSH IV SOLN 100 UNIT/ML 100 UNIT/ML
500 SOLUTION INTRAVENOUS PRN
Status: CANCELLED | OUTPATIENT
Start: 2023-10-13

## 2023-10-13 RX ORDER — DIPHENHYDRAMINE HYDROCHLORIDE 50 MG/ML
50 INJECTION INTRAMUSCULAR; INTRAVENOUS PRN
Status: CANCELLED | OUTPATIENT
Start: 2023-10-13

## 2023-10-13 RX ORDER — SODIUM CHLORIDE 0.9 % (FLUSH) 0.9 %
10 SYRINGE (ML) INJECTION PRN
Status: DISCONTINUED | OUTPATIENT
Start: 2023-10-13 | End: 2023-10-14 | Stop reason: HOSPADM

## 2023-10-13 RX ORDER — HEPARIN 100 UNIT/ML
500 SYRINGE INTRAVENOUS PRN
Status: DISCONTINUED | OUTPATIENT
Start: 2023-10-13 | End: 2023-10-15 | Stop reason: HOSPADM

## 2023-10-13 RX ADMIN — SODIUM CHLORIDE 480 MG: 9 INJECTION, SOLUTION INTRAVENOUS at 09:23

## 2023-10-13 RX ADMIN — HEPARIN 500 UNITS: 100 SYRINGE at 10:00

## 2023-10-13 RX ADMIN — SODIUM CHLORIDE, PRESERVATIVE FREE 10 ML: 5 INJECTION INTRAVENOUS at 10:00

## 2023-10-13 RX ADMIN — DENOSUMAB 120 MG: 120 INJECTION SUBCUTANEOUS at 09:26

## 2023-10-15 ASSESSMENT — ENCOUNTER SYMPTOMS
EYE ITCHING: 0
ABDOMINAL PAIN: 0
NAUSEA: 0
EYE DISCHARGE: 0
SORE THROAT: 0
BACK PAIN: 1
WHEEZING: 0
COUGH: 0
CONSTIPATION: 0
DIARRHEA: 0
SHORTNESS OF BREATH: 1
TROUBLE SWALLOWING: 0
VOMITING: 0

## 2023-11-06 DIAGNOSIS — G89.3 CANCER ASSOCIATED PAIN: ICD-10-CM

## 2023-11-06 RX ORDER — HYDROCODONE BITARTRATE AND ACETAMINOPHEN 10; 325 MG/1; MG/1
1 TABLET ORAL EVERY 6 HOURS PRN
Qty: 120 TABLET | Refills: 0 | Status: SHIPPED | OUTPATIENT
Start: 2023-11-06 | End: 2023-11-08 | Stop reason: SDUPTHER

## 2023-11-08 DIAGNOSIS — G89.3 CANCER ASSOCIATED PAIN: ICD-10-CM

## 2023-11-08 RX ORDER — HYDROCODONE BITARTRATE AND ACETAMINOPHEN 10; 325 MG/1; MG/1
1 TABLET ORAL EVERY 6 HOURS PRN
Qty: 120 TABLET | Refills: 0 | Status: SHIPPED | OUTPATIENT
Start: 2023-11-08 | End: 2023-12-08

## 2023-11-17 ENCOUNTER — OFFICE VISIT (OUTPATIENT)
Dept: HEMATOLOGY | Age: 83
End: 2023-11-17
Payer: MEDICARE

## 2023-11-17 ENCOUNTER — HOSPITAL ENCOUNTER (OUTPATIENT)
Dept: INFUSION THERAPY | Age: 83
Discharge: HOME | End: 2023-11-17
Payer: MEDICARE

## 2023-11-17 VITALS
RESPIRATION RATE: 18 BRPM | HEIGHT: 70 IN | WEIGHT: 186.8 LBS | SYSTOLIC BLOOD PRESSURE: 144 MMHG | DIASTOLIC BLOOD PRESSURE: 62 MMHG | TEMPERATURE: 98.7 F | BODY MASS INDEX: 26.74 KG/M2 | OXYGEN SATURATION: 96 % | HEART RATE: 64 BPM

## 2023-11-17 DIAGNOSIS — Z71.89 CARE PLAN DISCUSSED WITH PATIENT: ICD-10-CM

## 2023-11-17 DIAGNOSIS — N28.89 LEFT RENAL MASS: ICD-10-CM

## 2023-11-17 DIAGNOSIS — C79.51 MALIGNANT NEOPLASM METASTATIC TO BONE (HCC): ICD-10-CM

## 2023-11-17 DIAGNOSIS — Z51.12 ENCOUNTER FOR ANTINEOPLASTIC IMMUNOTHERAPY: ICD-10-CM

## 2023-11-17 DIAGNOSIS — L29.9 PRURITUS: ICD-10-CM

## 2023-11-17 DIAGNOSIS — I87.1 SUPERIOR VENA CAVA STENOSIS: ICD-10-CM

## 2023-11-17 DIAGNOSIS — C43.9 MALIGNANT MELANOMA, UNSPECIFIED SITE (HCC): ICD-10-CM

## 2023-11-17 DIAGNOSIS — C43.61 MALIGNANT MELANOMA OF RIGHT UPPER EXTREMITY INCLUDING SHOULDER (HCC): Primary | ICD-10-CM

## 2023-11-17 DIAGNOSIS — Z95.828 PORT-A-CATH IN PLACE: ICD-10-CM

## 2023-11-17 DIAGNOSIS — E03.2 HYPOTHYROIDISM DUE TO MEDICATION: ICD-10-CM

## 2023-11-17 DIAGNOSIS — G89.3 CANCER RELATED PAIN: ICD-10-CM

## 2023-11-17 DIAGNOSIS — R53.83 FATIGUE DUE TO TREATMENT: ICD-10-CM

## 2023-11-17 DIAGNOSIS — C43.61 MALIGNANT MELANOMA OF RIGHT UPPER EXTREMITY INCLUDING SHOULDER (HCC): ICD-10-CM

## 2023-11-17 LAB
ALBUMIN SERPL-MCNC: 3.8 G/DL (ref 3.5–5.2)
ALP SERPL-CCNC: 48 U/L (ref 40–130)
ALT SERPL-CCNC: 22 U/L (ref 21–72)
ANION GAP SERPL CALCULATED.3IONS-SCNC: 7 MMOL/L (ref 7–19)
AST SERPL-CCNC: 29 U/L (ref 17–59)
BASOPHILS # BLD: 0.04 K/UL (ref 0.01–0.08)
BASOPHILS NFR BLD: 0.6 % (ref 0.1–1.2)
BILIRUB SERPL-MCNC: 1.3 MG/DL (ref 0.2–1.3)
BUN SERPL-MCNC: 13 MG/DL (ref 9–20)
CALCIUM SERPL-MCNC: 8.9 MG/DL (ref 8.4–10.2)
CHLORIDE SERPL-SCNC: 100 MMOL/L (ref 98–111)
CO2 SERPL-SCNC: 30 MMOL/L (ref 22–29)
CORTIS AM PEAK SERPL-MCNC: 7.8 UG/DL (ref 6.2–19.4)
CREAT SERPL-MCNC: 1 MG/DL (ref 0.6–1.2)
EOSINOPHIL # BLD: 0.37 K/UL (ref 0.04–0.54)
EOSINOPHIL NFR BLD: 5.5 % (ref 0.7–7)
ERYTHROCYTE [DISTWIDTH] IN BLOOD BY AUTOMATED COUNT: 12.2 % (ref 11.6–14.4)
GLOBULIN: 2.8 G/DL
GLUCOSE SERPL-MCNC: 123 MG/DL (ref 74–106)
HCT VFR BLD AUTO: 41 % (ref 40.1–51)
HGB BLD-MCNC: 14 G/DL (ref 13.7–17.5)
LYMPHOCYTES # BLD: 1.82 K/UL (ref 1.18–3.74)
LYMPHOCYTES NFR BLD: 27.1 % (ref 19.3–53.1)
MCH RBC QN AUTO: 30.1 PG (ref 25.7–32.2)
MCHC RBC AUTO-ENTMCNC: 34.1 G/DL (ref 32.3–36.5)
MCV RBC AUTO: 88.2 FL (ref 79–92.2)
MONOCYTES # BLD: 0.4 K/UL (ref 0.24–0.82)
MONOCYTES NFR BLD: 6 % (ref 4.7–12.5)
NEUTROPHILS # BLD: 4.07 K/UL (ref 1.56–6.13)
NEUTS SEG NFR BLD: 60.7 % (ref 34–71.1)
PLATELET # BLD AUTO: 233 K/UL (ref 163–337)
PMV BLD AUTO: 9.3 FL (ref 7.4–10.4)
POTASSIUM SERPL-SCNC: 5 MMOL/L (ref 3.5–5.1)
PROT SERPL-MCNC: 6.6 G/DL (ref 6.3–8.2)
RBC # BLD AUTO: 4.65 M/UL (ref 4.63–6.08)
SODIUM SERPL-SCNC: 137 MMOL/L (ref 137–145)
TSH SERPL DL<=0.005 MIU/L-ACNC: 3.19 UIU/ML (ref 0.35–5.5)
WBC # BLD AUTO: 6.71 K/UL (ref 4.23–9.07)

## 2023-11-17 PROCEDURE — 1036F TOBACCO NON-USER: CPT | Performed by: NURSE PRACTITIONER

## 2023-11-17 PROCEDURE — 80053 COMPREHEN METABOLIC PANEL: CPT

## 2023-11-17 PROCEDURE — G8427 DOCREV CUR MEDS BY ELIG CLIN: HCPCS | Performed by: NURSE PRACTITIONER

## 2023-11-17 PROCEDURE — 6360000002 HC RX W HCPCS: Performed by: NURSE PRACTITIONER

## 2023-11-17 PROCEDURE — 1123F ACP DISCUSS/DSCN MKR DOCD: CPT | Performed by: NURSE PRACTITIONER

## 2023-11-17 PROCEDURE — G8484 FLU IMMUNIZE NO ADMIN: HCPCS | Performed by: NURSE PRACTITIONER

## 2023-11-17 PROCEDURE — 96372 THER/PROPH/DIAG INJ SC/IM: CPT

## 2023-11-17 PROCEDURE — G8417 CALC BMI ABV UP PARAM F/U: HCPCS | Performed by: NURSE PRACTITIONER

## 2023-11-17 PROCEDURE — 3074F SYST BP LT 130 MM HG: CPT | Performed by: NURSE PRACTITIONER

## 2023-11-17 PROCEDURE — 2580000003 HC RX 258: Performed by: NURSE PRACTITIONER

## 2023-11-17 PROCEDURE — 3078F DIAST BP <80 MM HG: CPT | Performed by: NURSE PRACTITIONER

## 2023-11-17 PROCEDURE — 96413 CHEMO IV INFUSION 1 HR: CPT

## 2023-11-17 PROCEDURE — 99214 OFFICE O/P EST MOD 30 MIN: CPT | Performed by: NURSE PRACTITIONER

## 2023-11-17 PROCEDURE — 36415 COLL VENOUS BLD VENIPUNCTURE: CPT

## 2023-11-17 PROCEDURE — 85025 COMPLETE CBC W/AUTO DIFF WBC: CPT

## 2023-11-17 RX ORDER — SODIUM CHLORIDE 0.9 % (FLUSH) 0.9 %
10 SYRINGE (ML) INJECTION PRN
Status: DISCONTINUED | OUTPATIENT
Start: 2023-11-17 | End: 2023-11-18 | Stop reason: HOSPADM

## 2023-11-17 RX ORDER — DIPHENHYDRAMINE HYDROCHLORIDE 50 MG/ML
50 INJECTION INTRAMUSCULAR; INTRAVENOUS PRN
Status: CANCELLED | OUTPATIENT
Start: 2023-11-17

## 2023-11-17 RX ORDER — EPINEPHRINE 1 MG/ML
0.3 INJECTION, SOLUTION, CONCENTRATE INTRAVENOUS PRN
Status: CANCELLED | OUTPATIENT
Start: 2023-11-17

## 2023-11-17 RX ORDER — SODIUM CHLORIDE 0.9 % (FLUSH) 0.9 %
5 SYRINGE (ML) INJECTION PRN
Status: CANCELLED | OUTPATIENT
Start: 2023-11-17

## 2023-11-17 RX ORDER — SODIUM CHLORIDE 0.9 % (FLUSH) 0.9 %
10 SYRINGE (ML) INJECTION PRN
Status: CANCELLED | OUTPATIENT
Start: 2023-11-17

## 2023-11-17 RX ORDER — HEPARIN 100 UNIT/ML
500 SYRINGE INTRAVENOUS PRN
Status: DISCONTINUED | OUTPATIENT
Start: 2023-11-17 | End: 2023-11-19 | Stop reason: HOSPADM

## 2023-11-17 RX ORDER — HEPARIN SODIUM (PORCINE) LOCK FLUSH IV SOLN 100 UNIT/ML 100 UNIT/ML
500 SOLUTION INTRAVENOUS PRN
Status: CANCELLED | OUTPATIENT
Start: 2023-11-17

## 2023-11-17 RX ADMIN — SODIUM CHLORIDE 480 MG: 9 INJECTION, SOLUTION INTRAVENOUS at 08:57

## 2023-11-17 RX ADMIN — Medication 10 ML: at 09:30

## 2023-11-17 RX ADMIN — DENOSUMAB 120 MG: 120 INJECTION SUBCUTANEOUS at 09:00

## 2023-11-17 RX ADMIN — HEPARIN 500 UNITS: 100 SYRINGE at 09:30

## 2023-11-23 ASSESSMENT — ENCOUNTER SYMPTOMS
WHEEZING: 0
TROUBLE SWALLOWING: 0
DIARRHEA: 0
EYE DISCHARGE: 0
EYE ITCHING: 0
SHORTNESS OF BREATH: 1
SORE THROAT: 0
NAUSEA: 0
COUGH: 0
VOMITING: 0
BACK PAIN: 1
CONSTIPATION: 0
ABDOMINAL PAIN: 0

## 2023-12-07 DIAGNOSIS — C43.9 MALIGNANT MELANOMA, UNSPECIFIED SITE (HCC): ICD-10-CM

## 2023-12-07 RX ORDER — PAROXETINE 10 MG/1
TABLET, FILM COATED ORAL
Qty: 90 TABLET | Refills: 3 | Status: SHIPPED | OUTPATIENT
Start: 2023-12-07

## 2023-12-07 RX ORDER — MONTELUKAST SODIUM 10 MG/1
TABLET ORAL
Qty: 90 TABLET | Refills: 3 | Status: SHIPPED | OUTPATIENT
Start: 2023-12-07

## 2023-12-11 DIAGNOSIS — G89.3 CANCER ASSOCIATED PAIN: ICD-10-CM

## 2023-12-11 RX ORDER — HYDROCODONE BITARTRATE AND ACETAMINOPHEN 10; 325 MG/1; MG/1
1 TABLET ORAL EVERY 6 HOURS PRN
Qty: 120 TABLET | Refills: 0 | Status: SHIPPED | OUTPATIENT
Start: 2023-12-11 | End: 2024-01-10

## 2023-12-15 ENCOUNTER — HOSPITAL ENCOUNTER (OUTPATIENT)
Dept: INFUSION THERAPY | Age: 83
Discharge: HOME | End: 2023-12-15
Payer: MEDICARE

## 2023-12-15 VITALS
HEIGHT: 70 IN | TEMPERATURE: 97.9 F | SYSTOLIC BLOOD PRESSURE: 146 MMHG | OXYGEN SATURATION: 94 % | WEIGHT: 191 LBS | DIASTOLIC BLOOD PRESSURE: 66 MMHG | RESPIRATION RATE: 20 BRPM | BODY MASS INDEX: 27.35 KG/M2 | HEART RATE: 63 BPM

## 2023-12-15 DIAGNOSIS — C43.61 MALIGNANT MELANOMA OF RIGHT UPPER EXTREMITY INCLUDING SHOULDER (HCC): Primary | ICD-10-CM

## 2023-12-15 DIAGNOSIS — E03.9 ACQUIRED HYPOTHYROIDISM: ICD-10-CM

## 2023-12-15 DIAGNOSIS — R53.83 FATIGUE DUE TO TREATMENT: ICD-10-CM

## 2023-12-15 DIAGNOSIS — C43.61 MALIGNANT MELANOMA OF RIGHT UPPER EXTREMITY INCLUDING SHOULDER (HCC): ICD-10-CM

## 2023-12-15 DIAGNOSIS — Z95.828 PORT-A-CATH IN PLACE: ICD-10-CM

## 2023-12-15 DIAGNOSIS — C79.51 MALIGNANT NEOPLASM METASTATIC TO BONE (HCC): ICD-10-CM

## 2023-12-15 DIAGNOSIS — C43.9 MALIGNANT MELANOMA, UNSPECIFIED SITE (HCC): ICD-10-CM

## 2023-12-15 LAB
ALBUMIN SERPL-MCNC: 4 G/DL (ref 3.5–5.2)
ALP SERPL-CCNC: 47 U/L (ref 40–130)
ALT SERPL-CCNC: 26 U/L (ref 21–72)
ANION GAP SERPL CALCULATED.3IONS-SCNC: 6 MMOL/L (ref 7–19)
AST SERPL-CCNC: 37 U/L (ref 17–59)
BASOPHILS # BLD: 0.02 K/UL (ref 0.01–0.08)
BASOPHILS NFR BLD: 0.3 % (ref 0.1–1.2)
BILIRUB SERPL-MCNC: 1.8 MG/DL (ref 0.2–1.3)
BUN SERPL-MCNC: 19 MG/DL (ref 9–20)
CALCIUM SERPL-MCNC: 8.7 MG/DL (ref 8.4–10.2)
CHLORIDE SERPL-SCNC: 100 MMOL/L (ref 98–111)
CO2 SERPL-SCNC: 31 MMOL/L (ref 22–29)
CREAT SERPL-MCNC: 1.1 MG/DL (ref 0.6–1.2)
EOSINOPHIL # BLD: 0.41 K/UL (ref 0.04–0.54)
EOSINOPHIL NFR BLD: 6.7 % (ref 0.7–7)
ERYTHROCYTE [DISTWIDTH] IN BLOOD BY AUTOMATED COUNT: 12.3 % (ref 11.6–14.4)
GLOBULIN: 2.6 G/DL
GLUCOSE SERPL-MCNC: 124 MG/DL (ref 74–106)
HCT VFR BLD AUTO: 40 % (ref 40.1–51)
HGB BLD-MCNC: 13.6 G/DL (ref 13.7–17.5)
LYMPHOCYTES # BLD: 1.69 K/UL (ref 1.18–3.74)
LYMPHOCYTES NFR BLD: 27.4 % (ref 19.3–53.1)
MCH RBC QN AUTO: 30.5 PG (ref 25.7–32.2)
MCHC RBC AUTO-ENTMCNC: 34 G/DL (ref 32.3–36.5)
MCV RBC AUTO: 89.7 FL (ref 79–92.2)
MONOCYTES # BLD: 0.64 K/UL (ref 0.24–0.82)
MONOCYTES NFR BLD: 10.4 % (ref 4.7–12.5)
NEUTROPHILS # BLD: 3.38 K/UL (ref 1.56–6.13)
NEUTS SEG NFR BLD: 54.9 % (ref 34–71.1)
PLATELET # BLD AUTO: 205 K/UL (ref 163–337)
PMV BLD AUTO: 9.1 FL (ref 7.4–10.4)
POTASSIUM SERPL-SCNC: 4.2 MMOL/L (ref 3.5–5.1)
PROT SERPL-MCNC: 6.7 G/DL (ref 6.3–8.2)
RBC # BLD AUTO: 4.46 M/UL (ref 4.63–6.08)
SODIUM SERPL-SCNC: 137 MMOL/L (ref 137–145)
WBC # BLD AUTO: 6.16 K/UL (ref 4.23–9.07)

## 2023-12-15 PROCEDURE — 80053 COMPREHEN METABOLIC PANEL: CPT

## 2023-12-15 PROCEDURE — 96372 THER/PROPH/DIAG INJ SC/IM: CPT

## 2023-12-15 PROCEDURE — 85025 COMPLETE CBC W/AUTO DIFF WBC: CPT

## 2023-12-15 PROCEDURE — 2580000003 HC RX 258: Performed by: PHYSICIAN ASSISTANT

## 2023-12-15 PROCEDURE — 6360000002 HC RX W HCPCS: Performed by: INTERNAL MEDICINE

## 2023-12-15 PROCEDURE — 96413 CHEMO IV INFUSION 1 HR: CPT

## 2023-12-15 PROCEDURE — 6360000002 HC RX W HCPCS: Performed by: PHYSICIAN ASSISTANT

## 2023-12-15 PROCEDURE — 36415 COLL VENOUS BLD VENIPUNCTURE: CPT

## 2023-12-15 RX ORDER — DIPHENHYDRAMINE HYDROCHLORIDE 50 MG/ML
50 INJECTION INTRAMUSCULAR; INTRAVENOUS PRN
Status: CANCELLED | OUTPATIENT
Start: 2023-12-15

## 2023-12-15 RX ORDER — SODIUM CHLORIDE 0.9 % (FLUSH) 0.9 %
10 SYRINGE (ML) INJECTION PRN
Status: CANCELLED | OUTPATIENT
Start: 2023-12-15

## 2023-12-15 RX ORDER — HEPARIN 100 UNIT/ML
500 SYRINGE INTRAVENOUS PRN
Status: DISCONTINUED | OUTPATIENT
Start: 2023-12-15 | End: 2023-12-17 | Stop reason: HOSPADM

## 2023-12-15 RX ORDER — HEPARIN SODIUM (PORCINE) LOCK FLUSH IV SOLN 100 UNIT/ML 100 UNIT/ML
500 SOLUTION INTRAVENOUS PRN
Status: CANCELLED | OUTPATIENT
Start: 2023-12-15

## 2023-12-15 RX ORDER — EPINEPHRINE 1 MG/ML
0.3 INJECTION, SOLUTION, CONCENTRATE INTRAVENOUS PRN
Status: CANCELLED | OUTPATIENT
Start: 2023-12-15

## 2023-12-15 RX ORDER — SODIUM CHLORIDE 0.9 % (FLUSH) 0.9 %
5 SYRINGE (ML) INJECTION PRN
Status: CANCELLED | OUTPATIENT
Start: 2023-12-15

## 2023-12-15 RX ADMIN — HEPARIN 500 UNITS: 100 SYRINGE at 09:25

## 2023-12-15 RX ADMIN — DENOSUMAB 120 MG: 120 INJECTION SUBCUTANEOUS at 08:58

## 2023-12-15 RX ADMIN — SODIUM CHLORIDE 480 MG: 9 INJECTION, SOLUTION INTRAVENOUS at 08:51

## 2024-01-11 RX ORDER — SODIUM CHLORIDE 0.9 % (FLUSH) 0.9 %
5 SYRINGE (ML) INJECTION PRN
Status: CANCELLED | OUTPATIENT
Start: 2024-01-12

## 2024-01-11 RX ORDER — EPINEPHRINE 1 MG/ML
0.3 INJECTION, SOLUTION, CONCENTRATE INTRAVENOUS PRN
Status: CANCELLED | OUTPATIENT
Start: 2024-01-12

## 2024-01-11 RX ORDER — DIPHENHYDRAMINE HYDROCHLORIDE 50 MG/ML
50 INJECTION INTRAMUSCULAR; INTRAVENOUS PRN
Status: CANCELLED | OUTPATIENT
Start: 2024-01-12

## 2024-01-11 NOTE — PROGRESS NOTES
Progress Note      Pt Name: Leeroy Conroy  YOB: 1940  MRN: [id number]    Date of evaluation: 1/12/2024    History Obtained From:  patient, electronic medical record    CHIEF COMPLAINT:    Chief Complaint   Patient presents with    Melanoma     Immunotherapy      HISTORY OF PRESENT ILLNESS:  Leeroy Conroy is a pleasant 83-year-old gentleman diagnosed with recurrent, stage IV BRAF V600E positive metastatic melanoma to a right axillary lymph node, liver and bone dating to July, 2018.  He initially received 4 cycles of Opdivo and Yervoy, followed by 2 cycles of maintenance Opdivo with excellent response.  Treatment was then changed to Tafinlar/Mekinist, per patient request due to intolerable itching/rash felt associated with Opdivo.  Leeroy however, developed enterocolitis requiring hospitalization.  He was challenged with single agent Tafinlar still with significant abdominal cramping to the point Leeroy discontinued medication.  He later opted to rechallenge Opdivo, and has been receiving treatment since April, 2019, tolerating with mild, grade 1 pruritus.  Pruritus is controlled with Paxil, Singulair and hydroxyzine as needed.  ECOG grade 1.  Fatigue grade 1, Leeroy also contributes fatigue due to recent hospitalizations his wife has had for atrial fibrillation.  He has been staying with his wife at the hospital most days which he states has been \"wearing me down\".  He has immunotherapy related hypothyroidism, stable on levothyroxine 100 mcg daily.  Neuropathy grade 1 (second and third right digits, more so from a prior injury) is stable and does not require intervention.  Chronic low back pain and occasional right upper quadrant abdominal pain is managed with Norco 10 mg QID PRN.  Most recent imaging studies dated 10/6/2023 documented stable disease.  Leeroy returns to clinic scheduled to continue immunotherapy today.    CBC, CMP are stable for treatment.  TSH remains stable at

## 2024-01-12 ENCOUNTER — OFFICE VISIT (OUTPATIENT)
Dept: HEMATOLOGY | Age: 84
End: 2024-01-12
Payer: MEDICARE

## 2024-01-12 ENCOUNTER — HOSPITAL ENCOUNTER (OUTPATIENT)
Dept: INFUSION THERAPY | Age: 84
Discharge: HOME | End: 2024-01-12
Payer: MEDICARE

## 2024-01-12 VITALS
BODY MASS INDEX: 27.41 KG/M2 | HEIGHT: 70 IN | WEIGHT: 191.5 LBS | OXYGEN SATURATION: 95 % | HEART RATE: 71 BPM | RESPIRATION RATE: 18 BRPM | DIASTOLIC BLOOD PRESSURE: 62 MMHG | TEMPERATURE: 98.7 F | SYSTOLIC BLOOD PRESSURE: 148 MMHG

## 2024-01-12 DIAGNOSIS — N28.89 LEFT RENAL MASS: ICD-10-CM

## 2024-01-12 DIAGNOSIS — L29.9 PRURITUS: ICD-10-CM

## 2024-01-12 DIAGNOSIS — E03.2 HYPOTHYROIDISM DUE TO MEDICATION: ICD-10-CM

## 2024-01-12 DIAGNOSIS — Z71.89 CARE PLAN DISCUSSED WITH PATIENT: ICD-10-CM

## 2024-01-12 DIAGNOSIS — G89.3 CANCER RELATED PAIN: ICD-10-CM

## 2024-01-12 DIAGNOSIS — C43.61 MALIGNANT MELANOMA OF RIGHT UPPER EXTREMITY INCLUDING SHOULDER (HCC): ICD-10-CM

## 2024-01-12 DIAGNOSIS — R53.83 FATIGUE DUE TO TREATMENT: ICD-10-CM

## 2024-01-12 DIAGNOSIS — Z95.828 PORT-A-CATH IN PLACE: ICD-10-CM

## 2024-01-12 DIAGNOSIS — C79.51 MALIGNANT NEOPLASM METASTATIC TO BONE (HCC): ICD-10-CM

## 2024-01-12 DIAGNOSIS — C43.9 MALIGNANT MELANOMA, UNSPECIFIED SITE (HCC): Primary | ICD-10-CM

## 2024-01-12 DIAGNOSIS — I87.1 SUPERIOR VENA CAVA STENOSIS: ICD-10-CM

## 2024-01-12 DIAGNOSIS — C43.61 MALIGNANT MELANOMA OF RIGHT UPPER EXTREMITY INCLUDING SHOULDER (HCC): Primary | ICD-10-CM

## 2024-01-12 LAB
ALBUMIN SERPL-MCNC: 4.1 G/DL (ref 3.5–5.2)
ALP SERPL-CCNC: 51 U/L (ref 40–130)
ALT SERPL-CCNC: 22 U/L (ref 21–72)
ANION GAP SERPL CALCULATED.3IONS-SCNC: 7 MMOL/L (ref 7–19)
AST SERPL-CCNC: 28 U/L (ref 17–59)
BASOPHILS # BLD: 0.03 K/UL (ref 0.01–0.08)
BASOPHILS NFR BLD: 0.4 % (ref 0.1–1.2)
BILIRUB SERPL-MCNC: 1.2 MG/DL (ref 0.2–1.3)
BUN SERPL-MCNC: 19 MG/DL (ref 9–20)
CALCIUM SERPL-MCNC: 8.6 MG/DL (ref 8.4–10.2)
CHLORIDE SERPL-SCNC: 106 MMOL/L (ref 98–111)
CO2 SERPL-SCNC: 27 MMOL/L (ref 22–29)
CORTIS AM PEAK SERPL-MCNC: 6.9 UG/DL (ref 6.2–19.4)
CREAT SERPL-MCNC: 1 MG/DL (ref 0.6–1.2)
EOSINOPHIL # BLD: 0.45 K/UL (ref 0.04–0.54)
EOSINOPHIL NFR BLD: 6.1 % (ref 0.7–7)
ERYTHROCYTE [DISTWIDTH] IN BLOOD BY AUTOMATED COUNT: 12 % (ref 11.6–14.4)
GLOBULIN: 2.5 G/DL
GLUCOSE SERPL-MCNC: 125 MG/DL (ref 74–106)
HCT VFR BLD AUTO: 40.8 % (ref 40.1–51)
HGB BLD-MCNC: 13.9 G/DL (ref 13.7–17.5)
LYMPHOCYTES # BLD: 2.05 K/UL (ref 1.18–3.74)
LYMPHOCYTES NFR BLD: 27.6 % (ref 19.3–53.1)
MCH RBC QN AUTO: 30.1 PG (ref 25.7–32.2)
MCHC RBC AUTO-ENTMCNC: 34.1 G/DL (ref 32.3–36.5)
MCV RBC AUTO: 88.3 FL (ref 79–92.2)
MONOCYTES # BLD: 0.73 K/UL (ref 0.24–0.82)
MONOCYTES NFR BLD: 9.8 % (ref 4.7–12.5)
NEUTROPHILS # BLD: 4.16 K/UL (ref 1.56–6.13)
NEUTS SEG NFR BLD: 56 % (ref 34–71.1)
PLATELET # BLD AUTO: 239 K/UL (ref 163–337)
PMV BLD AUTO: 9.3 FL (ref 7.4–10.4)
POTASSIUM SERPL-SCNC: 4.1 MMOL/L (ref 3.5–5.1)
PROT SERPL-MCNC: 6.6 G/DL (ref 6.3–8.2)
RBC # BLD AUTO: 4.62 M/UL (ref 4.63–6.08)
SODIUM SERPL-SCNC: 140 MMOL/L (ref 137–145)
T4 FREE SERPL-MCNC: 1.57 NG/DL (ref 0.93–1.7)
TSH SERPL DL<=0.005 MIU/L-ACNC: 3.45 UIU/ML (ref 0.27–4.2)
WBC # BLD AUTO: 7.43 K/UL (ref 4.23–9.07)

## 2024-01-12 PROCEDURE — 96372 THER/PROPH/DIAG INJ SC/IM: CPT

## 2024-01-12 PROCEDURE — 99214 OFFICE O/P EST MOD 30 MIN: CPT | Performed by: NURSE PRACTITIONER

## 2024-01-12 PROCEDURE — 2580000003 HC RX 258: Performed by: PHYSICIAN ASSISTANT

## 2024-01-12 PROCEDURE — 85025 COMPLETE CBC W/AUTO DIFF WBC: CPT

## 2024-01-12 PROCEDURE — 1123F ACP DISCUSS/DSCN MKR DOCD: CPT | Performed by: NURSE PRACTITIONER

## 2024-01-12 PROCEDURE — 96413 CHEMO IV INFUSION 1 HR: CPT

## 2024-01-12 PROCEDURE — G8484 FLU IMMUNIZE NO ADMIN: HCPCS | Performed by: NURSE PRACTITIONER

## 2024-01-12 PROCEDURE — 3078F DIAST BP <80 MM HG: CPT | Performed by: NURSE PRACTITIONER

## 2024-01-12 PROCEDURE — 36415 COLL VENOUS BLD VENIPUNCTURE: CPT

## 2024-01-12 PROCEDURE — G8427 DOCREV CUR MEDS BY ELIG CLIN: HCPCS | Performed by: NURSE PRACTITIONER

## 2024-01-12 PROCEDURE — 3077F SYST BP >= 140 MM HG: CPT | Performed by: NURSE PRACTITIONER

## 2024-01-12 PROCEDURE — G8417 CALC BMI ABV UP PARAM F/U: HCPCS | Performed by: NURSE PRACTITIONER

## 2024-01-12 PROCEDURE — 80053 COMPREHEN METABOLIC PANEL: CPT

## 2024-01-12 PROCEDURE — 6360000002 HC RX W HCPCS: Performed by: NURSE PRACTITIONER

## 2024-01-12 PROCEDURE — 1036F TOBACCO NON-USER: CPT | Performed by: NURSE PRACTITIONER

## 2024-01-12 PROCEDURE — 6360000002 HC RX W HCPCS: Performed by: PHYSICIAN ASSISTANT

## 2024-01-12 RX ORDER — HEPARIN 100 UNIT/ML
500 SYRINGE INTRAVENOUS PRN
Status: DISCONTINUED | OUTPATIENT
Start: 2024-01-12 | End: 2024-01-14 | Stop reason: HOSPADM

## 2024-01-12 RX ORDER — SODIUM CHLORIDE 0.9 % (FLUSH) 0.9 %
10 SYRINGE (ML) INJECTION PRN
Status: DISCONTINUED | OUTPATIENT
Start: 2024-01-12 | End: 2024-01-13 | Stop reason: HOSPADM

## 2024-01-12 RX ADMIN — SODIUM CHLORIDE 480 MG: 9 INJECTION, SOLUTION INTRAVENOUS at 08:50

## 2024-01-12 RX ADMIN — DENOSUMAB 120 MG: 120 INJECTION SUBCUTANEOUS at 08:50

## 2024-01-12 RX ADMIN — SODIUM CHLORIDE, PRESERVATIVE FREE 10 ML: 5 INJECTION INTRAVENOUS at 09:51

## 2024-01-12 RX ADMIN — HEPARIN 500 UNITS: 100 SYRINGE at 09:51

## 2024-01-13 ASSESSMENT — ENCOUNTER SYMPTOMS
DIARRHEA: 0
EYE ITCHING: 0
SHORTNESS OF BREATH: 1
CONSTIPATION: 0
NAUSEA: 0
COUGH: 0
TROUBLE SWALLOWING: 0
EYE DISCHARGE: 0
WHEEZING: 0
BACK PAIN: 1
ABDOMINAL PAIN: 0
SORE THROAT: 0
VOMITING: 0

## 2024-01-22 ENCOUNTER — TELEPHONE (OUTPATIENT)
Dept: INFUSION THERAPY | Age: 84
End: 2024-01-22

## 2024-01-22 DIAGNOSIS — G89.3 CANCER ASSOCIATED PAIN: ICD-10-CM

## 2024-01-22 RX ORDER — HYDROCODONE BITARTRATE AND ACETAMINOPHEN 10; 325 MG/1; MG/1
1 TABLET ORAL EVERY 6 HOURS PRN
Qty: 120 TABLET | Refills: 0 | Status: SHIPPED | OUTPATIENT
Start: 2024-01-22 | End: 2024-02-21

## 2024-01-22 NOTE — TELEPHONE ENCOUNTER
Pt's wife, Apryl, left a message stating that she had contacted [facility] and they didn't have rx for hydrocodone. It appears Dr. Winters signed this Rx at 11:53 today. Called [facility] [address] & verified that they do now have this prescription and it will be ready for Mr. Conroy shortly. Call placed to pt, no answer, voicemail left with the above information.

## 2024-01-27 ENCOUNTER — APPOINTMENT (OUTPATIENT)
Dept: GENERAL RADIOLOGY | Age: 84
DRG: 195 | End: 2024-01-27
Payer: MEDICARE

## 2024-01-27 ENCOUNTER — HOSPITAL ENCOUNTER (INPATIENT)
Age: 84
LOS: 1 days | Discharge: HOME | DRG: 195 | End: 2024-01-28
Attending: EMERGENCY MEDICINE | Admitting: STUDENT IN AN ORGANIZED HEALTH CARE EDUCATION/TRAINING PROGRAM
Payer: MEDICARE

## 2024-01-27 ENCOUNTER — APPOINTMENT (OUTPATIENT)
Dept: CT IMAGING | Age: 84
DRG: 195 | End: 2024-01-27
Payer: MEDICARE

## 2024-01-27 DIAGNOSIS — J96.01 ACUTE HYPOXEMIC RESPIRATORY FAILURE (HCC): Primary | ICD-10-CM

## 2024-01-27 DIAGNOSIS — J18.9 PNEUMONIA OF LEFT LOWER LOBE DUE TO INFECTIOUS ORGANISM: ICD-10-CM

## 2024-01-27 DIAGNOSIS — R07.9 CHEST PAIN, UNSPECIFIED TYPE: ICD-10-CM

## 2024-01-27 LAB
ALBUMIN SERPL-MCNC: 3.7 G/DL (ref 3.5–5.2)
ALP SERPL-CCNC: 49 U/L (ref 40–130)
ALT SERPL-CCNC: 13 U/L (ref 5–41)
ANION GAP SERPL CALCULATED.3IONS-SCNC: 8 MMOL/L (ref 7–19)
AST SERPL-CCNC: 26 U/L (ref 5–40)
B PARAP IS1001 DNA NPH QL NAA+NON-PROBE: NOT DETECTED
B PERT.PT PRMT NPH QL NAA+NON-PROBE: NOT DETECTED
BASOPHILS # BLD: 0 K/UL (ref 0–0.2)
BASOPHILS NFR BLD: 0.2 % (ref 0–1)
BILIRUB SERPL-MCNC: 1.4 MG/DL (ref 0.2–1.2)
BUN SERPL-MCNC: 16 MG/DL (ref 8–23)
C PNEUM DNA NPH QL NAA+NON-PROBE: NOT DETECTED
CALCIUM SERPL-MCNC: 8.7 MG/DL (ref 8.8–10.2)
CHLORIDE SERPL-SCNC: 103 MMOL/L (ref 98–111)
CO2 SERPL-SCNC: 27 MMOL/L (ref 22–29)
CREAT SERPL-MCNC: 1.1 MG/DL (ref 0.5–1.2)
EOSINOPHIL # BLD: 0.2 K/UL (ref 0–0.6)
EOSINOPHIL NFR BLD: 1.1 % (ref 0–5)
ERYTHROCYTE [DISTWIDTH] IN BLOOD BY AUTOMATED COUNT: 12.3 % (ref 11.5–14.5)
FLUAV RNA NPH QL NAA+NON-PROBE: NOT DETECTED
FLUBV RNA NPH QL NAA+NON-PROBE: NOT DETECTED
GLUCOSE SERPL-MCNC: 189 MG/DL (ref 74–109)
HADV DNA NPH QL NAA+NON-PROBE: NOT DETECTED
HCOV 229E RNA NPH QL NAA+NON-PROBE: NOT DETECTED
HCOV HKU1 RNA NPH QL NAA+NON-PROBE: NOT DETECTED
HCOV NL63 RNA NPH QL NAA+NON-PROBE: NOT DETECTED
HCOV OC43 RNA NPH QL NAA+NON-PROBE: NOT DETECTED
HCT VFR BLD AUTO: 39.8 % (ref 42–52)
HGB BLD-MCNC: 13.2 G/DL (ref 14–18)
HMPV RNA NPH QL NAA+NON-PROBE: NOT DETECTED
HPIV1 RNA NPH QL NAA+NON-PROBE: NOT DETECTED
HPIV2 RNA NPH QL NAA+NON-PROBE: NOT DETECTED
HPIV3 RNA NPH QL NAA+NON-PROBE: NOT DETECTED
HPIV4 RNA NPH QL NAA+NON-PROBE: NOT DETECTED
IMM GRANULOCYTES # BLD: 0.1 K/UL
LACTATE BLDV-SCNC: 0.6 MG/DL (ref 0.5–1.9)
LACTATE BLDV-SCNC: 0.9 MG/DL (ref 0.5–1.9)
LEGIONELLA AG UR QL: NORMAL
LYMPHOCYTES # BLD: 2.1 K/UL (ref 1.1–4.5)
LYMPHOCYTES NFR BLD: 12.5 % (ref 20–40)
M PNEUMO DNA NPH QL NAA+NON-PROBE: NOT DETECTED
MCH RBC QN AUTO: 30.8 PG (ref 27–31)
MCHC RBC AUTO-ENTMCNC: 33.2 G/DL (ref 33–37)
MCV RBC AUTO: 93 FL (ref 80–94)
MONOCYTES # BLD: 1.7 K/UL (ref 0–0.9)
MONOCYTES NFR BLD: 10.1 % (ref 0–10)
MRSA DNA SPEC QL NAA+PROBE: NOT DETECTED
NEUTROPHILS # BLD: 12.5 K/UL (ref 1.5–7.5)
NEUTS SEG NFR BLD: 75.7 % (ref 50–65)
PLATELET # BLD AUTO: 218 K/UL (ref 130–400)
PMV BLD AUTO: 9.9 FL (ref 9.4–12.4)
POTASSIUM SERPL-SCNC: 4.9 MMOL/L (ref 3.5–5)
PROCALCITONIN: 0.14 NG/ML (ref 0–0.09)
PROT SERPL-MCNC: 6.3 G/DL (ref 6.6–8.7)
RBC # BLD AUTO: 4.28 M/UL (ref 4.7–6.1)
RSV RNA NPH QL NAA+NON-PROBE: NOT DETECTED
RV+EV RNA NPH QL NAA+NON-PROBE: NOT DETECTED
S PNEUM AG SPEC QL: NORMAL
SARS-COV-2 RNA NPH QL NAA+NON-PROBE: NOT DETECTED
SODIUM SERPL-SCNC: 138 MMOL/L (ref 136–145)
TROPONIN, HIGH SENSITIVITY: 14 NG/L (ref 0–22)
TROPONIN, HIGH SENSITIVITY: 15 NG/L (ref 0–22)
WBC # BLD AUTO: 16.6 K/UL (ref 4.8–10.8)

## 2024-01-27 PROCEDURE — 80053 COMPREHEN METABOLIC PANEL: CPT

## 2024-01-27 PROCEDURE — 6370000000 HC RX 637 (ALT 250 FOR IP): Performed by: NURSE PRACTITIONER

## 2024-01-27 PROCEDURE — 99285 EMERGENCY DEPT VISIT HI MDM: CPT

## 2024-01-27 PROCEDURE — 2700000000 HC OXYGEN THERAPY PER DAY

## 2024-01-27 PROCEDURE — 0202U NFCT DS 22 TRGT SARS-COV-2: CPT

## 2024-01-27 PROCEDURE — 85025 COMPLETE CBC W/AUTO DIFF WBC: CPT

## 2024-01-27 PROCEDURE — 2580000003 HC RX 258: Performed by: NURSE PRACTITIONER

## 2024-01-27 PROCEDURE — 71275 CT ANGIOGRAPHY CHEST: CPT

## 2024-01-27 PROCEDURE — 36415 COLL VENOUS BLD VENIPUNCTURE: CPT

## 2024-01-27 PROCEDURE — 96376 TX/PRO/DX INJ SAME DRUG ADON: CPT

## 2024-01-27 PROCEDURE — 87205 SMEAR GRAM STAIN: CPT

## 2024-01-27 PROCEDURE — 2580000003 HC RX 258: Performed by: EMERGENCY MEDICINE

## 2024-01-27 PROCEDURE — 6360000002 HC RX W HCPCS: Performed by: EMERGENCY MEDICINE

## 2024-01-27 PROCEDURE — 96374 THER/PROPH/DIAG INJ IV PUSH: CPT

## 2024-01-27 PROCEDURE — 84484 ASSAY OF TROPONIN QUANT: CPT

## 2024-01-27 PROCEDURE — 94760 N-INVAS EAR/PLS OXIMETRY 1: CPT

## 2024-01-27 PROCEDURE — 87040 BLOOD CULTURE FOR BACTERIA: CPT

## 2024-01-27 PROCEDURE — 87449 NOS EACH ORGANISM AG IA: CPT

## 2024-01-27 PROCEDURE — 87070 CULTURE OTHR SPECIMN AEROBIC: CPT

## 2024-01-27 PROCEDURE — 1210000000 HC MED SURG R&B

## 2024-01-27 PROCEDURE — 6360000002 HC RX W HCPCS: Performed by: NURSE PRACTITIONER

## 2024-01-27 PROCEDURE — 93005 ELECTROCARDIOGRAM TRACING: CPT | Performed by: EMERGENCY MEDICINE

## 2024-01-27 PROCEDURE — 71045 X-RAY EXAM CHEST 1 VIEW: CPT

## 2024-01-27 PROCEDURE — 94640 AIRWAY INHALATION TREATMENT: CPT

## 2024-01-27 PROCEDURE — 96375 TX/PRO/DX INJ NEW DRUG ADDON: CPT

## 2024-01-27 PROCEDURE — 84145 PROCALCITONIN (PCT): CPT

## 2024-01-27 PROCEDURE — 6360000004 HC RX CONTRAST MEDICATION: Performed by: EMERGENCY MEDICINE

## 2024-01-27 PROCEDURE — 87641 MR-STAPH DNA AMP PROBE: CPT

## 2024-01-27 PROCEDURE — 83605 ASSAY OF LACTIC ACID: CPT

## 2024-01-27 RX ORDER — FINASTERIDE 5 MG/1
5 TABLET, FILM COATED ORAL NIGHTLY
Status: DISCONTINUED | OUTPATIENT
Start: 2024-01-28 | End: 2024-01-28 | Stop reason: HOSPADM

## 2024-01-27 RX ORDER — ENOXAPARIN SODIUM 100 MG/ML
40 INJECTION SUBCUTANEOUS DAILY
Status: DISCONTINUED | OUTPATIENT
Start: 2024-01-27 | End: 2024-01-28 | Stop reason: HOSPADM

## 2024-01-27 RX ORDER — MONTELUKAST SODIUM 10 MG/1
10 TABLET ORAL NIGHTLY
Status: DISCONTINUED | OUTPATIENT
Start: 2024-01-27 | End: 2024-01-28 | Stop reason: HOSPADM

## 2024-01-27 RX ORDER — ONDANSETRON 4 MG/1
4 TABLET, ORALLY DISINTEGRATING ORAL EVERY 8 HOURS PRN
Status: DISCONTINUED | OUTPATIENT
Start: 2024-01-27 | End: 2024-01-28 | Stop reason: HOSPADM

## 2024-01-27 RX ORDER — SODIUM CHLORIDE 9 MG/ML
INJECTION, SOLUTION INTRAVENOUS PRN
Status: DISCONTINUED | OUTPATIENT
Start: 2024-01-27 | End: 2024-01-28 | Stop reason: HOSPADM

## 2024-01-27 RX ORDER — MORPHINE SULFATE 2 MG/ML
2 INJECTION, SOLUTION INTRAMUSCULAR; INTRAVENOUS ONCE
Status: COMPLETED | OUTPATIENT
Start: 2024-01-27 | End: 2024-01-27

## 2024-01-27 RX ORDER — HYDROCODONE BITARTRATE AND ACETAMINOPHEN 10; 325 MG/1; MG/1
1 TABLET ORAL EVERY 6 HOURS PRN
Status: DISCONTINUED | OUTPATIENT
Start: 2024-01-27 | End: 2024-01-28 | Stop reason: HOSPADM

## 2024-01-27 RX ORDER — MORPHINE SULFATE 4 MG/ML
4 INJECTION, SOLUTION INTRAMUSCULAR; INTRAVENOUS ONCE
Status: COMPLETED | OUTPATIENT
Start: 2024-01-27 | End: 2024-01-27

## 2024-01-27 RX ORDER — LEVOTHYROXINE SODIUM 0.1 MG/1
100 TABLET ORAL DAILY
Status: DISCONTINUED | OUTPATIENT
Start: 2024-01-27 | End: 2024-01-28 | Stop reason: HOSPADM

## 2024-01-27 RX ORDER — HYDROXYZINE HYDROCHLORIDE 25 MG/1
25 TABLET, FILM COATED ORAL EVERY 8 HOURS PRN
Status: DISCONTINUED | OUTPATIENT
Start: 2024-01-27 | End: 2024-01-28 | Stop reason: HOSPADM

## 2024-01-27 RX ORDER — ONDANSETRON 2 MG/ML
4 INJECTION INTRAMUSCULAR; INTRAVENOUS EVERY 6 HOURS PRN
Status: DISCONTINUED | OUTPATIENT
Start: 2024-01-27 | End: 2024-01-28 | Stop reason: HOSPADM

## 2024-01-27 RX ORDER — IPRATROPIUM BROMIDE AND ALBUTEROL SULFATE 2.5; .5 MG/3ML; MG/3ML
1 SOLUTION RESPIRATORY (INHALATION)
Status: DISCONTINUED | OUTPATIENT
Start: 2024-01-27 | End: 2024-01-28 | Stop reason: HOSPADM

## 2024-01-27 RX ORDER — SODIUM CHLORIDE 0.9 % (FLUSH) 0.9 %
5-40 SYRINGE (ML) INJECTION EVERY 12 HOURS SCHEDULED
Status: DISCONTINUED | OUTPATIENT
Start: 2024-01-27 | End: 2024-01-28 | Stop reason: HOSPADM

## 2024-01-27 RX ORDER — POLYETHYLENE GLYCOL 3350 17 G/17G
17 POWDER, FOR SOLUTION ORAL DAILY PRN
Status: DISCONTINUED | OUTPATIENT
Start: 2024-01-27 | End: 2024-01-28 | Stop reason: HOSPADM

## 2024-01-27 RX ORDER — PANTOPRAZOLE SODIUM 40 MG/1
40 TABLET, DELAYED RELEASE ORAL DAILY
Status: DISCONTINUED | OUTPATIENT
Start: 2024-01-27 | End: 2024-01-28 | Stop reason: HOSPADM

## 2024-01-27 RX ORDER — ATORVASTATIN CALCIUM 10 MG/1
10 TABLET, FILM COATED ORAL DAILY
Status: DISCONTINUED | OUTPATIENT
Start: 2024-01-27 | End: 2024-01-28 | Stop reason: HOSPADM

## 2024-01-27 RX ORDER — GUAIFENESIN 600 MG/1
600 TABLET, EXTENDED RELEASE ORAL 2 TIMES DAILY
Status: DISCONTINUED | OUTPATIENT
Start: 2024-01-27 | End: 2024-01-28 | Stop reason: HOSPADM

## 2024-01-27 RX ORDER — KETOROLAC TROMETHAMINE 30 MG/ML
15 INJECTION, SOLUTION INTRAMUSCULAR; INTRAVENOUS EVERY 6 HOURS PRN
Status: DISCONTINUED | OUTPATIENT
Start: 2024-01-27 | End: 2024-01-28 | Stop reason: HOSPADM

## 2024-01-27 RX ORDER — PAROXETINE HYDROCHLORIDE 20 MG/1
10 TABLET, FILM COATED ORAL DAILY
Status: DISCONTINUED | OUTPATIENT
Start: 2024-01-27 | End: 2024-01-28 | Stop reason: HOSPADM

## 2024-01-27 RX ORDER — SODIUM CHLORIDE 0.9 % (FLUSH) 0.9 %
5-40 SYRINGE (ML) INJECTION PRN
Status: DISCONTINUED | OUTPATIENT
Start: 2024-01-27 | End: 2024-01-28 | Stop reason: HOSPADM

## 2024-01-27 RX ORDER — ACETAMINOPHEN 650 MG/1
650 SUPPOSITORY RECTAL EVERY 6 HOURS PRN
Status: DISCONTINUED | OUTPATIENT
Start: 2024-01-27 | End: 2024-01-28 | Stop reason: HOSPADM

## 2024-01-27 RX ORDER — NITROGLYCERIN 0.4 MG/1
0.4 TABLET SUBLINGUAL EVERY 5 MIN PRN
Status: DISCONTINUED | OUTPATIENT
Start: 2024-01-27 | End: 2024-01-28 | Stop reason: HOSPADM

## 2024-01-27 RX ORDER — ACETAMINOPHEN 325 MG/1
650 TABLET ORAL EVERY 6 HOURS PRN
Status: DISCONTINUED | OUTPATIENT
Start: 2024-01-27 | End: 2024-01-28 | Stop reason: HOSPADM

## 2024-01-27 RX ORDER — FINASTERIDE 5 MG/1
5 TABLET, FILM COATED ORAL DAILY
Status: DISCONTINUED | OUTPATIENT
Start: 2024-01-27 | End: 2024-01-27

## 2024-01-27 RX ADMIN — GUAIFENESIN 600 MG: 600 TABLET ORAL at 21:52

## 2024-01-27 RX ADMIN — IPRATROPIUM BROMIDE AND ALBUTEROL SULFATE 1 DOSE: 2.5; .5 SOLUTION RESPIRATORY (INHALATION) at 19:18

## 2024-01-27 RX ADMIN — LEVOTHYROXINE SODIUM 100 MCG: 100 TABLET ORAL at 11:03

## 2024-01-27 RX ADMIN — SODIUM CHLORIDE, PRESERVATIVE FREE 10 ML: 5 INJECTION INTRAVENOUS at 11:07

## 2024-01-27 RX ADMIN — KETOROLAC TROMETHAMINE 15 MG: 30 INJECTION INTRAMUSCULAR; INTRAVENOUS at 15:49

## 2024-01-27 RX ADMIN — WATER 1000 MG: 1 INJECTION INTRAMUSCULAR; INTRAVENOUS; SUBCUTANEOUS at 07:31

## 2024-01-27 RX ADMIN — IPRATROPIUM BROMIDE AND ALBUTEROL SULFATE 1 DOSE: 2.5; .5 SOLUTION RESPIRATORY (INHALATION) at 10:02

## 2024-01-27 RX ADMIN — MONTELUKAST 10 MG: 10 TABLET, FILM COATED ORAL at 21:52

## 2024-01-27 RX ADMIN — PAROXETINE HYDROCHLORIDE 10 MG: 20 TABLET, FILM COATED ORAL at 11:04

## 2024-01-27 RX ADMIN — SODIUM CHLORIDE, PRESERVATIVE FREE 10 ML: 5 INJECTION INTRAVENOUS at 21:52

## 2024-01-27 RX ADMIN — ATORVASTATIN CALCIUM 10 MG: 10 TABLET, FILM COATED ORAL at 11:03

## 2024-01-27 RX ADMIN — IPRATROPIUM BROMIDE AND ALBUTEROL SULFATE 1 DOSE: 2.5; .5 SOLUTION RESPIRATORY (INHALATION) at 13:58

## 2024-01-27 RX ADMIN — AZITHROMYCIN MONOHYDRATE 500 MG: 500 INJECTION, POWDER, LYOPHILIZED, FOR SOLUTION INTRAVENOUS at 07:32

## 2024-01-27 RX ADMIN — MORPHINE SULFATE 4 MG: 4 INJECTION, SOLUTION INTRAMUSCULAR; INTRAVENOUS at 04:11

## 2024-01-27 RX ADMIN — ENOXAPARIN SODIUM 40 MG: 100 INJECTION SUBCUTANEOUS at 11:04

## 2024-01-27 RX ADMIN — HYDROCODONE BITARTRATE AND ACETAMINOPHEN 1 TABLET: 10; 325 TABLET ORAL at 11:05

## 2024-01-27 RX ADMIN — PANTOPRAZOLE SODIUM 40 MG: 40 TABLET, DELAYED RELEASE ORAL at 11:03

## 2024-01-27 RX ADMIN — HYDROCODONE BITARTRATE AND ACETAMINOPHEN 1 TABLET: 10; 325 TABLET ORAL at 21:52

## 2024-01-27 RX ADMIN — MORPHINE SULFATE 2 MG: 2 INJECTION, SOLUTION INTRAMUSCULAR; INTRAVENOUS at 03:07

## 2024-01-27 RX ADMIN — GUAIFENESIN 600 MG: 600 TABLET ORAL at 11:03

## 2024-01-27 RX ADMIN — IOPAMIDOL 70 ML: 755 INJECTION, SOLUTION INTRAVENOUS at 06:06

## 2024-01-27 RX ADMIN — ACETAMINOPHEN 650 MG: 325 TABLET ORAL at 14:20

## 2024-01-27 ASSESSMENT — PAIN DESCRIPTION - LOCATION
LOCATION: CHEST
LOCATION: BACK;CHEST

## 2024-01-27 ASSESSMENT — PAIN DESCRIPTION - ORIENTATION
ORIENTATION: LEFT
ORIENTATION: LEFT
ORIENTATION: MID
ORIENTATION: LEFT
ORIENTATION: LEFT

## 2024-01-27 ASSESSMENT — PAIN SCALES - GENERAL
PAINLEVEL_OUTOF10: 8
PAINLEVEL_OUTOF10: 8
PAINLEVEL_OUTOF10: 6
PAINLEVEL_OUTOF10: 8
PAINLEVEL_OUTOF10: 9

## 2024-01-27 ASSESSMENT — PAIN DESCRIPTION - DESCRIPTORS
DESCRIPTORS: SHARP
DESCRIPTORS: SHARP
DESCRIPTORS: ACHING;DISCOMFORT
DESCRIPTORS: SHARP

## 2024-01-27 ASSESSMENT — PAIN - FUNCTIONAL ASSESSMENT: PAIN_FUNCTIONAL_ASSESSMENT: 0-10

## 2024-01-27 ASSESSMENT — PAIN DESCRIPTION - PAIN TYPE: TYPE: ACUTE PAIN

## 2024-01-27 ASSESSMENT — PAIN DESCRIPTION - FREQUENCY: FREQUENCY: CONTINUOUS

## 2024-01-27 NOTE — ED PROVIDER NOTES
[facility] EMERGENCY DEPT  eMERGENCY dEPARTMENT eNCOUnter      Pt Name: Leeroy Conroy  MRN: [id number]  Birthdate 1940  Date of evaluation: 1/27/2024  Provider: Adrian Zelaya MD    CHIEF COMPLAINT       Chief Complaint   Patient presents with    Chest Pain     Started yesterday @ 1800.  Took 81mg ASA at home. EMS gave 81mg ASA x 3, nitro x1- pt became hypotensive in ambulance and nauseated. 4mg zofran given          HISTORY OF PRESENT ILLNESS   (Location/Symptom, Timing/Onset,Context/Setting, Quality, Duration, Modifying Factors, Severity)  Note limiting factors.   Leeroy Conroy is a 83 y.o. male who presents to the emergency department for patient regarding feelings of chest pain with associated shortness of breath.  Patient states that the symptoms began around 6 PM yesterday evening prior to him going to bed.  EMS reported that they gave him aspirin along with sublingual nitro x 1 with resultant hypotension.  He was also given Zofran 4 mg and route to the hospital.  Patient states he does not have any prior history of coronary artery disease.  No prior history of pulmonary embolism or DVT.  He has not had any fevers or chills.  He does have a slight cough however this is been nonproductive in nature.    HPI    NursingNotes were reviewed.    REVIEW OF SYSTEMS    (2-9 systems for level 4, 10 or more for level 5)     Review of Systems   Constitutional:  Negative for chills and fever.   Respiratory:  Positive for cough and shortness of breath.    Cardiovascular:  Positive for chest pain. Negative for palpitations and leg swelling.   Gastrointestinal:  Negative for abdominal pain, diarrhea, nausea and vomiting.   Neurological:  Negative for syncope.   All other systems reviewed and are negative.           PAST MEDICALHISTORY     Past Medical History:   Diagnosis Date    Acid reflux     BPH (benign prostatic hyperplasia)     Cancer (HCC)     Diverticulitis     Hard of hearing     Hypercholesteremia     Hypertension

## 2024-01-27 NOTE — H&P
distress.      Breath sounds: No wheezing, rhonchi or rales.      Comments: Diminished breath sounds bilaterally   Abdominal:      General: Bowel sounds are normal. There is no distension.      Palpations: Abdomen is soft.      Tenderness: There is no abdominal tenderness.   Musculoskeletal:         General: No swelling, tenderness or deformity. Normal range of motion.      Cervical back: Normal range of motion and neck supple. No muscular tenderness.      Right lower leg: No edema.      Left lower leg: No edema.   Skin:     General: Skin is warm and dry.      Findings: No bruising or lesion.   Neurological:      Mental Status: He is alert and oriented to person, place, and time.   Psychiatric:         Mood and Affect: Mood normal.         Behavior: Behavior normal.         Thought Content: Thought content normal.          Diagnostic Data:    CBC:  Recent Labs     01/27/24 0211   WBC 16.6*   HGB 13.2*   HCT 39.8*          BMP:  Recent Labs     01/27/24 0211      K 4.9      CO2 27   BUN 16   CREATININE 1.1   CALCIUM 8.7*     Recent Labs     01/27/24 0211   AST 26   ALT 13   BILITOT 1.4*   ALKPHOS 49     Urinalysis:  Lab Results   Component Value Date/Time    NITRU Negative 11/06/2022 03:20 AM    BLOODU Negative 11/06/2022 03:20 AM    SPECGRAV 1.016 11/06/2022 03:20 AM    GLUCOSEU Negative 11/06/2022 03:20 AM         CTA PULMONARY W CONTRAST  Result Date: 1/27/2024    Impression: No evidence of pulmonary embolism.  Left lower lobe pneumonia as described.  Small left pleural effusion.  Lymphadenopathy as described which may be reactive or neoplastic.  Right basilar dependent atelectasis.  Chronic obstructive pulmonary disease.  Cardiomegaly.  All CT scans are performed using dose optimization techniques as appropriate to the performed exam and include at least one of the following: Automated exposure control, adjustment of the mA and/or kV according to size, and the use of iterative reconstruction

## 2024-01-27 NOTE — ED NOTES
ED TO INPATIENT SBAR HANDOFF    Patient Name: Leeroy Conroy   : 1940  83 y.o.   Family/Caregiver Present: Yes  Code Status Order: Prior    C-SSRS: Risk of Suicide: No Risk  Sitter No  Restraints:         Situation  Chief Complaint   Patient presents with    Chest Pain     Started yesterday @ 1800.  Took 81mg ASA at home. EMS gave 81mg ASA x 3, nitro x1- pt became hypotensive in ambulance and nauseated. 4mg zofran given      Brief Description of Patient's Condition: Pt arrived with c/o left sided chest pain, starting at 0400 this morning. Pt has a h/o malignant melanoma with mets to liver, stomach, bone and right axilla, with RUE lymph node removal. *No sticks/BP to right extremity. Pt continues to c/o of left pleural pain. Pt is currently on 2L NC, but becomes SOB with activity. Blood culture results pending. Lactate due at 0925.   Mental Status: oriented, alert, coherent, logical, and thought processes intact  Arrived from: home    Imaging:   CTA PULMONARY W CONTRAST   Final Result   Impression: No evidence of pulmonary embolism.       Left lower lobe pneumonia as described.       Small left pleural effusion.       Lymphadenopathy as described which may be reactive or neoplastic.       Right basilar dependent atelectasis.       Chronic obstructive pulmonary disease.       Cardiomegaly.        All CT scans are performed using dose optimization techniques as appropriate to the performed exam and include    at least one of the following: Automated exposure control, adjustment of the mA and/or kV according to size, and the use of iterative reconstruction technique.        ______________________________________    Electronically signed by: ADITYA MADDOX M.D.   Date:     2024   Time:    06:08       XR CHEST PORTABLE   Final Result       Left basilar opacities suspicious for pneumonia.       Small pleural effusion.                   ______________________________________    Electronically signed by:

## 2024-01-27 NOTE — ED NOTES
RN attempted to contact primary RN, Amparo, on 5th floor. RN was unavailable. MIREYA Dahl on 5th floor accepted handoff and will inform MIREYA Christensen.

## 2024-01-28 VITALS
OXYGEN SATURATION: 91 % | RESPIRATION RATE: 20 BRPM | SYSTOLIC BLOOD PRESSURE: 135 MMHG | BODY MASS INDEX: 27.35 KG/M2 | HEIGHT: 70 IN | DIASTOLIC BLOOD PRESSURE: 62 MMHG | WEIGHT: 191 LBS | TEMPERATURE: 97.5 F | HEART RATE: 69 BPM

## 2024-01-28 LAB
ANION GAP SERPL CALCULATED.3IONS-SCNC: 9 MMOL/L (ref 7–19)
BASOPHILS # BLD: 0 K/UL (ref 0–0.2)
BASOPHILS NFR BLD: 0.3 % (ref 0–1)
BUN SERPL-MCNC: 20 MG/DL (ref 8–23)
CALCIUM SERPL-MCNC: 8.3 MG/DL (ref 8.8–10.2)
CHLORIDE SERPL-SCNC: 103 MMOL/L (ref 98–111)
CO2 SERPL-SCNC: 23 MMOL/L (ref 22–29)
CREAT SERPL-MCNC: 1.3 MG/DL (ref 0.5–1.2)
EOSINOPHIL # BLD: 0.2 K/UL (ref 0–0.6)
EOSINOPHIL NFR BLD: 2.5 % (ref 0–5)
ERYTHROCYTE [DISTWIDTH] IN BLOOD BY AUTOMATED COUNT: 12 % (ref 11.5–14.5)
GLUCOSE SERPL-MCNC: 98 MG/DL (ref 74–109)
HCT VFR BLD AUTO: 39.5 % (ref 42–52)
HGB BLD-MCNC: 12.8 G/DL (ref 14–18)
IMM GRANULOCYTES # BLD: 0 K/UL
LYMPHOCYTES # BLD: 1.4 K/UL (ref 1.1–4.5)
LYMPHOCYTES NFR BLD: 15.8 % (ref 20–40)
MCH RBC QN AUTO: 29.5 PG (ref 27–31)
MCHC RBC AUTO-ENTMCNC: 32.4 G/DL (ref 33–37)
MCV RBC AUTO: 91 FL (ref 80–94)
MONOCYTES # BLD: 1.1 K/UL (ref 0–0.9)
MONOCYTES NFR BLD: 12.8 % (ref 0–10)
NEUTROPHILS # BLD: 6 K/UL (ref 1.5–7.5)
NEUTS SEG NFR BLD: 68.4 % (ref 50–65)
PLATELET # BLD AUTO: 174 K/UL (ref 130–400)
PMV BLD AUTO: 9.8 FL (ref 9.4–12.4)
POTASSIUM SERPL-SCNC: 4.3 MMOL/L (ref 3.5–5)
RBC # BLD AUTO: 4.34 M/UL (ref 4.7–6.1)
SODIUM SERPL-SCNC: 135 MMOL/L (ref 136–145)
WBC # BLD AUTO: 8.8 K/UL (ref 4.8–10.8)

## 2024-01-28 PROCEDURE — 97161 PT EVAL LOW COMPLEX 20 MIN: CPT

## 2024-01-28 PROCEDURE — 6370000000 HC RX 637 (ALT 250 FOR IP): Performed by: NURSE PRACTITIONER

## 2024-01-28 PROCEDURE — 2700000000 HC OXYGEN THERAPY PER DAY

## 2024-01-28 PROCEDURE — 94618 PULMONARY STRESS TESTING: CPT

## 2024-01-28 PROCEDURE — 85025 COMPLETE CBC W/AUTO DIFF WBC: CPT

## 2024-01-28 PROCEDURE — 6360000002 HC RX W HCPCS: Performed by: NURSE PRACTITIONER

## 2024-01-28 PROCEDURE — 2580000003 HC RX 258: Performed by: NURSE PRACTITIONER

## 2024-01-28 PROCEDURE — 94640 AIRWAY INHALATION TREATMENT: CPT

## 2024-01-28 PROCEDURE — 94760 N-INVAS EAR/PLS OXIMETRY 1: CPT

## 2024-01-28 PROCEDURE — 80048 BASIC METABOLIC PNL TOTAL CA: CPT

## 2024-01-28 PROCEDURE — 36415 COLL VENOUS BLD VENIPUNCTURE: CPT

## 2024-01-28 PROCEDURE — 97116 GAIT TRAINING THERAPY: CPT

## 2024-01-28 RX ORDER — SODIUM CHLORIDE 0.9 % (FLUSH) 0.9 %
10 SYRINGE (ML) INJECTION PRN
OUTPATIENT
Start: 2024-02-09

## 2024-01-28 RX ORDER — AZITHROMYCIN 500 MG/1
500 TABLET, FILM COATED ORAL DAILY
Qty: 1 TABLET | Refills: 0 | Status: SHIPPED | OUTPATIENT
Start: 2024-01-28 | End: 2024-01-29

## 2024-01-28 RX ORDER — GUAIFENESIN 600 MG/1
600 TABLET, EXTENDED RELEASE ORAL 2 TIMES DAILY
Qty: 30 TABLET | Refills: 0 | Status: SHIPPED | OUTPATIENT
Start: 2024-01-28 | End: 2024-02-12

## 2024-01-28 RX ORDER — HEPARIN SODIUM (PORCINE) LOCK FLUSH IV SOLN 100 UNIT/ML 100 UNIT/ML
500 SOLUTION INTRAVENOUS PRN
OUTPATIENT
Start: 2024-02-09

## 2024-01-28 RX ORDER — SODIUM CHLORIDE 0.9 % (FLUSH) 0.9 %
5 SYRINGE (ML) INJECTION PRN
OUTPATIENT
Start: 2024-02-09

## 2024-01-28 RX ORDER — ALBUTEROL SULFATE 90 UG/1
2 AEROSOL, METERED RESPIRATORY (INHALATION) EVERY 6 HOURS PRN
Status: DISCONTINUED | OUTPATIENT
Start: 2024-01-28 | End: 2024-01-28 | Stop reason: HOSPADM

## 2024-01-28 RX ORDER — SODIUM CHLORIDE 9 MG/ML
INJECTION, SOLUTION INTRAVENOUS CONTINUOUS
Status: DISCONTINUED | OUTPATIENT
Start: 2024-01-28 | End: 2024-01-28 | Stop reason: HOSPADM

## 2024-01-28 RX ORDER — EPINEPHRINE 1 MG/ML
0.3 INJECTION, SOLUTION, CONCENTRATE INTRAVENOUS PRN
OUTPATIENT
Start: 2024-02-09

## 2024-01-28 RX ORDER — DIPHENHYDRAMINE HYDROCHLORIDE 50 MG/ML
50 INJECTION INTRAMUSCULAR; INTRAVENOUS PRN
OUTPATIENT
Start: 2024-02-09

## 2024-01-28 RX ORDER — CEFDINIR 300 MG/1
300 CAPSULE ORAL 2 TIMES DAILY
Qty: 10 CAPSULE | Refills: 0 | Status: SHIPPED | OUTPATIENT
Start: 2024-01-28 | End: 2024-02-02

## 2024-01-28 RX ORDER — ALBUTEROL SULFATE 90 UG/1
2 AEROSOL, METERED RESPIRATORY (INHALATION) EVERY 6 HOURS PRN
Qty: 18 G | Refills: 0 | Status: SHIPPED | OUTPATIENT
Start: 2024-01-28

## 2024-01-28 RX ADMIN — AZITHROMYCIN MONOHYDRATE 500 MG: 500 INJECTION, POWDER, LYOPHILIZED, FOR SOLUTION INTRAVENOUS at 04:49

## 2024-01-28 RX ADMIN — HYDROXYZINE HYDROCHLORIDE 25 MG: 25 TABLET, FILM COATED ORAL at 04:57

## 2024-01-28 RX ADMIN — PAROXETINE HYDROCHLORIDE 10 MG: 20 TABLET, FILM COATED ORAL at 09:08

## 2024-01-28 RX ADMIN — SODIUM CHLORIDE, PRESERVATIVE FREE 10 ML: 5 INJECTION INTRAVENOUS at 09:10

## 2024-01-28 RX ADMIN — ENOXAPARIN SODIUM 40 MG: 100 INJECTION SUBCUTANEOUS at 09:07

## 2024-01-28 RX ADMIN — WATER 1000 MG: 1 INJECTION INTRAMUSCULAR; INTRAVENOUS; SUBCUTANEOUS at 09:08

## 2024-01-28 RX ADMIN — HYDROCODONE BITARTRATE AND ACETAMINOPHEN 1 TABLET: 10; 325 TABLET ORAL at 11:43

## 2024-01-28 RX ADMIN — ATORVASTATIN CALCIUM 10 MG: 10 TABLET, FILM COATED ORAL at 09:08

## 2024-01-28 RX ADMIN — LEVOTHYROXINE SODIUM 100 MCG: 100 TABLET ORAL at 04:53

## 2024-01-28 RX ADMIN — HYDROCODONE BITARTRATE AND ACETAMINOPHEN 1 TABLET: 10; 325 TABLET ORAL at 04:48

## 2024-01-28 RX ADMIN — GUAIFENESIN 600 MG: 600 TABLET ORAL at 09:08

## 2024-01-28 RX ADMIN — SODIUM CHLORIDE: 9 INJECTION, SOLUTION INTRAVENOUS at 09:17

## 2024-01-28 RX ADMIN — IPRATROPIUM BROMIDE AND ALBUTEROL SULFATE 1 DOSE: 2.5; .5 SOLUTION RESPIRATORY (INHALATION) at 06:08

## 2024-01-28 RX ADMIN — IPRATROPIUM BROMIDE AND ALBUTEROL SULFATE 1 DOSE: 2.5; .5 SOLUTION RESPIRATORY (INHALATION) at 10:07

## 2024-01-28 RX ADMIN — PANTOPRAZOLE SODIUM 40 MG: 40 TABLET, DELAYED RELEASE ORAL at 09:08

## 2024-01-28 ASSESSMENT — PAIN - FUNCTIONAL ASSESSMENT: PAIN_FUNCTIONAL_ASSESSMENT: PREVENTS OR INTERFERES SOME ACTIVE ACTIVITIES AND ADLS

## 2024-01-28 ASSESSMENT — PAIN DESCRIPTION - LOCATION
LOCATION: CHEST;ABDOMEN
LOCATION: BACK;CHEST

## 2024-01-28 ASSESSMENT — PAIN DESCRIPTION - ORIENTATION
ORIENTATION: LEFT;MID
ORIENTATION: LEFT

## 2024-01-28 ASSESSMENT — PAIN DESCRIPTION - DESCRIPTORS: DESCRIPTORS: ACHING;DISCOMFORT

## 2024-01-28 ASSESSMENT — PAIN SCALES - GENERAL
PAINLEVEL_OUTOF10: 7
PAINLEVEL_OUTOF10: 5

## 2024-01-28 NOTE — PROGRESS NOTES
01/28/24 1000   Resting (Room Air)   SpO2 92   During Walk (Room Air)   SpO2 93   Rate of Dyspnea 0   During Walk (On O2)   Rate of Dyspnea 1   After Walk   SpO2 92   Rate of Dyspnea 1   Does the Patient Qualify for Home O2 No   Does the Patient Need Portable Oxygen Tanks No

## 2024-01-28 NOTE — DISCHARGE SUMMARY
[facility]    Discharge Summary      Leeroy Conroy  :  1940  MRN:  [id number]    Admit date:  2024  Discharge date:    2024    Discharging Physician:  Dr. Antony Meraz     Advance Directive: Full Code    Consults: none    Primary Care Physician:  Miguel Quintana MD    Discharge Diagnoses:  Principal Problem:    Community acquired pneumonia of left lower lobe of lung  Active Problems:    Melanoma (HCC)    BPH (benign prostatic hyperplasia)  Resolved Problems:    * No resolved hospital problems. *      Portions of this note have been copied forward, however, changed to reflect the most current clinical status of this patient.    Hospital Course:    The patient is a 83 y.o. male with past medical history of Malignant Melanoma, hypertension, Diverticulitis, BPH, and Acid reflux who presented to [facility] ED with complaints of shortness of breath and pleuritic chest pain. Reported sharp left sided pleuritic chest pain with inspiration and shortness of breath that started yesterday afternoon. Reported productive cough at times. Denied fever, chills, nausea or vomiting. Stated he became hypotensive and nauseated after receiving nitro and ASA per EMS. Denied prior history of coronary artery disease, PE or DVT. Of note, he receives Chemo every 4 weeks, last chemo received on last Friday (2024). Workup in ED revealed hypoxia, unremarkable chemistry, Procalcitonin 0.14, WBC 16.6, Hgb 13.2, respiratory panel negative, chest x-ray indicated left basilar opacities suspicious for pneumonia, small pleural effusion.  CTA pulmonary indicated negative for PE, left lower lobe pneumonia, small left pleural effusion, lymphadenopathy, right basilar dependent atelectasis, chronic obstructive pulmonary disease.  Patient was admitted to hospital medicine for further evaluation.  Empiric antibiotics and bronchodilators were initiated.  Further workup indicated MRSA negative, legionella negative, strep

## 2024-01-28 NOTE — PROGRESS NOTES
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)    Malignant melanoma of skin of upper limb, including shoulder (HCC)    Hypothyroidism due to medication    Renal mass, left    History of diverticulitis    Liver metastasis    Encounter for antineoplastic immunotherapy    Osteoarthritis of multiple joints    Pruritus    Cancer related pain    Decrease in appetite    Malignant melanoma of right upper extremity including shoulder (HCC)    Port-A-Cath in place    Poor venous access    Community acquired pneumonia of left lower lobe of lung       Rehabilitation Diagnosis:  PNA, MELANOMA   Pneumonia of left lower lobe due to infectious organism [J18.9]  Acute hypoxemic respiratory failure (HCC) [J96.01]  Chest pain, unspecified type [R07.9]  Community acquired pneumonia of left lower lobe of lung [J18.9]       SERVICE DATE: 2024        SUBJECTIVE: Patient agrees that they are safe with mobility and do not require physical therapy services in this setting. States he feel 100% better today. Wanting to walk.     Pain: No complaint of pain    OBJECTIVE:    Orientation is Within normal limits     Reports minimal dyspnea with inc gt distance.        ACTIVE ROM:       All major lower extremity joints are within functional limits      STRENGTH     Both lower extremities are grossly within functional limits.    TRANSFERS   Sit to stand   SBA     Bed to chair   SBA    Bed to mobility   Supine to sit   Independent       Roll     Independent     Scoot Side to side / Up and down   Independent    AMBULATION   Distance: 50' + 200'     Device: NONE     Assistance: SB-IND       Comment:    BALANCE   Sitting    Good     Standing    Good    Tx Initiated:

## 2024-01-29 LAB
BACTERIA SPEC RESP CULT: NORMAL
GRAM STN SPEC: NORMAL

## 2024-01-30 LAB
EKG P AXIS: -34 DEGREES
EKG P-R INTERVAL: 184 MS
EKG Q-T INTERVAL: 390 MS
EKG QRS DURATION: 82 MS
EKG QTC CALCULATION (BAZETT): 405 MS
EKG T AXIS: 26 DEGREES

## 2024-01-30 PROCEDURE — 93010 ELECTROCARDIOGRAM REPORT: CPT | Performed by: INTERNAL MEDICINE

## 2024-02-01 LAB
BACTERIA BLD CULT ORG #2: NORMAL
BACTERIA BLD CULT: NORMAL

## 2024-02-16 ENCOUNTER — HOSPITAL ENCOUNTER (OUTPATIENT)
Dept: INFUSION THERAPY | Age: 84
Discharge: HOME | End: 2024-02-16
Payer: MEDICARE

## 2024-02-16 ENCOUNTER — HOSPITAL ENCOUNTER (OUTPATIENT)
Dept: INFUSION THERAPY | Age: 84
End: 2024-02-16

## 2024-02-16 DIAGNOSIS — C79.51 MALIGNANT NEOPLASM METASTATIC TO BONE (HCC): ICD-10-CM

## 2024-02-16 DIAGNOSIS — Z51.12 ENCOUNTER FOR ANTINEOPLASTIC IMMUNOTHERAPY: ICD-10-CM

## 2024-02-16 DIAGNOSIS — C79.51 MALIGNANT NEOPLASM METASTATIC TO BONE (HCC): Primary | ICD-10-CM

## 2024-02-16 DIAGNOSIS — R53.83 FATIGUE DUE TO TREATMENT: ICD-10-CM

## 2024-02-16 DIAGNOSIS — C43.61 MALIGNANT MELANOMA OF RIGHT UPPER EXTREMITY INCLUDING SHOULDER (HCC): ICD-10-CM

## 2024-02-16 DIAGNOSIS — C43.9 MALIGNANT MELANOMA, UNSPECIFIED SITE (HCC): Primary | ICD-10-CM

## 2024-02-16 DIAGNOSIS — Z95.828 PORT-A-CATH IN PLACE: ICD-10-CM

## 2024-02-16 DIAGNOSIS — C43.9 MALIGNANT MELANOMA, UNSPECIFIED SITE (HCC): ICD-10-CM

## 2024-02-16 LAB
ALBUMIN SERPL-MCNC: 4.3 G/DL (ref 3.5–5.2)
ALP SERPL-CCNC: 44 U/L (ref 40–130)
ALT SERPL-CCNC: 17 U/L (ref 21–72)
ANION GAP SERPL CALCULATED.3IONS-SCNC: 10 MMOL/L (ref 7–19)
AST SERPL-CCNC: 25 U/L (ref 17–59)
BASOPHILS # BLD: 0.01 K/UL (ref 0.01–0.08)
BASOPHILS NFR BLD: 0.1 % (ref 0.1–1.2)
BILIRUB SERPL-MCNC: 0.9 MG/DL (ref 0.2–1.3)
BUN SERPL-MCNC: 18 MG/DL (ref 9–20)
CALCIUM SERPL-MCNC: 8.6 MG/DL (ref 8.4–10.2)
CHLORIDE SERPL-SCNC: 102 MMOL/L (ref 98–111)
CO2 SERPL-SCNC: 27 MMOL/L (ref 22–29)
CORTIS SERPL-MCNC: 2.4 UG/DL
CREAT SERPL-MCNC: 1.1 MG/DL (ref 0.6–1.2)
EOSINOPHIL # BLD: 0.4 K/UL (ref 0.04–0.54)
EOSINOPHIL NFR BLD: 5.7 % (ref 0.7–7)
ERYTHROCYTE [DISTWIDTH] IN BLOOD BY AUTOMATED COUNT: 12.2 % (ref 11.6–14.4)
GLOBULIN: 2.8 G/DL
GLUCOSE SERPL-MCNC: 145 MG/DL (ref 74–106)
HCT VFR BLD AUTO: 41.8 % (ref 40.1–51)
HGB BLD-MCNC: 13.8 G/DL (ref 13.7–17.5)
LYMPHOCYTES # BLD: 1.92 K/UL (ref 1.18–3.74)
LYMPHOCYTES NFR BLD: 27.5 % (ref 19.3–53.1)
MAGNESIUM SERPL-MCNC: 2.2 MG/DL (ref 1.6–2.3)
MCH RBC QN AUTO: 29.9 PG (ref 25.7–32.2)
MCHC RBC AUTO-ENTMCNC: 33 G/DL (ref 32.3–36.5)
MCV RBC AUTO: 90.5 FL (ref 79–92.2)
MONOCYTES # BLD: 0.58 K/UL (ref 0.24–0.82)
MONOCYTES NFR BLD: 8.3 % (ref 4.7–12.5)
NEUTROPHILS # BLD: 4.05 K/UL (ref 1.56–6.13)
NEUTS SEG NFR BLD: 58.1 % (ref 34–71.1)
PHOSPHATE SERPL-MCNC: 4.1 MG/DL (ref 2.5–4.5)
PLATELET # BLD AUTO: 264 K/UL (ref 163–337)
PMV BLD AUTO: 8.9 FL (ref 7.4–10.4)
POTASSIUM SERPL-SCNC: 4 MMOL/L (ref 3.5–5.1)
PROT SERPL-MCNC: 7.1 G/DL (ref 6.3–8.2)
RBC # BLD AUTO: 4.62 M/UL (ref 4.63–6.08)
SODIUM SERPL-SCNC: 139 MMOL/L (ref 137–145)
TSH SERPL DL<=0.005 MIU/L-ACNC: 3.44 UIU/ML (ref 0.27–4.2)
WBC # BLD AUTO: 6.98 K/UL (ref 4.23–9.07)

## 2024-02-16 PROCEDURE — 80053 COMPREHEN METABOLIC PANEL: CPT

## 2024-02-16 PROCEDURE — 36415 COLL VENOUS BLD VENIPUNCTURE: CPT

## 2024-02-16 PROCEDURE — 96372 THER/PROPH/DIAG INJ SC/IM: CPT

## 2024-02-16 PROCEDURE — 2580000003 HC RX 258: Performed by: INTERNAL MEDICINE

## 2024-02-16 PROCEDURE — 6360000002 HC RX W HCPCS: Performed by: INTERNAL MEDICINE

## 2024-02-16 PROCEDURE — 85025 COMPLETE CBC W/AUTO DIFF WBC: CPT

## 2024-02-16 PROCEDURE — 83735 ASSAY OF MAGNESIUM: CPT

## 2024-02-16 PROCEDURE — 84100 ASSAY OF PHOSPHORUS: CPT

## 2024-02-16 PROCEDURE — 96413 CHEMO IV INFUSION 1 HR: CPT

## 2024-02-16 RX ORDER — SODIUM CHLORIDE 0.9 % (FLUSH) 0.9 %
10 SYRINGE (ML) INJECTION PRN
Status: DISCONTINUED | OUTPATIENT
Start: 2024-02-16 | End: 2024-02-17 | Stop reason: HOSPADM

## 2024-02-16 RX ORDER — HEPARIN 100 UNIT/ML
500 SYRINGE INTRAVENOUS PRN
Status: DISCONTINUED | OUTPATIENT
Start: 2024-02-16 | End: 2024-02-18 | Stop reason: HOSPADM

## 2024-02-16 RX ADMIN — SODIUM CHLORIDE 480 MG: 9 INJECTION, SOLUTION INTRAVENOUS at 14:05

## 2024-02-16 RX ADMIN — DENOSUMAB 120 MG: 120 INJECTION SUBCUTANEOUS at 14:07

## 2024-02-16 RX ADMIN — HEPARIN 500 UNITS: 100 SYRINGE at 14:36

## 2024-02-16 RX ADMIN — SODIUM CHLORIDE, PRESERVATIVE FREE 10 ML: 5 INJECTION INTRAVENOUS at 14:36

## 2024-02-22 DIAGNOSIS — E03.9 ACQUIRED HYPOTHYROIDISM: ICD-10-CM

## 2024-02-22 DIAGNOSIS — G89.3 CANCER ASSOCIATED PAIN: ICD-10-CM

## 2024-02-22 RX ORDER — LEVOTHYROXINE SODIUM 0.1 MG/1
100 TABLET ORAL DAILY
Qty: 90 TABLET | Refills: 1 | Status: SHIPPED | OUTPATIENT
Start: 2024-02-22

## 2024-02-22 RX ORDER — HYDROCODONE BITARTRATE AND ACETAMINOPHEN 10; 325 MG/1; MG/1
1 TABLET ORAL EVERY 6 HOURS PRN
Qty: 120 TABLET | Refills: 0 | Status: SHIPPED | OUTPATIENT
Start: 2024-02-22 | End: 2024-03-23

## 2024-03-14 NOTE — PROGRESS NOTES
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  He returns to the clinic to continue maintenance Opdivo.  He tolerates treatment with mild itching.  Leeroy was treated for left lower lobe pneumonia 1/27/2024.  He is feeling much better in this regard.  He denies significant cough, rash, diarrhea or fever.  Leeroy desires to proceed with treatment today.  CBC is stable for treatment including a WBC of 6.66, Hgb 13.7/MCV 87.6 and platelet count 207,000.  Immunotherapy related hypothyroidism remains stable on levothyroxine 100 mcg daily.  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  Chronic low back pain and right upper quadrant abdominal pain is managed with Norco 10 mg po QID PRN.  Imaging 10/6/2023 documented stable disease.    1/27/2024 CTA Pulmonary W Contrast at [facility] (COMPARISON: CT chest of 10/06/2023):  No evidence of pulmonary embolism.   There are enlarged mediastinal and hilar lymph nodes measuring up to 1.4   Left lower lobe pneumonia   Right basilar dependent atelectasis.  Small left pleural effusion.  Chronic obstructive pulmonary disease.  Cardiomegaly.     Leeroy accompanied by his wife today.    Leeroy Conroy presents for follow-up surveillance visit and toxicity assessment.   he requires intenstive monitoring due to the potential to cause serious morbidity or death.     TARGET METASTATIC MALIGNANT MELANOMA SITES:  Right upper extremity original primary site 06/05/14

## 2024-03-15 ENCOUNTER — OFFICE VISIT (OUTPATIENT)
Dept: HEMATOLOGY | Age: 84
End: 2024-03-15

## 2024-03-15 ENCOUNTER — HOSPITAL ENCOUNTER (OUTPATIENT)
Dept: INFUSION THERAPY | Age: 84
Discharge: HOME | End: 2024-03-15
Payer: MEDICARE

## 2024-03-15 VITALS
HEART RATE: 61 BPM | SYSTOLIC BLOOD PRESSURE: 143 MMHG | WEIGHT: 193.1 LBS | HEIGHT: 70 IN | RESPIRATION RATE: 18 BRPM | BODY MASS INDEX: 27.64 KG/M2 | DIASTOLIC BLOOD PRESSURE: 62 MMHG | OXYGEN SATURATION: 95 % | TEMPERATURE: 97.5 F

## 2024-03-15 DIAGNOSIS — C43.9 MALIGNANT MELANOMA, UNSPECIFIED SITE (HCC): Primary | ICD-10-CM

## 2024-03-15 DIAGNOSIS — C43.61 MALIGNANT MELANOMA OF RIGHT UPPER EXTREMITY INCLUDING SHOULDER (HCC): ICD-10-CM

## 2024-03-15 DIAGNOSIS — Z95.828 PORT-A-CATH IN PLACE: ICD-10-CM

## 2024-03-15 DIAGNOSIS — G89.3 CANCER RELATED PAIN: ICD-10-CM

## 2024-03-15 DIAGNOSIS — C79.51 MALIGNANT NEOPLASM METASTATIC TO BONE (HCC): ICD-10-CM

## 2024-03-15 DIAGNOSIS — Z51.12 ENCOUNTER FOR ANTINEOPLASTIC IMMUNOTHERAPY: ICD-10-CM

## 2024-03-15 DIAGNOSIS — Z71.89 CARE PLAN DISCUSSED WITH PATIENT: ICD-10-CM

## 2024-03-15 DIAGNOSIS — R53.83 FATIGUE DUE TO TREATMENT: ICD-10-CM

## 2024-03-15 DIAGNOSIS — C43.61 MALIGNANT MELANOMA OF RIGHT UPPER EXTREMITY INCLUDING SHOULDER (HCC): Primary | ICD-10-CM

## 2024-03-15 DIAGNOSIS — L29.9 PRURITUS: ICD-10-CM

## 2024-03-15 DIAGNOSIS — E03.2 HYPOTHYROIDISM DUE TO MEDICATION: ICD-10-CM

## 2024-03-15 LAB
ALBUMIN SERPL-MCNC: 4 G/DL (ref 3.5–5.2)
ALP SERPL-CCNC: 42 U/L (ref 40–130)
ALT SERPL-CCNC: 22 U/L (ref 21–72)
ANION GAP SERPL CALCULATED.3IONS-SCNC: 8 MMOL/L (ref 7–19)
AST SERPL-CCNC: 26 U/L (ref 17–59)
BASOPHILS # BLD: 0 K/UL (ref 0.01–0.08)
BASOPHILS NFR BLD: 0 % (ref 0.1–1.2)
BILIRUB SERPL-MCNC: 1.2 MG/DL (ref 0.2–1.3)
BUN SERPL-MCNC: 15 MG/DL (ref 9–20)
CALCIUM SERPL-MCNC: 8.5 MG/DL (ref 8.4–10.2)
CHLORIDE SERPL-SCNC: 100 MMOL/L (ref 98–111)
CO2 SERPL-SCNC: 29 MMOL/L (ref 22–29)
CREAT SERPL-MCNC: 1 MG/DL (ref 0.6–1.2)
EOSINOPHIL # BLD: 0.44 K/UL (ref 0.04–0.54)
EOSINOPHIL NFR BLD: 6.6 % (ref 0.7–7)
ERYTHROCYTE [DISTWIDTH] IN BLOOD BY AUTOMATED COUNT: 12 % (ref 11.6–14.4)
GLOBULIN: 2.7 G/DL
GLUCOSE SERPL-MCNC: 109 MG/DL (ref 74–106)
HCT VFR BLD AUTO: 40.3 % (ref 40.1–51)
HGB BLD-MCNC: 13.7 G/DL (ref 13.7–17.5)
LYMPHOCYTES # BLD: 1.82 K/UL (ref 1.18–3.74)
LYMPHOCYTES NFR BLD: 27.3 % (ref 19.3–53.1)
MCH RBC QN AUTO: 29.8 PG (ref 25.7–32.2)
MCHC RBC AUTO-ENTMCNC: 34 G/DL (ref 32.3–36.5)
MCV RBC AUTO: 87.6 FL (ref 79–92.2)
MONOCYTES # BLD: 0.6 K/UL (ref 0.24–0.82)
MONOCYTES NFR BLD: 9 % (ref 4.7–12.5)
NEUTROPHILS # BLD: 3.79 K/UL (ref 1.56–6.13)
NEUTS SEG NFR BLD: 56.9 % (ref 34–71.1)
PLATELET # BLD AUTO: 207 K/UL (ref 163–337)
PMV BLD AUTO: 9.3 FL (ref 7.4–10.4)
POTASSIUM SERPL-SCNC: 4.1 MMOL/L (ref 3.5–5.1)
PROT SERPL-MCNC: 6.6 G/DL (ref 6.3–8.2)
RBC # BLD AUTO: 4.6 M/UL (ref 4.63–6.08)
SODIUM SERPL-SCNC: 137 MMOL/L (ref 137–145)
TSH SERPL DL<=0.005 MIU/L-ACNC: 3.08 UIU/ML (ref 0.27–4.2)
WBC # BLD AUTO: 6.66 K/UL (ref 4.23–9.07)

## 2024-03-15 PROCEDURE — 36415 COLL VENOUS BLD VENIPUNCTURE: CPT

## 2024-03-15 PROCEDURE — 85025 COMPLETE CBC W/AUTO DIFF WBC: CPT

## 2024-03-15 PROCEDURE — 96413 CHEMO IV INFUSION 1 HR: CPT

## 2024-03-15 PROCEDURE — 96372 THER/PROPH/DIAG INJ SC/IM: CPT

## 2024-03-15 PROCEDURE — 6360000002 HC RX W HCPCS: Performed by: NURSE PRACTITIONER

## 2024-03-15 PROCEDURE — 2580000003 HC RX 258: Performed by: NURSE PRACTITIONER

## 2024-03-15 PROCEDURE — 80053 COMPREHEN METABOLIC PANEL: CPT

## 2024-03-15 RX ORDER — DIPHENHYDRAMINE HYDROCHLORIDE 50 MG/ML
50 INJECTION INTRAMUSCULAR; INTRAVENOUS PRN
OUTPATIENT
Start: 2024-03-15

## 2024-03-15 RX ORDER — HEPARIN SODIUM (PORCINE) LOCK FLUSH IV SOLN 100 UNIT/ML 100 UNIT/ML
500 SOLUTION INTRAVENOUS PRN
Status: CANCELLED | OUTPATIENT
Start: 2024-03-15

## 2024-03-15 RX ORDER — EPINEPHRINE 1 MG/ML
0.3 INJECTION, SOLUTION, CONCENTRATE INTRAVENOUS PRN
OUTPATIENT
Start: 2024-03-15

## 2024-03-15 RX ORDER — HEPARIN 100 UNIT/ML
500 SYRINGE INTRAVENOUS PRN
Status: DISCONTINUED | OUTPATIENT
Start: 2024-03-15 | End: 2024-03-17 | Stop reason: HOSPADM

## 2024-03-15 RX ORDER — SODIUM CHLORIDE 0.9 % (FLUSH) 0.9 %
10 SYRINGE (ML) INJECTION PRN
Status: DISCONTINUED | OUTPATIENT
Start: 2024-03-15 | End: 2024-03-16 | Stop reason: HOSPADM

## 2024-03-15 RX ORDER — SODIUM CHLORIDE 0.9 % (FLUSH) 0.9 %
10 SYRINGE (ML) INJECTION PRN
Status: CANCELLED | OUTPATIENT
Start: 2024-03-15

## 2024-03-15 RX ORDER — SODIUM CHLORIDE 0.9 % (FLUSH) 0.9 %
5 SYRINGE (ML) INJECTION PRN
OUTPATIENT
Start: 2024-03-15

## 2024-03-15 RX ADMIN — SODIUM CHLORIDE, PRESERVATIVE FREE 10 ML: 5 INJECTION INTRAVENOUS at 10:37

## 2024-03-15 RX ADMIN — SODIUM CHLORIDE 480 MG: 9 INJECTION, SOLUTION INTRAVENOUS at 10:06

## 2024-03-15 RX ADMIN — DENOSUMAB 120 MG: 120 INJECTION SUBCUTANEOUS at 10:07

## 2024-03-15 RX ADMIN — HEPARIN 500 UNITS: 100 SYRINGE at 10:37

## 2024-03-29 ENCOUNTER — HOSPITAL ENCOUNTER (OUTPATIENT)
Dept: CT IMAGING | Age: 84
Discharge: HOME | End: 2024-03-29
Payer: MEDICARE

## 2024-03-29 DIAGNOSIS — C43.61 MALIGNANT MELANOMA OF RIGHT UPPER EXTREMITY INCLUDING SHOULDER (HCC): ICD-10-CM

## 2024-03-29 PROCEDURE — 74177 CT ABD & PELVIS W/CONTRAST: CPT

## 2024-03-29 PROCEDURE — 6360000004 HC RX CONTRAST MEDICATION: Performed by: NURSE PRACTITIONER

## 2024-03-29 PROCEDURE — 71260 CT THORAX DX C+: CPT

## 2024-03-29 RX ADMIN — IOPAMIDOL 75 ML: 755 INJECTION, SOLUTION INTRAVENOUS at 10:07

## 2024-04-05 ENCOUNTER — APPOINTMENT (OUTPATIENT)
Dept: INFUSION THERAPY | Age: 84
End: 2024-04-05
Payer: MEDICARE

## 2024-04-09 DIAGNOSIS — G89.3 CANCER ASSOCIATED PAIN: ICD-10-CM

## 2024-04-09 RX ORDER — HYDROCODONE BITARTRATE AND ACETAMINOPHEN 10; 325 MG/1; MG/1
1 TABLET ORAL EVERY 6 HOURS PRN
Qty: 120 TABLET | Refills: 0 | Status: SHIPPED | OUTPATIENT
Start: 2024-04-09 | End: 2024-05-09

## 2024-04-12 ENCOUNTER — HOSPITAL ENCOUNTER (OUTPATIENT)
Dept: INFUSION THERAPY | Age: 84
Discharge: HOME | End: 2024-04-12
Payer: MEDICARE

## 2024-04-12 ENCOUNTER — OFFICE VISIT (OUTPATIENT)
Dept: HEMATOLOGY | Age: 84
End: 2024-04-12

## 2024-04-12 VITALS
DIASTOLIC BLOOD PRESSURE: 66 MMHG | WEIGHT: 193.7 LBS | HEIGHT: 70 IN | TEMPERATURE: 98 F | HEART RATE: 67 BPM | RESPIRATION RATE: 18 BRPM | BODY MASS INDEX: 27.73 KG/M2 | SYSTOLIC BLOOD PRESSURE: 139 MMHG | OXYGEN SATURATION: 98 %

## 2024-04-12 DIAGNOSIS — C43.61 MALIGNANT MELANOMA OF RIGHT UPPER EXTREMITY INCLUDING SHOULDER (HCC): Primary | ICD-10-CM

## 2024-04-12 DIAGNOSIS — Z51.12 ENCOUNTER FOR ANTINEOPLASTIC IMMUNOTHERAPY: ICD-10-CM

## 2024-04-12 DIAGNOSIS — G89.3 CANCER RELATED PAIN: ICD-10-CM

## 2024-04-12 DIAGNOSIS — Z95.828 PORT-A-CATH IN PLACE: ICD-10-CM

## 2024-04-12 DIAGNOSIS — Z71.89 CARE PLAN DISCUSSED WITH PATIENT: ICD-10-CM

## 2024-04-12 DIAGNOSIS — C79.51 MALIGNANT NEOPLASM METASTATIC TO BONE (HCC): ICD-10-CM

## 2024-04-12 DIAGNOSIS — L29.9 PRURITUS: ICD-10-CM

## 2024-04-12 DIAGNOSIS — C43.61 MALIGNANT MELANOMA OF RIGHT UPPER EXTREMITY INCLUDING SHOULDER (HCC): ICD-10-CM

## 2024-04-12 DIAGNOSIS — E03.2 HYPOTHYROIDISM DUE TO MEDICATION: ICD-10-CM

## 2024-04-12 DIAGNOSIS — C43.9 MALIGNANT MELANOMA, UNSPECIFIED SITE (HCC): Primary | ICD-10-CM

## 2024-04-12 DIAGNOSIS — C43.9 MALIGNANT MELANOMA, UNSPECIFIED SITE (HCC): ICD-10-CM

## 2024-04-12 LAB
ALBUMIN SERPL-MCNC: 4 G/DL (ref 3.5–5.2)
ALP SERPL-CCNC: 44 U/L (ref 40–130)
ALT SERPL-CCNC: 22 U/L (ref 21–72)
ANION GAP SERPL CALCULATED.3IONS-SCNC: 10 MMOL/L (ref 7–19)
AST SERPL-CCNC: 28 U/L (ref 17–59)
BASOPHILS # BLD: 0.01 K/UL (ref 0.01–0.08)
BASOPHILS NFR BLD: 0.1 % (ref 0.1–1.2)
BILIRUB SERPL-MCNC: 1.1 MG/DL (ref 0.2–1.3)
BUN SERPL-MCNC: 14 MG/DL (ref 9–20)
CALCIUM SERPL-MCNC: 8.5 MG/DL (ref 8.4–10.2)
CHLORIDE SERPL-SCNC: 102 MMOL/L (ref 98–111)
CO2 SERPL-SCNC: 26 MMOL/L (ref 22–29)
CREAT SERPL-MCNC: 1 MG/DL (ref 0.6–1.2)
EOSINOPHIL # BLD: 0.34 K/UL (ref 0.04–0.54)
EOSINOPHIL NFR BLD: 4.7 % (ref 0.7–7)
ERYTHROCYTE [DISTWIDTH] IN BLOOD BY AUTOMATED COUNT: 11.9 % (ref 11.6–14.4)
GLOBULIN: 2.7 G/DL
GLUCOSE SERPL-MCNC: 118 MG/DL (ref 74–106)
HCT VFR BLD AUTO: 41.2 % (ref 40.1–51)
HGB BLD-MCNC: 14.6 G/DL (ref 13.7–17.5)
LYMPHOCYTES # BLD: 1.84 K/UL (ref 1.18–3.74)
LYMPHOCYTES NFR BLD: 25.6 % (ref 19.3–53.1)
MCH RBC QN AUTO: 30.7 PG (ref 25.7–32.2)
MCHC RBC AUTO-ENTMCNC: 35.4 G/DL (ref 32.3–36.5)
MCV RBC AUTO: 86.6 FL (ref 79–92.2)
MONOCYTES # BLD: 0.73 K/UL (ref 0.24–0.82)
MONOCYTES NFR BLD: 10.1 % (ref 4.7–12.5)
NEUTROPHILS # BLD: 4.26 K/UL (ref 1.56–6.13)
NEUTS SEG NFR BLD: 59.2 % (ref 34–71.1)
PLATELET # BLD AUTO: 219 K/UL (ref 163–337)
PMV BLD AUTO: 9.2 FL (ref 7.4–10.4)
POTASSIUM SERPL-SCNC: 4.1 MMOL/L (ref 3.5–5.1)
PROT SERPL-MCNC: 6.7 G/DL (ref 6.3–8.2)
RBC # BLD AUTO: 4.76 M/UL (ref 4.63–6.08)
SODIUM SERPL-SCNC: 138 MMOL/L (ref 137–145)
WBC # BLD AUTO: 7.2 K/UL (ref 4.23–9.07)

## 2024-04-12 PROCEDURE — 80053 COMPREHEN METABOLIC PANEL: CPT

## 2024-04-12 PROCEDURE — 85025 COMPLETE CBC W/AUTO DIFF WBC: CPT

## 2024-04-12 PROCEDURE — 2580000003 HC RX 258: Performed by: NURSE PRACTITIONER

## 2024-04-12 PROCEDURE — 6360000002 HC RX W HCPCS: Performed by: NURSE PRACTITIONER

## 2024-04-12 PROCEDURE — 96372 THER/PROPH/DIAG INJ SC/IM: CPT

## 2024-04-12 PROCEDURE — 36415 COLL VENOUS BLD VENIPUNCTURE: CPT

## 2024-04-12 PROCEDURE — 96413 CHEMO IV INFUSION 1 HR: CPT

## 2024-04-12 RX ORDER — HEPARIN SODIUM (PORCINE) LOCK FLUSH IV SOLN 100 UNIT/ML 100 UNIT/ML
500 SOLUTION INTRAVENOUS PRN
Status: CANCELLED | OUTPATIENT
Start: 2024-04-12

## 2024-04-12 RX ORDER — SODIUM CHLORIDE 0.9 % (FLUSH) 0.9 %
10 SYRINGE (ML) INJECTION PRN
Status: CANCELLED | OUTPATIENT
Start: 2024-04-12

## 2024-04-12 RX ORDER — SODIUM CHLORIDE 0.9 % (FLUSH) 0.9 %
10 SYRINGE (ML) INJECTION PRN
Status: DISCONTINUED | OUTPATIENT
Start: 2024-04-12 | End: 2024-04-13 | Stop reason: HOSPADM

## 2024-04-12 RX ORDER — SODIUM CHLORIDE 0.9 % (FLUSH) 0.9 %
5 SYRINGE (ML) INJECTION PRN
Status: CANCELLED | OUTPATIENT
Start: 2024-04-12

## 2024-04-12 RX ORDER — EPINEPHRINE 1 MG/ML
0.3 INJECTION, SOLUTION, CONCENTRATE INTRAVENOUS PRN
Status: CANCELLED | OUTPATIENT
Start: 2024-04-12

## 2024-04-12 RX ORDER — HEPARIN 100 UNIT/ML
500 SYRINGE INTRAVENOUS PRN
Status: DISCONTINUED | OUTPATIENT
Start: 2024-04-12 | End: 2024-04-14 | Stop reason: HOSPADM

## 2024-04-12 RX ORDER — DIPHENHYDRAMINE HYDROCHLORIDE 50 MG/ML
50 INJECTION INTRAMUSCULAR; INTRAVENOUS PRN
Status: CANCELLED | OUTPATIENT
Start: 2024-04-12

## 2024-04-12 RX ADMIN — SODIUM CHLORIDE 480 MG: 9 INJECTION, SOLUTION INTRAVENOUS at 08:55

## 2024-04-12 RX ADMIN — SODIUM CHLORIDE, PRESERVATIVE FREE 10 ML: 5 INJECTION INTRAVENOUS at 09:26

## 2024-04-12 RX ADMIN — HEPARIN 500 UNITS: 100 SYRINGE at 09:26

## 2024-04-12 RX ADMIN — DENOSUMAB 120 MG: 120 INJECTION SUBCUTANEOUS at 09:26

## 2024-05-10 ENCOUNTER — HOSPITAL ENCOUNTER (OUTPATIENT)
Dept: INFUSION THERAPY | Age: 84
Discharge: HOME | End: 2024-05-10
Payer: MEDICARE

## 2024-05-10 VITALS
HEART RATE: 56 BPM | RESPIRATION RATE: 18 BRPM | SYSTOLIC BLOOD PRESSURE: 127 MMHG | BODY MASS INDEX: 27.35 KG/M2 | TEMPERATURE: 97.9 F | WEIGHT: 191 LBS | DIASTOLIC BLOOD PRESSURE: 58 MMHG | HEIGHT: 70 IN

## 2024-05-10 DIAGNOSIS — C43.61 MALIGNANT MELANOMA OF RIGHT UPPER EXTREMITY INCLUDING SHOULDER (HCC): ICD-10-CM

## 2024-05-10 DIAGNOSIS — C43.9 MALIGNANT MELANOMA, UNSPECIFIED SITE (HCC): Primary | ICD-10-CM

## 2024-05-10 DIAGNOSIS — C79.51 MALIGNANT NEOPLASM METASTATIC TO BONE (HCC): ICD-10-CM

## 2024-05-10 DIAGNOSIS — R53.83 FATIGUE DUE TO TREATMENT: ICD-10-CM

## 2024-05-10 DIAGNOSIS — Z95.828 PORT-A-CATH IN PLACE: ICD-10-CM

## 2024-05-10 DIAGNOSIS — C43.61 MALIGNANT MELANOMA OF RIGHT UPPER EXTREMITY INCLUDING SHOULDER (HCC): Primary | ICD-10-CM

## 2024-05-10 LAB
ALBUMIN SERPL-MCNC: 4.2 G/DL (ref 3.5–5.2)
ALP SERPL-CCNC: 44 U/L (ref 40–130)
ALT SERPL-CCNC: 17 U/L (ref 21–72)
ANION GAP SERPL CALCULATED.3IONS-SCNC: 6 MMOL/L (ref 7–19)
AST SERPL-CCNC: 24 U/L (ref 17–59)
BASOPHILS # BLD: 0.01 K/UL (ref 0.01–0.08)
BASOPHILS NFR BLD: 0.1 % (ref 0.1–1.2)
BILIRUB SERPL-MCNC: 1.1 MG/DL (ref 0.2–1.3)
BUN SERPL-MCNC: 14 MG/DL (ref 9–20)
CALCIUM SERPL-MCNC: 8.8 MG/DL (ref 8.4–10.2)
CHLORIDE SERPL-SCNC: 100 MMOL/L (ref 98–111)
CO2 SERPL-SCNC: 26 MMOL/L (ref 22–29)
CORTIS AM PEAK SERPL-MCNC: 9.2 UG/DL (ref 6.2–19.4)
CREAT SERPL-MCNC: 0.9 MG/DL (ref 0.6–1.2)
EOSINOPHIL # BLD: 0.02 K/UL (ref 0.04–0.54)
EOSINOPHIL NFR BLD: 0.3 % (ref 0.7–7)
ERYTHROCYTE [DISTWIDTH] IN BLOOD BY AUTOMATED COUNT: 12 % (ref 11.6–14.4)
GLOBULIN: 2.8 G/DL
GLUCOSE SERPL-MCNC: 114 MG/DL (ref 74–106)
HCT VFR BLD AUTO: 41.5 % (ref 40.1–51)
HGB BLD-MCNC: 15 G/DL (ref 13.7–17.5)
LYMPHOCYTES # BLD: 1.73 K/UL (ref 1.18–3.74)
LYMPHOCYTES NFR BLD: 24 % (ref 19.3–53.1)
MCH RBC QN AUTO: 31.1 PG (ref 25.7–32.2)
MCHC RBC AUTO-ENTMCNC: 36.1 G/DL (ref 32.3–36.5)
MCV RBC AUTO: 86.1 FL (ref 79–92.2)
MONOCYTES # BLD: 0.77 K/UL (ref 0.24–0.82)
MONOCYTES NFR BLD: 10.7 % (ref 4.7–12.5)
NEUTROPHILS # BLD: 4.65 K/UL (ref 1.56–6.13)
NEUTS SEG NFR BLD: 64.6 % (ref 34–71.1)
PLATELET # BLD AUTO: 222 K/UL (ref 163–337)
PMV BLD AUTO: 9.5 FL (ref 7.4–10.4)
POTASSIUM SERPL-SCNC: 4.2 MMOL/L (ref 3.5–5.1)
PROT SERPL-MCNC: 7.1 G/DL (ref 6.3–8.2)
RBC # BLD AUTO: 4.82 M/UL (ref 4.63–6.08)
SODIUM SERPL-SCNC: 132 MMOL/L (ref 137–145)
T4 FREE SERPL-MCNC: 1.43 NG/DL (ref 0.93–1.7)
TSH SERPL DL<=0.005 MIU/L-ACNC: 3.27 UIU/ML (ref 0.27–4.2)
WBC # BLD AUTO: 7.2 K/UL (ref 4.23–9.07)

## 2024-05-10 PROCEDURE — 96372 THER/PROPH/DIAG INJ SC/IM: CPT

## 2024-05-10 PROCEDURE — 36415 COLL VENOUS BLD VENIPUNCTURE: CPT

## 2024-05-10 PROCEDURE — 6360000002 HC RX W HCPCS: Performed by: INTERNAL MEDICINE

## 2024-05-10 PROCEDURE — 96413 CHEMO IV INFUSION 1 HR: CPT

## 2024-05-10 PROCEDURE — 85025 COMPLETE CBC W/AUTO DIFF WBC: CPT

## 2024-05-10 PROCEDURE — 80053 COMPREHEN METABOLIC PANEL: CPT

## 2024-05-10 PROCEDURE — 2580000003 HC RX 258: Performed by: INTERNAL MEDICINE

## 2024-05-10 RX ORDER — DIPHENHYDRAMINE HYDROCHLORIDE 50 MG/ML
50 INJECTION INTRAMUSCULAR; INTRAVENOUS PRN
Status: CANCELLED | OUTPATIENT
Start: 2024-05-10

## 2024-05-10 RX ORDER — SODIUM CHLORIDE 0.9 % (FLUSH) 0.9 %
5 SYRINGE (ML) INJECTION PRN
Status: CANCELLED | OUTPATIENT
Start: 2024-05-10

## 2024-05-10 RX ORDER — HEPARIN 100 UNIT/ML
500 SYRINGE INTRAVENOUS PRN
Status: DISCONTINUED | OUTPATIENT
Start: 2024-05-10 | End: 2024-05-12 | Stop reason: HOSPADM

## 2024-05-10 RX ORDER — EPINEPHRINE 1 MG/ML
0.3 INJECTION, SOLUTION, CONCENTRATE INTRAVENOUS PRN
Status: CANCELLED | OUTPATIENT
Start: 2024-05-10

## 2024-05-10 RX ORDER — SODIUM CHLORIDE 0.9 % (FLUSH) 0.9 %
10 SYRINGE (ML) INJECTION PRN
Status: DISCONTINUED | OUTPATIENT
Start: 2024-05-10 | End: 2024-05-11 | Stop reason: HOSPADM

## 2024-05-10 RX ADMIN — SODIUM CHLORIDE, PRESERVATIVE FREE 10 ML: 5 INJECTION INTRAVENOUS at 09:22

## 2024-05-10 RX ADMIN — SODIUM CHLORIDE 480 MG: 9 INJECTION, SOLUTION INTRAVENOUS at 08:43

## 2024-05-10 RX ADMIN — HEPARIN 500 UNITS: 100 SYRINGE at 09:21

## 2024-05-10 RX ADMIN — DENOSUMAB 120 MG: 120 INJECTION SUBCUTANEOUS at 09:22

## 2024-05-15 DIAGNOSIS — G89.3 CANCER ASSOCIATED PAIN: ICD-10-CM

## 2024-05-15 RX ORDER — HYDROCODONE BITARTRATE AND ACETAMINOPHEN 10; 325 MG/1; MG/1
1 TABLET ORAL EVERY 6 HOURS PRN
Qty: 120 TABLET | Refills: 0 | Status: SHIPPED | OUTPATIENT
Start: 2024-05-15 | End: 2024-06-14

## 2024-06-06 NOTE — PROGRESS NOTES
Progress Note      Pt Name: Leeroy Conroy  YOB: 1940  MRN: [id number]    Date of evaluation: 4/12/2024    History Obtained From:  patient, spouse-Apryl, electronic medical record    CHIEF COMPLAINT:    Chief Complaint   Patient presents with    Cancer     Malignant melanoma of right upper extremity including shoulder     HISTORY OF PRESENT ILLNESS:  Leeroy Conroy is an 83-year-old  gentleman receiving monthly Nivolumab for diagnosis of stage IV, BRAF V600E positive metastatic melanoma to a right axillary lymph node, liver and bone initially diagnosed 6 years ago in July, 2018.  He returns to the clinic for follow-up, toxicity assessment and delivery of treatment.  He tolerates treatment with grade 1 fatigue, grade 1 pruritus, treatment related hypothyroidism and decreased appetite.  Leeroy states he had leftover Marinol from prior prescription at home and began taking 2.5 mg daily, which has helped.  He continues levothyroxine 100 mcg daily.  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  Leeroy has required no intervention in this regard.  Chronic low back pain and right upper quadrant abdominal discomfort is managed with Norco 10 mg po QID PRN.    Leeroy remains active, ECOG grade 1.  Leeroy is accompanied by his wife today.      Leeroy Conroy presents for follow-up surveillance visit and toxicity assessment.   he requires intenstive monitoring due to the potential to cause serious morbidity or death.     TARGET METASTATIC MALIGNANT MELANOMA SITES:  Right upper extremity original primary site 06/05/14   Right axilla lymph node at presentation 6/5/2014 and 3 axillary nodes at recurrence 7/27/2018 with an SUV of 8.7   Too numerous to count liver metastases   Celiac lymph nodes x 2 with highest SUV of 3   Bony metastatic disease on PET scan in the right ilium (SUV of 5) and right acetabulum (SUV of 6.4)

## 2024-06-07 ENCOUNTER — HOSPITAL ENCOUNTER (OUTPATIENT)
Dept: INFUSION THERAPY | Age: 84
Discharge: HOME | End: 2024-06-07
Payer: MEDICARE

## 2024-06-07 VITALS
HEIGHT: 70 IN | RESPIRATION RATE: 18 BRPM | HEART RATE: 66 BPM | OXYGEN SATURATION: 94 % | WEIGHT: 187.3 LBS | BODY MASS INDEX: 26.81 KG/M2 | DIASTOLIC BLOOD PRESSURE: 58 MMHG | SYSTOLIC BLOOD PRESSURE: 151 MMHG | TEMPERATURE: 98.3 F

## 2024-06-07 DIAGNOSIS — R53.83 FATIGUE DUE TO TREATMENT: ICD-10-CM

## 2024-06-07 DIAGNOSIS — C79.51 MALIGNANT NEOPLASM METASTATIC TO BONE (HCC): Primary | ICD-10-CM

## 2024-06-07 DIAGNOSIS — Z95.828 PORT-A-CATH IN PLACE: ICD-10-CM

## 2024-06-07 DIAGNOSIS — C43.61 MALIGNANT MELANOMA OF RIGHT UPPER EXTREMITY INCLUDING SHOULDER (HCC): ICD-10-CM

## 2024-06-07 DIAGNOSIS — C43.61 MALIGNANT MELANOMA OF RIGHT UPPER EXTREMITY INCLUDING SHOULDER (HCC): Primary | ICD-10-CM

## 2024-06-07 DIAGNOSIS — C43.9 MALIGNANT MELANOMA, UNSPECIFIED SITE (HCC): ICD-10-CM

## 2024-06-07 LAB
ALBUMIN SERPL-MCNC: 4.4 G/DL (ref 3.5–5.2)
ALP SERPL-CCNC: 43 U/L (ref 40–130)
ALT SERPL-CCNC: 14 U/L (ref 21–72)
ANION GAP SERPL CALCULATED.3IONS-SCNC: 6 MMOL/L (ref 7–19)
AST SERPL-CCNC: 24 U/L (ref 17–59)
BASOPHILS # BLD: 0 K/UL (ref 0.01–0.08)
BASOPHILS NFR BLD: 0 % (ref 0.1–1.2)
BILIRUB SERPL-MCNC: 1.2 MG/DL (ref 0.2–1.3)
BUN SERPL-MCNC: 16 MG/DL (ref 9–20)
CALCIUM SERPL-MCNC: 9 MG/DL (ref 8.4–10.2)
CHLORIDE SERPL-SCNC: 101 MMOL/L (ref 98–111)
CO2 SERPL-SCNC: 29 MMOL/L (ref 22–29)
CORTIS AM PEAK SERPL-MCNC: 8.2 UG/DL (ref 6.2–19.4)
CREAT SERPL-MCNC: 1.1 MG/DL (ref 0.6–1.2)
EOSINOPHIL # BLD: 0 K/UL (ref 0.04–0.54)
EOSINOPHIL NFR BLD: 0 % (ref 0.7–7)
ERYTHROCYTE [DISTWIDTH] IN BLOOD BY AUTOMATED COUNT: 12.1 % (ref 11.6–14.4)
GLOBULIN: 2.3 G/DL
GLUCOSE SERPL-MCNC: 153 MG/DL (ref 74–106)
HCT VFR BLD AUTO: 41.9 % (ref 40.1–51)
HGB BLD-MCNC: 14.6 G/DL (ref 13.7–17.5)
LYMPHOCYTES # BLD: 1.89 K/UL (ref 1.18–3.74)
LYMPHOCYTES NFR BLD: 29.5 % (ref 19.3–53.1)
MCH RBC QN AUTO: 30.5 PG (ref 25.7–32.2)
MCHC RBC AUTO-ENTMCNC: 34.8 G/DL (ref 32.3–36.5)
MCV RBC AUTO: 87.7 FL (ref 79–92.2)
MONOCYTES # BLD: 0.68 K/UL (ref 0.24–0.82)
MONOCYTES NFR BLD: 10.6 % (ref 4.7–12.5)
NEUTROPHILS # BLD: 3.82 K/UL (ref 1.56–6.13)
NEUTS SEG NFR BLD: 59.7 % (ref 34–71.1)
PLATELET # BLD AUTO: 222 K/UL (ref 163–337)
PMV BLD AUTO: 9.3 FL (ref 7.4–10.4)
POTASSIUM SERPL-SCNC: 3.9 MMOL/L (ref 3.5–5.1)
PROT SERPL-MCNC: 6.6 G/DL (ref 6.3–8.2)
RBC # BLD AUTO: 4.78 M/UL (ref 4.63–6.08)
SODIUM SERPL-SCNC: 136 MMOL/L (ref 137–145)
T4 FREE SERPL-MCNC: 1.53 NG/DL (ref 0.93–1.7)
TSH SERPL DL<=0.005 MIU/L-ACNC: 3.92 UIU/ML (ref 0.27–4.2)
WBC # BLD AUTO: 6.4 K/UL (ref 4.23–9.07)

## 2024-06-07 PROCEDURE — 96413 CHEMO IV INFUSION 1 HR: CPT

## 2024-06-07 PROCEDURE — 96372 THER/PROPH/DIAG INJ SC/IM: CPT

## 2024-06-07 PROCEDURE — 96417 CHEMO IV INFUS EACH ADDL SEQ: CPT

## 2024-06-07 PROCEDURE — 36415 COLL VENOUS BLD VENIPUNCTURE: CPT

## 2024-06-07 PROCEDURE — 6360000002 HC RX W HCPCS: Performed by: NURSE PRACTITIONER

## 2024-06-07 PROCEDURE — 80053 COMPREHEN METABOLIC PANEL: CPT

## 2024-06-07 PROCEDURE — 96401 CHEMO ANTI-NEOPL SQ/IM: CPT

## 2024-06-07 PROCEDURE — 2580000003 HC RX 258: Performed by: PHYSICIAN ASSISTANT

## 2024-06-07 PROCEDURE — 85025 COMPLETE CBC W/AUTO DIFF WBC: CPT

## 2024-06-07 PROCEDURE — 6360000002 HC RX W HCPCS: Performed by: PHYSICIAN ASSISTANT

## 2024-06-07 RX ORDER — DIPHENHYDRAMINE HYDROCHLORIDE 50 MG/ML
50 INJECTION INTRAMUSCULAR; INTRAVENOUS PRN
Status: CANCELLED | OUTPATIENT
Start: 2024-06-07

## 2024-06-07 RX ORDER — HEPARIN 100 UNIT/ML
500 SYRINGE INTRAVENOUS PRN
Status: DISCONTINUED | OUTPATIENT
Start: 2024-06-07 | End: 2024-06-09 | Stop reason: HOSPADM

## 2024-06-07 RX ORDER — SODIUM CHLORIDE 0.9 % (FLUSH) 0.9 %
5 SYRINGE (ML) INJECTION PRN
Status: CANCELLED | OUTPATIENT
Start: 2024-06-07

## 2024-06-07 RX ORDER — SODIUM CHLORIDE 0.9 % (FLUSH) 0.9 %
10 SYRINGE (ML) INJECTION PRN
Status: DISCONTINUED | OUTPATIENT
Start: 2024-06-07 | End: 2024-06-08 | Stop reason: HOSPADM

## 2024-06-07 RX ORDER — SODIUM CHLORIDE 0.9 % (FLUSH) 0.9 %
10 SYRINGE (ML) INJECTION PRN
Status: CANCELLED | OUTPATIENT
Start: 2024-06-07

## 2024-06-07 RX ORDER — EPINEPHRINE 1 MG/ML
0.3 INJECTION, SOLUTION, CONCENTRATE INTRAVENOUS PRN
Status: CANCELLED | OUTPATIENT
Start: 2024-06-07

## 2024-06-07 RX ORDER — HEPARIN SODIUM (PORCINE) LOCK FLUSH IV SOLN 100 UNIT/ML 100 UNIT/ML
500 SOLUTION INTRAVENOUS PRN
Status: CANCELLED | OUTPATIENT
Start: 2024-06-07

## 2024-06-07 RX ADMIN — HEPARIN 500 UNITS: 100 SYRINGE at 09:33

## 2024-06-07 RX ADMIN — SODIUM CHLORIDE 480 MG: 9 INJECTION, SOLUTION INTRAVENOUS at 09:01

## 2024-06-07 RX ADMIN — SODIUM CHLORIDE, PRESERVATIVE FREE 10 ML: 5 INJECTION INTRAVENOUS at 09:32

## 2024-06-07 RX ADMIN — DENOSUMAB 120 MG: 120 INJECTION SUBCUTANEOUS at 08:36

## 2024-06-17 DIAGNOSIS — G89.3 CANCER ASSOCIATED PAIN: ICD-10-CM

## 2024-06-17 RX ORDER — HYDROCODONE BITARTRATE AND ACETAMINOPHEN 10; 325 MG/1; MG/1
1 TABLET ORAL EVERY 6 HOURS PRN
Qty: 120 TABLET | Refills: 0 | Status: SHIPPED | OUTPATIENT
Start: 2024-06-17 | End: 2024-07-17

## 2024-07-05 ENCOUNTER — HOSPITAL ENCOUNTER (OUTPATIENT)
Dept: INFUSION THERAPY | Age: 84
Discharge: HOME | End: 2024-07-05
Payer: MEDICARE

## 2024-07-05 ENCOUNTER — OFFICE VISIT (OUTPATIENT)
Dept: HEMATOLOGY | Age: 84
End: 2024-07-05

## 2024-07-05 VITALS
SYSTOLIC BLOOD PRESSURE: 147 MMHG | RESPIRATION RATE: 18 BRPM | WEIGHT: 187.2 LBS | BODY MASS INDEX: 26.8 KG/M2 | TEMPERATURE: 97.6 F | DIASTOLIC BLOOD PRESSURE: 66 MMHG | OXYGEN SATURATION: 96 % | HEART RATE: 56 BPM | HEIGHT: 70 IN

## 2024-07-05 DIAGNOSIS — Z95.828 PORT-A-CATH IN PLACE: ICD-10-CM

## 2024-07-05 DIAGNOSIS — C79.51 MALIGNANT NEOPLASM METASTATIC TO BONE (HCC): ICD-10-CM

## 2024-07-05 DIAGNOSIS — Z51.12 ENCOUNTER FOR ANTINEOPLASTIC IMMUNOTHERAPY: ICD-10-CM

## 2024-07-05 DIAGNOSIS — L29.9 PRURITUS: ICD-10-CM

## 2024-07-05 DIAGNOSIS — E03.2 HYPOTHYROIDISM DUE TO MEDICATION: ICD-10-CM

## 2024-07-05 DIAGNOSIS — R63.0 DECREASED APPETITE: ICD-10-CM

## 2024-07-05 DIAGNOSIS — Z71.89 CARE PLAN DISCUSSED WITH PATIENT: ICD-10-CM

## 2024-07-05 DIAGNOSIS — C43.61 MALIGNANT MELANOMA OF RIGHT UPPER EXTREMITY INCLUDING SHOULDER (HCC): Primary | ICD-10-CM

## 2024-07-05 DIAGNOSIS — N28.89 LEFT RENAL MASS: ICD-10-CM

## 2024-07-05 DIAGNOSIS — C43.9 MALIGNANT MELANOMA, UNSPECIFIED SITE (HCC): Primary | ICD-10-CM

## 2024-07-05 DIAGNOSIS — C43.61 MALIGNANT MELANOMA OF RIGHT UPPER EXTREMITY INCLUDING SHOULDER (HCC): ICD-10-CM

## 2024-07-05 LAB
ALBUMIN SERPL-MCNC: 3.9 G/DL (ref 3.5–5.2)
ALP SERPL-CCNC: 50 U/L (ref 40–130)
ALT SERPL-CCNC: 19 U/L (ref 21–72)
ANION GAP SERPL CALCULATED.3IONS-SCNC: 7 MMOL/L (ref 7–19)
AST SERPL-CCNC: 25 U/L (ref 17–59)
BASOPHILS # BLD: 0 K/UL (ref 0.01–0.08)
BASOPHILS NFR BLD: 0 % (ref 0.1–1.2)
BILIRUB SERPL-MCNC: 1.6 MG/DL (ref 0.2–1.3)
BUN SERPL-MCNC: 16 MG/DL (ref 9–20)
CALCIUM SERPL-MCNC: 8.5 MG/DL (ref 8.4–10.2)
CHLORIDE SERPL-SCNC: 102 MMOL/L (ref 98–111)
CO2 SERPL-SCNC: 30 MMOL/L (ref 22–29)
CREAT SERPL-MCNC: 1 MG/DL (ref 0.6–1.2)
EOSINOPHIL # BLD: 0 K/UL (ref 0.04–0.54)
EOSINOPHIL NFR BLD: 0 % (ref 0.7–7)
ERYTHROCYTE [DISTWIDTH] IN BLOOD BY AUTOMATED COUNT: 12.2 % (ref 11.6–14.4)
GLOBULIN: 2.4 G/DL
GLUCOSE SERPL-MCNC: 112 MG/DL (ref 74–106)
HCT VFR BLD AUTO: 40.6 % (ref 40.1–51)
HGB BLD-MCNC: 13.9 G/DL (ref 13.7–17.5)
LYMPHOCYTES # BLD: 1.76 K/UL (ref 1.18–3.74)
LYMPHOCYTES NFR BLD: 26.7 % (ref 19.3–53.1)
MCH RBC QN AUTO: 30.3 PG (ref 25.7–32.2)
MCHC RBC AUTO-ENTMCNC: 34.2 G/DL (ref 32.3–36.5)
MCV RBC AUTO: 88.5 FL (ref 79–92.2)
MONOCYTES # BLD: 0.59 K/UL (ref 0.24–0.82)
MONOCYTES NFR BLD: 9 % (ref 4.7–12.5)
NEUTROPHILS # BLD: 4.22 K/UL (ref 1.56–6.13)
NEUTS SEG NFR BLD: 64 % (ref 34–71.1)
PLATELET # BLD AUTO: 240 K/UL (ref 163–337)
PMV BLD AUTO: 9.2 FL (ref 7.4–10.4)
POTASSIUM SERPL-SCNC: 4.6 MMOL/L (ref 3.5–5.1)
PROT SERPL-MCNC: 6.3 G/DL (ref 6.3–8.2)
RBC # BLD AUTO: 4.59 M/UL (ref 4.63–6.08)
SODIUM SERPL-SCNC: 139 MMOL/L (ref 137–145)
WBC # BLD AUTO: 6.59 K/UL (ref 4.23–9.07)

## 2024-07-05 PROCEDURE — 2580000003 HC RX 258: Performed by: NURSE PRACTITIONER

## 2024-07-05 PROCEDURE — 96372 THER/PROPH/DIAG INJ SC/IM: CPT

## 2024-07-05 PROCEDURE — 36415 COLL VENOUS BLD VENIPUNCTURE: CPT

## 2024-07-05 PROCEDURE — 80053 COMPREHEN METABOLIC PANEL: CPT

## 2024-07-05 PROCEDURE — 6360000002 HC RX W HCPCS: Performed by: NURSE PRACTITIONER

## 2024-07-05 PROCEDURE — 96413 CHEMO IV INFUSION 1 HR: CPT

## 2024-07-05 PROCEDURE — 85025 COMPLETE CBC W/AUTO DIFF WBC: CPT

## 2024-07-05 RX ORDER — DRONABINOL 2.5 MG/1
2.5 CAPSULE ORAL DAILY PRN
COMMUNITY

## 2024-07-05 RX ORDER — EPINEPHRINE 1 MG/ML
0.3 INJECTION, SOLUTION, CONCENTRATE INTRAVENOUS PRN
Status: CANCELLED | OUTPATIENT
Start: 2024-07-05

## 2024-07-05 RX ORDER — SODIUM CHLORIDE 0.9 % (FLUSH) 0.9 %
10 SYRINGE (ML) INJECTION PRN
Status: DISCONTINUED | OUTPATIENT
Start: 2024-07-05 | End: 2024-07-06 | Stop reason: HOSPADM

## 2024-07-05 RX ORDER — SODIUM CHLORIDE 0.9 % (FLUSH) 0.9 %
5 SYRINGE (ML) INJECTION PRN
Status: CANCELLED | OUTPATIENT
Start: 2024-07-05

## 2024-07-05 RX ORDER — HEPARIN SODIUM (PORCINE) LOCK FLUSH IV SOLN 100 UNIT/ML 100 UNIT/ML
500 SOLUTION INTRAVENOUS PRN
Status: CANCELLED | OUTPATIENT
Start: 2024-07-05

## 2024-07-05 RX ORDER — HEPARIN 100 UNIT/ML
500 SYRINGE INTRAVENOUS PRN
Status: DISCONTINUED | OUTPATIENT
Start: 2024-07-05 | End: 2024-07-07 | Stop reason: HOSPADM

## 2024-07-05 RX ORDER — SODIUM CHLORIDE 0.9 % (FLUSH) 0.9 %
10 SYRINGE (ML) INJECTION PRN
Status: CANCELLED | OUTPATIENT
Start: 2024-07-05

## 2024-07-05 RX ORDER — DIPHENHYDRAMINE HYDROCHLORIDE 50 MG/ML
50 INJECTION INTRAMUSCULAR; INTRAVENOUS PRN
Status: CANCELLED | OUTPATIENT
Start: 2024-07-05

## 2024-07-05 RX ADMIN — SODIUM CHLORIDE 480 MG: 9 INJECTION, SOLUTION INTRAVENOUS at 09:15

## 2024-07-05 RX ADMIN — SODIUM CHLORIDE, PRESERVATIVE FREE 10 ML: 5 INJECTION INTRAVENOUS at 09:47

## 2024-07-05 RX ADMIN — DENOSUMAB 120 MG: 120 INJECTION SUBCUTANEOUS at 09:16

## 2024-07-05 RX ADMIN — HEPARIN 500 UNITS: 100 SYRINGE at 09:47

## 2024-07-05 ASSESSMENT — ENCOUNTER SYMPTOMS
COUGH: 0
DIARRHEA: 0
EYE ITCHING: 0
WHEEZING: 0
ABDOMINAL PAIN: 0
EYE DISCHARGE: 0
SORE THROAT: 0
BACK PAIN: 1
VOMITING: 0
CONSTIPATION: 0
SHORTNESS OF BREATH: 1
TROUBLE SWALLOWING: 0
NAUSEA: 0

## 2024-07-22 DIAGNOSIS — G89.3 CANCER ASSOCIATED PAIN: ICD-10-CM

## 2024-07-22 RX ORDER — HYDROCODONE BITARTRATE AND ACETAMINOPHEN 10; 325 MG/1; MG/1
1 TABLET ORAL EVERY 6 HOURS PRN
Qty: 120 TABLET | Refills: 0 | Status: SHIPPED | OUTPATIENT
Start: 2024-07-22 | End: 2024-08-21

## 2024-07-25 ENCOUNTER — TELEPHONE (OUTPATIENT)
Dept: HEMATOLOGY | Age: 84
End: 2024-07-25

## 2024-07-25 NOTE — TELEPHONE ENCOUNTER
Called Patient and reminded patient of their appointment on 08/02/2024 and patient confirmed they would be here. Reminded patient to just come at appointment time. Reminded patient that we will not check them in any more than 30 minutes before appointment time.  We have now moved to the [facility] cancer center that is located between our old office and the ER at the [facility]

## 2024-08-02 ENCOUNTER — HOSPITAL ENCOUNTER (OUTPATIENT)
Dept: INFUSION THERAPY | Age: 84
Discharge: HOME | End: 2024-08-02
Payer: MEDICARE

## 2024-08-02 VITALS
WEIGHT: 186.3 LBS | HEART RATE: 65 BPM | TEMPERATURE: 97.9 F | HEIGHT: 70 IN | BODY MASS INDEX: 26.67 KG/M2 | OXYGEN SATURATION: 96 % | RESPIRATION RATE: 18 BRPM | DIASTOLIC BLOOD PRESSURE: 63 MMHG | SYSTOLIC BLOOD PRESSURE: 135 MMHG

## 2024-08-02 DIAGNOSIS — C43.61 MALIGNANT MELANOMA OF RIGHT UPPER EXTREMITY INCLUDING SHOULDER (HCC): Primary | ICD-10-CM

## 2024-08-02 DIAGNOSIS — C43.9 MALIGNANT MELANOMA, UNSPECIFIED SITE (HCC): Primary | ICD-10-CM

## 2024-08-02 DIAGNOSIS — Z51.12 ENCOUNTER FOR ANTINEOPLASTIC IMMUNOTHERAPY: ICD-10-CM

## 2024-08-02 DIAGNOSIS — R53.83 FATIGUE DUE TO TREATMENT: ICD-10-CM

## 2024-08-02 DIAGNOSIS — C43.61 MALIGNANT MELANOMA OF RIGHT UPPER EXTREMITY INCLUDING SHOULDER (HCC): ICD-10-CM

## 2024-08-02 DIAGNOSIS — Z95.828 PORT-A-CATH IN PLACE: ICD-10-CM

## 2024-08-02 LAB
ALBUMIN SERPL-MCNC: 3.6 G/DL (ref 3.5–5.2)
ALP SERPL-CCNC: 45 U/L (ref 40–129)
ALT SERPL-CCNC: 8 U/L (ref 5–41)
ANION GAP SERPL CALCULATED.3IONS-SCNC: 9 MMOL/L (ref 7–19)
AST SERPL-CCNC: 17 U/L (ref 5–40)
BASOPHILS # BLD: 0.01 K/UL (ref 0.01–0.08)
BASOPHILS NFR BLD: 0.2 % (ref 0.1–1.2)
BILIRUB SERPL-MCNC: 1 MG/DL (ref 0–1.2)
BUN SERPL-MCNC: 13 MG/DL (ref 8–23)
CALCIUM SERPL-MCNC: 8.2 MG/DL (ref 8.8–10.2)
CHLORIDE SERPL-SCNC: 103 MMOL/L (ref 98–107)
CO2 SERPL-SCNC: 24 MMOL/L (ref 22–29)
CORTIS AM PEAK SERPL-MCNC: 8.2 UG/DL (ref 6.2–19.4)
CREAT SERPL-MCNC: 1.1 MG/DL (ref 0.7–1.2)
EOSINOPHIL # BLD: 0 K/UL (ref 0.04–0.54)
EOSINOPHIL NFR BLD: 0 % (ref 0.7–7)
ERYTHROCYTE [DISTWIDTH] IN BLOOD BY AUTOMATED COUNT: 12.8 % (ref 11.6–14.4)
GLUCOSE SERPL-MCNC: 124 MG/DL (ref 70–99)
HCT VFR BLD AUTO: 39.9 % (ref 40.1–51)
HGB BLD-MCNC: 13.6 G/DL (ref 13.7–17.5)
LYMPHOCYTES # BLD: 1.59 K/UL (ref 1.18–3.74)
LYMPHOCYTES NFR BLD: 24.3 % (ref 19.3–53.1)
MCH RBC QN AUTO: 30.4 PG (ref 25.7–32.2)
MCHC RBC AUTO-ENTMCNC: 34.1 G/DL (ref 32.3–36.5)
MCV RBC AUTO: 89.1 FL (ref 79–92.2)
MONOCYTES # BLD: 0.72 K/UL (ref 0.24–0.82)
MONOCYTES NFR BLD: 11 % (ref 4.7–12.5)
NEUTROPHILS # BLD: 4.21 K/UL (ref 1.56–6.13)
NEUTS SEG NFR BLD: 64.3 % (ref 34–71.1)
PLATELET # BLD AUTO: 212 K/UL (ref 163–337)
PMV BLD AUTO: 9.4 FL (ref 7.4–10.4)
POTASSIUM SERPL-SCNC: 4.1 MMOL/L (ref 3.5–5.1)
PROT SERPL-MCNC: 6.1 G/DL (ref 6.4–8.3)
RBC # BLD AUTO: 4.48 M/UL (ref 4.63–6.08)
SODIUM SERPL-SCNC: 136 MMOL/L (ref 136–145)
T4 FREE SERPL-MCNC: 1.52 NG/DL (ref 0.93–1.7)
TSH SERPL DL<=0.005 MIU/L-ACNC: 2.93 UIU/ML (ref 0.27–4.2)
WBC # BLD AUTO: 6.54 K/UL (ref 4.23–9.07)

## 2024-08-02 PROCEDURE — 2580000003 HC RX 258: Performed by: INTERNAL MEDICINE

## 2024-08-02 PROCEDURE — 36415 COLL VENOUS BLD VENIPUNCTURE: CPT

## 2024-08-02 PROCEDURE — 85025 COMPLETE CBC W/AUTO DIFF WBC: CPT

## 2024-08-02 PROCEDURE — 80053 COMPREHEN METABOLIC PANEL: CPT

## 2024-08-02 PROCEDURE — 96413 CHEMO IV INFUSION 1 HR: CPT

## 2024-08-02 PROCEDURE — 6360000002 HC RX W HCPCS: Performed by: INTERNAL MEDICINE

## 2024-08-02 RX ORDER — SODIUM CHLORIDE 0.9 % (FLUSH) 0.9 %
10 SYRINGE (ML) INJECTION PRN
Status: DISCONTINUED | OUTPATIENT
Start: 2024-08-02 | End: 2024-08-03 | Stop reason: HOSPADM

## 2024-08-02 RX ORDER — EPINEPHRINE 1 MG/ML
0.3 INJECTION, SOLUTION, CONCENTRATE INTRAVENOUS PRN
OUTPATIENT
Start: 2024-08-02

## 2024-08-02 RX ORDER — DIPHENHYDRAMINE HYDROCHLORIDE 50 MG/ML
50 INJECTION INTRAMUSCULAR; INTRAVENOUS PRN
OUTPATIENT
Start: 2024-08-02

## 2024-08-02 RX ORDER — SODIUM CHLORIDE 0.9 % (FLUSH) 0.9 %
5 SYRINGE (ML) INJECTION PRN
OUTPATIENT
Start: 2024-08-02

## 2024-08-02 RX ORDER — HEPARIN 100 UNIT/ML
500 SYRINGE INTRAVENOUS PRN
Status: DISCONTINUED | OUTPATIENT
Start: 2024-08-02 | End: 2024-08-04 | Stop reason: HOSPADM

## 2024-08-02 RX ADMIN — SODIUM CHLORIDE 480 MG: 9 INJECTION, SOLUTION INTRAVENOUS at 09:09

## 2024-08-02 RX ADMIN — SODIUM CHLORIDE, PRESERVATIVE FREE 10 ML: 5 INJECTION INTRAVENOUS at 09:39

## 2024-08-02 RX ADMIN — HEPARIN 500 UNITS: 100 SYRINGE at 09:39

## 2024-08-02 NOTE — PROGRESS NOTES
Corrected Calcium 8.2, HAMZAH WHEELER notified.  Hold Xgeva until next appointment date which is 8/30/24. Instructed patient to take TUMS twice daily.

## 2024-08-09 RX ORDER — HYDROXYZINE HYDROCHLORIDE 25 MG/1
25 TABLET, FILM COATED ORAL EVERY 8 HOURS PRN
Qty: 270 TABLET | Refills: 0 | Status: SHIPPED | OUTPATIENT
Start: 2024-08-09 | End: 2024-11-07

## 2024-08-29 DIAGNOSIS — G89.3 CANCER ASSOCIATED PAIN: ICD-10-CM

## 2024-08-29 DIAGNOSIS — C43.61 MALIGNANT MELANOMA OF RIGHT UPPER EXTREMITY INCLUDING SHOULDER (HCC): Primary | ICD-10-CM

## 2024-08-29 RX ORDER — HYDROCODONE BITARTRATE AND ACETAMINOPHEN 10; 325 MG/1; MG/1
1 TABLET ORAL EVERY 6 HOURS PRN
Qty: 120 TABLET | Refills: 0 | Status: SHIPPED | OUTPATIENT
Start: 2024-08-29 | End: 2024-09-28

## 2024-08-29 NOTE — PROGRESS NOTES
Progress Note      Pt Name: Leeroy Conroy  YOB: 1940  MRN: [id number]    Date of evaluation: 08/30/24    History Obtained From:  patient, electronic medical record    CHIEF COMPLAINT:    Chief Complaint   Patient presents with    Cancer     Malignant Melanoma, immunotherapy delivery     HISTORY OF PRESENT ILLNESS:  Leeroy Conroy is a pleasant 83-year-old gentleman diagnosed with stage IV, BRAF V600E positive metastatic melanoma 7/2018.  He presented with metastatic disease to a right axillary lymph node, liver and bones.  He has been treated consistently with nivolumab since 4/2019, as outlined in the treatment summary.  Zometa has been held due to marginally low calcium which is improved at this time.  Leeroy is being seen today to evaluate for medication management, toxicity assessment and treatment delivery.  He is pleased with medication tolerability.  Treatment related side effects are mild and tolerable according to Leeroy.  His main complaint at this time is of decreased appetite for which she takes Marinol as needed.  Marinol does help when he takes it.  Chronic pruritus is improved at present.  Grade 1 fatigue is stable.  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  Leeroy has required no intervention in this regard.  Chronic low back pain and right upper quadrant abdominal discomfort is managed with Norco 10 mg po QID PRN.    Surveillance imaging, CT chest, abdomen/pelvis, is scheduled 9/20/2024.    Leeroy Conroy presents for follow-up surveillance visit and toxicity assessment.   he requires intenstive monitoring due to the potential to cause serious morbidity or death.     TARGET METASTATIC MALIGNANT MELANOMA SITES:  Right upper extremity original primary site 06/05/14   Right axilla lymph node at presentation 6/5/2014 and 3  08/30/2024 1.70  1.18 - 3.74 K/uL Final    Monocytes Absolute 08/30/2024 0.78  0.24 - 0.82 K/uL Final    Eosinophils Absolute 08/30/2024 0.13  0.04 - 0.54 K/uL Final    Basophils Absolute 08/30/2024 0.00 (L)  0.01 - 0.08 K/uL Final     Labs 8/2/2024:  CBC: WBC: 6.54, Hgb: 13.6, MCV: 89.1, Platelets: 212,000  CMP: Creatinine: 1.1, GFR: 66, Calcium: 8.1, Total protein: 6.1  Cortisol, AM: 8.2  T4 Free: 1.52  TSH: 2.930    Xgeva held on 8/2/24 due to corrected calcium of 8.2. Plan to resume xgeva 8/30/24 pending calcium level.         ASSESSMENT/PLAN:   1. Malignant melanoma of right upper extremity including shoulder (HCC)    2. Fatigue due to treatment    3. Hypothyroidism due to medication      Malignant melanoma of skin of right upper extremity, including shoulder (HCC)  Encounter for antineoplastic immunotherapy    Contrasted CT chest 3/29/2024 [facility] (comparison 1/27/2024, 10/6/2023, 12/7/2021):  Resolution of previously seen left-sided pneumonia and left pleural effusion  Previously described small mediastinal lymph nodes appear decreased, no pathologically appearing lymphadenopathy on today's exam  No evidence of intrathoracic metastasis  No acute osseous abnormality    Contrasted abdominal/pelvic CT 3/29/2024 [facility] (comparison 10/6/2023):  No evidence of metastatic disease within the abdomen or pelvis  Diverticulosis without diverticulitis  Upper normal thickness of the urinary bladder wall which may be due to to lack of distention.  Correlate to exclude cystitis  The liver shows no acute abnormality  Adrenal glands and kidneys show no acute abnormality  Benign-appearing renal cysts appear stable    There are no new visible skin lesions.  There are no palpable lymphadenopathies, specifically no lymphadenopathies to the right axilla.    Stable imaging and exam.  Continue maintenance nivolumab today and monthly.  CT Chest, Abdomen/Pelvis scheduled 9/20/24 at [facility]  - follow-up results    hypothyroidism due to

## 2024-08-30 ENCOUNTER — HOSPITAL ENCOUNTER (OUTPATIENT)
Dept: INFUSION THERAPY | Age: 84
Discharge: HOME | End: 2024-08-30
Payer: MEDICARE

## 2024-08-30 ENCOUNTER — OFFICE VISIT (OUTPATIENT)
Dept: HEMATOLOGY | Age: 84
End: 2024-08-30

## 2024-08-30 VITALS
HEART RATE: 64 BPM | TEMPERATURE: 98.1 F | BODY MASS INDEX: 26.05 KG/M2 | WEIGHT: 182 LBS | DIASTOLIC BLOOD PRESSURE: 64 MMHG | RESPIRATION RATE: 18 BRPM | SYSTOLIC BLOOD PRESSURE: 140 MMHG | HEIGHT: 70 IN | OXYGEN SATURATION: 95 %

## 2024-08-30 VITALS — SYSTOLIC BLOOD PRESSURE: 140 MMHG | DIASTOLIC BLOOD PRESSURE: 64 MMHG | HEART RATE: 64 BPM | OXYGEN SATURATION: 95 %

## 2024-08-30 DIAGNOSIS — C43.61 MALIGNANT MELANOMA OF RIGHT UPPER EXTREMITY INCLUDING SHOULDER (HCC): ICD-10-CM

## 2024-08-30 DIAGNOSIS — E03.2 HYPOTHYROIDISM DUE TO MEDICATION: ICD-10-CM

## 2024-08-30 DIAGNOSIS — C79.51 MALIGNANT NEOPLASM METASTATIC TO BONE (HCC): ICD-10-CM

## 2024-08-30 DIAGNOSIS — G89.3 CANCER RELATED PAIN: ICD-10-CM

## 2024-08-30 DIAGNOSIS — R53.83 FATIGUE DUE TO TREATMENT: ICD-10-CM

## 2024-08-30 DIAGNOSIS — Z95.828 PORT-A-CATH IN PLACE: ICD-10-CM

## 2024-08-30 DIAGNOSIS — C43.9 MALIGNANT MELANOMA, UNSPECIFIED SITE (HCC): Primary | ICD-10-CM

## 2024-08-30 DIAGNOSIS — Z51.12 ENCOUNTER FOR ANTINEOPLASTIC IMMUNOTHERAPY: ICD-10-CM

## 2024-08-30 DIAGNOSIS — T88.9XXD SIDE EFFECTS OF TREATMENT, SUBSEQUENT ENCOUNTER: ICD-10-CM

## 2024-08-30 DIAGNOSIS — C43.61 MALIGNANT MELANOMA OF RIGHT UPPER EXTREMITY INCLUDING SHOULDER (HCC): Primary | ICD-10-CM

## 2024-08-30 DIAGNOSIS — N28.89 LEFT RENAL MASS: ICD-10-CM

## 2024-08-30 LAB
ALBUMIN SERPL-MCNC: 3.8 G/DL (ref 3.5–5.2)
ALP SERPL-CCNC: 52 U/L (ref 40–129)
ALT SERPL-CCNC: 9 U/L (ref 5–41)
ANION GAP SERPL CALCULATED.3IONS-SCNC: 5 MMOL/L (ref 7–19)
AST SERPL-CCNC: 17 U/L (ref 5–40)
BASOPHILS # BLD: 0 K/UL (ref 0.01–0.08)
BASOPHILS NFR BLD: 0 % (ref 0.1–1.2)
BILIRUB SERPL-MCNC: 0.9 MG/DL (ref 0–1.2)
BUN SERPL-MCNC: 17 MG/DL (ref 8–23)
CALCIUM SERPL-MCNC: 8.7 MG/DL (ref 8.8–10.2)
CHLORIDE SERPL-SCNC: 101 MMOL/L (ref 98–107)
CO2 SERPL-SCNC: 27 MMOL/L (ref 22–29)
CORTIS AM PEAK SERPL-MCNC: 8.7 UG/DL (ref 4.8–19.5)
CREAT SERPL-MCNC: 1.1 MG/DL (ref 0.7–1.2)
EOSINOPHIL # BLD: 0.13 K/UL (ref 0.04–0.54)
EOSINOPHIL NFR BLD: 1.8 % (ref 0.7–7)
ERYTHROCYTE [DISTWIDTH] IN BLOOD BY AUTOMATED COUNT: 12.3 % (ref 11.6–14.4)
GLUCOSE SERPL-MCNC: 109 MG/DL (ref 70–99)
HCT VFR BLD AUTO: 39.1 % (ref 40.1–51)
HGB BLD-MCNC: 13.4 G/DL (ref 13.7–17.5)
LYMPHOCYTES # BLD: 1.7 K/UL (ref 1.18–3.74)
LYMPHOCYTES NFR BLD: 24 % (ref 19.3–53.1)
MCH RBC QN AUTO: 30 PG (ref 25.7–32.2)
MCHC RBC AUTO-ENTMCNC: 34.3 G/DL (ref 32.3–36.5)
MCV RBC AUTO: 87.5 FL (ref 79–92.2)
MONOCYTES # BLD: 0.78 K/UL (ref 0.24–0.82)
MONOCYTES NFR BLD: 11 % (ref 4.7–12.5)
NEUTROPHILS # BLD: 4.45 K/UL (ref 1.56–6.13)
NEUTS SEG NFR BLD: 63.1 % (ref 34–71.1)
PLATELET # BLD AUTO: 240 K/UL (ref 163–337)
PMV BLD AUTO: 9.1 FL (ref 7.4–10.4)
POTASSIUM SERPL-SCNC: 4.1 MMOL/L (ref 3.5–5.1)
PROT SERPL-MCNC: 6.3 G/DL (ref 6.4–8.3)
RBC # BLD AUTO: 4.47 M/UL (ref 4.63–6.08)
SODIUM SERPL-SCNC: 133 MMOL/L (ref 136–145)
T4 FREE SERPL-MCNC: 1.56 NG/DL (ref 0.93–1.7)
TSH SERPL DL<=0.005 MIU/L-ACNC: 2.61 UIU/ML (ref 0.27–4.2)
WBC # BLD AUTO: 7.07 K/UL (ref 4.23–9.07)

## 2024-08-30 PROCEDURE — 80053 COMPREHEN METABOLIC PANEL: CPT

## 2024-08-30 PROCEDURE — 36415 COLL VENOUS BLD VENIPUNCTURE: CPT

## 2024-08-30 PROCEDURE — 2580000003 HC RX 258: Performed by: NURSE PRACTITIONER

## 2024-08-30 PROCEDURE — 96413 CHEMO IV INFUSION 1 HR: CPT

## 2024-08-30 PROCEDURE — 85025 COMPLETE CBC W/AUTO DIFF WBC: CPT

## 2024-08-30 PROCEDURE — 96372 THER/PROPH/DIAG INJ SC/IM: CPT

## 2024-08-30 PROCEDURE — 6360000002 HC RX W HCPCS: Performed by: NURSE PRACTITIONER

## 2024-08-30 RX ORDER — EPINEPHRINE 1 MG/ML
0.3 INJECTION, SOLUTION, CONCENTRATE INTRAVENOUS PRN
Status: CANCELLED | OUTPATIENT
Start: 2024-08-30

## 2024-08-30 RX ORDER — SODIUM CHLORIDE 0.9 % (FLUSH) 0.9 %
10 SYRINGE (ML) INJECTION PRN
Status: DISCONTINUED | OUTPATIENT
Start: 2024-08-30 | End: 2024-08-31 | Stop reason: HOSPADM

## 2024-08-30 RX ORDER — HEPARIN 100 UNIT/ML
500 SYRINGE INTRAVENOUS PRN
Status: DISCONTINUED | OUTPATIENT
Start: 2024-08-30 | End: 2024-09-01 | Stop reason: HOSPADM

## 2024-08-30 RX ORDER — SODIUM CHLORIDE 0.9 % (FLUSH) 0.9 %
5 SYRINGE (ML) INJECTION PRN
Status: CANCELLED | OUTPATIENT
Start: 2024-08-30

## 2024-08-30 RX ORDER — DIPHENHYDRAMINE HYDROCHLORIDE 50 MG/ML
50 INJECTION INTRAMUSCULAR; INTRAVENOUS PRN
Status: CANCELLED | OUTPATIENT
Start: 2024-08-30

## 2024-08-30 RX ADMIN — HEPARIN 500 UNITS: 100 SYRINGE at 09:31

## 2024-08-30 RX ADMIN — SODIUM CHLORIDE 480 MG: 9 INJECTION, SOLUTION INTRAVENOUS at 09:01

## 2024-08-30 RX ADMIN — DENOSUMAB 120 MG: 120 INJECTION SUBCUTANEOUS at 09:28

## 2024-08-30 RX ADMIN — SODIUM CHLORIDE, PRESERVATIVE FREE 10 ML: 5 INJECTION INTRAVENOUS at 09:31

## 2024-08-30 ASSESSMENT — ENCOUNTER SYMPTOMS
DIARRHEA: 0
WHEEZING: 0
SHORTNESS OF BREATH: 1
ABDOMINAL PAIN: 0
COUGH: 0
EYE DISCHARGE: 0
NAUSEA: 0
SORE THROAT: 0
EYE ITCHING: 0
BACK PAIN: 1
CONSTIPATION: 0
VOMITING: 0
TROUBLE SWALLOWING: 0

## 2024-09-20 ENCOUNTER — HOSPITAL ENCOUNTER (OUTPATIENT)
Dept: CT IMAGING | Age: 84
Discharge: HOME | End: 2024-09-20
Payer: MEDICARE

## 2024-09-20 DIAGNOSIS — C43.61 MALIGNANT MELANOMA OF RIGHT UPPER EXTREMITY INCLUDING SHOULDER (HCC): ICD-10-CM

## 2024-09-20 DIAGNOSIS — N28.89 LEFT RENAL MASS: ICD-10-CM

## 2024-09-20 PROCEDURE — 74177 CT ABD & PELVIS W/CONTRAST: CPT

## 2024-09-20 PROCEDURE — 71260 CT THORAX DX C+: CPT

## 2024-09-20 PROCEDURE — 6360000004 HC RX CONTRAST MEDICATION: Performed by: NURSE PRACTITIONER

## 2024-09-20 RX ORDER — IOPAMIDOL 755 MG/ML
75 INJECTION, SOLUTION INTRAVASCULAR
Status: COMPLETED | OUTPATIENT
Start: 2024-09-20 | End: 2024-09-20

## 2024-09-20 RX ADMIN — IOPAMIDOL 75 ML: 755 INJECTION, SOLUTION INTRAVENOUS at 11:29

## 2024-09-21 DIAGNOSIS — E03.9 ACQUIRED HYPOTHYROIDISM: ICD-10-CM

## 2024-09-23 RX ORDER — LEVOTHYROXINE SODIUM 100 UG/1
100 TABLET ORAL DAILY
Qty: 90 TABLET | Refills: 1 | Status: SHIPPED | OUTPATIENT
Start: 2024-09-23

## 2024-09-27 ENCOUNTER — HOSPITAL ENCOUNTER (OUTPATIENT)
Dept: INFUSION THERAPY | Age: 84
Discharge: HOME | End: 2024-09-27
Payer: MEDICARE

## 2024-09-27 ENCOUNTER — OFFICE VISIT (OUTPATIENT)
Dept: HEMATOLOGY | Age: 84
End: 2024-09-27
Payer: MEDICARE

## 2024-09-27 VITALS
WEIGHT: 178.8 LBS | OXYGEN SATURATION: 94 % | DIASTOLIC BLOOD PRESSURE: 61 MMHG | TEMPERATURE: 97.7 F | HEART RATE: 62 BPM | HEIGHT: 70 IN | BODY MASS INDEX: 25.6 KG/M2 | SYSTOLIC BLOOD PRESSURE: 156 MMHG | RESPIRATION RATE: 18 BRPM

## 2024-09-27 DIAGNOSIS — Z95.828 PORT-A-CATH IN PLACE: ICD-10-CM

## 2024-09-27 DIAGNOSIS — C43.61 MALIGNANT MELANOMA OF RIGHT UPPER EXTREMITY INCLUDING SHOULDER (HCC): ICD-10-CM

## 2024-09-27 DIAGNOSIS — R53.83 FATIGUE DUE TO TREATMENT: ICD-10-CM

## 2024-09-27 DIAGNOSIS — E03.2 HYPOTHYROIDISM DUE TO MEDICATION: ICD-10-CM

## 2024-09-27 DIAGNOSIS — N28.89 LEFT RENAL MASS: ICD-10-CM

## 2024-09-27 DIAGNOSIS — R63.0 DECREASED APPETITE: ICD-10-CM

## 2024-09-27 DIAGNOSIS — Z51.12 ENCOUNTER FOR ANTINEOPLASTIC IMMUNOTHERAPY: ICD-10-CM

## 2024-09-27 DIAGNOSIS — G89.3 CANCER RELATED PAIN: ICD-10-CM

## 2024-09-27 DIAGNOSIS — C43.9 MALIGNANT MELANOMA, UNSPECIFIED SITE (HCC): Primary | ICD-10-CM

## 2024-09-27 DIAGNOSIS — C79.51 MALIGNANT NEOPLASM METASTATIC TO BONE (HCC): ICD-10-CM

## 2024-09-27 DIAGNOSIS — G89.3 CANCER ASSOCIATED PAIN: ICD-10-CM

## 2024-09-27 DIAGNOSIS — T88.9XXD SIDE EFFECTS OF TREATMENT, SUBSEQUENT ENCOUNTER: ICD-10-CM

## 2024-09-27 DIAGNOSIS — C43.61 MALIGNANT MELANOMA OF RIGHT UPPER EXTREMITY INCLUDING SHOULDER (HCC): Primary | ICD-10-CM

## 2024-09-27 LAB
ALBUMIN SERPL-MCNC: 4.1 G/DL (ref 3.5–5.2)
ALP SERPL-CCNC: 52 U/L (ref 40–129)
ALT SERPL-CCNC: 15 U/L (ref 5–41)
ANION GAP SERPL CALCULATED.3IONS-SCNC: 7 MMOL/L (ref 7–19)
AST SERPL-CCNC: 21 U/L (ref 5–40)
BASOPHILS # BLD: 0 K/UL (ref 0.01–0.08)
BASOPHILS NFR BLD: 0 % (ref 0.1–1.2)
BILIRUB SERPL-MCNC: 1.5 MG/DL (ref 0–1.2)
BUN SERPL-MCNC: 13 MG/DL (ref 8–23)
CALCIUM SERPL-MCNC: 9.1 MG/DL (ref 8.8–10.2)
CHLORIDE SERPL-SCNC: 100 MMOL/L (ref 98–107)
CO2 SERPL-SCNC: 29 MMOL/L (ref 22–29)
CORTIS AM PEAK SERPL-MCNC: 12.1 UG/DL (ref 4.8–19.5)
CREAT SERPL-MCNC: 1.2 MG/DL (ref 0.7–1.2)
EOSINOPHIL # BLD: 0.28 K/UL (ref 0.04–0.54)
EOSINOPHIL NFR BLD: 4 % (ref 0.7–7)
ERYTHROCYTE [DISTWIDTH] IN BLOOD BY AUTOMATED COUNT: 12.3 % (ref 11.6–14.4)
GLUCOSE SERPL-MCNC: 121 MG/DL (ref 70–99)
HCT VFR BLD AUTO: 41.7 % (ref 40.1–51)
HGB BLD-MCNC: 14.1 G/DL (ref 13.7–17.5)
LYMPHOCYTES # BLD: 1.59 K/UL (ref 1.18–3.74)
LYMPHOCYTES NFR BLD: 22.8 % (ref 19.3–53.1)
MCH RBC QN AUTO: 29.8 PG (ref 25.7–32.2)
MCHC RBC AUTO-ENTMCNC: 33.8 G/DL (ref 32.3–36.5)
MCV RBC AUTO: 88.2 FL (ref 79–92.2)
MONOCYTES # BLD: 0.54 K/UL (ref 0.24–0.82)
MONOCYTES NFR BLD: 7.7 % (ref 4.7–12.5)
NEUTROPHILS # BLD: 4.54 K/UL (ref 1.56–6.13)
NEUTS SEG NFR BLD: 65.2 % (ref 34–71.1)
PLATELET # BLD AUTO: 223 K/UL (ref 163–337)
PMV BLD AUTO: 9.5 FL (ref 7.4–10.4)
POTASSIUM SERPL-SCNC: 4.9 MMOL/L (ref 3.5–5.1)
PROT SERPL-MCNC: 6.8 G/DL (ref 6.4–8.3)
RBC # BLD AUTO: 4.73 M/UL (ref 4.63–6.08)
SODIUM SERPL-SCNC: 136 MMOL/L (ref 136–145)
TSH SERPL DL<=0.005 MIU/L-ACNC: 2.45 UIU/ML (ref 0.27–4.2)
WBC # BLD AUTO: 6.97 K/UL (ref 4.23–9.07)

## 2024-09-27 PROCEDURE — 85025 COMPLETE CBC W/AUTO DIFF WBC: CPT

## 2024-09-27 PROCEDURE — 1123F ACP DISCUSS/DSCN MKR DOCD: CPT | Performed by: NURSE PRACTITIONER

## 2024-09-27 PROCEDURE — 96372 THER/PROPH/DIAG INJ SC/IM: CPT

## 2024-09-27 PROCEDURE — 80053 COMPREHEN METABOLIC PANEL: CPT

## 2024-09-27 PROCEDURE — G2211 COMPLEX E/M VISIT ADD ON: HCPCS | Performed by: NURSE PRACTITIONER

## 2024-09-27 PROCEDURE — 99214 OFFICE O/P EST MOD 30 MIN: CPT | Performed by: NURSE PRACTITIONER

## 2024-09-27 PROCEDURE — G8417 CALC BMI ABV UP PARAM F/U: HCPCS | Performed by: NURSE PRACTITIONER

## 2024-09-27 PROCEDURE — 6360000002 HC RX W HCPCS: Performed by: NURSE PRACTITIONER

## 2024-09-27 PROCEDURE — 1036F TOBACCO NON-USER: CPT | Performed by: NURSE PRACTITIONER

## 2024-09-27 PROCEDURE — 2580000003 HC RX 258: Performed by: NURSE PRACTITIONER

## 2024-09-27 PROCEDURE — 36415 COLL VENOUS BLD VENIPUNCTURE: CPT

## 2024-09-27 PROCEDURE — G8427 DOCREV CUR MEDS BY ELIG CLIN: HCPCS | Performed by: NURSE PRACTITIONER

## 2024-09-27 PROCEDURE — 96413 CHEMO IV INFUSION 1 HR: CPT

## 2024-09-27 RX ORDER — HYDROCODONE BITARTRATE AND ACETAMINOPHEN 10; 325 MG/1; MG/1
1 TABLET ORAL EVERY 6 HOURS PRN
Qty: 120 TABLET | Refills: 0 | Status: SHIPPED | OUTPATIENT
Start: 2024-09-27 | End: 2024-10-27

## 2024-09-27 RX ORDER — EPINEPHRINE 1 MG/ML
0.3 INJECTION, SOLUTION, CONCENTRATE INTRAVENOUS PRN
Status: CANCELLED | OUTPATIENT
Start: 2024-09-27

## 2024-09-27 RX ORDER — SODIUM CHLORIDE 0.9 % (FLUSH) 0.9 %
10 SYRINGE (ML) INJECTION PRN
Status: DISCONTINUED | OUTPATIENT
Start: 2024-09-27 | End: 2024-09-28 | Stop reason: HOSPADM

## 2024-09-27 RX ORDER — SODIUM CHLORIDE 0.9 % (FLUSH) 0.9 %
5 SYRINGE (ML) INJECTION PRN
Status: CANCELLED | OUTPATIENT
Start: 2024-09-27

## 2024-09-27 RX ORDER — SODIUM CHLORIDE 0.9 % (FLUSH) 0.9 %
10 SYRINGE (ML) INJECTION PRN
Status: CANCELLED | OUTPATIENT
Start: 2024-09-27

## 2024-09-27 RX ORDER — HEPARIN SODIUM (PORCINE) LOCK FLUSH IV SOLN 100 UNIT/ML 100 UNIT/ML
500 SOLUTION INTRAVENOUS PRN
Status: CANCELLED | OUTPATIENT
Start: 2024-09-27

## 2024-09-27 RX ORDER — DIPHENHYDRAMINE HYDROCHLORIDE 50 MG/ML
50 INJECTION INTRAMUSCULAR; INTRAVENOUS PRN
Status: CANCELLED | OUTPATIENT
Start: 2024-09-27

## 2024-09-27 RX ORDER — HEPARIN 100 UNIT/ML
500 SYRINGE INTRAVENOUS PRN
Status: DISCONTINUED | OUTPATIENT
Start: 2024-09-27 | End: 2024-09-29 | Stop reason: HOSPADM

## 2024-09-27 RX ADMIN — HEPARIN 500 UNITS: 100 SYRINGE at 09:29

## 2024-09-27 RX ADMIN — DENOSUMAB 120 MG: 120 INJECTION SUBCUTANEOUS at 09:04

## 2024-09-27 RX ADMIN — SODIUM CHLORIDE 480 MG: 9 INJECTION, SOLUTION INTRAVENOUS at 08:58

## 2024-09-27 RX ADMIN — SODIUM CHLORIDE, PRESERVATIVE FREE 10 ML: 5 INJECTION INTRAVENOUS at 09:29

## 2024-09-27 NOTE — PROGRESS NOTES
2021 CKD-EPI equation.  Careful  clinical correlation is recommended, particularly when  comparing to results calculated using previous equations.  The CKD-EPI equation is less accurate in patients with  extremes of muscle mass, extra-renal metabolism of  creatinine, excessive creatinine ingestion, or following  therapy that affects renal tubular secretion.      Calcium 09/27/2024 9.1  8.8 - 10.2 mg/dL Final    Total Protein 09/27/2024 6.8  6.4 - 8.3 g/dL Final    Albumin 09/27/2024 4.1  3.5 - 5.2 g/dL Final    Total Bilirubin 09/27/2024 1.5 (H)  0.0 - 1.2 mg/dL Final    Alkaline Phosphatase 09/27/2024 52  40 - 129 U/L Final    ALT 09/27/2024 15  5 - 41 U/L Final    AST 09/27/2024 21  5 - 40 U/L Final    WBC 09/27/2024 6.97  4.23 - 9.07 K/uL Final    RBC 09/27/2024 4.73  4.63 - 6.08 M/uL Final    Hemoglobin 09/27/2024 14.1  13.7 - 17.5 g/dL Final    Hematocrit 09/27/2024 41.7  40.1 - 51.0 % Final    MCV 09/27/2024 88.2  79.0 - 92.2 fL Final    MCH 09/27/2024 29.8  25.7 - 32.2 pg Final    MCHC 09/27/2024 33.8  32.3 - 36.5 g/dL Final    RDW 09/27/2024 12.3  11.6 - 14.4 % Final    Platelets 09/27/2024 223  163 - 337 K/uL Final    MPV 09/27/2024 9.5  7.4 - 10.4 fL Final    Neutrophils % 09/27/2024 65.2  34.0 - 71.1 % Final    Lymphocytes % 09/27/2024 22.8  19.3 - 53.1 % Final    Monocytes % 09/27/2024 7.7  4.7 - 12.5 % Final    Eosinophils % 09/27/2024 4.0  0.7 - 7.0 % Final    Basophils % 09/27/2024 0.0 (L)  0.1 - 1.2 % Final    Neutrophils Absolute 09/27/2024 4.54  1.56 - 6.13 K/uL Final    Lymphocytes Absolute 09/27/2024 1.59  1.18 - 3.74 K/uL Final    Monocytes Absolute 09/27/2024 0.54  0.24 - 0.82 K/uL Final    Eosinophils Absolute 09/27/2024 0.28  0.04 - 0.54 K/uL Final    Basophils Absolute 09/27/2024 0.00 (L)  0.01 - 0.08 K/uL Final   Orders Only on 09/27/2024   Component Date Value Ref Range Status    Cortisol - AM 09/27/2024 12.1  4.8 - 19.5 ug/dL Final    TSH 09/27/2024 2.450  0.270 - 4.200 uIU/mL Final

## 2024-10-24 NOTE — PROGRESS NOTES
10.4 fL Final    Neutrophils % 10/25/2024 60.7  34.0 - 71.1 % Final    Lymphocytes % 10/25/2024 23.2  19.3 - 53.1 % Final    Monocytes % 10/25/2024 10.9  4.7 - 12.5 % Final    Eosinophils % 10/25/2024 4.7  0.7 - 7.0 % Final    Basophils % 10/25/2024 0.2  0.1 - 1.2 % Final    Neutrophils Absolute 10/25/2024 3.75  1.56 - 6.13 K/uL Final    Lymphocytes Absolute 10/25/2024 1.43  1.18 - 3.74 K/uL Final    Monocytes Absolute 10/25/2024 0.67  0.24 - 0.82 K/uL Final    Eosinophils Absolute 10/25/2024 0.29  0.04 - 0.54 K/uL Final    Basophils Absolute 10/25/2024 0.01  0.01 - 0.08 K/uL Final    Sodium 10/25/2024 138  136 - 145 mmol/L Final    Potassium 10/25/2024 4.0  3.5 - 5.1 mmol/L Final    Chloride 10/25/2024 103  98 - 107 mmol/L Final    CO2 10/25/2024 29  22 - 29 mmol/L Final    Anion Gap 10/25/2024 6 (L)  7 - 19 mmol/L Final    Glucose 10/25/2024 88  70 - 99 mg/dL Final    BUN 10/25/2024 14  8 - 23 mg/dL Final    Creatinine 10/25/2024 1.1  0.7 - 1.2 mg/dL Final    Est, Glom Filt Rate 10/25/2024 66  >60 Final    Comment: Pediatric calculator link  https://www.kidney.org/professionals/kdoqi/gfr_calculatorped  Effective Oct 3, 2022  These results are not intended for use in patients  <18 years of age.  eGFR results are calculated without  a race factor using the 2021 CKD-EPI equation.  Careful  clinical correlation is recommended, particularly when  comparing to results calculated using previous equations.  The CKD-EPI equation is less accurate in patients with  extremes of muscle mass, extra-renal metabolism of  creatinine, excessive creatinine ingestion, or following  therapy that affects renal tubular secretion.      Calcium 10/25/2024 8.5 (L)  8.8 - 10.2 mg/dL Final    Total Protein 10/25/2024 6.4  6.4 - 8.3 g/dL Final    Albumin 10/25/2024 3.9  3.5 - 5.2 g/dL Final    Total Bilirubin 10/25/2024 1.0  0.0 - 1.2 mg/dL Final    Alkaline Phosphatase 10/25/2024 50  40 - 129 U/L Final    ALT 10/25/2024 9  5 - 41 U/L Final

## 2024-10-25 ENCOUNTER — OFFICE VISIT (OUTPATIENT)
Dept: HEMATOLOGY | Age: 84
End: 2024-10-25

## 2024-10-25 ENCOUNTER — HOSPITAL ENCOUNTER (OUTPATIENT)
Dept: INFUSION THERAPY | Age: 84
Discharge: HOME | End: 2024-10-25
Payer: MEDICARE

## 2024-10-25 VITALS
HEART RATE: 58 BPM | SYSTOLIC BLOOD PRESSURE: 138 MMHG | HEIGHT: 70 IN | BODY MASS INDEX: 25.58 KG/M2 | WEIGHT: 178.7 LBS | TEMPERATURE: 97.6 F | DIASTOLIC BLOOD PRESSURE: 56 MMHG | OXYGEN SATURATION: 96 % | RESPIRATION RATE: 18 BRPM

## 2024-10-25 VITALS — HEIGHT: 70 IN | BODY MASS INDEX: 25.64 KG/M2

## 2024-10-25 DIAGNOSIS — Z79.899 MEDICATION MANAGEMENT: ICD-10-CM

## 2024-10-25 DIAGNOSIS — T88.9XXD SIDE EFFECTS OF TREATMENT, SUBSEQUENT ENCOUNTER: ICD-10-CM

## 2024-10-25 DIAGNOSIS — Z51.12 ENCOUNTER FOR ANTINEOPLASTIC IMMUNOTHERAPY: ICD-10-CM

## 2024-10-25 DIAGNOSIS — R53.83 FATIGUE DUE TO TREATMENT: ICD-10-CM

## 2024-10-25 DIAGNOSIS — R63.0 DECREASED APPETITE: ICD-10-CM

## 2024-10-25 DIAGNOSIS — E03.2 HYPOTHYROIDISM DUE TO MEDICATION: ICD-10-CM

## 2024-10-25 DIAGNOSIS — Z95.828 PORT-A-CATH IN PLACE: ICD-10-CM

## 2024-10-25 DIAGNOSIS — C78.7 MALIGNANT NEOPLASM METASTATIC TO LIVER (HCC): ICD-10-CM

## 2024-10-25 DIAGNOSIS — C43.61 MALIGNANT MELANOMA OF RIGHT UPPER EXTREMITY INCLUDING SHOULDER (HCC): Primary | ICD-10-CM

## 2024-10-25 DIAGNOSIS — C43.61 MALIGNANT MELANOMA OF RIGHT UPPER EXTREMITY INCLUDING SHOULDER (HCC): ICD-10-CM

## 2024-10-25 DIAGNOSIS — Z71.89 CARE PLAN DISCUSSED WITH PATIENT: ICD-10-CM

## 2024-10-25 DIAGNOSIS — N28.89 LEFT RENAL MASS: ICD-10-CM

## 2024-10-25 DIAGNOSIS — C43.9 MALIGNANT MELANOMA, UNSPECIFIED SITE (HCC): Primary | ICD-10-CM

## 2024-10-25 DIAGNOSIS — R63.4 WEIGHT LOSS: ICD-10-CM

## 2024-10-25 DIAGNOSIS — G89.3 CANCER ASSOCIATED PAIN: ICD-10-CM

## 2024-10-25 DIAGNOSIS — C79.51 MALIGNANT NEOPLASM METASTATIC TO BONE (HCC): ICD-10-CM

## 2024-10-25 DIAGNOSIS — R63.4 UNINTENTIONAL WEIGHT LOSS: ICD-10-CM

## 2024-10-25 LAB
ALBUMIN SERPL-MCNC: 3.9 G/DL (ref 3.5–5.2)
ALP SERPL-CCNC: 50 U/L (ref 40–129)
ALT SERPL-CCNC: 9 U/L (ref 5–41)
ANION GAP SERPL CALCULATED.3IONS-SCNC: 6 MMOL/L (ref 7–19)
AST SERPL-CCNC: 19 U/L (ref 5–40)
BASOPHILS # BLD: 0.01 K/UL (ref 0.01–0.08)
BASOPHILS NFR BLD: 0.2 % (ref 0.1–1.2)
BILIRUB SERPL-MCNC: 1 MG/DL (ref 0–1.2)
BUN SERPL-MCNC: 14 MG/DL (ref 8–23)
CALCIUM SERPL-MCNC: 8.5 MG/DL (ref 8.8–10.2)
CHLORIDE SERPL-SCNC: 103 MMOL/L (ref 98–107)
CO2 SERPL-SCNC: 29 MMOL/L (ref 22–29)
CORTIS AM PEAK SERPL-MCNC: 9.4 UG/DL (ref 4.8–19.5)
CREAT SERPL-MCNC: 1.1 MG/DL (ref 0.7–1.2)
EOSINOPHIL # BLD: 0.29 K/UL (ref 0.04–0.54)
EOSINOPHIL NFR BLD: 4.7 % (ref 0.7–7)
ERYTHROCYTE [DISTWIDTH] IN BLOOD BY AUTOMATED COUNT: 12.3 % (ref 11.6–14.4)
GLUCOSE SERPL-MCNC: 88 MG/DL (ref 70–99)
HCT VFR BLD AUTO: 40.8 % (ref 40.1–51)
HGB BLD-MCNC: 13.7 G/DL (ref 13.7–17.5)
LYMPHOCYTES # BLD: 1.43 K/UL (ref 1.18–3.74)
LYMPHOCYTES NFR BLD: 23.2 % (ref 19.3–53.1)
MCH RBC QN AUTO: 30.4 PG (ref 25.7–32.2)
MCHC RBC AUTO-ENTMCNC: 33.6 G/DL (ref 32.3–36.5)
MCV RBC AUTO: 90.5 FL (ref 79–92.2)
MONOCYTES # BLD: 0.67 K/UL (ref 0.24–0.82)
MONOCYTES NFR BLD: 10.9 % (ref 4.7–12.5)
NEUTROPHILS # BLD: 3.75 K/UL (ref 1.56–6.13)
NEUTS SEG NFR BLD: 60.7 % (ref 34–71.1)
PLATELET # BLD AUTO: 198 K/UL (ref 163–337)
PMV BLD AUTO: 9.2 FL (ref 7.4–10.4)
POTASSIUM SERPL-SCNC: 4 MMOL/L (ref 3.5–5.1)
PROT SERPL-MCNC: 6.4 G/DL (ref 6.4–8.3)
RBC # BLD AUTO: 4.51 M/UL (ref 4.63–6.08)
SODIUM SERPL-SCNC: 138 MMOL/L (ref 136–145)
T4 FREE SERPL-MCNC: 1.43 NG/DL (ref 0.93–1.7)
TSH SERPL DL<=0.005 MIU/L-ACNC: 2.16 UIU/ML (ref 0.27–4.2)
WBC # BLD AUTO: 6.17 K/UL (ref 4.23–9.07)

## 2024-10-25 PROCEDURE — 2580000003 HC RX 258: Performed by: NURSE PRACTITIONER

## 2024-10-25 PROCEDURE — 6360000002 HC RX W HCPCS: Performed by: NURSE PRACTITIONER

## 2024-10-25 PROCEDURE — 96413 CHEMO IV INFUSION 1 HR: CPT

## 2024-10-25 PROCEDURE — 85025 COMPLETE CBC W/AUTO DIFF WBC: CPT

## 2024-10-25 PROCEDURE — 96372 THER/PROPH/DIAG INJ SC/IM: CPT

## 2024-10-25 PROCEDURE — 80053 COMPREHEN METABOLIC PANEL: CPT

## 2024-10-25 PROCEDURE — 36415 COLL VENOUS BLD VENIPUNCTURE: CPT

## 2024-10-25 RX ORDER — SODIUM CHLORIDE 0.9 % (FLUSH) 0.9 %
5 SYRINGE (ML) INJECTION PRN
Status: CANCELLED | OUTPATIENT
Start: 2024-10-25

## 2024-10-25 RX ORDER — EPINEPHRINE 1 MG/ML
0.3 INJECTION, SOLUTION, CONCENTRATE INTRAVENOUS PRN
Status: CANCELLED | OUTPATIENT
Start: 2024-10-25

## 2024-10-25 RX ORDER — DRONABINOL 2.5 MG/1
2.5 CAPSULE ORAL
Qty: 60 CAPSULE | Refills: 0 | Status: SHIPPED | OUTPATIENT
Start: 2024-10-25 | End: 2024-11-24

## 2024-10-25 RX ORDER — DIPHENHYDRAMINE HYDROCHLORIDE 50 MG/ML
50 INJECTION INTRAMUSCULAR; INTRAVENOUS PRN
Status: CANCELLED | OUTPATIENT
Start: 2024-10-25

## 2024-10-25 RX ORDER — HEPARIN SODIUM (PORCINE) LOCK FLUSH IV SOLN 100 UNIT/ML 100 UNIT/ML
500 SOLUTION INTRAVENOUS PRN
Status: CANCELLED | OUTPATIENT
Start: 2024-10-25

## 2024-10-25 RX ORDER — SODIUM CHLORIDE 0.9 % (FLUSH) 0.9 %
10 SYRINGE (ML) INJECTION PRN
Status: DISCONTINUED | OUTPATIENT
Start: 2024-10-25 | End: 2024-10-26 | Stop reason: HOSPADM

## 2024-10-25 RX ORDER — MEGESTROL ACETATE 40 MG/ML
800 SUSPENSION ORAL DAILY
Qty: 240 ML | Refills: 5 | Status: SHIPPED | OUTPATIENT
Start: 2024-10-25

## 2024-10-25 RX ORDER — SODIUM CHLORIDE 0.9 % (FLUSH) 0.9 %
10 SYRINGE (ML) INJECTION PRN
Status: CANCELLED | OUTPATIENT
Start: 2024-10-25

## 2024-10-25 RX ORDER — HEPARIN 100 UNIT/ML
500 SYRINGE INTRAVENOUS PRN
Status: DISCONTINUED | OUTPATIENT
Start: 2024-10-25 | End: 2024-10-27 | Stop reason: HOSPADM

## 2024-10-25 RX ADMIN — HEPARIN 500 UNITS: 100 SYRINGE at 10:04

## 2024-10-25 RX ADMIN — SODIUM CHLORIDE, PRESERVATIVE FREE 10 ML: 5 INJECTION INTRAVENOUS at 10:03

## 2024-10-25 RX ADMIN — DENOSUMAB 120 MG: 120 INJECTION SUBCUTANEOUS at 10:05

## 2024-10-25 RX ADMIN — SODIUM CHLORIDE 480 MG: 9 INJECTION, SOLUTION INTRAVENOUS at 09:27

## 2024-10-25 ASSESSMENT — ENCOUNTER SYMPTOMS
EYE DISCHARGE: 0
TROUBLE SWALLOWING: 0
SORE THROAT: 0
VOMITING: 0
BACK PAIN: 1
SHORTNESS OF BREATH: 1
NAUSEA: 0
COUGH: 0
DIARRHEA: 0
CONSTIPATION: 0
WHEEZING: 0
EYE ITCHING: 0

## 2024-10-25 NOTE — PROGRESS NOTES
[facility]  [address] [place] [address] [place] [address]  Phone: [phone]  Fax: [phone]    Harleen Freeman, MS, RD, LD   Leeroy Conroy 84 y.o. White (non-)  Diagnosis, staging, date of diagnosis: stg IV melanoma, metastatic to liver and bones, July 2018  Current Treatment: Opdivo q28d    Comprehensive Nutrition Assessment    Type and Reason for Visit:  Consult, Initial    Malnutrition Assessment:  Malnutrition Status:  At risk for malnutrition (Comment) (10/25/24 1009)    Context:  Chronic Illness     Findings of the 6 clinical characteristics of malnutrition:  Energy Intake:  Mild decrease in energy intake (Comment)  Weight Loss:  Mild weight loss (specify amount and time period)       Nutrition Assessment:    RQ 85 y/o male presents 10/25/24 for initial RD evaluation. Referral from [facility] Whitt for pt with dx malignant melanoma and persistent wt loss. He is currently receiving Opdivo treatments. Pt presents at risk for malnutrition AEB wt loss and decreased energy intake. Pt endorses primary barrier to adequate intake as early satiety and poor appetite. Pt's wife indicates pt will take a few bites and be full at meals. Pt was prescribed Megace for appetite support today. He reports this has worked well for him in the past.    Diet History:  Pt reports eating 3 meals daily, but only very small amounts at each meal. He normally eats at home. He does not use any oral supplements.     Nutrition Related Laboratory Data:  Na+ 138, K+ 4.0, Cr 1.1, BUN 14, BUN:Cr 12.7    Anthropometric Measures:  Height: 177.8 cm (5' 10\")  Ideal Body Weight (IBW): 166 lbs (75 kg)       Current Body Weight: 81.1 kg (178 lb 11.2 oz), 107.7 % IBW.    Current BMI (kg/m2): 25.6  Wt Readings from Last 5 Encounters:   09/27/24 81.1 kg (178 lb 12.8 oz)   08/30/24 82.6 kg (182 lb)   08/02/24 84.5 kg (186 lb 4.8 oz)   07/05/24 84.9 kg (187 lb 3.2 oz)   06/07/24 85 kg (187

## 2024-11-04 DIAGNOSIS — C43.61 MALIGNANT MELANOMA OF RIGHT UPPER EXTREMITY INCLUDING SHOULDER (HCC): ICD-10-CM

## 2024-11-04 DIAGNOSIS — G89.3 CANCER ASSOCIATED PAIN: ICD-10-CM

## 2024-11-04 RX ORDER — HYDROCODONE BITARTRATE AND ACETAMINOPHEN 10; 325 MG/1; MG/1
1 TABLET ORAL EVERY 6 HOURS PRN
Qty: 120 TABLET | Refills: 0 | Status: SHIPPED | OUTPATIENT
Start: 2024-11-04 | End: 2024-12-04

## 2024-11-21 NOTE — PROGRESS NOTES
disease like    Dietitian following    #hypothyroidism due to medication  #medication management  Repeat TSH today.  Results reviewed  TSH 2.56 today, 11/22/2024   Continue [id number] mcg po daily     #Left renal mass  2.2 cm left renal mass stable on abdominal/pelvic CT dated 6/4/2020, 10/22/2020 and 4/1/2021, 9/30/2021, 3/4/2022, 11/14/2022, 4/13/2023 and 10/6/2023 at [facility]  Previously evaluated by urology, monitor conservatively  CT abdomen/pelvis 3/29/2024: \"Benign-appearing renal cysts appear stable\"  Continue to monitor on surveillance imaging for melanoma  2.1 cm left renal mass stable on CT abdomen/pelvis 9/20/2024    #Treatment related side effects, subsequent encounter  Pruritis - stable. Continue Atarax PRN, Singulair, Paxil  Decreased appetite.  As noted above, alternate Marinol/Megace  Fatigue - stable. Supportive care, monitor TSH. Supportive care    #Cancer related pain  Low back pain, chronic  Tolerable with Norco 10/325 q 6 hours PRN  Goal: Obtain tolerable pain control to continue ADLs independently  RF [place] PRN     Health Maintenance  Colonoscopy 10/20/2022 by Dr. Phillip Harden normal aside from diverticulosis in the sigmoid colon. Return PRN    Orders Placed This Encounter   Procedures    CT CHEST W CONTRAST    CT ABDOMEN PELVIS W IV CONTRAST Additional Contrast? Oral    CBC with Auto Differential    Comprehensive Metabolic Panel    Cortisol AM, Total    TSH    T4, Free    Magnesium    Phosphorus     RTC RN in 4 weeks for tx  Return in about 8 weeks (around 1/17/2025) for follow up with LI[facility] Whitt for nivolumab.     I have seen, examined and reviewed this patient medication list, appropriate labs and imaging studies. I reviewed relevant medical records and others physician’s notes. I discussed the plans of care with the patient. I answered all the questions to the patient’s satisfaction. I have also reviewed the chief complaint (CC) and part of the history (History of Present Illness

## 2024-11-22 ENCOUNTER — OFFICE VISIT (OUTPATIENT)
Dept: HEMATOLOGY | Age: 84
End: 2024-11-22
Payer: MEDICARE

## 2024-11-22 ENCOUNTER — HOSPITAL ENCOUNTER (OUTPATIENT)
Dept: INFUSION THERAPY | Age: 84
Discharge: HOME | End: 2024-11-22
Payer: MEDICARE

## 2024-11-22 VITALS
DIASTOLIC BLOOD PRESSURE: 65 MMHG | RESPIRATION RATE: 16 BRPM | SYSTOLIC BLOOD PRESSURE: 147 MMHG | BODY MASS INDEX: 26.92 KG/M2 | OXYGEN SATURATION: 98 % | WEIGHT: 188 LBS | TEMPERATURE: 97.9 F | HEIGHT: 70 IN | HEART RATE: 66 BPM

## 2024-11-22 VITALS — HEIGHT: 70 IN | BODY MASS INDEX: 26.98 KG/M2

## 2024-11-22 DIAGNOSIS — T88.9XXD SIDE EFFECTS OF TREATMENT, SUBSEQUENT ENCOUNTER: ICD-10-CM

## 2024-11-22 DIAGNOSIS — R53.83 FATIGUE DUE TO TREATMENT: ICD-10-CM

## 2024-11-22 DIAGNOSIS — C79.51 MALIGNANT NEOPLASM METASTATIC TO BONE (HCC): ICD-10-CM

## 2024-11-22 DIAGNOSIS — R63.0 DECREASED APPETITE: ICD-10-CM

## 2024-11-22 DIAGNOSIS — E03.2 HYPOTHYROIDISM DUE TO MEDICATION: ICD-10-CM

## 2024-11-22 DIAGNOSIS — C43.61 MALIGNANT MELANOMA OF RIGHT UPPER EXTREMITY INCLUDING SHOULDER (HCC): Primary | ICD-10-CM

## 2024-11-22 DIAGNOSIS — R91.1 NODULE OF UPPER LOBE OF RIGHT LUNG: ICD-10-CM

## 2024-11-22 DIAGNOSIS — Z51.12 ENCOUNTER FOR ANTINEOPLASTIC IMMUNOTHERAPY: ICD-10-CM

## 2024-11-22 DIAGNOSIS — R63.4 UNINTENTIONAL WEIGHT LOSS: Primary | ICD-10-CM

## 2024-11-22 DIAGNOSIS — N28.89 LEFT RENAL MASS: ICD-10-CM

## 2024-11-22 DIAGNOSIS — Z79.899 MEDICATION MANAGEMENT: ICD-10-CM

## 2024-11-22 DIAGNOSIS — G89.3 CANCER ASSOCIATED PAIN: ICD-10-CM

## 2024-11-22 DIAGNOSIS — C43.9 MALIGNANT MELANOMA, UNSPECIFIED SITE (HCC): Primary | ICD-10-CM

## 2024-11-22 DIAGNOSIS — Z95.828 PORT-A-CATH IN PLACE: ICD-10-CM

## 2024-11-22 DIAGNOSIS — C43.61 MALIGNANT MELANOMA OF RIGHT UPPER EXTREMITY INCLUDING SHOULDER (HCC): ICD-10-CM

## 2024-11-22 LAB
ALBUMIN SERPL-MCNC: 4 G/DL (ref 3.5–5.2)
ALP SERPL-CCNC: 48 U/L (ref 40–129)
ALT SERPL-CCNC: 8 U/L (ref 5–41)
ANION GAP SERPL CALCULATED.3IONS-SCNC: 7 MMOL/L (ref 7–19)
AST SERPL-CCNC: 19 U/L (ref 5–40)
BASOPHILS # BLD: 0.01 K/UL (ref 0.01–0.08)
BASOPHILS NFR BLD: 0.1 % (ref 0.1–1.2)
BILIRUB SERPL-MCNC: 1.2 MG/DL (ref 0–1.2)
BUN SERPL-MCNC: 17 MG/DL (ref 8–23)
CALCIUM SERPL-MCNC: 9 MG/DL (ref 8.8–10.2)
CHLORIDE SERPL-SCNC: 105 MMOL/L (ref 98–107)
CO2 SERPL-SCNC: 27 MMOL/L (ref 22–29)
CORTIS AM PEAK SERPL-MCNC: 9.1 UG/DL (ref 4.8–19.5)
CREAT SERPL-MCNC: 1.1 MG/DL (ref 0.7–1.2)
EOSINOPHIL # BLD: 0.35 K/UL (ref 0.04–0.54)
EOSINOPHIL NFR BLD: 5.1 % (ref 0.7–7)
ERYTHROCYTE [DISTWIDTH] IN BLOOD BY AUTOMATED COUNT: 12.7 % (ref 11.6–14.4)
GLUCOSE SERPL-MCNC: 90 MG/DL (ref 70–99)
HCT VFR BLD AUTO: 41.2 % (ref 40.1–51)
HGB BLD-MCNC: 14.1 G/DL (ref 13.7–17.5)
LYMPHOCYTES # BLD: 1.71 K/UL (ref 1.18–3.74)
LYMPHOCYTES NFR BLD: 25.1 % (ref 19.3–53.1)
MAGNESIUM SERPL-MCNC: 2.3 MG/DL (ref 1.6–2.4)
MCH RBC QN AUTO: 31 PG (ref 25.7–32.2)
MCHC RBC AUTO-ENTMCNC: 34.2 G/DL (ref 32.3–36.5)
MCV RBC AUTO: 90.5 FL (ref 79–92.2)
MONOCYTES # BLD: 0.73 K/UL (ref 0.24–0.82)
MONOCYTES NFR BLD: 10.7 % (ref 4.7–12.5)
NEUTROPHILS # BLD: 3.99 K/UL (ref 1.56–6.13)
NEUTS SEG NFR BLD: 58.7 % (ref 34–71.1)
PHOSPHATE SERPL-MCNC: 3.2 MG/DL (ref 2.5–4.5)
PLATELET # BLD AUTO: 208 K/UL (ref 163–337)
PMV BLD AUTO: 9.4 FL (ref 7.4–10.4)
POTASSIUM SERPL-SCNC: 4.3 MMOL/L (ref 3.5–5.1)
PROT SERPL-MCNC: 7 G/DL (ref 6.4–8.3)
RBC # BLD AUTO: 4.55 M/UL (ref 4.63–6.08)
SODIUM SERPL-SCNC: 139 MMOL/L (ref 136–145)
T4 FREE SERPL-MCNC: 1.56 NG/DL (ref 0.93–1.7)
TSH SERPL DL<=0.005 MIU/L-ACNC: 2.56 UIU/ML (ref 0.27–4.2)
WBC # BLD AUTO: 6.81 K/UL (ref 4.23–9.07)

## 2024-11-22 PROCEDURE — 6360000002 HC RX W HCPCS: Performed by: NURSE PRACTITIONER

## 2024-11-22 PROCEDURE — 96413 CHEMO IV INFUSION 1 HR: CPT

## 2024-11-22 PROCEDURE — 84100 ASSAY OF PHOSPHORUS: CPT

## 2024-11-22 PROCEDURE — 96372 THER/PROPH/DIAG INJ SC/IM: CPT

## 2024-11-22 PROCEDURE — 80053 COMPREHEN METABOLIC PANEL: CPT

## 2024-11-22 PROCEDURE — 85025 COMPLETE CBC W/AUTO DIFF WBC: CPT

## 2024-11-22 PROCEDURE — 36415 COLL VENOUS BLD VENIPUNCTURE: CPT

## 2024-11-22 PROCEDURE — 83735 ASSAY OF MAGNESIUM: CPT

## 2024-11-22 PROCEDURE — 2580000003 HC RX 258: Performed by: NURSE PRACTITIONER

## 2024-11-22 RX ORDER — HEPARIN 100 UNIT/ML
500 SYRINGE INTRAVENOUS PRN
Status: DISCONTINUED | OUTPATIENT
Start: 2024-11-22 | End: 2024-11-24 | Stop reason: HOSPADM

## 2024-11-22 RX ORDER — SODIUM CHLORIDE 0.9 % (FLUSH) 0.9 %
10 SYRINGE (ML) INJECTION PRN
Status: DISCONTINUED | OUTPATIENT
Start: 2024-11-22 | End: 2024-11-23 | Stop reason: HOSPADM

## 2024-11-22 RX ORDER — SODIUM CHLORIDE 0.9 % (FLUSH) 0.9 %
5 SYRINGE (ML) INJECTION PRN
Status: CANCELLED | OUTPATIENT
Start: 2024-11-22

## 2024-11-22 RX ORDER — HEPARIN SODIUM (PORCINE) LOCK FLUSH IV SOLN 100 UNIT/ML 100 UNIT/ML
500 SOLUTION INTRAVENOUS PRN
Status: CANCELLED | OUTPATIENT
Start: 2024-11-22

## 2024-11-22 RX ORDER — SODIUM CHLORIDE 0.9 % (FLUSH) 0.9 %
10 SYRINGE (ML) INJECTION PRN
Status: CANCELLED | OUTPATIENT
Start: 2024-11-22

## 2024-11-22 RX ORDER — HYDROCORTISONE SODIUM SUCCINATE 100 MG/2ML
100 INJECTION INTRAMUSCULAR; INTRAVENOUS PRN
Status: CANCELLED | OUTPATIENT
Start: 2024-11-22

## 2024-11-22 RX ORDER — DIPHENHYDRAMINE HYDROCHLORIDE 50 MG/ML
50 INJECTION INTRAMUSCULAR; INTRAVENOUS PRN
Status: CANCELLED | OUTPATIENT
Start: 2024-11-22

## 2024-11-22 RX ORDER — EPINEPHRINE 1 MG/ML
0.3 INJECTION, SOLUTION, CONCENTRATE INTRAVENOUS PRN
Status: CANCELLED | OUTPATIENT
Start: 2024-11-22

## 2024-11-22 RX ADMIN — SODIUM CHLORIDE 480 MG: 9 INJECTION, SOLUTION INTRAVENOUS at 09:00

## 2024-11-22 RX ADMIN — DENOSUMAB 120 MG: 120 INJECTION SUBCUTANEOUS at 09:32

## 2024-11-22 RX ADMIN — HEPARIN 500 UNITS: 100 SYRINGE at 09:33

## 2024-11-22 RX ADMIN — SODIUM CHLORIDE, PRESERVATIVE FREE 10 ML: 5 INJECTION INTRAVENOUS at 09:32

## 2024-11-22 ASSESSMENT — ENCOUNTER SYMPTOMS
WHEEZING: 0
DIARRHEA: 0
SORE THROAT: 0
EYE ITCHING: 0
COUGH: 0
SHORTNESS OF BREATH: 1
VOMITING: 0
EYE DISCHARGE: 0
TROUBLE SWALLOWING: 0
CONSTIPATION: 0
NAUSEA: 0

## 2024-11-22 NOTE — PROGRESS NOTES
[facility]  [address] [place] [address] [place] [address]  Phone: [phone]  Fax: [phone]    Harleen Freeman MS, RD, LD   Leeroy Jenniferpaul 84 y.o. 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 Pt improving from a nutritional standpoint AEB increase in energy intake and wt gain. Pt has gained 10lbs over the past month per office scale. Pt does note he is wearing heavier clothes today, but feels he has had some true wt gain. He notes his appetite is improved with use of appetite stimulant. He feels his portions sizes at meals have improved. He is also drinking one Ensure daily and occasionally more. He denies any new nutrition-related complaints.     Nutrition Related Laboratory Data:  Na+ 139, K+ 4.3, BUN:Cr 15, GFR 66    Anthropometric Measures:  Height: 177.8 cm (5' 10\")  Ideal Body Weight (IBW): 166 lbs (75 kg)       Current Body Weight: 85.3 kg (188 lb), 113.3 % IBW.    Current BMI (kg/m2): 27  Wt Readings from Last 5 Encounters:   11/22/24 85.3 kg (188 lb)   10/25/24 81.1 kg (178 lb 11.2 oz)   09/27/24 81.1 kg (178 lb 12.8 oz)   08/30/24 82.6 kg (182 lb)   08/02/24 84.5 kg (186 lb 4.8 oz)       Nutrition Diagnosis:   No nutrition diagnosis at this time     Nutrition Interventions:   Food and/or Nutrient Delivery: Continue Current Diet, Continue Oral Nutrition Supplement  Nutrition Education/Counseling: Education/Counseling completed     Plan of Care discussed with: pt, wife  No new intervention. Encouraged continued use of appetite stimulant and ONS daily. Pt agreeable.

## 2024-11-23 ASSESSMENT — ENCOUNTER SYMPTOMS: BACK PAIN: 1

## 2024-12-04 DIAGNOSIS — G89.3 CANCER ASSOCIATED PAIN: ICD-10-CM

## 2024-12-04 DIAGNOSIS — C43.61 MALIGNANT MELANOMA OF RIGHT UPPER EXTREMITY INCLUDING SHOULDER (HCC): ICD-10-CM

## 2024-12-04 RX ORDER — HYDROCODONE BITARTRATE AND ACETAMINOPHEN 10; 325 MG/1; MG/1
1 TABLET ORAL EVERY 6 HOURS PRN
Qty: 120 TABLET | Refills: 0 | Status: SHIPPED | OUTPATIENT
Start: 2024-12-04 | End: 2025-01-03

## 2024-12-20 ENCOUNTER — HOSPITAL ENCOUNTER (OUTPATIENT)
Dept: INFUSION THERAPY | Age: 84
Discharge: HOME | End: 2024-12-20
Payer: MEDICARE

## 2024-12-20 VITALS
OXYGEN SATURATION: 99 % | BODY MASS INDEX: 27.29 KG/M2 | DIASTOLIC BLOOD PRESSURE: 78 MMHG | HEART RATE: 74 BPM | RESPIRATION RATE: 16 BRPM | SYSTOLIC BLOOD PRESSURE: 151 MMHG | TEMPERATURE: 97.8 F | WEIGHT: 190.6 LBS | HEIGHT: 70 IN

## 2024-12-20 DIAGNOSIS — C43.61 MALIGNANT MELANOMA OF RIGHT UPPER EXTREMITY INCLUDING SHOULDER (HCC): ICD-10-CM

## 2024-12-20 DIAGNOSIS — R63.4 WEIGHT LOSS: ICD-10-CM

## 2024-12-20 DIAGNOSIS — R53.83 FATIGUE DUE TO TREATMENT: ICD-10-CM

## 2024-12-20 DIAGNOSIS — Z95.828 PORT-A-CATH IN PLACE: ICD-10-CM

## 2024-12-20 DIAGNOSIS — C79.51 MALIGNANT NEOPLASM METASTATIC TO BONE (HCC): ICD-10-CM

## 2024-12-20 DIAGNOSIS — C43.61 MALIGNANT MELANOMA OF RIGHT UPPER EXTREMITY INCLUDING SHOULDER (HCC): Primary | ICD-10-CM

## 2024-12-20 DIAGNOSIS — C43.9 MALIGNANT MELANOMA, UNSPECIFIED SITE (HCC): Primary | ICD-10-CM

## 2024-12-20 DIAGNOSIS — R63.0 DECREASED APPETITE: ICD-10-CM

## 2024-12-20 LAB
ALBUMIN SERPL-MCNC: 3.9 G/DL (ref 3.5–5.2)
ALP SERPL-CCNC: 37 U/L (ref 40–129)
ALT SERPL-CCNC: 14 U/L (ref 5–41)
ANION GAP SERPL CALCULATED.3IONS-SCNC: 6 MMOL/L (ref 7–19)
AST SERPL-CCNC: 21 U/L (ref 5–40)
BASOPHILS # BLD: 0.01 K/UL (ref 0.01–0.08)
BASOPHILS NFR BLD: 0.1 % (ref 0.1–1.2)
BILIRUB SERPL-MCNC: 0.9 MG/DL (ref 0–1.2)
BUN SERPL-MCNC: 18 MG/DL (ref 8–23)
CALCIUM SERPL-MCNC: 8.5 MG/DL (ref 8.8–10.2)
CHLORIDE SERPL-SCNC: 107 MMOL/L (ref 98–107)
CO2 SERPL-SCNC: 25 MMOL/L (ref 22–29)
CORTIS AM PEAK SERPL-MCNC: 8.2 UG/DL (ref 4.8–19.5)
CREAT SERPL-MCNC: 1.1 MG/DL (ref 0.7–1.2)
EOSINOPHIL # BLD: 0.44 K/UL (ref 0.04–0.54)
EOSINOPHIL NFR BLD: 6.6 % (ref 0.7–7)
ERYTHROCYTE [DISTWIDTH] IN BLOOD BY AUTOMATED COUNT: 12.6 % (ref 11.6–14.4)
GLUCOSE SERPL-MCNC: 82 MG/DL (ref 70–99)
HCT VFR BLD AUTO: 39.2 % (ref 40.1–51)
HGB BLD-MCNC: 13.4 G/DL (ref 13.7–17.5)
LYMPHOCYTES # BLD: 1.83 K/UL (ref 1.18–3.74)
LYMPHOCYTES NFR BLD: 27.4 % (ref 19.3–53.1)
MCH RBC QN AUTO: 30.9 PG (ref 25.7–32.2)
MCHC RBC AUTO-ENTMCNC: 34.2 G/DL (ref 32.3–36.5)
MCV RBC AUTO: 90.5 FL (ref 79–92.2)
MONOCYTES # BLD: 0.68 K/UL (ref 0.24–0.82)
MONOCYTES NFR BLD: 10.2 % (ref 4.7–12.5)
NEUTROPHILS # BLD: 3.7 K/UL (ref 1.56–6.13)
NEUTS SEG NFR BLD: 55.3 % (ref 34–71.1)
PLATELET # BLD AUTO: 225 K/UL (ref 163–337)
PMV BLD AUTO: 9.4 FL (ref 7.4–10.4)
POTASSIUM SERPL-SCNC: 4.4 MMOL/L (ref 3.5–5.1)
PROT SERPL-MCNC: 6.4 G/DL (ref 6.4–8.3)
RBC # BLD AUTO: 4.33 M/UL (ref 4.63–6.08)
SODIUM SERPL-SCNC: 138 MMOL/L (ref 136–145)
T4 FREE SERPL-MCNC: 1.55 NG/DL (ref 0.93–1.7)
TSH SERPL DL<=0.005 MIU/L-ACNC: 2.41 UIU/ML (ref 0.27–4.2)
WBC # BLD AUTO: 6.69 K/UL (ref 4.23–9.07)

## 2024-12-20 PROCEDURE — 96413 CHEMO IV INFUSION 1 HR: CPT

## 2024-12-20 PROCEDURE — 36415 COLL VENOUS BLD VENIPUNCTURE: CPT

## 2024-12-20 PROCEDURE — 2500000003 HC RX 250 WO HCPCS: Performed by: NURSE PRACTITIONER

## 2024-12-20 PROCEDURE — 80053 COMPREHEN METABOLIC PANEL: CPT

## 2024-12-20 PROCEDURE — 2580000003 HC RX 258: Performed by: NURSE PRACTITIONER

## 2024-12-20 PROCEDURE — 6360000002 HC RX W HCPCS: Performed by: NURSE PRACTITIONER

## 2024-12-20 PROCEDURE — 85025 COMPLETE CBC W/AUTO DIFF WBC: CPT

## 2024-12-20 PROCEDURE — 96372 THER/PROPH/DIAG INJ SC/IM: CPT

## 2024-12-20 RX ORDER — SODIUM CHLORIDE 0.9 % (FLUSH) 0.9 %
10 SYRINGE (ML) INJECTION PRN
Status: CANCELLED | OUTPATIENT
Start: 2024-12-20

## 2024-12-20 RX ORDER — HEPARIN 100 UNIT/ML
500 SYRINGE INTRAVENOUS PRN
Status: DISCONTINUED | OUTPATIENT
Start: 2024-12-20 | End: 2024-12-22 | Stop reason: HOSPADM

## 2024-12-20 RX ORDER — DIPHENHYDRAMINE HYDROCHLORIDE 50 MG/ML
50 INJECTION INTRAMUSCULAR; INTRAVENOUS PRN
Status: CANCELLED | OUTPATIENT
Start: 2024-12-20

## 2024-12-20 RX ORDER — SODIUM CHLORIDE 0.9 % (FLUSH) 0.9 %
10 SYRINGE (ML) INJECTION PRN
Status: DISCONTINUED | OUTPATIENT
Start: 2024-12-20 | End: 2024-12-21 | Stop reason: HOSPADM

## 2024-12-20 RX ORDER — EPINEPHRINE 1 MG/ML
0.3 INJECTION, SOLUTION, CONCENTRATE INTRAVENOUS PRN
Status: CANCELLED | OUTPATIENT
Start: 2024-12-20

## 2024-12-20 RX ORDER — HYDROCORTISONE SODIUM SUCCINATE 100 MG/2ML
100 INJECTION INTRAMUSCULAR; INTRAVENOUS PRN
Status: CANCELLED | OUTPATIENT
Start: 2024-12-20

## 2024-12-20 RX ORDER — HEPARIN SODIUM (PORCINE) LOCK FLUSH IV SOLN 100 UNIT/ML 100 UNIT/ML
500 SOLUTION INTRAVENOUS PRN
Status: CANCELLED | OUTPATIENT
Start: 2024-12-20

## 2024-12-20 RX ORDER — SODIUM CHLORIDE 0.9 % (FLUSH) 0.9 %
5 SYRINGE (ML) INJECTION PRN
Status: CANCELLED | OUTPATIENT
Start: 2024-12-20

## 2024-12-20 RX ADMIN — SODIUM CHLORIDE 480 MG: 9 INJECTION, SOLUTION INTRAVENOUS at 09:16

## 2024-12-20 RX ADMIN — DENOSUMAB 120 MG: 120 INJECTION SUBCUTANEOUS at 09:49

## 2024-12-20 RX ADMIN — HEPARIN 500 UNITS: 100 SYRINGE at 09:52

## 2024-12-20 RX ADMIN — SODIUM CHLORIDE, PRESERVATIVE FREE 10 ML: 5 INJECTION INTRAVENOUS at 09:52

## 2024-12-21 DIAGNOSIS — R63.4 WEIGHT LOSS: ICD-10-CM

## 2024-12-21 DIAGNOSIS — R63.0 DECREASED APPETITE: ICD-10-CM

## 2024-12-21 DIAGNOSIS — C43.61 MALIGNANT MELANOMA OF RIGHT UPPER EXTREMITY INCLUDING SHOULDER (HCC): ICD-10-CM

## 2024-12-27 RX ORDER — DRONABINOL 2.5 MG/1
2.5 CAPSULE ORAL
Qty: 60 CAPSULE | Refills: 0 | Status: SHIPPED | OUTPATIENT
Start: 2024-12-27 | End: 2025-01-26

## 2025-01-02 RX ORDER — DRONABINOL 2.5 MG/1
2.5 CAPSULE ORAL
Qty: 60 CAPSULE | Refills: 0 | OUTPATIENT
Start: 2025-01-02 | End: 2025-02-01

## 2025-01-02 RX ORDER — HYDROXYZINE HYDROCHLORIDE 25 MG/1
25 TABLET, FILM COATED ORAL EVERY 8 HOURS PRN
Qty: 270 TABLET | Refills: 0 | OUTPATIENT
Start: 2025-01-02 | End: 2025-04-02

## 2025-01-07 DIAGNOSIS — R11.0 NAUSEA: ICD-10-CM

## 2025-01-07 DIAGNOSIS — C43.61 MALIGNANT MELANOMA OF RIGHT UPPER EXTREMITY INCLUDING SHOULDER (HCC): ICD-10-CM

## 2025-01-07 DIAGNOSIS — G89.3 CANCER ASSOCIATED PAIN: ICD-10-CM

## 2025-01-07 DIAGNOSIS — C43.9 MALIGNANT MELANOMA, UNSPECIFIED SITE (HCC): ICD-10-CM

## 2025-01-07 RX ORDER — HYDROCODONE BITARTRATE AND ACETAMINOPHEN 10; 325 MG/1; MG/1
1 TABLET ORAL EVERY 6 HOURS PRN
Qty: 120 TABLET | Refills: 0 | Status: SHIPPED | OUTPATIENT
Start: 2025-01-07 | End: 2025-02-06

## 2025-01-07 RX ORDER — MONTELUKAST SODIUM 10 MG/1
TABLET ORAL
Qty: 90 TABLET | Refills: 3 | Status: SHIPPED | OUTPATIENT
Start: 2025-01-07

## 2025-01-07 RX ORDER — PAROXETINE 10 MG/1
TABLET, FILM COATED ORAL
Qty: 90 TABLET | Refills: 3 | Status: SHIPPED | OUTPATIENT
Start: 2025-01-07

## 2025-01-07 RX ORDER — PANTOPRAZOLE SODIUM 40 MG/1
40 TABLET, DELAYED RELEASE ORAL DAILY
Qty: 90 TABLET | Refills: 3 | Status: SHIPPED | OUTPATIENT
Start: 2025-01-07

## 2025-01-08 RX ORDER — HYDROXYZINE HYDROCHLORIDE 25 MG/1
25 TABLET, FILM COATED ORAL EVERY 8 HOURS PRN
Qty: 270 TABLET | Refills: 0 | Status: SHIPPED | OUTPATIENT
Start: 2025-01-08 | End: 2025-04-08

## 2025-01-16 NOTE — PROGRESS NOTES
0.00 - 0.90 K/uL Final    Eosinophils Absolute 01/17/2025 0.42  0.00 - 0.60 K/uL Final    Basophils Absolute 01/17/2025 0.00  0.00 - 0.20 K/uL Final    Sodium 01/17/2025 139  136 - 145 mmol/L Final    Potassium 01/17/2025 4.4  3.5 - 5.1 mmol/L Final    Chloride 01/17/2025 102  98 - 107 mmol/L Final    CO2 01/17/2025 29  22 - 29 mmol/L Final    Anion Gap 01/17/2025 8  7 - 19 mmol/L Final    Glucose 01/17/2025 93  70 - 99 mg/dL Final    BUN 01/17/2025 15  8 - 23 mg/dL Final    Creatinine 01/17/2025 1.2  0.7 - 1.2 mg/dL Final    Est, Glom Filt Rate 01/17/2025 60 (A)  >60 Final    Comment: Pediatric calculator link  https://www.kidney.org/professionals/kdoqi/gfr_calculatorped  Effective Oct 3, 2022  These results are not intended for use in patients  <18 years of age.  eGFR results are calculated without  a race factor using the 2021 CKD-EPI equation.  Careful  clinical correlation is recommended, particularly when  comparing to results calculated using previous equations.  The CKD-EPI equation is less accurate in patients with  extremes of muscle mass, extra-renal metabolism of  creatinine, excessive creatinine ingestion, or following  therapy that affects renal tubular secretion.      Calcium 01/17/2025 9.2  8.8 - 10.2 mg/dL Final    Total Protein 01/17/2025 6.2 (L)  6.4 - 8.3 g/dL Final    Albumin 01/17/2025 4.0  3.5 - 5.2 g/dL Final    Total Bilirubin 01/17/2025 0.9  0.0 - 1.2 mg/dL Final    Alkaline Phosphatase 01/17/2025 39 (L)  40 - 129 U/L Final    ALT 01/17/2025 11  5 - 41 U/L Final    AST 01/17/2025 21  5 - 40 U/L Final   Orders Only on 01/17/2025   Component Date Value Ref Range Status    Cortisol - AM 01/17/2025 7.7  4.8 - 19.5 ug/dL Final    T4 Free 01/17/2025 1.75 (H)  0.93 - 1.70 ng/dL Final    TSH 01/17/2025 1.860  0.270 - 4.200 uIU/mL Final     12/20/2024 Labs:   CMP: Creatinine: 1.1, GFR: 66, Calcium: 8.5, Total Protein: 6.4  TSH: 2.410  T4, Free: 1.55  Cortisol, AM: 8.2    ASSESSMENT/PLAN:   1.

## 2025-01-17 ENCOUNTER — HOSPITAL ENCOUNTER (OUTPATIENT)
Dept: INFUSION THERAPY | Age: 85
Discharge: HOME | End: 2025-01-17
Payer: MEDICARE

## 2025-01-17 ENCOUNTER — OFFICE VISIT (OUTPATIENT)
Dept: HEMATOLOGY | Age: 85
End: 2025-01-17
Payer: MEDICARE

## 2025-01-17 VITALS
RESPIRATION RATE: 18 BRPM | WEIGHT: 187.1 LBS | OXYGEN SATURATION: 97 % | BODY MASS INDEX: 26.79 KG/M2 | TEMPERATURE: 97.9 F | SYSTOLIC BLOOD PRESSURE: 145 MMHG | HEART RATE: 65 BPM | DIASTOLIC BLOOD PRESSURE: 61 MMHG | HEIGHT: 70 IN

## 2025-01-17 DIAGNOSIS — C79.51 MALIGNANT NEOPLASM METASTATIC TO BONE (HCC): ICD-10-CM

## 2025-01-17 DIAGNOSIS — Z95.828 PORT-A-CATH IN PLACE: ICD-10-CM

## 2025-01-17 DIAGNOSIS — E03.2 HYPOTHYROIDISM DUE TO MEDICATION: ICD-10-CM

## 2025-01-17 DIAGNOSIS — C43.61 MALIGNANT MELANOMA OF RIGHT UPPER EXTREMITY INCLUDING SHOULDER (HCC): Primary | ICD-10-CM

## 2025-01-17 DIAGNOSIS — R91.1 NODULE OF UPPER LOBE OF RIGHT LUNG: ICD-10-CM

## 2025-01-17 DIAGNOSIS — Z79.899 MEDICATION MANAGEMENT: ICD-10-CM

## 2025-01-17 DIAGNOSIS — C43.61 MALIGNANT MELANOMA OF RIGHT UPPER EXTREMITY INCLUDING SHOULDER (HCC): ICD-10-CM

## 2025-01-17 DIAGNOSIS — R53.83 FATIGUE DUE TO TREATMENT: ICD-10-CM

## 2025-01-17 DIAGNOSIS — T88.9XXD SIDE EFFECTS OF TREATMENT, SUBSEQUENT ENCOUNTER: ICD-10-CM

## 2025-01-17 DIAGNOSIS — G89.3 CANCER ASSOCIATED PAIN: ICD-10-CM

## 2025-01-17 DIAGNOSIS — R63.0 DECREASED APPETITE: ICD-10-CM

## 2025-01-17 DIAGNOSIS — Z51.12 ENCOUNTER FOR ANTINEOPLASTIC IMMUNOTHERAPY: ICD-10-CM

## 2025-01-17 DIAGNOSIS — N28.89 LEFT RENAL MASS: ICD-10-CM

## 2025-01-17 DIAGNOSIS — C43.9 MALIGNANT MELANOMA, UNSPECIFIED SITE (HCC): Primary | ICD-10-CM

## 2025-01-17 LAB
ALBUMIN SERPL-MCNC: 4 G/DL (ref 3.5–5.2)
ALP SERPL-CCNC: 39 U/L (ref 40–129)
ALT SERPL-CCNC: 11 U/L (ref 5–41)
ANION GAP SERPL CALCULATED.3IONS-SCNC: 8 MMOL/L (ref 7–19)
AST SERPL-CCNC: 21 U/L (ref 5–40)
BASOPHILS # BLD: 0 K/UL (ref 0–0.2)
BASOPHILS NFR BLD: 0 % (ref 0–1)
BILIRUB SERPL-MCNC: 0.9 MG/DL (ref 0–1.2)
BUN SERPL-MCNC: 15 MG/DL (ref 8–23)
CALCIUM SERPL-MCNC: 9.2 MG/DL (ref 8.8–10.2)
CHLORIDE SERPL-SCNC: 102 MMOL/L (ref 98–107)
CO2 SERPL-SCNC: 29 MMOL/L (ref 22–29)
CORTIS AM PEAK SERPL-MCNC: 7.7 UG/DL (ref 4.8–19.5)
CREAT SERPL-MCNC: 1.2 MG/DL (ref 0.7–1.2)
EOSINOPHIL # BLD: 0.42 K/UL (ref 0–0.6)
EOSINOPHIL NFR BLD: 5.6 % (ref 0–5)
ERYTHROCYTE [DISTWIDTH] IN BLOOD BY AUTOMATED COUNT: 12.7 % (ref 11.5–14.5)
GLUCOSE SERPL-MCNC: 93 MG/DL (ref 70–99)
HCT VFR BLD AUTO: 39.5 % (ref 42–52)
HGB BLD-MCNC: 13.6 G/DL (ref 14–18)
LYMPHOCYTES # BLD: 1.79 K/UL (ref 1.1–4.5)
LYMPHOCYTES NFR BLD: 23.7 % (ref 20–40)
MCH RBC QN AUTO: 31 PG (ref 27–31)
MCHC RBC AUTO-ENTMCNC: 34.4 G/DL (ref 33–37)
MCV RBC AUTO: 90 FL (ref 80–94)
MONOCYTES # BLD: 0.82 K/UL (ref 0–0.9)
MONOCYTES NFR BLD: 10.9 % (ref 1–10)
NEUTROPHILS # BLD: 4.5 K/UL (ref 1.5–7.5)
NEUTS SEG NFR BLD: 59.7 % (ref 50–65)
PLATELET # BLD AUTO: 239 K/UL (ref 130–400)
PMV BLD AUTO: 9.3 FL (ref 9.4–12.4)
POTASSIUM SERPL-SCNC: 4.4 MMOL/L (ref 3.5–5.1)
PROT SERPL-MCNC: 6.2 G/DL (ref 6.4–8.3)
RBC # BLD AUTO: 4.39 M/UL (ref 4.7–6.1)
SODIUM SERPL-SCNC: 139 MMOL/L (ref 136–145)
T4 FREE SERPL-MCNC: 1.75 NG/DL (ref 0.93–1.7)
TSH SERPL DL<=0.005 MIU/L-ACNC: 1.86 UIU/ML (ref 0.27–4.2)
WBC # BLD AUTO: 7.54 K/UL (ref 4.8–10.8)

## 2025-01-17 PROCEDURE — 36415 COLL VENOUS BLD VENIPUNCTURE: CPT

## 2025-01-17 PROCEDURE — 6360000002 HC RX W HCPCS: Performed by: NURSE PRACTITIONER

## 2025-01-17 PROCEDURE — 2500000003 HC RX 250 WO HCPCS: Performed by: NURSE PRACTITIONER

## 2025-01-17 PROCEDURE — 80053 COMPREHEN METABOLIC PANEL: CPT

## 2025-01-17 PROCEDURE — 85025 COMPLETE CBC W/AUTO DIFF WBC: CPT

## 2025-01-17 PROCEDURE — 2580000003 HC RX 258: Performed by: NURSE PRACTITIONER

## 2025-01-17 PROCEDURE — 96372 THER/PROPH/DIAG INJ SC/IM: CPT

## 2025-01-17 PROCEDURE — 96413 CHEMO IV INFUSION 1 HR: CPT

## 2025-01-17 RX ORDER — SODIUM CHLORIDE 0.9 % (FLUSH) 0.9 %
10 SYRINGE (ML) INJECTION PRN
Status: DISCONTINUED | OUTPATIENT
Start: 2025-01-17 | End: 2025-01-18 | Stop reason: HOSPADM

## 2025-01-17 RX ORDER — SODIUM CHLORIDE 0.9 % (FLUSH) 0.9 %
10 SYRINGE (ML) INJECTION PRN
Status: CANCELLED | OUTPATIENT
Start: 2025-01-17

## 2025-01-17 RX ORDER — HYDROCORTISONE SODIUM SUCCINATE 100 MG/2ML
100 INJECTION INTRAMUSCULAR; INTRAVENOUS PRN
Status: CANCELLED | OUTPATIENT
Start: 2025-01-17

## 2025-01-17 RX ORDER — EPINEPHRINE 1 MG/ML
0.3 INJECTION, SOLUTION, CONCENTRATE INTRAVENOUS PRN
Status: CANCELLED | OUTPATIENT
Start: 2025-01-17

## 2025-01-17 RX ORDER — HEPARIN 100 UNIT/ML
500 SYRINGE INTRAVENOUS PRN
Status: DISCONTINUED | OUTPATIENT
Start: 2025-01-17 | End: 2025-01-19 | Stop reason: HOSPADM

## 2025-01-17 RX ORDER — DIPHENHYDRAMINE HYDROCHLORIDE 50 MG/ML
50 INJECTION INTRAMUSCULAR; INTRAVENOUS PRN
Status: CANCELLED | OUTPATIENT
Start: 2025-01-17

## 2025-01-17 RX ORDER — HEPARIN SODIUM (PORCINE) LOCK FLUSH IV SOLN 100 UNIT/ML 100 UNIT/ML
500 SOLUTION INTRAVENOUS PRN
Status: CANCELLED | OUTPATIENT
Start: 2025-01-17

## 2025-01-17 RX ORDER — SODIUM CHLORIDE 0.9 % (FLUSH) 0.9 %
5 SYRINGE (ML) INJECTION PRN
Status: CANCELLED | OUTPATIENT
Start: 2025-01-17

## 2025-01-17 RX ADMIN — SODIUM CHLORIDE 480 MG: 9 INJECTION, SOLUTION INTRAVENOUS at 09:57

## 2025-01-17 RX ADMIN — DENOSUMAB 120 MG: 120 INJECTION SUBCUTANEOUS at 10:28

## 2025-01-17 RX ADMIN — HEPARIN 500 UNITS: 100 SYRINGE at 10:29

## 2025-01-17 RX ADMIN — SODIUM CHLORIDE, PRESERVATIVE FREE 10 ML: 5 INJECTION INTRAVENOUS at 10:29

## 2025-01-17 ASSESSMENT — ENCOUNTER SYMPTOMS
DIARRHEA: 0
VOMITING: 0
COUGH: 0
EYE DISCHARGE: 0
SHORTNESS OF BREATH: 1
EYE ITCHING: 0
NAUSEA: 0
CONSTIPATION: 0
TROUBLE SWALLOWING: 0
WHEEZING: 0
BACK PAIN: 1
SORE THROAT: 0

## 2025-02-07 DIAGNOSIS — G89.3 CANCER ASSOCIATED PAIN: ICD-10-CM

## 2025-02-07 DIAGNOSIS — C43.61 MALIGNANT MELANOMA OF RIGHT UPPER EXTREMITY INCLUDING SHOULDER (HCC): ICD-10-CM

## 2025-02-07 RX ORDER — HYDROCODONE BITARTRATE AND ACETAMINOPHEN 10; 325 MG/1; MG/1
1 TABLET ORAL EVERY 6 HOURS PRN
Qty: 120 TABLET | Refills: 0 | Status: SHIPPED | OUTPATIENT
Start: 2025-02-07 | End: 2025-03-09

## 2025-02-11 ENCOUNTER — HOSPITAL ENCOUNTER (OUTPATIENT)
Dept: CT IMAGING | Age: 85
Discharge: HOME | End: 2025-02-11
Payer: MEDICARE

## 2025-02-11 DIAGNOSIS — C43.61 MALIGNANT MELANOMA OF RIGHT UPPER EXTREMITY INCLUDING SHOULDER (HCC): ICD-10-CM

## 2025-02-11 DIAGNOSIS — R91.1 NODULE OF UPPER LOBE OF RIGHT LUNG: ICD-10-CM

## 2025-02-11 PROCEDURE — 71260 CT THORAX DX C+: CPT

## 2025-02-11 PROCEDURE — 6360000004 HC RX CONTRAST MEDICATION: Performed by: NURSE PRACTITIONER

## 2025-02-11 PROCEDURE — 74177 CT ABD & PELVIS W/CONTRAST: CPT

## 2025-02-11 RX ORDER — IOPAMIDOL 755 MG/ML
70 INJECTION, SOLUTION INTRAVASCULAR
Status: COMPLETED | OUTPATIENT
Start: 2025-02-11 | End: 2025-02-11

## 2025-02-11 RX ADMIN — IOPAMIDOL 70 ML: 755 INJECTION, SOLUTION INTRAVENOUS at 11:48

## 2025-02-13 NOTE — PROGRESS NOTES
Progress Note      Pt Name: Leeroy Conroy  YOB: 1940  MRN: [id number]    Date of evaluation: 2/14/25    History Obtained From:  patient, spouse-Apryl, electronic medical record    CHIEF COMPLAINT:    Chief Complaint   Patient presents with    Melanoma     Discuss surveillance imaging, encounter for immunotherapy     HISTORY OF PRESENT ILLNESS:  Leeroy Conroy is a pleasant 84-year-old gentleman with recurrent, BRAF V600E positive stage IV malignant melanoma, initially diagnosed 7/2018 having presented with right axillary and celiac lymph node, liver and bone involvement.  He returns to the clinic to discuss surveillance imaging and to continue monthly nivolumab.  In general, Leeroy tolerates treatment well.  He has occasional, generalized pruritus, stable on medication and worse during the summer with sun exposure.  He is currently treated with Paxil, Singulair and Atarax as needed.  Appetite waxes and wanes, though overall stable with Megace PRN.  Chronic back pain is stable taking Norco as needed.  Leeroy denies cough, diarrhea or rash.  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   he requires intenstive monitoring due to the potential to cause serious morbidity or death.     TARGET METASTATIC MALIGNANT MELANOMA SITES:  Right upper extremity original primary site 06/05/14   Right axilla lymph node at presentation 6/5/2014 and 3 axillary nodes at recurrence 7/27/2018 with an SUV of 8.7   Too numerous to count liver metastases   Celiac lymph nodes x 2 with highest SUV of 3   Bony metastatic disease on PET scan in the right ilium (SUV of 5) and right acetabulum (SUV of 6.4)   Indeterminate HORACIO 3 mm subpleural nodule   Indeterminate thyroid nodules on chest

## 2025-02-14 ENCOUNTER — OFFICE VISIT (OUTPATIENT)
Dept: HEMATOLOGY | Age: 85
End: 2025-02-14
Payer: MEDICARE

## 2025-02-14 ENCOUNTER — HOSPITAL ENCOUNTER (OUTPATIENT)
Dept: INFUSION THERAPY | Age: 85
Discharge: HOME | End: 2025-02-14
Payer: MEDICARE

## 2025-02-14 VITALS
OXYGEN SATURATION: 99 % | WEIGHT: 189.3 LBS | SYSTOLIC BLOOD PRESSURE: 128 MMHG | HEART RATE: 64 BPM | RESPIRATION RATE: 18 BRPM | HEIGHT: 70 IN | BODY MASS INDEX: 27.1 KG/M2 | DIASTOLIC BLOOD PRESSURE: 52 MMHG | TEMPERATURE: 98.2 F

## 2025-02-14 DIAGNOSIS — Z95.828 PORT-A-CATH IN PLACE: ICD-10-CM

## 2025-02-14 DIAGNOSIS — T88.9XXD SIDE EFFECTS OF TREATMENT, SUBSEQUENT ENCOUNTER: ICD-10-CM

## 2025-02-14 DIAGNOSIS — G89.3 CANCER ASSOCIATED PAIN: ICD-10-CM

## 2025-02-14 DIAGNOSIS — Z79.899 MEDICATION MANAGEMENT: ICD-10-CM

## 2025-02-14 DIAGNOSIS — C43.61 MALIGNANT MELANOMA OF RIGHT UPPER EXTREMITY INCLUDING SHOULDER (HCC): ICD-10-CM

## 2025-02-14 DIAGNOSIS — Z71.89 CARE PLAN DISCUSSED WITH PATIENT: ICD-10-CM

## 2025-02-14 DIAGNOSIS — E03.2 HYPOTHYROIDISM DUE TO MEDICATION: ICD-10-CM

## 2025-02-14 DIAGNOSIS — Z51.12 ENCOUNTER FOR ANTINEOPLASTIC IMMUNOTHERAPY: ICD-10-CM

## 2025-02-14 DIAGNOSIS — R53.83 FATIGUE DUE TO TREATMENT: ICD-10-CM

## 2025-02-14 DIAGNOSIS — R53.83 FATIGUE DUE TO TREATMENT: Primary | ICD-10-CM

## 2025-02-14 DIAGNOSIS — C79.51 MALIGNANT NEOPLASM METASTATIC TO BONE (HCC): ICD-10-CM

## 2025-02-14 DIAGNOSIS — R63.0 DECREASED APPETITE: ICD-10-CM

## 2025-02-14 DIAGNOSIS — C43.9 MALIGNANT MELANOMA, UNSPECIFIED SITE (HCC): Primary | ICD-10-CM

## 2025-02-14 DIAGNOSIS — C43.61 MALIGNANT MELANOMA OF RIGHT UPPER EXTREMITY INCLUDING SHOULDER (HCC): Primary | ICD-10-CM

## 2025-02-14 DIAGNOSIS — N28.89 LEFT RENAL MASS: ICD-10-CM

## 2025-02-14 LAB
ALBUMIN SERPL-MCNC: 4 G/DL (ref 3.5–5.2)
ALP SERPL-CCNC: 41 U/L (ref 40–129)
ALT SERPL-CCNC: 9 U/L (ref 5–41)
ANION GAP SERPL CALCULATED.3IONS-SCNC: 7 MMOL/L (ref 7–19)
AST SERPL-CCNC: 20 U/L (ref 5–40)
BASOPHILS # BLD: 0 K/UL (ref 0–0.2)
BASOPHILS NFR BLD: 0 % (ref 0–1)
BILIRUB SERPL-MCNC: 1.2 MG/DL (ref 0–1.2)
BUN SERPL-MCNC: 17 MG/DL (ref 8–23)
CALCIUM SERPL-MCNC: 8.7 MG/DL (ref 8.8–10.2)
CHLORIDE SERPL-SCNC: 103 MMOL/L (ref 98–107)
CO2 SERPL-SCNC: 29 MMOL/L (ref 22–29)
CORTIS AM PEAK SERPL-MCNC: 7.7 UG/DL (ref 4.8–19.5)
CREAT SERPL-MCNC: 1.1 MG/DL (ref 0.7–1.2)
EOSINOPHIL # BLD: 0.38 K/UL (ref 0–0.6)
EOSINOPHIL NFR BLD: 5.6 % (ref 0–5)
ERYTHROCYTE [DISTWIDTH] IN BLOOD BY AUTOMATED COUNT: 12.9 % (ref 11.5–14.5)
GLUCOSE SERPL-MCNC: 101 MG/DL (ref 70–99)
HCT VFR BLD AUTO: 40.1 % (ref 42–52)
HGB BLD-MCNC: 13.8 G/DL (ref 14–18)
LYMPHOCYTES # BLD: 1.51 K/UL (ref 1.1–4.5)
LYMPHOCYTES NFR BLD: 22.2 % (ref 20–40)
MCH RBC QN AUTO: 31.5 PG (ref 27–31)
MCHC RBC AUTO-ENTMCNC: 34.4 G/DL (ref 33–37)
MCV RBC AUTO: 91.6 FL (ref 80–94)
MONOCYTES # BLD: 0.56 K/UL (ref 0–0.9)
MONOCYTES NFR BLD: 8.2 % (ref 1–10)
NEUTROPHILS # BLD: 4.33 K/UL (ref 1.5–7.5)
NEUTS SEG NFR BLD: 63.9 % (ref 50–65)
PLATELET # BLD AUTO: 241 K/UL (ref 130–400)
PMV BLD AUTO: 9.3 FL (ref 9.4–12.4)
POTASSIUM SERPL-SCNC: 4.3 MMOL/L (ref 3.5–5.1)
PROT SERPL-MCNC: 6.5 G/DL (ref 6.4–8.3)
RBC # BLD AUTO: 4.38 M/UL (ref 4.7–6.1)
SODIUM SERPL-SCNC: 139 MMOL/L (ref 136–145)
T4 FREE SERPL-MCNC: 1.59 NG/DL (ref 0.93–1.7)
TSH SERPL DL<=0.005 MIU/L-ACNC: 2.17 UIU/ML (ref 0.27–4.2)
WBC # BLD AUTO: 6.79 K/UL (ref 4.8–10.8)

## 2025-02-14 PROCEDURE — 36415 COLL VENOUS BLD VENIPUNCTURE: CPT

## 2025-02-14 PROCEDURE — 84439 ASSAY OF FREE THYROXINE: CPT

## 2025-02-14 PROCEDURE — 2500000003 HC RX 250 WO HCPCS: Performed by: NURSE PRACTITIONER

## 2025-02-14 PROCEDURE — 84443 ASSAY THYROID STIM HORMONE: CPT

## 2025-02-14 PROCEDURE — 96372 THER/PROPH/DIAG INJ SC/IM: CPT

## 2025-02-14 PROCEDURE — 82533 TOTAL CORTISOL: CPT

## 2025-02-14 PROCEDURE — 2580000003 HC RX 258: Performed by: NURSE PRACTITIONER

## 2025-02-14 PROCEDURE — 80053 COMPREHEN METABOLIC PANEL: CPT

## 2025-02-14 PROCEDURE — 6360000002 HC RX W HCPCS: Performed by: NURSE PRACTITIONER

## 2025-02-14 PROCEDURE — 96413 CHEMO IV INFUSION 1 HR: CPT

## 2025-02-14 PROCEDURE — 85025 COMPLETE CBC W/AUTO DIFF WBC: CPT

## 2025-02-14 RX ORDER — HEPARIN 100 UNIT/ML
500 SYRINGE INTRAVENOUS PRN
Status: DISCONTINUED | OUTPATIENT
Start: 2025-02-14 | End: 2025-02-16 | Stop reason: HOSPADM

## 2025-02-14 RX ORDER — HEPARIN SODIUM (PORCINE) LOCK FLUSH IV SOLN 100 UNIT/ML 100 UNIT/ML
500 SOLUTION INTRAVENOUS PRN
Status: CANCELLED | OUTPATIENT
Start: 2025-02-14

## 2025-02-14 RX ORDER — DIPHENHYDRAMINE HYDROCHLORIDE 50 MG/ML
50 INJECTION INTRAMUSCULAR; INTRAVENOUS PRN
Status: CANCELLED | OUTPATIENT
Start: 2025-02-14

## 2025-02-14 RX ORDER — HYDROCORTISONE SODIUM SUCCINATE 100 MG/2ML
100 INJECTION INTRAMUSCULAR; INTRAVENOUS PRN
Status: CANCELLED | OUTPATIENT
Start: 2025-02-14

## 2025-02-14 RX ORDER — SODIUM CHLORIDE 0.9 % (FLUSH) 0.9 %
5 SYRINGE (ML) INJECTION PRN
Status: CANCELLED | OUTPATIENT
Start: 2025-02-14

## 2025-02-14 RX ORDER — SODIUM CHLORIDE 0.9 % (FLUSH) 0.9 %
10 SYRINGE (ML) INJECTION PRN
Status: DISCONTINUED | OUTPATIENT
Start: 2025-02-14 | End: 2025-02-15 | Stop reason: HOSPADM

## 2025-02-14 RX ORDER — EPINEPHRINE 1 MG/ML
0.3 INJECTION, SOLUTION, CONCENTRATE INTRAVENOUS PRN
Status: CANCELLED | OUTPATIENT
Start: 2025-02-14

## 2025-02-14 RX ORDER — SODIUM CHLORIDE 0.9 % (FLUSH) 0.9 %
10 SYRINGE (ML) INJECTION PRN
Status: CANCELLED | OUTPATIENT
Start: 2025-02-14

## 2025-02-14 RX ADMIN — HEPARIN 500 UNITS: 100 SYRINGE at 09:31

## 2025-02-14 RX ADMIN — SODIUM CHLORIDE, PRESERVATIVE FREE 10 ML: 5 INJECTION INTRAVENOUS at 09:31

## 2025-02-14 RX ADMIN — DENOSUMAB 120 MG: 120 INJECTION SUBCUTANEOUS at 09:31

## 2025-02-14 RX ADMIN — SODIUM CHLORIDE 480 MG: 9 INJECTION, SOLUTION INTRAVENOUS at 09:02

## 2025-02-14 ASSESSMENT — ENCOUNTER SYMPTOMS
DIARRHEA: 0
COUGH: 0
SORE THROAT: 0
NAUSEA: 0
EYE ITCHING: 0
BACK PAIN: 1
EYE DISCHARGE: 0
CONSTIPATION: 0
WHEEZING: 0
VOMITING: 0
SHORTNESS OF BREATH: 1
TROUBLE SWALLOWING: 0

## 2025-03-11 DIAGNOSIS — G89.3 CANCER ASSOCIATED PAIN: ICD-10-CM

## 2025-03-11 DIAGNOSIS — C43.61 MALIGNANT MELANOMA OF RIGHT UPPER EXTREMITY INCLUDING SHOULDER (HCC): ICD-10-CM

## 2025-03-11 RX ORDER — HYDROCODONE BITARTRATE AND ACETAMINOPHEN 10; 325 MG/1; MG/1
1 TABLET ORAL EVERY 6 HOURS PRN
Qty: 120 TABLET | Refills: 0 | Status: SHIPPED | OUTPATIENT
Start: 2025-03-11 | End: 2025-04-10

## 2025-03-14 ENCOUNTER — HOSPITAL ENCOUNTER (OUTPATIENT)
Dept: INFUSION THERAPY | Age: 85
Discharge: HOME | End: 2025-03-14
Payer: MEDICARE

## 2025-03-14 VITALS
OXYGEN SATURATION: 95 % | BODY MASS INDEX: 26.41 KG/M2 | TEMPERATURE: 97.8 F | SYSTOLIC BLOOD PRESSURE: 143 MMHG | RESPIRATION RATE: 18 BRPM | DIASTOLIC BLOOD PRESSURE: 63 MMHG | HEIGHT: 70 IN | HEART RATE: 60 BPM | WEIGHT: 184.5 LBS

## 2025-03-14 DIAGNOSIS — Z95.828 PORT-A-CATH IN PLACE: ICD-10-CM

## 2025-03-14 DIAGNOSIS — Z51.12 ENCOUNTER FOR ANTINEOPLASTIC IMMUNOTHERAPY: ICD-10-CM

## 2025-03-14 DIAGNOSIS — C79.51 MALIGNANT NEOPLASM METASTATIC TO BONE (HCC): Primary | ICD-10-CM

## 2025-03-14 DIAGNOSIS — R53.83 FATIGUE DUE TO TREATMENT: ICD-10-CM

## 2025-03-14 DIAGNOSIS — C43.9 MALIGNANT MELANOMA, UNSPECIFIED SITE (HCC): ICD-10-CM

## 2025-03-14 DIAGNOSIS — C79.51 MALIGNANT NEOPLASM METASTATIC TO BONE (HCC): ICD-10-CM

## 2025-03-14 DIAGNOSIS — C43.61 MALIGNANT MELANOMA OF RIGHT UPPER EXTREMITY INCLUDING SHOULDER (HCC): Primary | ICD-10-CM

## 2025-03-14 DIAGNOSIS — C43.61 MALIGNANT MELANOMA OF RIGHT UPPER EXTREMITY INCLUDING SHOULDER: ICD-10-CM

## 2025-03-14 LAB
ALBUMIN SERPL-MCNC: 3.8 G/DL (ref 3.5–5.2)
ALP SERPL-CCNC: 50 U/L (ref 40–129)
ALT SERPL-CCNC: 14 U/L (ref 5–41)
ANION GAP SERPL CALCULATED.3IONS-SCNC: 10 MMOL/L (ref 7–19)
AST SERPL-CCNC: 22 U/L (ref 5–40)
BASOPHILS # BLD: 0.01 K/UL (ref 0–0.2)
BASOPHILS NFR BLD: 0.1 % (ref 0–1)
BILIRUB SERPL-MCNC: 0.9 MG/DL (ref 0–1.2)
BUN SERPL-MCNC: 16 MG/DL (ref 8–23)
CALCIUM SERPL-MCNC: 8.9 MG/DL (ref 8.8–10.2)
CHLORIDE SERPL-SCNC: 101 MMOL/L (ref 98–107)
CO2 SERPL-SCNC: 29 MMOL/L (ref 22–29)
CORTIS AM PEAK SERPL-MCNC: 8.3 UG/DL (ref 4.8–19.5)
CREAT SERPL-MCNC: 1.2 MG/DL (ref 0.7–1.2)
EOSINOPHIL # BLD: 0.5 K/UL (ref 0–0.6)
EOSINOPHIL NFR BLD: 6.8 % (ref 0–5)
ERYTHROCYTE [DISTWIDTH] IN BLOOD BY AUTOMATED COUNT: 12.5 % (ref 11.5–14.5)
GLUCOSE SERPL-MCNC: 121 MG/DL (ref 70–99)
HCT VFR BLD AUTO: 40.6 % (ref 42–52)
HGB BLD-MCNC: 13.9 G/DL (ref 14–18)
LYMPHOCYTES # BLD: 1.67 K/UL (ref 1.1–4.5)
LYMPHOCYTES NFR BLD: 22.6 % (ref 20–40)
MAGNESIUM SERPL-MCNC: 2 MG/DL (ref 1.6–2.4)
MCH RBC QN AUTO: 30.9 PG (ref 27–31)
MCHC RBC AUTO-ENTMCNC: 34.2 G/DL (ref 33–37)
MCV RBC AUTO: 90.2 FL (ref 80–94)
MONOCYTES # BLD: 0.75 K/UL (ref 0–0.9)
MONOCYTES NFR BLD: 10.1 % (ref 1–10)
NEUTROPHILS # BLD: 4.45 K/UL (ref 1.5–7.5)
NEUTS SEG NFR BLD: 60.1 % (ref 50–65)
PHOSPHATE SERPL-MCNC: 3 MG/DL (ref 2.5–4.5)
PLATELET # BLD AUTO: 227 K/UL (ref 130–400)
PMV BLD AUTO: 9.5 FL (ref 9.4–12.4)
POTASSIUM SERPL-SCNC: 4.2 MMOL/L (ref 3.5–5.1)
PROT SERPL-MCNC: 6.4 G/DL (ref 6.4–8.3)
RBC # BLD AUTO: 4.5 M/UL (ref 4.7–6.1)
SODIUM SERPL-SCNC: 140 MMOL/L (ref 136–145)
T4 FREE SERPL-MCNC: 1.39 NG/DL (ref 0.93–1.7)
TSH SERPL DL<=0.005 MIU/L-ACNC: 2.59 UIU/ML (ref 0.27–4.2)
WBC # BLD AUTO: 7.4 K/UL (ref 4.8–10.8)

## 2025-03-14 PROCEDURE — 2580000003 HC RX 258: Performed by: NURSE PRACTITIONER

## 2025-03-14 PROCEDURE — 85025 COMPLETE CBC W/AUTO DIFF WBC: CPT

## 2025-03-14 PROCEDURE — 96413 CHEMO IV INFUSION 1 HR: CPT

## 2025-03-14 PROCEDURE — 84439 ASSAY OF FREE THYROXINE: CPT

## 2025-03-14 PROCEDURE — 83735 ASSAY OF MAGNESIUM: CPT

## 2025-03-14 PROCEDURE — 80053 COMPREHEN METABOLIC PANEL: CPT

## 2025-03-14 PROCEDURE — 84443 ASSAY THYROID STIM HORMONE: CPT

## 2025-03-14 PROCEDURE — 36415 COLL VENOUS BLD VENIPUNCTURE: CPT

## 2025-03-14 PROCEDURE — 2500000003 HC RX 250 WO HCPCS: Performed by: NURSE PRACTITIONER

## 2025-03-14 PROCEDURE — 82533 TOTAL CORTISOL: CPT

## 2025-03-14 PROCEDURE — 84100 ASSAY OF PHOSPHORUS: CPT

## 2025-03-14 PROCEDURE — 6360000002 HC RX W HCPCS: Performed by: NURSE PRACTITIONER

## 2025-03-14 PROCEDURE — 96372 THER/PROPH/DIAG INJ SC/IM: CPT

## 2025-03-14 RX ORDER — SODIUM CHLORIDE 0.9 % (FLUSH) 0.9 %
5 SYRINGE (ML) INJECTION PRN
OUTPATIENT
Start: 2025-03-14

## 2025-03-14 RX ORDER — DIPHENHYDRAMINE HYDROCHLORIDE 50 MG/ML
50 INJECTION, SOLUTION INTRAMUSCULAR; INTRAVENOUS PRN
OUTPATIENT
Start: 2025-03-14

## 2025-03-14 RX ORDER — SODIUM CHLORIDE 0.9 % (FLUSH) 0.9 %
10 SYRINGE (ML) INJECTION PRN
Status: DISCONTINUED | OUTPATIENT
Start: 2025-03-14 | End: 2025-03-15 | Stop reason: HOSPADM

## 2025-03-14 RX ORDER — HYDROCORTISONE SODIUM SUCCINATE 100 MG/2ML
100 INJECTION INTRAMUSCULAR; INTRAVENOUS PRN
OUTPATIENT
Start: 2025-03-14

## 2025-03-14 RX ORDER — EPINEPHRINE 1 MG/ML
0.3 INJECTION, SOLUTION, CONCENTRATE INTRAVENOUS PRN
OUTPATIENT
Start: 2025-03-14

## 2025-03-14 RX ORDER — HEPARIN 100 UNIT/ML
500 SYRINGE INTRAVENOUS PRN
Status: DISCONTINUED | OUTPATIENT
Start: 2025-03-14 | End: 2025-03-16 | Stop reason: HOSPADM

## 2025-03-14 RX ADMIN — HEPARIN 500 UNITS: 100 SYRINGE at 09:45

## 2025-03-14 RX ADMIN — DENOSUMAB 120 MG: 120 INJECTION SUBCUTANEOUS at 09:46

## 2025-03-14 RX ADMIN — SODIUM CHLORIDE, PRESERVATIVE FREE 10 ML: 5 INJECTION INTRAVENOUS at 09:46

## 2025-03-14 RX ADMIN — SODIUM CHLORIDE 480 MG: 9 INJECTION, SOLUTION INTRAVENOUS at 09:13

## 2025-03-17 RX ORDER — SODIUM CHLORIDE 0.9 % (FLUSH) 0.9 %
5 SYRINGE (ML) INJECTION PRN
OUTPATIENT
Start: 2025-04-11

## 2025-03-17 RX ORDER — HYDROCORTISONE SODIUM SUCCINATE 100 MG/2ML
100 INJECTION INTRAMUSCULAR; INTRAVENOUS PRN
OUTPATIENT
Start: 2025-04-11

## 2025-03-17 RX ORDER — EPINEPHRINE 1 MG/ML
0.3 INJECTION, SOLUTION, CONCENTRATE INTRAVENOUS PRN
OUTPATIENT
Start: 2025-04-11

## 2025-03-17 RX ORDER — DIPHENHYDRAMINE HYDROCHLORIDE 50 MG/ML
50 INJECTION, SOLUTION INTRAMUSCULAR; INTRAVENOUS PRN
OUTPATIENT
Start: 2025-04-11

## 2025-03-17 RX ORDER — SODIUM CHLORIDE 0.9 % (FLUSH) 0.9 %
10 SYRINGE (ML) INJECTION PRN
OUTPATIENT
Start: 2025-04-11

## 2025-03-17 RX ORDER — HEPARIN SODIUM (PORCINE) LOCK FLUSH IV SOLN 100 UNIT/ML 100 UNIT/ML
500 SOLUTION INTRAVENOUS PRN
OUTPATIENT
Start: 2025-04-11

## 2025-04-02 DIAGNOSIS — E03.9 ACQUIRED HYPOTHYROIDISM: ICD-10-CM

## 2025-04-02 RX ORDER — LEVOTHYROXINE SODIUM 100 UG/1
100 TABLET ORAL DAILY
Qty: 90 TABLET | Refills: 1 | Status: SHIPPED | OUTPATIENT
Start: 2025-04-02

## 2025-04-02 RX ORDER — HYDROXYZINE HYDROCHLORIDE 25 MG/1
25 TABLET, FILM COATED ORAL EVERY 8 HOURS PRN
Qty: 270 TABLET | Refills: 0 | Status: SHIPPED | OUTPATIENT
Start: 2025-04-02 | End: 2025-07-01

## 2025-04-10 NOTE — PROGRESS NOTES
enterocolitis with nausea/vomiting and diarrhea.  Antineoplastic therapy was held during that time, rechallenged beginning 1/24/19.  Leeroy was evaluated 2/11/19 at  ER for a 24 hour history of abdominal pain and diarrhea with mild diverticulitis, stable from 1/12/19. He was treated with Cipro and Flagyl.  Single agent Tafinlar was taken 3/28/19 - 4/4/19, discontinued by Leeroy due to intolerable abdominal cramping.  Opdivo was resumed 4/25/19.  CT scans of the chest, abdomen and pelvis with contrast 8/13/2019 at [facility] was negative for intrathoracic metastasis.  There was no evidence of disease progression in the abdomen/pelvis with stable, subcentimeter low-density liver lesions noted.  Dosing was changed to 480 mg every 4 weeks on 9/5/2019 to see if this may decrease persistent itching. Rx fluoxetine and hydroxyzine per Dr. Winters recommendations.  Contrasted CT scans of the chest, abdomen/pelvis and bone scan 1/16/2020 were negative for evidence of disease progression.  19 mm right thyroid lobe nodule and subcentimeter low-density liver lesions were stable.  No abnormal bony uptake.  Contrasted CT scans of the chest, abdomen and pelvis 6/4/2020 were without evidence of metastatic disease. 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  Treatment continues with nivolumab with stable serial imaging.  TREATMENT SUMMARY:  Dr. Huggins resected a malignant melanoma from the right posterior arm, above the elbow, on 06/05/14   Wide excision with a sentinel lymph node biopsy by Dr. Javier on 07/09/14.   Adjuvant weekly Sylatron Initiated 09/02/14 at 6 mcg/kg (500mcg) sub-q weekly but was only able to receive 4 of 8 planned treatments of this. The last dose #4 was on 09/28/14   Adjuvant weekly Sylatron 3 mcg/kg (250mcg)  vascular surgery on 11/11/2020 regarding high-grade SVC narrowing with collateral vein formation in the mediastinum noted on contrasted chest CT at [facility] on 10/22/2020.   Leeroy was asymptomatic, observation recommended.    Past Medical History:    Past Medical History:   Diagnosis Date    Acid reflux     BPH (benign prostatic hyperplasia)     Cancer (HCC)     Diverticulitis     Hard of hearing     Hypercholesteremia     Hypertension     Malignant melanoma of skin of upper limb, including shoulder (HCC) dx 6/5/2014    to liver, stomach, bone, and right axilla     Past Surgical History:    Past Surgical History:   Procedure Laterality Date    CHOLECYSTECTOMY      FINE NEEDLE ASPIRATION Right 07/26/2018    FNA right axillary LN    SENTINEL LYMPH NODE BIOPSY Right 07/09/2014    wide excision right posterior arm melanoma and right axillary SLN biopsy    SKIN CANCER EXCISION Right 06/05/2014    excision melanoma right posterior arm by Dr. Blayne Huggins    TUNNELED CENTRAL VENOUS CATHETER W/ SUBCUTANEOUS PORT       Current Medications:    Current Outpatient Medications   Medication Sig Dispense Refill    levothyroxine (SYNTHROID) 100 MCG tablet TAKE 1 TABLET BY MOUTH EVERY DAY 90 tablet 1    hydrOXYzine HCl (ATARAX) 25 MG tablet TAKE 1 TABLET BY MOUTH EVERY 8 HOURS AS NEEDED FOR ITCHING 270 tablet 0    HYDROcodone-acetaminophen (NORCO)  MG per tablet Take 1 tablet by mouth every 6 hours as needed for Pain for up to 30 days. Intended supply: 30 days Max Daily Amount: 4 tablets 120 tablet 0    pantoprazole (PROTONIX) 40 MG tablet Take 1 tablet by mouth daily 90 tablet 3    PARoxetine (PAXIL) 10 MG tablet TAKE 1 TABLET BY MOUTH EVERY DAY 90 tablet 3    montelukast (SINGULAIR) 10 MG tablet TAKE 1 TABLET BY MOUTH ONCE DAILY AT NIGHT 90 tablet 3    megestrol (MEGACE) 40 MG/ML suspension Take 20 mLs by mouth daily 240 mL 5    albuterol sulfate HFA (PROVENTIL;VENTOLIN;PROAIR) 108 (90 Base) MCG/ACT inhaler Inhale 2 puffs into the

## 2025-04-11 ENCOUNTER — OFFICE VISIT (OUTPATIENT)
Dept: HEMATOLOGY | Age: 85
End: 2025-04-11
Payer: MEDICARE

## 2025-04-11 ENCOUNTER — HOSPITAL ENCOUNTER (OUTPATIENT)
Dept: INFUSION THERAPY | Age: 85
Discharge: HOME | End: 2025-04-11
Payer: MEDICARE

## 2025-04-11 VITALS
OXYGEN SATURATION: 95 % | BODY MASS INDEX: 26.92 KG/M2 | WEIGHT: 188 LBS | SYSTOLIC BLOOD PRESSURE: 155 MMHG | RESPIRATION RATE: 18 BRPM | HEIGHT: 70 IN | HEART RATE: 65 BPM | DIASTOLIC BLOOD PRESSURE: 68 MMHG | TEMPERATURE: 97.7 F

## 2025-04-11 VITALS
SYSTOLIC BLOOD PRESSURE: 146 MMHG | TEMPERATURE: 97.5 F | HEART RATE: 61 BPM | OXYGEN SATURATION: 100 % | RESPIRATION RATE: 18 BRPM | DIASTOLIC BLOOD PRESSURE: 61 MMHG

## 2025-04-11 DIAGNOSIS — G89.3 CANCER ASSOCIATED PAIN: ICD-10-CM

## 2025-04-11 DIAGNOSIS — C43.61 MALIGNANT MELANOMA OF RIGHT UPPER EXTREMITY INCLUDING SHOULDER: ICD-10-CM

## 2025-04-11 DIAGNOSIS — C79.51 MALIGNANT NEOPLASM METASTATIC TO BONE (HCC): ICD-10-CM

## 2025-04-11 DIAGNOSIS — R53.83 FATIGUE DUE TO TREATMENT: ICD-10-CM

## 2025-04-11 DIAGNOSIS — Z95.828 PORT-A-CATH IN PLACE: ICD-10-CM

## 2025-04-11 DIAGNOSIS — C43.61 MALIGNANT MELANOMA OF RIGHT UPPER EXTREMITY INCLUDING SHOULDER: Primary | ICD-10-CM

## 2025-04-11 DIAGNOSIS — Z71.89 CARE PLAN DISCUSSED WITH PATIENT: ICD-10-CM

## 2025-04-11 DIAGNOSIS — N28.89 LEFT RENAL MASS: ICD-10-CM

## 2025-04-11 DIAGNOSIS — T88.9XXD SIDE EFFECTS OF TREATMENT, SUBSEQUENT ENCOUNTER: ICD-10-CM

## 2025-04-11 DIAGNOSIS — C43.9 MALIGNANT MELANOMA, UNSPECIFIED SITE (HCC): Primary | ICD-10-CM

## 2025-04-11 DIAGNOSIS — E03.2 HYPOTHYROIDISM DUE TO MEDICATION: ICD-10-CM

## 2025-04-11 DIAGNOSIS — Z79.899 MEDICATION MANAGEMENT: ICD-10-CM

## 2025-04-11 DIAGNOSIS — Z51.12 ENCOUNTER FOR ANTINEOPLASTIC IMMUNOTHERAPY: ICD-10-CM

## 2025-04-11 LAB
ALBUMIN SERPL-MCNC: 4 G/DL (ref 3.5–5.2)
ALP SERPL-CCNC: 62 U/L (ref 40–129)
ALT SERPL-CCNC: 18 U/L (ref 5–41)
ANION GAP SERPL CALCULATED.3IONS-SCNC: 11 MMOL/L (ref 7–19)
AST SERPL-CCNC: 26 U/L (ref 5–40)
BASOPHILS # BLD: 0 K/UL (ref 0–0.2)
BASOPHILS NFR BLD: 0 % (ref 0–1)
BILIRUB SERPL-MCNC: 1.2 MG/DL (ref 0–1.2)
BUN SERPL-MCNC: 16 MG/DL (ref 8–23)
CALCIUM SERPL-MCNC: 8.9 MG/DL (ref 8.8–10.2)
CHLORIDE SERPL-SCNC: 102 MMOL/L (ref 98–107)
CO2 SERPL-SCNC: 26 MMOL/L (ref 22–29)
CORTIS AM PEAK SERPL-MCNC: 6.8 UG/DL (ref 4.8–19.5)
CREAT SERPL-MCNC: 1.1 MG/DL (ref 0.7–1.2)
EOSINOPHIL # BLD: 0.58 K/UL (ref 0–0.6)
EOSINOPHIL NFR BLD: 8.2 % (ref 0–5)
ERYTHROCYTE [DISTWIDTH] IN BLOOD BY AUTOMATED COUNT: 12.3 % (ref 11.5–14.5)
GLUCOSE SERPL-MCNC: 108 MG/DL (ref 70–99)
HCT VFR BLD AUTO: 41.6 % (ref 42–52)
HGB BLD-MCNC: 14.5 G/DL (ref 14–18)
LYMPHOCYTES # BLD: 1.73 K/UL (ref 1.1–4.5)
LYMPHOCYTES NFR BLD: 24.4 % (ref 20–40)
MAGNESIUM SERPL-MCNC: 2 MG/DL (ref 1.6–2.4)
MCH RBC QN AUTO: 31.5 PG (ref 27–31)
MCHC RBC AUTO-ENTMCNC: 34.9 G/DL (ref 33–37)
MCV RBC AUTO: 90.4 FL (ref 80–94)
MONOCYTES # BLD: 0.64 K/UL (ref 0–0.9)
MONOCYTES NFR BLD: 9 % (ref 1–10)
NEUTROPHILS # BLD: 4.12 K/UL (ref 1.5–7.5)
NEUTS SEG NFR BLD: 58.3 % (ref 50–65)
PHOSPHATE SERPL-MCNC: 3.1 MG/DL (ref 2.5–4.5)
PLATELET # BLD AUTO: 231 K/UL (ref 130–400)
PMV BLD AUTO: 9.8 FL (ref 9.4–12.4)
POTASSIUM SERPL-SCNC: 4.1 MMOL/L (ref 3.5–5.1)
PROT SERPL-MCNC: 6.6 G/DL (ref 6.4–8.3)
RBC # BLD AUTO: 4.6 M/UL (ref 4.7–6.1)
SODIUM SERPL-SCNC: 139 MMOL/L (ref 136–145)
T4 FREE SERPL-MCNC: 1.44 NG/DL (ref 0.93–1.7)
TSH SERPL DL<=0.005 MIU/L-ACNC: 3.36 UIU/ML (ref 0.27–4.2)
WBC # BLD AUTO: 7.08 K/UL (ref 4.8–10.8)

## 2025-04-11 PROCEDURE — 2580000003 HC RX 258: Performed by: INTERNAL MEDICINE

## 2025-04-11 PROCEDURE — 83735 ASSAY OF MAGNESIUM: CPT

## 2025-04-11 PROCEDURE — 85025 COMPLETE CBC W/AUTO DIFF WBC: CPT

## 2025-04-11 PROCEDURE — 6360000002 HC RX W HCPCS: Performed by: NURSE PRACTITIONER

## 2025-04-11 PROCEDURE — 6360000002 HC RX W HCPCS: Performed by: INTERNAL MEDICINE

## 2025-04-11 PROCEDURE — 1126F AMNT PAIN NOTED NONE PRSNT: CPT | Performed by: NURSE PRACTITIONER

## 2025-04-11 PROCEDURE — 96372 THER/PROPH/DIAG INJ SC/IM: CPT

## 2025-04-11 PROCEDURE — G8417 CALC BMI ABV UP PARAM F/U: HCPCS | Performed by: NURSE PRACTITIONER

## 2025-04-11 PROCEDURE — 84443 ASSAY THYROID STIM HORMONE: CPT

## 2025-04-11 PROCEDURE — 99214 OFFICE O/P EST MOD 30 MIN: CPT | Performed by: NURSE PRACTITIONER

## 2025-04-11 PROCEDURE — 1123F ACP DISCUSS/DSCN MKR DOCD: CPT | Performed by: NURSE PRACTITIONER

## 2025-04-11 PROCEDURE — 96413 CHEMO IV INFUSION 1 HR: CPT

## 2025-04-11 PROCEDURE — 80053 COMPREHEN METABOLIC PANEL: CPT

## 2025-04-11 PROCEDURE — G2211 COMPLEX E/M VISIT ADD ON: HCPCS | Performed by: NURSE PRACTITIONER

## 2025-04-11 PROCEDURE — 2500000003 HC RX 250 WO HCPCS: Performed by: INTERNAL MEDICINE

## 2025-04-11 PROCEDURE — 36415 COLL VENOUS BLD VENIPUNCTURE: CPT

## 2025-04-11 PROCEDURE — G8427 DOCREV CUR MEDS BY ELIG CLIN: HCPCS | Performed by: NURSE PRACTITIONER

## 2025-04-11 PROCEDURE — 84100 ASSAY OF PHOSPHORUS: CPT

## 2025-04-11 PROCEDURE — 82533 TOTAL CORTISOL: CPT

## 2025-04-11 PROCEDURE — 84439 ASSAY OF FREE THYROXINE: CPT

## 2025-04-11 PROCEDURE — 1036F TOBACCO NON-USER: CPT | Performed by: NURSE PRACTITIONER

## 2025-04-11 RX ORDER — HYDROCODONE BITARTRATE AND ACETAMINOPHEN 10; 325 MG/1; MG/1
1 TABLET ORAL EVERY 6 HOURS PRN
Qty: 120 TABLET | Refills: 0 | Status: SHIPPED | OUTPATIENT
Start: 2025-04-11 | End: 2025-05-11

## 2025-04-11 RX ORDER — SODIUM CHLORIDE 0.9 % (FLUSH) 0.9 %
10 SYRINGE (ML) INJECTION PRN
Status: DISCONTINUED | OUTPATIENT
Start: 2025-04-11 | End: 2025-04-12 | Stop reason: HOSPADM

## 2025-04-11 RX ORDER — HEPARIN 100 UNIT/ML
500 SYRINGE INTRAVENOUS PRN
Status: DISCONTINUED | OUTPATIENT
Start: 2025-04-11 | End: 2025-04-13 | Stop reason: HOSPADM

## 2025-04-11 RX ADMIN — DENOSUMAB 120 MG: 120 INJECTION SUBCUTANEOUS at 09:34

## 2025-04-11 RX ADMIN — SODIUM CHLORIDE 480 MG: 9 INJECTION, SOLUTION INTRAVENOUS at 09:02

## 2025-04-11 RX ADMIN — SODIUM CHLORIDE, PRESERVATIVE FREE 10 ML: 5 INJECTION INTRAVENOUS at 09:38

## 2025-04-11 RX ADMIN — HEPARIN 500 UNITS: 100 SYRINGE at 09:38

## 2025-04-16 ASSESSMENT — ENCOUNTER SYMPTOMS
VOMITING: 0
SHORTNESS OF BREATH: 1
SORE THROAT: 0
CONSTIPATION: 0
DIARRHEA: 0
ABDOMINAL PAIN: 1
COUGH: 0
EYE ITCHING: 0
EYE DISCHARGE: 0
NAUSEA: 0
TROUBLE SWALLOWING: 0
WHEEZING: 0
BACK PAIN: 1

## 2025-05-09 ENCOUNTER — HOSPITAL ENCOUNTER (OUTPATIENT)
Dept: INFUSION THERAPY | Age: 85
Discharge: HOME | End: 2025-05-09
Payer: MEDICARE

## 2025-05-09 VITALS
OXYGEN SATURATION: 94 % | TEMPERATURE: 98.4 F | WEIGHT: 185.5 LBS | HEIGHT: 70 IN | BODY MASS INDEX: 26.56 KG/M2 | SYSTOLIC BLOOD PRESSURE: 154 MMHG | RESPIRATION RATE: 18 BRPM | HEART RATE: 66 BPM | DIASTOLIC BLOOD PRESSURE: 66 MMHG

## 2025-05-09 DIAGNOSIS — C79.51 MALIGNANT NEOPLASM METASTATIC TO BONE (HCC): ICD-10-CM

## 2025-05-09 DIAGNOSIS — G89.3 CANCER ASSOCIATED PAIN: ICD-10-CM

## 2025-05-09 DIAGNOSIS — C43.61 MALIGNANT MELANOMA OF RIGHT UPPER EXTREMITY INCLUDING SHOULDER (HCC): ICD-10-CM

## 2025-05-09 DIAGNOSIS — Z51.12 ENCOUNTER FOR ANTINEOPLASTIC IMMUNOTHERAPY: ICD-10-CM

## 2025-05-09 DIAGNOSIS — C43.9 MALIGNANT MELANOMA, UNSPECIFIED SITE (HCC): Primary | ICD-10-CM

## 2025-05-09 DIAGNOSIS — R53.83 FATIGUE DUE TO TREATMENT: ICD-10-CM

## 2025-05-09 DIAGNOSIS — Z95.828 PORT-A-CATH IN PLACE: ICD-10-CM

## 2025-05-09 DIAGNOSIS — C43.61 MALIGNANT MELANOMA OF RIGHT UPPER EXTREMITY INCLUDING SHOULDER (HCC): Primary | ICD-10-CM

## 2025-05-09 LAB
ALBUMIN SERPL-MCNC: 4.1 G/DL (ref 3.5–5.2)
ALP SERPL-CCNC: 59 U/L (ref 40–129)
ALT SERPL-CCNC: 11 U/L (ref 5–41)
ANION GAP SERPL CALCULATED.3IONS-SCNC: 9 MMOL/L (ref 7–19)
AST SERPL-CCNC: 20 U/L (ref 5–40)
BASOPHILS # BLD: 0.01 K/UL (ref 0–0.2)
BASOPHILS NFR BLD: 0.1 % (ref 0–1)
BILIRUB SERPL-MCNC: 0.9 MG/DL (ref 0–1.2)
BUN SERPL-MCNC: 17 MG/DL (ref 8–23)
CALCIUM SERPL-MCNC: 8.8 MG/DL (ref 8.8–10.2)
CHLORIDE SERPL-SCNC: 100 MMOL/L (ref 98–107)
CO2 SERPL-SCNC: 27 MMOL/L (ref 22–29)
CORTIS PM SERPL-MCNC: 6.9 UG/DL (ref 2.5–11.9)
CREAT SERPL-MCNC: 1.2 MG/DL (ref 0.7–1.2)
EOSINOPHIL # BLD: 0.57 K/UL (ref 0–0.6)
EOSINOPHIL NFR BLD: 7.7 % (ref 0–5)
ERYTHROCYTE [DISTWIDTH] IN BLOOD BY AUTOMATED COUNT: 12.1 % (ref 11.5–14.5)
GLUCOSE SERPL-MCNC: 125 MG/DL (ref 70–99)
HCT VFR BLD AUTO: 42.4 % (ref 42–52)
HGB BLD-MCNC: 14.6 G/DL (ref 14–18)
LYMPHOCYTES # BLD: 2.22 K/UL (ref 1.1–4.5)
LYMPHOCYTES NFR BLD: 29.8 % (ref 20–40)
MAGNESIUM SERPL-MCNC: 2 MG/DL (ref 1.6–2.4)
MCH RBC QN AUTO: 30.8 PG (ref 27–31)
MCHC RBC AUTO-ENTMCNC: 34.4 G/DL (ref 33–37)
MCV RBC AUTO: 89.5 FL (ref 80–94)
MONOCYTES # BLD: 0.69 K/UL (ref 0–0.9)
MONOCYTES NFR BLD: 9.3 % (ref 1–10)
NEUTROPHILS # BLD: 3.93 K/UL (ref 1.5–7.5)
NEUTS SEG NFR BLD: 52.8 % (ref 50–65)
PHOSPHATE SERPL-MCNC: 3.6 MG/DL (ref 2.5–4.5)
PLATELET # BLD AUTO: 219 K/UL (ref 130–400)
PMV BLD AUTO: 9.6 FL (ref 9.4–12.4)
POTASSIUM SERPL-SCNC: 4.3 MMOL/L (ref 3.5–5.1)
PROT SERPL-MCNC: 6.8 G/DL (ref 6.4–8.3)
RBC # BLD AUTO: 4.74 M/UL (ref 4.7–6.1)
SODIUM SERPL-SCNC: 136 MMOL/L (ref 136–145)
T4 FREE SERPL-MCNC: 1.5 NG/DL (ref 0.93–1.7)
TSH SERPL DL<=0.005 MIU/L-ACNC: 4.51 UIU/ML (ref 0.27–4.2)
WBC # BLD AUTO: 7.44 K/UL (ref 4.8–10.8)

## 2025-05-09 PROCEDURE — 96413 CHEMO IV INFUSION 1 HR: CPT

## 2025-05-09 PROCEDURE — 36415 COLL VENOUS BLD VENIPUNCTURE: CPT

## 2025-05-09 PROCEDURE — 84443 ASSAY THYROID STIM HORMONE: CPT

## 2025-05-09 PROCEDURE — 83735 ASSAY OF MAGNESIUM: CPT

## 2025-05-09 PROCEDURE — 84439 ASSAY OF FREE THYROXINE: CPT

## 2025-05-09 PROCEDURE — 6360000002 HC RX W HCPCS: Performed by: NURSE PRACTITIONER

## 2025-05-09 PROCEDURE — 85025 COMPLETE CBC W/AUTO DIFF WBC: CPT

## 2025-05-09 PROCEDURE — 96372 THER/PROPH/DIAG INJ SC/IM: CPT

## 2025-05-09 PROCEDURE — 84100 ASSAY OF PHOSPHORUS: CPT

## 2025-05-09 PROCEDURE — 82533 TOTAL CORTISOL: CPT

## 2025-05-09 PROCEDURE — 2500000003 HC RX 250 WO HCPCS: Performed by: NURSE PRACTITIONER

## 2025-05-09 PROCEDURE — 80053 COMPREHEN METABOLIC PANEL: CPT

## 2025-05-09 PROCEDURE — 2580000003 HC RX 258: Performed by: NURSE PRACTITIONER

## 2025-05-09 RX ORDER — SODIUM CHLORIDE 0.9 % (FLUSH) 0.9 %
10 SYRINGE (ML) INJECTION PRN
Status: DISCONTINUED | OUTPATIENT
Start: 2025-05-09 | End: 2025-05-10 | Stop reason: HOSPADM

## 2025-05-09 RX ORDER — SODIUM CHLORIDE 0.9 % (FLUSH) 0.9 %
5 SYRINGE (ML) INJECTION PRN
Status: CANCELLED | OUTPATIENT
Start: 2025-05-09

## 2025-05-09 RX ORDER — DIPHENHYDRAMINE HYDROCHLORIDE 50 MG/ML
50 INJECTION, SOLUTION INTRAMUSCULAR; INTRAVENOUS PRN
Status: CANCELLED | OUTPATIENT
Start: 2025-05-09

## 2025-05-09 RX ORDER — HEPARIN 100 UNIT/ML
500 SYRINGE INTRAVENOUS PRN
Status: DISCONTINUED | OUTPATIENT
Start: 2025-05-09 | End: 2025-05-11 | Stop reason: HOSPADM

## 2025-05-09 RX ORDER — HYDROCODONE BITARTRATE AND ACETAMINOPHEN 10; 325 MG/1; MG/1
1 TABLET ORAL EVERY 6 HOURS PRN
Qty: 120 TABLET | Refills: 0 | Status: SHIPPED | OUTPATIENT
Start: 2025-05-09 | End: 2025-06-08

## 2025-05-09 RX ORDER — SODIUM CHLORIDE 0.9 % (FLUSH) 0.9 %
10 SYRINGE (ML) INJECTION PRN
Status: CANCELLED | OUTPATIENT
Start: 2025-05-09

## 2025-05-09 RX ORDER — EPINEPHRINE 1 MG/ML
0.3 INJECTION, SOLUTION, CONCENTRATE INTRAVENOUS PRN
Status: CANCELLED | OUTPATIENT
Start: 2025-05-09

## 2025-05-09 RX ORDER — HEPARIN SODIUM (PORCINE) LOCK FLUSH IV SOLN 100 UNIT/ML 100 UNIT/ML
500 SOLUTION INTRAVENOUS PRN
Status: CANCELLED | OUTPATIENT
Start: 2025-05-09

## 2025-05-09 RX ORDER — HYDROCORTISONE SODIUM SUCCINATE 100 MG/2ML
100 INJECTION INTRAMUSCULAR; INTRAVENOUS PRN
Status: CANCELLED | OUTPATIENT
Start: 2025-05-09

## 2025-05-09 RX ADMIN — HEPARIN 500 UNITS: 100 SYRINGE at 14:59

## 2025-05-09 RX ADMIN — SODIUM CHLORIDE 480 MG: 9 INJECTION, SOLUTION INTRAVENOUS at 14:28

## 2025-05-09 RX ADMIN — DENOSUMAB 120 MG: 120 INJECTION SUBCUTANEOUS at 14:59

## 2025-05-09 RX ADMIN — SODIUM CHLORIDE, PRESERVATIVE FREE 10 ML: 5 INJECTION INTRAVENOUS at 15:00

## 2025-05-09 NOTE — PROGRESS NOTES
Spiritual Health History and Assessment/Progress Note  [facility]    Initial Encounter,  ,  ,      Name: Leeroy Conroy MRN: [id number]    Age: 84 y.o.     Sex: male   Language: English   Mandaen: Uatsdin   <principal problem not specified>     Date: 5/9/2025            Total Time Calculated: 8 min              Spiritual Assessment began in [facility]            Encounter Overview/Reason: Initial Encounter  Service Provided For: Patient and family together (Wife)    Michelle, Belief, Meaning:   Patient is connected with a michelle tradition or spiritual practice:Expressed - going to a [employer].  Family/Friends Other: Wife said the same.      Importance and Influence:  Patient Other: Didn't elaborate.  Family/Friends Other: Didn't either.    Community:  Patient feels well-supported. Support system includes: Spouse/Partner and Michelle Community  Family/Friends feel well-supported. Support system includes: Spouse/Partner and Michelle Community    Assessment and Plan of Care:   Pt smilingly reported no need, concern of distress of spiritual/Zoroastrianism kind.    Patient Interventions include: Facilitated expression of thoughts and feelings  Family/Friends Interventions include: Facilitated expression of thoughts and feelings    Patient Plan of Care: No future visits per patient/family request  Family/Friends Plan of Care: No future visits per patient/family request    Electronically signed by SYED Robertson on 5/9/2025 at 1:50 PM

## 2025-06-06 ENCOUNTER — HOSPITAL ENCOUNTER (OUTPATIENT)
Dept: INFUSION THERAPY | Age: 85
Discharge: HOME | End: 2025-06-06
Payer: MEDICARE

## 2025-06-06 VITALS
HEIGHT: 70 IN | BODY MASS INDEX: 26.66 KG/M2 | RESPIRATION RATE: 18 BRPM | TEMPERATURE: 97.9 F | HEART RATE: 63 BPM | OXYGEN SATURATION: 96 % | WEIGHT: 186.2 LBS | DIASTOLIC BLOOD PRESSURE: 64 MMHG | SYSTOLIC BLOOD PRESSURE: 138 MMHG

## 2025-06-06 DIAGNOSIS — Z95.828 PORT-A-CATH IN PLACE: ICD-10-CM

## 2025-06-06 DIAGNOSIS — R53.83 FATIGUE DUE TO TREATMENT: ICD-10-CM

## 2025-06-06 DIAGNOSIS — Z51.12 ENCOUNTER FOR ANTINEOPLASTIC IMMUNOTHERAPY: ICD-10-CM

## 2025-06-06 DIAGNOSIS — E03.2 HYPOTHYROIDISM DUE TO MEDICATION: ICD-10-CM

## 2025-06-06 DIAGNOSIS — E03.2 HYPOTHYROIDISM DUE TO MEDICATION: Primary | ICD-10-CM

## 2025-06-06 DIAGNOSIS — C79.51 MALIGNANT NEOPLASM METASTATIC TO BONE (HCC): ICD-10-CM

## 2025-06-06 DIAGNOSIS — C43.61 MALIGNANT MELANOMA OF RIGHT UPPER EXTREMITY INCLUDING SHOULDER (HCC): ICD-10-CM

## 2025-06-06 DIAGNOSIS — C43.9 MALIGNANT MELANOMA, UNSPECIFIED SITE (HCC): Primary | ICD-10-CM

## 2025-06-06 LAB
ALBUMIN SERPL-MCNC: 3.9 G/DL (ref 3.5–5.2)
ALP SERPL-CCNC: 49 U/L (ref 40–129)
ALT SERPL-CCNC: 13 U/L (ref 5–41)
ANION GAP SERPL CALCULATED.3IONS-SCNC: 8 MMOL/L (ref 7–19)
AST SERPL-CCNC: 20 U/L (ref 5–40)
BASOPHILS # BLD: 0 K/UL (ref 0–0.2)
BASOPHILS NFR BLD: 0 % (ref 0–1)
BILIRUB SERPL-MCNC: 1.1 MG/DL (ref 0–1.2)
BUN SERPL-MCNC: 15 MG/DL (ref 8–23)
CALCIUM SERPL-MCNC: 8.8 MG/DL (ref 8.8–10.2)
CHLORIDE SERPL-SCNC: 100 MMOL/L (ref 98–107)
CO2 SERPL-SCNC: 28 MMOL/L (ref 22–29)
CORTIS AM PEAK SERPL-MCNC: 7 UG/DL (ref 4.8–19.5)
CREAT SERPL-MCNC: 1.2 MG/DL (ref 0.7–1.2)
EOSINOPHIL # BLD: 0.5 K/UL (ref 0–0.6)
EOSINOPHIL NFR BLD: 6.9 % (ref 0–5)
ERYTHROCYTE [DISTWIDTH] IN BLOOD BY AUTOMATED COUNT: 12.1 % (ref 11.5–14.5)
GLUCOSE SERPL-MCNC: 101 MG/DL (ref 70–99)
HCT VFR BLD AUTO: 40.8 % (ref 42–52)
HGB BLD-MCNC: 14.1 G/DL (ref 14–18)
LYMPHOCYTES # BLD: 2.01 K/UL (ref 1.1–4.5)
LYMPHOCYTES NFR BLD: 27.7 % (ref 20–40)
MAGNESIUM SERPL-MCNC: 2 MG/DL (ref 1.6–2.4)
MCH RBC QN AUTO: 30.6 PG (ref 27–31)
MCHC RBC AUTO-ENTMCNC: 34.6 G/DL (ref 33–37)
MCV RBC AUTO: 88.5 FL (ref 80–94)
MONOCYTES # BLD: 0.8 K/UL (ref 0–0.9)
MONOCYTES NFR BLD: 11 % (ref 1–10)
NEUTROPHILS # BLD: 3.93 K/UL (ref 1.5–7.5)
NEUTS SEG NFR BLD: 54.1 % (ref 50–65)
PHOSPHATE SERPL-MCNC: 3.2 MG/DL (ref 2.5–4.5)
PLATELET # BLD AUTO: 225 K/UL (ref 130–400)
PMV BLD AUTO: 9.3 FL (ref 9.4–12.4)
POTASSIUM SERPL-SCNC: 4.5 MMOL/L (ref 3.5–5.1)
PROT SERPL-MCNC: 6.4 G/DL (ref 6.4–8.3)
RBC # BLD AUTO: 4.61 M/UL (ref 4.7–6.1)
SODIUM SERPL-SCNC: 136 MMOL/L (ref 136–145)
T4 FREE SERPL-MCNC: 1.27 NG/DL (ref 0.93–1.7)
TSH SERPL DL<=0.005 MIU/L-ACNC: 7.36 UIU/ML (ref 0.27–4.2)
WBC # BLD AUTO: 7.26 K/UL (ref 4.8–10.8)

## 2025-06-06 PROCEDURE — 80053 COMPREHEN METABOLIC PANEL: CPT

## 2025-06-06 PROCEDURE — 96372 THER/PROPH/DIAG INJ SC/IM: CPT

## 2025-06-06 PROCEDURE — 96413 CHEMO IV INFUSION 1 HR: CPT

## 2025-06-06 PROCEDURE — 2500000003 HC RX 250 WO HCPCS: Performed by: INTERNAL MEDICINE

## 2025-06-06 PROCEDURE — 6360000002 HC RX W HCPCS: Performed by: NURSE PRACTITIONER

## 2025-06-06 PROCEDURE — 84439 ASSAY OF FREE THYROXINE: CPT

## 2025-06-06 PROCEDURE — 6360000002 HC RX W HCPCS: Performed by: INTERNAL MEDICINE

## 2025-06-06 PROCEDURE — 2580000003 HC RX 258: Performed by: INTERNAL MEDICINE

## 2025-06-06 PROCEDURE — 82533 TOTAL CORTISOL: CPT

## 2025-06-06 PROCEDURE — 85025 COMPLETE CBC W/AUTO DIFF WBC: CPT

## 2025-06-06 PROCEDURE — 84443 ASSAY THYROID STIM HORMONE: CPT

## 2025-06-06 PROCEDURE — 84100 ASSAY OF PHOSPHORUS: CPT

## 2025-06-06 PROCEDURE — 83735 ASSAY OF MAGNESIUM: CPT

## 2025-06-06 PROCEDURE — 36415 COLL VENOUS BLD VENIPUNCTURE: CPT

## 2025-06-06 RX ORDER — HEPARIN 100 UNIT/ML
500 SYRINGE INTRAVENOUS PRN
Status: DISCONTINUED | OUTPATIENT
Start: 2025-06-06 | End: 2025-06-08 | Stop reason: HOSPADM

## 2025-06-06 RX ORDER — EPINEPHRINE 1 MG/ML
0.3 INJECTION, SOLUTION, CONCENTRATE INTRAVENOUS PRN
Status: CANCELLED | OUTPATIENT
Start: 2025-06-06

## 2025-06-06 RX ORDER — DIPHENHYDRAMINE HYDROCHLORIDE 50 MG/ML
50 INJECTION, SOLUTION INTRAMUSCULAR; INTRAVENOUS PRN
Status: CANCELLED | OUTPATIENT
Start: 2025-06-06

## 2025-06-06 RX ORDER — SODIUM CHLORIDE 0.9 % (FLUSH) 0.9 %
5 SYRINGE (ML) INJECTION PRN
Status: CANCELLED | OUTPATIENT
Start: 2025-06-06

## 2025-06-06 RX ORDER — HYDROCORTISONE SODIUM SUCCINATE 100 MG/2ML
100 INJECTION INTRAMUSCULAR; INTRAVENOUS PRN
Status: CANCELLED | OUTPATIENT
Start: 2025-06-06

## 2025-06-06 RX ORDER — SODIUM CHLORIDE 0.9 % (FLUSH) 0.9 %
10 SYRINGE (ML) INJECTION PRN
Status: DISCONTINUED | OUTPATIENT
Start: 2025-06-06 | End: 2025-06-07 | Stop reason: HOSPADM

## 2025-06-06 RX ADMIN — SODIUM CHLORIDE, PRESERVATIVE FREE 10 ML: 5 INJECTION INTRAVENOUS at 09:30

## 2025-06-06 RX ADMIN — HEPARIN 500 UNITS: 100 SYRINGE at 09:30

## 2025-06-06 RX ADMIN — DENOSUMAB 120 MG: 120 INJECTION SUBCUTANEOUS at 09:30

## 2025-06-06 RX ADMIN — SODIUM CHLORIDE 480 MG: 9 INJECTION, SOLUTION INTRAVENOUS at 09:00

## 2025-06-11 RX ORDER — LEVOTHYROXINE SODIUM 112 UG/1
112 TABLET ORAL DAILY
Qty: 90 TABLET | Refills: 1 | Status: SHIPPED | OUTPATIENT
Start: 2025-06-11

## 2025-06-27 DIAGNOSIS — C43.61 MALIGNANT MELANOMA OF RIGHT UPPER EXTREMITY INCLUDING SHOULDER (HCC): ICD-10-CM

## 2025-06-27 DIAGNOSIS — G89.3 CANCER ASSOCIATED PAIN: ICD-10-CM

## 2025-06-27 RX ORDER — HYDROCODONE BITARTRATE AND ACETAMINOPHEN 10; 325 MG/1; MG/1
1 TABLET ORAL EVERY 6 HOURS PRN
Qty: 120 TABLET | Refills: 0 | Status: SHIPPED | OUTPATIENT
Start: 2025-06-27 | End: 2025-07-27

## 2025-07-07 RX ORDER — HYDROXYZINE HYDROCHLORIDE 25 MG/1
25 TABLET, FILM COATED ORAL EVERY 8 HOURS PRN
Qty: 270 TABLET | Refills: 0 | Status: SHIPPED | OUTPATIENT
Start: 2025-07-07 | End: 2025-10-05

## 2025-07-10 NOTE — PROGRESS NOTES
Progress Note      Pt Name: Leeroy Conroy  YOB: 1940  MRN: [id number]    Date of evaluation: 7/11/25  PCP: Dr. Miguel Quintana    History Obtained From:  patient, spouse-Apryl, electronic medical record    CHIEF COMPLAINT:    Chief Complaint   Patient presents with    Melanoma     Immunotherapy       History of Present Illness  Leeroy Conroy is an 84-year-old  gentleman who has been undergoing treatment for recurrent, metastatic stage IV malignant melanoma since 07/2018. He returns to the clinic scheduled for continuation of nivolumab, delivered every 4 weeks. He tolerates treatment with generalized pruritus that waxes and wanes, worse in the summer months, though currently well controlled. He is treated with Paxil, Singulair, and Atarax as needed. Chronic low back pain is stable, treated with Norco every 6 hours as needed. Appetite is stable, taking Megace as needed. Weight is stable as well. Medication-related hypothyroidism is monitored. Synthroid was increased to 112 mcg 1 month ago for a TSH of 7.36. CBC is stable for treatment, as is his chemistry profile. Imaging studies last completed on 02/11/2025 were stable, with a known left renal mass, unchanged. He is due for reimaging at this time.    He reports no new health issues or concerns. He has been gardening and has been able to go outside wearing a short-sleeved shirt. The itching he experiences has also improved, particularly when he uses his hands during the summer. He has been taking Atarax three times a day, which has been beneficial.    He mentions that his legs are not in good condition due to arthritis in his knees. He received an injection in one of his knees, but it did not provide relief.    He reports no diarrhea or stomach issues. He continues to take pain medication for his back and occasionally uses Megace.    He was previously prescribed a medication for mouth sores, which he used infrequently but

## 2025-07-11 ENCOUNTER — OFFICE VISIT (OUTPATIENT)
Dept: HEMATOLOGY | Age: 85
End: 2025-07-11
Payer: MEDICARE

## 2025-07-11 ENCOUNTER — HOSPITAL ENCOUNTER (OUTPATIENT)
Dept: INFUSION THERAPY | Age: 85
Discharge: HOME | End: 2025-07-11
Payer: MEDICARE

## 2025-07-11 VITALS
DIASTOLIC BLOOD PRESSURE: 57 MMHG | SYSTOLIC BLOOD PRESSURE: 123 MMHG | OXYGEN SATURATION: 97 % | HEIGHT: 70 IN | WEIGHT: 186.4 LBS | TEMPERATURE: 97.9 F | HEART RATE: 57 BPM | RESPIRATION RATE: 18 BRPM | BODY MASS INDEX: 26.69 KG/M2

## 2025-07-11 DIAGNOSIS — Z95.828 PORT-A-CATH IN PLACE: ICD-10-CM

## 2025-07-11 DIAGNOSIS — Z51.12 ENCOUNTER FOR ANTINEOPLASTIC IMMUNOTHERAPY: ICD-10-CM

## 2025-07-11 DIAGNOSIS — G89.3 CANCER ASSOCIATED PAIN: ICD-10-CM

## 2025-07-11 DIAGNOSIS — R53.83 FATIGUE DUE TO TREATMENT: ICD-10-CM

## 2025-07-11 DIAGNOSIS — C43.9 MALIGNANT MELANOMA, UNSPECIFIED SITE (HCC): ICD-10-CM

## 2025-07-11 DIAGNOSIS — T45.1X5A MOUTH SORE SECONDARY TO CHEMOTHERAPY: ICD-10-CM

## 2025-07-11 DIAGNOSIS — K13.79 MOUTH SORE SECONDARY TO CHEMOTHERAPY: ICD-10-CM

## 2025-07-11 DIAGNOSIS — E03.2 HYPOTHYROIDISM DUE TO MEDICATION: ICD-10-CM

## 2025-07-11 DIAGNOSIS — C43.61 MALIGNANT MELANOMA OF RIGHT UPPER EXTREMITY INCLUDING SHOULDER (HCC): ICD-10-CM

## 2025-07-11 DIAGNOSIS — C43.61 MALIGNANT MELANOMA OF RIGHT UPPER EXTREMITY INCLUDING SHOULDER (HCC): Primary | ICD-10-CM

## 2025-07-11 DIAGNOSIS — R91.1 NODULE OF UPPER LOBE OF RIGHT LUNG: ICD-10-CM

## 2025-07-11 DIAGNOSIS — N28.89 LEFT RENAL MASS: ICD-10-CM

## 2025-07-11 DIAGNOSIS — Z71.89 CARE PLAN DISCUSSED WITH PATIENT: ICD-10-CM

## 2025-07-11 DIAGNOSIS — Z79.899 MEDICATION MANAGEMENT: ICD-10-CM

## 2025-07-11 DIAGNOSIS — C79.51 MALIGNANT NEOPLASM METASTATIC TO BONE (HCC): Primary | ICD-10-CM

## 2025-07-11 DIAGNOSIS — T88.9XXD SIDE EFFECTS OF TREATMENT, SUBSEQUENT ENCOUNTER: ICD-10-CM

## 2025-07-11 LAB
ALBUMIN SERPL-MCNC: 4 G/DL (ref 3.5–5.2)
ALP SERPL-CCNC: 53 U/L (ref 40–129)
ALT SERPL-CCNC: 13 U/L (ref 5–41)
ANION GAP SERPL CALCULATED.3IONS-SCNC: 8 MMOL/L (ref 7–19)
AST SERPL-CCNC: 22 U/L (ref 5–40)
BASOPHILS # BLD: 0 K/UL (ref 0–0.2)
BASOPHILS NFR BLD: 0 % (ref 0–1)
BILIRUB SERPL-MCNC: 0.7 MG/DL (ref 0–1.2)
BUN SERPL-MCNC: 14 MG/DL (ref 8–23)
CALCIUM SERPL-MCNC: 8.9 MG/DL (ref 8.8–10.2)
CHLORIDE SERPL-SCNC: 103 MMOL/L (ref 98–107)
CO2 SERPL-SCNC: 28 MMOL/L (ref 22–29)
CORTIS PM SERPL-MCNC: 8 UG/DL (ref 2.5–11.9)
CREAT SERPL-MCNC: 1.3 MG/DL (ref 0.7–1.2)
EOSINOPHIL # BLD: 0.5 K/UL (ref 0–0.6)
EOSINOPHIL NFR BLD: 6.3 % (ref 0–5)
ERYTHROCYTE [DISTWIDTH] IN BLOOD BY AUTOMATED COUNT: 12.8 % (ref 11.5–14.5)
GLUCOSE SERPL-MCNC: 101 MG/DL (ref 70–99)
HCT VFR BLD AUTO: 40.7 % (ref 42–52)
HGB BLD-MCNC: 13.8 G/DL (ref 14–18)
LYMPHOCYTES # BLD: 2.03 K/UL (ref 1.1–4.5)
LYMPHOCYTES NFR BLD: 25.6 % (ref 20–40)
MAGNESIUM SERPL-MCNC: 2 MG/DL (ref 1.6–2.4)
MCH RBC QN AUTO: 30.5 PG (ref 27–31)
MCHC RBC AUTO-ENTMCNC: 33.9 G/DL (ref 33–37)
MCV RBC AUTO: 90 FL (ref 80–94)
MONOCYTES # BLD: 0.83 K/UL (ref 0–0.9)
MONOCYTES NFR BLD: 10.5 % (ref 1–10)
NEUTROPHILS # BLD: 4.55 K/UL (ref 1.5–7.5)
NEUTS SEG NFR BLD: 57.3 % (ref 50–65)
PHOSPHATE SERPL-MCNC: 2.9 MG/DL (ref 2.5–4.5)
PLATELET # BLD AUTO: 245 K/UL (ref 130–400)
PMV BLD AUTO: 9.4 FL (ref 9.4–12.4)
POTASSIUM SERPL-SCNC: 4.4 MMOL/L (ref 3.5–5.1)
PROT SERPL-MCNC: 6.5 G/DL (ref 6.4–8.3)
RBC # BLD AUTO: 4.52 M/UL (ref 4.7–6.1)
SODIUM SERPL-SCNC: 139 MMOL/L (ref 136–145)
T4 FREE SERPL-MCNC: 1.32 NG/DL (ref 0.93–1.7)
TSH SERPL DL<=0.005 MIU/L-ACNC: 8.15 UIU/ML (ref 0.27–4.2)
WBC # BLD AUTO: 7.93 K/UL (ref 4.8–10.8)

## 2025-07-11 PROCEDURE — 96372 THER/PROPH/DIAG INJ SC/IM: CPT

## 2025-07-11 PROCEDURE — 85025 COMPLETE CBC W/AUTO DIFF WBC: CPT

## 2025-07-11 PROCEDURE — 6360000002 HC RX W HCPCS: Performed by: NURSE PRACTITIONER

## 2025-07-11 PROCEDURE — 2500000003 HC RX 250 WO HCPCS: Performed by: NURSE PRACTITIONER

## 2025-07-11 PROCEDURE — 80053 COMPREHEN METABOLIC PANEL: CPT

## 2025-07-11 PROCEDURE — 2580000003 HC RX 258: Performed by: NURSE PRACTITIONER

## 2025-07-11 PROCEDURE — 83735 ASSAY OF MAGNESIUM: CPT

## 2025-07-11 PROCEDURE — 84439 ASSAY OF FREE THYROXINE: CPT

## 2025-07-11 PROCEDURE — 96413 CHEMO IV INFUSION 1 HR: CPT

## 2025-07-11 PROCEDURE — 84443 ASSAY THYROID STIM HORMONE: CPT

## 2025-07-11 PROCEDURE — 84100 ASSAY OF PHOSPHORUS: CPT

## 2025-07-11 PROCEDURE — 82533 TOTAL CORTISOL: CPT

## 2025-07-11 RX ORDER — EPINEPHRINE 1 MG/ML
0.3 INJECTION, SOLUTION, CONCENTRATE INTRAVENOUS PRN
Status: CANCELLED | OUTPATIENT
Start: 2025-07-11

## 2025-07-11 RX ORDER — SODIUM CHLORIDE 0.9 % (FLUSH) 0.9 %
10 SYRINGE (ML) INJECTION PRN
Status: DISCONTINUED | OUTPATIENT
Start: 2025-07-11 | End: 2025-07-12 | Stop reason: HOSPADM

## 2025-07-11 RX ORDER — SODIUM CHLORIDE 0.9 % (FLUSH) 0.9 %
10 SYRINGE (ML) INJECTION PRN
Status: CANCELLED | OUTPATIENT
Start: 2025-07-11

## 2025-07-11 RX ORDER — HEPARIN SODIUM (PORCINE) LOCK FLUSH IV SOLN 100 UNIT/ML 100 UNIT/ML
500 SOLUTION INTRAVENOUS PRN
Status: CANCELLED | OUTPATIENT
Start: 2025-07-11

## 2025-07-11 RX ORDER — HYDROCORTISONE SODIUM SUCCINATE 100 MG/2ML
100 INJECTION INTRAMUSCULAR; INTRAVENOUS PRN
Status: CANCELLED | OUTPATIENT
Start: 2025-07-11

## 2025-07-11 RX ORDER — DEXAMETHASONE 0.5 MG/5ML
ELIXIR ORAL
Qty: 237 ML | Refills: 1 | Status: SHIPPED | OUTPATIENT
Start: 2025-07-11

## 2025-07-11 RX ORDER — SODIUM CHLORIDE 0.9 % (FLUSH) 0.9 %
5 SYRINGE (ML) INJECTION PRN
Status: CANCELLED | OUTPATIENT
Start: 2025-07-11

## 2025-07-11 RX ORDER — HEPARIN 100 UNIT/ML
500 SYRINGE INTRAVENOUS PRN
Status: DISCONTINUED | OUTPATIENT
Start: 2025-07-11 | End: 2025-07-13 | Stop reason: HOSPADM

## 2025-07-11 RX ORDER — DIPHENHYDRAMINE HYDROCHLORIDE 50 MG/ML
50 INJECTION, SOLUTION INTRAMUSCULAR; INTRAVENOUS PRN
Status: CANCELLED | OUTPATIENT
Start: 2025-07-11

## 2025-07-11 RX ADMIN — SODIUM CHLORIDE 480 MG: 9 INJECTION, SOLUTION INTRAVENOUS at 08:58

## 2025-07-11 RX ADMIN — DENOSUMAB 120 MG: 120 INJECTION SUBCUTANEOUS at 08:59

## 2025-07-11 RX ADMIN — HEPARIN 500 UNITS: 100 SYRINGE at 09:30

## 2025-07-11 RX ADMIN — SODIUM CHLORIDE, PRESERVATIVE FREE 10 ML: 5 INJECTION INTRAVENOUS at 09:30

## 2025-07-11 ASSESSMENT — ENCOUNTER SYMPTOMS
COUGH: 0
EYE ITCHING: 0
WHEEZING: 0
EYE DISCHARGE: 0
BACK PAIN: 1
DIARRHEA: 0
TROUBLE SWALLOWING: 0
ABDOMINAL PAIN: 1
SORE THROAT: 0
NAUSEA: 0
SHORTNESS OF BREATH: 1
CONSTIPATION: 0
VOMITING: 0

## 2025-07-28 RX ORDER — DIPHENHYDRAMINE HYDROCHLORIDE 50 MG/ML
50 INJECTION, SOLUTION INTRAMUSCULAR; INTRAVENOUS PRN
OUTPATIENT
Start: 2025-08-08

## 2025-07-28 RX ORDER — SODIUM CHLORIDE 0.9 % (FLUSH) 0.9 %
5 SYRINGE (ML) INJECTION PRN
OUTPATIENT
Start: 2025-08-08

## 2025-07-28 RX ORDER — SODIUM CHLORIDE 0.9 % (FLUSH) 0.9 %
10 SYRINGE (ML) INJECTION PRN
OUTPATIENT
Start: 2025-08-08

## 2025-07-28 RX ORDER — EPINEPHRINE 1 MG/ML
0.3 INJECTION, SOLUTION, CONCENTRATE INTRAVENOUS PRN
OUTPATIENT
Start: 2025-08-08

## 2025-07-28 RX ORDER — HEPARIN SODIUM (PORCINE) LOCK FLUSH IV SOLN 100 UNIT/ML 100 UNIT/ML
500 SOLUTION INTRAVENOUS PRN
OUTPATIENT
Start: 2025-08-08

## 2025-07-28 RX ORDER — HYDROCORTISONE SODIUM SUCCINATE 100 MG/2ML
100 INJECTION INTRAMUSCULAR; INTRAVENOUS PRN
OUTPATIENT
Start: 2025-08-08

## 2025-07-30 ENCOUNTER — HOSPITAL ENCOUNTER (OUTPATIENT)
Dept: CT IMAGING | Age: 85
Discharge: HOME | End: 2025-07-30
Payer: MEDICARE

## 2025-07-30 DIAGNOSIS — R91.1 NODULE OF UPPER LOBE OF RIGHT LUNG: ICD-10-CM

## 2025-07-30 DIAGNOSIS — C43.61 MALIGNANT MELANOMA OF RIGHT UPPER EXTREMITY INCLUDING SHOULDER (HCC): ICD-10-CM

## 2025-07-30 PROCEDURE — 6360000004 HC RX CONTRAST MEDICATION: Performed by: NURSE PRACTITIONER

## 2025-07-30 PROCEDURE — 74177 CT ABD & PELVIS W/CONTRAST: CPT

## 2025-07-30 PROCEDURE — 71260 CT THORAX DX C+: CPT

## 2025-07-30 RX ORDER — IOPAMIDOL 755 MG/ML
75 INJECTION, SOLUTION INTRAVASCULAR
Status: COMPLETED | OUTPATIENT
Start: 2025-07-30 | End: 2025-07-30

## 2025-07-30 RX ADMIN — IOPAMIDOL 75 ML: 755 INJECTION, SOLUTION INTRAVENOUS at 08:31

## 2025-08-06 DIAGNOSIS — G89.3 CANCER ASSOCIATED PAIN: ICD-10-CM

## 2025-08-06 DIAGNOSIS — C43.61 MALIGNANT MELANOMA OF RIGHT UPPER EXTREMITY INCLUDING SHOULDER (HCC): ICD-10-CM

## 2025-08-06 RX ORDER — HYDROCODONE BITARTRATE AND ACETAMINOPHEN 10; 325 MG/1; MG/1
1 TABLET ORAL EVERY 6 HOURS PRN
Qty: 120 TABLET | Refills: 0 | Status: SHIPPED | OUTPATIENT
Start: 2025-08-06 | End: 2025-09-05

## 2025-08-08 ENCOUNTER — OFFICE VISIT (OUTPATIENT)
Dept: HEMATOLOGY | Age: 85
End: 2025-08-08
Payer: MEDICARE

## 2025-08-08 ENCOUNTER — HOSPITAL ENCOUNTER (OUTPATIENT)
Dept: INFUSION THERAPY | Age: 85
Discharge: HOME | End: 2025-08-08
Payer: MEDICARE

## 2025-08-08 VITALS
WEIGHT: 185.3 LBS | HEIGHT: 70 IN | OXYGEN SATURATION: 98 % | BODY MASS INDEX: 26.53 KG/M2 | HEART RATE: 63 BPM | TEMPERATURE: 98.2 F | DIASTOLIC BLOOD PRESSURE: 57 MMHG | SYSTOLIC BLOOD PRESSURE: 122 MMHG | RESPIRATION RATE: 18 BRPM

## 2025-08-08 DIAGNOSIS — Z95.828 PORT-A-CATH IN PLACE: ICD-10-CM

## 2025-08-08 DIAGNOSIS — C43.61 MALIGNANT MELANOMA OF RIGHT UPPER EXTREMITY INCLUDING SHOULDER (HCC): ICD-10-CM

## 2025-08-08 DIAGNOSIS — C43.9 MALIGNANT MELANOMA, UNSPECIFIED SITE (HCC): Primary | ICD-10-CM

## 2025-08-08 DIAGNOSIS — Z79.899 MEDICATION MANAGEMENT: ICD-10-CM

## 2025-08-08 DIAGNOSIS — C43.61 MALIGNANT MELANOMA OF RIGHT UPPER EXTREMITY INCLUDING SHOULDER (HCC): Primary | ICD-10-CM

## 2025-08-08 DIAGNOSIS — Z12.5 SCREENING FOR MALIGNANT NEOPLASM OF PROSTATE: ICD-10-CM

## 2025-08-08 DIAGNOSIS — N28.89 LEFT RENAL MASS: ICD-10-CM

## 2025-08-08 DIAGNOSIS — Z71.89 CARE PLAN DISCUSSED WITH PATIENT: ICD-10-CM

## 2025-08-08 DIAGNOSIS — Z51.12 ENCOUNTER FOR ANTINEOPLASTIC IMMUNOTHERAPY: ICD-10-CM

## 2025-08-08 DIAGNOSIS — E03.2 HYPOTHYROIDISM DUE TO MEDICATION: ICD-10-CM

## 2025-08-08 DIAGNOSIS — N40.0 ENLARGED PROSTATE: ICD-10-CM

## 2025-08-08 DIAGNOSIS — C79.51 MALIGNANT NEOPLASM METASTATIC TO BONE (HCC): ICD-10-CM

## 2025-08-08 DIAGNOSIS — G89.3 CANCER ASSOCIATED PAIN: ICD-10-CM

## 2025-08-08 DIAGNOSIS — R53.83 FATIGUE DUE TO TREATMENT: ICD-10-CM

## 2025-08-08 LAB
ALBUMIN SERPL-MCNC: 3.9 G/DL (ref 3.5–5.2)
ALP SERPL-CCNC: 54 U/L (ref 40–129)
ALT SERPL-CCNC: 11 U/L (ref 5–41)
ANION GAP SERPL CALCULATED.3IONS-SCNC: 9 MMOL/L (ref 7–19)
AST SERPL-CCNC: 21 U/L (ref 5–40)
BASOPHILS # BLD: 0.01 K/UL (ref 0–0.2)
BASOPHILS NFR BLD: 0.1 % (ref 0–1)
BILIRUB SERPL-MCNC: 1.1 MG/DL (ref 0–1.2)
BUN SERPL-MCNC: 12 MG/DL (ref 8–23)
CALCIUM SERPL-MCNC: 9.5 MG/DL (ref 8.8–10.2)
CHLORIDE SERPL-SCNC: 101 MMOL/L (ref 98–107)
CO2 SERPL-SCNC: 26 MMOL/L (ref 22–29)
CORTIS AM PEAK SERPL-MCNC: 9.5 UG/DL (ref 4.8–19.5)
CREAT SERPL-MCNC: 1.2 MG/DL (ref 0.7–1.2)
EOSINOPHIL # BLD: 0.56 K/UL (ref 0–0.6)
EOSINOPHIL NFR BLD: 7.4 % (ref 0–5)
ERYTHROCYTE [DISTWIDTH] IN BLOOD BY AUTOMATED COUNT: 12.5 % (ref 11.5–14.5)
GLUCOSE SERPL-MCNC: 124 MG/DL (ref 70–99)
HCT VFR BLD AUTO: 41.8 % (ref 42–52)
HGB BLD-MCNC: 14.2 G/DL (ref 14–18)
LYMPHOCYTES # BLD: 2.14 K/UL (ref 1.1–4.5)
LYMPHOCYTES NFR BLD: 28.2 % (ref 20–40)
MAGNESIUM SERPL-MCNC: 2 MG/DL (ref 1.6–2.4)
MCH RBC QN AUTO: 30.5 PG (ref 27–31)
MCHC RBC AUTO-ENTMCNC: 34 G/DL (ref 33–37)
MCV RBC AUTO: 89.7 FL (ref 80–94)
MONOCYTES # BLD: 0.89 K/UL (ref 0–0.9)
MONOCYTES NFR BLD: 11.7 % (ref 1–10)
NEUTROPHILS # BLD: 3.97 K/UL (ref 1.5–7.5)
NEUTS SEG NFR BLD: 52.5 % (ref 50–65)
PHOSPHATE SERPL-MCNC: 2.8 MG/DL (ref 2.5–4.5)
PLATELET # BLD AUTO: 229 K/UL (ref 130–400)
PMV BLD AUTO: 9.2 FL (ref 9.4–12.4)
POTASSIUM SERPL-SCNC: 4.5 MMOL/L (ref 3.5–5.1)
PROT SERPL-MCNC: 6.6 G/DL (ref 6.4–8.3)
RBC # BLD AUTO: 4.66 M/UL (ref 4.7–6.1)
SODIUM SERPL-SCNC: 136 MMOL/L (ref 136–145)
T4 FREE SERPL-MCNC: 1.33 NG/DL (ref 0.93–1.7)
TSH SERPL DL<=0.005 MIU/L-ACNC: 7.34 UIU/ML (ref 0.27–4.2)
WBC # BLD AUTO: 7.58 K/UL (ref 4.8–10.8)

## 2025-08-08 PROCEDURE — 2500000003 HC RX 250 WO HCPCS: Performed by: INTERNAL MEDICINE

## 2025-08-08 PROCEDURE — 96413 CHEMO IV INFUSION 1 HR: CPT

## 2025-08-08 PROCEDURE — 84100 ASSAY OF PHOSPHORUS: CPT

## 2025-08-08 PROCEDURE — 80053 COMPREHEN METABOLIC PANEL: CPT

## 2025-08-08 PROCEDURE — 36415 COLL VENOUS BLD VENIPUNCTURE: CPT

## 2025-08-08 PROCEDURE — 6360000002 HC RX W HCPCS: Performed by: NURSE PRACTITIONER

## 2025-08-08 PROCEDURE — 82533 TOTAL CORTISOL: CPT

## 2025-08-08 PROCEDURE — 85025 COMPLETE CBC W/AUTO DIFF WBC: CPT

## 2025-08-08 PROCEDURE — 96372 THER/PROPH/DIAG INJ SC/IM: CPT

## 2025-08-08 PROCEDURE — 2580000003 HC RX 258: Performed by: INTERNAL MEDICINE

## 2025-08-08 PROCEDURE — 84439 ASSAY OF FREE THYROXINE: CPT

## 2025-08-08 PROCEDURE — 84443 ASSAY THYROID STIM HORMONE: CPT

## 2025-08-08 PROCEDURE — 83735 ASSAY OF MAGNESIUM: CPT

## 2025-08-08 PROCEDURE — 6360000002 HC RX W HCPCS: Performed by: INTERNAL MEDICINE

## 2025-08-08 RX ORDER — HEPARIN 100 UNIT/ML
500 SYRINGE INTRAVENOUS PRN
Status: DISCONTINUED | OUTPATIENT
Start: 2025-08-08 | End: 2025-08-10 | Stop reason: HOSPADM

## 2025-08-08 RX ORDER — SODIUM CHLORIDE 0.9 % (FLUSH) 0.9 %
10 SYRINGE (ML) INJECTION PRN
Status: DISCONTINUED | OUTPATIENT
Start: 2025-08-08 | End: 2025-08-09 | Stop reason: HOSPADM

## 2025-08-08 RX ADMIN — SODIUM CHLORIDE 480 MG: 9 INJECTION, SOLUTION INTRAVENOUS at 08:35

## 2025-08-08 RX ADMIN — SODIUM CHLORIDE, PRESERVATIVE FREE 10 ML: 5 INJECTION INTRAVENOUS at 09:09

## 2025-08-08 RX ADMIN — DENOSUMAB 120 MG: 120 INJECTION SUBCUTANEOUS at 09:10

## 2025-08-08 RX ADMIN — HEPARIN 500 UNITS: 100 SYRINGE at 09:09

## 2025-08-09 ASSESSMENT — ENCOUNTER SYMPTOMS
VOMITING: 0
EYE ITCHING: 0
NAUSEA: 0
DIARRHEA: 0
SHORTNESS OF BREATH: 1
BACK PAIN: 1
WHEEZING: 0
SORE THROAT: 0
CONSTIPATION: 0
ABDOMINAL PAIN: 1
EYE DISCHARGE: 0
TROUBLE SWALLOWING: 0
COUGH: 0

## 2025-08-12 RX ORDER — LEVOTHYROXINE SODIUM 125 UG/1
125 TABLET ORAL DAILY
Qty: 90 TABLET | Refills: 1 | Status: SHIPPED | OUTPATIENT
Start: 2025-08-12

## 2025-09-05 ENCOUNTER — HOSPITAL ENCOUNTER (OUTPATIENT)
Dept: INFUSION THERAPY | Age: 85
Discharge: HOME | End: 2025-09-05
Payer: MEDICARE

## 2025-09-05 VITALS
TEMPERATURE: 98 F | HEIGHT: 70 IN | SYSTOLIC BLOOD PRESSURE: 138 MMHG | WEIGHT: 184.5 LBS | DIASTOLIC BLOOD PRESSURE: 66 MMHG | OXYGEN SATURATION: 99 % | BODY MASS INDEX: 26.41 KG/M2 | RESPIRATION RATE: 16 BRPM | HEART RATE: 62 BPM

## 2025-09-05 DIAGNOSIS — N40.0 ENLARGED PROSTATE: ICD-10-CM

## 2025-09-05 DIAGNOSIS — G89.3 CANCER ASSOCIATED PAIN: ICD-10-CM

## 2025-09-05 DIAGNOSIS — Z51.12 ENCOUNTER FOR ANTINEOPLASTIC IMMUNOTHERAPY: ICD-10-CM

## 2025-09-05 DIAGNOSIS — C43.9 MALIGNANT MELANOMA, UNSPECIFIED SITE (HCC): Primary | ICD-10-CM

## 2025-09-05 DIAGNOSIS — C43.61 MALIGNANT MELANOMA OF RIGHT UPPER EXTREMITY INCLUDING SHOULDER (HCC): ICD-10-CM

## 2025-09-05 DIAGNOSIS — R53.83 FATIGUE DUE TO TREATMENT: ICD-10-CM

## 2025-09-05 DIAGNOSIS — Z12.5 SCREENING FOR MALIGNANT NEOPLASM OF PROSTATE: ICD-10-CM

## 2025-09-05 DIAGNOSIS — Z95.828 PORT-A-CATH IN PLACE: ICD-10-CM

## 2025-09-05 DIAGNOSIS — C79.51 MALIGNANT NEOPLASM METASTATIC TO BONE (HCC): ICD-10-CM

## 2025-09-05 LAB
ALBUMIN SERPL-MCNC: 3.9 G/DL (ref 3.5–5.2)
ALP SERPL-CCNC: 52 U/L (ref 40–129)
ALT SERPL-CCNC: 11 U/L (ref 5–41)
ANION GAP SERPL CALCULATED.3IONS-SCNC: 10 MMOL/L (ref 7–19)
AST SERPL-CCNC: 22 U/L (ref 5–40)
BASOPHILS # BLD: 0 K/UL (ref 0–0.2)
BASOPHILS NFR BLD: 0 % (ref 0–1)
BILIRUB SERPL-MCNC: 1.1 MG/DL (ref 0–1.2)
BUN SERPL-MCNC: 13 MG/DL (ref 8–23)
CALCIUM SERPL-MCNC: 9 MG/DL (ref 8.8–10.2)
CHLORIDE SERPL-SCNC: 102 MMOL/L (ref 98–107)
CO2 SERPL-SCNC: 25 MMOL/L (ref 22–29)
CORTIS AM PEAK SERPL-MCNC: 8.7 UG/DL (ref 4.8–19.5)
CREAT SERPL-MCNC: 1.2 MG/DL (ref 0.7–1.2)
EOSINOPHIL # BLD: 0.5 K/UL (ref 0–0.6)
EOSINOPHIL NFR BLD: 6.1 % (ref 0–5)
ERYTHROCYTE [DISTWIDTH] IN BLOOD BY AUTOMATED COUNT: 12.4 % (ref 11.5–14.5)
GLUCOSE SERPL-MCNC: 105 MG/DL (ref 70–99)
HCT VFR BLD AUTO: 40 % (ref 42–52)
HGB BLD-MCNC: 13.8 G/DL (ref 14–18)
LYMPHOCYTES # BLD: 1.93 K/UL (ref 1.1–4.5)
LYMPHOCYTES NFR BLD: 23.5 % (ref 20–40)
MAGNESIUM SERPL-MCNC: 2 MG/DL (ref 1.6–2.4)
MCH RBC QN AUTO: 30.8 PG (ref 27–31)
MCHC RBC AUTO-ENTMCNC: 34.5 G/DL (ref 33–37)
MCV RBC AUTO: 89.3 FL (ref 80–94)
MONOCYTES # BLD: 0.74 K/UL (ref 0–0.9)
MONOCYTES NFR BLD: 9 % (ref 1–10)
NEUTROPHILS # BLD: 5.04 K/UL (ref 1.5–7.5)
NEUTS SEG NFR BLD: 61.3 % (ref 50–65)
PHOSPHATE SERPL-MCNC: 2.7 MG/DL (ref 2.5–4.5)
PLATELET # BLD AUTO: 238 K/UL (ref 130–400)
PMV BLD AUTO: 9.3 FL (ref 9.4–12.4)
POTASSIUM SERPL-SCNC: 4.2 MMOL/L (ref 3.5–5.1)
PROT SERPL-MCNC: 6.4 G/DL (ref 6.4–8.3)
PSA SERPL-MCNC: 6.6 NG/ML (ref 0–4)
RBC # BLD AUTO: 4.48 M/UL (ref 4.7–6.1)
SODIUM SERPL-SCNC: 137 MMOL/L (ref 136–145)
T4 FREE SERPL-MCNC: 1.57 NG/DL (ref 0.93–1.7)
TSH SERPL DL<=0.005 MIU/L-ACNC: 2.66 UIU/ML (ref 0.27–4.2)
WBC # BLD AUTO: 8.22 K/UL (ref 4.8–10.8)

## 2025-09-05 PROCEDURE — 96413 CHEMO IV INFUSION 1 HR: CPT

## 2025-09-05 PROCEDURE — 84153 ASSAY OF PSA TOTAL: CPT

## 2025-09-05 PROCEDURE — 82533 TOTAL CORTISOL: CPT

## 2025-09-05 PROCEDURE — 84443 ASSAY THYROID STIM HORMONE: CPT

## 2025-09-05 PROCEDURE — 80053 COMPREHEN METABOLIC PANEL: CPT

## 2025-09-05 PROCEDURE — 84439 ASSAY OF FREE THYROXINE: CPT

## 2025-09-05 PROCEDURE — 84100 ASSAY OF PHOSPHORUS: CPT

## 2025-09-05 PROCEDURE — 83735 ASSAY OF MAGNESIUM: CPT

## 2025-09-05 PROCEDURE — 96372 THER/PROPH/DIAG INJ SC/IM: CPT

## 2025-09-05 PROCEDURE — 2500000003 HC RX 250 WO HCPCS: Performed by: NURSE PRACTITIONER

## 2025-09-05 PROCEDURE — 6360000002 HC RX W HCPCS: Performed by: NURSE PRACTITIONER

## 2025-09-05 PROCEDURE — 36415 COLL VENOUS BLD VENIPUNCTURE: CPT

## 2025-09-05 PROCEDURE — 85025 COMPLETE CBC W/AUTO DIFF WBC: CPT

## 2025-09-05 PROCEDURE — 2580000003 HC RX 258: Performed by: NURSE PRACTITIONER

## 2025-09-05 RX ORDER — HYDROCORTISONE SODIUM SUCCINATE 100 MG/2ML
100 INJECTION INTRAMUSCULAR; INTRAVENOUS PRN
Status: CANCELLED | OUTPATIENT
Start: 2025-09-05

## 2025-09-05 RX ORDER — SODIUM CHLORIDE 0.9 % (FLUSH) 0.9 %
5 SYRINGE (ML) INJECTION PRN
Status: CANCELLED | OUTPATIENT
Start: 2025-09-05

## 2025-09-05 RX ORDER — SODIUM CHLORIDE 0.9 % (FLUSH) 0.9 %
10 SYRINGE (ML) INJECTION PRN
Status: CANCELLED | OUTPATIENT
Start: 2025-09-05

## 2025-09-05 RX ORDER — SODIUM CHLORIDE 0.9 % (FLUSH) 0.9 %
10 SYRINGE (ML) INJECTION PRN
Status: DISCONTINUED | OUTPATIENT
Start: 2025-09-05 | End: 2025-09-06 | Stop reason: HOSPADM

## 2025-09-05 RX ORDER — EPINEPHRINE 1 MG/ML
0.3 INJECTION, SOLUTION, CONCENTRATE INTRAVENOUS PRN
Status: CANCELLED | OUTPATIENT
Start: 2025-09-05

## 2025-09-05 RX ORDER — DIPHENHYDRAMINE HYDROCHLORIDE 50 MG/ML
50 INJECTION, SOLUTION INTRAMUSCULAR; INTRAVENOUS PRN
Status: CANCELLED | OUTPATIENT
Start: 2025-09-05

## 2025-09-05 RX ORDER — HYDROCODONE BITARTRATE AND ACETAMINOPHEN 10; 325 MG/1; MG/1
1 TABLET ORAL EVERY 6 HOURS PRN
Qty: 120 TABLET | Refills: 0 | Status: SHIPPED | OUTPATIENT
Start: 2025-09-05 | End: 2025-10-05

## 2025-09-05 RX ORDER — HEPARIN SODIUM (PORCINE) LOCK FLUSH IV SOLN 100 UNIT/ML 100 UNIT/ML
500 SOLUTION INTRAVENOUS PRN
Status: CANCELLED | OUTPATIENT
Start: 2025-09-05

## 2025-09-05 RX ORDER — HEPARIN 100 UNIT/ML
500 SYRINGE INTRAVENOUS PRN
Status: DISCONTINUED | OUTPATIENT
Start: 2025-09-05 | End: 2025-09-07 | Stop reason: HOSPADM

## 2025-09-05 RX ADMIN — SODIUM CHLORIDE 480 MG: 9 INJECTION, SOLUTION INTRAVENOUS at 08:42

## 2025-09-05 RX ADMIN — DENOSUMAB 120 MG: 120 INJECTION SUBCUTANEOUS at 09:14

## 2025-09-05 RX ADMIN — SODIUM CHLORIDE, PRESERVATIVE FREE 10 ML: 5 INJECTION INTRAVENOUS at 09:14

## 2025-09-05 RX ADMIN — HEPARIN 500 UNITS: 100 SYRINGE at 09:14

## (undated) DEVICE — NDL HYPO PRECISIONGLIDE REG 22G 1 1/2

## (undated) DEVICE — 3M™ IOBAN™ 2 ANTIMICROBIAL INCISE DRAPE 6650EZ: Brand: IOBAN™ 2

## (undated) DEVICE — TBG SMPL FLTR LINE NASL 02/C02 A/ BX/100

## (undated) DEVICE — NDL HYPO PRECISIONGLIDE REG 25G 1 1/2

## (undated) DEVICE — SUT SILK 2/0 SH CR8 18IN CR8 C012D

## (undated) DEVICE — SPNG GZ WOVN 4X4IN 12PLY 10/BX STRL

## (undated) DEVICE — YANKAUER,BULB TIP WITH VENT: Brand: ARGYLE

## (undated) DEVICE — ELECTRD BLD EDGE/INSUL1P 2.4X5.1MM STRL

## (undated) DEVICE — UTILITY MARKER W/MED LABELS: Brand: MEDLINE

## (undated) DEVICE — SYR SLP TP 10ML DISP

## (undated) DEVICE — MASK,OXYGEN,MED CONC,ADLT,7' TUB, UC: Brand: PENDING

## (undated) DEVICE — SENSR O2 OXIMAX FNGR A/ 18IN NONSTR

## (undated) DEVICE — ENDOGATOR AUXILIARY WATER JET CONNECTOR: Brand: ENDOGATOR

## (undated) DEVICE — THE CHANNEL CLEANING BRUSH IS A NYLON FLEXI BRUSH ATTACHED TO A FLEXIBLE PLASTIC SHEATH DESIGNED TO SAFELY REMOVE DEBRIS FROM FLEXIBLE ENDOSCOPES.

## (undated) DEVICE — 3M™ STERI-STRIP™ REINFORCED ADHESIVE SKIN CLOSURES, R1546, 1/4 IN X 4 IN (6 MM X 100 MM), 10 STRIPS/ENVELOPE: Brand: 3M™ STERI-STRIP™

## (undated) DEVICE — DRSNG SURESITE WNDW 4X4.5

## (undated) DEVICE — CUFF,BP,DISP,1 TUBE,ADULT,HP: Brand: MEDLINE

## (undated) DEVICE — Device: Brand: DEFENDO AIR/WATER/SUCTION AND BIOPSY VALVE

## (undated) DEVICE — SUT VIC 4/0 P3 18IN UD VCP494H

## (undated) DEVICE — TRY PREP SCRB VAG PVP

## (undated) DEVICE — CVR UNIV C/ARM

## (undated) DEVICE — MSK O2 MD CONCENTR A/ LF 7FT 1P/U

## (undated) DEVICE — ADHS LIQ MASTISOL 2/3ML

## (undated) DEVICE — SNAR POLYP SENSATION MICRO OVL 13 240X40

## (undated) DEVICE — INTENDED FOR TISSUE SEPARATION, AND OTHER PROCEDURES THAT REQUIRE A SHARP SURGICAL BLADE TO PUNCTURE OR CUT.: Brand: BARD-PARKER ® STAINLESS STEEL BLADES

## (undated) DEVICE — SPNG GZ 2S 2X2 8PLY STRL PK/2

## (undated) DEVICE — PAD MINOR UNIVERSAL: Brand: MEDLINE INDUSTRIES, INC.

## (undated) DEVICE — SUT SILK 2/0 SUTUPAK TIES 24IN SA75H

## (undated) DEVICE — THE SINGLE USE ETRAP – POLYP TRAP IS USED FOR SUCTION RETRIEVAL OF ENDOSCOPICALLY REMOVED POLYPS.: Brand: ETRAP

## (undated) DEVICE — SYR LL TP 10ML STRL

## (undated) DEVICE — PAD GRND REM POLYHESIVE A/ DISP

## (undated) DEVICE — PK TURNOVER RM ADV

## (undated) DEVICE — SUT VIC 3/0 RB1 27IN UD VCP215H

## (undated) DEVICE — GLV SURG TRIUMPH MICRO PF LTX 7.5 STRL